# Patient Record
Sex: MALE | Race: WHITE | NOT HISPANIC OR LATINO | Employment: OTHER | URBAN - METROPOLITAN AREA
[De-identification: names, ages, dates, MRNs, and addresses within clinical notes are randomized per-mention and may not be internally consistent; named-entity substitution may affect disease eponyms.]

---

## 2017-11-22 ENCOUNTER — TRANSFERRED RECORDS (OUTPATIENT)
Dept: HEALTH INFORMATION MANAGEMENT | Facility: CLINIC | Age: 62
End: 2017-11-22

## 2017-12-04 ENCOUNTER — TRANSFERRED RECORDS (OUTPATIENT)
Dept: HEALTH INFORMATION MANAGEMENT | Facility: CLINIC | Age: 62
End: 2017-12-04

## 2017-12-06 ENCOUNTER — TRANSFERRED RECORDS (OUTPATIENT)
Dept: HEALTH INFORMATION MANAGEMENT | Facility: CLINIC | Age: 62
End: 2017-12-06

## 2017-12-07 ENCOUNTER — TRANSFERRED RECORDS (OUTPATIENT)
Dept: HEALTH INFORMATION MANAGEMENT | Facility: CLINIC | Age: 62
End: 2017-12-07

## 2018-02-12 ENCOUNTER — TRANSFERRED RECORDS (OUTPATIENT)
Dept: HEALTH INFORMATION MANAGEMENT | Facility: CLINIC | Age: 63
End: 2018-02-12

## 2018-06-14 ENCOUNTER — TRANSFERRED RECORDS (OUTPATIENT)
Dept: HEALTH INFORMATION MANAGEMENT | Facility: CLINIC | Age: 63
End: 2018-06-14

## 2018-08-22 ENCOUNTER — TRANSFERRED RECORDS (OUTPATIENT)
Dept: HEALTH INFORMATION MANAGEMENT | Facility: CLINIC | Age: 63
End: 2018-08-22

## 2018-10-10 ENCOUNTER — TRANSFERRED RECORDS (OUTPATIENT)
Dept: HEALTH INFORMATION MANAGEMENT | Facility: CLINIC | Age: 63
End: 2018-10-10

## 2018-10-10 LAB
ALT SERPL-CCNC: 87 U/L (ref 7–52)
AST SERPL-CCNC: 41 U/L (ref 12–39)
CREAT SERPL-MCNC: 1.1 MG/DL (ref 0.7–1.3)
GFR SERPL CREATININE-BSD FRML MDRD: >60 ML/MIN/1.73M2
GLUCOSE SERPL-MCNC: 194 MG/DL (ref 70–100)
POTASSIUM SERPL-SCNC: 4.6 MMOL/L (ref 3.1–5.1)

## 2019-01-29 ENCOUNTER — TELEPHONE (OUTPATIENT)
Dept: VASCULAR SURGERY | Facility: CLINIC | Age: 64
End: 2019-01-29

## 2019-01-29 NOTE — TELEPHONE ENCOUNTER
Dx 5/1/14 colon cancer  Scan saw that it had also mets into liver    Had Colectomy in ascending colon June 2014.     8/14: hepatectomy at [facility] 2/3rd of his right liver.  Treated after with irinotecan  No tolerate oxaliplatan- due to neuropathy     Was told that Margins good, MD was encouraging.    F/u with CT and was noted to be Cancer free    Last CT scan noted on 3/2016 that it was back.   Went to [facility] in [place], as [facility] hospital stated that it was one lesion    But Bernard said that it was 4 lesions noted in the liver. Discouraged and went back to [facility].     Recommended irinotecan with 12 txt, once a week.     Did 2 txt and then went searching again for another MD.    Found: Dr Gerber Pacheco,Oncologist  at [place] in [place],     He then started to Notice a lump in right side of his abd wall,     12/2016: surgeon removed the mass,   Baldomero Koroma MD , path came back as  Adenocarcinoma.  Dr. Koroma:  suspicious that it was seeding from the liver biopsy at the time of colectomy.    Since then. Dr. Pacheco rec Dr. Aguilar Hogan, IR at [facility], after several months of contemplating treatment, on 12/2017: Y90 was performed, tolerated it well.     Did also have TACE treatment x 2 afterwards,  Last treatment August 2018  Last followup last week.     Plan: pet/ct scan.     Seek 2nd opinion at  of MANI Galarza, RN, BSN  Interventional Radiology Nurse Coordinator   Phone: [phone]

## 2019-02-05 ENCOUNTER — TELEPHONE (OUTPATIENT)
Dept: ONCOLOGY | Facility: CLINIC | Age: 64
End: 2019-02-05

## 2019-02-05 ENCOUNTER — PRE VISIT (OUTPATIENT)
Dept: RADIOLOGY | Facility: CLINIC | Age: 64
End: 2019-02-05

## 2019-02-05 ENCOUNTER — OFFICE VISIT (OUTPATIENT)
Dept: RADIOLOGY | Facility: CLINIC | Age: 64
End: 2019-02-05
Attending: RADIOLOGY
Payer: MEDICARE

## 2019-02-05 VITALS
BODY MASS INDEX: 26.1 KG/M2 | OXYGEN SATURATION: 97 % | HEART RATE: 80 BPM | WEIGHT: 203.4 LBS | TEMPERATURE: 98.1 F | HEIGHT: 74 IN | SYSTOLIC BLOOD PRESSURE: 130 MMHG | DIASTOLIC BLOOD PRESSURE: 72 MMHG

## 2019-02-05 DIAGNOSIS — C18.9 METASTATIC COLON CANCER TO LIVER (H): Primary | ICD-10-CM

## 2019-02-05 DIAGNOSIS — C78.7 METASTATIC COLON CANCER TO LIVER (H): Primary | ICD-10-CM

## 2019-02-05 PROCEDURE — G0463 HOSPITAL OUTPT CLINIC VISIT: HCPCS

## 2019-02-05 RX ORDER — RISPERIDONE 4 MG/1
4 TABLET ORAL AT BEDTIME
Status: ON HOLD | COMMUNITY
End: 2019-04-22

## 2019-02-05 RX ORDER — DIVALPROEX SODIUM 500 MG/1
1000 TABLET, EXTENDED RELEASE ORAL DAILY
Status: ON HOLD | COMMUNITY
End: 2019-04-22

## 2019-02-05 RX ORDER — CLONAZEPAM 0.5 MG/1
0.5 TABLET ORAL
Status: ON HOLD | COMMUNITY
End: 2019-04-22

## 2019-02-05 RX ORDER — MULTIVIT WITH MINERALS/LUTEIN
1 TABLET ORAL DAILY
Status: ON HOLD | COMMUNITY
End: 2019-04-22

## 2019-02-05 RX ORDER — TRAZODONE HYDROCHLORIDE 50 MG/1
50 TABLET, FILM COATED ORAL AT BEDTIME
Status: ON HOLD | COMMUNITY
End: 2019-04-22

## 2019-02-05 RX ORDER — METHYLPHENIDATE HYDROCHLORIDE 10 MG/1
10 CAPSULE, EXTENDED RELEASE ORAL DAILY
Status: ON HOLD | COMMUNITY
End: 2019-04-22

## 2019-02-05 SDOH — HEALTH STABILITY: MENTAL HEALTH: HOW OFTEN DO YOU HAVE A DRINK CONTAINING ALCOHOL?: MONTHLY OR LESS

## 2019-02-05 ASSESSMENT — MIFFLIN-ST. JEOR: SCORE: 1777.37

## 2019-02-05 ASSESSMENT — PAIN SCALES - GENERAL: PAINLEVEL: NO PAIN (0)

## 2019-02-05 NOTE — TELEPHONE ENCOUNTER
ONCOLOGY INTAKE: Records Information      APPT INFORMATION: 02/14 W/Dr. Srivastava at [facility]   Referring provider:  Trinh Landis MD  Referring provider s clinic:  NA  Reason for visit/diagnosis:  Metastatic colon cancer to liver (H) [C18.9, C78.7]    Were the records received with the referral (via [employer])? No    Has patient been seen for any external appt for this diagnosis (enter clinic/location)? Yes    Dx: Metastatic colon cancer to liver (H) [C18.9, C78.7]: Caller PT: Ref by:  Trinh Landis MD:   Records in [facility]   not sure for Outside records

## 2019-02-05 NOTE — LETTER
2019       RE: Los Huang  [address]  [place] [address]     Dear Colleague,    Thank you for referring your patient, Los Huang, to the [facility]. Please see a copy of my visit note below.        Interventional Radiology Clinic Visit  Chief Complaint   Patient presents with     Oncology Clinic Visit     New; Met Colon Ca     HPI:    Pt is a 63M with a history of metastatic CRC, dx in  with oligometastatic disease to liver at the time of diagnosis.  Per the chart, He was treated with ascending colectomy  and R hepatectomy in .  Was then tx with chemotx, eventually had recurrance of hepatic mets in .  Since then has been treated with multiple LDT, initially with y90 but since then with TACE x4. It also sounds like he had an abdominal wall met that was resected at one point.  Most recent treatment was TACE in .      He presents today to establish care.  He recently moved to [place] to be closer to his adult children as well as plan to seek care at a new facility.  He states that he had originally planned to seek care at [facility] when he moved here, but has been unable to gather required documents for [place].  He states that he just wanted to be somewhere he could be placed on a trial.  He had been offered continued treatment with Y90 in [place].  He states that in the past he had preferred TACE over chemotherapy when possible due to convenience.  His visit with us today is his first with new providers since moving to [place].  He has not seen medical oncology.      PMH: No past medical history on file.    PSH: No past surgical history on file.    Family History: No family history on file.    Social History:   Social History     Tobacco Use     Smoking status: Former Smoker     Types: Cigarettes     Start date:      Last attempt to quit:      Years since quittin.1     Smokeless tobacco: Former User     Types: Snuff     Quit  "date: 2008   Substance Use Topics     Alcohol use: Yes     Frequency: Monthly or less     Drug use: No     Medications:   No current outpatient medications on file.     ROS:   Negative other than mentioned in HPI.     EXAM:  /72   Pulse 80   Temp 98.1  F (36.7  C) (Oral)   Ht 1.88 m (6' 2\")   Wt 92.3 kg (203 lb 6.4 oz)   SpO2 97%   BMI 26.12 kg/m     Los is a 63 year old male    General: Well appearing, no acute distress  Eyes: anicteric  CV: Regular rate and rhythm  Resp: Clear to auscultation bilaterally, non labored breathing on room air.    Labs:   No results found for: WBC  No results found for: INR   No results found for: PLT     Imaging Results:   PET ct from 10/10 reviewed.  He has two residual metabolically active liver lesions.  The distrubution of hypermetabolism is much small that the overall lesions on the CT component, likely indicated at least partial response to the prior treatments, although difficult to state with certainty.  He also has 2 FDG avid L axillary LN of unclear significance.       Assessment and Plan:     Pt is a 63M with a hx of R colon Ca with liver and abdominal wall mets with multiple prior treatments, included R hemicolectomy, R abd wall met resection, R hepatectomy, Y90 x1 and TACE x4 to residual liver mets.  He presents to our clinic to establish care in a new system. He may be a candidate for further LDT although his overall care should be managed by medical oncology. At this point we only have limited outside records.      -Referral to medical oncology to establish treatment    -Will follow up with the patient after examining the most recent imaging and discussing with medical oncology.     -May consider further LDT to the residual mets, but need to confer with medical onc first.       Raymon Rubalcava MD  Interventional Radiology Fellow      A total of 30 minutes was spent in care for the patient, of which >50% was spent in counseling and co-ordination of " care.         I have seen the patient with the fellow and agree with the plan.    Again, thank you for allowing me to participate in the care of your patient.      Sincerely,    Trinh Landis MD

## 2019-02-05 NOTE — PATIENT INSTRUCTIONS
1. We will refer you to see an Oncologist.     2. We will also review the rest of your images after they are uploaded and consult with an Oncologist to see what is the best plan of care.     Please call me should you have any other questions. It was a pleasure to see you today.    Elvira Galarza RN, BSN  Interventional Radiology Nurse Coordinator   Phone: [phone]

## 2019-02-05 NOTE — PROGRESS NOTES
Interventional Radiology Clinic Visit  Chief Complaint   Patient presents with     Oncology Clinic Visit     New; Met Colon Ca     HPI:    Pt is a 63M with a history of metastatic CRC, dx in  with oligometastatic disease to liver at the time of diagnosis.  Per the chart, He was treated with ascending colectomy  and R hepatectomy in .  Was then tx with chemotx, eventually had recurrance of hepatic mets in 2016.  Since then has been treated with multiple LDT, initially with y90 but since then with TACE x4. It also sounds like he had an abdominal wall met that was resected at one point.  Most recent treatment was TACE in .      He presents today to establish care.  He recently moved to [place] to be closer to his adult children as well as plan to seek care at a new facility.  He states that he had originally planned to seek care at [facility] when he moved here, but has been unable to gather required documents for [place].  He states that he just wanted to be somewhere he could be placed on a trial.  He had been offered continued treatment with Y90 in [place].  He states that in the past he had preferred TACE over chemotherapy when possible due to convenience.  His visit with us today is his first with new providers since moving to [place].  He has not seen medical oncology.      PMH: No past medical history on file.    PSH: No past surgical history on file.    Family History: No family history on file.    Social History:   Social History     Tobacco Use     Smoking status: Former Smoker     Types: Cigarettes     Start date:      Last attempt to quit: 2003     Years since quittin.1     Smokeless tobacco: Former User     Types: Snuff     Quit date:    Substance Use Topics     Alcohol use: Yes     Frequency: Monthly or less     Drug use: No     Medications:   No current outpatient medications on file.     ROS:   Negative other than mentioned in HPI.     EXAM:  /72   Pulse 80   " Temp 98.1  F (36.7  C) (Oral)   Ht 1.88 m (6' 2\")   Wt 92.3 kg (203 lb 6.4 oz)   SpO2 97%   BMI 26.12 kg/m    Los is a 63 year old male    General: Well appearing, no acute distress  Eyes: anicteric  CV: Regular rate and rhythm  Resp: Clear to auscultation bilaterally, non labored breathing on room air.    Labs:   No results found for: WBC  No results found for: INR   No results found for: PLT     Imaging Results:   PET ct from 10/10 reviewed.  He has two residual metabolically active liver lesions.  The distrubution of hypermetabolism is much small that the overall lesions on the CT component, likely indicated at least partial response to the prior treatments, although difficult to state with certainty.  He also has 2 FDG avid L axillary LN of unclear significance.       Assessment and Plan:     Pt is a 63M with a hx of R colon Ca with liver and abdominal wall mets with multiple prior treatments, included R hemicolectomy, R abd wall met resection, R hepatectomy, Y90 x1 and TACE x4 to residual liver mets.  He presents to our clinic to establish care in a new system. He may be a candidate for further LDT although his overall care should be managed by medical oncology. At this point we only have limited outside records.      -Referral to medical oncology to establish treatment    -Will follow up with the patient after examining the most recent imaging and discussing with medical oncology.     -May consider further LDT to the residual mets, but need to confer with medical onc first.       Raymon Rubalcava MD  Interventional Radiology Fellow      A total of 30 minutes was spent in care for the patient, of which >50% was spent in counseling and co-ordination of care.         I have seen the patient with the fellow and agree with the plan.  "

## 2019-02-05 NOTE — NURSING NOTE
"Oncology Rooming Note    February 5, 2019 1:56 PM   Los Huang is a 63 year old male who presents for:    Chief Complaint   Patient presents with     Oncology Clinic Visit     New; Met Colon Ca     Initial Vitals: /72   Pulse 80   Temp 98.1  F (36.7  C) (Oral)   Ht 1.88 m (6' 2\")   Wt 92.3 kg (203 lb 6.4 oz)   SpO2 97%   BMI 26.12 kg/m   Estimated body mass index is 26.12 kg/m  as calculated from the following:    Height as of this encounter: 1.88 m (6' 2\").    Weight as of this encounter: 92.3 kg (203 lb 6.4 oz). Body surface area is 2.2 meters squared.  No Pain (0) Comment: Data Unavailable   No LMP for male patient.  Allergies reviewed: Yes  Medications reviewed: Yes    Medications: Medication refills not needed today.  Pharmacy name entered into EPIC: Data Unavailable    Clinical concerns: Pt here to discuss Met Colon CA.  Dr Landis  was notified.        Malina Man CMA              "

## 2019-02-05 NOTE — LETTER
Date:February 14, 2019      Patient was self referred, no letter generated. Do not send.        [facility] Information

## 2019-02-06 ENCOUNTER — TELEPHONE (OUTPATIENT)
Dept: PSYCHIATRY | Facility: CLINIC | Age: 64
End: 2019-02-06
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
## 2019-02-06 NOTE — TELEPHONE ENCOUNTER
----- Message from Jeri Sharpe MA sent at 2/5/2019  3:27 PM CST -----  Regarding: Patient Records  [facility],    Patient Los Huang ([id number]) inquired about the CD's of his records he gave to you/Dr Landis today but, you had already left for the day. The patient would like to make sure he can got those records back, when he returns to the clinic next Thursday.     Please let me know if you have any questions.    Thanks very much!    Jeri Sharpe CMA

## 2019-02-12 ENCOUNTER — TELEPHONE (OUTPATIENT)
Dept: PSYCHIATRY | Facility: CLINIC | Age: 64
End: 2019-02-12

## 2019-02-12 NOTE — TELEPHONE ENCOUNTER
Social Work   Incoming/Outgoing Call  [facility]    Outgoing Call To: Los Huang    Reason for Call:  Requested by patient, see intake note below    Response/Plan:  Los currently has a 90 day supply of medications. He has calculated that he will run out around 4/8/19 and his appointment with psychiatric clinic is not until 4/17/19. LAURIE brainstormed options. He believes his past provider would write another refill for 1 weeks worth of medication. He is also on the cancellation list at our clinic. LAURIE provided information on APS (Acute Psychiatric Services) with [facility] that may be able to provide short term medications if needed. Los would prefer to move up his appointment time.    Los reports having a complicated case as he also has metastatic cancer in his liver. He reports reading a blackbox warning on one of his medications (Depakote XR) regarding effects on liver and is concerned. LAURIE offered to contact clinic's Pharmacy team about possibility of MTM visit. Los has an appointment with cancer clinic tomorrow (2/13/19). LAURIE also encouraged him to talk with his provider there as it is possible they have pharmacists on staff.    Los notes he does best when he has both medication and therapy support. He requested help finding a therapist. He would prefer to stay within provider system and has set up PCP care at [facility]. LAURIE provided information on Eugenio Ashby, who provides therapy at [place]. Los has My Chart and writer will provide a few additional referral ideas.    LAURIE encouraged Los to call if he needs additional resources or supports.      Will route to patient's current psychiatric provider(s) as an FYI.   Please call or EPIC message with any questions or concerns.    KIM Bernabe, [facility]  [phone]    ----- Message from Shanel Juarez sent at 1/30/2019 11:18 AM CST -----  Regarding: Pt Needs Help with Resources  Contact:  [phone]  Amado Alexander,     I was speaking with this pt. He's scared about what's going to happen to him because [place] and doesn't have a med provider or PCP. He needs help with find providers and is currently taking psych meds for bi-polar or schizoeffective, although he doesn't have another refill. I explained to him that we are scheduled out into April, but he doesn't want to wait too long.     His name is Solo and he can be reached at [phone].

## 2019-02-13 NOTE — TELEPHONE ENCOUNTER
RECORDS STATUS - ALL OTHER DIAGNOSIS      RECORDS RECEIVED FROM: Epic    DATE RECEIVED: February 13, 2019     NOTES STATUS DETAILS   OFFICE NOTE from referring provider      OFFICE NOTE from medical oncologist None per pt     DISCHARGE SUMMARY from hospital [facility] - and [facility]    DISCHARGE REPORT from the ER [facility] - and [facility]- dr. Aguilar leon.       OPERATIVE REPORT [facility] -  Dr. Shaun acevedo and [facility]- dr. Gerber castaneda        MEDICATION LIST [facility]     CLINICAL TRIAL TREATMENTS TO DATE None per pt     LABS     PATHOLOGY REPORTS Reports in epic     ANYTHING RELATED TO DIAGNOSIS [facility] and [facility]        GENONOMIC TESTING     TYPE: None per pt     IMAGING (NEED IMAGES & REPORT)     CT SCANS [facility]     MRI NA    MAMMO NA    ULTRASOUND NA    PET NA

## 2019-02-13 NOTE — TELEPHONE ENCOUNTER
I called Zuleyma at the clinic and she will let me know if  We need to reschedule due to a lack of records.  3:51 PM

## 2019-02-13 NOTE — TELEPHONE ENCOUNTER
Called [facility] to obtain MR Sowmya Lee Ann ALVES. LVM and explained urgency of the records.  3:17 PM

## 2019-02-14 ENCOUNTER — PRE VISIT (OUTPATIENT)
Dept: ONCOLOGY | Facility: CLINIC | Age: 64
End: 2019-02-14

## 2019-02-14 ENCOUNTER — ONCOLOGY VISIT (OUTPATIENT)
Dept: ONCOLOGY | Facility: CLINIC | Age: 64
End: 2019-02-14
Attending: RADIOLOGY
Payer: MEDICARE

## 2019-02-14 VITALS
WEIGHT: 210 LBS | BODY MASS INDEX: 26.95 KG/M2 | OXYGEN SATURATION: 95 % | DIASTOLIC BLOOD PRESSURE: 75 MMHG | SYSTOLIC BLOOD PRESSURE: 123 MMHG | HEIGHT: 74 IN | TEMPERATURE: 97 F | RESPIRATION RATE: 16 BRPM | HEART RATE: 76 BPM

## 2019-02-14 DIAGNOSIS — C78.7 METASTATIC COLON CANCER TO LIVER (H): ICD-10-CM

## 2019-02-14 DIAGNOSIS — C18.9 METASTATIC COLON CANCER TO LIVER (H): ICD-10-CM

## 2019-02-14 LAB
ALBUMIN SERPL-MCNC: 3.9 G/DL (ref 3.4–5)
ALP SERPL-CCNC: 96 U/L (ref 40–150)
ALT SERPL W P-5'-P-CCNC: 63 U/L (ref 0–70)
ANION GAP SERPL CALCULATED.3IONS-SCNC: 8 MMOL/L (ref 3–14)
AST SERPL W P-5'-P-CCNC: 36 U/L (ref 0–45)
BASOPHILS # BLD AUTO: 0.1 10E9/L (ref 0–0.2)
BASOPHILS NFR BLD AUTO: 0.9 %
BILIRUB SERPL-MCNC: 0.5 MG/DL (ref 0.2–1.3)
BUN SERPL-MCNC: 20 MG/DL (ref 7–30)
CALCIUM SERPL-MCNC: 8.5 MG/DL (ref 8.5–10.1)
CEA SERPL-MCNC: 0.7 UG/L (ref 0–2.5)
CHLORIDE SERPL-SCNC: 106 MMOL/L (ref 94–109)
CO2 SERPL-SCNC: 25 MMOL/L (ref 20–32)
CREAT SERPL-MCNC: 0.86 MG/DL (ref 0.66–1.25)
DIFFERENTIAL METHOD BLD: NORMAL
EOSINOPHIL # BLD AUTO: 0.3 10E9/L (ref 0–0.7)
EOSINOPHIL NFR BLD AUTO: 4.7 %
ERYTHROCYTE [DISTWIDTH] IN BLOOD BY AUTOMATED COUNT: 13.2 % (ref 10–15)
GFR SERPL CREATININE-BSD FRML MDRD: >90 ML/MIN/{1.73_M2}
GLUCOSE SERPL-MCNC: 91 MG/DL (ref 70–99)
HCT VFR BLD AUTO: 46.8 % (ref 40–53)
HGB BLD-MCNC: 15.3 G/DL (ref 13.3–17.7)
IMM GRANULOCYTES # BLD: 0 10E9/L (ref 0–0.4)
IMM GRANULOCYTES NFR BLD: 0.2 %
LYMPHOCYTES # BLD AUTO: 1.3 10E9/L (ref 0.8–5.3)
LYMPHOCYTES NFR BLD AUTO: 20.6 %
MCH RBC QN AUTO: 29.5 PG (ref 26.5–33)
MCHC RBC AUTO-ENTMCNC: 32.7 G/DL (ref 31.5–36.5)
MCV RBC AUTO: 90 FL (ref 78–100)
MONOCYTES # BLD AUTO: 0.7 10E9/L (ref 0–1.3)
MONOCYTES NFR BLD AUTO: 10.3 %
NEUTROPHILS # BLD AUTO: 4.1 10E9/L (ref 1.6–8.3)
NEUTROPHILS NFR BLD AUTO: 63.3 %
NRBC # BLD AUTO: 0 10*3/UL
NRBC BLD AUTO-RTO: 0 /100
PLATELET # BLD AUTO: 155 10E9/L (ref 150–450)
POTASSIUM SERPL-SCNC: 4 MMOL/L (ref 3.4–5.3)
PROT SERPL-MCNC: 7.1 G/DL (ref 6.8–8.8)
RBC # BLD AUTO: 5.18 10E12/L (ref 4.4–5.9)
SODIUM SERPL-SCNC: 138 MMOL/L (ref 133–144)
WBC # BLD AUTO: 6.4 10E9/L (ref 4–11)

## 2019-02-14 PROCEDURE — G0463 HOSPITAL OUTPT CLINIC VISIT: HCPCS | Mod: ZF

## 2019-02-14 PROCEDURE — 36415 COLL VENOUS BLD VENIPUNCTURE: CPT

## 2019-02-14 PROCEDURE — 80053 COMPREHEN METABOLIC PANEL: CPT | Performed by: INTERNAL MEDICINE

## 2019-02-14 PROCEDURE — 82378 CARCINOEMBRYONIC ANTIGEN: CPT | Performed by: INTERNAL MEDICINE

## 2019-02-14 PROCEDURE — 85025 COMPLETE CBC W/AUTO DIFF WBC: CPT | Performed by: INTERNAL MEDICINE

## 2019-02-14 PROCEDURE — 99205 OFFICE O/P NEW HI 60 MIN: CPT | Mod: ZP | Performed by: INTERNAL MEDICINE

## 2019-02-14 ASSESSMENT — MIFFLIN-ST. JEOR: SCORE: 1807.3

## 2019-02-14 ASSESSMENT — PAIN SCALES - GENERAL: PAINLEVEL: NO PAIN (0)

## 2019-02-14 NOTE — TELEPHONE ENCOUNTER
Spoke to [place] in path dept and it will take a couple days for them to send path. When ready she will overnight the slides. She has all the info needed to do so.   9:28 AM

## 2019-02-14 NOTE — LETTER
2/14/2019     RE: Los Huang  [address]  [facility] [address]     Dear Colleague,    Thank you for referring your patient, Los Huang, to the [facility]. Please see a copy of my visit note below.    Oncology initial visit:  Date on this visit: 2/14/2019    Los Huang  is referred by Dr.Jafar Landis for an oncology consultation. He requires evaluation for metastatic colon cancer    Primary Physician: No Ref-Primary, Physician     History Of Present Illness:    I took the history from the patient as well as reviewing outside records but all relevant outside records are not available at this time.  Mr. Huang is a 63 year old male who was diagnosed with metastatic colorectal cancer in 2014 with oligometastatic disease to liver at the time of diagnosis.   He was treated with ascending colectomy 6/14 and R hepatectomy in 8/14.  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  He completed 10 cycles of FOLFIRI around February of March 2015.), eventually had recurrance of hepatic mets in March 2016.  He was seeking several different opinions from different oncologist at that time so his treatment got delayed and he got cetuximab for 2 cycles during the summer 2016 and he had issues with skin the rash from it.  He also had an right upper quadrant abdominal wall met that was resected in Dec 2016.    Since then has been treated with multiple liver directed therapies, initially with y90 in Dec 2017 but since then with TACE x4, 1/9/18, 6/14/18, 7/10/18 and 8/22/18.        He also has possible Renal cell cancer of the Right lower pole of the kidney which has not been treated.    He comes in today and tells me that he recently relocated from [place] to [place].  Overall he has been feeling well but he has  noticed some mild right upper quadrant abdominal discomfort but it is not severe enough to take any pain medications.  Denies nausea or vomiting.  No significant weight loss.  He has been able to eat and drink well.  Off-and-on he has had diarrhea for 18 months and he takes 1-4 Imodium's every day.  Generally he takes 1 Imodium a day.  Denies infections.  No trouble breathing.  He has baseline mild numbness in his feet which predates the diagnosis of colon cancer but he does not have any other numbness anywhere else.  Overall his energy level is a stable although he is retired and he feels tired.  He is also on multiple psych medications and he believes that some of the lack of energy is due to that.    ECOG 1    ROS:  A comprehensive ROS was otherwise neg    Past Medical/Surgical History:  No past medical history on file.  No past surgical history on file.   Bipolar disorder  Schizoaffective dx  Colon cancer    Cancer History:   As above    Allergies:  Allergies as of 02/14/2019 - Reviewed 02/14/2019   Allergen Reaction Noted     Animal dander Other (See Comments) 02/14/2019     Current Medications:  Current Outpatient Medications   Medication Sig Dispense Refill     cholecalciferol (VITAMIN D3) 1000 units (25 mcg) capsule Take 1 capsule by mouth daily       clonazePAM (KLONOPIN) 0.5 MG tablet Take 0.5 mg by mouth nightly as needed for anxiety       divalproex sodium extended-release (DEPAKOTE ER) 500 MG 24 hr tablet Take 1,000 mg by mouth daily       methylphenidate (RITALIN LA) 10 MG 24 hr capsule Take 10 mg by mouth daily       multivitamin (CENTRUM SILVER) tablet Take 1 tablet by mouth daily       risperiDONE (RISPERDAL) 4 MG tablet Take 4 mg by mouth At Bedtime       traZODone (DESYREL) 50 MG tablet Take 50 mg by mouth At Bedtime        Family History:  No family history on file.   No known family history of cancers.  He has a son and a daughter who are healthy.  Social History:  Social History  "    Socioeconomic History     Marital status:      Spouse name: Not on file     Number of children: Not on file     Years of education: Not on file     Highest education level: Not on file   Social Needs     Financial resource strain: Not on file     Food insecurity - worry: Not on file     Food insecurity - inability: Not on file     Transportation needs - medical: Not on file     Transportation needs - non-medical: Not on file   Occupational History     Not on file   Tobacco Use     Smoking status: Former Smoker     Types: Cigarettes     Start date:      Last attempt to quit:      Years since quittin.1     Smokeless tobacco: Former User     Types: Snuff     Quit date:    Substance and Sexual Activity     Alcohol use: Yes     Frequency: Monthly or less     Drug use: No     Sexual activity: Not on file   Other Topics Concern     Not on file   Social History Narrative     Not on file   He used to smoke but quit in .  He drinks alcohol occasionally.  He was in [place] but recently relocated to [place] and he is going to move in his own apartment tomorrow.  His daughter lives close by.    Physical Exam:  /75   Pulse 76   Temp 97  F (36.1  C) (Tympanic)   Resp 16   Ht 1.88 m (6' 2\")   Wt 95.3 kg (210 lb)   SpO2 95%   BMI 26.96 kg/m     CONSTITUTIONAL: no acute distress  EYES: PERRLA, no palor or icterus.   ENT/MOUTH: no mouth lesions. Ears normal  CVS: s1s2 no m r g .   RESPIRATORY: clear to auscultation b/l  GI: soft.  Abdominal surgical scars are healed well.  He has minimal tenderness in the right upper quadrant but no organomegaly appreciated.   NEURO: AAOX3  Grossly non focal neuro exam apart from mild neuropathy in the feet.  INTEGUMENT: no obvious rashes  LYMPHATIC: no palpable cervical, supraclavicular, axillary or inguinal LAD  MUSCULOSKELETAL: Unremarkable. No bony tenderness.   EXTREMITIES: no edema  PSYCH: Mentation, mood and affect are normal. Decision making " capacity is intact    Laboratory/Imaging Studies  No results found for this or any previous visit.     Outside labs reviewed.  Outside his scans reviewed.    ASSESSMENT/PLAN:    We will try to get all outside medical records including his original slides and I would also like to confirm MSI status.  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  He has metastatic colorectal cancer to the liver.  He was treated with ascending colectomy in June 2014 followed by hepatectomy in August 2014.  He received FOLFOX x2 and FOLFIRI x10 after that.  He had recurrence of the liver metastases in 2016. He got 2 doses of Cetuximab in 2016. He has received multiple liver directed therapy since then the last one was in August 2018.  He also had excision of the right upper quadrant abdominal wall metastasis in December 2016.  On his most recent PET scan from October 2018 he had 2 residual metabolically active liver lesions and these were less conspicuous as compared to the previous CT scan.  He also had 2 FDG avid left axillary lymph nodes which likely were due to extravasation of the tracer which was injected in the left arm.    We discussed the situation in detail.  I would recommend repeating labs and getting a fresh set of his scans with a PET scan and then decide further management.  Potentially we can continue with additional liver directed therapy if possible.  But we will decide about it later on.    Right kidney lesion.  There is a lesion in the right lower pole of the kidney which is concerning for renal cell cancer.  We will repeat imaging and decide about its management.  Potentially it can be treated by local therapies by interventional radiology but we will make the final determination later on.    We will determine the follow-up accordingly.    All of his questions were answered to his satisfaction.  He is agreeable and comfortable with the plan.      Again, thank you for allowing me to participate in  the care of your patient.      Sincerely,    Patrice Srivastava MD

## 2019-02-14 NOTE — PROGRESS NOTES
Oncology initial visit:  Date on this visit: 2/14/2019    Los Huang  is referred by Dr.Jafar Landis for an oncology consultation. He requires evaluation for metastatic colon cancer    Primary Physician: No Ref-Primary, Physician     History Of Present Illness:    I took the history from the patient as well as reviewing outside records but all relevant outside records are not available at this time.  Mr. Huang is a 63 year old male who was diagnosed with metastatic colorectal cancer in 2014 with oligometastatic disease to liver at the time of diagnosis.  He was treated with ascending colectomy 6/14 and R hepatectomy in 8/14.  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  He completed 10 cycles of FOLFIRI around February of March 2015.), eventually had recurrance of hepatic mets in March 2016.  He was seeking several different opinions from different oncologist at that time so his treatment got delayed and he got cetuximab for 2 cycles during the summer 2016 and he had issues with skin the rash from it. He also had an right upper quadrant abdominal wall met that was resected in Dec 2016.    Since then has been treated with multiple liver directed therapies, initially with y90 in Dec 2017 but since then with TACE x4, 1/9/18, 6/14/18, 7/10/18 and 8/22/18.        He also has possible Renal cell cancer of the Right lower pole of the kidney which has not been treated.    He comes in today and tells me that he recently relocated from [place] to [place].  Overall he has been feeling well but he has noticed some mild right upper quadrant abdominal discomfort but it is not severe enough to take any pain medications.  Denies nausea or vomiting.  No significant weight loss.  He has been able to eat and drink well.  Off-and-on he has had diarrhea for  18 months and he takes 1-4 Imodium's every day.  Generally he takes 1 Imodium a day.  Denies infections.  No trouble breathing.  He has baseline mild numbness in his feet which predates the diagnosis of colon cancer but he does not have any other numbness anywhere else.  Overall his energy level is a stable although he is retired and he feels tired.  He is also on multiple psych medications and he believes that some of the lack of energy is due to that.    ECOG 1    ROS:  A comprehensive ROS was otherwise neg    Past Medical/Surgical History:  No past medical history on file.  No past surgical history on file.   Bipolar disorder  Schizoaffective dx  Colon cancer    Cancer History:   As above    Allergies:  Allergies as of 02/14/2019 - Reviewed 02/14/2019   Allergen Reaction Noted     Animal dander Other (See Comments) 02/14/2019     Current Medications:  Current Outpatient Medications   Medication Sig Dispense Refill     cholecalciferol (VITAMIN D3) 1000 units (25 mcg) capsule Take 1 capsule by mouth daily       clonazePAM (KLONOPIN) 0.5 MG tablet Take 0.5 mg by mouth nightly as needed for anxiety       divalproex sodium extended-release (DEPAKOTE ER) 500 MG 24 hr tablet Take 1,000 mg by mouth daily       methylphenidate (RITALIN LA) 10 MG 24 hr capsule Take 10 mg by mouth daily       multivitamin (CENTRUM SILVER) tablet Take 1 tablet by mouth daily       risperiDONE (RISPERDAL) 4 MG tablet Take 4 mg by mouth At Bedtime       traZODone (DESYREL) 50 MG tablet Take 50 mg by mouth At Bedtime        Family History:  No family history on file.   No known family history of cancers.  He has a son and a daughter who are healthy.  Social History:  Social History     Socioeconomic History     Marital status:      Spouse name: Not on file     Number of children: Not on file     Years of education: Not on file     Highest education level: Not on file   Social Needs     Financial resource strain: Not on file     Food  "insecurity - worry: Not on file     Food insecurity - inability: Not on file     Transportation needs - medical: Not on file     Transportation needs - non-medical: Not on file   Occupational History     Not on file   Tobacco Use     Smoking status: Former Smoker     Types: Cigarettes     Start date:      Last attempt to quit: 2003     Years since quittin.1     Smokeless tobacco: Former User     Types: Snuff     Quit date:    Substance and Sexual Activity     Alcohol use: Yes     Frequency: Monthly or less     Drug use: No     Sexual activity: Not on file   Other Topics Concern     Not on file   Social History Narrative     Not on file   He used to smoke but quit in .  He drinks alcohol occasionally.  He was in [place] but recently relocated to [place] and he is going to move in his own apartment tomorrow.  His daughter lives close by.    Physical Exam:  /75   Pulse 76   Temp 97  F (36.1  C) (Tympanic)   Resp 16   Ht 1.88 m (6' 2\")   Wt 95.3 kg (210 lb)   SpO2 95%   BMI 26.96 kg/m    CONSTITUTIONAL: no acute distress  EYES: PERRLA, no palor or icterus.   ENT/MOUTH: no mouth lesions. Ears normal  CVS: s1s2 no m r g .   RESPIRATORY: clear to auscultation b/l  GI: soft.  Abdominal surgical scars are healed well.  He has minimal tenderness in the right upper quadrant but no organomegaly appreciated.   NEURO: AAOX3  Grossly non focal neuro exam apart from mild neuropathy in the feet.  INTEGUMENT: no obvious rashes  LYMPHATIC: no palpable cervical, supraclavicular, axillary or inguinal LAD  MUSCULOSKELETAL: Unremarkable. No bony tenderness.   EXTREMITIES: no edema  PSYCH: Mentation, mood and affect are normal. Decision making capacity is intact    Laboratory/Imaging Studies  No results found for this or any previous visit.     Outside labs reviewed.  Outside his scans reviewed.    ASSESSMENT/PLAN:    We will try to get all outside medical records including his original slides and I would " also like to confirm MSI status.  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  He has metastatic colorectal cancer to the liver.  He was treated with ascending colectomy in June 2014 followed by hepatectomy in August 2014.  He received FOLFOX x2 and FOLFIRI x10 after that.  He had recurrence of the liver metastases in 2016. He got 2 doses of Cetuximab in 2016. He has received multiple liver directed therapy since then the last one was in August 2018.  He also had excision of the right upper quadrant abdominal wall metastasis in December 2016.  On his most recent PET scan from October 2018 he had 2 residual metabolically active liver lesions and these were less conspicuous as compared to the previous CT scan.  He also had 2 FDG avid left axillary lymph nodes which likely were due to extravasation of the tracer which was injected in the left arm.    We discussed the situation in detail.  I would recommend repeating labs and getting a fresh set of his scans with a PET scan and then decide further management.  Potentially we can continue with additional liver directed therapy if possible.  But we will decide about it later on.    Right kidney lesion.  There is a lesion in the right lower pole of the kidney which is concerning for renal cell cancer.  We will repeat imaging and decide about its management.  Potentially it can be treated by local therapies by interventional radiology but we will make the final determination later on.    We will determine the follow-up accordingly.    All of his questions were answered to his satisfaction.  He is agreeable and comfortable with the plan.

## 2019-02-14 NOTE — NURSING NOTE
"Oncology Rooming Note    February 14, 2019 5:28 PM   Los Huang is a 63 year old male who presents for:    Chief Complaint   Patient presents with     Oncology Clinic Visit     Colon Cancer     Initial Vitals: /75   Pulse 76   Temp 97  F (36.1  C) (Tympanic)   Resp 16   Ht 1.88 m (6' 2\")   Wt 95.3 kg (210 lb)   SpO2 95%   BMI 26.96 kg/m   Estimated body mass index is 26.96 kg/m  as calculated from the following:    Height as of this encounter: 1.88 m (6' 2\").    Weight as of this encounter: 95.3 kg (210 lb). Body surface area is 2.23 meters squared.  No Pain (0) Comment: Data Unavailable   No LMP for male patient.  Allergies reviewed: Yes  Medications reviewed: Yes    Medications: Medication refills not needed today.  Pharmacy name entered into [employer]: [id number] - [place], [place] - [address] [facility] [address]    Clinical concerns: No New Concerns    5 minutes for nursing intake (face to face time)     DAWNA Walsh      "

## 2019-02-14 NOTE — TELEPHONE ENCOUNTER
Attempted to call [facility]  again to obtain MR Sowmya Had to [facility]. Explained the urgency of these records.    Correction- Pt mentioned he was seen at [place] - Due to his accent I misunderstood and thought he said [facility]- pt was not seen at [facility].  9:18 AM

## 2019-02-14 NOTE — TELEPHONE ENCOUNTER
[facility] records and [place] records as well as path reports scanned in and viewable under Media tab.  9:05 AM

## 2019-02-15 NOTE — NURSING NOTE
Chief Complaint   Patient presents with     Blood Draw     lab only appointment.  labs drawn by venipuncture by rosalba.       Bronwyn Long RN

## 2019-02-19 NOTE — PROGRESS NOTES
"  SUBJECTIVE:   Los Huang is a 63 year old male who presents to clinic today for the following health issues:       Polypharmacy  Screen for colon cancer  Need for hepatitis C screening test  Screening for HIV (human immunodeficiency virus)  Need for prophylactic vaccination and inoculation against influenza     New patient to the Internal Medicine department. Has seen Patrice Srivastava, hematologist with [facility] and that office visit assessment is clipped and pasted here.    Mr. Huang is currently dealing with diagnosis and treatment of metastatic colon cancer. He notes that he has a long history of mental health problems, and his mental health is not good at this time. He feels that because of his liver and colon cancer he is not long for this world, and would just like to live his life to the fullest and spend as much time with his grandchildren as he can. His psychologist has given him a stimulant to help him have more energy to get things done before he passes away. In 2014 when he was diagnosed, he was told he had 5 years to live, which will be this May 1st.   He is not looking for diagnostic or future cares today, but rather supportive care measures to make the coming times more comfortable. He notes that some of his psychiatric meds cause his energy to be lower, calls them \"anti-motivation pills\". He cannot get in to see psychiatry very soon, will be seeing a new psychiatrist at the end of April. 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 He does have enough to get him through until he sees the psychiatrist. He is interested in the [facility] pharmacy program.   He is concerned that the Depakote he is taking could cause liver failure.     We can review all preventative healthcare maintenance goals. I have NOT listed all of this stuff as diagnoses in the diagnosis section. " Screening human immunodeficiency virus ( HIV ), screening  Hepatitis C, Immunizations for Shingrix vaccination against herpes zoster, tetanus booster, prophylactic vaccination and inoculation against influenza, fasting lipid panel, advanced directive discussion, Wellness physical exam, screening for abdominal aortic aneurysm     History Of Present Illness:     I took the history from the patient as well as reviewing outside records but all relevant outside records are not available at this time.  Mr. Huang is a 63 year old male who was diagnosed with metastatic colorectal cancer in 2014 with oligometastatic disease to liver at the time of diagnosis.  He was treated with ascending colectomy 6/14 and R hepatectomy in 8/14.  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  He completed 10 cycles of FOLFIRI around February of March 2015.), eventually had recurrance of hepatic mets in March 2016.  He was seeking several different opinions from different oncologist at that time so his treatment got delayed and he got cetuximab for 2 cycles during the summer 2016 and he had issues with skin the rash from it. He also had an right upper quadrant abdominal wall met that was resected in Dec 2016.    Since then has been treated with multiple liver directed therapies, initially with y90 in Dec 2017 but since then with TACE x4, 1/9/18, 6/14/18, 7/10/18 and 8/22/18.       He also has possible Renal cell cancer of the Right lower pole of the kidney which has not been treated.     He comes in today and tells me that he recently relocated from [place] to [place].  Overall he has been feeling well but he has noticed some mild right upper quadrant abdominal discomfort but it is not severe enough to take any pain medications.  Denies nausea or vomiting.  No significant  weight loss.  He has been able to eat and drink well.  Off-and-on he has had diarrhea for 18 months and he takes 1-4 Imodium's every day.  Generally he takes 1 Imodium a day.  Denies infections.  No trouble breathing.  He has baseline mild numbness in his feet which predates the diagnosis of colon cancer but he does not have any other numbness anywhere else.  Overall his energy level is a stable although he is retired and he feels tired.  He is also on multiple psych medications and he believes that some of the lack of energy is due to that.    ASSESSMENT/PLAN:     We will try to get all outside medical records including his original slides and I would also like to confirm MSI status.  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  He has metastatic colorectal cancer to the liver.  He was treated with ascending colectomy in June 2014 followed by hepatectomy in August 2014.  He received FOLFOX x2 and FOLFIRI x10 after that.  He had recurrence of the liver metastases in 2016. He got 2 doses of Cetuximab in 2016. He has received multiple liver directed therapy since then the last one was in August 2018.  He also had excision of the right upper quadrant abdominal wall metastasis in December 2016.  On his most recent PET scan from October 2018 he had 2 residual metabolically active liver lesions and these were less conspicuous as compared to the previous CT scan.  He also had 2 FDG avid left axillary lymph nodes which likely were due to extravasation of the tracer which was injected in the left arm.     We discussed the situation in detail.  I would recommend repeating labs and getting a fresh set of his scans with a PET scan and then decide further management.  Potentially we can continue with additional liver directed therapy if possible.  But we will decide about it later on.     Right kidney lesion.  There is a lesion in the right lower pole of the kidney which is concerning for renal cell cancer.   We will repeat imaging and decide about its management.  Potentially it can be treated by local therapies by interventional radiology but we will make the final determination later on.     We will determine the follow-up accordingly.     All of his questions were answered to his satisfaction.  He is agreeable and comfortable with the plan.       Problem list and histories reviewed & adjusted, as indicated.  Additional history: as documented    There is no problem list on file for this patient.    Past Surgical History:   Procedure Laterality Date     ABDOMEN SURGERY       BIOPSY       CHOLECYSTECTOMY       COLONOSCOPY       ORTHOPEDIC SURGERY         Social History     Tobacco Use     Smoking status: Former Smoker     Packs/day: 1.00     Years: 20.00     Pack years: 20.00     Types: Cigarettes     Start date:      Last attempt to quit:      Years since quittin.1     Smokeless tobacco: Former User     Types: Snuff     Quit date:    Substance Use Topics     Alcohol use: Yes     Frequency: Monthly or less     Comment: slight     Family History   Problem Relation Age of Onset     Osteoporosis Sister      Thyroid Disease Sister         Thyroid killed     Thyroid Disease Sister         Thyroid removed         Current Outpatient Medications   Medication Sig Dispense Refill     cholecalciferol (VITAMIN D3) 1000 units (25 mcg) capsule Take 1 capsule by mouth daily       clonazePAM (KLONOPIN) 0.5 MG tablet Take 0.5 mg by mouth nightly as needed for anxiety       divalproex sodium extended-release (DEPAKOTE ER) 500 MG 24 hr tablet Take 1,000 mg by mouth daily       methylphenidate (RITALIN LA) 10 MG 24 hr capsule Take 10 mg by mouth daily       multivitamin (CENTRUM SILVER) tablet Take 1 tablet by mouth daily       risperiDONE (RISPERDAL) 4 MG tablet Take 4 mg by mouth At Bedtime       traZODone (DESYREL) 50 MG tablet Take 50 mg by mouth At Bedtime       Labs reviewed in EPIC    Reviewed and updated as needed  "this visit by clinical staff       Reviewed and updated as needed this visit by Provider         ROS:  Constitutional, HEENT, cardiovascular, pulmonary, GI, , musculoskeletal, neuro, skin, endocrine and psych systems are negative, except as otherwise noted.    This document serves as a record of the services and decisions personally performed and made by Babar Mckinney MD. It was created on his behalf by Sonia Sanchez, a trained medical scribe. The creation of this document is based on the provider's statements to the medical scribe.  Sonia Sanchez February 25, 2019 3:13 PM    OBJECTIVE:     /64   Pulse 86   Temp 98.8  F (37.1  C) (Oral)   Resp 18   Ht 1.88 m (6' 2\")   Wt 95.7 kg (211 lb)   SpO2 96%   BMI 27.09 kg/m    Body mass index is 27.09 kg/m .  GENERAL: healthy, alert and no distress  MS: no gross musculoskeletal defects noted, no edema  SKIN: no suspicious lesions or rashes  PSYCH: mentation appears normal, affect normal/bright    Diagnostic Test Results:  No results found for this or any previous visit (from the past 24 hour(s)).    ASSESSMENT/PLAN:     1. Screen for colon cancer  A nonsensical diagnosis     2. Need for hepatitis C screening test  Postponed     3. Screening for HIV (human immunodeficiency virus)  Postponed     4. Need for prophylactic vaccination and inoculation against influenza  Declines     5. Polypharmacy  I agreed to help refill prescriptions and he doesn't need that, he has refills from his prior MD . He does have a good many questions about his medication and agreed that an medication therapy management consult  Was a good plan  - MED THERAPY MANAGE REFERRAL    There are no Patient Instructions on file for this visit.    The information in this document, created by the medical scribe for me, accurately reflects the services I personally performed and the decisions made by me. I have reviewed and approved this document for accuracy.    Babar Mckinney MD  [facility] " [place]

## 2019-02-21 PROCEDURE — 88341 IMHCHEM/IMCYTCHM EA ADD ANTB: CPT | Performed by: INTERNAL MEDICINE

## 2019-02-21 PROCEDURE — 88342 IMHCHEM/IMCYTCHM 1ST ANTB: CPT | Performed by: INTERNAL MEDICINE

## 2019-02-22 ENCOUNTER — HOSPITAL ENCOUNTER (OUTPATIENT)
Dept: PET IMAGING | Facility: CLINIC | Age: 64
Discharge: HOME | End: 2019-02-22
Attending: INTERNAL MEDICINE | Admitting: INTERNAL MEDICINE
Payer: MEDICARE

## 2019-02-22 DIAGNOSIS — C78.7 METASTATIC COLON CANCER TO LIVER (H): ICD-10-CM

## 2019-02-22 DIAGNOSIS — C18.9 METASTATIC COLON CANCER TO LIVER (H): ICD-10-CM

## 2019-02-22 LAB — GLUCOSE BLDC GLUCOMTR-MCNC: 94 MG/DL (ref 70–99)

## 2019-02-22 PROCEDURE — 34300033 ZZH RX 343: Performed by: INTERNAL MEDICINE

## 2019-02-22 PROCEDURE — 78816 PET IMAGE W/CT FULL BODY: CPT | Mod: PS

## 2019-02-22 PROCEDURE — 71260 CT THORAX DX C+: CPT

## 2019-02-22 PROCEDURE — 82962 GLUCOSE BLOOD TEST: CPT

## 2019-02-22 PROCEDURE — A9552 F18 FDG: HCPCS | Performed by: INTERNAL MEDICINE

## 2019-02-22 PROCEDURE — 25000128 H RX IP 250 OP 636: Performed by: INTERNAL MEDICINE

## 2019-02-22 RX ORDER — IOPAMIDOL 755 MG/ML
45-150 INJECTION, SOLUTION INTRAVASCULAR ONCE
Status: COMPLETED | OUTPATIENT
Start: 2019-02-22 | End: 2019-02-22

## 2019-02-22 RX ADMIN — IOPAMIDOL 128 ML: 755 INJECTION, SOLUTION INTRAVENOUS at 13:56

## 2019-02-22 RX ADMIN — FLUDEOXYGLUCOSE F-18 13.03 MCI.: 500 INJECTION, SOLUTION INTRAVENOUS at 13:00

## 2019-02-25 ENCOUNTER — OFFICE VISIT (OUTPATIENT)
Dept: FAMILY MEDICINE | Facility: CLINIC | Age: 64
End: 2019-02-25
Payer: MEDICARE

## 2019-02-25 VITALS
HEART RATE: 86 BPM | TEMPERATURE: 98.8 F | RESPIRATION RATE: 18 BRPM | DIASTOLIC BLOOD PRESSURE: 64 MMHG | BODY MASS INDEX: 27.08 KG/M2 | HEIGHT: 74 IN | OXYGEN SATURATION: 96 % | WEIGHT: 211 LBS | SYSTOLIC BLOOD PRESSURE: 108 MMHG

## 2019-02-25 DIAGNOSIS — Z11.4 SCREENING FOR HIV (HUMAN IMMUNODEFICIENCY VIRUS): ICD-10-CM

## 2019-02-25 DIAGNOSIS — Z79.899 POLYPHARMACY: Primary | ICD-10-CM

## 2019-02-25 DIAGNOSIS — Z23 NEED FOR PROPHYLACTIC VACCINATION AND INOCULATION AGAINST INFLUENZA: ICD-10-CM

## 2019-02-25 DIAGNOSIS — Z12.11 SCREEN FOR COLON CANCER: ICD-10-CM

## 2019-02-25 DIAGNOSIS — Z11.59 NEED FOR HEPATITIS C SCREENING TEST: ICD-10-CM

## 2019-02-25 PROCEDURE — 99214 OFFICE O/P EST MOD 30 MIN: CPT | Performed by: INTERNAL MEDICINE

## 2019-02-25 ASSESSMENT — ANXIETY QUESTIONNAIRES
IF YOU CHECKED OFF ANY PROBLEMS ON THIS QUESTIONNAIRE, HOW DIFFICULT HAVE THESE PROBLEMS MADE IT FOR YOU TO DO YOUR WORK, TAKE CARE OF THINGS AT HOME, OR GET ALONG WITH OTHER PEOPLE: NOT DIFFICULT AT ALL
7. FEELING AFRAID AS IF SOMETHING AWFUL MIGHT HAPPEN: SEVERAL DAYS
3. WORRYING TOO MUCH ABOUT DIFFERENT THINGS: SEVERAL DAYS
GAD7 TOTAL SCORE: 4
2. NOT BEING ABLE TO STOP OR CONTROL WORRYING: SEVERAL DAYS
6. BECOMING EASILY ANNOYED OR IRRITABLE: NOT AT ALL
5. BEING SO RESTLESS THAT IT IS HARD TO SIT STILL: NOT AT ALL
1. FEELING NERVOUS, ANXIOUS, OR ON EDGE: SEVERAL DAYS

## 2019-02-25 ASSESSMENT — PATIENT HEALTH QUESTIONNAIRE - PHQ9
5. POOR APPETITE OR OVEREATING: NOT AT ALL
SUM OF ALL RESPONSES TO PHQ QUESTIONS 1-9: 2

## 2019-02-25 ASSESSMENT — MIFFLIN-ST. JEOR: SCORE: 1811.84

## 2019-02-26 ASSESSMENT — ANXIETY QUESTIONNAIRES: GAD7 TOTAL SCORE: 4

## 2019-03-04 ENCOUNTER — HOSPITAL ENCOUNTER (OUTPATIENT)
Facility: CLINIC | Age: 64
Setting detail: SPECIMEN
Discharge: HOME | End: 2019-03-04
Admitting: INTERNAL MEDICINE
Payer: MEDICARE

## 2019-03-04 PROCEDURE — 00000346 ZZHCL STATISTIC REVIEW OUTSIDE SLIDES TC 88321: Performed by: INTERNAL MEDICINE

## 2019-03-04 PROCEDURE — 88323 CONSLTJ&REPRT MATRL PREP SLD: CPT | Performed by: INTERNAL MEDICINE

## 2019-03-06 ENCOUNTER — OFFICE VISIT (OUTPATIENT)
Dept: PSYCHOLOGY | Facility: CLINIC | Age: 64
End: 2019-03-06
Payer: MEDICARE

## 2019-03-06 DIAGNOSIS — F31.0: Primary | ICD-10-CM

## 2019-03-06 PROCEDURE — 90834 PSYTX W PT 45 MINUTES: CPT | Performed by: SOCIAL WORKER

## 2019-03-06 ASSESSMENT — ANXIETY QUESTIONNAIRES
7. FEELING AFRAID AS IF SOMETHING AWFUL MIGHT HAPPEN: NEARLY EVERY DAY
1. FEELING NERVOUS, ANXIOUS, OR ON EDGE: SEVERAL DAYS
IF YOU CHECKED OFF ANY PROBLEMS ON THIS QUESTIONNAIRE, HOW DIFFICULT HAVE THESE PROBLEMS MADE IT FOR YOU TO DO YOUR WORK, TAKE CARE OF THINGS AT HOME, OR GET ALONG WITH OTHER PEOPLE: NOT DIFFICULT AT ALL
2. NOT BEING ABLE TO STOP OR CONTROL WORRYING: NOT AT ALL
5. BEING SO RESTLESS THAT IT IS HARD TO SIT STILL: NOT AT ALL
GAD7 TOTAL SCORE: 6
6. BECOMING EASILY ANNOYED OR IRRITABLE: SEVERAL DAYS
3. WORRYING TOO MUCH ABOUT DIFFERENT THINGS: SEVERAL DAYS

## 2019-03-06 ASSESSMENT — PATIENT HEALTH QUESTIONNAIRE - PHQ9
SUM OF ALL RESPONSES TO PHQ QUESTIONS 1-9: 2
5. POOR APPETITE OR OVEREATING: NOT AT ALL

## 2019-03-06 NOTE — PROGRESS NOTES
Progress Note - Initial Session    Client Name:  Los Huang Date: 3-06-19         Service Type: Individual  Video Visit: No     Session Start Time: 11:00  Session End Time: 11:45     Session Length: 45    Session #: 1    Attendees: Client     DATA:  Diagnostic Assessment in progress.  Unable to complete documentation at the conclusion of the first session due to extensive social and mental health history.     Interactive Complexity: No  Crisis: No    Intervention:  Motivational Interviewing: PACE and MI spirit skills    ASSESSMENT:  Mental Status Assessment:  Appearance:   Appropriate   Eye Contact:   Good   Psychomotor Behavior: Normal   Attitude:   Cooperative   Orientation:   All  Speech   Rate / Production: Normal    Volume:  Normal   Mood:    Anxious  Elevated   Affect:    Bright  Expansive  Worrisome   Thought Content:  Referential Thinking   Thought Form:  Coherent  Logical   Insight:    Fair       Safety Issues and Plan for Safety and Risk Management:  Client denies current fears or concerns for personal safety.  Client denies current or recent suicidal ideation or behaviors.  Client denies current or recent homicidal ideation or behaviors.  Client denies current or recent self injurious behavior or ideation.  Client denies other safety concerns.  A safety and risk management plan has not been developed at this time, however client was given the after-hours number / 911 should there be a change in any of these risk factors.  Client reports there are no firearms in the house.      Diagnostic Criteria:  MANIC EPISODE - At least one lifetime manic episode is required for the dx of Bipolar I Disorder as evidenced by present symptoms or by history  A. A distinct period of abnormally and persistently elevated, expansive, or irritable mood, lasting at least 1 week (or any duration if hospitalization is necessary).   B. During the period of mood disturbance, three (or more) of the following  symptoms (four if the mood is only irritable) have persisted and have been present to a significant degree:   - inflated self-esteem or grandiosity    - decreased need for sleep (e.g., feels rested after only 3 hours of sleep)    - more talkative than usual or pressure to keep talking    - flight of ideas or subjectivie experience that thoughts are racing   - distractibility   - increase in goal-directed activity   - excessive involvement in pleasurable activities that have a high potential for painful consequences, such as spending money or sexual indiscretion.  C. The mood disturbance is sufficiently severe to cause marked impairment in social or occupational functioning or to necessitate hospitalization to prevent harm to self or others, or there are severe psychotic features  D. The symptoms are not attributable to the physiologicial effects of a substance or to another medical condition  E. The episode is sufficiently severe enough to cause marked impairment in social or occupational functioning or to necessitate hospitalization to prevent harm to self or others, or there are psychotic features  F. The symptoms are not due to the direct physiological effects of a substance (eg, a drug of abuse, a medication, or other treatment) or a general medical condition (eg, hyperthyroidism).      DSM5 Diagnoses: (Sustained by DSM5 Criteria Listed Above)  Diagnoses: 296.40 Bipolar I Disorder Current or Most Recent Episode Hypomanic with anxious distress  R/O Schizoaffective disorder  Psychosocial & Contextual Factors: has cancer dx, recent move to [place] from [place], difficulty making decisions, limited social support system  WHODAS 2.0 (12 item)            This questionnaire asks about difficulties due to health conditions. Health conditions  include  disease or illnesses, other health problems that may be short or long lasting,  injuries, mental health or emotional problems, and problems with alcohol or drugs.                      Think back over the past 30 days and answer these questions, thinking about how much  difficulty you had doing the following activities. For each question, please [place] only  one response.    S1 Standing for long periods such as 30 minutes? None =         1   S2 Taking care of household responsibilities? None =         1   S3 Learning a new task, for example, learning how to get to a new place? Mild =           2   S4 How much of a problem do you have joining community activities (for example, festivals, Rastafari or other activities) in the same way as anyone else can? Mild =           2   S5 How much have you been emotionally affected by your health problems? Moderate =   3     In the past 30 days, how much difficulty did you have in:   S6 Concentrating on doing something for ten minutes? Mild =           2   S7 Walking a long distance such as a kilometer (or equivalent)? None =         1   S8 Washing your whole body? None =         1   S9 Getting dressed? None =         1   S10 Dealing with people you do not know? None =         1   S11 Maintaining a friendship? None =         1   S12 Your day to day work? None =         1     H1 Overall, in the past 30 days, how many days were these difficulties present? Record number of days 30   H2 In the past 30 days, for how many days were you totally unable to carry out your usual activities or work because of any health condition? Record number of days  0   H3 In the past 30 days, not counting the days that you were totally unable, for how many days did you cut back or reduce your usual activities or work because of any health condition? Record number of days 5       Collateral Reports Completed:  Routed note to PCP      PLAN: (Homework, other):  Client scheduled ongoing services with [facility].        ARGELIA Walker

## 2019-03-06 NOTE — Clinical Note
Amado Eckert--I saw our patient today for his first session and he listed you as his PCP. I will complete his diagnostic assessment at our next session.  Very long history of mental health issues and he was very talkative and forth coming about his issues.  I have scheduled follow-up sessions with him in the future.  Let me know if you have any questions.  Best, Chris

## 2019-03-07 ASSESSMENT — ANXIETY QUESTIONNAIRES: GAD7 TOTAL SCORE: 6

## 2019-03-12 ENCOUNTER — TELEPHONE (OUTPATIENT)
Dept: VASCULAR SURGERY | Facility: CLINIC | Age: 64
End: 2019-03-12

## 2019-03-12 NOTE — TELEPHONE ENCOUNTER
Called pt on his home phone.     Informed him that we did receive a msg from Dr. Srivastava.   Understood that he had a PET/CT.   Informed him that we'd like to bring him in next week to talk about the scan as well as Y90 Sirs treatment.    Pt is agreeable    Will go ahead and schedule with Boby Conner 3/19 @ 130pm     He is aware of appt details/location    Will call me with any further questions     Elvira Galarza RN, BSN  Interventional Radiology Nurse Coordinator   Phone: [phone]

## 2019-03-15 LAB — COPATH REPORT: NORMAL

## 2019-03-18 ENCOUNTER — OFFICE VISIT (OUTPATIENT)
Dept: PSYCHIATRY | Facility: CLINIC | Age: 64
End: 2019-03-18
Attending: PSYCHIATRY & NEUROLOGY
Payer: MEDICARE

## 2019-03-18 VITALS
SYSTOLIC BLOOD PRESSURE: 97 MMHG | WEIGHT: 203.2 LBS | DIASTOLIC BLOOD PRESSURE: 66 MMHG | HEART RATE: 76 BPM | BODY MASS INDEX: 26.09 KG/M2

## 2019-03-18 DIAGNOSIS — Z79.899 ENCOUNTER FOR LONG-TERM (CURRENT) USE OF MEDICATIONS: Primary | ICD-10-CM

## 2019-03-18 PROCEDURE — G0463 HOSPITAL OUTPT CLINIC VISIT: HCPCS | Mod: ZF

## 2019-03-18 RX ORDER — BENZTROPINE MESYLATE 1 MG/1
1 TABLET ORAL DAILY
Status: ON HOLD | COMMUNITY
End: 2019-04-22

## 2019-03-18 ASSESSMENT — PAIN SCALES - GENERAL: PAINLEVEL: NO PAIN (0)

## 2019-03-18 NOTE — NURSING NOTE
Chief Complaint   Patient presents with     Eval/Assessment     Encounter for long-term (current) use of medications

## 2019-03-18 NOTE — PROGRESS NOTES
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 the adverse/toxic effect of Serotonin Modulators. Specifically, the risk of serotonin syndrome or serotonin toxicity may be increased.    ClonazePAM may diminish the therapeutic effect of Valproate Products. Valproate Products may decrease the serum concentration of ClonazePAM. ClonazePAM may increase the serum concentration of Valproate Products.    MANAGEMENT:  Monitoring for adverse effects, routine vitals, routine labs, using lowest therapeutic dose of [Ritalin] and patient is aware of risks     PLAN                                                                                                                        m2, h3     1) PSYCHOTROPIC MEDICATIONS:    For now will recommend continuing the patient's previous regimen:  - Risperidone 4 mg at bedtime  - Depakote ER 1000 mg at bedtime  - Trazodone 50 mg at bedtime  - Clonazepam 0.5 mg BID PRN  - Ritalin 10 mg daily (see above re: 3/13/19 rx by GABRIEL Pacheco MD)    2) THERAPY:  Continue with ARGELIA Walker    3) NEXT DUE:    Labs- Depakote and AP labs  EKG- PRN  Rating Scales- AIMS due    4) REFERRALS:  MTM- to address regimen in the context of metastatic liver cancer    5) RTC: 2-3 weeks    6)  CRISIS NUMBERS:   Provided routinely in AVS.  [facility] [phone] (clinic)    [phone] (after hours)  CRISIS TEXT LINE: Text [id number] from anywhere in [place], anytime, any crisis 24/7;  OR SEE www.crisistextline.org    TREATMENT RISK STATEMENT:  The risks, benefits, alternatives and potential adverse effects have been discussed and are understood by the pt. The pt understands the risks of using street drugs or alcohol. There are no medical contraindications, the pt agrees to treatment with the ability to do so. The pt knows to call the clinic for any problems or to access emergency care if needed.  Medical and substance use concerns are documented above.  Psychotropic drug interaction check was  done, including changes made today.    PROVIDER: Ziggy Guevara,     Patient staffed in clinic with Dr. Munguia who will sign the note.  Supervisor is Dr. Munguia.     Supervisor Attestation:  I met with Solo Huang along with the resident physician, Ziggy Guevara MD. I participated in key portions of the service, including the mental status examination and developing the plan of care. I reviewed key portions of the history with the resident. I agree with the findings and plan as documented in this note.  Alex Munguia MD

## 2019-03-19 ENCOUNTER — TELEPHONE (OUTPATIENT)
Dept: VASCULAR SURGERY | Facility: CLINIC | Age: 64
End: 2019-03-19

## 2019-03-19 NOTE — TELEPHONE ENCOUNTER
Pt calling to inform us that he would like to cancel his appt for today with Dr. Landis.   Did not state a reason as to why.    *Will go ahead and cancel appt.   Elvira Galarza RN, BSN  Interventional Radiology Nurse Coordinator   Phone: [phone]

## 2019-03-25 ENCOUNTER — TELEPHONE (OUTPATIENT)
Dept: PSYCHIATRY | Facility: CLINIC | Age: 64
End: 2019-03-25

## 2019-03-25 ASSESSMENT — PATIENT HEALTH QUESTIONNAIRE - PHQ9: SUM OF ALL RESPONSES TO PHQ QUESTIONS 1-9: 4

## 2019-03-25 NOTE — TELEPHONE ENCOUNTER
On 3/18/19 the patient signed an SERA authorizing the release of all pertinent records from Dr. Kain Pacheco to [facility] for the purpose of continuing care. This writer faxed this SERA to [phone] on 3/25/19, sent the original to scanning and held a copy in psychiatry until scanning completed.

## 2019-04-02 ENCOUNTER — OFFICE VISIT (OUTPATIENT)
Dept: RADIOLOGY | Facility: CLINIC | Age: 64
End: 2019-04-02
Attending: RADIOLOGY
Payer: MEDICARE

## 2019-04-02 VITALS
DIASTOLIC BLOOD PRESSURE: 70 MMHG | TEMPERATURE: 97.7 F | BODY MASS INDEX: 26.58 KG/M2 | HEART RATE: 74 BPM | WEIGHT: 207 LBS | SYSTOLIC BLOOD PRESSURE: 111 MMHG | OXYGEN SATURATION: 95 %

## 2019-04-02 DIAGNOSIS — C78.7 SECONDARY MALIGNANT NEOPLASM OF LIVER (H): Primary | ICD-10-CM

## 2019-04-02 PROCEDURE — G0463 HOSPITAL OUTPT CLINIC VISIT: HCPCS | Mod: ZF

## 2019-04-02 ASSESSMENT — ENCOUNTER SYMPTOMS
JAUNDICE: 0
COUGH DISTURBING SLEEP: 1
SNORES LOUDLY: 0
MUSCLE WEAKNESS: 1
JOINT SWELLING: 0
ABDOMINAL PAIN: 1
BOWEL INCONTINENCE: 0
MUSCLE CRAMPS: 1
MYALGIAS: 1
CONSTIPATION: 0
BLOATING: 0
RECTAL PAIN: 0
DYSPNEA ON EXERTION: 1
HEARTBURN: 0
HEMOPTYSIS: 0
NECK PAIN: 0
SHORTNESS OF BREATH: 0
ARTHRALGIAS: 0
BLOOD IN STOOL: 0
SPUTUM PRODUCTION: 0
COUGH: 1
STIFFNESS: 1
POSTURAL DYSPNEA: 0
WHEEZING: 0
BACK PAIN: 0
VOMITING: 0
NAUSEA: 0
DIARRHEA: 1

## 2019-04-02 ASSESSMENT — PAIN SCALES - GENERAL: PAINLEVEL: NO PAIN (0)

## 2019-04-02 NOTE — LETTER
2019       RE: Los Huang  [address]  [place] [address]     Dear Colleague,    Thank you for referring your patient, Los Huang, to the [facility]. Please see a copy of my visit note below.    Mr. Huang is a 63 year-old patient who has a long history of CRC with liver met that has been treated with Y 90 and Debiri in [place]. The patient has moved here to stay with his daughter and seek a second opinion. He is doing good overall but a little depressed. He has been seeing a psychologist in the last couple of weeks. I have seen his imaging. PET scan shows two lesions that are slightly bigger than before.        Past Medical History:   Diagnosis Date     Cancer (H)      Depressive disorder        Past Surgical History:   Procedure Laterality Date     ABDOMEN SURGERY       BIOPSY       CHOLECYSTECTOMY       COLONOSCOPY       ORTHOPEDIC SURGERY         Family History   Problem Relation Age of Onset     Osteoporosis Sister      Thyroid Disease Sister         Thyroid killed     Thyroid Disease Sister         Thyroid removed       Social History     Tobacco Use     Smoking status: Former Smoker     Packs/day: 1.00     Years: 20.00     Pack years: 20.00     Types: Cigarettes     Start date:      Last attempt to quit:      Years since quittin.2     Smokeless tobacco: Former User     Types: Snuff     Quit date:    Substance Use Topics     Alcohol use: Yes     Frequency: Monthly or less     Comment: slight     Impression and Plan:    I discussed the possibility of Y 90 and possibly TACE on these lesions. However, the patient is still uncertain and would like to think. I proposed that he can come back any time to see me if he would like to undergo any treatment. If not, I will see him in  with new imaging. He agreed with the plan.  Answers for HPI/ROS submitted by the patient on 2019   General Symptoms: No  Skin Symptoms: No  HENT  Symptoms: No  EYE SYMPTOMS: No  HEART SYMPTOMS: No  LUNG SYMPTOMS: Yes  INTESTINAL SYMPTOMS: Yes  URINARY SYMPTOMS: No  REPRODUCTIVE SYMPTOMS: No  SKELETAL SYMPTOMS: Yes  BLOOD SYMPTOMS: No  NERVOUS SYSTEM SYMPTOMS: No  MENTAL HEALTH SYMPTOMS: No  Cough: Yes  Sputum or phlegm: No  Coughing up blood: No  Difficulty breating or shortness of breath: No  Snoring: No  Wheezing: No  Difficulty breathing on exertion: Yes  Nighttime Cough: Yes  Difficulty breathing when lying flat: No  Heart burn or indigestion: No  Nausea: No  Vomiting: No  Abdominal pain: Yes  Bloating: No  Constipation: No  Diarrhea: Yes  Blood in stool: No  Black stools: No  Rectal or Anal pain: No  Fecal incontinence: No  Yellowing of skin or eyes: No  Vomit with blood: No  Change in stools: Yes  Back pain: No  Muscle aches: Yes  Neck pain: No  Swollen joints: No  Joint pain: No  Bone pain: No  Muscle cramps: Yes  Muscle weakness: Yes  Joint stiffness: Yes  Bone fracture: No      Again, thank you for allowing me to participate in the care of your patient.      Sincerely,    Trinh Landis MD

## 2019-04-02 NOTE — PATIENT INSTRUCTIONS
"We will call you with the appointment details of your Radioembolizaton procedure.     As discussed with you, I will need to seek insurance approval before scheduling you for your procedure. This may take up to two weeks, however I will keep you updated. Please feel free to call me for updates as well.     Please check into the Gold Waiting room, located on the main level, at [facility], located at [address] [place] [address].       Reminders:    -No solids or milk products 6 hours prior to appointment time.    -No clear liquids 2 hours prior to appointment time.     -Please take your morning medications as indicated with enough water to swallow.    -Please have a  as you will be going home afterwards.      Radioembolization consists of two procedures.     1.  Y90  Mapping- This procedure will help us see the vasculature of your liver and help us determine the shunting from the liver to the lung and gastric area.   Please allow for a total time of 3-5 hours for this day.  You will need to \"pass\" this procedure before the Delivery day. This means that if you're \"shunting\" calculation is higher than a certain percentage (20%) then we may not be able to move forward with treatment due to risk of the radioembolization particles migrating elsewhere besides the tumor itself. If you do pass then we will move forward with the Deliver day.     On the day of the appointment, you will check  in 1.5 hours early to your scheduled procedure.     2. Y90 Delivery procedure.  This procedure is the actual delivery of the radioactive particles into the liver.   This will take approximately 2 hours. Please also plan for at least 3-5 hours for this day as well.     On the day of the appointment you will check in 2 hours early to your scheduled procedure.        -Post follow up: We will call you 2-3 days after you leave to follow up after the procedure.     We will also have you return in 1 mos " with an MRI and follow upappt.       *Please keep in mind that you may have Post Embolization syndrome.  The reason for this is because the tumor is dying.     This includes:    -high fevers 101-102, which may last for a couple of days. We recommend using over the counter medications such as Tylenol or Ibuprofen.     -Nausea, in which we will give you medications after you are discharged home.     -Decreased appetite: We don't expect you to eat 3 full meals. Instead, we prefer that you  snack through out the day.     -Feeling fatigue: this is normal, however we recommend that you get up and walk around.     -Radiation Precaution (please do these things for 5 days)     - we advise that although you are not radiating a large amount of radiation, please refrain from being near small children, pregnant women and those who have a compromised immune system. We advise a 3 feet distance for no more than 30 minutes.     -PLEASE ALSO MAKE SURE THAT YOU FLUSH TWICE WHEN USING THE BATHROOM, and make sure to rinse the sink and tub after each time you use them.      -Wash your hands with soap and water after each visit to the bathroom.      -Use separate eating utensils. Wash them separately from your family's dishes.      -Avoid public transportation (buses, trains, taxis, light rail) and crowded places such as stores, restaurants, theaters and sporting events.      -Sleep alone, Avoid intimate contact; No kissing, or intercourse     **Please keep in mind to stay hydrated after the procedure. At Least 6 glasses of water a day.        *Abnormal symptoms in which to call or seek immediate help:  1. Confusion!!-GO TO THE EMERGENCY ROOM.  2. Swelling of the lower legs  3. Severe abdominal pain that doesn't go away with pain medications.   4. High fevers that do not come down with over the counter medications.      Should ANY of the above symptoms are exhibited, please seek immediate help or call our hospital at [phone] and ask  for the Interventional Radiologist On-call (after hours) or you can contact me (during business hours) 8-4pm.     Should you have any further questions, please call us at anytime. It has been a pleasure to see you today.           Elvira Galarza RN  Interventional Radiology Nurse Coordinator   Phone: [phone]

## 2019-04-02 NOTE — NURSING NOTE
"Oncology Rooming Note    April 2, 2019 2:40 PM   Los Huang is a 63 year old male who presents for:    Chief Complaint   Patient presents with     Oncology Clinic Visit     Return; Y90 Consult     Initial Vitals: /70   Pulse 74   Temp 97.7  F (36.5  C) (Oral)   Wt 93.9 kg (207 lb)   SpO2 95%   BMI 26.58 kg/m   Estimated body mass index is 26.58 kg/m  as calculated from the following:    Height as of 2/25/19: 1.88 m (6' 2\").    Weight as of this encounter: 93.9 kg (207 lb). Body surface area is 2.21 meters squared.  No Pain (0) Comment: Data Unavailable   No LMP for male patient.  Allergies reviewed: Yes  Medications reviewed: Yes    Medications: Medication refills not needed today.  Pharmacy name entered into [employer]: [id number] - [address]    Clinical concerns: No Concerns Boby was NOT notified.      Jeri Sharpe CMA              "

## 2019-04-02 NOTE — PROGRESS NOTES
Mr. Huang is a 63 year-old patient who has a long history of CRC with liver met that has been treated with Y 90 and Debiri in [place]. The patient has moved here to stay with his daughter and seek a second opinion. He is doing good overall but a little depressed. He has been seeing a psychologist in the last couple of weeks. I have seen his imaging. PET scan shows two lesions that are slightly bigger than before.        Past Medical History:   Diagnosis Date     Cancer (H)      Depressive disorder        Past Surgical History:   Procedure Laterality Date     ABDOMEN SURGERY       BIOPSY       CHOLECYSTECTOMY       COLONOSCOPY       ORTHOPEDIC SURGERY         Family History   Problem Relation Age of Onset     Osteoporosis Sister      Thyroid Disease Sister         Thyroid killed     Thyroid Disease Sister         Thyroid removed       Social History     Tobacco Use     Smoking status: Former Smoker     Packs/day: 1.00     Years: 20.00     Pack years: 20.00     Types: Cigarettes     Start date:      Last attempt to quit:      Years since quittin.2     Smokeless tobacco: Former User     Types: Snuff     Quit date:    Substance Use Topics     Alcohol use: Yes     Frequency: Monthly or less     Comment: slight     Impression and Plan:    I discussed the possibility of Y 90 and possibly TACE on these lesions. However, the patient is still uncertain and would like to think. I proposed that he can come back any time to see me if he would like to undergo any treatment. If not, I will see him in  with new imaging. He agreed with the plan.  Answers for HPI/ROS submitted by the patient on 2019   General Symptoms: No  Skin Symptoms: No  HENT Symptoms: No  EYE SYMPTOMS: No  HEART SYMPTOMS: No  LUNG SYMPTOMS: Yes  INTESTINAL SYMPTOMS: Yes  URINARY SYMPTOMS: No  REPRODUCTIVE SYMPTOMS: No  SKELETAL SYMPTOMS: Yes  BLOOD SYMPTOMS: No  NERVOUS SYSTEM SYMPTOMS: No  MENTAL HEALTH SYMPTOMS: No  Cough:  Yes  Sputum or phlegm: No  Coughing up blood: No  Difficulty breating or shortness of breath: No  Snoring: No  Wheezing: No  Difficulty breathing on exertion: Yes  Nighttime Cough: Yes  Difficulty breathing when lying flat: No  Heart burn or indigestion: No  Nausea: No  Vomiting: No  Abdominal pain: Yes  Bloating: No  Constipation: No  Diarrhea: Yes  Blood in stool: No  Black stools: No  Rectal or Anal pain: No  Fecal incontinence: No  Yellowing of skin or eyes: No  Vomit with blood: No  Change in stools: Yes  Back pain: No  Muscle aches: Yes  Neck pain: No  Swollen joints: No  Joint pain: No  Bone pain: No  Muscle cramps: Yes  Muscle weakness: Yes  Joint stiffness: Yes  Bone fracture: No

## 2019-04-03 ENCOUNTER — OFFICE VISIT (OUTPATIENT)
Dept: PSYCHOLOGY | Facility: CLINIC | Age: 64
End: 2019-04-03
Payer: MEDICARE

## 2019-04-03 DIAGNOSIS — C78.7 METASTATIC COLON CANCER TO LIVER (H): Primary | ICD-10-CM

## 2019-04-03 DIAGNOSIS — C18.9 METASTATIC COLON CANCER TO LIVER (H): Primary | ICD-10-CM

## 2019-04-03 DIAGNOSIS — F31.0: Primary | ICD-10-CM

## 2019-04-03 PROCEDURE — 90791 PSYCH DIAGNOSTIC EVALUATION: CPT | Performed by: SOCIAL WORKER
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
## 2019-04-03 NOTE — Clinical Note
Amado العلي-I completed client's diagnostic assessment to the best of my ability.  Client would be very tangential and had difficulty staying on topic and difficult to re-direct at times.  Client was friendly and cooperative.  Client has a lot of issues he is dealing with especially [place] and dealing with [place] diagnosis.  Let me know if you have any questions. Best, Chris

## 2019-04-04 ENCOUNTER — MYC MEDICAL ADVICE (OUTPATIENT)
Dept: PSYCHIATRY | Facility: CLINIC | Age: 64
End: 2019-04-04

## 2019-04-17 ENCOUNTER — HOSPITAL ENCOUNTER (INPATIENT)
Facility: CLINIC | Age: 64
LOS: 5 days | Discharge: HOME | DRG: 885 | End: 2019-04-22
Attending: EMERGENCY MEDICINE | Admitting: PSYCHIATRY & NEUROLOGY
Payer: MEDICARE

## 2019-04-17 DIAGNOSIS — F22 DELUSIONS (H): ICD-10-CM

## 2019-04-17 DIAGNOSIS — F90.1 ATTENTION DEFICIT HYPERACTIVITY DISORDER (ADHD), PREDOMINANTLY HYPERACTIVE TYPE: Primary | ICD-10-CM

## 2019-04-17 DIAGNOSIS — F31.9 BIPOLAR 1 DISORDER (H): ICD-10-CM

## 2019-04-17 DIAGNOSIS — R41.89 DISORGANIZED THOUGHT PROCESS: ICD-10-CM

## 2019-04-17 LAB
ALBUMIN SERPL-MCNC: 3.6 G/DL (ref 3.4–5)
ALP SERPL-CCNC: 79 U/L (ref 40–150)
ALT SERPL W P-5'-P-CCNC: 49 U/L (ref 0–70)
AMMONIA PLAS-SCNC: 36 UMOL/L (ref 10–50)
AMPHETAMINES UR QL SCN: NEGATIVE
ANION GAP SERPL CALCULATED.3IONS-SCNC: 8 MMOL/L (ref 3–14)
AST SERPL W P-5'-P-CCNC: 26 U/L (ref 0–45)
BARBITURATES UR QL: NEGATIVE
BASOPHILS # BLD AUTO: 0 10E9/L (ref 0–0.2)
BASOPHILS NFR BLD AUTO: 0.7 %
BENZODIAZ UR QL: NEGATIVE
BILIRUB SERPL-MCNC: 0.4 MG/DL (ref 0.2–1.3)
BUN SERPL-MCNC: 22 MG/DL (ref 7–30)
CALCIUM SERPL-MCNC: 8.1 MG/DL (ref 8.5–10.1)
CANNABINOIDS UR QL SCN: NEGATIVE
CHLORIDE SERPL-SCNC: 108 MMOL/L (ref 94–109)
CO2 SERPL-SCNC: 27 MMOL/L (ref 20–32)
COCAINE UR QL: NEGATIVE
CREAT SERPL-MCNC: 1.02 MG/DL (ref 0.66–1.25)
DIFFERENTIAL METHOD BLD: NORMAL
EOSINOPHIL # BLD AUTO: 0.3 10E9/L (ref 0–0.7)
EOSINOPHIL NFR BLD AUTO: 4.7 %
ERYTHROCYTE [DISTWIDTH] IN BLOOD BY AUTOMATED COUNT: 13.1 % (ref 10–15)
ETHANOL UR QL SCN: NEGATIVE
GFR SERPL CREATININE-BSD FRML MDRD: 76 ML/MIN/{1.73_M2}
GLUCOSE SERPL-MCNC: 77 MG/DL (ref 70–99)
HCT VFR BLD AUTO: 42.4 % (ref 40–53)
HGB BLD-MCNC: 14.2 G/DL (ref 13.3–17.7)
IMM GRANULOCYTES # BLD: 0 10E9/L (ref 0–0.4)
IMM GRANULOCYTES NFR BLD: 0 %
LYMPHOCYTES # BLD AUTO: 1.7 10E9/L (ref 0.8–5.3)
LYMPHOCYTES NFR BLD AUTO: 28.9 %
MCH RBC QN AUTO: 30.1 PG (ref 26.5–33)
MCHC RBC AUTO-ENTMCNC: 33.5 G/DL (ref 31.5–36.5)
MCV RBC AUTO: 90 FL (ref 78–100)
MONOCYTES # BLD AUTO: 0.9 10E9/L (ref 0–1.3)
MONOCYTES NFR BLD AUTO: 15.1 %
NEUTROPHILS # BLD AUTO: 2.9 10E9/L (ref 1.6–8.3)
NEUTROPHILS NFR BLD AUTO: 50.6 %
NRBC # BLD AUTO: 0 10*3/UL
NRBC BLD AUTO-RTO: 0 /100
OPIATES UR QL SCN: NEGATIVE
PLATELET # BLD AUTO: 164 10E9/L (ref 150–450)
POTASSIUM SERPL-SCNC: 3.7 MMOL/L (ref 3.4–5.3)
PROT SERPL-MCNC: 6.4 G/DL (ref 6.8–8.8)
RBC # BLD AUTO: 4.71 10E12/L (ref 4.4–5.9)
SODIUM SERPL-SCNC: 143 MMOL/L (ref 133–144)
VALPROATE SERPL-MCNC: 47 MG/L (ref 50–100)
WBC # BLD AUTO: 5.7 10E9/L (ref 4–11)

## 2019-04-17 PROCEDURE — 82140 ASSAY OF AMMONIA: CPT | Performed by: PSYCHIATRY & NEUROLOGY

## 2019-04-17 PROCEDURE — 93010 ELECTROCARDIOGRAM REPORT: CPT | Performed by: INTERNAL MEDICINE

## 2019-04-17 PROCEDURE — 99285 EMERGENCY DEPT VISIT HI MDM: CPT | Mod: Z6 | Performed by: EMERGENCY MEDICINE

## 2019-04-17 PROCEDURE — 80307 DRUG TEST PRSMV CHEM ANLYZR: CPT | Performed by: EMERGENCY MEDICINE

## 2019-04-17 PROCEDURE — 80320 DRUG SCREEN QUANTALCOHOLS: CPT | Performed by: EMERGENCY MEDICINE

## 2019-04-17 PROCEDURE — 99285 EMERGENCY DEPT VISIT HI MDM: CPT | Performed by: EMERGENCY MEDICINE

## 2019-04-17 PROCEDURE — A9270 NON-COVERED ITEM OR SERVICE: HCPCS | Performed by: PSYCHIATRY & NEUROLOGY

## 2019-04-17 PROCEDURE — 25000132 ZZH RX MED GY IP 250 OP 250 PS 637: Performed by: PSYCHIATRY & NEUROLOGY

## 2019-04-17 PROCEDURE — 93005 ELECTROCARDIOGRAM TRACING: CPT

## 2019-04-17 PROCEDURE — 12400002 ZZH R&B MH SENIOR/ADOLESCENT

## 2019-04-17 PROCEDURE — A9270 NON-COVERED ITEM OR SERVICE: HCPCS | Performed by: FAMILY MEDICINE

## 2019-04-17 PROCEDURE — 85025 COMPLETE CBC W/AUTO DIFF WBC: CPT | Performed by: EMERGENCY MEDICINE

## 2019-04-17 PROCEDURE — H2032 ACTIVITY THERAPY, PER 15 MIN: HCPCS

## 2019-04-17 PROCEDURE — 80053 COMPREHEN METABOLIC PANEL: CPT | Performed by: EMERGENCY MEDICINE

## 2019-04-17 PROCEDURE — 80164 ASSAY DIPROPYLACETIC ACD TOT: CPT | Performed by: PSYCHIATRY & NEUROLOGY

## 2019-04-17 PROCEDURE — 25000132 ZZH RX MED GY IP 250 OP 250 PS 637: Performed by: FAMILY MEDICINE

## 2019-04-17 PROCEDURE — G0177 OPPS/PHP; TRAIN & EDUC SERV: HCPCS

## 2019-04-17 PROCEDURE — 36415 COLL VENOUS BLD VENIPUNCTURE: CPT | Performed by: PSYCHIATRY & NEUROLOGY

## 2019-04-17 RX ORDER — HYDROXYZINE HYDROCHLORIDE 25 MG/1
25 TABLET, FILM COATED ORAL EVERY 4 HOURS PRN
Status: DISCONTINUED | OUTPATIENT
Start: 2019-04-17 | End: 2019-04-22 | Stop reason: HOSPADM

## 2019-04-17 RX ORDER — ATOMOXETINE 18 MG/1
18 CAPSULE ORAL DAILY
Status: DISCONTINUED | OUTPATIENT
Start: 2019-04-17 | End: 2019-04-22 | Stop reason: HOSPADM

## 2019-04-17 RX ORDER — MULTIPLE VITAMINS W/ MINERALS TAB 9MG-400MCG
1 TAB ORAL DAILY
Status: DISCONTINUED | OUTPATIENT
Start: 2019-04-17 | End: 2019-04-22 | Stop reason: HOSPADM

## 2019-04-17 RX ORDER — ACETAMINOPHEN 325 MG/1
650 TABLET ORAL EVERY 4 HOURS PRN
Status: DISCONTINUED | OUTPATIENT
Start: 2019-04-17 | End: 2019-04-22 | Stop reason: HOSPADM

## 2019-04-17 RX ORDER — ALUMINA, MAGNESIA, AND SIMETHICONE 2400; 2400; 240 MG/30ML; MG/30ML; MG/30ML
30 SUSPENSION ORAL EVERY 4 HOURS PRN
Status: DISCONTINUED | OUTPATIENT
Start: 2019-04-17 | End: 2019-04-22 | Stop reason: HOSPADM

## 2019-04-17 RX ORDER — ARIPIPRAZOLE 2 MG/1
2 TABLET ORAL DAILY
Status: DISCONTINUED | OUTPATIENT
Start: 2019-04-17 | End: 2019-04-22 | Stop reason: HOSPADM

## 2019-04-17 RX ORDER — BISACODYL 10 MG
10 SUPPOSITORY, RECTAL RECTAL DAILY PRN
Status: DISCONTINUED | OUTPATIENT
Start: 2019-04-17 | End: 2019-04-22 | Stop reason: HOSPADM

## 2019-04-17 RX ORDER — BENZTROPINE MESYLATE 1 MG/1
1 TABLET ORAL DAILY
Status: DISCONTINUED | OUTPATIENT
Start: 2019-04-17 | End: 2019-04-19

## 2019-04-17 RX ORDER — DIVALPROEX SODIUM 500 MG/1
1000 TABLET, EXTENDED RELEASE ORAL DAILY
Status: DISCONTINUED | OUTPATIENT
Start: 2019-04-17 | End: 2019-04-22 | Stop reason: HOSPADM

## 2019-04-17 RX ORDER — BENZTROPINE MESYLATE 1 MG/1
1 TABLET ORAL ONCE
Status: COMPLETED | OUTPATIENT
Start: 2019-04-17 | End: 2019-04-17

## 2019-04-17 RX ORDER — TRAZODONE HYDROCHLORIDE 100 MG/1
100 TABLET ORAL AT BEDTIME
Status: DISCONTINUED | OUTPATIENT
Start: 2019-04-17 | End: 2019-04-22 | Stop reason: HOSPADM

## 2019-04-17 RX ORDER — CLONAZEPAM 0.5 MG/1
0.5 TABLET ORAL ONCE
Status: COMPLETED | OUTPATIENT
Start: 2019-04-17 | End: 2019-04-17

## 2019-04-17 RX ADMIN — VITAMIN D, TAB 1000IU (100/BT) 1000 UNITS: 25 TAB at 12:44

## 2019-04-17 RX ADMIN — ATOMOXETINE 18 MG: 18 CAPSULE ORAL at 12:43

## 2019-04-17 RX ADMIN — BENZTROPINE MESYLATE 1 MG: 1 TABLET ORAL at 12:43

## 2019-04-17 RX ADMIN — MULTIPLE VITAMINS W/ MINERALS TAB 1 TABLET: TAB at 12:43

## 2019-04-17 RX ADMIN — RISPERIDONE 4 MG: 3 TABLET ORAL at 21:58

## 2019-04-17 RX ADMIN — ARIPIPRAZOLE 2 MG: 2 TABLET ORAL at 12:42

## 2019-04-17 RX ADMIN — BENZTROPINE MESYLATE 1 MG: 1 TABLET ORAL at 08:22

## 2019-04-17 RX ADMIN — DIVALPROEX SODIUM 1000 MG: 500 TABLET, EXTENDED RELEASE ORAL at 21:58

## 2019-04-17 RX ADMIN — TRAZODONE HYDROCHLORIDE 100 MG: 100 TABLET ORAL at 21:59

## 2019-04-17 RX ADMIN — CLONAZEPAM 0.5 MG: 0.5 TABLET ORAL at 08:22

## 2019-04-17 ASSESSMENT — ACTIVITIES OF DAILY LIVING (ADL)
TOILETING: 0-->INDEPENDENT
DRESS: 0-->INDEPENDENT
HYGIENE/GROOMING: INDEPENDENT
RETIRED_COMMUNICATION: 0-->UNDERSTANDS/COMMUNICATES WITHOUT DIFFICULTY
RETIRED_EATING: 0-->INDEPENDENT
HYGIENE/GROOMING: INDEPENDENT
ORAL_HYGIENE: INDEPENDENT
COGNITION: 0 - NO COGNITION ISSUES REPORTED
AMBULATION: 0-->INDEPENDENT
FALL_HISTORY_WITHIN_LAST_SIX_MONTHS: NO
SWALLOWING: 0-->SWALLOWS FOODS/LIQUIDS WITHOUT DIFFICULTY
ORAL_HYGIENE: INDEPENDENT
DRESS: INDEPENDENT
BATHING: 0-->INDEPENDENT
DRESS: INDEPENDENT
TRANSFERRING: 0-->INDEPENDENT

## 2019-04-17 NOTE — PROGRESS NOTES
04/17/19 0557   Patient Belongings   Did you bring any home meds/supplements to the hospital?  No   Patient Belongings sent to security per site process   Patient Belongings Put in Hospital Secure Location (Security or Locker, etc.) watch;glasses;clothing  (wallet keys sent home with daughter, watch,belt,shoelaces gl)     Disposition    W/ Pt: Glasses, shoes, shirt, pants, underwear, socks.    Locked in Pt cubby in room: Belt, shoelaces, watch.    Wallet and keys sent home with daughter.    A               Admission:  I am responsible for any personal items that are not sent to the safe or pharmacy.  [facility] is not responsible for loss, theft or damage of any property in my possession.    Signature:  _________________________________ Date: _______  Time: _____                                              Staff Signature:  ____________________________ Date: ________  Time: _____      2nd Staff person, if patient is unable/unwilling to sign:    Signature: ________________________________ Date: ________  Time: _____     Discharge:  [facility] has returned all of my personal belongings:    Signature: _________________________________ Date: ________  Time: _____                                          Staff Signature:  ____________________________ Date: ________  Time: _____

## 2019-04-17 NOTE — PLAN OF CARE
Reasons you are in the hospital:  1)  Delusional thinking  2)  Hypo-manic    Goals for Discharge:  1)  Just want to be stable.

## 2019-04-17 NOTE — PROGRESS NOTES
"Pt is calm and cooperative. Denies depression, anxiety and SI.  Affect is flat.  Eye contact is direct and somewhat intense.  Participated in group activity.  Went to bed at 2015 and slept the remainder of the shift.  No delusional statements observed.  Alert and oriented x 4.  States, \"I don't know what happened to me before I came in, but now I feel good.  I felt really off before I came here.\"  Compliant with scheduled medications.    "
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
## 2019-04-17 NOTE — PROGRESS NOTES
"Pt is admitted to w. Came to ER with Lori,diorganized and delusional thinking. Pt during admit process was pleasant and cooperative. Pt did not appear to state anything delusional. Pt reports he comes to the hospital because he recently moved to [facility] from [place], is in a new apt. and does not feel connected to the community and is anxious and fearful. Pt reports he has 2 sisters that live here in [place] and he wanted to be near them. He had lived with his mother, he says, in [place],but she recently went into a nursing home and he was displaced.He says he now lives in an apt in [place].     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 He reports he plans treatment for this via the \" [facility] cancer center\" here in [place].     Plan:Build trust and allow to settle onto the unit. ( Pt. Ate well at lunch and is compliant with his medications.)  "

## 2019-04-17 NOTE — PLAN OF CARE
BEHAVIORAL TEAM DISCUSSION    Participants:Dr. Carrington, Murphy Kim, RN, Jewels Fang [facility], Viktoria Cam, OT  Progress: New admit  Continued Stay Criteria/Rationale: Delusional thinking  Medical/Physical: See medical notes  Precautions:  N/A  Plan: The plan is to assess the patient for mental health and medication needs.  The patient will be prescribed medications to treat the identified symptoms.  Upon discharge the patient will be referred to services as appropriate based on the assessment.  Rationale for change in precautions or plan: N/A

## 2019-04-17 NOTE — ED NOTES
Sign out Provider: Demian      Sign out Plan: Patient seen on an earlier shift and deemed in need of psychiatric admission.  This morning is requesting his a.m. medications.      Reassessment: No events or issues this morning.  His a.m. medications were ordered.      Disposition: Continue with plan for psychiatric admission as outlined at shift change.       Kain Sood MD  04/17/19 0802
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
## 2019-04-18 PROCEDURE — 12400002 ZZH R&B MH SENIOR/ADOLESCENT

## 2019-04-18 PROCEDURE — G0177 OPPS/PHP; TRAIN & EDUC SERV: HCPCS

## 2019-04-18 PROCEDURE — 25000132 ZZH RX MED GY IP 250 OP 250 PS 637: Performed by: PSYCHIATRY & NEUROLOGY

## 2019-04-18 PROCEDURE — A9270 NON-COVERED ITEM OR SERVICE: HCPCS | Performed by: PSYCHIATRY & NEUROLOGY

## 2019-04-18 RX ADMIN — RISPERIDONE 4 MG: 3 TABLET ORAL at 21:02

## 2019-04-18 RX ADMIN — ATOMOXETINE 18 MG: 18 CAPSULE ORAL at 08:07

## 2019-04-18 RX ADMIN — DIVALPROEX SODIUM 1000 MG: 500 TABLET, EXTENDED RELEASE ORAL at 21:02

## 2019-04-18 RX ADMIN — ARIPIPRAZOLE 2 MG: 2 TABLET ORAL at 08:08

## 2019-04-18 RX ADMIN — VITAMIN D, TAB 1000IU (100/BT) 1000 UNITS: 25 TAB at 08:07

## 2019-04-18 RX ADMIN — BENZTROPINE MESYLATE 1 MG: 1 TABLET ORAL at 08:07

## 2019-04-18 RX ADMIN — TRAZODONE HYDROCHLORIDE 100 MG: 100 TABLET ORAL at 21:02

## 2019-04-18 RX ADMIN — MULTIPLE VITAMINS W/ MINERALS TAB 1 TABLET: TAB at 08:07

## 2019-04-18 ASSESSMENT — ACTIVITIES OF DAILY LIVING (ADL)
DRESS: INDEPENDENT
ORAL_HYGIENE: INDEPENDENT
LAUNDRY: WITH SUPERVISION
HYGIENE/GROOMING: INDEPENDENT

## 2019-04-18 NOTE — PROGRESS NOTES
04/17/19 2200   Therapeutic Recreation   Type of Intervention structured groups   Activity game   Response Participates, initiates socially appropriate   Hours 1     Pt actively participated in a structured Therapeutic Recreation group with a focus on leisure participation, stress reduction, and social engagement via a group game. Pt remained focused and engaged throughout full duration of group. Pt shared with group a recreational interest they enjoy during the spring season. Showed progress in session goals. Pt mood was calm and was appropriate with interactions.

## 2019-04-18 NOTE — PLAN OF CARE
"INITIAL OT NOTE  Problem: OT General Care Plan  Goal: OT Goal 1  Will attend OT groups and participate actively in all OT opportunities. Will assess and set goals.     Pt attended 1 out of 2 OT groups offered. Pt actively participated in a mental health management group with a focus on coping through movement to facilitate relaxation and stress management via chair yoga. Pt followed and engaged in about 50% of the yoga poses, and appeared calm at the end of group. He shared that yoga is new to him, but expressed that he is interested in trying it because he is \"losing muscle.\" Calm and cooperative throughout this group. Will continue to assess. Initial assessment to be completed upon additional group participation.     "

## 2019-04-18 NOTE — PROGRESS NOTES
Pt denies SI/SIB or hallucinations. Pt was active in the milieu and attended evening community group. Pt was calm, cooperative, and pleasant.

## 2019-04-19 LAB — INTERPRETATION ECG - MUSE: NORMAL

## 2019-04-19 PROCEDURE — G0177 OPPS/PHP; TRAIN & EDUC SERV: HCPCS

## 2019-04-19 PROCEDURE — A9270 NON-COVERED ITEM OR SERVICE: HCPCS | Performed by: PSYCHIATRY & NEUROLOGY

## 2019-04-19 PROCEDURE — 12400002 ZZH R&B MH SENIOR/ADOLESCENT

## 2019-04-19 PROCEDURE — 25000132 ZZH RX MED GY IP 250 OP 250 PS 637: Performed by: PSYCHIATRY & NEUROLOGY

## 2019-04-19 RX ORDER — BENZTROPINE MESYLATE 1 MG/1
1 TABLET ORAL 2 TIMES DAILY
Status: DISCONTINUED | OUTPATIENT
Start: 2019-04-19 | End: 2019-04-22 | Stop reason: HOSPADM

## 2019-04-19 RX ORDER — CLONAZEPAM 0.5 MG/1
0.5 TABLET ORAL AT BEDTIME
Status: DISCONTINUED | OUTPATIENT
Start: 2019-04-19 | End: 2019-04-22 | Stop reason: HOSPADM

## 2019-04-19 RX ADMIN — DIVALPROEX SODIUM 1000 MG: 500 TABLET, EXTENDED RELEASE ORAL at 21:17

## 2019-04-19 RX ADMIN — VITAMIN D, TAB 1000IU (100/BT) 1000 UNITS: 25 TAB at 08:16

## 2019-04-19 RX ADMIN — ATOMOXETINE 18 MG: 18 CAPSULE ORAL at 08:16

## 2019-04-19 RX ADMIN — RISPERIDONE 4 MG: 3 TABLET ORAL at 21:17

## 2019-04-19 RX ADMIN — BENZTROPINE MESYLATE 1 MG: 1 TABLET ORAL at 08:16

## 2019-04-19 RX ADMIN — TRAZODONE HYDROCHLORIDE 100 MG: 100 TABLET ORAL at 21:17

## 2019-04-19 RX ADMIN — ACETAMINOPHEN 650 MG: 325 TABLET, FILM COATED ORAL at 01:27

## 2019-04-19 RX ADMIN — CLONAZEPAM 0.5 MG: 0.5 TABLET ORAL at 21:17

## 2019-04-19 RX ADMIN — BENZTROPINE MESYLATE 1 MG: 1 TABLET ORAL at 21:17

## 2019-04-19 RX ADMIN — MULTIPLE VITAMINS W/ MINERALS TAB 1 TABLET: TAB at 08:16

## 2019-04-19 RX ADMIN — ARIPIPRAZOLE 2 MG: 2 TABLET ORAL at 08:16

## 2019-04-19 ASSESSMENT — ACTIVITIES OF DAILY LIVING (ADL)
ORAL_HYGIENE: INDEPENDENT
HYGIENE/GROOMING: INDEPENDENT
DRESS: INDEPENDENT;SCRUBS (BEHAVIORAL HEALTH)
LAUNDRY: UNABLE TO COMPLETE
DRESS: INDEPENDENT
ORAL_HYGIENE: INDEPENDENT
LAUNDRY: WITH SUPERVISION
HYGIENE/GROOMING: INDEPENDENT

## 2019-04-19 NOTE — PROGRESS NOTES
Patient woke up,  came to the nurses' station and reported that he was having right hip pain. Tylenol 650 mg. given @ 0127 PRN for pain. Patient went to sleep after a few  minutes.     Slept a total of 7.75 hours.

## 2019-04-19 NOTE — PROGRESS NOTES
"INITIAL OT ASSESSMENT       04/18/19 1400   General Information   Date Initially Attended OT 04/18/19   Special Considerations see note - based on participation in 2 groups   Clinical Impression   Affect Appropriate to situation;Other (see comments)  (appeared hypomanic at times, otherwise calm and appropriate)   Orientation Oriented to person, place and time   Appearance and ADLs Neatly groomed   Attention to Internal Stimuli No observed signs   Interaction Skills Interacts appropriately with staff;Interacts appropriately with peers   Ability to Communicate Needs Independent   Verbal Content Pressured;Loose;Clear   Ability to Maintain Boundaries Maintains appropriate physical boundaries;Maintains appropriate verbal boundaries   Participation Independently participates   Concentration Concentrates 50 minutes   Ability to Concentrate Without difficulty;With structure   Follows and Comprehends Directions Independently follows 2 step verbal directions   Memory Delayed and immediate recall intact   Organization Independently organizes all tasks   Decision Making Independent   Planning and Problem Solving Independently plans ahead   Ability to Apply and Learn Concepts Applies within group structure   Frustrations / Stress Tolerance Independently identifies skills    Level of Insight Some insight   Self Esteem Can identify positives;Poor self esteem  (reported low self-esteem on self-assessment)   Social Supports Has knowledge of support systems     Pt attended 1 out of 2 OT groups offered. Pt actively participated in occupational therapy clinic. Pt was able to ask for assistance as needed, and independently initiated a creative expression task. Pt demonstrated good focus, planning, and attention to detail. Social with peers throughout. He occasionally made odd comments, such as stating \"I'm going to use black paint [place] He verbalized: \"I'm worried that my " "daughter will disown me for things I've said when I was manic.\" He demonstrated some insight, and was receptive to validation. Pressured speech observed at times, though he was overall calm and cooperative throughout group. Pt was given and completed a written self-assessment form. OT staff reviewed with pt and explained the value of having them involved in their treatment plan, and provided options to meet current needs/self-identified goals. Pt stated reason for admission was \"med adjustment.\" Selected goals including deal with frustration more effectively, increase motivation, improve decision making and organization, and improve assertivness. PLAN: Will provide structure, support, and encouragement. Offer education on coping strategies and life management skills. Assist pt to increase self awareness regarding mental health issues and expand ideas. Will assess further in the areas of organization, problem solving, and concentration.           "

## 2019-04-19 NOTE — PROGRESS NOTES
Patient participated in :60 process group on the topic of a Life Tree wherein patients taz a tree and filled in various elements that bring meaning to their life. Patient participated actively in the group by drawing his tree, filling in elements and in processing afterward with other patients

## 2019-04-19 NOTE — PROGRESS NOTES
04/19/19 4715   Behavioral Health   Hallucinations denies / not responding to hallucinations   Thinking intact   Orientation person: oriented;place: oriented;date: oriented;time: oriented   Memory baseline memory   Insight admits / accepts   Judgement intact   Eye Contact at examiner   Affect full range affect   Mood mood is calm   Physical Appearance/Attire attire appropriate to age and situation   Hygiene well groomed   Suicidality other (see comments)  (Denies)   1. Wish to be Dead No   2. Non-Specific Active Suicidal Thoughts  No   Self Injury other (see comment)  (Denies)   Elopement   (None observed)   Activity other (see comment)  (Visible in the milieu and groups)   Speech coherent;clear   Medication Sensitivity no observed side effects;no stated side effects   Psychomotor / Gait steady;balanced   Activities of Daily Living   Hygiene/Grooming independent   Oral Hygiene independent   Dress independent   Laundry with supervision   Room Organization independent     Solo had a calm and engaged shift this evening. He spent time in the milieu and attending groups, participating as intended. He was friendly upon approach but appeared with a blunted affect. He denied anxiety and endorsed a little depression. He denied SI, SIB, HI, and hallucinations. He spoke about exercising more, a goal he has for himself moving forward. He said he was happy he was addressing his mental health and appreciated everything staff is doing. There were no behavioral problems today.

## 2019-04-19 NOTE — PROGRESS NOTES
Patient seen, chart reviewed, care discussed with staff.  Blood pressure 100/64, pulse 65, temperature 97.3  F (36.3  C), temperature source Tympanic, resp. rate 16, weight 92.4 kg (203 lb 9.6 oz), SpO2 98 %.    Alert.  Affect fair and anxious.  Speech production increased.  Eye contact good.  Psychomotor behavior and gait  normal.  No delusions or hallucinations voiced today.  Thoughts logical.  Associations intact. Cognitions intact.  Not suicidal.  He has had muscle cramping from neuroleptics in the past.  He is worried about anxiety, notes he was taking Cogentin and Klonopin twice daily.    He has a headache, possible from Strattera    Plan:  Re-start Klonopin at once/day  2.  Increase Cogentin      Current Facility-Administered Medications:      acetaminophen (TYLENOL) tablet 650 mg, 650 mg, Oral, Q4H PRN, Shaun Carrington MD, 650 mg at 04/19/19 0127     alum & mag hydroxide-simethicone (MYLANTA ES/MAALOX  ES) suspension 30 mL, 30 mL, Oral, Q4H PRN, Shaun Carrington MD     ARIPiprazole (ABILIFY) tablet 2 mg, 2 mg, Oral, Daily, Shaun Carrington MD, 2 mg at 04/19/19 0816     atomoxetine (STRATTERA) capsule 18 mg, 18 mg, Oral, Daily, Shaun Carrington MD, 18 mg at 04/19/19 0816     benztropine (COGENTIN) tablet 1 mg, 1 mg, Oral, BID, Shaun Carrington MD     bisacodyl (DULCOLAX) Suppository 10 mg, 10 mg, Rectal, Daily PRN, Shaun Carrington MD     clonazePAM (klonoPIN) tablet 0.5 mg, 0.5 mg, Oral, At Bedtime, Shaun Carrington MD     divalproex sodium extended-release (DEPAKOTE ER) 24 hr tablet 1,000 mg, 1,000 mg, Oral, Daily, Shaun Carrington MD, 1,000 mg at 04/18/19 2102     hydrOXYzine (ATARAX) tablet 25 mg, 25 mg, Oral, Q4H PRN, Shaun Carrington MD     magnesium hydroxide (MILK OF MAGNESIA) suspension 30 mL, 30 mL, Oral, At Bedtime PRN, Shaun Carrington MD     multivitamin w/minerals (THERA-VIT-M) tablet 1 tablet, 1 tablet, Oral, Daily, Shaun Carrington MD, 1 tablet at 04/19/19 0816     risperiDONE (risperDAL) tablet 4 mg, 4 mg, Oral,  At Bedtime, Shaun Carrington MD, 4 mg at 04/18/19 2102     traZODone (DESYREL) tablet 100 mg, 100 mg, Oral, At Bedtime, Shaun Carrington MD, 100 mg at 04/18/19 2102     vitamin D3 (CHOLECALCIFEROL) 1000 units (25 mcg) tablet 1,000 Units, 1,000 Units, Oral, Daily, Shaun Carrington MD, 1,000 Units at 04/19/19 0816  Recent Results (from the past 168 hour(s))   Drug abuse screen 6 urine (chem dep)    Collection Time: 04/17/19  5:04 AM   Result Value Ref Range    Amphetamine Qual Urine Negative NEG^Negative    Barbiturates Qual Urine Negative NEG^Negative    Benzodiazepine Qual Urine Negative NEG^Negative    Cannabinoids Qual Urine Negative NEG^Negative    Cocaine Qual Urine Negative NEG^Negative    Ethanol Qual Urine Negative NEG^Negative    Opiates Qualitative Urine Negative NEG^Negative   CBC with platelets differential    Collection Time: 04/17/19  5:28 AM   Result Value Ref Range    WBC 5.7 4.0 - 11.0 10e9/L    RBC Count 4.71 4.4 - 5.9 10e12/L    Hemoglobin 14.2 13.3 - 17.7 g/dL    Hematocrit 42.4 40.0 - 53.0 %    MCV 90 78 - 100 fl    MCH 30.1 26.5 - 33.0 pg    MCHC 33.5 31.5 - 36.5 g/dL    RDW 13.1 10.0 - 15.0 %    Platelet Count 164 150 - 450 10e9/L    Diff Method Automated Method     % Neutrophils 50.6 %    % Lymphocytes 28.9 %    % Monocytes 15.1 %    % Eosinophils 4.7 %    % Basophils 0.7 %    % Immature Granulocytes 0.0 %    Nucleated RBCs 0 0 /100    Absolute Neutrophil 2.9 1.6 - 8.3 10e9/L    Absolute Lymphocytes 1.7 0.8 - 5.3 10e9/L    Absolute Monocytes 0.9 0.0 - 1.3 10e9/L    Absolute Eosinophils 0.3 0.0 - 0.7 10e9/L    Absolute Basophils 0.0 0.0 - 0.2 10e9/L    Abs Immature Granulocytes 0.0 0 - 0.4 10e9/L    Absolute Nucleated RBC 0.0    Comprehensive metabolic panel    Collection Time: 04/17/19  5:28 AM   Result Value Ref Range    Sodium 143 133 - 144 mmol/L    Potassium 3.7 3.4 - 5.3 mmol/L    Chloride 108 94 - 109 mmol/L    Carbon Dioxide 27 20 - 32 mmol/L    Anion Gap 8 3 - 14 mmol/L    Glucose 77 70 - 99  mg/dL    Urea Nitrogen 22 7 - 30 mg/dL    Creatinine 1.02 0.66 - 1.25 mg/dL    GFR Estimate 76 >60 mL/min/[1.73_m2]    GFR Estimate If Black 89 >60 mL/min/[1.73_m2]    Calcium 8.1 (L) 8.5 - 10.1 mg/dL    Bilirubin Total 0.4 0.2 - 1.3 mg/dL    Albumin 3.6 3.4 - 5.0 g/dL    Protein Total 6.4 (L) 6.8 - 8.8 g/dL    Alkaline Phosphatase 79 40 - 150 U/L    ALT 49 0 - 70 U/L    AST 26 0 - 45 U/L   EKG 12-lead, complete    Collection Time: 04/17/19 12:20 PM   Result Value Ref Range    Interpretation ECG Click View Image link to view waveform and result    Valproic acid    Collection Time: 04/17/19 12:39 PM   Result Value Ref Range    Valproic Acid Level 47 (L) 50 - 100 mg/L   Ammonia    Collection Time: 04/17/19 12:39 PM   Result Value Ref Range    Ammonia 36 10 - 50 umol/L

## 2019-04-20 PROCEDURE — A9270 NON-COVERED ITEM OR SERVICE: HCPCS | Performed by: PSYCHIATRY & NEUROLOGY

## 2019-04-20 PROCEDURE — 90853 GROUP PSYCHOTHERAPY: CPT

## 2019-04-20 PROCEDURE — 25000132 ZZH RX MED GY IP 250 OP 250 PS 637: Performed by: PSYCHIATRY & NEUROLOGY

## 2019-04-20 PROCEDURE — 12400002 ZZH R&B MH SENIOR/ADOLESCENT

## 2019-04-20 RX ADMIN — RISPERIDONE 4 MG: 3 TABLET ORAL at 21:30

## 2019-04-20 RX ADMIN — ARIPIPRAZOLE 2 MG: 2 TABLET ORAL at 08:23

## 2019-04-20 RX ADMIN — BENZTROPINE MESYLATE 1 MG: 1 TABLET ORAL at 08:23

## 2019-04-20 RX ADMIN — BENZTROPINE MESYLATE 1 MG: 1 TABLET ORAL at 21:30

## 2019-04-20 RX ADMIN — TRAZODONE HYDROCHLORIDE 100 MG: 100 TABLET ORAL at 21:30

## 2019-04-20 RX ADMIN — CLONAZEPAM 0.5 MG: 0.5 TABLET ORAL at 21:30

## 2019-04-20 RX ADMIN — DIVALPROEX SODIUM 1000 MG: 500 TABLET, EXTENDED RELEASE ORAL at 21:30

## 2019-04-20 RX ADMIN — MULTIPLE VITAMINS W/ MINERALS TAB 1 TABLET: TAB at 08:23

## 2019-04-20 RX ADMIN — ATOMOXETINE 18 MG: 18 CAPSULE ORAL at 08:23

## 2019-04-20 RX ADMIN — VITAMIN D, TAB 1000IU (100/BT) 1000 UNITS: 25 TAB at 08:23

## 2019-04-20 ASSESSMENT — ACTIVITIES OF DAILY LIVING (ADL)
ORAL_HYGIENE: INDEPENDENT
DRESS: INDEPENDENT
DRESS: SCRUBS (BEHAVIORAL HEALTH);INDEPENDENT
HYGIENE/GROOMING: INDEPENDENT
LAUNDRY: UNABLE TO COMPLETE
LAUNDRY: UNABLE TO COMPLETE
HYGIENE/GROOMING: INDEPENDENT
ORAL_HYGIENE: INDEPENDENT

## 2019-04-20 NOTE — PROGRESS NOTES
"Pt has been present in the milieu and social with peers. Pt states his mood is, \"feeling good.\" Pt denies anxiety and depression, \"Not really. I just slept 2.5 hours this afternoon. I hope to sleep well tonight.\" Pt denies SI/SIB. Denies thoughts of wanting to die. Pt reports both sleep and appetite have been good. Denies hallucinations. Pt prior to admission states, \"I thought someone was coming after me because I have cancer.\" Pt denies these thoughts at this time. Denies confusion, \"I do have trouble with my thoughts because they are so deep.\" He reports, \"I am hopeful I won't be here too long.\" He reports when he has ruminating thoughts he feels, \"Like I am a fish under water, in the deep end. Not in the shallow, where it is safe.\" Pt has been pleasant and cooperative. No odd behaviors or statements made. Will continue to monitor and assess.   "

## 2019-04-20 NOTE — PROGRESS NOTES
"   04/20/19 1400   Behavioral Health   Hallucinations denies / not responding to hallucinations   Thinking distractable   Orientation person: oriented;place: oriented;date: oriented;time: oriented   Memory baseline memory   Insight insight appropriate to situation   Judgement intact   Eye Contact at examiner   Affect full range affect   Mood mood is calm   Physical Appearance/Attire attire appropriate to age and situation   Hygiene well groomed   Suicidality other (see comments)  (denies)   1. Wish to be Dead No   2. Non-Specific Active Suicidal Thoughts  No   Self Injury other (see comment)  (none)   Elopement   (none)   Activity other (see comment)  (visible in the millieu)   Speech clear;coherent   Medication Sensitivity no stated side effects;no observed side effects   Psychomotor / Gait balanced;steady   Psycho Education   Type of Intervention 1:1 intervention   Response participates, initiates socially appropriate   Hours 0.5   Treatment Detail check in   Activities of Daily Living   Hygiene/Grooming independent   Oral Hygiene independent   Dress independent   Laundry unable to complete   Room Organization independent     Pt had a good day today. Pt denied feelings of depression and anxiety today. Pt attended community meeting and participated. Pt stated that sleep was fair last night and appetite is good. Pt commented on behavior from yesterday stating that he was \"walking like an [place] and whistling a lot yesterday because I was bored.\" Pt is hoping that he can discharge back to his home so he can \"take care of responsibilities outside of the hospital.\"Pt has been social with other pts. Pt took a shower this morning. Pt spent most the shift out in the lounge for the first half of the day shift.   "

## 2019-04-20 NOTE — PLAN OF CARE
"At the start of the shift pt was slightly hyper verbal talking about, \"[place],\" and asking numerous patients, \"do you know anything about [place]?\" Pt was pacing the [place] whistling songs. \"I hope everyone likes my whistling because I can whistle many songs.\" Another pt became irritated with his whistling and he stopped.     Pt attended and participated in community meeting.     Pt states his mood, \"different, it's okay.\" Pt states, \"I just have these irrational fears of being here for a long period of time.\" Pt reports his sleep has been good. Appetite has been good. Pleasant and cooperative. Pt reports that he feels, \"less confused than when I came in.\" No other concerns at this time. Denies SI/SIB. Denies hallucinations. Will continue to monitor and assess.   "

## 2019-04-20 NOTE — PROGRESS NOTES
"At the beginning of this writers shift patient was pacing the [place] whistling intrusively and was redirected to stop. He said \"I alredy stopped you were was just hearing him whistle in the past.\" Patient would later attend community meeting and be hyper verbal when others were trying to contribute saying,\"I cant talk in this group because I'm not clear in thought right now\". When it was his turn patient did not address the topic of the day or produce a meaningful check-in and was completely off topic. Patient had to de redirected a few times but was allowed to stay in group. Later in the shift he would rejoin his peers in the lounge and conduct himself very well, he was appropriate and carried on substantive conversations with his peers and this writer. He was rather pleasant which was a completely different presentation then earlier. Denies SI and SIB.   "

## 2019-04-20 NOTE — PROGRESS NOTES
Patient seen, chart reviewed, care discussed with staff.  Blood pressure 100/57, pulse 73, temperature 97.2  F (36.2  C), temperature source Tympanic, resp. rate 16, weight 92.4 kg (203 lb 9.6 oz), SpO2 98 %.    Less speech production, but still above normal.  Less anxious, Motor ok.  No delusions voiced.  Plan:  Same meds      Current Facility-Administered Medications:      acetaminophen (TYLENOL) tablet 650 mg, 650 mg, Oral, Q4H PRN, Shaun Carrington MD, 650 mg at 04/19/19 0127     alum & mag hydroxide-simethicone (MYLANTA ES/MAALOX  ES) suspension 30 mL, 30 mL, Oral, Q4H PRN, Sahun Carrington MD     ARIPiprazole (ABILIFY) tablet 2 mg, 2 mg, Oral, Daily, Shaun Carrington MD, 2 mg at 04/20/19 0823     atomoxetine (STRATTERA) capsule 18 mg, 18 mg, Oral, Daily, Shaun Carrington MD, 18 mg at 04/20/19 0823     benztropine (COGENTIN) tablet 1 mg, 1 mg, Oral, BID, Shaun Carrington MD, 1 mg at 04/20/19 0823     bisacodyl (DULCOLAX) Suppository 10 mg, 10 mg, Rectal, Daily PRN, Shaun Carrington MD     clonazePAM (klonoPIN) tablet 0.5 mg, 0.5 mg, Oral, At Bedtime, Shaun Carrington MD, 0.5 mg at 04/19/19 2117     divalproex sodium extended-release (DEPAKOTE ER) 24 hr tablet 1,000 mg, 1,000 mg, Oral, Daily, Shaun Carrington MD, 1,000 mg at 04/19/19 2117     hydrOXYzine (ATARAX) tablet 25 mg, 25 mg, Oral, Q4H PRN, Shaun Carrington MD     magnesium hydroxide (MILK OF MAGNESIA) suspension 30 mL, 30 mL, Oral, At Bedtime PRN, Shaun Carrington MD     multivitamin w/minerals (THERA-VIT-M) tablet 1 tablet, 1 tablet, Oral, Daily, Shaun Carrington MD, 1 tablet at 04/20/19 0823     risperiDONE (risperDAL) tablet 4 mg, 4 mg, Oral, At Bedtime, Shaun Carrington MD, 4 mg at 04/19/19 2117     traZODone (DESYREL) tablet 100 mg, 100 mg, Oral, At Bedtime, Shaun Carrington MD, 100 mg at 04/19/19 2117     vitamin D3 (CHOLECALCIFEROL) 1000 units (25 mcg) tablet 1,000 Units, 1,000 Units, Oral, Daily, Shaun Carrington MD, 1,000 Units at 04/20/19 0823  Recent Results (from the  past 168 hour(s))   Drug abuse screen 6 urine (chem dep)    Collection Time: 04/17/19  5:04 AM   Result Value Ref Range    Amphetamine Qual Urine Negative NEG^Negative    Barbiturates Qual Urine Negative NEG^Negative    Benzodiazepine Qual Urine Negative NEG^Negative    Cannabinoids Qual Urine Negative NEG^Negative    Cocaine Qual Urine Negative NEG^Negative    Ethanol Qual Urine Negative NEG^Negative    Opiates Qualitative Urine Negative NEG^Negative   CBC with platelets differential    Collection Time: 04/17/19  5:28 AM   Result Value Ref Range    WBC 5.7 4.0 - 11.0 10e9/L    RBC Count 4.71 4.4 - 5.9 10e12/L    Hemoglobin 14.2 13.3 - 17.7 g/dL    Hematocrit 42.4 40.0 - 53.0 %    MCV 90 78 - 100 fl    MCH 30.1 26.5 - 33.0 pg    MCHC 33.5 31.5 - 36.5 g/dL    RDW 13.1 10.0 - 15.0 %    Platelet Count 164 150 - 450 10e9/L    Diff Method Automated Method     % Neutrophils 50.6 %    % Lymphocytes 28.9 %    % Monocytes 15.1 %    % Eosinophils 4.7 %    % Basophils 0.7 %    % Immature Granulocytes 0.0 %    Nucleated RBCs 0 0 /100    Absolute Neutrophil 2.9 1.6 - 8.3 10e9/L    Absolute Lymphocytes 1.7 0.8 - 5.3 10e9/L    Absolute Monocytes 0.9 0.0 - 1.3 10e9/L    Absolute Eosinophils 0.3 0.0 - 0.7 10e9/L    Absolute Basophils 0.0 0.0 - 0.2 10e9/L    Abs Immature Granulocytes 0.0 0 - 0.4 10e9/L    Absolute Nucleated RBC 0.0    Comprehensive metabolic panel    Collection Time: 04/17/19  5:28 AM   Result Value Ref Range    Sodium 143 133 - 144 mmol/L    Potassium 3.7 3.4 - 5.3 mmol/L    Chloride 108 94 - 109 mmol/L    Carbon Dioxide 27 20 - 32 mmol/L    Anion Gap 8 3 - 14 mmol/L    Glucose 77 70 - 99 mg/dL    Urea Nitrogen 22 7 - 30 mg/dL    Creatinine 1.02 0.66 - 1.25 mg/dL    GFR Estimate 76 >60 mL/min/[1.73_m2]    GFR Estimate If Black 89 >60 mL/min/[1.73_m2]    Calcium 8.1 (L) 8.5 - 10.1 mg/dL    Bilirubin Total 0.4 0.2 - 1.3 mg/dL    Albumin 3.6 3.4 - 5.0 g/dL    Protein Total 6.4 (L) 6.8 - 8.8 g/dL    Alkaline Phosphatase  79 40 - 150 U/L    ALT 49 0 - 70 U/L    AST 26 0 - 45 U/L   EKG 12-lead, complete    Collection Time: 04/17/19 12:20 PM   Result Value Ref Range    Interpretation ECG Click View Image link to view waveform and result    Valproic acid    Collection Time: 04/17/19 12:39 PM   Result Value Ref Range    Valproic Acid Level 47 (L) 50 - 100 mg/L   Ammonia    Collection Time: 04/17/19 12:39 PM   Result Value Ref Range    Ammonia 36 10 - 50 umol/L

## 2019-04-21 PROCEDURE — A9270 NON-COVERED ITEM OR SERVICE: HCPCS | Performed by: PSYCHIATRY & NEUROLOGY

## 2019-04-21 PROCEDURE — G0177 OPPS/PHP; TRAIN & EDUC SERV: HCPCS

## 2019-04-21 PROCEDURE — 12400002 ZZH R&B MH SENIOR/ADOLESCENT

## 2019-04-21 PROCEDURE — 25000132 ZZH RX MED GY IP 250 OP 250 PS 637: Performed by: PSYCHIATRY & NEUROLOGY

## 2019-04-21 RX ADMIN — MULTIPLE VITAMINS W/ MINERALS TAB 1 TABLET: TAB at 08:37

## 2019-04-21 RX ADMIN — BENZTROPINE MESYLATE 1 MG: 1 TABLET ORAL at 08:37

## 2019-04-21 RX ADMIN — BENZTROPINE MESYLATE 1 MG: 1 TABLET ORAL at 22:11

## 2019-04-21 RX ADMIN — ATOMOXETINE 18 MG: 18 CAPSULE ORAL at 08:37

## 2019-04-21 RX ADMIN — ARIPIPRAZOLE 2 MG: 2 TABLET ORAL at 08:37

## 2019-04-21 RX ADMIN — VITAMIN D, TAB 1000IU (100/BT) 1000 UNITS: 25 TAB at 08:37

## 2019-04-21 RX ADMIN — TRAZODONE HYDROCHLORIDE 100 MG: 100 TABLET ORAL at 22:11

## 2019-04-21 RX ADMIN — DIVALPROEX SODIUM 1000 MG: 500 TABLET, EXTENDED RELEASE ORAL at 22:11

## 2019-04-21 RX ADMIN — RISPERIDONE 4 MG: 3 TABLET ORAL at 22:11

## 2019-04-21 RX ADMIN — ACETAMINOPHEN 650 MG: 325 TABLET, FILM COATED ORAL at 02:55

## 2019-04-21 RX ADMIN — CLONAZEPAM 0.5 MG: 0.5 TABLET ORAL at 22:11

## 2019-04-21 ASSESSMENT — ACTIVITIES OF DAILY LIVING (ADL)
DRESS: INDEPENDENT
HYGIENE/GROOMING: INDEPENDENT
ORAL_HYGIENE: INDEPENDENT
DRESS: INDEPENDENT
HYGIENE/GROOMING: INDEPENDENT
ORAL_HYGIENE: INDEPENDENT

## 2019-04-21 NOTE — PROGRESS NOTES
04/21/19 1447   Behavioral Health   Hallucinations denies / not responding to hallucinations   Thinking distractable   Orientation person: oriented;place: oriented;date: oriented;time: oriented   Memory baseline memory   Insight insight appropriate to situation   Judgement intact   Eye Contact at examiner   Affect full range affect   Mood mood is calm   Physical Appearance/Attire attire appropriate to age and situation   Hygiene well groomed   Suicidality other (see comments)  (denies)   1. Wish to be Dead No   2. Non-Specific Active Suicidal Thoughts  No   Self Injury other (see comment)  (pt denies)   Elopement   (none)   [facility]see [facility]   Speech clear;coherent   Medication Sensitivity no stated side effects;no observed side effects   Psychomotor / Gait balanced;steady   Psycho Education   Type of Intervention 1:1 intervention   Response participates, initiates socially appropriate   Hours 0.5   Treatment Detail check in   Activities of Daily Living   Hygiene/Grooming independent   Oral Hygiene independent   Dress independent   Laundry   (NA)   Room Organization independent     Pt has had a good day. Pt denies feelings of anxiety and depression. Pt does feel somewhat anxious about cancer diagnosis though. Pt attended community meeting and OT. Pt has been social with other pts and staff. Pt told writer that he has been feeling more talkative lately which is not normal for him. Pt feels that he is getting better. Pt has been eating well and feels quality of sleep is improving. Pt shaved his face today and had a visit from his kids.

## 2019-04-21 NOTE — PLAN OF CARE
04/21/19 1200   Occupational Therapy   Type of Intervention structured groups   Response Initiates, socially acceptable   Hours 1     Pt actively participated in occupational therapy clinic. Pt was able to ask for assistance as needed, and independently initiate a novel, goal-directed task with minimal assistance. Pt demonstrated good focus, planning, and problem solving during task completion. Pt appeared comfortable interacting with peers and writer, and complimented peers regarding their projects. He was pleasant with a congruent affect.

## 2019-04-21 NOTE — PROGRESS NOTES
04/20/19 1815   Psycho Education   Type of Intervention structured groups   Response participates, initiates socially appropriate   Hours 1   Psychotherapy group goals: Identify and share responses to 4 questions (fears, loves/likes, things to let go, wants).     Solo actively participated in group and openly shared responses during group discussion. He was talkative but respectful of others' time to share.  He appeared anxious as he described multiple worries. This writer taught and the group practiced 2 mindful strategies to encourage focus on the here and now.

## 2019-04-22 VITALS
SYSTOLIC BLOOD PRESSURE: 106 MMHG | RESPIRATION RATE: 16 BRPM | TEMPERATURE: 97.6 F | HEART RATE: 81 BPM | DIASTOLIC BLOOD PRESSURE: 65 MMHG | BODY MASS INDEX: 26.36 KG/M2 | WEIGHT: 205.3 LBS | OXYGEN SATURATION: 99 %

## 2019-04-22 PROCEDURE — 25000132 ZZH RX MED GY IP 250 OP 250 PS 637: Performed by: PSYCHIATRY & NEUROLOGY

## 2019-04-22 PROCEDURE — G0177 OPPS/PHP; TRAIN & EDUC SERV: HCPCS

## 2019-04-22 PROCEDURE — A9270 NON-COVERED ITEM OR SERVICE: HCPCS | Performed by: PSYCHIATRY & NEUROLOGY

## 2019-04-22 RX ORDER — MULTIVIT WITH MINERALS/LUTEIN
1 TABLET ORAL DAILY
Qty: 90 TABLET | Refills: 3 | Status: SHIPPED | OUTPATIENT
Start: 2019-04-22 | End: 2022-07-14

## 2019-04-22 RX ORDER — ARIPIPRAZOLE 2 MG/1
2 TABLET ORAL DAILY
Qty: 30 TABLET | Refills: 3 | Status: SHIPPED | OUTPATIENT
Start: 2019-04-23 | End: 2019-07-31

## 2019-04-22 RX ORDER — DIVALPROEX SODIUM 500 MG/1
1000 TABLET, EXTENDED RELEASE ORAL DAILY
Qty: 60 TABLET | Refills: 3 | Status: SHIPPED | OUTPATIENT
Start: 2019-04-22 | End: 2019-07-09

## 2019-04-22 RX ORDER — ATOMOXETINE 18 MG/1
18 CAPSULE ORAL DAILY
Qty: 30 CAPSULE | Refills: 3 | Status: SHIPPED | OUTPATIENT
Start: 2019-04-23 | End: 2019-08-23

## 2019-04-22 RX ORDER — TRAZODONE HYDROCHLORIDE 100 MG/1
100 TABLET ORAL AT BEDTIME
Qty: 30 TABLET | Refills: 3 | Status: SHIPPED | OUTPATIENT
Start: 2019-04-22 | End: 2019-10-30

## 2019-04-22 RX ORDER — CLONAZEPAM 0.5 MG/1
0.5 TABLET ORAL AT BEDTIME
Qty: 30 TABLET | Refills: 3 | Status: SHIPPED | OUTPATIENT
Start: 2019-04-22 | End: 2019-08-07

## 2019-04-22 RX ORDER — BENZTROPINE MESYLATE 1 MG/1
1 TABLET ORAL DAILY
Qty: 60 TABLET | Refills: 3 | Status: SHIPPED | OUTPATIENT
Start: 2019-04-22 | End: 2019-08-28

## 2019-04-22 RX ORDER — RISPERIDONE 4 MG/1
4 TABLET ORAL AT BEDTIME
Qty: 30 TABLET | Refills: 3 | Status: SHIPPED | OUTPATIENT
Start: 2019-04-22 | End: 2019-08-28

## 2019-04-22 RX ADMIN — VITAMIN D, TAB 1000IU (100/BT) 1000 UNITS: 25 TAB at 08:33

## 2019-04-22 RX ADMIN — ATOMOXETINE 18 MG: 18 CAPSULE ORAL at 08:32

## 2019-04-22 RX ADMIN — BENZTROPINE MESYLATE 1 MG: 1 TABLET ORAL at 08:34

## 2019-04-22 RX ADMIN — MULTIPLE VITAMINS W/ MINERALS TAB 1 TABLET: TAB at 08:33

## 2019-04-22 RX ADMIN — ARIPIPRAZOLE 2 MG: 2 TABLET ORAL at 08:34

## 2019-04-22 ASSESSMENT — ACTIVITIES OF DAILY LIVING (ADL)
DRESS: INDEPENDENT
DRESS: INDEPENDENT
ORAL_HYGIENE: INDEPENDENT
HYGIENE/GROOMING: INDEPENDENT
ORAL_HYGIENE: INDEPENDENT
HYGIENE/GROOMING: INDEPENDENT

## 2019-04-22 NOTE — PROGRESS NOTES
Pt attended 2 out of 2 OT groups offered. Pt actively participated in occupational therapy clinic. Pt was able to ask for assistance as needed, and independently returned to a creative expression task. Pt demonstrated good focus, planning, and problem solving. Social with peers and writer throughout. Pt actively participated in a structured occupational therapy group with a focus on identifying and prioritizing personal values. Pt contributed to a group discussion that facilitated self-reflection and application of personal values. Using a list of values, pt identified creativity, honesty, and family as his most important values. Calm and cooperative.

## 2019-04-22 NOTE — PROGRESS NOTES
Pt discharged to home, transportation provided by daughter.  Pt denies hallucinations, SI/SIB/HI at this time.  Pt is alert and oriented x 4.  Affect is bright.  States he feels hopeful for the future.  Discharge summary, follow up appointments and medications reviewed with pt - pt states an understanding.  Pt left the unit with discharge materials, medications from discharge pharmacy and belongings.

## 2019-04-22 NOTE — PLAN OF CARE
"Pt has been present in the milieu and social on approach. Pt has been attending and participating in programming. Reports both his sleep and appetite have been good. States his children came into visit today and it went well. Denies anxiety. Denies hallucinations and/or SI/SIB. Pt states he does have some depression, \"Only time I feel depressed is when I think about my cancer.\" Pt reports he feels the, \"Klonopin is helping me at night.\" Pleasant and cooperative. Hygiene is adequate. No odd statements or behaviors. Medication compliant. Will continue to monitor and assess.   "
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
## 2019-04-22 NOTE — DISCHARGE INSTRUCTIONS
Behavioral Discharge Planning and Instructions      Summary:  You were admitted on 4/17/2019  due to Delusional Thoughts and Manic Symptomology.  You were treated by Dr. Erin Mata MD and discharged on 04/22/2019 from Unit [facility] to Home      Principal Diagnosis:   Manic symptoms  Delusions (H)  Disorganized thought process       Health Care Follow-up Appointments:   Psychiatrist:   Ziggy Guevara - Friday, May 3rd at 1pm.  [facility] -   [address] [place] [id number]   Phone: [phone]    Therapy:  Chris Perez - Friday, May 10th at 10 am  [facility]  [address] [place] [address]   Phone: [phone]    Medication Therapy Management  You will receive a call for this appointment which will be over the phone. You have identified the best phone number to reach you as [phone]. You will receive your call on Thursday, April 25th at 2pm.  Attend all scheduled appointments with your outpatient providers. Call at least 24 hours in advance if you need to reschedule an appointment to ensure continued access to your outpatient providers.   Major Treatments, Procedures and Findings:  You were provided with: a psychiatric assessment, assessed for medical stability and medication evaluation and/or management    Symptoms to Report: feeling more aggressive, increased confusion, losing more sleep, mood getting worse or thoughts of suicide    Early warning signs can include: increased depression or anxiety sleep disturbances increased thoughts or behaviors of suicide or self-harm  increased unusual thinking, such as paranoia or hearing voices    Safety and Wellness:  Take all medicines as directed.  Make no changes unless your doctor suggests them.      Follow treatment recommendations.  Refrain from alcohol and non-prescribed drugs.  If there is a concern for safety, call [phone].    Resources:   Crisis Intervention: 353.570.6260 or  941.613.4219 (TTY: 633.244.9168).  Call anytime for help.  National [place] on Mental Illness (www.[place].christian.org): 197.230.6306 or 900-269-3227.  Suicide Awareness Voices of Education (SAVE) (www.save.org): 018-698-CXOI (2211)  National Suicide Prevention Line (www.mentalhealthmn.org): 669-440-AXMU (2419)  Mental Health Consumer/Survivor Network of [place] (www.mhcsn.net): 614.373.1952 or 790-899-6435  Mental Health Association of [place] (www.mentalhealth.org): 413.672.7660 or 971-305-4884  Self- Management and Recovery Training., SMART-- Toll free: 665.349.6212  www.[employer]  [facility] Crisis Response [phone]      The treatment team has appreciated the opportunity to work with you.     If you have any questions or concerns our unit number is [phone]  You may be receiving a follow-up phone call within the next three days from a representative from behavioral health.    You have identified the best phone number to reach you as [phone]

## 2019-04-25 ENCOUNTER — ALLIED HEALTH/NURSE VISIT (OUTPATIENT)
Dept: PHARMACY | Facility: CLINIC | Age: 64
End: 2019-04-25
Payer: COMMERCIAL

## 2019-04-25 ENCOUNTER — PATIENT OUTREACH (OUTPATIENT)
Dept: CARE COORDINATION | Facility: CLINIC | Age: 64
End: 2019-04-25

## 2019-04-25 DIAGNOSIS — R25.2 MUSCLE CRAMPS: ICD-10-CM

## 2019-04-25 DIAGNOSIS — F90.8 ATTENTION DEFICIT HYPERACTIVITY DISORDER (ADHD), OTHER TYPE: ICD-10-CM

## 2019-04-25 DIAGNOSIS — G47.09 OTHER INSOMNIA: ICD-10-CM

## 2019-04-25 DIAGNOSIS — Z78.9 TAKES DIETARY SUPPLEMENTS: ICD-10-CM

## 2019-04-25 DIAGNOSIS — F31.9 BIPOLAR 1 DISORDER (H): Primary | ICD-10-CM

## 2019-04-25 PROCEDURE — 99607 MTMS BY PHARM ADDL 15 MIN: CPT | Performed by: PHARMACIST

## 2019-04-25 PROCEDURE — 99605 MTMS BY PHARM NP 15 MIN: CPT | Performed by: PHARMACIST

## 2019-04-25 NOTE — Clinical Note
Amado Trinh,I ended up doing a phone visit with Solo yesterday and we got through quite a bit.  He is going to look for a saved document of the dates he's taken certain meds and see if there are others he couldn't remember.  Of note, he is interested in seeing a non-resident for consistency and plans to discuss that with you next week.Please see note for details and let me know if you have questions.Shameka

## 2019-04-25 NOTE — PROGRESS NOTES
Clinic Care Coordination Contact 4/25/19  Care Team Conversations-SW    Phone call made to f/u with the pt from his recent hospitalization. Pt states he has some apt's lined up in the near future. One is MTM, the others are psychiatry and counseling, all scheduled within the [place] system.     Pt explained that he didn't want to drive to the [facility] for his cares. SW offered to send him a mychart note with some places that could offer psychiatry that are closer to home for him. We also discussed MH CM through the county.    Pt sounded a bit overwhelmed at the end of our conversation. SW to send him a list of MH clinics near his home and was encouraged to check with his insurance about coverage. I will plan to f/u with him in approx 2 weeks to offer support if needed.    Trixie Mijares, [facility] -Care Coordination  Venancio Medrano  [phone]  4/25/2019 12:06 PM
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
## 2019-04-25 NOTE — LETTER
4/25/2019        RE: Los Huang  [address]  [place] [address]        Amado [place],       It was nice connecting with you this afternoon. As we discussed, here are some mental health clinics that could offer psychiatry should you want to find something closer to home.     Don't hesitate to call me if you have questions or concerns. I will plan to follow up with you in approximately 2 weeks.      [facility] (ID#[id number])  Address: [address] [place] [address]  Phone: [phone]     [facility] (ID#[id number])   Address: [address] [place] [address]  Phone: [phone]     [facility]-Jerome Franz MD/Vinnie Malik (ID#[id number])  Address: [place] [id number]  Phone: [phone]       Sincerely,        Trixie Mijares [facility] -Care Coordination  Venancio Medrano  [phone]

## 2019-04-26 RX ORDER — ARNICA MONTANA 1 [HP_X]/G
1 GEL TOPICAL DAILY PRN
COMMUNITY
End: 2020-12-15

## 2019-05-10 ENCOUNTER — OFFICE VISIT (OUTPATIENT)
Dept: PSYCHOLOGY | Facility: CLINIC | Age: 64
End: 2019-05-10
Payer: MEDICARE

## 2019-05-10 ENCOUNTER — PATIENT OUTREACH (OUTPATIENT)
Dept: CARE COORDINATION | Facility: CLINIC | Age: 64
End: 2019-05-10

## 2019-05-10 DIAGNOSIS — F31.9 BIPOLAR 1 DISORDER (H): Primary | ICD-10-CM

## 2019-05-10 PROCEDURE — 90834 PSYTX W PT 45 MINUTES: CPT | Performed by: SOCIAL WORKER

## 2019-05-10 ASSESSMENT — PATIENT HEALTH QUESTIONNAIRE - PHQ9
5. POOR APPETITE OR OVEREATING: NOT AT ALL
SUM OF ALL RESPONSES TO PHQ QUESTIONS 1-9: 3

## 2019-05-10 ASSESSMENT — ANXIETY QUESTIONNAIRES
3. WORRYING TOO MUCH ABOUT DIFFERENT THINGS: SEVERAL DAYS
1. FEELING NERVOUS, ANXIOUS, OR ON EDGE: NOT AT ALL
5. BEING SO RESTLESS THAT IT IS HARD TO SIT STILL: NOT AT ALL
IF YOU CHECKED OFF ANY PROBLEMS ON THIS QUESTIONNAIRE, HOW DIFFICULT HAVE THESE PROBLEMS MADE IT FOR YOU TO DO YOUR WORK, TAKE CARE OF THINGS AT HOME, OR GET ALONG WITH OTHER PEOPLE: SOMEWHAT DIFFICULT
GAD7 TOTAL SCORE: 2
7. FEELING AFRAID AS IF SOMETHING AWFUL MIGHT HAPPEN: NOT AT ALL
6. BECOMING EASILY ANNOYED OR IRRITABLE: NOT AT ALL
2. NOT BEING ABLE TO STOP OR CONTROL WORRYING: SEVERAL DAYS

## 2019-05-10 NOTE — PROGRESS NOTES
Progress Note    Client Name: Los Huang  Date: 5-10-19         Service Type: Individual  Video Visit: No     Session Start Time: 10:00  Session End Time: 10:45     Session Length: 45    Session #: 3    Attendees: Client     Treatment Plan Last Reviewed: Started tx plan today 5-10-19  PHQ-9 / HUGO-7 : Completed this session: Decreased anxiety screening this session and stable depression screening    DATA  Interactive Complexity: No  Crisis: No       Progress Since Last Session (Related to Symptoms / Goals / Homework):   Symptoms: stable-managing symptoms overall.     Homework: Did not complete      Episode of Care Goals: No improvement - PREPARATION (Decided to change - considering how); Intervened by negotiating a change plan and determining options / strategies for behavior change, identifying triggers, exploring social supports, and working towards setting a date to begin behavior change     Current / Ongoing Stressors and Concerns:   Has cancer dx, recent move to [place] from [place], difficulty making decisions, limited social support system       Treatment Objective(s) Addressed in This Session:   Ego integrity vs despair  Family relationship issues-concentrate on thought stopping process and engage in positive activities to improve his mood.      Intervention:   Solution Focused: tx planning  Motivational interviewing: OARS      ASSESSMENT: Current Emotional / Mental Status (status of significant symptoms):   Risk status (Self / Other harm or suicidal ideation)   Client denies current fears or concerns for personal safety.   Client denies current or recent suicidal ideation or behaviors.   Client denies current or recent homicidal ideation or behaviors.   Client denies current or recent self injurious behavior or ideation.   Client denies other safety concerns.   Client Client reports there has been no change in risk factors since their last session.     Client  Client reports there has been no change in protective factors since their last session.     A safety and risk management plan has not been developed at this time, however client was given the after-hours number / 911 should there be a change in any of these risk factors.     Appearance:   Appropriate    Eye Contact:   Good    Psychomotor Behavior: Restless    Attitude:   Cooperative    Orientation:   All   Speech    Rate / Production: Normal     Volume:  Normal    Mood:    Anxious  Depressed    Affect:    Bright  Expansive  Worrisome    Thought Content:  Referential Thinking  Rumination    Thought Form:  Blocking  Obsessive    Insight:    Fair      Medication Review:   No changes to current psychiatric medication(s)     Medication Compliance:   Yes     Changes in Health Issues:   None reported     Chemical Use Review:   Substance Use: Chemical use reviewed, no active concerns identified      Tobacco Use: No current tobacco use.      Diagnosis:  No diagnosis found.    Collateral Reports Completed:   Not Applicable    PLAN: (Client Tasks / Therapist Tasks / Other)  Client will concentrate on positives in his life.  Use thought stopping process when he get's caught up in negative thinking.  Concentrate on engaging in positive distraction activities that improve his mood.          Chris Perez, [facility]                                                         ______________________________________________________________________    Treatment Plan    Client's Name: Los Huang  YOB: 1955    Date: 5-10-19    DSM-V Diagnoses: 296.43 Bipolar I Disorder Current or Most Recent Episode Manic, Severe with psychotic features R/O Schizoaffective Disorder  Psychosocial & Contextual Factors: Has cancer dx, recent move to [place] from [place], difficulty making decisions, limited social support system      WHODAS: Completed at the first session     Referral / Collaboration:  Referral to another professional/service is  not indicated at this time..    Anticipated number of session or this episode of care: 15      MeasurableTreatment Goal(s) related to diagnosis / functional impairment(s)  Goal 1: Client will manage his bipolar symptoms and take his medications on a regular basis and feel better about himself.     I will know I've met my goal when I am enjoying and engaging in life again.       Objective #A (Client Action)    Client will use cognitive strategies identified in therapy to challenge anxious thoughts.  Status: Continued - Date(s): 5-10-19    Intervention(s)  Therapist will teach effective thought stopping and CBT skillsto improve his overall mood.  Client will engage in CBT skills until they become automatic. .    Objective #B  Client will engage in positive activities that improve his mood and that make him feel good about himself. .  Status: Continued - Date(s): 5-10-19    Intervention(s)  Therapist will assign homework Client will create a list of activities that will distract and improve his mood and engage in these activities at least three times per week. .    Objective #C  Client will work on resolving inner conflict and concentrate on ego integrity instead of despair and process feelings in session and concentrate on positives in his life..  Status: Continued - Date(s): 5-10-19    Intervention(s)  Therapist will work with client on reframing thinking and concentrate on improving his positive ego integrity and resolve the inner conflict he often wrestles with. .      Client has reviewed and agreed to the above plan.      Chris Perez, [facility]  May 10, 2019

## 2019-05-10 NOTE — PROGRESS NOTES
Clinic Care Coordination Contact 5/10/19  Care Team Conversations-LAURIE SULLIVAN made phone call to the pt today to check in with him. Pt states he has established with a psychiatrist at [facility] and has an apt on 5/22/19.    Pt had no further questions at this time. I encouraged him to let his PCP know to place a CC order should he have more questions/concerns in the future.     Trixie Mijares [facility] -Care Coordination  Venancio Medrano  [phone]  5/10/2019 9:12 AM

## 2019-05-11 ASSESSMENT — ANXIETY QUESTIONNAIRES: GAD7 TOTAL SCORE: 2

## 2019-05-16 ENCOUNTER — ALLIED HEALTH/NURSE VISIT (OUTPATIENT)
Dept: PHARMACY | Facility: CLINIC | Age: 64
End: 2019-05-16
Payer: COMMERCIAL

## 2019-05-16 DIAGNOSIS — F31.9 BIPOLAR 1 DISORDER (H): Primary | ICD-10-CM

## 2019-05-16 DIAGNOSIS — F90.8 ATTENTION DEFICIT HYPERACTIVITY DISORDER (ADHD), OTHER TYPE: ICD-10-CM

## 2019-05-16 PROCEDURE — 99607 MTMS BY PHARM ADDL 15 MIN: CPT | Performed by: PHARMACIST

## 2019-05-16 PROCEDURE — 99606 MTMS BY PHARM EST 15 MIN: CPT | Performed by: PHARMACIST
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
## 2019-05-22 ENCOUNTER — MYC MEDICAL ADVICE (OUTPATIENT)
Dept: PHARMACY | Facility: CLINIC | Age: 64
End: 2019-05-22

## 2019-05-24 NOTE — TELEPHONE ENCOUNTER
Spoke with patient about MyChart message.  Pt reported not having a positive experience at [facility] and is scheduled with Dr. Guevara for follow up on 5/31/19.    Shameka Curtis, PharmD  Medication Therapy Management Pharmacist  [facility]  Phone: [phone]

## 2019-05-31 ENCOUNTER — OFFICE VISIT (OUTPATIENT)
Dept: PSYCHIATRY | Facility: CLINIC | Age: 64
End: 2019-05-31
Attending: PSYCHIATRY & NEUROLOGY
Payer: MEDICARE

## 2019-05-31 VITALS
SYSTOLIC BLOOD PRESSURE: 109 MMHG | BODY MASS INDEX: 26.19 KG/M2 | WEIGHT: 204 LBS | HEART RATE: 76 BPM | DIASTOLIC BLOOD PRESSURE: 73 MMHG

## 2019-05-31 DIAGNOSIS — F31.9 BIPOLAR I DISORDER (H): Primary | ICD-10-CM

## 2019-05-31 PROCEDURE — G0463 HOSPITAL OUTPT CLINIC VISIT: HCPCS | Mod: ZF

## 2019-05-31 ASSESSMENT — PAIN SCALES - GENERAL: PAINLEVEL: NO PAIN (0)
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
## 2019-06-03 ASSESSMENT — PATIENT HEALTH QUESTIONNAIRE - PHQ9: SUM OF ALL RESPONSES TO PHQ QUESTIONS 1-9: 3

## 2019-06-06 ASSESSMENT — ENCOUNTER SYMPTOMS
JAUNDICE: 0
BLOATING: 0
NAUSEA: 0
ABDOMINAL PAIN: 1
RECTAL PAIN: 0
NECK PAIN: 0
POOR WOUND HEALING: 0
STIFFNESS: 0
HEARTBURN: 0
MUSCLE CRAMPS: 1
ARTHRALGIAS: 0
CONSTIPATION: 0
VOMITING: 0
DIARRHEA: 1
MYALGIAS: 1
BLOOD IN STOOL: 0
MUSCLE WEAKNESS: 0
BACK PAIN: 0
BOWEL INCONTINENCE: 0
NAIL CHANGES: 0
SKIN CHANGES: 1
JOINT SWELLING: 0

## 2019-06-07 ENCOUNTER — ANCILLARY PROCEDURE (OUTPATIENT)
Dept: CT IMAGING | Facility: CLINIC | Age: 64
End: 2019-06-07
Attending: RADIOLOGY
Payer: MEDICARE

## 2019-06-07 DIAGNOSIS — C18.9 METASTATIC COLON CANCER TO LIVER (H): ICD-10-CM

## 2019-06-07 DIAGNOSIS — C78.7 METASTATIC COLON CANCER TO LIVER (H): ICD-10-CM

## 2019-06-07 LAB
ALBUMIN SERPL-MCNC: 3.4 G/DL (ref 3.4–5)
ALP SERPL-CCNC: 87 U/L (ref 40–150)
ALT SERPL W P-5'-P-CCNC: 54 U/L (ref 0–70)
ANION GAP SERPL CALCULATED.3IONS-SCNC: 4 MMOL/L (ref 3–14)
AST SERPL W P-5'-P-CCNC: 29 U/L (ref 0–45)
BILIRUB DIRECT SERPL-MCNC: 0.1 MG/DL (ref 0–0.2)
BILIRUB SERPL-MCNC: 0.4 MG/DL (ref 0.2–1.3)
BUN SERPL-MCNC: 14 MG/DL (ref 7–30)
CALCIUM SERPL-MCNC: 8 MG/DL (ref 8.5–10.1)
CEA SERPL-MCNC: <0.5 UG/L (ref 0–2.5)
CHLORIDE SERPL-SCNC: 103 MMOL/L (ref 94–109)
CO2 SERPL-SCNC: 30 MMOL/L (ref 20–32)
CREAT SERPL-MCNC: 1.11 MG/DL (ref 0.66–1.25)
ERYTHROCYTE [DISTWIDTH] IN BLOOD BY AUTOMATED COUNT: 12.9 % (ref 10–15)
GFR SERPL CREATININE-BSD FRML MDRD: 69 ML/MIN/{1.73_M2}
GLUCOSE SERPL-MCNC: 91 MG/DL (ref 70–99)
HCT VFR BLD AUTO: 41.2 % (ref 40–53)
HGB BLD-MCNC: 13.7 G/DL (ref 13.3–17.7)
MCH RBC QN AUTO: 30.4 PG (ref 26.5–33)
MCHC RBC AUTO-ENTMCNC: 33.3 G/DL (ref 31.5–36.5)
MCV RBC AUTO: 91 FL (ref 78–100)
PLATELET # BLD AUTO: 143 10E9/L (ref 150–450)
POTASSIUM SERPL-SCNC: 4.1 MMOL/L (ref 3.4–5.3)
PROT SERPL-MCNC: 6.2 G/DL (ref 6.8–8.8)
RBC # BLD AUTO: 4.51 10E12/L (ref 4.4–5.9)
SODIUM SERPL-SCNC: 136 MMOL/L (ref 133–144)
WBC # BLD AUTO: 5.5 10E9/L (ref 4–11)

## 2019-06-07 PROCEDURE — 82378 CARCINOEMBRYONIC ANTIGEN: CPT | Performed by: RADIOLOGY

## 2019-06-07 RX ORDER — IOPAMIDOL 755 MG/ML
126 INJECTION, SOLUTION INTRAVASCULAR ONCE
Status: COMPLETED | OUTPATIENT
Start: 2019-06-07 | End: 2019-06-07

## 2019-06-07 RX ADMIN — IOPAMIDOL 126 ML: 755 INJECTION, SOLUTION INTRAVASCULAR at 16:08

## 2019-06-07 NOTE — DISCHARGE INSTRUCTIONS

## 2019-06-09 LAB — RADIOLOGIST FLAGS: NORMAL

## 2019-06-11 ENCOUNTER — OFFICE VISIT (OUTPATIENT)
Dept: RADIOLOGY | Facility: CLINIC | Age: 64
End: 2019-06-11
Attending: RADIOLOGY
Payer: MEDICARE

## 2019-06-11 VITALS
WEIGHT: 207.5 LBS | DIASTOLIC BLOOD PRESSURE: 70 MMHG | BODY MASS INDEX: 26.63 KG/M2 | RESPIRATION RATE: 16 BRPM | TEMPERATURE: 98 F | SYSTOLIC BLOOD PRESSURE: 117 MMHG | OXYGEN SATURATION: 94 % | HEART RATE: 72 BPM | HEIGHT: 74 IN

## 2019-06-11 DIAGNOSIS — C78.7 SECONDARY MALIGNANT NEOPLASM OF LIVER (H): Primary | ICD-10-CM

## 2019-06-11 PROCEDURE — G0463 HOSPITAL OUTPT CLINIC VISIT: HCPCS | Mod: ZF

## 2019-06-11 ASSESSMENT — MIFFLIN-ST. JEOR: SCORE: 1796.21

## 2019-06-11 ASSESSMENT — PAIN SCALES - GENERAL: PAINLEVEL: NO PAIN (0)

## 2019-06-11 NOTE — PROGRESS NOTES
Mr. Huang is a 63 year-old patient who has a long history of CRC with liver met that has been treated with Y [facility] and [facility] in [place]. The patient has moved here to stay with his daughter and seek a second opinion. I have seen him in April and discussed several treatment options but he preferred to just follow up in June. Today he is doing well and has still some hesitation about how to proceed. I have seen his new CT imaging.   The CT shows that the segment 2 larger lesion is slightly smaller. The dome lesion is slightly bigger. There is at list another small lesion in the left lobe that remains stable.   There is an increase in size of the right renal lesion that could represent an RCC vs a Metastatic lesion.      Current Outpatient Medications   Medication     ARIPiprazole (ABILIFY) 2 MG tablet     atomoxetine (STRATTERA) 18 MG capsule     benztropine (COGENTIN) 1 MG tablet     cholecalciferol (VITAMIN D3) 1000 units (25 mcg) capsule     clonazePAM (KLONOPIN) 0.5 MG tablet     divalproex sodium extended-release (DEPAKOTE ER) 500 MG 24 hr tablet     Homeopathic Products (ARNICARE) GEL     multivitamin (CENTRUM SILVER) tablet     risperiDONE (RISPERDAL) 4 MG tablet     traZODone (DESYREL) 100 MG tablet     No current facility-administered medications for this visit.      Past Medical History:   Diagnosis Date     Cancer (H)      Depressive disorder      Schizoaffective disorder (H)        Past Surgical History:   Procedure Laterality Date     ABDOMEN SURGERY       BIOPSY       CHOLECYSTECTOMY       COLONOSCOPY       ORTHOPEDIC SURGERY         Family History   Problem Relation Age of Onset     Osteoporosis Sister      Thyroid Disease Sister         Thyroid killed     Thyroid Disease Sister         Thyroid removed       Social History     Tobacco Use     Smoking status: Former Smoker     Packs/day: 1.00     Years: 20.00     Pack years: 20.00     Types: Cigarettes     Start date: 1980     Last attempt to quit: 2003      Years since quittin.4     Smokeless tobacco: Former User     Types: Snuff     Quit date:    Substance Use Topics     Alcohol use: Yes     Frequency: Monthly or less     Comment: slight         Impression and plan:    Stable liver metastatic disease in the liver. He is worried about his cancer but hesitate to do anything. I recommended him to follow up with Dr. Srivastava. He also asked about Colonoscopy. We will try to schedule to see a GI.   Slight increase in size of the inferior right kidney mass. May need a biopsy and visit with Urology.       Answers for HPI/ROS submitted by the patient on 2019   General Symptoms: No  Skin Symptoms: Yes  HENT Symptoms: No  EYE SYMPTOMS: No  HEART SYMPTOMS: No  LUNG SYMPTOMS: No  INTESTINAL SYMPTOMS: Yes  URINARY SYMPTOMS: No  REPRODUCTIVE SYMPTOMS: No  SKELETAL SYMPTOMS: Yes  BLOOD SYMPTOMS: No  NERVOUS SYSTEM SYMPTOMS: No  MENTAL HEALTH SYMPTOMS: No  Changes in hair: Yes  Changes in moles/birth marks: Yes  Itching: No  Rashes: No  Changes in nails: No  Acne: No  Change in facial hair: No  Warts: Yes  Non-healing sores: No  Scarring: No  Flaking of skin: No  Color changes of hands/feet in cold : No  Sun sensitivity: No  Skin thickening: No  Heart burn or indigestion: No  Nausea: No  Vomiting: No  Abdominal pain: Yes  Bloating: No  Constipation: No  Diarrhea: Yes  Blood in stool: No  Black stools: No  Rectal or Anal pain: No  Fecal incontinence: No  Yellowing of skin or eyes: No  Vomit with blood: No  Change in stools: Yes  Back pain: No  Muscle aches: Yes  Neck pain: No  Swollen joints: No  Joint pain: No  Bone pain: No  Muscle cramps: Yes  Muscle weakness: No  Joint stiffness: No  Bone fracture: No

## 2019-06-11 NOTE — NURSING NOTE
"Oncology Rooming Note    June 11, 2019 2:22 PM   Los Huang is a 63 year old male who presents for:    Chief Complaint   Patient presents with     Oncology Clinic Visit     [facility] RETURN- METASTATIC COLON CA TO LIVER     Initial Vitals: /70 (BP Location: Left arm, Patient Position: Chair, Cuff Size: Adult Regular)   Pulse 72   Temp 98  F (36.7  C) (Oral)   Resp 16   Ht 1.88 m (6' 2.02\")   Wt 94.1 kg (207 lb 8 oz)   SpO2 94%   BMI 26.63 kg/m   Estimated body mass index is 26.63 kg/m  as calculated from the following:    Height as of this encounter: 1.88 m (6' 2.02\").    Weight as of this encounter: 94.1 kg (207 lb 8 oz). Body surface area is 2.22 meters squared.  No Pain (0) Comment: Data Unavailable   No LMP for male patient.  Allergies reviewed: Yes  Medications reviewed: Yes    Medications: Medication refills not needed today.  Pharmacy name entered into [facility]: [id number] [address]    Clinical concerns: No new concerns. Boby was notified.      Harvinder Marquez LPN            "

## 2019-06-11 NOTE — LETTER
6/11/2019       RE: Los Huang  [address]  [facility] [address]     Dear Colleague,    Thank you for referring your patient, Los Huang, to the [facility]. Please see a copy of my visit note below.    Mr. Huang is a 63 year-old patient who has a long history of CRC with liver met that has been treated with Y 90 and Debiri in [place]. The patient has moved here to stay with his daughter and seek a second opinion. I have seen him in April and discussed several treatment options but he preferred to just follow up in June. Today he is doing well and has still some hesitation about how to proceed. I have seen his new CT imaging.   The CT shows that the segment 2 larger lesion is slightly smaller. The dome lesion is slightly bigger. There is at list another small lesion in the left lobe that remains stable.   There is an increase in size of the right renal lesion that could represent an RCC vs a Metastatic lesion.      Current Outpatient Medications   Medication     ARIPiprazole (ABILIFY) 2 MG tablet     atomoxetine (STRATTERA) 18 MG capsule     benztropine (COGENTIN) 1 MG tablet     cholecalciferol (VITAMIN D3) 1000 units (25 mcg) capsule     clonazePAM (KLONOPIN) 0.5 MG tablet     divalproex sodium extended-release (DEPAKOTE ER) 500 MG 24 hr tablet     Homeopathic Products (ARNICARE) GEL     multivitamin (CENTRUM SILVER) tablet     risperiDONE (RISPERDAL) 4 MG tablet     traZODone (DESYREL) 100 MG tablet     No current facility-administered medications for this visit.      Past Medical History:   Diagnosis Date     Cancer (H)      Depressive disorder      Schizoaffective disorder (H)        Past Surgical History:   Procedure Laterality Date     ABDOMEN SURGERY       BIOPSY       CHOLECYSTECTOMY       COLONOSCOPY       ORTHOPEDIC SURGERY         Family History   Problem Relation Age of Onset     Osteoporosis Sister      Thyroid Disease Sister         Thyroid  killed     Thyroid Disease Sister         Thyroid removed       Social History     Tobacco Use     Smoking status: Former Smoker     Packs/day: 1.00     Years: 20.00     Pack years: 20.00     Types: Cigarettes     Start date:      Last attempt to quit:      Years since quittin.4     Smokeless tobacco: Former User     Types: Snuff     Quit date:    Substance Use Topics     Alcohol use: Yes     Frequency: Monthly or less     Comment: slight         Impression and plan:    Stable liver metastatic disease in the liver. He is worried about his cancer but hesitate to do anything. I recommended him to follow up with Dr. Srivastava. He also asked about Colonoscopy. We will try to schedule to see a GI.   Slight increase in size of the inferior right kidney mass. May need a biopsy and visit with Urology.     Again, thank you for allowing me to participate in the care of your patient.      Sincerely,  Trinh Landis MD

## 2019-06-27 ENCOUNTER — ONCOLOGY VISIT (OUTPATIENT)
Dept: ONCOLOGY | Facility: CLINIC | Age: 64
End: 2019-06-27
Attending: INTERNAL MEDICINE
Payer: MEDICARE

## 2019-06-27 VITALS
RESPIRATION RATE: 16 BRPM | WEIGHT: 207.7 LBS | HEART RATE: 71 BPM | DIASTOLIC BLOOD PRESSURE: 68 MMHG | BODY MASS INDEX: 26.66 KG/M2 | SYSTOLIC BLOOD PRESSURE: 102 MMHG | OXYGEN SATURATION: 95 % | TEMPERATURE: 96.6 F | HEIGHT: 74 IN

## 2019-06-27 DIAGNOSIS — N28.9 RENAL LESION: Primary | ICD-10-CM

## 2019-06-27 PROCEDURE — G0463 HOSPITAL OUTPT CLINIC VISIT: HCPCS | Mod: ZF

## 2019-06-27 PROCEDURE — 99215 OFFICE O/P EST HI 40 MIN: CPT | Mod: ZP | Performed by: INTERNAL MEDICINE

## 2019-06-27 ASSESSMENT — PAIN SCALES - GENERAL: PAINLEVEL: NO PAIN (0)

## 2019-06-27 ASSESSMENT — MIFFLIN-ST. JEOR: SCORE: 1797.12

## 2019-06-27 NOTE — PATIENT INSTRUCTIONS
Refer to Urology    I will discuss with Dr Landis about doing liver directed therapy.    We will determine the follow up accordingly

## 2019-06-27 NOTE — LETTER
6/27/2019       RE: Los Huang  [address]  [place]. \[facility]"" [id number]     Dear Colleague,    Thank you for referring your patient, Los Huang, to the [facility]. Please see a copy of my visit note below.    Oncology follow up visit:  Date on this visit: 6/27/2019     Los Huang  is referred by Dr.Jafar Landis for an oncology consultation. He requires evaluation for metastatic colon cancer- [facility].    Primary Physician: No Ref-Primary, Physician     History Of Present Illness:    Please see previous notes for detail.  I have copied and updated from prior note.    Mr. Huang is a 63 year old male who was diagnosed with metastatic colorectal cancer in 2014 with oligometastatic disease to liver at the time of diagnosis.  He was treated with ascending colectomy 6/14 and R hepatectomy in 8/14.  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  He completed 10 cycles of FOLFIRI around February of March 2015.), eventually had recurrance of hepatic mets in March 2016.  He was seeking several different opinions from different oncologist at that time so his treatment got delayed and he got cetuximab for 2 cycles during the summer 2016 and he had issues with skin the rash from it. He also had an right upper quadrant abdominal wall met that was resected in Dec 2016.    Since then has been treated with multiple liver directed therapies, initially with y90 in Dec 2017 but since then with TACE x4, 1/9/18, 6/14/18, 7/10/18 and 8/22/18.        He also has possible Renal cell cancer of the Right lower pole of the kidney which has not been treated.    He recently relocated from [place] to [place].      We did a PET scan on 2/22/2019 which showed 2 FDG avid liver lesions which had enlarged since October  2018.  At that point I referred him to Dr. Landis for possibility of liver directed therapy.  At that point patient was not sure whether he want to pursue more liver directed therapy or not.  He eventually decided on repeating his scans after a few months and he had a repeat CT abdomen and pelvis on 6/7/2019.  The CT scan shows overall stable disease.  The exophytic lesion in the inferior pole of the right kidney has continued to slowly increase in size and now measures 3 cm as opposed to 2.2 cm in June 2017.  This is concerning for renal cell cancer.    He comes in today accompanied by his son.  Overall he is feeling about the same as before.  Denies any abdominal pain.  No nausea or vomiting.  Denies diarrhea constipation or bleeding.  Overall energy is a stable.  He denies any infections.  Denies any shortness of breath.  No new swellings.  Denies neuropathy.  He had an admission in psychiatry for delusions and April 2019.  He continues to be on psych medications.      ECOG 1    ROS:  Rest of the comprehensive review of the system was essentially unremarkable.    I reviewed other history in epic as below.      Past Medical/Surgical History:  Past Medical History:   Diagnosis Date     Cancer (H)      Depressive disorder      Schizoaffective disorder (H)      Past Surgical History:   Procedure Laterality Date     ABDOMEN SURGERY       BIOPSY       CHOLECYSTECTOMY       COLONOSCOPY       ORTHOPEDIC SURGERY        Bipolar disorder  Schizoaffective dx  Colon cancer    Cancer History:   As above    Allergies:  Allergies as of 06/27/2019 - Reviewed 06/27/2019   Allergen Reaction Noted     Animal dander Other (See Comments) 02/14/2019     Current Medications:  Current Outpatient Medications   Medication Sig Dispense Refill     ARIPiprazole (ABILIFY) 2 MG tablet Take 1 tablet (2 mg) by mouth daily 30 tablet 3     atomoxetine (STRATTERA) 18 MG capsule Take 1 capsule (18 mg) by mouth daily 30 capsule 3     benztropine  (COGENTIN) 1 MG tablet Take 1 tablet (1 mg) by mouth daily 60 tablet 3     cholecalciferol (VITAMIN D3) 1000 units (25 mcg) capsule Take 1 capsule (1,000 Units) by mouth daily 90 capsule 3     clonazePAM (KLONOPIN) 0.5 MG tablet Take 1 tablet (0.5 mg) by mouth At Bedtime 30 tablet 3     divalproex sodium extended-release (DEPAKOTE ER) 500 MG 24 hr tablet Take 2 tablets (1,000 mg) by mouth daily 60 tablet 3     Homeopathic Products (ARNICARE) GEL Externally apply 1 Dose topically daily as needed (leg cramps)       multivitamin (CENTRUM SILVER) tablet Take 1 tablet by mouth daily 90 tablet 3     risperiDONE (RISPERDAL) 4 MG tablet Take 1 tablet (4 mg) by mouth At Bedtime 30 tablet 3     traZODone (DESYREL) 100 MG tablet Take 1 tablet (100 mg) by mouth At Bedtime 30 tablet 3      Family History:  Family History   Problem Relation Age of Onset     Osteoporosis Sister      Thyroid Disease Sister         Thyroid killed     Thyroid Disease Sister         Thyroid removed      No known family history of cancers.  He has a son and a daughter who are healthy.  Social History:  Social History     Socioeconomic History     Marital status:      Spouse name: Not on file     Number of children: Not on file     Years of education: Not on file     Highest education level: Not on file   Occupational History     Not on file   Social Needs     Financial resource strain: Not on file     Food insecurity:     Worry: Not on file     Inability: Not on file     Transportation needs:     Medical: Not on file     Non-medical: Not on file   Tobacco Use     Smoking status: Former Smoker     Packs/day: 1.00     Years: 20.00     Pack years: 20.00     Types: Cigarettes     Start date:      Last attempt to quit:      Years since quittin.4     Smokeless tobacco: Former User     Types: Snuff     Quit date:    Substance and Sexual Activity     Alcohol use: Yes     Frequency: Monthly or less     Comment: slight     Drug use: No  "    Sexual activity: Never   Lifestyle     Physical activity:     Days per week: Not on file     Minutes per session: Not on file     Stress: Not on file   Relationships     Social connections:     Talks on phone: Not on file     Gets together: Not on file     Attends Rastafari service: Not on file     Active member of club or organization: Not on file     Attends meetings of clubs or organizations: Not on file     Relationship status: Not on file     Intimate partner violence:     Fear of current or ex partner: Not on file     Emotionally abused: Not on file     Physically abused: Not on file     Forced sexual activity: Not on file   Other Topics Concern     Parent/sibling w/ CABG, MI or angioplasty before 65F 55M? No   Social History Narrative     Not on file   He used to smoke but quit in 2003.  He drinks alcohol occasionally.  He was in [place] but recently relocated to [place] and he is going to move in his own apartment tomorrow.  His daughter lives close by.    Physical Exam:  /68 (BP Location: Right arm, Patient Position: Sitting, Cuff Size: Adult Regular)   Pulse 71   Temp 96.6  F (35.9  C) (Oral)   Resp 16   Ht 1.88 m (6' 2.02\")   Wt 94.2 kg (207 lb 11.2 oz)   SpO2 95%   BMI 26.66 kg/m      Wt Readings from Last 4 Encounters:   06/27/19 94.2 kg (207 lb 11.2 oz)   06/11/19 94.1 kg (207 lb 8 oz)   04/21/19 93.1 kg (205 lb 4.8 oz)   04/02/19 93.9 kg (207 lb)       CONSTITUTIONAL: No apparent distress  EYES: PERRLA, without pallor or jaundice  ENT/MOUTH: Ears unremarkable. No oral lesions  CVS: s1s2 normal  RESPIRATORY: Chest is clear  GI: Abdomen is benign  NEURO: Alert and oriented ×3  INTEGUMENT: no concerning skin rashes   LYMPHATIC: no palpable lymphadenopathy  MUSCULOSKELETAL: Unremarkable. No bony tenderness.   EXTREMITIES: no pedal edema  PSYCH: Mentation, mood and affect are appropriate      Laboratory/Imaging Studies    Results for NEVIN DICKERSON (MRN [id number]) as of 6/27/2019 " 17:04   Ref. Range 6/7/2019 16:09   Sodium Latest Ref Range: 133 - 144 mmol/L 136   Potassium Latest Ref Range: 3.4 - 5.3 mmol/L 4.1   Chloride Latest Ref Range: 94 - 109 mmol/L 103   Carbon Dioxide Latest Ref Range: 20 - 32 mmol/L 30   Urea Nitrogen Latest Ref Range: 7 - 30 mg/dL 14   Creatinine Latest Ref Range: 0.66 - 1.25 mg/dL 1.11   GFR Estimate Latest Ref Range: >60 mL/min/1.73_m2 69   GFR Estimate If Black Latest Ref Range: >60 mL/min/1.73_m2 80   Calcium Latest Ref Range: 8.5 - 10.1 mg/dL 8.0 (L)   Anion Gap Latest Ref Range: 3 - 14 mmol/L 4   Albumin Latest Ref Range: 3.4 - 5.0 g/dL 3.4   Protein Total Latest Ref Range: 6.8 - 8.8 g/dL 6.2 (L)   Bilirubin Total Latest Ref Range: 0.2 - 1.3 mg/dL 0.4   Alkaline Phosphatase Latest Ref Range: 40 - 150 U/L 87   ALT Latest Ref Range: 0 - 70 U/L 54   AST Latest Ref Range: 0 - 45 U/L 29   Bilirubin Direct Latest Ref Range: 0.0 - 0.2 mg/dL 0.1   Glucose Latest Ref Range: 70 - 99 mg/dL 91   WBC Latest Ref Range: 4.0 - 11.0 10e9/L 5.5   Hemoglobin Latest Ref Range: 13.3 - 17.7 g/dL 13.7   Hematocrit Latest Ref Range: 40.0 - 53.0 % 41.2   Platelet Count Latest Ref Range: 150 - 450 10e9/L 143 (L)   RBC Count Latest Ref Range: 4.4 - 5.9 10e12/L 4.51   MCV Latest Ref Range: 78 - 100 fl 91   MCH Latest Ref Range: 26.5 - 33.0 pg 30.4   MCHC Latest Ref Range: 31.5 - 36.5 g/dL 33.3   RDW is Latest Ref Range: 10.0 - 15.0 % 12.9     Results for NEVIN DICKERSON (MRN [id number]) as of 6/27/2019 17:04   Ref. Range 2/14/2019 18:16 6/7/2019 16:09   CEA Latest Ref Range: 0 - 2.5 ug/L 0.7 <0.5       Combined Report of:    PET and CT on  2/22/2019 2:23 PM :     1. PET of the neck, chest, abdomen, and pelvis.  2. PET CT Fusion for Attenuation Correction and Anatomical  Localization:    3. Diagnostic CT scan of the chest, abdomen, and pelvis with  intravenous contrast for interpretation.  3. CT of the chest, abdomen and pelvis obtained for diagnostic  interpretation.  4. 3D MIP and  PET-CT fused images were processed on an independent  workstation and archived to PACS and reviewed by a radiologist.     Technique:     1. PET: The patient received 13.01 mCi of F-18-FDG; the serum glucose  was 94 mg/dL prior to administration, body weight was 95.3 kg. Images  were evaluated in the axial, sagittal, and coronal planes as well as  the rotational whole body MIP. Images were acquired from the Vertex to  the Feet.     UPTAKE WAS MEASURED AT 61 MINUTES.      BACKGROUND:  Liver SUV max= 4.1,   Aorta Blood SUV Max: 2.8.      2. CT: Volumetric acquisition for clinical interpretation of the  chest, abdomen, and pelvis acquired at 3 mm sections . The chest,  abdomen, and pelvis were evaluated at 5 mm sections in bone, soft  tissue, and lung windows.       The patient received 128 cc. Of Isovue 370 intravenously for the  examination.    --     3. 3D MIP and PET-CT fused images were processed on an independent  workstation and archived to PACS and reviewed by a radiologist.     INDICATION: follow up colorectal cancer- please compare with outside  studies; Metastatic colon cancer to liver (H); Metastatic colon cancer  to liver (H)     ADDITIONAL INFORMATION OBTAINED FROM EMR: Metastatic colon cancer  status post right hepatectomy and ascending colectomy in 2014. Status  post TACE x4 in 2018.     COMPARISON: 10/10/2018, 5/15/2018     FINDINGS:      HEAD/NECK:  There is no  suspicious FDG uptake in the neck.      The paranasal sinuses are clear. The mastoid air cells are clear.      The mucosal pharyngeal space, the , prevertebral and carotid  spaces are within normal limits.      No masses, mass effect or pathologically enlarged lymph nodes are  evident. Unchanged 18 mm hyperenhancing right thyroid nodule with with  normal SUV of 2.8.     CHEST:  There is no suspicious FDG uptake in the chest.      There are no pathologically enlarged mediastinal, hilar or axillary  lymph nodes.  There are no suspicious  lung nodules or evidence for infection.    Calcified granulomas and right hilar lymph nodes, likely secondary to  prior histoplasmosis infection. Left chest wall Port-A-Cath with tip  in the mid SVC. There is no significant pericardial or plural  effusions.     ABDOMEN AND PELVIS:  Postoperative changes from right hepatectomy and right hemicolectomy.  Hypodense lesions in the liver with associated abnormal FDG uptake  appear enlarged from 10/10/2018. One example is a 28 x 23 mm lesion  with maximum SUV of 9.0 (series 5 image 173) and a 27 x 24 mm lesion  with maximum SUV of 9.7 (series 5 image 162). Exact size on the  previous scans is suboptimal due to lack of intravenous contrast,  however these lesions and maximum SUV of 6.5 and 6.6 respectively.  Nonspecific nodular hypermetabolism of the left adrenal gland with  maximum SUV of 5.5. Unchanged mild FDG avidity along the right  abdominal wall resection site.     There are no suspicious hepatic lesions. Calcified splenic granulomas.  No suspicious pancreatic lesion.  There are no suspicious adrenal mass lesions or opaque gallbladder  calculi.  Mildly hyperdense 15 mm cyst in the superior pole of the  right kidney without abnormal FDG uptake, such is favored to represent  a benign cyst. There is symmetric nephrographic renal phase without  hydronephrosis. Prostatomegaly with evidence of bladder wall  thickening from chronic outlet obstruction.     There is no evidence for diverticulitis, bowel obstruction or free  fluid.     LOWER EXTREMITIES:   No abnormal masses or hypermetabolic lesions.     BONES:   There are no suspicious lytic or blastic osseous lesions.  There is no  abnormal FDG uptake in the skeleton.                                                                         IMPRESSION:  In this patient with a history of colon cancer with  metastatic disease to the liver status post right hepatectomy and  right hemicolectomy:  1. Increased size and FDG uptake  in 2 metastases in the liver.  2. Nonspecific nodular hypermetabolism of the left adrenal gland  without underlying CT correlate. Recommend attention on follow-up.        Results for orders placed or performed in visit on 06/07/19   CT Abdomen Pelvis w Contrast   Result Value Ref Range    Radiologist flags Bilateral renal lesions     Narrative    EXAMINATION: CT ABDOMEN PELVIS W CONTRAST, 6/7/2019 4:08 PM    TECHNIQUE:  Helical CT images from the lung bases through the  symphysis pubis were obtained with IV contrast. Contrast dose: Isovue  370 126cc    COMPARISON: PET/CT 2/22/2019. CT exam 6/26/2017.    HISTORY: met colon cancer f/u; possible liver directed therapy please  assess liver lesions; Metastatic colon cancer to liver (H); Metastatic  colon cancer to liver (H)    FINDINGS:    Lung bases: No pleural effusions. Bilateral lower lobes dependent  atelectasis. Calcified focus in the right lower lobe.    Abdomen and pelvis: Postoperative changes from right hepatectomy and  right hemicolectomy. At the hepatic dome there is a 2.7 cm hypodense  liver lesion subcapsular, stable, series 3 image 42; hepatic segment 2  measuring 2.9 cm subcapsular, stable, series 3 image 62; hepatic  segment 2 measuring 9 mm, series 3 image 74. Measuring 7 mm, too small  to characterize inferiorly in the left hepatic lobe, series 3 image  128. Patent hepatic vasculature. No biliary tree dilation. Surgically  absent gallbladder.    Homogeneous pancreatic parenchyma. Main pancreatic duct is not  dilated. The spleen is not enlarged. Calcified granulomas in the  spleen.    Unremarkable right adrenal gland. Mild thickening of the left adrenal  gland with no discrete nodule. No renal stones or hydronephrosis.  Bilateral renal cysts as well as subcentimeter cortical hypodensities  that are too small to characterize. 1.5 cm indeterminant lesion in the  superior pole of the left kidney, stable. 3.0 cm heterogeneously  enhancing exophytic lesion  in the inferior pole of the right kidney  previously measuring 2.2 cm on CT exam 6/26/2017. Partially distended  urinary bladder. Enlarged prostate with calcifications. Pelvic  phleboliths. Prominent fat in the right greater than left inguinal  canal. No inguinal lymphadenopathy. No free fluid or free air. No  pelvic lymphadenopathy.    No abdominal aortic aneurysm. Atherosclerotic calcifications of the  abdominal aorta and its major branches. Prominent stable du hepatis  lymph nodes with nodularities adjacent to the diaphragmatic crura.  Subcentimeter retroperitoneal and mesenteric lymph nodes, not enlarged  by size criteria. No dilated loops of bowel. No bowel wall thickening.    Bones: Degenerative changes of the spine, bilateral SI joints and  hips. Scattered Schmorl nodes. Enthesopathic changes off the pelvis  and hips. Scattered sclerotic foci, stable, likely representing benign  bone islands. No convincing aggressive bone lesions.      Impression    IMPRESSION: In this patient with history of metastatic colon cancer:  1. Postoperative changes from right hepatectomy end right  hemicolectomy. Liver lesions as described above concerning for  metastatic disease.  2. Stable indeterminate lesion in the superior pole of the left kidney  comment stable from CT exam 6/26/2017, favoring benign etiology.  Consider further evaluation with renal ultrasound to rule out  hemorrhagic/proteinaceous cyst versus solid lesion.  3. 3 cm heterogeneously enhancing solid lesion in the inferior pole of  the right kidney previously measuring 2.2 cm on CT exam 6/26/2017.  Differentials include renal cell carcinoma versus metastatic disease.  4. Stable prominent du hepatis lymph nodes as well as nonspecific  soft tissue nodules adjacent to the diaphragmatic crura bilaterally.  5. Additional incidental findings as described above.    [Consider Follow Up: Bilateral renal lesions]    This report will be copied to the [facility] to ensure a  provider acknowledges the finding.     BROCK WILLIS MD        Outside labs reviewed.  Outside his scans reviewed.    ASSESSMENT/PLAN:      He has metastatic colorectal cancer to the liver.  [facility].  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 He was treated with ascending colectomy in June 2014 followed by hepatectomy in August 2014.  He received FOLFOX x2 and FOLFIRI x10 after that.  He had recurrence of the liver metastases in 2016. He got 2 doses of Cetuximab in 2016. He has received multiple liver directed therapy since then the last one was in August 2018.  He also had excision of the right upper quadrant abdominal wall metastasis in December 2016.  On his most recent PET scan from October 2018 he had 2 residual metabolically active liver lesions and these were less conspicuous as compared to the previous CT scan.  He also had 2 FDG avid left axillary lymph nodes which likely were due to extravasation of the tracer which was injected in the left arm.    We did a PET scan on 2/22/2019 which showed 2 FDG avid liver lesions which had enlarged since October 2018.  At that point I referred him to Dr. Landis for possibility of liver directed therapy.  At that point patient was not sure whether he want to pursue more liver directed therapy or not.  He eventually decided on repeating his scans after a few months and he had a repeat CT abdomen and pelvis on 6/7/2019.  The CT scan shows overall stable disease.  The exophytic lesion in the inferior pole of the right kidney has continued to slowly increase in size and now measures 3 cm as opposed to 2.2 cm in June 2017.  This is concerning for renal cell cancer.    We discussed the situation in detail.  I would recommend that he should follow with Dr Landis for more liver directed therapy as his disease has been confined to the liver and at this time, the chances of controlling it would be high and overall this is  likely to improve his outcomes in terms of progression form the cancer while maintaining his quality of life at this time.  As the known disease is only confined to the liver and he is pretty much asymptomatic from it, I am not enthusiastic is giving him systemic chemotherapy and would like to reserve it for potential future use.    Renal lesion in the inferior pole of right kidney. This has continued to grow slowly and likely represents RCC. I would like him to see Urology to discuss options regarding its management. Potentially it can be treated by local therapies by interventional radiology but would like to get Urology input as well.     Discussion regarding health care directive  He tells me that he has this completed. I asked him to bring this on next visit so that we can also scan in our system.      We will determine the follow-up accordingly.     All of his and his son's questions were answered to their satisfaction.  They are agreeable and comfortable with the plan.    Patrice Srivastava

## 2019-06-27 NOTE — NURSING NOTE
"Oncology Rooming Note    June 27, 2019 4:40 PM   Los Huang is a 63 year old male who presents for:    Chief Complaint   Patient presents with     RECHECK     on met colorectal ca      Initial Vitals: /68 (BP Location: Right arm, Patient Position: Sitting, Cuff Size: Adult Regular)   Pulse 71   Temp 96.6  F (35.9  C) (Oral)   Resp 16   Ht 1.88 m (6' 2.02\")   Wt 94.2 kg (207 lb 11.2 oz)   SpO2 95%   BMI 26.66 kg/m   Estimated body mass index is 26.66 kg/m  as calculated from the following:    Height as of this encounter: 1.88 m (6' 2.02\").    Weight as of this encounter: 94.2 kg (207 lb 11.2 oz). Body surface area is 2.22 meters squared.  No Pain (0) Comment: Data Unavailable   No LMP for male patient.  Allergies reviewed: Yes  Medications reviewed: Yes    Medications: Medication refills not needed today.  Pharmacy name entered into [facility]: [id number]  [place] [address]    Clinical concerns: none        Sarah CLIF Frias              "

## 2019-06-27 NOTE — PROGRESS NOTES
Oncology follow up visit:  Date on this visit: 6/27/2019     Los Huang  is referred by Dr.Jafar Landis for an oncology consultation. He requires evaluation for metastatic colon cancer- [facility].    Primary Physician: No Ref-Primary, Physician     History Of Present Illness:    Please see previous notes for detail.  I have copied and updated from prior note.    Mr. Huang is a 63 year old male who was diagnosed with metastatic colorectal cancer in 2014 with oligometastatic disease to liver at the time of diagnosis.  He was treated with ascending colectomy 6/14 and R hepatectomy in 8/14.  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  He completed 10 cycles of FOLFIRI around February of March 2015.), eventually had recurrance of hepatic mets in March 2016.  He was seeking several different opinions from different oncologist at that time so his treatment got delayed and he got cetuximab for 2 cycles during the summer 2016 and he had issues with skin the rash from it. He also had an right upper quadrant abdominal wall met that was resected in Dec 2016.    Since then has been treated with multiple liver directed therapies, initially with y90 in Dec 2017 but since then with TACE x4, 1/9/18, 6/14/18, 7/10/18 and 8/22/18.        He also has possible Renal cell cancer of the Right lower pole of the kidney which has not been treated.    He recently relocated from [place] to [place].      We did a PET scan on 2/22/2019 which showed 2 FDG avid liver lesions which had enlarged since October 2018.  At that point I referred him to Dr. Landis for possibility of liver directed therapy.  At that point patient was not sure whether he want to pursue more liver directed therapy or not.  He eventually decided on repeating his scans after a few months and he  had a repeat CT abdomen and pelvis on 6/7/2019.  The CT scan shows overall stable disease.  The exophytic lesion in the inferior pole of the right kidney has continued to slowly increase in size and now measures 3 cm as opposed to 2.2 cm in June 2017.  This is concerning for renal cell cancer.    He comes in today accompanied by his son.  Overall he is feeling about the same as before.  Denies any abdominal pain.  No nausea or vomiting.  Denies diarrhea constipation or bleeding.  Overall energy is a stable.  He denies any infections.  Denies any shortness of breath.  No new swellings.  Denies neuropathy.  He had an admission in psychiatry for delusions and April 2019.  He continues to be on psych medications.      ECOG 1    ROS:  Rest of the comprehensive review of the system was essentially unremarkable.    I reviewed other history in epic as below.      Past Medical/Surgical History:  Past Medical History:   Diagnosis Date     Cancer (H)      Depressive disorder      Schizoaffective disorder (H)      Past Surgical History:   Procedure Laterality Date     ABDOMEN SURGERY       BIOPSY       CHOLECYSTECTOMY       COLONOSCOPY       ORTHOPEDIC SURGERY        Bipolar disorder  Schizoaffective dx  Colon cancer    Cancer History:   As above    Allergies:  Allergies as of 06/27/2019 - Reviewed 06/27/2019   Allergen Reaction Noted     Animal dander Other (See Comments) 02/14/2019     Current Medications:  Current Outpatient Medications   Medication Sig Dispense Refill     ARIPiprazole (ABILIFY) 2 MG tablet Take 1 tablet (2 mg) by mouth daily 30 tablet 3     atomoxetine (STRATTERA) 18 MG capsule Take 1 capsule (18 mg) by mouth daily 30 capsule 3     benztropine (COGENTIN) 1 MG tablet Take 1 tablet (1 mg) by mouth daily 60 tablet 3     cholecalciferol (VITAMIN D3) 1000 units (25 mcg) capsule Take 1 capsule (1,000 Units) by mouth daily 90 capsule 3     clonazePAM (KLONOPIN) 0.5 MG tablet Take 1 tablet (0.5 mg) by mouth At  Bedtime 30 tablet 3     divalproex sodium extended-release (DEPAKOTE ER) 500 MG 24 hr tablet Take 2 tablets (1,000 mg) by mouth daily 60 tablet 3     Homeopathic Products (ARNICARE) GEL Externally apply 1 Dose topically daily as needed (leg cramps)       multivitamin (CENTRUM SILVER) tablet Take 1 tablet by mouth daily 90 tablet 3     risperiDONE (RISPERDAL) 4 MG tablet Take 1 tablet (4 mg) by mouth At Bedtime 30 tablet 3     traZODone (DESYREL) 100 MG tablet Take 1 tablet (100 mg) by mouth At Bedtime 30 tablet 3      Family History:  Family History   Problem Relation Age of Onset     Osteoporosis Sister      Thyroid Disease Sister         Thyroid killed     Thyroid Disease Sister         Thyroid removed      No known family history of cancers.  He has a son and a daughter who are healthy.  Social History:  Social History     Socioeconomic History     Marital status:      Spouse name: Not on file     Number of children: Not on file     Years of education: Not on file     Highest education level: Not on file   Occupational History     Not on file   Social Needs     Financial resource strain: Not on file     Food insecurity:     Worry: Not on file     Inability: Not on file     Transportation needs:     Medical: Not on file     Non-medical: Not on file   Tobacco Use     Smoking status: Former Smoker     Packs/day: 1.00     Years: 20.00     Pack years: 20.00     Types: Cigarettes     Start date:      Last attempt to quit:      Years since quittin.4     Smokeless tobacco: Former User     Types: Snuff     Quit date:    Substance and Sexual Activity     Alcohol use: Yes     Frequency: Monthly or less     Comment: slight     Drug use: No     Sexual activity: Never   Lifestyle     Physical activity:     Days per week: Not on file     Minutes per session: Not on file     Stress: Not on file   Relationships     Social connections:     Talks on phone: Not on file     Gets together: Not on file      "Attends Orthodox service: Not on file     Active member of club or organization: Not on file     Attends meetings of clubs or organizations: Not on file     Relationship status: Not on file     Intimate partner violence:     Fear of current or ex partner: Not on file     Emotionally abused: Not on file     Physically abused: Not on file     Forced sexual activity: Not on file   Other Topics Concern     Parent/sibling w/ CABG, MI or angioplasty before 65F 55M? No   Social History Narrative     Not on file   He used to smoke but quit in 2003.  He drinks alcohol occasionally.  He was in [place] but recently relocated to [place] and he is going to move in his own apartment tomorrow.  His daughter lives close by.    Physical Exam:  /68 (BP Location: Right arm, Patient Position: Sitting, Cuff Size: Adult Regular)   Pulse 71   Temp 96.6  F (35.9  C) (Oral)   Resp 16   Ht 1.88 m (6' 2.02\")   Wt 94.2 kg (207 lb 11.2 oz)   SpO2 95%   BMI 26.66 kg/m     Wt Readings from Last 4 Encounters:   06/27/19 94.2 kg (207 lb 11.2 oz)   06/11/19 94.1 kg (207 lb 8 oz)   04/21/19 93.1 kg (205 lb 4.8 oz)   04/02/19 93.9 kg (207 lb)       CONSTITUTIONAL: No apparent distress  EYES: PERRLA, without pallor or jaundice  ENT/MOUTH: Ears unremarkable. No oral lesions  CVS: s1s2 normal  RESPIRATORY: Chest is clear  GI: Abdomen is benign  NEURO: Alert and oriented ×3  INTEGUMENT: no concerning skin rashes   LYMPHATIC: no palpable lymphadenopathy  MUSCULOSKELETAL: Unremarkable. No bony tenderness.   EXTREMITIES: no pedal edema  PSYCH: Mentation, mood and affect are appropriate      Laboratory/Imaging Studies    Results for NEVIN DICKERSON DAVIDA (MRN [id number]) as of 6/27/2019 17:04   Ref. Range 6/7/2019 16:09   Sodium Latest Ref Range: 133 - 144 mmol/L 136   Potassium Latest Ref Range: 3.4 - 5.3 mmol/L 4.1   Chloride Latest Ref Range: 94 - 109 mmol/L 103   Carbon Dioxide Latest Ref Range: 20 - 32 mmol/L 30   Urea Nitrogen Latest Ref " Range: 7 - 30 mg/dL 14   Creatinine Latest Ref Range: 0.66 - 1.25 mg/dL 1.11   GFR Estimate Latest Ref Range: >60 mL/min/1.73_m2 69   GFR Estimate If Black Latest Ref Range: >60 mL/min/1.73_m2 80   Calcium Latest Ref Range: 8.5 - 10.1 mg/dL 8.0 (L)   Anion Gap Latest Ref Range: 3 - 14 mmol/L 4   Albumin Latest Ref Range: 3.4 - 5.0 g/dL 3.4   Protein Total Latest Ref Range: 6.8 - 8.8 g/dL 6.2 (L)   Bilirubin Total Latest Ref Range: 0.2 - 1.3 mg/dL 0.4   Alkaline Phosphatase Latest Ref Range: 40 - 150 U/L 87   ALT Latest Ref Range: 0 - 70 U/L 54   AST Latest Ref Range: 0 - 45 U/L 29   Bilirubin Direct Latest Ref Range: 0.0 - 0.2 mg/dL 0.1   Glucose Latest Ref Range: 70 - 99 mg/dL 91   WBC Latest Ref Range: 4.0 - 11.0 10e9/L 5.5   Hemoglobin Latest Ref Range: 13.3 - 17.7 g/dL 13.7   Hematocrit Latest Ref Range: 40.0 - 53.0 % 41.2   Platelet Count Latest Ref Range: 150 - 450 10e9/L 143 (L)   RBC Count Latest Ref Range: 4.4 - 5.9 10e12/L 4.51   MCV Latest Ref Range: 78 - 100 fl 91   MCH Latest Ref Range: 26.5 - 33.0 pg 30.4   MCHC Latest Ref Range: 31.5 - 36.5 g/dL 33.3   RDW is Latest Ref Range: 10.0 - 15.0 % 12.9     Results for NEVIN DICKERSON (MRN [id number]) as of 6/27/2019 17:04   Ref. Range 2/14/2019 18:16 6/7/2019 16:09   CEA Latest Ref Range: 0 - 2.5 ug/L 0.7 <0.5       Combined Report of:    PET and CT on  2/22/2019 2:23 PM :     1. PET of the neck, chest, abdomen, and pelvis.  2. PET CT Fusion for Attenuation Correction and Anatomical  Localization:    3. Diagnostic CT scan of the chest, abdomen, and pelvis with  intravenous contrast for interpretation.  3. CT of the chest, abdomen and pelvis obtained for diagnostic  interpretation.  4. 3D MIP and PET-CT fused images were processed on an independent  workstation and archived to PACS and reviewed by a radiologist.     Technique:     1. PET: The patient received 13.01 mCi of F-18-FDG; the serum glucose  was 94 mg/dL prior to administration, body weight was 95.3  kg. Images  were evaluated in the axial, sagittal, and coronal planes as well as  the rotational whole body MIP. Images were acquired from the Vertex to  the Feet.     UPTAKE WAS MEASURED AT 61 MINUTES.      BACKGROUND:  Liver SUV max= 4.1,   Aorta Blood SUV Max: 2.8.      2. CT: Volumetric acquisition for clinical interpretation of the  chest, abdomen, and pelvis acquired at 3 mm sections . The chest,  abdomen, and pelvis were evaluated at 5 mm sections in bone, soft  tissue, and lung windows.       The patient received 128 cc. Of Isovue 370 intravenously for the  examination.    --     3. 3D MIP and PET-CT fused images were processed on an independent  workstation and archived to PACS and reviewed by a radiologist.     INDICATION: follow up colorectal cancer- please compare with outside  studies; Metastatic colon cancer to liver (H); Metastatic colon cancer  to liver (H)     ADDITIONAL INFORMATION OBTAINED FROM EMR: Metastatic colon cancer  status post right hepatectomy and ascending colectomy in 2014. Status  post TACE x4 in 2018.     COMPARISON: 10/10/2018, 5/15/2018     FINDINGS:      HEAD/NECK:  There is no  suspicious FDG uptake in the neck.      The paranasal sinuses are clear. The mastoid air cells are clear.      The mucosal pharyngeal space, the , prevertebral and carotid  spaces are within normal limits.      No masses, mass effect or pathologically enlarged lymph nodes are  evident. Unchanged 18 mm hyperenhancing right thyroid nodule with with  normal SUV of 2.8.     CHEST:  There is no suspicious FDG uptake in the chest.      There are no pathologically enlarged mediastinal, hilar or axillary  lymph nodes.  There are no suspicious lung nodules or evidence for infection.    Calcified granulomas and right hilar lymph nodes, likely secondary to  prior histoplasmosis infection. Left chest wall Port-A-Cath with tip  in the mid SVC. There is no significant pericardial or  plural  effusions.     ABDOMEN AND PELVIS:  Postoperative changes from right hepatectomy and right hemicolectomy.  Hypodense lesions in the liver with associated abnormal FDG uptake  appear enlarged from 10/10/2018. One example is a 28 x 23 mm lesion  with maximum SUV of 9.0 (series 5 image 173) and a 27 x 24 mm lesion  with maximum SUV of 9.7 (series 5 image 162). Exact size on the  previous scans is suboptimal due to lack of intravenous contrast,  however these lesions and maximum SUV of 6.5 and 6.6 respectively.  Nonspecific nodular hypermetabolism of the left adrenal gland with  maximum SUV of 5.5. Unchanged mild FDG avidity along the right  abdominal wall resection site.     There are no suspicious hepatic lesions. Calcified splenic granulomas.  No suspicious pancreatic lesion.  There are no suspicious adrenal mass lesions or opaque gallbladder  calculi.  Mildly hyperdense 15 mm cyst in the superior pole of the  right kidney without abnormal FDG uptake, such is favored to represent  a benign cyst. There is symmetric nephrographic renal phase without  hydronephrosis. Prostatomegaly with evidence of bladder wall  thickening from chronic outlet obstruction.     There is no evidence for diverticulitis, bowel obstruction or free  fluid.     LOWER EXTREMITIES:   No abnormal masses or hypermetabolic lesions.     BONES:   There are no suspicious lytic or blastic osseous lesions.  There is no  abnormal FDG uptake in the skeleton.                                                                         IMPRESSION:  In this patient with a history of colon cancer with  metastatic disease to the liver status post right hepatectomy and  right hemicolectomy:  1. Increased size and FDG uptake in 2 metastases in the liver.  2. Nonspecific nodular hypermetabolism of the left adrenal gland  without underlying CT correlate. Recommend attention on follow-up.        Results for orders placed or performed in visit on 06/07/19   CT  Abdomen Pelvis w Contrast   Result Value Ref Range    Radiologist flags Bilateral renal lesions     Narrative    EXAMINATION: CT ABDOMEN PELVIS W CONTRAST, 6/7/2019 4:08 PM    TECHNIQUE:  Helical CT images from the lung bases through the  symphysis pubis were obtained with IV contrast. Contrast dose: Isovue  370 126cc    COMPARISON: PET/CT 2/22/2019. CT exam 6/26/2017.    HISTORY: met colon cancer f/u; possible liver directed therapy please  assess liver lesions; Metastatic colon cancer to liver (H); Metastatic  colon cancer to liver (H)    FINDINGS:    Lung bases: No pleural effusions. Bilateral lower lobes dependent  atelectasis. Calcified focus in the right lower lobe.    Abdomen and pelvis: Postoperative changes from right hepatectomy and  right hemicolectomy. At the hepatic dome there is a 2.7 cm hypodense  liver lesion subcapsular, stable, series 3 image 42; hepatic segment 2  measuring 2.9 cm subcapsular, stable, series 3 image 62; hepatic  segment 2 measuring 9 mm, series 3 image 74. Measuring 7 mm, too small  to characterize inferiorly in the left hepatic lobe, series 3 image  128. Patent hepatic vasculature. No biliary tree dilation. Surgically  absent gallbladder.    Homogeneous pancreatic parenchyma. Main pancreatic duct is not  dilated. The spleen is not enlarged. Calcified granulomas in the  spleen.    Unremarkable right adrenal gland. Mild thickening of the left adrenal  gland with no discrete nodule. No renal stones or hydronephrosis.  Bilateral renal cysts as well as subcentimeter cortical hypodensities  that are too small to characterize. 1.5 cm indeterminant lesion in the  superior pole of the left kidney, stable. 3.0 cm heterogeneously  enhancing exophytic lesion in the inferior pole of the right kidney  previously measuring 2.2 cm on CT exam 6/26/2017. Partially distended  urinary bladder. Enlarged prostate with calcifications. Pelvic  phleboliths. Prominent fat in the right greater than left  inguinal  canal. No inguinal lymphadenopathy. No free fluid or free air. No  pelvic lymphadenopathy.    No abdominal aortic aneurysm. Atherosclerotic calcifications of the  abdominal aorta and its major branches. Prominent stable du hepatis  lymph nodes with nodularities adjacent to the diaphragmatic crura.  Subcentimeter retroperitoneal and mesenteric lymph nodes, not enlarged  by size criteria. No dilated loops of bowel. No bowel wall thickening.    Bones: Degenerative changes of the spine, bilateral SI joints and  hips. Scattered Schmorl nodes. Enthesopathic changes off the pelvis  and hips. Scattered sclerotic foci, stable, likely representing benign  bone islands. No convincing aggressive bone lesions.      Impression    IMPRESSION: In this patient with history of metastatic colon cancer:  1. Postoperative changes from right hepatectomy end right  hemicolectomy. Liver lesions as described above concerning for  metastatic disease.  2. Stable indeterminate lesion in the superior pole of the left kidney  comment stable from CT exam 6/26/2017, favoring benign etiology.  Consider further evaluation with renal ultrasound to rule out  hemorrhagic/proteinaceous cyst versus solid lesion.  3. 3 cm heterogeneously enhancing solid lesion in the inferior pole of  the right kidney previously measuring 2.2 cm on CT exam 6/26/2017.  Differentials include renal cell carcinoma versus metastatic disease.  4. Stable prominent du hepatis lymph nodes as well as nonspecific  soft tissue nodules adjacent to the diaphragmatic crura bilaterally.  5. Additional incidental findings as described above.    [Consider Follow Up: Bilateral renal lesions]    This report will be copied to the [facility] to ensure a  provider acknowledges the finding.     BROCK WILLIS MD        Outside labs reviewed.  Outside his scans reviewed.    ASSESSMENT/PLAN:      He has metastatic colorectal cancer to the liver.  [facility] Int[facility].  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 He was treated with ascending colectomy in June 2014 followed by hepatectomy in August 2014.  He received FOLFOX x2 and FOLFIRI x10 after that.  He had recurrence of the liver metastases in 2016. He got 2 doses of Cetuximab in 2016. He has received multiple liver directed therapy since then the last one was in August 2018.  He also had excision of the right upper quadrant abdominal wall metastasis in December 2016.  On his most recent PET scan from October 2018 he had 2 residual metabolically active liver lesions and these were less conspicuous as compared to the previous CT scan.  He also had 2 FDG avid left axillary lymph nodes which likely were due to extravasation of the tracer which was injected in the left arm.    We did a PET scan on 2/22/2019 which showed 2 FDG avid liver lesions which had enlarged since October 2018.  At that point I referred him to Dr. Landis for possibility of liver directed therapy.  At that point patient was not sure whether he want to pursue more liver directed therapy or not.  He eventually decided on repeating his scans after a few months and he had a repeat CT abdomen and pelvis on 6/7/2019.  The CT scan shows overall stable disease.  The exophytic lesion in the inferior pole of the right kidney has continued to slowly increase in size and now measures 3 cm as opposed to 2.2 cm in June 2017.  This is concerning for renal cell cancer.    We discussed the situation in detail.  I would recommend that he should follow with Dr Landis for more liver directed therapy as his disease has been confined to the liver and at this time, the chances of controlling it would be high and overall this is likely to improve his outcomes in terms of progression form the cancer while maintaining his quality of life at this time.  As the known disease is only confined to the liver and he is pretty much asymptomatic from it, I am not  enthusiastic is giving him systemic chemotherapy and would like to reserve it for potential future use.    Renal lesion in the inferior pole of right kidney. This has continued to grow slowly and likely represents RCC. I would like him to see Urology to discuss options regarding its management. Potentially it can be treated by local therapies by interventional radiology but would like to get Urology input as well.     Discussion regarding health care directive  He tells me that he has this completed. I asked him to bring this on next visit so that we can also scan in our system.      We will determine the follow-up accordingly.     All of his and his son's questions were answered to their satisfaction.  They are agreeable and comfortable with the plan.    Patrice Srivastava

## 2019-07-02 ENCOUNTER — PRE VISIT (OUTPATIENT)
Dept: UROLOGY | Facility: CLINIC | Age: 64
End: 2019-07-02

## 2019-07-02 NOTE — TELEPHONE ENCOUNTER
MEDICAL RECORDS REQUEST   [facility]  Urology Clinic  [address] [place] [address]  PHONE: [phone]  Fax: [phone]        FUTURE VISIT INFORMATION                                                   Los Huang, : 1955 scheduled for future visit at [facility]    APPOINTMENT INFORMATION:    Date: 8/15/19 4PM    Provider: Esau Chairez MD     Reason for Visit/Diagnosis: Renal Lesion     REFERRAL INFORMATION:    Referring provider:  Patrice Srivastava     Specialty: MD    Referring providers clinic:  [facility]     Clinic contact number:  [phone]    RECORDS REQUESTED FOR VISIT                                                     NOTES  STATUS/DETAILS   OFFICE NOTE from referring provider  yes   OFFICE NOTE from other specialist  yes   DISCHARGE SUMMARY from hospital  no   DISCHARGE REPORT from the ER  no   OPERATIVE REPORT  no   MEDICATION LIST  no     PRE-VISIT CHECKLIST      Record collection complete Yes- All recs in Epic & Images in [facility] PACS    Appointment appropriately scheduled           (right time/right provider) Yes   MyChart activation Yes   Questionnaire complete If no, please explain: In process      Completed by: Cynthia Galarza

## 2019-07-08 ENCOUNTER — ALLIED HEALTH/NURSE VISIT (OUTPATIENT)
Dept: PHARMACY | Facility: CLINIC | Age: 64
End: 2019-07-08
Payer: COMMERCIAL

## 2019-07-08 DIAGNOSIS — F31.9 BIPOLAR 1 DISORDER (H): Primary | ICD-10-CM

## 2019-07-08 DIAGNOSIS — F90.8 ATTENTION DEFICIT HYPERACTIVITY DISORDER (ADHD), OTHER TYPE: ICD-10-CM

## 2019-07-08 PROCEDURE — 99607 MTMS BY PHARM ADDL 15 MIN: CPT | Performed by: PHARMACIST

## 2019-07-08 PROCEDURE — 99606 MTMS BY PHARM EST 15 MIN: CPT | Performed by: PHARMACIST
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
## 2019-07-08 NOTE — Clinical Note
Amado Mcginnis,You were originally going to meet this patient this week, but he is now regretful that he rescheduled for late August.  He is struggling with medication copays and wonders if we can make any changes.He could switch Depakote ER to DR, which would be much more affordable from his research. Additionally, from what I can find, aripiprazole was solely started for sexual side effects from risperidone during hospitalization and he no longer prefers to pay the cost for that effect.Please see note for details and let me know if you are comfortable making either/both of these changes prior to meeting him in about 7 weeks.  I'm happy to follow up with him, unless you'd rather talk with him instead.Looking forward to your thoughts!Shameka

## 2019-07-09 RX ORDER — DIVALPROEX SODIUM 500 MG/1
500 TABLET, DELAYED RELEASE ORAL 2 TIMES DAILY
Qty: 60 TABLET | Refills: 1 | Status: SHIPPED | OUTPATIENT
Start: 2019-07-09 | End: 2019-08-28

## 2019-07-09 NOTE — PROGRESS NOTES
Discussed with Dr. Colon, who is in agreement with switching Depakote ER to DR formulation, taking DR 500mg BID.  Also ok to discontinue aripiprazole 2mg if patient feels he is unable to sustain current dosing until next visit.    Spoke with patient to review update and sent [employer] message with summary.  Pt will stop Depakote ER and start taking Depakote DR 500mg BID.  He will message writer when he makes the switch, depending on when he is able to get to the pharmacy in the next few days.  Pt will have valproic acid level drawn ~5 days after switching meds.  Discussed importance of getting a 12 hour trough level and patient prefers to go in the afternoon.  He usually takes doses at 6am and 6pm and will plan for lab draw around 5:30pm.  Provided phone number for [facility] to schedule lab appointment.  Pt would prefer to discontinue aripiprazole and will plan to do so as of 7/10/19.    Will follow up on these changes on 8/5, then with Dr. Colon on 8/28/19.    Shameka Curtis, PharmD  Medication Therapy Management Pharmacist  [facility]  Phone: [phone]

## 2019-07-18 ENCOUNTER — OFFICE VISIT (OUTPATIENT)
Dept: FAMILY MEDICINE | Facility: CLINIC | Age: 64
End: 2019-07-18
Payer: MEDICARE

## 2019-07-18 VITALS
RESPIRATION RATE: 18 BRPM | SYSTOLIC BLOOD PRESSURE: 104 MMHG | BODY MASS INDEX: 26.69 KG/M2 | TEMPERATURE: 97.2 F | WEIGHT: 208 LBS | OXYGEN SATURATION: 97 % | DIASTOLIC BLOOD PRESSURE: 62 MMHG | HEART RATE: 82 BPM

## 2019-07-18 DIAGNOSIS — K43.9 VENTRAL HERNIA WITHOUT OBSTRUCTION OR GANGRENE: ICD-10-CM

## 2019-07-18 DIAGNOSIS — N28.1 RENAL CYST, ACQUIRED: ICD-10-CM

## 2019-07-18 DIAGNOSIS — Z11.4 ENCOUNTER FOR SCREENING FOR HIV: ICD-10-CM

## 2019-07-18 DIAGNOSIS — Z11.59 ENCOUNTER FOR HEPATITIS C SCREENING TEST FOR LOW RISK PATIENT: ICD-10-CM

## 2019-07-18 DIAGNOSIS — Z13.6 CARDIOVASCULAR SCREENING; LDL GOAL LESS THAN 130: Primary | ICD-10-CM

## 2019-07-18 LAB
CHOLEST SERPL-MCNC: 199 MG/DL
HDLC SERPL-MCNC: 40 MG/DL
LDLC SERPL CALC-MCNC: 129 MG/DL
NONHDLC SERPL-MCNC: 159 MG/DL
TRIGL SERPL-MCNC: 152 MG/DL

## 2019-07-18 PROCEDURE — 36415 COLL VENOUS BLD VENIPUNCTURE: CPT | Performed by: INTERNAL MEDICINE

## 2019-07-18 PROCEDURE — 87389 HIV-1 AG W/HIV-1&-2 AB AG IA: CPT | Performed by: INTERNAL MEDICINE

## 2019-07-18 PROCEDURE — 80061 LIPID PANEL: CPT | Performed by: INTERNAL MEDICINE

## 2019-07-18 PROCEDURE — 99213 OFFICE O/P EST LOW 20 MIN: CPT | Performed by: INTERNAL MEDICINE

## 2019-07-18 PROCEDURE — G0472 HEP C SCREEN HIGH RISK/OTHER: HCPCS | Performed by: INTERNAL MEDICINE

## 2019-07-18 NOTE — PATIENT INSTRUCTIONS
1) Follow-up with oncology specialist in order to discuss whether you should have follow-up colonoscopy at this time. Otherwise we will revisit the issue in a year.     2) As long as you are not in distress I don't see a reason why you shouldn't be able to try an abdominal exercise machine.

## 2019-07-18 NOTE — PROGRESS NOTES
"Subjective     Los Huang is a 63 year old male who presents to clinic today for the following health issues:    HPI   He has a history of partial colectomy, partially hepatectomy, and tumor removal on right side of abdomen.     Kidney Lesions  He had a recent PET scan and CT scan and noted an abnormality with the kidneys. He plans to have follow-up with urology in regards to this concern. He had left sided nephrectomy tube and after this he had pains whenever lying on his right side he noted \"gutwrenching\" pains.     EXAMINATION: CT ABDOMEN PELVIS W CONTRAST, 6/7/2019 4:08 PM     TECHNIQUE:  Helical CT images from the lung bases through the  symphysis pubis were obtained with IV contrast. Contrast dose: Isovue  370 126cc     COMPARISON: PET/CT 2/22/2019. CT exam 6/26/2017.     HISTORY: met colon cancer f/u; possible liver directed therapy please  assess liver lesions; Metastatic colon cancer to liver (H); Metastatic  colon cancer to liver (H)     FINDINGS:     Lung bases: No pleural effusions. Bilateral lower lobes dependent  atelectasis. Calcified focus in the right lower lobe.     Abdomen and pelvis: Postoperative changes from right hepatectomy and  right hemicolectomy. At the hepatic dome there is a 2.7 cm hypodense  liver lesion subcapsular, stable, series 3 image 42; hepatic segment 2  measuring 2.9 cm subcapsular, stable, series 3 image 62; hepatic  segment 2 measuring 9 mm, series 3 image 74. Measuring 7 mm, too small  to characterize inferiorly in the left hepatic lobe, series 3 image  128. Patent hepatic vasculature. No biliary tree dilation. Surgically  absent gallbladder.     Homogeneous pancreatic parenchyma. Main pancreatic duct is not  dilated. The spleen is not enlarged. Calcified granulomas in the  spleen.     Unremarkable right adrenal gland. Mild thickening of the left adrenal  gland with no discrete nodule. No renal stones or hydronephrosis.  Bilateral renal cysts as well as subcentimeter " cortical hypodensities  that are too small to characterize. 1.5 cm indeterminant lesion in the  superior pole of the left kidney, stable. 3.0 cm heterogeneously  enhancing exophytic lesion in the inferior pole of the right kidney  previously measuring 2.2 cm on CT exam 6/26/2017. Partially distended  urinary bladder. Enlarged prostate with calcifications. Pelvic  phleboliths. Prominent fat in the right greater than left inguinal  canal. No inguinal lymphadenopathy. No free fluid or free air. No  pelvic lymphadenopathy.     No abdominal aortic aneurysm. Atherosclerotic calcifications of the  abdominal aorta and its major branches. Prominent stable du hepatis  lymph nodes with nodularities adjacent to the diaphragmatic crura.  Subcentimeter retroperitoneal and mesenteric lymph nodes, not enlarged  by size criteria. No dilated loops of bowel. No bowel wall thickening.     Bones: Degenerative changes of the spine, bilateral SI joints and  hips. Scattered Schmorl nodes. Enthesopathic changes off the pelvis  and hips. Scattered sclerotic foci, stable, likely representing benign  bone islands. No convincing aggressive bone lesions.                                                                      IMPRESSION: In this patient with history of metastatic colon cancer:  1. Postoperative changes from right hepatectomy end right  hemicolectomy. Liver lesions as described above concerning for  metastatic disease.  2. Stable indeterminate lesion in the superior pole of the left kidney  comment stable from CT exam 6/26/2017, favoring benign etiology.  Consider further evaluation with renal ultrasound to rule out  hemorrhagic/proteinaceous cyst versus solid lesion.  3. 3 cm heterogeneously enhancing solid lesion in the inferior pole of  the right kidney previously measuring 2.2 cm on CT exam 6/26/2017.  Differentials include renal cell carcinoma versus metastatic disease.  4. Stable prominent du hepatis lymph nodes as well  as nonspecific  soft tissue nodules adjacent to the diaphragmatic crura bilaterally.  5. Additional incidental findings as described above.     [Consider Follow Up: Bilateral renal lesions]     This report will be copied to the [facility] to ensure a  provider acknowledges the finding.      BROCK WILLIS MD    Ventral Hernia Right   He noted that his right side of the abdomen seems to bulge out. This is where he had tumor removed and a mesh placed. He questions his ability to use abdominal exercise machines.     Pecnaeem Carmarshallum   Reports that he noted his chest seems to be sunken in. 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 He noted his sternum when he lies flat.     Additional Information   He reports that he would prefer to control lipids with diet before taking medications. However he says that lately he has been eating sort of however he wants. He had Tdap updated about 6 years ago. New shingrix vaccine discussed with patient today and patient declines. Reports that he was due for colonoscopy in 2017, but has not since followed with this. He plans to ask his oncologist whether this is a good idea for him given his cancer history.     Patient Active Problem List   Diagnosis     Metastatic colon cancer to liver (H)     Bipolar 1 disorder (H)     Past Surgical History:   Procedure Laterality Date     ABDOMEN SURGERY       BIOPSY       CHOLECYSTECTOMY       COLONOSCOPY       GENITOURINARY SURGERY      Ureteroscopy gone awry     ORTHOPEDIC SURGERY         Social History     Tobacco Use     Smoking status: Former Smoker     Packs/day: 1.00     Years: 34.00     Pack years: 34.00     Types: Cigarettes     Start date:      Last attempt to quit:      Years since quittin.5     Smokeless tobacco: Former User     Types: Snuff     Quit date:      Tobacco comment: Smoked sporadically in high school and during college until    Substance Use  Topics     Alcohol use: Not Currently     Frequency: Monthly or less     Comment: slight     Family History   Problem Relation Age of Onset     Osteoporosis Sister      Thyroid Disease Sister         Thyroid killed     Thyroid Disease Sister         Thyroid removed         BP Readings from Last 3 Encounters:   07/18/19 104/62   06/27/19 102/68   06/11/19 117/70    Wt Readings from Last 3 Encounters:   07/18/19 94.3 kg (208 lb)   06/27/19 94.2 kg (207 lb 11.2 oz)   06/11/19 94.1 kg (207 lb 8 oz)        Reviewed and updated as needed this visit by Provider       Review of Systems   ROS COMP: Constitutional, HEENT, cardiovascular, pulmonary, GI, , musculoskeletal, neuro, skin, endocrine and psych systems are negative, except as otherwise noted.    This document serves as a record of the services and decisions personally performed and made by Babar Mckinney MD. It was created on his behalf by Giovanni Vernon, a trained medical scribe. The creation of this document is based on the provider's statements to the medical scribe.  Giovanni Vernon 11:57 AM July 18, 2019      Objective    /62   Pulse 82   Temp 97.2  F (36.2  C) (Oral)   Resp 18   Wt 94.3 kg (208 lb)   SpO2 97%   BMI 26.69 kg/m    Body mass index is 26.69 kg/m .  Physical Exam   GENERAL: healthy, alert and no distress  EYES: Eyes grossly normal to inspection, PERRL and conjunctivae and sclerae normal  ABDOMEN: he has a ventral hernia defect with the right side of abdomen   MS: he has some mild pectus carinatum, normal xiphoid process   SKIN: no suspicious lesions or rashes to visible skin   NEURO: Normal strength and tone, mentation intact and speech normal  PSYCH: mentation appears normal, affect normal/bright    Diagnostic Test Results:  Labs reviewed in Epic  No results found for this or any previous visit (from the past 24 hour(s)).        Assessment & Plan     (Z13.6) CARDIOVASCULAR SCREENING; LDL GOAL LESS THAN 130  (primary encounter  diagnosis)  Comment: routine screening   Plan: Lipid panel reflex to direct LDL Non-fasting            (N28.1) Renal cyst, acquired  Comment: reviewed different ideas but in the end he needs urological follow-up   Plan:     (Z11.4) Encounter for screening for HIV  Comment: routine screening   Plan: HIV Antigen Antibody Combo            (Z11.59) Encounter for hepatitis C screening test for low risk patient  Comment: routine screening   Plan: Hepatitis C antibody            (K43.9) Ventral hernia without obstruction or gangrene  Comment: discussed this diagnosis. It's not dangerous   Plan: he's not a good surgery candidate in my opinion     No follow-ups on file.    The information in this document, created by the medical scribe for me, accurately reflects the services I personally performed and the decisions made by me. I have reviewed and approved this document for accuracy prior to leaving the patient care area.  July 18, 2019 12:05 PM    Babar Mckinney MD  [facility]

## 2019-07-19 LAB
HCV AB SERPL QL IA: NONREACTIVE
HIV 1+2 AB+HIV1 P24 AG SERPL QL IA: NONREACTIVE

## 2019-07-25 DIAGNOSIS — Z79.899 ENCOUNTER FOR LONG-TERM (CURRENT) USE OF MEDICATIONS: ICD-10-CM

## 2019-07-25 LAB
AMMONIA PLAS-SCNC: 39 UMOL/L (ref 10–50)
BASOPHILS # BLD AUTO: 0 10E9/L (ref 0–0.2)
BASOPHILS NFR BLD AUTO: 0.3 %
DIFFERENTIAL METHOD BLD: NORMAL
EOSINOPHIL # BLD AUTO: 0.2 10E9/L (ref 0–0.7)
EOSINOPHIL NFR BLD AUTO: 4 %
ERYTHROCYTE [DISTWIDTH] IN BLOOD BY AUTOMATED COUNT: 13.3 % (ref 10–15)
HBA1C MFR BLD: 5.5 % (ref 0–5.6)
HCT VFR BLD AUTO: 42.5 % (ref 40–53)
HGB BLD-MCNC: 14.7 G/DL (ref 13.3–17.7)
LYMPHOCYTES # BLD AUTO: 1.9 10E9/L (ref 0.8–5.3)
LYMPHOCYTES NFR BLD AUTO: 31.3 %
MCH RBC QN AUTO: 30.6 PG (ref 26.5–33)
MCHC RBC AUTO-ENTMCNC: 34.6 G/DL (ref 31.5–36.5)
MCV RBC AUTO: 89 FL (ref 78–100)
MONOCYTES # BLD AUTO: 0.8 10E9/L (ref 0–1.3)
MONOCYTES NFR BLD AUTO: 12.7 %
NEUTROPHILS # BLD AUTO: 3.1 10E9/L (ref 1.6–8.3)
NEUTROPHILS NFR BLD AUTO: 51.7 %
PLATELET # BLD AUTO: 162 10E9/L (ref 150–450)
RBC # BLD AUTO: 4.8 10E12/L (ref 4.4–5.9)
WBC # BLD AUTO: 6 10E9/L (ref 4–11)

## 2019-07-25 PROCEDURE — 80061 LIPID PANEL: CPT | Performed by: STUDENT IN AN ORGANIZED HEALTH CARE EDUCATION/TRAINING PROGRAM

## 2019-07-25 PROCEDURE — 82140 ASSAY OF AMMONIA: CPT | Performed by: STUDENT IN AN ORGANIZED HEALTH CARE EDUCATION/TRAINING PROGRAM

## 2019-07-25 PROCEDURE — 85025 COMPLETE CBC W/AUTO DIFF WBC: CPT | Performed by: FAMILY MEDICINE

## 2019-07-25 PROCEDURE — 80164 ASSAY DIPROPYLACETIC ACD TOT: CPT | Performed by: STUDENT IN AN ORGANIZED HEALTH CARE EDUCATION/TRAINING PROGRAM

## 2019-07-25 PROCEDURE — 80053 COMPREHEN METABOLIC PANEL: CPT | Performed by: FAMILY MEDICINE

## 2019-07-25 PROCEDURE — 36415 COLL VENOUS BLD VENIPUNCTURE: CPT | Performed by: STUDENT IN AN ORGANIZED HEALTH CARE EDUCATION/TRAINING PROGRAM

## 2019-07-25 PROCEDURE — 83036 HEMOGLOBIN GLYCOSYLATED A1C: CPT | Performed by: FAMILY MEDICINE

## 2019-07-26 LAB
ALBUMIN SERPL-MCNC: 4.1 G/DL (ref 3.4–5)
ALP SERPL-CCNC: 75 U/L (ref 40–150)
ALT SERPL W P-5'-P-CCNC: 51 U/L (ref 0–70)
ANION GAP SERPL CALCULATED.3IONS-SCNC: 8 MMOL/L (ref 3–14)
AST SERPL W P-5'-P-CCNC: 21 U/L (ref 0–45)
BILIRUB SERPL-MCNC: 0.4 MG/DL (ref 0.2–1.3)
BUN SERPL-MCNC: 18 MG/DL (ref 7–30)
CALCIUM SERPL-MCNC: 8.6 MG/DL (ref 8.5–10.1)
CHLORIDE SERPL-SCNC: 107 MMOL/L (ref 94–109)
CHOLEST SERPL-MCNC: 186 MG/DL
CO2 SERPL-SCNC: 24 MMOL/L (ref 20–32)
CREAT SERPL-MCNC: 1.01 MG/DL (ref 0.66–1.25)
GFR SERPL CREATININE-BSD FRML MDRD: 77 ML/MIN/{1.73_M2}
GLUCOSE SERPL-MCNC: 92 MG/DL (ref 70–99)
HDLC SERPL-MCNC: 40 MG/DL
LDLC SERPL CALC-MCNC: 110 MG/DL
NONHDLC SERPL-MCNC: 146 MG/DL
POTASSIUM SERPL-SCNC: 4.1 MMOL/L (ref 3.4–5.3)
PROT SERPL-MCNC: 7 G/DL (ref 6.8–8.8)
SODIUM SERPL-SCNC: 139 MMOL/L (ref 133–144)
TRIGL SERPL-MCNC: 181 MG/DL
VALPROATE SERPL-MCNC: 59 MG/L (ref 50–100)

## 2019-07-30 ASSESSMENT — ENCOUNTER SYMPTOMS
JAUNDICE: 0
MUSCLE WEAKNESS: 1
BACK PAIN: 0
JOINT SWELLING: 0
HEARTBURN: 0
VOMITING: 0
NECK PAIN: 0
ARTHRALGIAS: 0
BLOOD IN STOOL: 0
RECTAL PAIN: 0
MUSCLE CRAMPS: 1
ABDOMINAL PAIN: 0
BOWEL INCONTINENCE: 0
BLOATING: 0
CONSTIPATION: 0
STIFFNESS: 0
DIARRHEA: 1
MYALGIAS: 1
NAUSEA: 0

## 2019-07-30 NOTE — TELEPHONE ENCOUNTER
ONCOLOGY INTAKE: Records Information      APPT INFORMATION: 7/31/19 - Eric Bridges CSC  Referring provider:  REBEL Srivastava  Referring provider s clinic:  [facility]  Reason for visit/diagnosis:  Colon cancer with liver mets  Has patient been notified of appointment date and time?: Yes    RECORDS INFORMATION:  Were the records received with the referral (via [employer])? Internal referral    Has patient been seen for any external appt for this diagnosis? No    If yes, where? NA    Has patient had any imaging or procedures outside of [facility] for this condition? No      If Yes, where? NA    ADDITIONAL INFORMATION:  Scheduled via inbasket from Maeve / WENDIE called pt & confirmed

## 2019-07-30 NOTE — TELEPHONE ENCOUNTER
RECORDS STATUS - ALL OTHER DIAGNOSIS      RECORDS RECEIVED FROM: [facility]   DATE RECEIVED: 7/30/19   NOTES STATUS DETAILS   OFFICE NOTE from referring provider Damaris Srivastava   OFFICE NOTE from medical oncologist Damaris Srivastava   DISCHARGE SUMMARY from hospital NA    DISCHARGE REPORT from the ER NA    OPERATIVE REPORT NA    MEDICATION LIST [facility] 7/9/19   CLINICAL TRIAL TREATMENTS TO DATE     LABS     PATHOLOGY REPORTS [facility] Path Consult ([facility]) 2/21/19   ANYTHING RELATED TO DIAGNOSIS Epic 7/25/19   GENONOMIC TESTING     TYPE:     IMAGING (NEED IMAGES & REPORT)     CT SCANS PACS 6/7/19, 2/12/18, 12/6/17, 6/26/17   MRI     MAMMO     ULTRASOUND PACS 8/22/18, 7/10/18, 6/14/18, 12/6/17   PET PACS 2/22/19, 10/10/18, 5/15/18, 12/7/17

## 2019-07-31 ENCOUNTER — OFFICE VISIT (OUTPATIENT)
Dept: SURGERY | Facility: CLINIC | Age: 64
End: 2019-07-31
Attending: SURGERY
Payer: MEDICARE

## 2019-07-31 ENCOUNTER — PRE VISIT (OUTPATIENT)
Dept: SURGERY | Facility: CLINIC | Age: 64
End: 2019-07-31

## 2019-07-31 VITALS
RESPIRATION RATE: 16 BRPM | SYSTOLIC BLOOD PRESSURE: 114 MMHG | WEIGHT: 206.9 LBS | HEART RATE: 77 BPM | BODY MASS INDEX: 26.55 KG/M2 | HEIGHT: 74 IN | TEMPERATURE: 97.4 F | DIASTOLIC BLOOD PRESSURE: 71 MMHG | OXYGEN SATURATION: 96 %

## 2019-07-31 DIAGNOSIS — C78.7 METASTATIC COLON CANCER TO LIVER (H): Primary | ICD-10-CM

## 2019-07-31 DIAGNOSIS — C18.9 METASTATIC COLON CANCER TO LIVER (H): Primary | ICD-10-CM

## 2019-07-31 PROCEDURE — G0463 HOSPITAL OUTPT CLINIC VISIT: HCPCS | Mod: ZF

## 2019-07-31 ASSESSMENT — PAIN SCALES - GENERAL: PAINLEVEL: NO PAIN (0)

## 2019-07-31 ASSESSMENT — MIFFLIN-ST. JEOR: SCORE: 1793.56

## 2019-07-31 NOTE — NURSING NOTE
"Oncology Rooming Note    July 31, 2019 9:48 AM   Los Huang is a 63 year old male who presents for:    Chief Complaint   Patient presents with     New Patient     NEW PT; COLON CA; VITALS COMPLETED BY CMA      Initial Vitals: /71   Pulse 77   Temp 97.4  F (36.3  C) (Oral)   Resp 16   Ht 1.88 m (6' 2.02\")   Wt 93.8 kg (206 lb 14.4 oz)   SpO2 96%   BMI 26.55 kg/m   Estimated body mass index is 26.55 kg/m  as calculated from the following:    Height as of this encounter: 1.88 m (6' 2.02\").    Weight as of this encounter: 93.8 kg (206 lb 14.4 oz). Body surface area is 2.21 meters squared.  No Pain (0) Comment: Data Unavailable   No LMP for male patient.  Allergies reviewed: Yes  Medications reviewed: Yes    Medications: Medication refills not needed today.  Pharmacy name entered into [employer]: [id number] - [facility], [place] - [address] [place] [address]    Clinical concerns: No new concerns today  Dr Reyes was notified.      Marcia Jarvis              "

## 2019-07-31 NOTE — PROGRESS NOTES
NEW PATIENT CONSULTATION       REASON FOR EVALUATION:  Recurrent metastatic colon cancer.      HISTORY OF PRESENT ILLNESS:  Mr. Huang is a 63-year-old gentleman cared for by Dr. Srivastava from Medical Oncology.  Approximately 5 years ago, he was diagnosed with metastatic colon cancer of the right lobe of his liver.  He underwent sequential colectomy followed by a right hepatectomy at that time.  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  Since that time, he has had essentially intermittent but ongoing recurrences.  He initially had most of his care in the [place], [place] region.  He notes to me today that he saw numerous doctors, both at [facility], smaller [facility] hospitals as well as seeking care at [facility] along the way.  Along that time, he also had an abdominal wall metastases that was resected from his right abdominal wall adjacent to his previous right subcostal incision.  More recently, he has up to 4 lesions in the remnant left lobe of his liver.  He has received catheter-based therapy including Y90 as well as multiple TACE procedures which have not fully controlled the lesions in his liver.  Of note, he has not received any systemic therapy despite the presence of metastatic disease.        I was asked by Dr. Srivastava to see Mr. Huang to discuss the possibility of surgical ablation.      I had a long discussion with Mr. Huang today which lasted over an hour, discussing the options for treatment.  I discussed that I do believe that ablation is a possibility in this case.  However, given his extensive abdominal surgical history, would likely have to be performed as an open procedure as opposed to a percutaneous procedure or laparoscopic procedure.  I also discussed that overall, for patients in his situation, systemic therapy can improve the overall survival and is typically recommended as well.  I discussed with him that our typical protocol at the [facility]  [place] would be to give him approximately 3 months of upfront chemotherapy followed by restaging for assessment response.  We would then perform his surgery several months from now and that surgery would be followed by an additional 3 months of chemotherapy to complete his course.      Mr. Huang mentioned multiple times during our visit today that he was strongly considering no further treatment and he also mentioned that he found it unusual that suicide is illegal; however, we can force people live with cancer.  Having said that, he did not have any plans to harm himself.      Ultimately, I discussed multiple options with Mr. Huang including no treatment at all but also palliative chemotherapy alone or a combination of ablation and chemotherapy.  Finally, I also did discuss the option of moving forward with ablation first and resuming chemotherapy only for recurrent disease in the future.  I think Mr. Huang got perhaps somewhat overwhelmed by the discussion today and left the visit indicating that he wanted to take time to think things over.  I strongly encouraged him to follow up with Dr. Srivastava as quickly as possible to discuss options moving forward.  Per Mr. Huang' comfort level, we did not set up a followup visit with me.  I offered that I would be very happy to see him again to discuss moving forward with surgery but more specifically, I discussed that I would recommend that he get hooked up with Dr. Srivastava to consider up-front chemotherapy followed by attempts at treatment of all of his liver lesions.  Mr. Huang left the visit without finalizing those plans.      We will not make any plans for liver-directed therapy for Mr. Huang at this point but I look forward to hearing from Dr. Srivastava after his followup with Mr. Huang so that we can discuss the best plan of care moving forward.      A total of about an hour and a half was spent on this case; more than half that time was in face-to-face  time with Mr. Huang.

## 2019-07-31 NOTE — LETTER
7/31/2019       RE: Los Huang  [address] [facility] [address] [place] [id number]     Dear Colleague,    Thank you for referring your patient, Los Huang, to the [facility]. Please see a copy of my visit note below.    NEW PATIENT CONSULTATION       REASON FOR EVALUATION:  Recurrent metastatic colon cancer.      HISTORY OF PRESENT ILLNESS:  Mr. Huang is a 63-year-old gentleman cared for by Dr. Srivastava from [facility].  Approximately 5 years ago, he was diagnosed with metastatic colon cancer of the right lobe of his liver.  He underwent sequential colectomy followed by a right hepatectomy at that time.  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  Since that time, he has had essentially intermittent but ongoing recurrences.  He initially had most of his care in the [place], [place] region.  He notes to me today that he saw numerous doctors, both at [facility], smaller [facility] hospitals as well as seeking care at [place] along the way.  Along that time, he also had an abdominal wall metastases that was resected from his right abdominal wall adjacent to his previous right subcostal incision.  More recently, he has up to 4 lesions in the remnant left lobe of his liver.  He has received catheter-based therapy including Y90 as well as multiple TACE procedures which have not fully controlled the lesions in his liver.  Of note, he has not received any systemic therapy despite the presence of metastatic disease.        I was asked by Dr. Srivastava to see Mr. Huang to discuss the possibility of surgical ablation.      I had a long discussion with Mr. Huang today which lasted over an hour, discussing the options for treatment.  I discussed that I do believe that ablation is a possibility in this case.  However, given his extensive abdominal surgical history, would likely have to be performed as an open procedure as opposed to a percutaneous  procedure or laparoscopic procedure.  I also discussed that overall, for patients in his situation, systemic therapy can improve the overall survival and is typically recommended as well.  I discussed with him that our typical protocol at the [facility] would be to give him approximately 3 months of upfront chemotherapy followed by restaging for assessment response.  We would then perform his surgery several months from now and that surgery would be followed by an additional 3 months of chemotherapy to complete his course.      Mr. Huang mentioned multiple times during our visit today that he was strongly considering no further treatment and he also mentioned that he found it unusual that suicide is illegal; however, we can force people live with cancer.  Having said that, he did not have any plans to harm himself.      Ultimately, I discussed multiple options with Mr. Huang including no treatment at all but also palliative chemotherapy alone or a combination of ablation and chemotherapy.  Finally, I also did discuss the option of moving forward with ablation first and resuming chemotherapy only for recurrent disease in the future.  I think Mr. Huang got perhaps somewhat overwhelmed by the discussion today and left the visit indicating that he wanted to take time to think things over.  I strongly encouraged him to follow up with Dr. Srivastava as quickly as possible to discuss options moving forward.  Per Mr. Huang' comfort level, we did not set up a followup visit with me.  I offered that I would be very happy to see him again to discuss moving forward with surgery but more specifically, I discussed that I would recommend that he get hooked up with Dr. Srivastava to consider up-front chemotherapy followed by attempts at treatment of all of his liver lesions.  Mr. Huang left the visit without finalizing those plans.      We will not make any plans for liver-directed therapy for Mr. Huang at this  point but I look forward to hearing from Dr. Srivastava after his followup with Mr. Huang so that we can discuss the best plan of care moving forward.      A total of about an hour and a half was spent on this case; more than half that time was in face-to-face time with Mr. Huang.         Again, thank you for allowing me to participate in the care of your patient.      Sincerely,    Indra Reyes MD

## 2019-08-02 ENCOUNTER — TELEPHONE (OUTPATIENT)
Dept: PSYCHIATRY | Facility: CLINIC | Age: 64
End: 2019-08-02

## 2019-08-02 NOTE — TELEPHONE ENCOUNTER
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 Guevara, reading the Urology and Oncology notes, and reviewing the website linked in his other [employer] messages, I contacted him to discuss his mental health. There was no answer but I left a message with our clinic number to call back, as well as a reminder to call 911 or go to the ED if he is feeling unsafe or experiencing an emergency.    Rahel Colon MD  PGY-3 Psychiatry Resident

## 2019-08-05 ENCOUNTER — MYC MEDICAL ADVICE (OUTPATIENT)
Dept: PSYCHIATRY | Facility: CLINIC | Age: 64
End: 2019-08-05

## 2019-08-05 ENCOUNTER — TELEPHONE (OUTPATIENT)
Dept: PHARMACY | Facility: CLINIC | Age: 64
End: 2019-08-05

## 2019-08-05 NOTE — TELEPHONE ENCOUNTER
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  Notably, he sent a MyChart message to Urology provider yesterday, 8/4/19, asking that provider's [employer] preference.  Such behavior and inquisition is unusual based on this writer's experience with the patient.  Patient has been easily reachable in the past and never cancelled our phone appointments.  LVM on both mobile and home numbers encouraging call back to this writer or clinic and provided phone numbers.    Routed to psych provider as jossue Curtis, PharmD  Medication Therapy Management Pharmacist  [facility]  Phone: [phone]

## 2019-08-05 NOTE — TELEPHONE ENCOUNTER
Outreach     Rahel Colon MD  You 5 minutes ago (4:37 PM)      Amado Rader,   Thank you so much for reaching out! I'm happy we finally got in touch with him and happy he is doing OK for now.   Thanks,   Rahel    Routing comment

## 2019-08-05 NOTE — TELEPHONE ENCOUNTER
Writer placed a call to patient at [phone]. No answer at number provided. LVM, requesting a call back. Clinic number provided.

## 2019-08-05 NOTE — TELEPHONE ENCOUNTER
"Writer received incoming call from patient [phone]. Patient reports \" I am not sure why you guys keep calling me.\" Patient reports doing well. Denies changes in sleep or appetite. Reports feeling safe at home. Shared \" I know when I need to come into the hospital.\" Shared he cancelled appt with the pharmacist due to receiving an email from them with lab results which were normal. He does not feel the need to be seen in clinic sooner. Patient denied sharing anything further and the phone was quickly disconnected.   "

## 2019-08-07 DIAGNOSIS — F31.9 BIPOLAR 1 DISORDER (H): ICD-10-CM

## 2019-08-07 RX ORDER — CLONAZEPAM 0.5 MG/1
0.5 TABLET ORAL AT BEDTIME
Qty: 30 TABLET | Refills: 3 | Status: SHIPPED | OUTPATIENT
Start: 2019-08-07 | End: 2019-08-28

## 2019-08-07 NOTE — TELEPHONE ENCOUNTER
Last Seen 5/31/19  RTC 6-8 weeks  Cancel 1 7/10/19  No-Show None    Next Appt 8/28/19    Incoming Refill From [facility] in [place] via fax    Medication Requested clonazePAM (KLONOPIN) 0.5 MG tablet    Directions from request Take 1 tablet (0.5 mg) by mouth twice daily.     Directions per med tab Take 1 tablet (0.5 mg) by mouth At Bedtime  Qty 180    Last Refill 4/9/19      Message routed to Dior Dick RN

## 2019-08-07 NOTE — TELEPHONE ENCOUNTER
Writer received signed script from provider for Klonopin 0.5 mg. Script faxed to [id number] - [place], [place] - [address] [place] [address] [place] at [phone]     Also placed a call to [facility] pharmacy and spoke with Kathie who agreed to discontinue his refills for Klonopin sent on 4/22/19 since patient is refilling med a [facility] pharmacy.     No further action needed by this writer

## 2019-08-07 NOTE — TELEPHONE ENCOUNTER
Per  last refilled: 4/9/19    Post reviewing the chart patient should have refills for Klonopin available from previous refill. Placed a call to pharmacy at [phone] and confirmed 3 refills on file. Placed a call to [facility] pharmacy at [phone] and notified that patient has refills available at [facility] pharmacy. Per pharmacist, they are unable to transfer control med. Writer agreed to reach out to provider for refills.     Placed a call to patient at [phone]. No answer at number provided. LVM, requesting a call back. Clinic number provided.

## 2019-08-23 ENCOUNTER — MYC MEDICAL ADVICE (OUTPATIENT)
Dept: PSYCHIATRY | Facility: CLINIC | Age: 64
End: 2019-08-23

## 2019-08-23 DIAGNOSIS — F90.1 ATTENTION DEFICIT HYPERACTIVITY DISORDER (ADHD), PREDOMINANTLY HYPERACTIVE TYPE: ICD-10-CM

## 2019-08-23 RX ORDER — ATOMOXETINE 18 MG/1
18 CAPSULE ORAL DAILY
Qty: 30 CAPSULE | Refills: 0 | Status: SHIPPED | OUTPATIENT
Start: 2019-08-23 | End: 2019-08-28

## 2019-08-23 NOTE — TELEPHONE ENCOUNTER
Last seen: 5/31/19  RTC: 6-8 weeks   Cancel: 7/10/19  No-show: none   Next appt: 8/28/19    Incoming refill from patient via MyChart    Medication requested: atomoxetine (STRATTERA) 18 MG capsul  Directions: Take 1 capsule (18 mg) by mouth daily - Oral  Qty: 30  Last refilled: 4/23/19 by Dr. Watkins     Will route to provider for approval due to outside of RTC timeframe

## 2019-08-26 NOTE — TELEPHONE ENCOUNTER
Meds refilled by provider   Med tab changed to reflect this   Patient notified     No further action needed by this writer

## 2019-08-28 ENCOUNTER — OFFICE VISIT (OUTPATIENT)
Dept: PSYCHIATRY | Facility: CLINIC | Age: 64
End: 2019-08-28
Attending: PSYCHIATRY & NEUROLOGY
Payer: MEDICARE

## 2019-08-28 VITALS
BODY MASS INDEX: 26.36 KG/M2 | WEIGHT: 205.4 LBS | HEART RATE: 74 BPM | SYSTOLIC BLOOD PRESSURE: 107 MMHG | DIASTOLIC BLOOD PRESSURE: 73 MMHG

## 2019-08-28 DIAGNOSIS — F90.1 ATTENTION DEFICIT HYPERACTIVITY DISORDER (ADHD), PREDOMINANTLY HYPERACTIVE TYPE: ICD-10-CM

## 2019-08-28 DIAGNOSIS — F31.9 BIPOLAR 1 DISORDER (H): ICD-10-CM

## 2019-08-28 PROCEDURE — G0463 HOSPITAL OUTPT CLINIC VISIT: HCPCS | Mod: ZF

## 2019-08-28 RX ORDER — DIVALPROEX SODIUM 500 MG/1
1000 TABLET, DELAYED RELEASE ORAL AT BEDTIME
Qty: 60 TABLET | Refills: 3 | Status: SHIPPED | OUTPATIENT
Start: 2019-08-28 | End: 2019-10-30

## 2019-08-28 RX ORDER — RISPERIDONE 4 MG/1
4 TABLET ORAL AT BEDTIME
Qty: 30 TABLET | Refills: 3 | Status: SHIPPED | OUTPATIENT
Start: 2019-08-28 | End: 2019-10-30

## 2019-08-28 RX ORDER — CLONAZEPAM 0.5 MG/1
0.5 TABLET ORAL DAILY PRN
Qty: 30 TABLET | Refills: 3 | Status: SHIPPED | OUTPATIENT
Start: 2019-08-28 | End: 2020-01-03

## 2019-08-28 RX ORDER — ATOMOXETINE 18 MG/1
18 CAPSULE ORAL DAILY
Qty: 30 CAPSULE | Refills: 0 | Status: SHIPPED | OUTPATIENT
Start: 2019-08-28 | End: 2019-09-26

## 2019-08-28 ASSESSMENT — PATIENT HEALTH QUESTIONNAIRE - PHQ9: SUM OF ALL RESPONSES TO PHQ QUESTIONS 1-9: 3

## 2019-08-28 ASSESSMENT — PAIN SCALES - GENERAL: PAINLEVEL: NO PAIN (0)
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
## 2019-08-28 NOTE — PATIENT INSTRUCTIONS
Decrease Cogentin dose to 0.5 mg at bedtime and see how you feel.  Try taking the Klonopin in the morning.  Continue all other medications without any changes.  Please come back in 2 months.  Please call if you have any questions.

## 2019-08-29 NOTE — TELEPHONE ENCOUNTER
Writer received signed script for Klonopin 0.5 mg #30 with 3 refills with start date of 8/28/19. Script faxed to [id number] - [place], [place] - [address] [place] [address] [place] at [phone].     No further action needed by this writer

## 2019-09-12 ENCOUNTER — PRE VISIT (OUTPATIENT)
Dept: UROLOGY | Facility: CLINIC | Age: 64
End: 2019-09-12

## 2019-09-12 NOTE — TELEPHONE ENCOUNTER
Reason for Visit: Consult    Diagnosis: Renal lesion    Orders/Procedures/Records: Records available    Contact Patient: N/A    Rooming Requirements: Normal      Radha Lilly  09/12/19  12:34 PM

## 2019-09-24 DIAGNOSIS — F90.1 ATTENTION DEFICIT HYPERACTIVITY DISORDER (ADHD), PREDOMINANTLY HYPERACTIVE TYPE: ICD-10-CM

## 2019-09-26 RX ORDER — ATOMOXETINE 18 MG/1
18 CAPSULE ORAL DAILY
Qty: 30 CAPSULE | Refills: 0 | Status: SHIPPED | OUTPATIENT
Start: 2019-09-26 | End: 2019-10-30

## 2019-09-26 NOTE — TELEPHONE ENCOUNTER
Medication requested:  atomoxetine (STRATTERA) 18 MG   Last refilled: 8/28/19   Qty: 30  *  Continue Strattera 18 mg daily.    Last seen:  8/28/19  RTC:  2 MOS  Cancel:0  No-show: 0  Next appt: 10/30/19    Refill decision: Refilled for 30 days per protocol.

## 2019-10-08 ENCOUNTER — PRE VISIT (OUTPATIENT)
Dept: UROLOGY | Facility: CLINIC | Age: 64
End: 2019-10-08

## 2019-10-08 NOTE — TELEPHONE ENCOUNTER
Chief Complaint : renal lesion consult     Records/Orders: in epic     Pt Contacted: no    At Rooming: normal

## 2019-10-11 ENCOUNTER — MYC MEDICAL ADVICE (OUTPATIENT)
Dept: PSYCHIATRY | Facility: CLINIC | Age: 64
End: 2019-10-11

## 2019-10-14 NOTE — TELEPHONE ENCOUNTER
Received VORB from provider  1. This can be addressed at the next office visit on 10/30/19    Patient notified via [email or web address]

## 2019-10-30 ENCOUNTER — OFFICE VISIT (OUTPATIENT)
Dept: PSYCHIATRY | Facility: CLINIC | Age: 64
End: 2019-10-30
Attending: PSYCHIATRY & NEUROLOGY
Payer: MEDICARE

## 2019-10-30 VITALS
DIASTOLIC BLOOD PRESSURE: 65 MMHG | BODY MASS INDEX: 26.43 KG/M2 | WEIGHT: 206 LBS | SYSTOLIC BLOOD PRESSURE: 102 MMHG | HEART RATE: 72 BPM

## 2019-10-30 DIAGNOSIS — F31.9 BIPOLAR 1 DISORDER (H): Primary | ICD-10-CM

## 2019-10-30 DIAGNOSIS — R06.83 SNORING: ICD-10-CM

## 2019-10-30 DIAGNOSIS — F90.1 ATTENTION DEFICIT HYPERACTIVITY DISORDER (ADHD), PREDOMINANTLY HYPERACTIVE TYPE: ICD-10-CM

## 2019-10-30 PROCEDURE — G0463 HOSPITAL OUTPT CLINIC VISIT: HCPCS | Mod: ZF

## 2019-10-30 RX ORDER — BENZTROPINE MESYLATE 1 MG/1
1 TABLET ORAL DAILY
COMMUNITY
End: 2020-01-03

## 2019-10-30 RX ORDER — ATOMOXETINE 18 MG/1
18 CAPSULE ORAL DAILY
Qty: 30 CAPSULE | Refills: 1 | Status: SHIPPED | OUTPATIENT
Start: 2019-10-30 | End: 2020-01-03

## 2019-10-30 RX ORDER — RISPERIDONE 4 MG/1
4 TABLET ORAL AT BEDTIME
Qty: 30 TABLET | Refills: 3 | Status: SHIPPED | OUTPATIENT
Start: 2019-10-30 | End: 2020-01-03

## 2019-10-30 RX ORDER — CLONAZEPAM 0.5 MG/1
0.5 TABLET ORAL DAILY PRN
Qty: 30 TABLET | Refills: 3 | Status: CANCELLED | OUTPATIENT
Start: 2019-10-30

## 2019-10-30 RX ORDER — DIVALPROEX SODIUM 500 MG/1
1000 TABLET, DELAYED RELEASE ORAL AT BEDTIME
Qty: 60 TABLET | Refills: 3 | Status: SHIPPED | OUTPATIENT
Start: 2019-10-30 | End: 2020-01-31

## 2019-10-30 ASSESSMENT — PAIN SCALES - GENERAL: PAINLEVEL: NO PAIN (0)

## 2019-10-30 ASSESSMENT — PATIENT HEALTH QUESTIONNAIRE - PHQ9: SUM OF ALL RESPONSES TO PHQ QUESTIONS 1-9: 5

## 2019-10-30 NOTE — PROGRESS NOTES
"     [facility]  Psychiatry Clinic  TRANSFER of CARE DIAGNOSTIC ASSESSMENT     Date of initial Diagnostic Assessment (DA) is 3/18/19 and most recent Transfer of Care DA is 08/28/19.    CARE TEAM:  PCP- Babar Mckinney    Oncology- [facility], Therapist- [facility].              Los Huang is a 64 year old male who prefers the name \"Solo\" & pronouns he, him, his, himself.      Pertinent Background:  This patient first experienced mental health issues around the age of 28 and has received treatment for Bipolar I Disorder with severe manic episodes accompanied by psychosis.  Notably, had a major first psychotic carlos in 1983, subsequent to which he took Navane until 1994.  That is also the year that he moved back to [place] (where he grew up and attended college) from [place] where he'd lived for four years and had  his wife in 1992 after 16 years of marriage.  Retained joint custody of their two kids.  Back in [place], lived with his mother until her transition into a nursing home in 2017.  Has struggled with what he describes as having been \"dozens\" of psychiatric hospitalizations, estimating that he's spent approximately two cumulative years of his life on an inpatient psychiatric unit.  Has been on disability since 2001.  Had a 7-year stent on Clozaril without hospitalizations from [id number] however this medication unfortunately had to be discontinued after a notable drop in WBC's occurred.  In 2014 was diagnosed with stage IV colon cancer and was told that his life expectancy would be five years, a milestone which will be reached in May 2019.  Continues to work closely with oncology.  In December 2018 states he sought out a three week psychiatric admission due to feeling trapped and depressed in his assisted living facility, back in [place], subsequent to which he has been brought to live in [place] by his two adult children who live in the " "[employer]. 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    Psych critical item history includes suicide attempt [multiple], psychosis [sxs include paranoia, IOR, AH, thought insertion], mutiple psychotropic trials, trauma hx, psych hosp (>5) and SUBSTANCE USE: cannabis and acid in the 1970s. Note: no specific traumatic anniversary dates, but is often hospitalized for carlos around Thanksgiving and [place] holidays.    INTERIM HISTORY      [4, 4]   The patient reports good treatment adherence.  History was provided by the patient who was a fair but often overinclusive historian.    The last visit ended with no change to the med regimen.   Since the last visit:   Solo contacted our clinic via [facility] about one month after his last visit, asking \"Just wondered why I had such a difficult time finding a psychiatrist when I came to [place], and am I at the right no answer center now? Do I need an appointment based on your assessment of this email?\" Please see EMR for Dior Dick RN's reply. Subsequently Solo contacted Shameka Curtis PharmD about concerns about dry mouth possibly related to Depakote and had a more organized written conversation on 10/11.     Solo states that overall he has been \"good, but concerned about some things lately.\" For instance, he has been driving less due to worry that he would \"not know where to go or what to do.\" He also has times when his \"thoughts start racing a little bit\" and he paces, precipitated by worries that people are reading his website but not listening to his music, or judging his writing online. He feels he is more able to focus recently and \"can write a whole paragraph now.\" He wonders \"how much of that is related to burning my brain out on Nuvigil\" in the past. He has also taken Ritalin and now atomoxetine for difficulty with attention and alertness. He does report offhandedly that he " "frequently wakes up gasping during the night. He is unsure if he snores. He worries that the atomoxetine is increasing his anxiety, though.      He also worries that he is \"addicted\" to the internet. He wakes up at 0600, gets coffee, and goes straight to the computer. He reads the news and works on his blog. 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  He has been using Biotene wash but it is not helping. He thinks he probably does not drink enough water. He also notes that he is losing hair, which is associated with Depakote, but states \"I'd rather be bald than crazy.\"    RECENT PSYCH ROS:   Depression:  poor concentration /memory and indecisiveness  Elevated:  none  Psychosis:  disorganized speech (difficulty communicating) at times  Anxiety:  excessive worry and feeling fearful  Panic Attack:  none  Trauma Related:  none  Dysregulation:  none  Eating Disorder: no     Pertinent Negative Symptoms:  NO suicidal and violent ideation, aggression, hallucinations and carlos    RECENT SUBSTANCE USE:     ALCOHOL- 1-2 occasions 1-2 times a month   TOBACCO- none  CAFFEINE- 3-4 cups of coffee per day  OPIOIDS- none         NARCAN KIT- N/A        CANNABIS- none          OTHER ILLICIT DRUGS- none    CURRENT SOCIAL HISTORY:  FINANCIAL SUPPORT- on disability since 2001, also family, savings  CHILDREN- a son Juventino (40 years old) who works for the [employer], and a daughter Yuliet (35 years old) who works for \"[employer]\" - also has two " "granddaughters  LIVING SITUATION- apartment in [place]  SOCIAL/ SPIRITUAL SUPPORT- daughter, son, grand kids, sisters, artistic/avocational pursuits, i.e. 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 Resident transition. Cogentin decreased.  10/30: Trazodone discontinued to target dry mouth. Patient had increased Cogentin back to previous dose. Sleep Medicine referral placed for waking up gasping at night.    PAST MED TRIALS        See last Diagnostic Assessment  MEDICAL / SURGICAL HISTORY          Patient Active Problem List   Diagnosis     Metastatic colon cancer to liver (H)     Bipolar 1 disorder (H)       Major Surgery-   Past Surgical History:   Procedure Laterality Date     ABDOMEN SURGERY       BIOPSY       CHOLECYSTECTOMY       COLONOSCOPY       GENITOURINARY SURGERY  1997    Ureteroscopy gone awry     ORTHOPEDIC SURGERY         MEDICAL REVIEW OF SYSTEMS    [2, 10]     A comprehensive review of systems was performed and is negative other than noted in the HPI.    ALLERGY       Animal dander  MEDICATIONS        Current Outpatient Medications   Medication Sig Dispense Refill     atomoxetine (STRATTERA) 18 MG capsule Take 1 capsule (18 mg) by mouth daily 30 capsule 0     benztropine (COGENTIN) 1 MG tablet Take 1 mg by mouth daily       cholecalciferol (VITAMIN D3) 1000 units (25 mcg) capsule Take 1 capsule (1,000 Units) by mouth daily 90 capsule 3     clonazePAM (KLONOPIN) 0.5 MG tablet Take 1 tablet (0.5 mg) by mouth daily as needed for anxiety As needed 30 tablet 3     divalproex sodium delayed-release (DEPAKOTE) 500 MG DR tablet Take 2 tablets (1,000 mg) by mouth At Bedtime 60 tablet 3     Homeopathic Products (ARNICARE) GEL Externally " "apply 1 Dose topically daily as needed (leg cramps)       multivitamin (CENTRUM SILVER) tablet Take 1 tablet by mouth daily 90 tablet 3     risperiDONE (RISPERDAL) 4 MG tablet Take 1 tablet (4 mg) by mouth At Bedtime 30 tablet 3     traZODone (DESYREL) 100 MG tablet Take 1 tablet (100 mg) by mouth At Bedtime 30 tablet 3     VITALS      [3, 3]   /65   Pulse 72   Wt 93.4 kg (206 lb)   BMI 26.43 kg/m     MENTAL STATUS EXAM      [9, 14 cog gs]     Alertness: alert  and oriented  Appearance: well groomed and neatly dressed  Behavior/Demeanor: cooperative and pleasant, with good  eye contact   Speech: Slow, frequent pauses, Southern accent  Language: no obvious problem  Psychomotor: normal or unremarkable  Mood: \"pretty good\"  Affect: restricted; was congruent to mood; was congruent to content, no grandiosity today  Thought Process/Associations: overinclusive  and circumstantial at times  Thought Content:  Reports none;  Denies suicidal and violent ideation and paranoid ideation  Perception:  Reports none;  Denies auditory hallucinations and visual hallucinations  Insight: limited  Judgment: fair and adequate for safety  Cognition: (6) does  appear grossly intact; formal cognitive testing was not done  Gait and Station: unremarkable    LABS and DATA     AIMS:  due at next appointment    PHQ9 TODAY = 3  PHQ-9 SCORE 5/10/2019 5/31/2019 8/28/2019   PHQ-9 Total Score 3 3 3     ANTIPSYCHOTIC LABS  [glu, A1C, lipids (eddie LDL), liver enzymes, WBC, ANEU, Hgb, plts]  q12 mo  Recent Labs   Lab Test 07/25/19 1649 06/07/19 1609 04/17/19 0528 02/14/19  1816   GLC 92 91 77 91   A1C 5.5  --   --   --      Recent Labs   Lab Test 07/25/19 1649 07/18/19  1216   CHOL 186 199   TRIG 181* 152*   * 129*   HDL 40 40     Recent Labs   Lab Test 07/25/19 1649 06/07/19 1609 04/17/19  0528 02/14/19  1816   AST 21 29 26 36   ALT 51 54 49 63   ALKPHOS 75 87 79 96     Recent Labs   Lab Test 07/25/19 1649 06/07/19  1609 " 04/17/19  0528 02/14/19  1816   WBC 6.0 5.5 5.7 6.4   ANEU 3.1  --  2.9 4.1   HGB 14.7 13.7 14.2 15.3    143* 164 155     Recent Labs   Lab Test 07/25/19  1649 06/07/19  1609 04/17/19  0528   CR 1.01 1.11 1.02   GFRESTIMATED 77 69 76       VALPROIC ACID LABS     [liver enzymes, WBC, ANEU, Hgb, plts, +ammonia]  q6-12 mo  Recent Labs   Lab Test 07/25/19  1649 04/17/19  1239   ACE 59 47*       PSYCHOTROPIC DRUG INTERACTIONS     Serotonin Modulators may enhance the adverse/toxic effect of Antipsychotic Agents. Specifically, serotonin modulators may enhance dopamine blockade, possibly increasing the risk for neuroleptic malignant syndrome. Antipsychotic Agents may enhance the serotonergic effect of Serotonin Modulators. This could result in serotonin syndrome.     CNS Depressants may enhance the adverse/toxic effect of other CNS Depressants.     Valproate Products may enhance the adverse/toxic effect of RisperiDONE. Generalized edema has developed.     ClonazePAM may diminish the therapeutic effect of Valproate Products. Valproate Products may decrease the serum concentration of ClonazePAM. ClonazePAM may increase the serum concentration of Valproate Products.     Anticholinergic Agents may enhance the adverse/toxic effect of other Anticholinergic Agents.     QT-prolonging Agents may enhance the QTc-prolonging effect of QT-prolonging Agents.     MANAGEMENT:  Monitoring for adverse effects, routine vitals, routine labs, using lowest therapeutic dose of [Klonopin] and patient is aware of risks    RISK STATEMENT for SAFETY     Los Huang did not appear to be an imminent safety risk to self or others.    PSYCHIATRIC DIAGNOSIS     Bipolar Disorder, type I, most recent episode depressed, currently in remission  R/o ADHD     ASSESSMENT      [m2, h3]     TODAY Solo Huang, 64M with PMHx including stage IV colon cancer with liver mets s/p surgery and chemotherapy, returns to the [facility] Out[facility]  "Clinic for ongoing management of Bipolar I. Had clinic intake here in March of this year, followed by a hospitalization for carlos during which his medications were adjusted; he has stabilized since then, though some of his writing in [employer] messages is odd at times. Currently, he is euthymic, less animated than during the last visit, and without the grandiosity evident at that time. Aurora denies SI/SIB thoughts, violent/aggressive ideation, markedly depressed mood, hypo-/manic features, psychotic features, or other hallmarks of psychiatric decompensation with dangerousness. Reports overall feeling \"good,\" but does have ongoing concerns with loneliness and frustration with his children, as well as difficulty focusing at times. He described waking up gasping at times, potentially related to ROSEMARY (no recent weight gain, unsure if he snores, takes clonazepam daily but often takes his dose several hours before bedtime); will refer him for a sleep study. Should he be diagnosed with ROSEMARY it would prompt discussion of a shorter timeline for taper off clonazepam, due to negative cognitive side effects and risk of falls, but in the interest of not further disturbing sleep prior to the study, will make no changes today. Another possible medication change to consider would be discontinue atomoxetine, which is already at a low dose.     PLAN      [m2, h3]     1) PSYCHOTROPIC MEDICATIONS:   Continue Depakote DR 1000 mg PO qHS.  Continue risperidone 4 mg at bedtime.  Continue Strattera 18 mg daily.  Continue clonazepam 0.5 mg daily PRN.  Continue trazodone 100 mg at bedtime.  Continue benztropine 0.5 mg PO qHS     2) [place]  was checked today: indicates expected use in accordance with chart review/patient report.    3) THERAPY:    continue    4) NEXT DUE:    Labs- CMP, CBC, lipids, HbA1c, VPA level ordered and pending  EKG- PRN  Rating Scales- AIMS due    5) REFERRALS:    No Referrals needed     6) RTC: 1 month    7) CRISIS NUMBERS:   " Provided routinely in AVS   CRISIS TEXT LINE: Text [id number] from anywhere in [place], anytime, any crisis 24/7;  OR SEE www.crisistextline.org  ONLY if a CAR[facility] PT: [facility] [phone] (clinic), [phone] (after hours)     TREATMENT RISK STATEMENT:  The risks, benefits, alternatives and potential adverse effects have been discussed and are understood by the pt. The pt understands the risks of using street drugs or alcohol. There are no medical contraindications, the pt agrees to treatment with the ability to do so. The pt knows to call the clinic for any problems or to access emergency care if needed.  Medical and substance use concerns are documented above.  Psychotropic drug interaction check was done, including changes made today.    PROVIDER: Rahel Colon MD    Patient staffed in clinic with Dr. Arias who will sign the note.  Supervisor is Dr. Munguia.    Supervisor Attestation:    I saw the patient with the resident, and participated in key portions of the service, including the mental status examination and developing the plan of care. I reviewed key portions of the history with the resident. I agree with the findings and plan as documented in this note.    Soledad Arias MD

## 2019-10-30 NOTE — PATIENT INSTRUCTIONS
-Try going without the trazodone and see if that helps with dry mouth.  -Please come back in one month.    -The sleep medicine people should call you to set an appointment. If they don't call you in the next two weeks, here is their information:    [facility] [address] [place] [address]   Floor 1, [address]   Appointments: [phone]   Provider Referrals: [phone]  OR  [phone]  Alternative numbers:  Call us at [phone] or [phone] (toll free).

## 2019-11-11 ASSESSMENT — ENCOUNTER SYMPTOMS
SINUS CONGESTION: 0
SINUS PAIN: 0
EYE PAIN: 0
EYE REDNESS: 0
DOUBLE VISION: 0
SMELL DISTURBANCE: 0
TROUBLE SWALLOWING: 0
EYE IRRITATION: 0
SORE THROAT: 0
EYE WATERING: 0
TASTE DISTURBANCE: 0
NECK MASS: 0
HOARSE VOICE: 0

## 2019-11-13 NOTE — PROGRESS NOTES
Urology Clinic    Esau Chairez MD  Date of Service: 2019     Name: Los Huang  MRN: [id number]  Age: 64 year old  : 1955  Referring provider: Patrice Srivastava     Assessment:  Los Huang  is a 64 year old male with a history of bilateral subcentimeter cortical hydrodensities, 1.5 cm indeterminate left superior pole kidney lesion and stable 3.0 cm heterogeneously enhancing exophytic lesion in the inferior right kidney (previously measured 2.2 cm in 2017). He presents today to follow up on imagining completed in 2019. Based on imaging, we think it is probable these lesions are renal cell carcinoma. Though, there is a chance this is secondary to his colon cancer. In the end, we decided to proceed with repeat CT and follow up with Dr. Villanueva. He will discuss this further with Dr. Villanueva and if they decide, we could schedule a biopsy.     We had an extensive discussion about the significance of a localized, enhancing kidney masses/lesions.  Evaluation of the literature demonstrates that approximately 80% of enhancing renal masses turn out to be kidney cancer upon removal, while 20% are found to be benign.  Although certain features such as size, gender and tumor characteristics can change these risks.  Predominantly cystic masses tend to have a much lower risk of cancer than solid masses.    We discussed the role of renal mass biopsy including the fact that renal mass biopsy is very safe with current techniques (risk of tumor seeding far below 1%).  Though renal mass biopsy is usually quite accurate 90-95% of the time, it can be inaccurate and it can be non diagnostic.  Furthermore, the majority of patients find they just need to proceed to surgery anyway. Renal mass biopsy can be very useful to determine a possible cancer recurrence (if there was a history of a previous) and if the surgical risks are high due to comorbidities or previous extensive surgery.    Plan:  -Complete CT  scan and follow up with Dr. Villanueva.   -Will watch closely for growth, but active surveillance good option for now.    Attestation:  This patient was seen and evaluated by me, with a scribe taking notes.  I have reviewed the note above and agree.  The physical exam and or any procedures were performed by me and the pertinant details are outlined below.       Esau Chairez MD  Department of Urology  [facility]    ---------------------------------------------------------------------------------------------------------------------  ##RENAL MASS INITIAL ENCOUNTER##  Chief Complaint:   Solid Renal Mass     HPI:   Los Huang  is a 64 year old male with a history of bilateral subcentimeter cortical hydrodensities, 1.5 cm indeterminate left superior pole kidney lesion and stable 3.0 cm heterogeneously enhancing exophytic lesion in the inferior right kidney (previously measured 2.2 cm in 06/2017). He presents today to follow up on imagining completed in 06/2019.     Concerning  his renal function, his last SCr is 1.01 in July 2019.     He does have a history of previous abdominal or retroperitoneal surgery.  There is not a family history of renal cell cancer.  The patient does have a history of smoking and currently is not smoking. He denies gross hematuria, bone pain or fatigue.     Notably, he has a history of ureteroscopy at an outside clinic as there was concerned for a spot on his kidney. After the surgery, he required a nephrostomy as there was a hole in his ureter.     Abdominal CT in 06/2019  Unremarkable right adrenal gland. Mild thickening of the left adrenal gland with no discrete nodule. No renal stones or hydronephrosis. Bilateral renal cysts as well as subcentimeter cortical hypodensities that are too small to characterize. 1.5 cm indeterminant lesion in the superior pole of the left kidney, stable. 3.0 cm heterogeneously enhancing exophytic lesion in the inferior pole of the right kidney  previously measuring 2.2 cm on CT exam 6/26/2017. Partially distended urinary bladder. Enlarged prostate with calcifications. Pelvic phleboliths. Prominent fat in the right greater than left inguinal canal. No inguinal lymphadenopathy. No free fluid or free air. No pelvic lymphadenopathy.     Based on notes from Dr. Reyes who he saw in July 2019. He also has a history of metastatic colon cancer initially diagnosed in 2014. He underwent sequential colectomy followed by right hepatectomy at time of diagnosis. 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 Since that time, he has had intermittent but ongoing recurrences. He initially received his care in the [place], [place] region. Notably he has received catheter-based therapy including Y90 as well as multiple TACE procedures which have not fully controlled the liver lesions.       Review of Systems:   Pertinent items are noted in HPI or as below, remainder of complete ROS is negative.      Active Medications:      atomoxetine (STRATTERA) 18 MG capsule, Take 1 capsule (18 mg) by mouth daily, Disp: 30 capsule, Rfl: 1     benztropine (COGENTIN) 1 MG tablet, Take 1 mg by mouth daily, Disp: , Rfl:      cholecalciferol (VITAMIN D3) 1000 units (25 mcg) capsule, Take 1 capsule (1,000 Units) by mouth daily, Disp: 90 capsule, Rfl: 3     clonazePAM (KLONOPIN) 0.5 MG tablet, Take 1 tablet (0.5 mg) by mouth daily as needed for anxiety As needed, Disp: 30 tablet, Rfl: 3     divalproex sodium delayed-release (DEPAKOTE) 500 MG DR tablet, Take 2 tablets (1,000 mg) by mouth At Bedtime, Disp: 60 tablet, Rfl: 3     Homeopathic Products (ARNICARE) GEL, Externally apply 1 Dose topically daily as needed (leg cramps), Disp: , Rfl:      multivitamin (CENTRUM SILVER) tablet, Take 1 tablet by mouth daily, Disp: 90 tablet, Rfl: 3     risperiDONE (RISPERDAL) 4 MG tablet, Take 1 tablet (4 mg) by mouth At Bedtime, Disp: 30 tablet, Rfl: 3      Allergies:   Animal  "dontrell      Past Medical History:  Colon caner   Depression   Schizoaffective disorder   Bipolar 1 disorder   Incisional hernia      Past Surgical History:  Cholecystectomy   Biopsy   Vasectomy   Ureteroscopy with nephrostomy   Orthopedic surgery      Family History:   Sister: osteoporosis, thyroid disease, thyroidectomy       Social History:   The patient was accompanied to the appointment by: Son  Smoking Status: former; 1 pack per day for 34 years; quit in 2003   Smokeless Tobacco: former (snuff); quit in 2011    Alcohol Use: former    Drug use: 8062-2111; Marijuana, Amphetamines, Benzodiazepines, Hashish, LSD, Mescaline, Methaqualone, Methylphenidate, Nitrous oxide, PCP      Physical Exam:   Patient is a 64 year old  male   Vitals: Blood pressure 107/72, pulse 74, temperature 97.8  F (36.6  C), height 1.88 m (6' 2.02\"), weight 93 kg (205 lb).  General Appearance Adult: Alert, no acute distress, oriented  HENT: throat/mouth:normal, good dentition  Lungs: no respiratory distress, or pursed lip breathing  Heart: No obvious jugular venous distension present  Abdomen: Body mass index is 26.31 kg/m .  Musculoskeltal: extremities normal  Skin: no visible suspicious lesions or rashes  Neuro: Alert, oriented, speech and mentation normal  Psych: affect and mood normal  Gait: Normal  : deferred    Imaging:   I have personally reviewed the results of the below imaging studies. The results were discussed with the patient.     Results for orders placed or performed in visit on 06/07/19   CT Abdomen Pelvis w Contrast     Value    Radiologist flags Bilateral renal lesions    Narrative    EXAMINATION: CT ABDOMEN PELVIS W CONTRAST, 6/7/2019 4:08 PM    TECHNIQUE:  Helical CT images from the lung bases through the  symphysis pubis were obtained with IV contrast. Contrast dose: Isovue  370 126cc    COMPARISON: PET/CT 2/22/2019. CT exam 6/26/2017.    HISTORY: met colon cancer f/u; possible liver directed therapy please  assess " liver lesions; Metastatic colon cancer to liver (H); Metastatic  colon cancer to liver (H)    FINDINGS:    Lung bases: No pleural effusions. Bilateral lower lobes dependent  atelectasis. Calcified focus in the right lower lobe.    Abdomen and pelvis: Postoperative changes from right hepatectomy and  right hemicolectomy. At the hepatic dome there is a 2.7 cm hypodense  liver lesion subcapsular, stable, series 3 image 42; hepatic segment 2  measuring 2.9 cm subcapsular, stable, series 3 image 62; hepatic  segment 2 measuring 9 mm, series 3 image 74. Measuring 7 mm, too small  to characterize inferiorly in the left hepatic lobe, series 3 image  128. Patent hepatic vasculature. No biliary tree dilation. Surgically  absent gallbladder.    Homogeneous pancreatic parenchyma. Main pancreatic duct is not  dilated. The spleen is not enlarged. Calcified granulomas in the  spleen.    Unremarkable right adrenal gland. Mild thickening of the left adrenal  gland with no discrete nodule. No renal stones or hydronephrosis.  Bilateral renal cysts as well as subcentimeter cortical hypodensities  that are too small to characterize. 1.5 cm indeterminant lesion in the  superior pole of the left kidney, stable. 3.0 cm heterogeneously  enhancing exophytic lesion in the inferior pole of the right kidney  previously measuring 2.2 cm on CT exam 6/26/2017. Partially distended  urinary bladder. Enlarged prostate with calcifications. Pelvic  phleboliths. Prominent fat in the right greater than left inguinal  canal. No inguinal lymphadenopathy. No free fluid or free air. No  pelvic lymphadenopathy.    No abdominal aortic aneurysm. Atherosclerotic calcifications of the  abdominal aorta and its major branches. Prominent stable du hepatis  lymph nodes with nodularities adjacent to the diaphragmatic crura.  Subcentimeter retroperitoneal and mesenteric lymph nodes, not enlarged  by size criteria. No dilated loops of bowel. No bowel wall  thickening.    Bones: Degenerative changes of the spine, bilateral SI joints and  hips. Scattered Schmorl nodes. Enthesopathic changes off the pelvis  and hips. Scattered sclerotic foci, stable, likely representing benign  bone islands. No convincing aggressive bone lesions.      Impression    IMPRESSION: In this patient with history of metastatic colon cancer:  1. Postoperative changes from right hepatectomy end right  hemicolectomy. Liver lesions as described above concerning for  metastatic disease.  2. Stable indeterminate lesion in the superior pole of the left kidney  comment stable from CT exam 6/26/2017, favoring benign etiology.  Consider further evaluation with renal ultrasound to rule out  hemorrhagic/proteinaceous cyst versus solid lesion.  3. 3 cm heterogeneously enhancing solid lesion in the inferior pole of  the right kidney previously measuring 2.2 cm on CT exam 6/26/2017.  Differentials include renal cell carcinoma versus metastatic disease.  4. Stable prominent du hepatis lymph nodes as well as nonspecific  soft tissue nodules adjacent to the diaphragmatic crura bilaterally.  5. Additional incidental findings as described above.    [Consider Follow Up: Bilateral renal lesions]    This report will be copied to the [facility] to ensure a  provider acknowledges the finding.     BROCK WILLIS MD     Laboratory:   I personally reviewed all applicable laboratory data and went over findings with patient  Significant for:    CBC RESULTS:  Recent Labs   Lab Test 07/25/19  1649 06/07/19  1609 04/17/19  0528 02/14/19  1816   WBC 6.0 5.5 5.7 6.4   HGB 14.7 13.7 14.2 15.3    143* 164 155     BMP RESULTS:  Recent Labs   Lab Test 07/25/19  1649 06/07/19  1609 04/17/19  0528 02/14/19  1816    136 143 138   POTASSIUM 4.1 4.1 3.7 4.0   CHLORIDE 107 103 108 106   CO2 24 30 27 25   ANIONGAP 8 4 8 8   GLC 92 91 77 91   BUN 18 14 22 20   CR 1.01 1.11 1.02 0.86   GFRESTIMATED 77 69 76  >90   GFRESTBLACK 90 80 89 >90   GEOVANNA 8.6 8.0* 8.1* 8.5     Scribe Disclosure:  Martha PRESSLEY, am serving as a scribe to document services personally performed by Esau Chairez MD at this visit, based upon the provider's statements to me. All documentation has been reviewed by the aforementioned provider prior to being entered into the official medical record.     Martha PRESSLEY, a scribe, prepared the chart for today's encounter.     Answers for HPI/ROS submitted by the patient on 11/11/2019   General Symptoms: No  Skin Symptoms: No  HENT Symptoms: Yes  EYE SYMPTOMS: Yes  HEART SYMPTOMS: No  LUNG SYMPTOMS: No  INTESTINAL SYMPTOMS: No  URINARY SYMPTOMS: No  REPRODUCTIVE SYMPTOMS: No  SKELETAL SYMPTOMS: No  BLOOD SYMPTOMS: No  NERVOUS SYSTEM SYMPTOMS: No  MENTAL HEALTH SYMPTOMS: No  Ear pain: No  Ear discharge: No  Hearing loss: No  Tinnitus: No  Nosebleeds: No  Congestion: No  Sinus pain: No  Trouble swallowing: No   Voice hoarseness: No  Mouth sores: No  Sore throat: No  Tooth pain: No  Gum tenderness: Yes  Bleeding gums: No  Change in taste: No  Change in sense of smell: No  Dry mouth: Yes  Hearing aid used: No  Neck lump: No  Eye pain: No  Vision loss: No  Dry eyes: Yes  Watery eyes: No  Eye bulging: No  Double vision: No  Flashing of lights: No  Spots: No  Floaters: No  Redness: No  Crossed eyes: No  Tunnel Vision: No  Yellowing of eyes: No  Eye irritation: No

## 2019-11-14 ENCOUNTER — OFFICE VISIT (OUTPATIENT)
Dept: UROLOGY | Facility: CLINIC | Age: 64
End: 2019-11-14
Payer: MEDICARE

## 2019-11-14 VITALS
WEIGHT: 205 LBS | HEART RATE: 74 BPM | BODY MASS INDEX: 26.31 KG/M2 | TEMPERATURE: 97.8 F | HEIGHT: 74 IN | DIASTOLIC BLOOD PRESSURE: 72 MMHG | SYSTOLIC BLOOD PRESSURE: 107 MMHG

## 2019-11-14 DIAGNOSIS — C78.7 METASTATIC COLON CANCER TO LIVER (H): Primary | ICD-10-CM

## 2019-11-14 DIAGNOSIS — N28.89 RENAL MASS: ICD-10-CM

## 2019-11-14 DIAGNOSIS — C18.9 METASTATIC COLON CANCER TO LIVER (H): Primary | ICD-10-CM

## 2019-11-14 ASSESSMENT — MIFFLIN-ST. JEOR: SCORE: 1779.94

## 2019-11-14 NOTE — PATIENT INSTRUCTIONS
Schedule appointment for CT scan.    Follow up with Medical Oncology.    It was a pleasure meeting with you today.  Thank you for allowing me and my team the privilege of caring for you today.  YOU are the reason we are here, and I truly hope we provided you with the excellent service you deserve.  Please let us know if there is anything else we can do for you so that we can be sure you are leaving completely satisfied with your care experience.        Domonique Benavides, CMA

## 2019-11-14 NOTE — LETTER
2019       RE: Los Huang  [address]  [place] [address]     Dear Colleague,    Thank you for referring your patient, Los Huang, to the [facility] AND [facility] PROST[facility] UROLOGIC [facility] at [facility]. Please see a copy of my visit note below.      Urology Clinic    Esau Chairez MD  Date of Service: 2019     Name: Los Huang  MRN: [id number]  Age: 64 year old  : 1955  Referring provider: Patrice Srivastava     Assessment:  Los Huang  is a 64 year old male with a history of bilateral subcentimeter cortical hydrodensities, 1.5 cm indeterminate left superior pole kidney lesion and stable 3.0 cm heterogeneously enhancing exophytic lesion in the inferior right kidney (previously measured 2.2 cm in 2017). He presents today to follow up on imagining completed in 2019. Based on imaging, we think it is probable these lesions are renal cell carcinoma. Though, there is a chance this is secondary to his colon cancer. In the end, we decided to proceed with repeat CT and follow up with Dr. Villanueva. He will discuss this further with Dr. Villanueva and if they decide, we could schedule a biopsy.     We had an extensive discussion about the significance of a localized, enhancing kidney masses/lesions.  Evaluation of the literature demonstrates that approximately 80% of enhancing renal masses turn out to be kidney cancer upon removal, while 20% are found to be benign.  Although certain features such as size, gender and tumor characteristics can change these risks.  Predominantly cystic masses tend to have a much lower risk of cancer than solid masses.    We discussed the role of renal mass biopsy including the fact that renal mass biopsy is very safe with current techniques (risk of tumor seeding far below 1%).  Though renal mass biopsy is usually quite accurate 90-95% of the time, it can be inaccurate and it  can be non diagnostic.  Furthermore, the majority of patients find they just need to proceed to surgery anyway. Renal mass biopsy can be very useful to determine a possible cancer recurrence (if there was a history of a previous) and if the surgical risks are high due to comorbidities or previous extensive surgery.    Plan:  -Complete CT scan and follow up with Dr. Villanueva.   -Will watch closely for growth, but active surveillance good option for now.    Attestation:  This patient was seen and evaluated by me, with a scribe taking notes.  I have reviewed the note above and agree.  The physical exam and or any procedures were performed by me and the pertinant details are outlined below.       Esau Chairez MD  Department of Urology  [facility]    ---------------------------------------------------------------------------------------------------------------------  ##RENAL MASS INITIAL ENCOUNTER##  Chief Complaint:   Solid Renal Mass     HPI:   Los Huang  is a 64 year old male with a history of bilateral subcentimeter cortical hydrodensities, 1.5 cm indeterminate left superior pole kidney lesion and stable 3.0 cm heterogeneously enhancing exophytic lesion in the inferior right kidney (previously measured 2.2 cm in 06/2017). He presents today to follow up on imagining completed in 06/2019.     Concerning  his renal function, his last SCr is 1.01 in July 2019.     He does have a history of previous abdominal or retroperitoneal surgery.  There is not a family history of renal cell cancer.  The patient does have a history of smoking and currently is not smoking. He denies gross hematuria, bone pain or fatigue.     Notably, he has a history of ureteroscopy at an outside clinic as there was concerned for a spot on his kidney. After the surgery, he required a nephrostomy as there was a hole in his ureter.     Abdominal CT in 06/2019  Unremarkable right adrenal gland. Mild thickening of the left adrenal  gland with no discrete nodule. No renal stones or hydronephrosis. Bilateral renal cysts as well as subcentimeter cortical hypodensities that are too small to characterize. 1.5 cm indeterminant lesion in the superior pole of the left kidney, stable. 3.0 cm heterogeneously enhancing exophytic lesion in the inferior pole of the right kidney previously measuring 2.2 cm on CT exam 6/26/2017. Partially distended urinary bladder. Enlarged prostate with calcifications. Pelvic phleboliths. Prominent fat in the right greater than left inguinal canal. No inguinal lymphadenopathy. No free fluid or free air. No pelvic lymphadenopathy.     Based on notes from Dr. Reyes who he saw in July 2019. He also has a history of metastatic colon cancer initially diagnosed in 2014. He underwent sequential colectomy followed by right hepatectomy at time of diagnosis. 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 Since that time, he has had intermittent but ongoing recurrences. He initially received his care in the [place], [place] region. Notably he has received catheter-based therapy including Y90 as well as multiple TACE procedures which have not fully controlled the liver lesions.       Review of Systems:   Pertinent items are noted in HPI or as below, remainder of complete ROS is negative.      Active Medications:      atomoxetine (STRATTERA) 18 MG capsule, Take 1 capsule (18 mg) by mouth daily, Disp: 30 capsule, Rfl: 1     benztropine (COGENTIN) 1 MG tablet, Take 1 mg by mouth daily, Disp: , Rfl:      cholecalciferol (VITAMIN D3) 1000 units (25 mcg) capsule, Take 1 capsule (1,000 Units) by mouth daily, Disp: 90 capsule, Rfl: 3     clonazePAM (KLONOPIN) 0.5 MG tablet, Take 1 tablet (0.5 mg) by mouth daily as needed for anxiety As needed, Disp: 30 tablet, Rfl: 3     divalproex sodium delayed-release (DEPAKOTE) 500 MG DR tablet, Take 2 tablets (1,000 mg) by mouth At Bedtime, Disp: 60 tablet, Rfl: 3      "Homeopathic Products (ARNICARE) GEL, Externally apply 1 Dose topically daily as needed (leg cramps), Disp: , Rfl:      multivitamin (CENTRUM SILVER) tablet, Take 1 tablet by mouth daily, Disp: 90 tablet, Rfl: 3     risperiDONE (RISPERDAL) 4 MG tablet, Take 1 tablet (4 mg) by mouth At Bedtime, Disp: 30 tablet, Rfl: 3      Allergies:   Animal dander      Past Medical History:  Colon caner   Depression   Schizoaffective disorder   Bipolar 1 disorder   Incisional hernia      Past Surgical History:  Cholecystectomy   Biopsy   Vasectomy   Ureteroscopy with nephrostomy   Orthopedic surgery      Family History:   Sister: osteoporosis, thyroid disease, thyroidectomy       Social History:   The patient was accompanied to the appointment by: Son  Smoking Status: former; 1 pack per day for 34 years; quit in 2003   Smokeless Tobacco: former (snuff); quit in 2011    Alcohol Use: former    Drug use: 1036-3912; Marijuana, Amphetamines, Benzodiazepines, Hashish, LSD, Mescaline, Methaqualone, Methylphenidate, Nitrous oxide, PCP      Physical Exam:   Patient is a 64 year old  male   Vitals: Blood pressure 107/72, pulse 74, temperature 97.8  F (36.6  C), height 1.88 m (6' 2.02\"), weight 93 kg (205 lb).  General Appearance Adult: Alert, no acute distress, oriented  HENT: throat/mouth:normal, good dentition  Lungs: no respiratory distress, or pursed lip breathing  Heart: No obvious jugular venous distension present  Abdomen: Body mass index is 26.31 kg/m .  Musculoskeltal: extremities normal  Skin: no visible suspicious lesions or rashes  Neuro: Alert, oriented, speech and mentation normal  Psych: affect and mood normal  Gait: Normal  : deferred    Imaging:   I have personally reviewed the results of the below imaging studies. The results were discussed with the patient.     Results for orders placed or performed in visit on 06/07/19   CT Abdomen Pelvis w Contrast     Value    Radiologist flags Bilateral renal lesions    Narrative    " EXAMINATION: CT ABDOMEN PELVIS W CONTRAST, 6/7/2019 4:08 PM    TECHNIQUE:  Helical CT images from the lung bases through the  symphysis pubis were obtained with IV contrast. Contrast dose: Isovue  370 126cc    COMPARISON: PET/CT 2/22/2019. CT exam 6/26/2017.    HISTORY: met colon cancer f/u; possible liver directed therapy please  assess liver lesions; Metastatic colon cancer to liver (H); Metastatic  colon cancer to liver (H)    FINDINGS:    Lung bases: No pleural effusions. Bilateral lower lobes dependent  atelectasis. Calcified focus in the right lower lobe.    Abdomen and pelvis: Postoperative changes from right hepatectomy and  right hemicolectomy. At the hepatic dome there is a 2.7 cm hypodense  liver lesion subcapsular, stable, series 3 image 42; hepatic segment 2  measuring 2.9 cm subcapsular, stable, series 3 image 62; hepatic  segment 2 measuring 9 mm, series 3 image 74. Measuring 7 mm, too small  to characterize inferiorly in the left hepatic lobe, series 3 image  128. Patent hepatic vasculature. No biliary tree dilation. Surgically  absent gallbladder.    Homogeneous pancreatic parenchyma. Main pancreatic duct is not  dilated. The spleen is not enlarged. Calcified granulomas in the  spleen.    Unremarkable right adrenal gland. Mild thickening of the left adrenal  gland with no discrete nodule. No renal stones or hydronephrosis.  Bilateral renal cysts as well as subcentimeter cortical hypodensities  that are too small to characterize. 1.5 cm indeterminant lesion in the  superior pole of the left kidney, stable. 3.0 cm heterogeneously  enhancing exophytic lesion in the inferior pole of the right kidney  previously measuring 2.2 cm on CT exam 6/26/2017. Partially distended  urinary bladder. Enlarged prostate with calcifications. Pelvic  phleboliths. Prominent fat in the right greater than left inguinal  canal. No inguinal lymphadenopathy. No free fluid or free air. No  pelvic lymphadenopathy.    No  abdominal aortic aneurysm. Atherosclerotic calcifications of the  abdominal aorta and its major branches. Prominent stable du hepatis  lymph nodes with nodularities adjacent to the diaphragmatic crura.  Subcentimeter retroperitoneal and mesenteric lymph nodes, not enlarged  by size criteria. No dilated loops of bowel. No bowel wall thickening.    Bones: Degenerative changes of the spine, bilateral SI joints and  hips. Scattered Schmorl nodes. Enthesopathic changes off the pelvis  and hips. Scattered sclerotic foci, stable, likely representing benign  bone islands. No convincing aggressive bone lesions.      Impression    IMPRESSION: In this patient with history of metastatic colon cancer:  1. Postoperative changes from right hepatectomy end right  hemicolectomy. Liver lesions as described above concerning for  metastatic disease.  2. Stable indeterminate lesion in the superior pole of the left kidney  comment stable from CT exam 6/26/2017, favoring benign etiology.  Consider further evaluation with renal ultrasound to rule out  hemorrhagic/proteinaceous cyst versus solid lesion.  3. 3 cm heterogeneously enhancing solid lesion in the inferior pole of  the right kidney previously measuring 2.2 cm on CT exam 6/26/2017.  Differentials include renal cell carcinoma versus metastatic disease.  4. Stable prominent du hepatis lymph nodes as well as nonspecific  soft tissue nodules adjacent to the diaphragmatic crura bilaterally.  5. Additional incidental findings as described above.    [Consider Follow Up: Bilateral renal lesions]    This report will be copied to the [facility] to ensure a  provider acknowledges the finding.     BROCK WILLIS MD     Laboratory:   I personally reviewed all applicable laboratory data and went over findings with patient  Significant for:    CBC RESULTS:  Recent Labs   Lab Test 07/25/19  1649 06/07/19  1609 04/17/19  0528 02/14/19  1816   WBC 6.0 5.5 5.7 6.4   HGB 14.7  13.7 14.2 15.3    143* 164 155     BMP RESULTS:  Recent Labs   Lab Test 07/25/19  1649 06/07/19  1609 04/17/19  0528 02/14/19  1816    136 143 138   POTASSIUM 4.1 4.1 3.7 4.0   CHLORIDE 107 103 108 106   CO2 24 30 27 25   ANIONGAP 8 4 8 8   GLC 92 91 77 91   BUN 18 14 22 20   CR 1.01 1.11 1.02 0.86   GFRESTIMATED 77 69 76 >90   GFRESTBLACK 90 80 89 >90   GEOVANNA 8.6 8.0* 8.1* 8.5     Scribe Disclosure:  Martha PRESSLEY, am serving as a scribe to document services personally performed by Esau Chairez MD at this visit, based upon the provider's statements to me. All documentation has been reviewed by the aforementioned provider prior to being entered into the official medical record.     Martha PRESSLEY, a scribe, prepared the chart for today's encounter.     Answers for HPI/ROS submitted by the patient on 11/11/2019   General Symptoms: No  Skin Symptoms: No  HENT Symptoms: Yes  EYE SYMPTOMS: Yes  HEART SYMPTOMS: No  LUNG SYMPTOMS: No  INTESTINAL SYMPTOMS: No  URINARY SYMPTOMS: No  REPRODUCTIVE SYMPTOMS: No  SKELETAL SYMPTOMS: No  BLOOD SYMPTOMS: No  NERVOUS SYSTEM SYMPTOMS: No  MENTAL HEALTH SYMPTOMS: No  Ear pain: No  Ear discharge: No  Hearing loss: No  Tinnitus: No  Nosebleeds: No  Congestion: No  Sinus pain: No  Trouble swallowing: No   Voice hoarseness: No  Mouth sores: No  Sore throat: No  Tooth pain: No  Gum tenderness: Yes  Bleeding gums: No  Change in taste: No  Change in sense of smell: No  Dry mouth: Yes  Hearing aid used: No  Neck lump: No  Eye pain: No  Vision loss: No  Dry eyes: Yes  Watery eyes: No  Eye bulging: No  Double vision: No  Flashing of lights: No  Spots: No  Floaters: No  Redness: No  Crossed eyes: No  Tunnel Vision: No  Yellowing of eyes: No  Eye irritation: No      Again, thank you for allowing me to participate in the care of your patient.      Sincerely,    Esau Chairez MD

## 2019-12-09 DIAGNOSIS — C18.9 METASTATIC COLON CANCER TO LIVER (H): Primary | ICD-10-CM

## 2019-12-09 DIAGNOSIS — C78.7 METASTATIC COLON CANCER TO LIVER (H): Primary | ICD-10-CM

## 2019-12-13 ENCOUNTER — ANCILLARY PROCEDURE (OUTPATIENT)
Dept: CT IMAGING | Facility: CLINIC | Age: 64
End: 2019-12-13
Attending: INTERNAL MEDICINE
Payer: MEDICARE

## 2019-12-13 DIAGNOSIS — C18.9 METASTATIC COLON CANCER TO LIVER (H): ICD-10-CM

## 2019-12-13 DIAGNOSIS — C78.7 METASTATIC COLON CANCER TO LIVER (H): ICD-10-CM

## 2019-12-13 LAB
ALBUMIN SERPL-MCNC: 4.1 G/DL (ref 3.4–5)
ALP SERPL-CCNC: 85 U/L (ref 40–150)
ALT SERPL W P-5'-P-CCNC: 56 U/L (ref 0–70)
ANION GAP SERPL CALCULATED.3IONS-SCNC: 4 MMOL/L (ref 3–14)
AST SERPL W P-5'-P-CCNC: 18 U/L (ref 0–45)
BASOPHILS # BLD AUTO: 0.1 10E9/L (ref 0–0.2)
BASOPHILS NFR BLD AUTO: 0.8 %
BILIRUB SERPL-MCNC: 0.4 MG/DL (ref 0.2–1.3)
BUN SERPL-MCNC: 19 MG/DL (ref 7–30)
CALCIUM SERPL-MCNC: 9.1 MG/DL (ref 8.5–10.1)
CEA SERPL-MCNC: <0.5 UG/L (ref 0–2.5)
CHLORIDE SERPL-SCNC: 109 MMOL/L (ref 94–109)
CO2 SERPL-SCNC: 28 MMOL/L (ref 20–32)
CREAT BLD-MCNC: 1.1 MG/DL (ref 0.66–1.25)
CREAT SERPL-MCNC: 0.9 MG/DL (ref 0.66–1.25)
DIFFERENTIAL METHOD BLD: NORMAL
EOSINOPHIL # BLD AUTO: 0.3 10E9/L (ref 0–0.7)
EOSINOPHIL NFR BLD AUTO: 4.8 %
ERYTHROCYTE [DISTWIDTH] IN BLOOD BY AUTOMATED COUNT: 13 % (ref 10–15)
GFR SERPL CREATININE-BSD FRML MDRD: 67 ML/MIN/{1.73_M2}
GFR SERPL CREATININE-BSD FRML MDRD: 89 ML/MIN/{1.73_M2}
GLUCOSE SERPL-MCNC: 110 MG/DL (ref 70–99)
HCT VFR BLD AUTO: 45.1 % (ref 40–53)
HGB BLD-MCNC: 15.2 G/DL (ref 13.3–17.7)
IMM GRANULOCYTES # BLD: 0 10E9/L (ref 0–0.4)
IMM GRANULOCYTES NFR BLD: 0.2 %
LYMPHOCYTES # BLD AUTO: 1.7 10E9/L (ref 0.8–5.3)
LYMPHOCYTES NFR BLD AUTO: 26.8 %
MCH RBC QN AUTO: 30 PG (ref 26.5–33)
MCHC RBC AUTO-ENTMCNC: 33.7 G/DL (ref 31.5–36.5)
MCV RBC AUTO: 89 FL (ref 78–100)
MONOCYTES # BLD AUTO: 0.7 10E9/L (ref 0–1.3)
MONOCYTES NFR BLD AUTO: 11.5 %
NEUTROPHILS # BLD AUTO: 3.5 10E9/L (ref 1.6–8.3)
NEUTROPHILS NFR BLD AUTO: 55.9 %
NRBC # BLD AUTO: 0 10*3/UL
NRBC BLD AUTO-RTO: 0 /100
PLATELET # BLD AUTO: 167 10E9/L (ref 150–450)
POTASSIUM SERPL-SCNC: 4 MMOL/L (ref 3.4–5.3)
PROT SERPL-MCNC: 7.4 G/DL (ref 6.8–8.8)
RBC # BLD AUTO: 5.07 10E12/L (ref 4.4–5.9)
SODIUM SERPL-SCNC: 140 MMOL/L (ref 133–144)
WBC # BLD AUTO: 6.3 10E9/L (ref 4–11)

## 2019-12-13 PROCEDURE — 82378 CARCINOEMBRYONIC ANTIGEN: CPT | Performed by: INTERNAL MEDICINE

## 2019-12-13 RX ORDER — IOPAMIDOL 755 MG/ML
126 INJECTION, SOLUTION INTRAVASCULAR ONCE
Status: COMPLETED | OUTPATIENT
Start: 2019-12-13 | End: 2019-12-13

## 2019-12-13 RX ADMIN — IOPAMIDOL 126 ML: 755 INJECTION, SOLUTION INTRAVASCULAR at 16:29

## 2020-01-03 ENCOUNTER — OFFICE VISIT (OUTPATIENT)
Dept: PSYCHIATRY | Facility: CLINIC | Age: 65
End: 2020-01-03
Attending: PSYCHIATRY & NEUROLOGY
Payer: MEDICARE

## 2020-01-03 VITALS
BODY MASS INDEX: 26.95 KG/M2 | SYSTOLIC BLOOD PRESSURE: 115 MMHG | WEIGHT: 210 LBS | DIASTOLIC BLOOD PRESSURE: 80 MMHG | HEART RATE: 76 BPM

## 2020-01-03 DIAGNOSIS — F90.1 ATTENTION DEFICIT HYPERACTIVITY DISORDER (ADHD), PREDOMINANTLY HYPERACTIVE TYPE: ICD-10-CM

## 2020-01-03 DIAGNOSIS — F31.9 BIPOLAR 1 DISORDER (H): ICD-10-CM

## 2020-01-03 PROCEDURE — G0463 HOSPITAL OUTPT CLINIC VISIT: HCPCS | Mod: ZF

## 2020-01-03 RX ORDER — ATOMOXETINE 18 MG/1
18 CAPSULE ORAL DAILY
Qty: 30 CAPSULE | Refills: 1 | Status: SHIPPED | OUTPATIENT
Start: 2020-01-03 | End: 2020-03-13

## 2020-01-03 RX ORDER — BENZTROPINE MESYLATE 0.5 MG/1
0.5 TABLET ORAL DAILY
Qty: 30 TABLET | Refills: 1 | Status: SHIPPED | OUTPATIENT
Start: 2020-01-03 | End: 2020-01-31

## 2020-01-03 RX ORDER — CLONAZEPAM 0.5 MG/1
0.5 TABLET ORAL DAILY PRN
Qty: 30 TABLET | Refills: 1 | Status: SHIPPED | OUTPATIENT
Start: 2020-01-03 | End: 2020-01-31

## 2020-01-03 RX ORDER — RISPERIDONE 4 MG/1
4 TABLET ORAL AT BEDTIME
Qty: 30 TABLET | Refills: 1 | Status: SHIPPED | OUTPATIENT
Start: 2020-01-03 | End: 2020-01-31

## 2020-01-03 ASSESSMENT — PAIN SCALES - GENERAL: PAINLEVEL: NO PAIN (0)

## 2020-01-03 NOTE — PROGRESS NOTES
"     [facility]  Psychiatry Clinic  PROGRESS NOTE     Date of initial Diagnostic Assessment (DA) is 3/18/19 and most recent Transfer of Care DA is 08/28/19.    CARE TEAM:  PCP- Babar Mckinney    Oncology- [facility], Therapist- ARGELIA Virgen and Urology-[facility].              Los Huang is a 64 year old male who prefers the name \"Solo\" & pronouns he, him, his, himself.      Pertinent Background:  This patient first experienced mental health issues around the age of 28 and has received treatment for Bipolar I Disorder with severe manic episodes accompanied by psychosis.  Notably, had a major first psychotic carlos in 1983, subsequent to which he took Navane until 1994.  That is also the year that he moved back to [place] (where he grew up and attended college) from [place] where he'd lived for four years and had  his wife in 1992 after 16 years of marriage.  Retained joint custody of their two kids.  Back in [place], lived with his mother until her transition into a nursing home in 2017.  Has struggled with what he describes as having been \"dozens\" of psychiatric hospitalizations, estimating that he's spent approximately two cumulative years of his life on an inpatient psychiatric unit.  Has been on disability since 2001.  Had a 7-year stent on Clozaril without hospitalizations from [id number] however this medication unfortunately had to be discontinued after a notable drop in WBC's occurred.  In 2014 was diagnosed with stage IV colon cancer and was told that his life expectancy would be five years, a milestone which will be reached in May 2019.  Continues to work closely with oncology.  In December 2018 states he sought out a three week psychiatric admission due to feeling trapped and depressed in his assisted living facility, back in [place], subsequent to which he has been brought to live in [place] by his two adult children who live in the [facility]. 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    Psych critical item history includes suicide attempt [multiple], psychosis [sxs include paranoia, IOR, AH, thought insertion], mutiple psychotropic trials, trauma hx, psych hosp (>5) and SUBSTANCE USE: cannabis and acid in the 1970s. Note: no specific traumatic anniversary dates, but is often hospitalized for carlos around Thanksgiving and Chloe holidays.    INTERIM HISTORY      [4, 4]   The patient reports good treatment adherence.  History was provided by the patient who was a fair but often overinclusive historian.    The last visit ended with no change to the med regimen.   Since the last visit:   Solo reports things ar good though he spent [place] and [place] alone. He is lonely at home, where he feels there is a cultural barrier between him and other people in the building, many of whom are Omani. He is not aware of any senior centers around but may be interested in checking them out. He states that for fun he \"cooks and does dishes.\" He is a creature of habit, eating almon, chicken, vegetables, frozen pizzas every now and again.     Mood is \"unpredictable sometimes. I get angry at my kids because they ignore me. They're entitled to ignore me but my anger doesn't manifest itself in too bad a way.\" He recently texted his son something inflammatory to bother him. He feels sometimes that they don't have empathy toward him. He moved up here because he was mad at his sisters for putting him in the [place]    He reports his dentist told him his dry mouth is likely contributing to cavities and gum disease. He thinks the Cogentin is to blame, but he has to take that for muscle stiffness. He is interested in switching antipsychotics.    RECENT PSYCH ROS:   Depression:  poor concentration /memory and indecisiveness  Elevated:  none  Psychosis:  disorganized speech " "(difficulty communicating) at times  Anxiety:  excessive worry and feeling fearful  Panic Attack:  none  Trauma Related:  none  Dysregulation:  none  Eating Disorder: no     Pertinent Negative Symptoms:  NO suicidal and violent ideation, aggression, hallucinations and carlos    RECENT SUBSTANCE USE:     ALCOHOL- 1-2 occasions 1-2 times a month   TOBACCO- none  CAFFEINE- 3-4 cups of coffee per day  OPIOIDS- none         NARCAN KIT- N/A        CANNABIS- none          OTHER ILLICIT DRUGS- none    CURRENT SOCIAL HISTORY:  FINANCIAL SUPPORT- on disability since 2001, also family, savings  CHILDREN- a son Juventino (40 years old) who works for the [employer], and a daughter Yuliet (35 years old) who works for \"[employer]\" - also has two granddaughters  LIVING SITUATION- apartment in [place]  SOCIAL/ SPIRITUAL SUPPORT- daughter, son, grand kids, sisters, artistic/avocational pursuits, i.e. 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 Resident transition. Cogentin decreased.  10/30: Trazodone discontinued to target dry mouth. Patient had increased Cogentin back to previous dose. Sleep Medicine referral placed for waking up gasping at night.  1/3/2020:     PAST MED TRIALS        See last Diagnostic Assessment  MEDICAL / SURGICAL HISTORY          Patient Active Problem List   Diagnosis     Metastatic colon cancer to liver (H)     Bipolar 1 disorder (H)       Major Surgery-   Past Surgical History:   Procedure Laterality Date     ABDOMEN SURGERY       BIOPSY       CHOLECYSTECTOMY       COLONOSCOPY       GENITOURINARY SURGERY  1997    Ureteroscopy gone awry     ORTHOPEDIC SURGERY         MEDICAL REVIEW OF SYSTEMS    [2, 10]     A comprehensive review of " "systems was performed and is negative other than noted in the HPI.    ALLERGY       Animal dander  MEDICATIONS        Current Outpatient Medications   Medication Sig Dispense Refill     atomoxetine (STRATTERA) 18 MG capsule Take 1 capsule (18 mg) by mouth daily 30 capsule 1     benztropine (COGENTIN) 1 MG tablet Take 1 mg by mouth daily       cholecalciferol (VITAMIN D3) 1000 units (25 mcg) capsule Take 1 capsule (1,000 Units) by mouth daily 90 capsule 3     clonazePAM (KLONOPIN) 0.5 MG tablet Take 1 tablet (0.5 mg) by mouth daily as needed for anxiety As needed 30 tablet 3     divalproex sodium delayed-release (DEPAKOTE) 500 MG DR tablet Take 2 tablets (1,000 mg) by mouth At Bedtime 60 tablet 3     Homeopathic Products (ARNICARE) GEL Externally apply 1 Dose topically daily as needed (leg cramps)       multivitamin (CENTRUM SILVER) tablet Take 1 tablet by mouth daily 90 tablet 3     risperiDONE (RISPERDAL) 4 MG tablet Take 1 tablet (4 mg) by mouth At Bedtime 30 tablet 3     VITALS      [3, 3]   /80   Pulse 76   Wt 95.3 kg (210 lb)   BMI 26.95 kg/m     MENTAL STATUS EXAM      [9, 14 cog gs]     Alertness: alert  and oriented  Appearance: well groomed and neatly dressed  Behavior/Demeanor: cooperative and pleasant, with good  eye contact   Speech: Slow, frequent pauses, Southern accent  Language: no obvious problem  Psychomotor: normal or unremarkable  Mood: \"pretty good\"  Affect: restricted; was congruent to mood; was congruent to content, no grandiosity today  Thought Process/Associations: overinclusive  and circumstantial at times  Thought Content:  Reports none;  Denies suicidal and violent ideation and paranoid ideation  Perception:  Reports none;  Denies auditory hallucinations and visual hallucinations  Insight: limited  Judgment: fair and adequate for safety  Cognition: (6) does  appear grossly intact; formal cognitive testing was not done  Gait and Station: unremarkable    LABS and DATA     AIMS:  " due at next appointment    PHQ9 TODAY = 3  PHQ-9 SCORE 5/31/2019 8/28/2019 10/30/2019   PHQ-9 Total Score 3 3 5     ANTIPSYCHOTIC LABS  [glu, A1C, lipids (eddie LDL), liver enzymes, WBC, ANEU, Hgb, plts]  q12 mo  Recent Labs   Lab Test 12/13/19  1558 07/25/19  1649 06/07/19  1609 04/17/19  0528   * 92 91 77   A1C  --  5.5  --   --      Recent Labs   Lab Test 07/25/19 1649 07/18/19  1216   CHOL 186 199   TRIG 181* 152*   * 129*   HDL 40 40     Recent Labs   Lab Test 12/13/19  1558 07/25/19  1649 06/07/19  1609 04/17/19  0528   AST 18 21 29 26   ALT 56 51 54 49   ALKPHOS 85 75 87 79     Recent Labs   Lab Test 12/13/19  1558 07/25/19  1649 06/07/19  1609 04/17/19  0528 02/14/19  1816   WBC 6.3 6.0 5.5 5.7 6.4   ANEU 3.5 3.1  --  2.9 4.1   HGB 15.2 14.7 13.7 14.2 15.3    162 143* 164 155     Recent Labs   Lab Test 12/13/19  1627 12/13/19  1558 07/25/19  1649 06/07/19  1609   CR  --  0.90 1.01 1.11   GFRESTIMATED 67 89 77 69       VALPROIC ACID LABS     [liver enzymes, WBC, ANEU, Hgb, plts, +ammonia]  q6-12 mo  Recent Labs   Lab Test 07/25/19  1649 04/17/19  1239   ACE 59 47*       PSYCHOTROPIC DRUG INTERACTIONS     Serotonin Modulators may enhance the adverse/toxic effect of Antipsychotic Agents. Specifically, serotonin modulators may enhance dopamine blockade, possibly increasing the risk for neuroleptic malignant syndrome. Antipsychotic Agents may enhance the serotonergic effect of Serotonin Modulators. This could result in serotonin syndrome.     CNS Depressants may enhance the adverse/toxic effect of other CNS Depressants.     Valproate Products may enhance the adverse/toxic effect of RisperiDONE. Generalized edema has developed.     ClonazePAM may diminish the therapeutic effect of Valproate Products. Valproate Products may decrease the serum concentration of ClonazePAM. ClonazePAM may increase the serum concentration of Valproate Products.     Anticholinergic Agents may enhance the  adverse/toxic effect of other Anticholinergic Agents.     QT-prolonging Agents may enhance the QTc-prolonging effect of QT-prolonging Agents.     MANAGEMENT:  Monitoring for adverse effects, routine vitals, routine labs, using lowest therapeutic dose of [Klonopin] and patient is aware of risks    RISK STATEMENT for SAFETY     Los Huang did not appear to be an imminent safety risk to self or others.    PSYCHIATRIC DIAGNOSIS     Bipolar Disorder, type I, most recent episode depressed, currently in remission  R/o ADHD     ASSESSMENT      [m2, h3]     TODAY Solo Huang, 64M with PMHx including stage IV colon cancer with liver mets s/p surgery and chemotherapy, returns to the [facility] for ongoing management of Bipolar I. Had clinic intake here in March of this year, followed by a hospitalization for carlos during which his medications were adjusted; he has stabilized since then, though some of his writing in [employer] messages is odd at times. Currently, he is euthymic and organized, without safety concerns. He did not go for the sleep study that was recommended. He does voice concern about his Cogentin worsening dry mouth/dental problems. It is possible that he could require a lower dose of Cogentin at a lower dose of Risperdal, but the latter would have to be decreased slowly while watching for emerging psychotic symptoms. Would also consider Klonopin taper given patient's age and fall risk. If possible, could increase Depakote dose to compensate. He is also interested in using melatonin OTC and was instructed in its use.     PLAN      [m2, h3]     1) PSYCHOTROPIC MEDICATIONS:   Continue Depakote DR 1000 mg PO qHS.  Decrease Risperidone to 3.5 mg at bedtime.  Continue Strattera 18 mg daily.  Continue clonazepam 0.5 mg daily PRN.  Continue benztropine 0.5 mg PO qHS   Take melatonin 1 mg PO daily with dinner.    2) [place]  was checked today: indicates expected use in accordance with  chart review/patient report.    3) THERAPY:    continue    4) NEXT DUE:    Labs- CMP, CBC, lipids, HbA1c, VPA level ordered and pending  EKG- PRN  Rating Scales- AIMS due    5) REFERRALS:    No Referrals needed     6) RTC: 1 month    7) CRISIS NUMBERS:   Provided routinely in AVS   CRISIS TEXT LINE: Text [id number] from anywhere in [place], anytime, any crisis 24/7;  OR SEE www.crisistextline.org  ONLY if a CARL PT: [facility] [phone] (clinic), [phone] (after hours)     TREATMENT RISK STATEMENT:  The risks, benefits, alternatives and potential adverse effects have been discussed and are understood by the pt. The pt understands the risks of using street drugs or alcohol. There are no medical contraindications, the pt agrees to treatment with the ability to do so. The pt knows to call the clinic for any problems or to access emergency care if needed.  Medical and substance use concerns are documented above.  Psychotropic drug interaction check was done, including changes made today.    PROVIDER: Rahel Colon MD    Patient staffed in clinic with Dr. Pardo who will sign the note.  Supervisor is Dr. Munguia.    Supervisor Attestation:  I saw the patient with the resident, and participated in key portions of the service, including the mental status examination and developing the plan of care. I reviewed key portions of the history with the resident. I agree with the findings and plan as documented in this note.    Mignon Pardo MD       Additional Progress Note...

## 2020-01-03 NOTE — PATIENT INSTRUCTIONS
-[email or web address]  -Melatonin: you can take 1 mg every day with dinner. Do not take it as needed.   -Decrease Cogentin to 0.5 mg at bedtime.  -Please come back in one month.    Thank you for coming to the PSYCHIATRY CLINIC.    Lab Testing:  If you had lab testing today and your results are reassuring or normal they will be mailed to you or sent through [facility] within 7 days.   If the lab tests need quick action we will call you with the results.  The phone number we will call with results is # [phone] (home) . If this is not the best number please call our clinic and change the number.    Medication Refills:  If you need any refills please call your pharmacy and they will contact us. Our fax number for refills is [phone]. Please allow three business for refill processing.   If you need to  your refill at a new pharmacy, please contact the new pharmacy directly. The new pharmacy will help you get your medications transferred.     Scheduling:  If you have any concerns about today's visit or wish to schedule another appointment please call our office during normal business hours [phone] (8-5:00 M-F)    Contact Us:  Please call [phone] during business hours (8-5:00 M-F).  If after clinic hours, or on the weekend, please call  [phone].    Financial Assistance [phone]  [employer] Billing [phone]  [place] Billing Office, [facility]: [phone]  [facility] Billing [phone]  Medical Records [phone]      MENTAL HEALTH CRISIS NUMBERS:  [facility]:   [facility] - [phone]   Crisis Residence [facility] - [phone]   Walk-In Counseling [facility] - [phone]   COPE 24/7 [place] Mobile Team for Adults - [[phone]]; Child - [[phone]]        [facility]:   [facility] - [phone]   Walk-in counseling [facility] [phone]    Walk-in counseling [facility] - [phone]   Crisis Residence [facility] - [phone]   Urgent Care Adult Mental Health:   --Drop-in, 24/7 crisis line, [facility] Mobile Team [[phone]]    CRISIS TEXT LINE: Text [id number] from anywhere, anytime, any crisis 24/7;    OR SEE www.crisistextline.org     Poison Control Center - [phone]    CHILD: [facility] needs assessment team - [phone]     [facility] Lifeline - [phone]; or [employer] Lifeline - [phone]    If you have a medical emergency please call 911or go to the nearest ER.                    _____________________________________________    Again thank you for choosing PSYCHIATRY CLINIC and please let us know how we can best partner with you to improve you and your family's health.  You may be receiving a survey in the mail regarding this appointment. We would love to have your feedback, both positive and negative, so please fill out the survey and return it using the provided envelope. The survey is done by an external company, so your answers are anonymous.

## 2020-01-16 ENCOUNTER — ONCOLOGY VISIT (OUTPATIENT)
Dept: ONCOLOGY | Facility: CLINIC | Age: 65
End: 2020-01-16
Attending: INTERNAL MEDICINE
Payer: MEDICARE

## 2020-01-16 VITALS
HEIGHT: 74 IN | SYSTOLIC BLOOD PRESSURE: 102 MMHG | RESPIRATION RATE: 15 BRPM | OXYGEN SATURATION: 98 % | DIASTOLIC BLOOD PRESSURE: 69 MMHG | WEIGHT: 209.2 LBS | HEART RATE: 91 BPM | TEMPERATURE: 98.1 F | BODY MASS INDEX: 26.85 KG/M2

## 2020-01-16 DIAGNOSIS — C78.7 METASTATIC COLON CANCER TO LIVER (H): Primary | ICD-10-CM

## 2020-01-16 DIAGNOSIS — C18.9 METASTATIC COLON CANCER TO LIVER (H): Primary | ICD-10-CM

## 2020-01-16 DIAGNOSIS — N28.9 RENAL LESION: ICD-10-CM

## 2020-01-16 PROCEDURE — G0463 HOSPITAL OUTPT CLINIC VISIT: HCPCS | Mod: ZF

## 2020-01-16 PROCEDURE — 99215 OFFICE O/P EST HI 40 MIN: CPT | Mod: ZP | Performed by: INTERNAL MEDICINE

## 2020-01-16 ASSESSMENT — MIFFLIN-ST. JEOR: SCORE: 1798.92

## 2020-01-16 ASSESSMENT — PAIN SCALES - GENERAL: PAINLEVEL: NO PAIN (0)

## 2020-01-16 NOTE — NURSING NOTE
"Oncology Rooming Note    January 16, 2020 3:24 PM   Los Huang is a 64 year old male who presents for:    Chief Complaint   Patient presents with     Oncology Clinic Visit     Return; Metastatic Colorectal Ca     Initial Vitals: /69   Pulse 91   Temp 98.1  F (36.7  C) (Oral)   Resp 15   Ht 1.88 m (6' 2.02\")   Wt 94.9 kg (209 lb 3.2 oz)   SpO2 98%   BMI 26.85 kg/m   Estimated body mass index is 26.85 kg/m  as calculated from the following:    Height as of this encounter: 1.88 m (6' 2.02\").    Weight as of this encounter: 94.9 kg (209 lb 3.2 oz). Body surface area is 2.23 meters squared.  No Pain (0) Comment: Data Unavailable   No LMP for male patient.  Allergies reviewed: Yes  Medications reviewed: Yes    Medications: Medication refills not needed today.  Pharmacy name entered into [facility]: [id number] - [place], [place] - [address]    Clinical concerns: No new concerns        Bhakti Ross CMA              "

## 2020-01-16 NOTE — PROGRESS NOTES
Oncology follow up visit:  Date on this visit: 1/16/2020     Los Huang  is referred by Dr.Jafar Landis for an oncology consultation. He requires evaluation for metastatic colon adenocarcinoma- [facility].    Primary Physician: No Ref-Primary, Physician     History Of Present Illness:    Please see previous notes for detail.  I have copied and updated from prior note.    Mr. Huang is a 64 year old male who was diagnosed with metastatic colorectal cancer in 2014 with oligometastatic disease to liver at the time of diagnosis.  He was treated with ascending colectomy 6/14 and R hepatectomy in 8/14.  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  He completed 10 cycles of FOLFIRI around February of March 2015.), eventually had recurrance of hepatic mets in March 2016.  He was seeking several different opinions from different oncologist at that time so his treatment got delayed and he got cetuximab for 2 cycles during the summer 2016 and he had issues with skin the rash from it. He also had an right upper quadrant abdominal wall met that was resected in Dec 2016.    Since then has been treated with multiple liver directed therapies, initially with y90 in Dec 2017 but since then with TACE x4, 1/9/18, 6/14/18, 7/10/18 and 8/22/18.        He also has possible Renal cell cancer of the Right lower pole of the kidney which has not been treated.    He recently relocated from [place] to [place].      We did a PET scan on 2/22/2019 which showed 2 FDG avid liver lesions which had enlarged since October 2018.  At that point I referred him to Dr. Landis for possibility of liver directed therapy.  At that point patient was not sure whether he want to pursue more liver directed therapy or not.  He eventually decided on repeating his scans after a few  months and he had a repeat CT abdomen and pelvis on 6/7/2019.  The CT scan shows overall stable disease.  The exophytic lesion in the inferior pole of the right kidney has continued to slowly increase in size and now measures 3 cm as opposed to 2.2 cm in June 2017.  This is concerning for renal cell cancer.    He comes in today accompanied by his son.  He was seen by Dr. Reyes in July 2019 who offered him microwave ablation via an open procedure but patient was not interested in that.     He was seen by Dr. chung from urology in November 2019 for the right kidney inferior pole lesion, likely a renal cell cancer but he opted for watchful waiting.   Overall he feels well.  He has some discomfort in the right side of the abdomen from what he thinks is a small hernia which pops up when he stands up but it is reducible.  Denies any other pain.  No nausea or vomiting.  No diarrhea constipation.  Energy is decent.  Denies any fevers or infections or shortness of breath or new swellings or neuropathy.       ECOG 1    ROS:  A comprehensive ROS was otherwise neg      I reviewed other history in epic as below.      Past Medical/Surgical History:  Past Medical History:   Diagnosis Date     Cancer (H)      Depressive disorder      Schizoaffective disorder (H)      Past Surgical History:   Procedure Laterality Date     ABDOMEN SURGERY       BIOPSY       CHOLECYSTECTOMY       COLONOSCOPY       GENITOURINARY SURGERY  1997    Ureteroscopy gone awry     ORTHOPEDIC SURGERY        Bipolar disorder  Schizoaffective dx  Colon cancer    Cancer History:   As above    Allergies:  Allergies as of 01/16/2020 - Reviewed 01/16/2020   Allergen Reaction Noted     Animal dander Other (See Comments) 02/14/2019     Current Medications:  Current Outpatient Medications   Medication Sig Dispense Refill     atomoxetine (STRATTERA) 18 MG capsule Take 1 capsule (18 mg) by mouth daily 30 capsule 1     benztropine (COGENTIN) 0.5 MG tablet Take 1 tablet  (0.5 mg) by mouth daily 30 tablet 1     cholecalciferol (VITAMIN D3) 1000 units (25 mcg) capsule Take 1 capsule (1,000 Units) by mouth daily 90 capsule 3     clonazePAM (KLONOPIN) 0.5 MG tablet Take 1 tablet (0.5 mg) by mouth daily as needed for anxiety As needed 30 tablet 1     divalproex sodium delayed-release (DEPAKOTE) 500 MG DR tablet Take 2 tablets (1,000 mg) by mouth At Bedtime 60 tablet 3     Homeopathic Products (ARNICARE) GEL Externally apply 1 Dose topically daily as needed (leg cramps)       multivitamin (CENTRUM SILVER) tablet Take 1 tablet by mouth daily 90 tablet 3     risperiDONE (RISPERDAL) 4 MG tablet Take 1 tablet (4 mg) by mouth At Bedtime 30 tablet 1      Family History:  Family History   Problem Relation Age of Onset     Osteoporosis Sister      Thyroid Disease Sister         Thyroid killed     Thyroid Disease Sister         Thyroid removed      No known family history of cancers.  He has a son and a daughter who are healthy.  Social History:  Social History     Socioeconomic History     Marital status:      Spouse name: Not on file     Number of children: Not on file     Years of education: Not on file     Highest education level: Not on file   Occupational History     Not on file   Social Needs     Financial resource strain: Not on file     Food insecurity:     Worry: Not on file     Inability: Not on file     Transportation needs:     Medical: Not on file     Non-medical: Not on file   Tobacco Use     Smoking status: Former Smoker     Packs/day: 1.00     Years: 34.00     Pack years: 34.00     Types: Cigarettes     Start date:      Last attempt to quit:      Years since quittin.0     Smokeless tobacco: Former User     Types: Snuff     Quit date:      Tobacco comment: Smoked sporadically in high school and during college until    Substance and Sexual Activity     Alcohol use: Not Currently     Frequency: Monthly or less     Comment: slight     Drug use: Not  "Currently     Types: Marijuana, Amphetamines, Benzodiazepines, Hashish, LSD, Mescaline, Methaqualone, Methylphenidate, Nitrous oxide, PCP, Psilocybin     Comment: [id number]     Sexual activity: Never   Lifestyle     Physical activity:     Days per week: Not on file     Minutes per session: Not on file     Stress: Not on file   Relationships     Social connections:     Talks on phone: Not on file     Gets together: Not on file     Attends Jewish service: Not on file     Active member of club or organization: Not on file     Attends meetings of clubs or organizations: Not on file     Relationship status: Not on file     Intimate partner violence:     Fear of current or ex partner: Not on file     Emotionally abused: Not on file     Physically abused: Not on file     Forced sexual activity: Not on file   Other Topics Concern     Parent/sibling w/ CABG, MI or angioplasty before 65F 55M? No   Social History Narrative     Not on file   He used to smoke but quit in 2003.  He drinks alcohol occasionally.  He was in [place] but recently relocated to [place] and he is going to move in his own apartment tomorrow.  His daughter lives close by.    Physical Exam:  /69   Pulse 91   Temp 98.1  F (36.7  C) (Oral)   Resp 15   Ht 1.88 m (6' 2.02\")   Wt 94.9 kg (209 lb 3.2 oz)   SpO2 98%   BMI 26.85 kg/m     Wt Readings from Last 4 Encounters:   01/16/20 94.9 kg (209 lb 3.2 oz)   11/14/19 93 kg (205 lb)   07/31/19 93.8 kg (206 lb 14.4 oz)   07/18/19 94.3 kg (208 lb)       CONSTITUTIONAL: no acute distress  EYES: PERRLA, no palor or icterus.   ENT/MOUTH: no mouth lesions. Ears normal  CVS: s1s2 no m r g .   RESPIRATORY: clear to auscultation b/l  GI: soft non tender no hepatosplenomegaly.  Surgical scars are well-healed.  On the right side of the abdomen there is about a 2 inch area very likely has incisional hernia which is very reducible and no signs of strangulation.  NEURO: AAOX3  Grossly non focal neuro " exam  INTEGUMENT: no obvious rashes  LYMPHATIC: no palpable cervical, supraclavicular, axillary or inguinal LAD  MUSCULOSKELETAL: Unremarkable. No bony tenderness.   EXTREMITIES: no edema  PSYCH: Mentation, mood and affect are normal. Decision making capacity is intact        Laboratory/Imaging Studies    Results for NEVIN DICKERSON (MRN [id number]) as of 1/16/2020 15:47   Ref. Range 12/13/2019 15:58   Sodium Latest Ref Range: 133 - 144 mmol/L 140   Potassium Latest Ref Range: 3.4 - 5.3 mmol/L 4.0   Chloride Latest Ref Range: 94 - 109 mmol/L 109   Carbon Dioxide Latest Ref Range: 20 - 32 mmol/L 28   Urea Nitrogen Latest Ref Range: 7 - 30 mg/dL 19   Creatinine Latest Ref Range: 0.66 - 1.25 mg/dL 0.90   GFR Estimate Latest Ref Range: >60 mL/min/1.73_m2 89   GFR Estimate If Black Latest Ref Range: >60 mL/min/1.73_m2 >90   Calcium Latest Ref Range: 8.5 - 10.1 mg/dL 9.1   Anion Gap Latest Ref Range: 3 - 14 mmol/L 4   Albumin Latest Ref Range: 3.4 - 5.0 g/dL 4.1   Protein Total Latest Ref Range: 6.8 - 8.8 g/dL 7.4   Bilirubin Total Latest Ref Range: 0.2 - 1.3 mg/dL 0.4   Alkaline Phosphatase Latest Ref Range: 40 - 150 U/L 85   ALT Latest Ref Range: 0 - 70 U/L 56   AST Latest Ref Range: 0 - 45 U/L 18   Glucose Latest Ref Range: 70 - 99 mg/dL 110 (H)   WBC Latest Ref Range: 4.0 - 11.0 10e9/L 6.3   Hemoglobin Latest Ref Range: 13.3 - 17.7 g/dL 15.2   Hematocrit Latest Ref Range: 40.0 - 53.0 % 45.1   Platelet Count Latest Ref Range: 150 - 450 10e9/L 167   RBC Count Latest Ref Range: 4.4 - 5.9 10e12/L 5.07   MCV Latest Ref Range: 78 - 100 fl 89   MCH Latest Ref Range: 26.5 - 33.0 pg 30.0   MCHC Latest Ref Range: 31.5 - 36.5 g/dL 33.7   RDW Latest Ref Range: 10.0 - 15.0 % 13.0   Diff Method Unknown Automated Method   % Neutrophils Latest Units: % 55.9   % Lymphocytes Latest Units: % 26.8   % Monocytes Latest Units: % 11.5   % Eosinophils Latest Units: % 4.8   % Basophils Latest Units: % 0.8   % Immature Granulocytes Latest  Units: % 0.2   Nucleated RBCs Latest Ref Range: 0 /100 0   Absolute Neutrophil Latest Ref Range: 1.6 - 8.3 10e9/L 3.5   Absolute Lymphocytes Latest Ref Range: 0.8 - 5.3 10e9/L 1.7   Absolute Monocytes Latest Ref Range: 0.0 - 1.3 10e9/L 0.7   Absolute Eosinophils Latest Ref Range: 0.0 - 0.7 10e9/L 0.3   Absolute Basophils Latest Ref Range: 0.0 - 0.2 10e9/L 0.1   Abs Immature Granulocytes Latest Ref Range: 0 - 0.4 10e9/L 0.0   Absolute Nucleated RBC Unknown 0.0   CEA Latest Ref Range: 0 - 2.5 ug/L <0.5       EXAMINATION: CT CHEST/ABDOMEN/PELVIS W CONTRAST, 12/13/2019 4:39 PM     TECHNIQUE:  Helical CT images from the thoracic inlet through the  symphysis pubis were obtained  with contrast. Contrast dose: Isovue  370 126cc     COMPARISON: 6/7/2019 CT exam; PET/CT 2/22/2019.     HISTORY: Surveillance scan for metastatic colon cancer; Metastatic  colon cancer to liver (H); Metastatic colon cancer to liver (H)     FINDINGS:     Chest: Left-sided chest wall Port-A-Cath with its tip in the mid SVC.  18 mm right thyroid gland lobe nodule previously mildly hypermetabolic  on PET/CT 2/22/2019. Central tracheobronchial tree is patent. Mild  patulous esophagus. Minimal fluid in the superior aortic recess.  Thoracic aorta and main pulmonary artery with normal diameter. No  central pulmonary embolism. Atherosclerotic calcifications of the  thoracic aorta, great vessels and coronary arteries. Cardiac size  within normal limits. No pericardial effusions. No pleural effusions.  No pneumothorax. Subcentimeter mediastinal lymph nodes are not  enlarged by size criteria. No new or enlarging thoracic lymph nodes.  Calcified mediastinal and hilar lymph nodes. Findings are most likely  sequela of prior histoplasmosis infection.     LUNGS: Mild biapical pleural thickening/scarring. Mild bilateral lower  lobes dependent atelectasis. Scattered pulmonary nodules measuring up  to 9 mm in the right lower lobe, stable. Calcified granulomas.  No  acute airspace opacities.     Abdomen and pelvis: Postoperative changes of right hepatectomy and  right hemicolectomy. Stable hepatic liver lesions measuring 2.8 cm at  the dome, subcapsular; measuring 2.8 cm in hepatic segment 2;  measuring 7 mm in the left hepatic lobe inferiorly. No new or  enlarging liver lesions. Patent hepatic vasculature. No biliary tree  dilation. Surgically absent gallbladder.     Homogeneous pancreatic parenchyma. Main pancreatic duct is not  dilated. The spleen is not enlarged. No suspicious splenic lesions.  Calcified splenic granulomas.     Unremarkable adrenal glands. No renal stones or hydronephrosis.  Bilateral renal cysts as well as subcentimeter cortical hypodensities,  too small to characterize, stable. Persistent 1.5 cm indeterminant  lesion in the superior pole of the kidney on the left. Right kidney  3.0 cm heterogeneously enhancing exophytic lesion, stable.  Unremarkable urinary bladder. Enlarged prostate with calcifications.  Pelvic phleboliths. Prominent fat in the right greater than left  inguinal canal. No inguinal lymphadenopathy. Prominent pelvic lymph  nodes measuring up to 7 mm short axis in the right external iliac  chain, stable. Stable nodularity is adjacent to the diaphragmatic  crura. Subcentimeter retroperitoneal and mesenteric lymph nodes are  not enlarged by size criteria. No dilated loops of bowel. No bowel  wall thickening. Colonic diverticulosis. No associated CT findings of  acute diverticulitis. Minimal free fluid in the pelvis. No free air.  No pelvic masses. No abdominal aortic aneurysm. Atherosclerotic  calcifications of the abdominal aorta and its major branches. Stable  aneurysmal dilation off the right common iliac artery measuring 2.0  cm. Stable prominent du hepatis lymph nodes. Fat and small bowel  loop containing abdominal hernia along the right lower quadrant  abdominal wall, series 3 image 441. No evidence for obstruction.     Bones:  Degenerative changes of the spine, bilateral SI joints and  hips. Scattered Schmorl nodes. Scattered stable sclerotic foci likely  representing benign bone islands. Enthesopathic changes of the pelvis  and hips. No convincing aggressive bone lesions.                                                                      IMPRESSION: In this patient with history of metastatic colon cancer:  1. Postoperative changes of right hepatectomy and right hemicolectomy.  Liver lesions as described above concerning for metastasis, stable.  2. Stable indeterminate lesion in the superior pole of the left kidney  from 2017 favoring benign etiology. Attention on follow-up studies.  3. Stable enhancing lesion in the inferior pole of the right kidney  concerning for renal cell carcinoma versus metastatic disease.  Attention on follow-up studies.  4. Stable prominent du hepatis lymph nodes as well as nonspecific  soft tissue nodules adjacent to the diaphragmatic crura bilaterally.  Attention on follow-up studies.  5. Stable pulmonary nodules. Attention on follow-up studies.  6. Additional incidental findings as described above.        ASSESSMENT/PLAN:      He has metastatic colorectal cancer to the liver.  [facility] Int[facility].  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 He was treated with ascending colectomy in June 2014 followed by hepatectomy in August 2014.  He received FOLFOX x2 and FOLFIRI x10 after that.  He had recurrence of the liver metastases in 2016. He got 2 doses of Cetuximab in 2016. He has received multiple liver directed therapy since then the last one was in August 2018.  He also had excision of the right upper quadrant abdominal wall metastasis in December 2016.  On his most recent PET scan from October 2018 he had 2 residual metabolically active liver lesions and these were less conspicuous as compared to the previous CT scan.  He also had 2 FDG avid left axillary lymph nodes which likely were due to  extravasation of the tracer which was injected in the left arm.    We did a PET scan on 2/22/2019 which showed 2 FDG avid liver lesions which had enlarged since October 2018.  At that point I referred him to Dr. Landis for possibility of liver directed therapy.  At that point patient was not sure whether he want to pursue more liver directed therapy or not.  He eventually decided on repeating his scans after a few months and he had a repeat CT abdomen and pelvis on 6/7/2019.  The CT scan shows overall stable disease.  The exophytic lesion in the inferior pole of the right kidney has continued to slowly increase in size and now measures 3 cm as opposed to 2.2 cm in June 2017.  This is concerning for renal cell cancer.      Repeat CT CAP from 12/13/19 shows stable disease and liver lesions.  We again discussed the situation in detail.  I reviewed the scans with him.  This is good news.  We discussed several options going forward.  One option would be to wait and watch and repeat scans in a few months.  The other option would be to discuss microwave ablation procedure with Dr. Reyes again.  I also offered him to have another opinion with another interventional radiologist to see if any percutaneous liver directed procedures would be possible.    Since the disease is so stable and he is doing well, I do not see a necessity to start him on systemic chemotherapy and he also does not want to start systemic chemotherapy.  After thorough discussion he wants to wait and watch and repeat his scans in a few months.  We decided on repeating his scans in 4 months.    Renal lesion in the inferior pole of right kidney. This has continued to grow slowly and likely represents RCC.  He met with Dr. chung and at that time watchful waiting was decided.  He wants to readdress this and I believe since his colon cancer is so stable it is reasonable to treat his likely renal cell cancer at this time.  I will send a message to   weight about this and he will follow with him.      We did not address the following today.    Discussion regarding health care directive  He tells me that he has this completed.   I will see him back in 4 months with repeat labs/CT scan prior.         All of his and his son's questions were answered to their satisfaction.  They are agreeable and comfortable with the plan.    Patrice Srivastava

## 2020-01-16 NOTE — LETTER
1/16/2020       RE: Los Huang  [address]  [place] [facility] [address]     Dear Colleague,    Thank you for referring your patient, Los Huang, to the [facility]. Please see a copy of my visit note below.    Oncology follow up visit:  Date on this visit: 1/16/2020     Los Huang  is referred by Dr.Jafar Landis for an oncology consultation. He requires evaluation for metastatic colon adenocarcinoma- MSI-Intact.    Primary Physician: No Ref-Primary, Physician     History Of Present Illness:    Please see previous notes for detail.  I have copied and updated from prior note.    Mr. Huang is a 64 year old male who was diagnosed with metastatic colorectal cancer in 2014 with oligometastatic disease to liver at the time of diagnosis.  He was treated with ascending colectomy 6/14 and R hepatectomy in 8/14.  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  He completed 10 cycles of FOLFIRI around February of March 2015.), eventually had recurrance of hepatic mets in March 2016.  He was seeking several different opinions from different oncologist at that time so his treatment got delayed and he got cetuximab for 2 cycles during the summer 2016 and he had issues with skin the rash from it. He also had an right upper quadrant abdominal wall met that was resected in Dec 2016.    Since then has been treated with multiple liver directed therapies, initially with y90 in Dec 2017 but since then with TACE x4, 1/9/18, 6/14/18, 7/10/18 and 8/22/18.        He also has possible Renal cell cancer of the Right lower pole of the kidney which has not been treated.    He recently relocated from [place] to [place].      We did a PET scan on 2/22/2019 which showed 2 FDG avid liver lesions which had enlarged since October  2018.  At that point I referred him to Dr. Landis for possibility of liver directed therapy.  At that point patient was not sure whether he want to pursue more liver directed therapy or not.  He eventually decided on repeating his scans after a few months and he had a repeat CT abdomen and pelvis on 6/7/2019.  The CT scan shows overall stable disease.  The exophytic lesion in the inferior pole of the right kidney has continued to slowly increase in size and now measures 3 cm as opposed to 2.2 cm in June 2017.  This is concerning for renal cell cancer.    He comes in today accompanied by his son.  He was seen by Dr. Reyes in July 2019 who offered him microwave ablation via an open procedure but patient was not interested in that.     He was seen by Dr. chung from urology in November 2019 for the right kidney inferior pole lesion, likely a renal cell cancer but he opted for watchful waiting.   Overall he feels well.  He has some discomfort in the right side of the abdomen from what he thinks is a small hernia which pops up when he stands up but it is reducible.  Denies any other pain.  No nausea or vomiting.  No diarrhea constipation.  Energy is decent.  Denies any fevers or infections or shortness of breath or new swellings or neuropathy.       ECOG 1    ROS:  A comprehensive ROS was otherwise neg      I reviewed other history in epic as below.      Past Medical/Surgical History:  Past Medical History:   Diagnosis Date     Cancer (H)      Depressive disorder      Schizoaffective disorder (H)      Past Surgical History:   Procedure Laterality Date     ABDOMEN SURGERY       BIOPSY       CHOLECYSTECTOMY       COLONOSCOPY       GENITOURINARY SURGERY  1997    Ureteroscopy gone awry     ORTHOPEDIC SURGERY        Bipolar disorder  Schizoaffective dx  Colon cancer    Cancer History:   As above    Allergies:  Allergies as of 01/16/2020 - Reviewed 01/16/2020   Allergen Reaction Noted     Animal dander Other (See Comments)  2019     Current Medications:  Current Outpatient Medications   Medication Sig Dispense Refill     atomoxetine (STRATTERA) 18 MG capsule Take 1 capsule (18 mg) by mouth daily 30 capsule 1     benztropine (COGENTIN) 0.5 MG tablet Take 1 tablet (0.5 mg) by mouth daily 30 tablet 1     cholecalciferol (VITAMIN D3) 1000 units (25 mcg) capsule Take 1 capsule (1,000 Units) by mouth daily 90 capsule 3     clonazePAM (KLONOPIN) 0.5 MG tablet Take 1 tablet (0.5 mg) by mouth daily as needed for anxiety As needed 30 tablet 1     divalproex sodium delayed-release (DEPAKOTE) 500 MG DR tablet Take 2 tablets (1,000 mg) by mouth At Bedtime 60 tablet 3     Homeopathic Products (ARNICARE) GEL Externally apply 1 Dose topically daily as needed (leg cramps)       multivitamin (CENTRUM SILVER) tablet Take 1 tablet by mouth daily 90 tablet 3     risperiDONE (RISPERDAL) 4 MG tablet Take 1 tablet (4 mg) by mouth At Bedtime 30 tablet 1      Family History:  Family History   Problem Relation Age of Onset     Osteoporosis Sister      Thyroid Disease Sister         Thyroid killed     Thyroid Disease Sister         Thyroid removed      No known family history of cancers.  He has a son and a daughter who are healthy.  Social History:  Social History     Socioeconomic History     Marital status:      Spouse name: Not on file     Number of children: Not on file     Years of education: Not on file     Highest education level: Not on file   Occupational History     Not on file   Social Needs     Financial resource strain: Not on file     Food insecurity:     Worry: Not on file     Inability: Not on file     Transportation needs:     Medical: Not on file     Non-medical: Not on file   Tobacco Use     Smoking status: Former Smoker     Packs/day: 1.00     Years: 34.00     Pack years: 34.00     Types: Cigarettes     Start date:      Last attempt to quit:      Years since quittin.0     Smokeless tobacco: Former User     Types:  "Snuff     Quit date: 2011     Tobacco comment: Smoked sporadically in high school and during college until 1979   Substance and Sexual Activity     Alcohol use: Not Currently     Frequency: Monthly or less     Comment: slight     Drug use: Not Currently     Types: Marijuana, Amphetamines, Benzodiazepines, Hashish, LSD, Mescaline, Methaqualone, Methylphenidate, Nitrous oxide, PCP, Psilocybin     Comment: [id number]     Sexual activity: Never   Lifestyle     Physical activity:     Days per week: Not on file     Minutes per session: Not on file     Stress: Not on file   Relationships     Social connections:     Talks on phone: Not on file     Gets together: Not on file     Attends Buddhism service: Not on file     Active member of club or organization: Not on file     Attends meetings of clubs or organizations: Not on file     Relationship status: Not on file     Intimate partner violence:     Fear of current or ex partner: Not on file     Emotionally abused: Not on file     Physically abused: Not on file     Forced sexual activity: Not on file   Other Topics Concern     Parent/sibling w/ CABG, MI or angioplasty before 65F 55M? No   Social History Narrative     Not on file   He used to smoke but quit in 2003.  He drinks alcohol occasionally.  He was in [place] but recently relocated to [place] and he is going to move in his own apartment tomorrow.  His daughter lives close by.    Physical Exam:  /69   Pulse 91   Temp 98.1  F (36.7  C) (Oral)   Resp 15   Ht 1.88 m (6' 2.02\")   Wt 94.9 kg (209 lb 3.2 oz)   SpO2 98%   BMI 26.85 kg/m      Wt Readings from Last 4 Encounters:   01/16/20 94.9 kg (209 lb 3.2 oz)   11/14/19 93 kg (205 lb)   07/31/19 93.8 kg (206 lb 14.4 oz)   07/18/19 94.3 kg (208 lb)       CONSTITUTIONAL: no acute distress  EYES: PERRLA, no palor or icterus.   ENT/MOUTH: no mouth lesions. Ears normal  CVS: s1s2 no m r g .   RESPIRATORY: clear to auscultation b/l  GI: soft non tender no " hepatosplenomegaly.  Surgical scars are well-healed.  On the right side of the abdomen there is about a 2 inch area very likely has incisional hernia which is very reducible and no signs of strangulation.  NEURO: AAOX3  Grossly non focal neuro exam  INTEGUMENT: no obvious rashes  LYMPHATIC: no palpable cervical, supraclavicular, axillary or inguinal LAD  MUSCULOSKELETAL: Unremarkable. No bony tenderness.   EXTREMITIES: no edema  PSYCH: Mentation, mood and affect are normal. Decision making capacity is intact        Laboratory/Imaging Studies    Results for NEVIN DICKERSON (MRN [id number]) as of 1/16/2020 15:47   Ref. Range 12/13/2019 15:58   Sodium Latest Ref Range: 133 - 144 mmol/L 140   Potassium Latest Ref Range: 3.4 - 5.3 mmol/L 4.0   Chloride Latest Ref Range: 94 - 109 mmol/L 109   Carbon Dioxide Latest Ref Range: 20 - 32 mmol/L 28   Urea Nitrogen Latest Ref Range: 7 - 30 mg/dL 19   Creatinine Latest Ref Range: 0.66 - 1.25 mg/dL 0.90   GFR Estimate Latest Ref Range: >60 mL/min/1.73_m2 89   GFR Estimate If Black Latest Ref Range: >60 mL/min/1.73_m2 >90   Calcium Latest Ref Range: 8.5 - 10.1 mg/dL 9.1   Anion Gap Latest Ref Range: 3 - 14 mmol/L 4   Albumin Latest Ref Range: 3.4 - 5.0 g/dL 4.1   Protein Total Latest Ref Range: 6.8 - 8.8 g/dL 7.4   Bilirubin Total Latest Ref Range: 0.2 - 1.3 mg/dL 0.4   Alkaline Phosphatase Latest Ref Range: 40 - 150 U/L 85   ALT Latest Ref Range: 0 - 70 U/L 56   AST Latest Ref Range: 0 - 45 U/L 18   Glucose Latest Ref Range: 70 - 99 mg/dL 110 (H)   WBC Latest Ref Range: 4.0 - 11.0 10e9/L 6.3   Hemoglobin Latest Ref Range: 13.3 - 17.7 g/dL 15.2   Hematocrit Latest Ref Range: 40.0 - 53.0 % 45.1   Platelet Count Latest Ref Range: 150 - 450 10e9/L 167   RBC Count Latest Ref Range: 4.4 - 5.9 10e12/L 5.07   MCV Latest Ref Range: 78 - 100 fl 89   MCH Latest Ref Range: 26.5 - 33.0 pg 30.0   MCHC Latest Ref Range: 31.5 - 36.5 g/dL 33.7   RDW Latest Ref Range: 10.0 - 15.0 % 13.0   Diff  Method Unknown Automated Method   % Neutrophils Latest Units: % 55.9   % Lymphocytes Latest Units: % 26.8   % Monocytes Latest Units: % 11.5   % Eosinophils Latest Units: % 4.8   % Basophils Latest Units: % 0.8   % Immature Granulocytes Latest Units: % 0.2   Nucleated RBCs Latest Ref Range: 0 /100 0   Absolute Neutrophil Latest Ref Range: 1.6 - 8.3 10e9/L 3.5   Absolute Lymphocytes Latest Ref Range: 0.8 - 5.3 10e9/L 1.7   Absolute Monocytes Latest Ref Range: 0.0 - 1.3 10e9/L 0.7   Absolute Eosinophils Latest Ref Range: 0.0 - 0.7 10e9/L 0.3   Absolute Basophils Latest Ref Range: 0.0 - 0.2 10e9/L 0.1   Abs Immature Granulocytes Latest Ref Range: 0 - 0.4 10e9/L 0.0   Absolute Nucleated RBC Unknown 0.0   CEA Latest Ref Range: 0 - 2.5 ug/L <0.5       EXAMINATION: CT CHEST/ABDOMEN/PELVIS W CONTRAST, 12/13/2019 4:39 PM     TECHNIQUE:  Helical CT images from the thoracic inlet through the  symphysis pubis were obtained  with contrast. Contrast dose: Isovue  370 126cc     COMPARISON: 6/7/2019 CT exam; PET/CT 2/22/2019.     HISTORY: Surveillance scan for metastatic colon cancer; Metastatic  colon cancer to liver (H); Metastatic colon cancer to liver (H)     FINDINGS:     Chest: Left-sided chest wall Port-A-Cath with its tip in the mid SVC.  18 mm right thyroid gland lobe nodule previously mildly hypermetabolic  on PET/CT 2/22/2019. Central tracheobronchial tree is patent. Mild  patulous esophagus. Minimal fluid in the superior aortic recess.  Thoracic aorta and main pulmonary artery with normal diameter. No  central pulmonary embolism. Atherosclerotic calcifications of the  thoracic aorta, great vessels and coronary arteries. Cardiac size  within normal limits. No pericardial effusions. No pleural effusions.  No pneumothorax. Subcentimeter mediastinal lymph nodes are not  enlarged by size criteria. No new or enlarging thoracic lymph nodes.  Calcified mediastinal and hilar lymph nodes. Findings are most likely  sequela of  prior histoplasmosis infection.     LUNGS: Mild biapical pleural thickening/scarring. Mild bilateral lower  lobes dependent atelectasis. Scattered pulmonary nodules measuring up  to 9 mm in the right lower lobe, stable. Calcified granulomas. No  acute airspace opacities.     Abdomen and pelvis: Postoperative changes of right hepatectomy and  right hemicolectomy. Stable hepatic liver lesions measuring 2.8 cm at  the dome, subcapsular; measuring 2.8 cm in hepatic segment 2;  measuring 7 mm in the left hepatic lobe inferiorly. No new or  enlarging liver lesions. Patent hepatic vasculature. No biliary tree  dilation. Surgically absent gallbladder.     Homogeneous pancreatic parenchyma. Main pancreatic duct is not  dilated. The spleen is not enlarged. No suspicious splenic lesions.  Calcified splenic granulomas.     Unremarkable adrenal glands. No renal stones or hydronephrosis.  Bilateral renal cysts as well as subcentimeter cortical hypodensities,  too small to characterize, stable. Persistent 1.5 cm indeterminant  lesion in the superior pole of the kidney on the left. Right kidney  3.0 cm heterogeneously enhancing exophytic lesion, stable.  Unremarkable urinary bladder. Enlarged prostate with calcifications.  Pelvic phleboliths. Prominent fat in the right greater than left  inguinal canal. No inguinal lymphadenopathy. Prominent pelvic lymph  nodes measuring up to 7 mm short axis in the right external iliac  chain, stable. Stable nodularity is adjacent to the diaphragmatic  crura. Subcentimeter retroperitoneal and mesenteric lymph nodes are  not enlarged by size criteria. No dilated loops of bowel. No bowel  wall thickening. Colonic diverticulosis. No associated CT findings of  acute diverticulitis. Minimal free fluid in the pelvis. No free air.  No pelvic masses. No abdominal aortic aneurysm. Atherosclerotic  calcifications of the abdominal aorta and its major branches. Stable  aneurysmal dilation off the right common  iliac artery measuring 2.0  cm. Stable prominent du hepatis lymph nodes. Fat and small bowel  loop containing abdominal hernia along the right lower quadrant  abdominal wall, series 3 image 441. No evidence for obstruction.     Bones: Degenerative changes of the spine, bilateral SI joints and  hips. Scattered Schmorl nodes. Scattered stable sclerotic foci likely  representing benign bone islands. Enthesopathic changes of the pelvis  and hips. No convincing aggressive bone lesions.                                                                      IMPRESSION: In this patient with history of metastatic colon cancer:  1. Postoperative changes of right hepatectomy and right hemicolectomy.  Liver lesions as described above concerning for metastasis, stable.  2. Stable indeterminate lesion in the superior pole of the left kidney  from 2017 favoring benign etiology. Attention on follow-up studies.  3. Stable enhancing lesion in the inferior pole of the right kidney  concerning for renal cell carcinoma versus metastatic disease.  Attention on follow-up studies.  4. Stable prominent du hepatis lymph nodes as well as nonspecific  soft tissue nodules adjacent to the diaphragmatic crura bilaterally.  Attention on follow-up studies.  5. Stable pulmonary nodules. Attention on follow-up studies.  6. Additional incidental findings as described above.        ASSESSMENT/PLAN:      He has metastatic colorectal cancer to the liver.  MSI Intact.  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 He was treated with ascending colectomy in June 2014 followed by hepatectomy in August 2014.  He received FOLFOX x2 and FOLFIRI x10 after that.  He had recurrence of the liver metastases in 2016. He got 2 doses of Cetuximab in 2016. He has received multiple liver directed therapy since then the last one was in August 2018.  He also had excision of the right upper quadrant abdominal wall metastasis in December 2016.  On his  most recent PET scan from October 2018 he had 2 residual metabolically active liver lesions and these were less conspicuous as compared to the previous CT scan.  He also had 2 FDG avid left axillary lymph nodes which likely were due to extravasation of the tracer which was injected in the left arm.    We did a PET scan on 2/22/2019 which showed 2 FDG avid liver lesions which had enlarged since October 2018.  At that point I referred him to Dr. Landis for possibility of liver directed therapy.  At that point patient was not sure whether he want to pursue more liver directed therapy or not.  He eventually decided on repeating his scans after a few months and he had a repeat CT abdomen and pelvis on 6/7/2019.  The CT scan shows overall stable disease.  The exophytic lesion in the inferior pole of the right kidney has continued to slowly increase in size and now measures 3 cm as opposed to 2.2 cm in June 2017.  This is concerning for renal cell cancer.      Repeat CT CAP from 12/13/19 shows stable disease and liver lesions.  We again discussed the situation in detail.  I reviewed the scans with him.  This is good news.  We discussed several options going forward.  One option would be to wait and watch and repeat scans in a few months.  The other option would be to discuss microwave ablation procedure with Dr. Reyes again.  I also offered him to have another opinion with another interventional radiologist to see if any percutaneous liver directed procedures would be possible.    Since the disease is so stable and he is doing well, I do not see a necessity to start him on systemic chemotherapy and he also does not want to start systemic chemotherapy.  After thorough discussion he wants to wait and watch and repeat his scans in a few months.  We decided on repeating his scans in 4 months.    Renal lesion in the inferior pole of right kidney. This has continued to grow slowly and likely represents RCC.  He met with   weight and at that time watchful waiting was decided.  He wants to readdress this and I believe since his colon cancer is so stable it is reasonable to treat his likely renal cell cancer at this time.  I will send a message to Dr. chung about this and he will follow with him.      We did not address the following today.    Discussion regarding health care directive  He tells me that he has this completed.   I will see him back in 4 months with repeat labs/CT scan prior.         All of his and his son's questions were answered to their satisfaction.  They are agreeable and comfortable with the plan.    Patrice Srivastava

## 2020-01-29 DIAGNOSIS — F31.9 BIPOLAR 1 DISORDER (H): ICD-10-CM

## 2020-01-30 RX ORDER — DIVALPROEX SODIUM 500 MG/1
1000 TABLET, DELAYED RELEASE ORAL AT BEDTIME
Qty: 60 TABLET | Status: CANCELLED | OUTPATIENT
Start: 2020-01-30

## 2020-01-30 NOTE — TELEPHONE ENCOUNTER
"Medication requested:divalproex sodium delayed-release (DEPAKOTE) 500 MG DR tablet    Take 2 tablets (1,000 mg) by mouth At Bedtime - Oral  Last refilled: 10/30/2019  Qty: 60/3  Last seen: 1/3/20     \"Continue Depakote DR 1000 mg PO qHS.\"  RTC: 1 month  Cancel: 0  No-show: 0  Next appt: 1/31/20    Refill decision: Refill pended and routed to the provider for review/determination due to  per Epic recent mental health hospitalization [facility].No reported changes in Divalproex.   1/27/20:  VALPROIC ACID,TOTAL 63.4Comment: This is a corrected result. Previously reported as 51.7 ug/mL with reference range 50.0-100.0 ug/mL on 1/27/2020 at 0959 CST            "

## 2020-01-31 ENCOUNTER — OFFICE VISIT (OUTPATIENT)
Dept: PSYCHIATRY | Facility: CLINIC | Age: 65
End: 2020-01-31
Attending: PSYCHIATRY & NEUROLOGY
Payer: MEDICARE

## 2020-01-31 VITALS
DIASTOLIC BLOOD PRESSURE: 80 MMHG | WEIGHT: 207 LBS | SYSTOLIC BLOOD PRESSURE: 122 MMHG | HEART RATE: 88 BPM | BODY MASS INDEX: 26.57 KG/M2

## 2020-01-31 DIAGNOSIS — F31.9 BIPOLAR 1 DISORDER (H): ICD-10-CM

## 2020-01-31 PROCEDURE — G0463 HOSPITAL OUTPT CLINIC VISIT: HCPCS | Mod: ZF

## 2020-01-31 RX ORDER — CLONAZEPAM 0.5 MG/1
0.25 TABLET ORAL DAILY
Qty: 15 TABLET | Refills: 1 | Status: SHIPPED | OUTPATIENT
Start: 2020-01-31 | End: 2020-03-16

## 2020-01-31 RX ORDER — BENZTROPINE MESYLATE 0.5 MG/1
0.5 TABLET ORAL DAILY
Qty: 30 TABLET | Refills: 1 | OUTPATIENT
Start: 2020-01-31

## 2020-01-31 RX ORDER — RISPERIDONE 0.5 MG/1
0.5 TABLET ORAL AT BEDTIME
Qty: 30 TABLET | Refills: 1 | Status: SHIPPED | OUTPATIENT
Start: 2020-01-31 | End: 2020-01-31

## 2020-01-31 RX ORDER — RISPERIDONE 3 MG/1
3 TABLET ORAL AT BEDTIME
Qty: 30 TABLET | Refills: 1 | Status: SHIPPED | OUTPATIENT
Start: 2020-01-31 | End: 2020-01-31

## 2020-01-31 RX ORDER — DIVALPROEX SODIUM 500 MG/1
1000 TABLET, DELAYED RELEASE ORAL AT BEDTIME
Qty: 60 TABLET | Refills: 3 | Status: SHIPPED | OUTPATIENT
Start: 2020-01-31 | End: 2020-03-10

## 2020-01-31 ASSESSMENT — PAIN SCALES - GENERAL: PAINLEVEL: NO PAIN (0)

## 2020-01-31 NOTE — PATIENT INSTRUCTIONS
-Decrease Klonopin to 0.25 mg daily. You can take this either in the morning or at night.  -Try to go without the benztropine. You can go back to 0.5 mg daily if you have muscle soreness.  -Keep the risperidone dose the same, 4 mg at bedtime.  -Please come back in one month.      Therapy referrals:  herapy Clinics in [facility] [place] ([place]) [phone]  [facility] ([place]) [phone]  [facility] ([facility]) [phone]  [facility] ([facility] Services)* ([place]) [phone]  [facility] (Multiple Locations) [phone]  [facility] ([place]) [phone]  [facility] ([place]) [phone]   [facility], [facility] ([place]) [phone]  [facility] ([place]) [phone]  [facility] ([facility]) [phone]  [facility] ([place]) [phone]  [facility]* ([place]) [phone]  [facility] ([place], [place], [place], [place]) [phone]  [facility] ([place]) [phone]    *Offers sliding fee schedule    You can also contact your insurance company, by calling the [phone] number on your insurance card, and they would be able to give you a list of providers they would cover.       Thank you for coming to the PSYCHIATRY CLINIC.    Lab Testing:  If you had lab testing today and your results are reassuring or normal they will be mailed to you or sent through [email or web address] within 7 days.   If the lab tests need quick action we will call you with the results.  The phone number we will call with results is # [phone] (home) . If this is not the best number please call our clinic and change the number.    Medication Refills:  If you need any refills please call your pharmacy and they will contact us. Our fax number for refills is [phone]. Please allow  three business for refill processing.   If you need to  your refill at a new pharmacy, please contact the new pharmacy directly. The new pharmacy will help you get your medications transferred.     Scheduling:  If you have any concerns about today's visit or wish to schedule another appointment please call our office during normal business hours [phone] (8-5:00 M-F)    Contact Us:  Please call [phone] during business hours (8-5:00 M-F).  If after clinic hours, or on the weekend, please call  [phone].    Financial Assistance [phone]  [facility] Billing [phone]  [place] Billing Office, [facility]: [phone]  [place] Billing [phone]  Medical Records [phone]      MENTAL HEALTH CRISIS NUMBERS:  [facility]:   [facility] - [phone]   [facility] ([place], [place]) Residence [facility] - [phone]   Walk-In Counseling [facility] [phone]   COPE 24/7 [place] Mobile Team for Adults - [[phone]]; Child - [[phone]]        [facility]:   [facility] - [phone]   Walk-in counseling [facility] - [phone]   Walk-in counseling [facility] - [phone]   [facility] [place] Residence [facility] - [phone]   Urgent Care Adult Mental Health:   --Drop-in, 24/7 crisis line, and [place] Mobile Team [[phone]]    CRISIS TEXT LINE: Text 741-862 from anywhere, anytime, any crisis 24/7;    OR SEE www.crisistextline.org     National Suicide Prevention Lifeline: 067-711-AVEA (487-516-7973)    Poison Control Center - [phone]    CHILD: [facility] needs assessment team - [phone]     [facility] Lifeline - 1-658.574.7398; or [facility] Lifeline - 1-246.700.4520    If you have a medical emergency please call 911or go to the nearest ER.                    _____________________________________________    Again thank you for choosing PSYCHIATRY CLINIC and please let us know how we can  best partner with you to improve you and your family's health.  You may be receiving a survey regarding this appointment. We would love to have your feedback, both positive and negative. The survey is done by an external company, so your answers are anonymous.

## 2020-01-31 NOTE — PROGRESS NOTES
"     [facility]  Psychiatry Clinic  PROGRESS NOTE     Date of initial Diagnostic Assessment (DA) is 3/18/19 and most recent Transfer of Care DA is 08/28/19.    CARE TEAM:  PCP- Babar Mckinney    Oncology- [facility], Therapist- [facility]rology-[facility].              Los Huang is a 64 year old male who prefers the name \"Solo\" & pronouns he, him, his, himself.      Pertinent Background:  This patient first experienced mental health issues around the age of 28 and has received treatment for Bipolar I Disorder with severe manic episodes accompanied by psychosis.  Notably, had a major first psychotic carlos in 1983, subsequent to which he took Navane until 1994.  That is also the year that he moved back to [place] (where he grew up and attended college) from [place] where he'd lived for four years and had  his wife in 1992 after 16 years of marriage.  Retained joint custody of their two kids.  Back in [place], lived with his mother until her transition into a nursing home in 2017.  Has struggled with what he describes as having been \"dozens\" of psychiatric hospitalizations, estimating that he's spent approximately two cumulative years of his life on an inpatient psychiatric unit.  Has been on disability since 2001.  Had a 7-year stint on Clozaril without hospitalizations from [id number] however this medication unfortunately had to be discontinued after a notable drop in WBC's occurred.  In 2014 was diagnosed with stage IV colon cancer and was told that his life expectancy would be five years, which he reached in May 2019.  Continues to work closely with oncology.  In December 2018 states he sought out a three week psychiatric admission due to feeling trapped and depressed in his assisted living facility, back in [place], subsequent to which he has been brought to live in [place] by his two adult children who live in the [facility]. 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    Psych critical item history includes suicide attempt [multiple], psychosis [sxs include paranoia, IOR, AH, thought insertion], mutiple psychotropic trials, trauma hx, psych hosp (>5) and SUBSTANCE USE: cannabis and acid in the 1970s. Note: no specific traumatic anniversary dates, but is often hospitalized for carlos around Thanksgiving and Chloe holidays.    INTERIM HISTORY      [4, 4]   The patient reports good treatment adherence.  History was provided by the patient who was a fair but often overinclusive historian.    The last visit ended with no change to the med regimen.   Since the last visit:   Solo reports he was briefly in the hospital over the past weekend for what the EMR describes as delusions but he describes as stress. He got bad news about his mother's health, his daughter brought him to the ED, and he \"put on a show\" for the staff in the ED, talking about \"weird things.\" He was also upset that staff asked him so many in-depth questions about drug use from decades ago. He was admitted for 3 days. No medication changes were made apart from stopping Klonopin (switched to Ativan while he was in the hospital, then stopped altogether on discharge) and Cogentin completely held while he was in the hospital. He also had a prolactin level drawn, it was 26 (top of the normal range is 13) though there was no comment made on this value in the documents from [place]. Patient denies any gynecomastia or decreased sex drive. He has been taking 0.5 mg at bedtime of Cogentin since discharge and has taken 1 dose of Klonopin.    He does report that his mother passed away this morning. He feels he is coping well with it, feeling it was harder to leave her in [place] over a year ago.     RECENT PSYCH ROS:   Depression:  poor concentration /memory and indecisiveness  Elevated:  none  Psychosis:  disorganized speech (difficulty " "communicating) at times, denies delusions or paranoia  Anxiety:  excessive worry and feeling fearful  Panic Attack:  none  Trauma Related:  none  Dysregulation:  none  Eating Disorder: no     Pertinent Negative Symptoms:  NO suicidal and violent ideation, aggression, hallucinations and carlos    RECENT SUBSTANCE USE:     ALCOHOL- 1-2 occasions 1-2 times a month   TOBACCO- none  CAFFEINE- 3-4 cups of coffee per day  OPIOIDS- none         NARCAN KIT- N/A        CANNABIS- none          OTHER ILLICIT DRUGS- none    CURRENT SOCIAL HISTORY:  FINANCIAL SUPPORT- on disability since 2001, also family, savings  CHILDREN- a son Juventino (40 years old) who works for the [employer], and a daughter Yuliet (35 years old) who works for \"[employer]\" - also has two granddaughters  LIVING SITUATION- apartment in [place]  SOCIAL/ SPIRITUAL SUPPORT- daughter, son, grand kids, sisters, artistic/avocational pursuits, i.e. 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 Resident transition. Cogentin decreased.  10/30: Trazodone discontinued to target dry mouth. Patient had increased Cogentin back to previous dose. Sleep Medicine referral placed for waking up gasping at night.  1/31/2020: Klonopin decreased to 0.25 mg per day, Cogentin stopped.    PAST MED TRIALS        See last Diagnostic Assessment  MEDICAL / SURGICAL HISTORY          Patient Active Problem List   Diagnosis     Metastatic colon cancer to liver (H)     Bipolar 1 disorder (H)       Major Surgery-   Past Surgical History:   Procedure Laterality Date     ABDOMEN SURGERY       BIOPSY       CHOLECYSTECTOMY       COLONOSCOPY       GENITOURINARY SURGERY  1997    Ureteroscopy gone awry     ORTHOPEDIC SURGERY " "        MEDICAL REVIEW OF SYSTEMS    [2, 10]     A comprehensive review of systems was performed and is negative other than noted in the HPI.    ALLERGY       Animal dander  MEDICATIONS        Current Outpatient Medications   Medication Sig Dispense Refill     atomoxetine (STRATTERA) 18 MG capsule Take 1 capsule (18 mg) by mouth daily 30 capsule 1     cholecalciferol (VITAMIN D3) 1000 units (25 mcg) capsule Take 1 capsule (1,000 Units) by mouth daily 90 capsule 3     clonazePAM (KLONOPIN) 0.5 MG tablet Take 0.5 tablets (0.25 mg) by mouth daily 15 tablet 1     divalproex sodium delayed-release (DEPAKOTE) 500 MG DR tablet Take 2 tablets (1,000 mg) by mouth At Bedtime 60 tablet 3     Homeopathic Products (ARNICARE) GEL Externally apply 1 Dose topically daily as needed (leg cramps)       multivitamin (CENTRUM SILVER) tablet Take 1 tablet by mouth daily 90 tablet 3     VITALS      [3, 3]   /80   Pulse 88   Wt 93.9 kg (207 lb)   BMI 26.57 kg/m     MENTAL STATUS EXAM      [9, 14 cog gs]     Alertness: alert  and oriented  Appearance: well groomed and neatly dressed  Behavior/Demeanor: cooperative and pleasant, with good  eye contact   Speech: Slow, frequent pauses, Southern accent  Language: no obvious problem  Psychomotor: normal or unremarkable  Mood: \"OK\"  Affect: restricted; was congruent to mood; was congruent to content, no grandiosity today  Thought Process/Associations: overinclusive  and circumstantial at times  Thought Content:  Reports none;  Denies suicidal and violent ideation and paranoid ideation  Perception:  Reports none;  Denies auditory hallucinations and visual hallucinations  Insight: limited  Judgment: fair and adequate for safety  Cognition: (6) does  appear grossly intact; formal cognitive testing was not done  Gait and Station: unremarkable    LABS and DATA     AIMS:  due at next appointment    PHQ9 TODAY = 3  PHQ-9 SCORE 5/31/2019 8/28/2019 10/30/2019   PHQ-9 Total Score 3 3 5 "     ANTIPSYCHOTIC LABS  [glu, A1C, lipids (eddie LDL), liver enzymes, WBC, ANEU, Hgb, plts]  q12 mo  Recent Labs   Lab Test 12/13/19  1558 07/25/19  1649 06/07/19  1609 04/17/19  0528   * 92 91 77   A1C  --  5.5  --   --      Recent Labs   Lab Test 07/25/19  1649 07/18/19  1216   CHOL 186 199   TRIG 181* 152*   * 129*   HDL 40 40     Recent Labs   Lab Test 12/13/19  1558 07/25/19  1649 06/07/19  1609 04/17/19  0528   AST 18 21 29 26   ALT 56 51 54 49   ALKPHOS 85 75 87 79     Recent Labs   Lab Test 12/13/19  1558 07/25/19  1649 06/07/19  1609 04/17/19  0528 02/14/19  1816   WBC 6.3 6.0 5.5 5.7 6.4   ANEU 3.5 3.1  --  2.9 4.1   HGB 15.2 14.7 13.7 14.2 15.3    162 143* 164 155     Recent Labs   Lab Test 12/13/19  1627 12/13/19  1558 07/25/19  1649 06/07/19  1609   CR  --  0.90 1.01 1.11   GFRESTIMATED 67 89 77 69       VALPROIC ACID LABS     [liver enzymes, WBC, ANEU, Hgb, plts, +ammonia]  q6-12 mo  Recent Labs   Lab Test 07/25/19  1649 04/17/19  1239   ACE 59 47*       PSYCHOTROPIC DRUG INTERACTIONS     Serotonin Modulators may enhance the adverse/toxic effect of Antipsychotic Agents. Specifically, serotonin modulators may enhance dopamine blockade, possibly increasing the risk for neuroleptic malignant syndrome. Antipsychotic Agents may enhance the serotonergic effect of Serotonin Modulators. This could result in serotonin syndrome.     CNS Depressants may enhance the adverse/toxic effect of other CNS Depressants.     Valproate Products may enhance the adverse/toxic effect of RisperiDONE. Generalized edema has developed.     ClonazePAM may diminish the therapeutic effect of Valproate Products. Valproate Products may decrease the serum concentration of ClonazePAM. ClonazePAM may increase the serum concentration of Valproate Products.     Anticholinergic Agents may enhance the adverse/toxic effect of other Anticholinergic Agents.     QT-prolonging Agents may enhance the QTc-prolonging effect of  "QT-prolonging Agents.     MANAGEMENT:  Monitoring for adverse effects, routine vitals, routine labs, using lowest therapeutic dose of [Klonopin] and patient is aware of risks    RISK STATEMENT for SAFETY     Los Huang did not appear to be an imminent safety risk to self or others.    PSYCHIATRIC DIAGNOSIS     Bipolar Disorder, type I, most recent episode depressed, currently in remission  R/o ADHD     ASSESSMENT      [m2, h3]     TODAY Solo Huang, 64M with PMHx including stage IV colon cancer with liver mets s/p surgery and chemotherapy, returns to the [facility] for ongoing management of Bipolar I. He had a brief hospitalization at [facility] last weekend for delusions after hearing that his mother was not doing well; he got news that she passed away earlier today. Denies any mood symptoms or psychosis recently, states he \"put on a show\" at [facility], where they stopped his Klonopin and found him to have a slightly elevated prolactin level. He is interested in switching or decreasing Risperdal, and also wants to decrease Cogentin due to dry mouth. It is possible that he could require a lower dose of Cogentin at a lower dose of Risperdal, but the latter would have to be decreased slowly while watching for emerging mood or psychotic symptoms. If possible, could increase Depakote dose to compensate. Given patient's age and fall risk, will begin slow Klonopin taper as recommended at [facility], given that pt had been taking this daily prior to hospitalization.     PLAN      [m2, h3]     1) PSYCHOTROPIC MEDICATIONS:   Continue Depakote DR 1000 mg PO qHS.  Continue risperidone 4 mg at bedtime.  Continue Strattera 18 mg daily.  Decrease clonazepam to 0.25 mg daily PRN.  Stop benztropine, may go back to 0.5 mg PO at bedtime if stiffness recurs  Take melatonin 1 mg PO daily with dinner.    2) [place]  was checked today: indicates expected use in accordance with chart review/patient report.    3) " THERAPY:    continue    4) NEXT DUE:    Labs- CMP, CBC, lipids, HbA1c, VPA level ordered and pending  EKG- PRN  Rating Scales- AIMS due    5) REFERRALS:    No Referrals needed     6) RTC: 1 month    7) CRISIS NUMBERS:   Provided routinely in AVS   CRISIS TEXT LINE: Text [id number] from anywhere in [place], anytime, any crisis 24/7;  OR SEE www.crisistextline.org  ONLY if a [facility]: [facility], located within [facility] [phone] (clinic), [phone] (after hours)     TREATMENT RISK STATEMENT:  The risks, benefits, alternatives and potential adverse effects have been discussed and are understood by the pt. The pt understands the risks of using street drugs or alcohol. There are no medical contraindications, the pt agrees to treatment with the ability to do so. The pt knows to call the clinic for any problems or to access emergency care if needed.  Medical and substance use concerns are documented above.  Psychotropic drug interaction check was done, including changes made today.    PROVIDER: Rahel Colon MD    Patient staffed in clinic with Dr. Pardo who will sign the note.  Supervisor is Dr. Munguia.    Supervisor Attestation:  I saw the patient with the resident, and participated in key portions of the service, including the mental status examination and developing the plan of care. I reviewed key portions of the history with the resident. I agree with the findings and plan as documented in this note.    Mignon Pardo MD

## 2020-02-04 RX ORDER — RISPERIDONE 4 MG/1
4 TABLET ORAL AT BEDTIME
Qty: 30 TABLET | Refills: 1 | Status: SHIPPED | OUTPATIENT
Start: 2020-02-04 | End: 2020-04-17

## 2020-03-05 ENCOUNTER — PRE VISIT (OUTPATIENT)
Dept: UROLOGY | Facility: CLINIC | Age: 65
End: 2020-03-05

## 2020-03-05 DIAGNOSIS — F31.9 BIPOLAR 1 DISORDER (H): ICD-10-CM

## 2020-03-05 NOTE — TELEPHONE ENCOUNTER
Chief Complaint : kidney lesion     Records/Orders: record in epic     Pt Contacted: no    At Rooming: normal

## 2020-03-06 RX ORDER — DIVALPROEX SODIUM 500 MG/1
TABLET, DELAYED RELEASE ORAL
Qty: 60 TABLET | Refills: 0 | OUTPATIENT
Start: 2020-03-06

## 2020-03-08 DIAGNOSIS — F31.9 BIPOLAR 1 DISORDER (H): ICD-10-CM

## 2020-03-09 ENCOUNTER — OFFICE VISIT (OUTPATIENT)
Dept: INTERNAL MEDICINE | Facility: CLINIC | Age: 65
End: 2020-03-09
Payer: MEDICARE

## 2020-03-09 ENCOUNTER — ANCILLARY PROCEDURE (OUTPATIENT)
Dept: GENERAL RADIOLOGY | Facility: CLINIC | Age: 65
End: 2020-03-09
Attending: INTERNAL MEDICINE
Payer: MEDICARE

## 2020-03-09 VITALS
SYSTOLIC BLOOD PRESSURE: 106 MMHG | DIASTOLIC BLOOD PRESSURE: 80 MMHG | TEMPERATURE: 97.3 F | RESPIRATION RATE: 18 BRPM | HEART RATE: 89 BPM | BODY MASS INDEX: 26.31 KG/M2 | WEIGHT: 205 LBS | OXYGEN SATURATION: 97 %

## 2020-03-09 DIAGNOSIS — C78.7 METASTATIC COLON CANCER TO LIVER (H): ICD-10-CM

## 2020-03-09 DIAGNOSIS — M19.112 POST-TRAUMATIC OSTEOARTHRITIS OF LEFT SHOULDER: Primary | ICD-10-CM

## 2020-03-09 DIAGNOSIS — F90.9 ATTENTION DEFICIT HYPERACTIVITY DISORDER (ADHD), UNSPECIFIED ADHD TYPE: ICD-10-CM

## 2020-03-09 DIAGNOSIS — R19.7 DIARRHEA, UNSPECIFIED TYPE: ICD-10-CM

## 2020-03-09 DIAGNOSIS — Z23 NEED FOR 23-POLYVALENT PNEUMOCOCCAL POLYSACCHARIDE VACCINE: ICD-10-CM

## 2020-03-09 DIAGNOSIS — M19.112 POST-TRAUMATIC OSTEOARTHRITIS OF LEFT SHOULDER: ICD-10-CM

## 2020-03-09 DIAGNOSIS — Z90.49 H/O PARTIAL RESECTION OF COLON: ICD-10-CM

## 2020-03-09 DIAGNOSIS — C18.9 METASTATIC COLON CANCER TO LIVER (H): ICD-10-CM

## 2020-03-09 PROCEDURE — 73030 X-RAY EXAM OF SHOULDER: CPT | Mod: LT

## 2020-03-09 PROCEDURE — 90732 PPSV23 VACC 2 YRS+ SUBQ/IM: CPT | Performed by: INTERNAL MEDICINE

## 2020-03-09 PROCEDURE — G0009 ADMIN PNEUMOCOCCAL VACCINE: HCPCS | Performed by: INTERNAL MEDICINE

## 2020-03-09 PROCEDURE — 99214 OFFICE O/P EST MOD 30 MIN: CPT | Mod: 25 | Performed by: INTERNAL MEDICINE

## 2020-03-09 RX ORDER — DIVALPROEX SODIUM 500 MG/1
TABLET, DELAYED RELEASE ORAL
Qty: 60 TABLET | Refills: 0 | OUTPATIENT
Start: 2020-03-09

## 2020-03-09 RX ORDER — BENZTROPINE MESYLATE 0.5 MG/1
0.5 TABLET ORAL
COMMUNITY
Start: 2020-01-03 | End: 2020-03-19

## 2020-03-09 RX ORDER — DIPHENOXYLATE HCL/ATROPINE 2.5-.025MG
1 TABLET ORAL 4 TIMES DAILY PRN
Qty: 30 TABLET | Refills: 3 | Status: ON HOLD | OUTPATIENT
Start: 2020-03-09 | End: 2020-08-05

## 2020-03-09 NOTE — PROGRESS NOTES
Subjective     Los Huang is a 64 year old male who presents to clinic today for the following health issues:       Post-traumatic osteoarthritis of left shoulder  Need for 23-polyvalent pneumococcal polysaccharide vaccine  Attention deficit hyperactivity disorder (ADHD), unspecified ADHD type  Diarrhea, unspecified type  H/O partial resection of colon  Metastatic colon cancer to liver (H)     This is an interesting gentleman and this was an interesting appointment. When I first met this patient I was under the impression that he was dealing with a stage 4 metastatic cancer and wasn't long for this world. Seeing him today puts that picture in some dispute and need for reconsideration. He had / has benefit getting his oncology care with Patrice Srivastava , hematologist with [facility] and from the sound of things we are starting to consider his tumor [ with metastatic disease to the liver ] as more of a neuroendocrine tumor which can have a far slower rate of growth. His overall estimated life expectancy is entirely uncertain to me now.    Goals of this office visit are    1. Annual wellness visit ? He's concerned about coverage so he opted out of this title to the appointment today  2. A lot of shoulder - history of shoulder surgery 2012 with left shoulder - broke it as a kid falling out of bed, had a decompression.  Hard to play the acoustic guitar.  Is taking ibuprofen or acetaminophen , he's trying to avoid steroid injection =s because it affects his mood swings / mental health issues. Partial colon resection and have had mushy stools / diarrhea - since June of 2014. Armodafinil (Nuvigil) was started 5 years ago, for attention deficit and hyperactivity disorder , also on Strattera  (atomoxetine HCl) and no longer taking Armodafinil (Nuvigil). He's got questions. He wants a referral to the [facility] but eschews the ideas of seeing an orthopedist surgeon     3. He  does wonder about diarrhea secondary to psychiatric / psychotropic drugs ? he already did have medication therapy management consult and declines more of this.   Recommended to see a nutritionist / dietician to review his partial colon resection diet although was willing to try some diphenoxylate-atropine (LOMOTIL) to start, see orders section of this encounter     Finally has a wart on his left anterior aspect of his thigh and we recommended trying compound W  For this    Patient Active Problem List   Diagnosis     Metastatic colon cancer to liver (H)     Bipolar 1 disorder (H)     Attention deficit hyperactivity disorder (ADHD), unspecified ADHD type     Diarrhea, unspecified type     H/O partial resection of colon     Post-traumatic osteoarthritis of left shoulder     Past Surgical History:   Procedure Laterality Date     ABDOMEN SURGERY       BIOPSY       CHOLECYSTECTOMY       COLONOSCOPY       GENITOURINARY SURGERY      Ureteroscopy gone awry     ORTHOPEDIC SURGERY         Social History     Tobacco Use     Smoking status: Former Smoker     Packs/day: 1.00     Years: 34.00     Pack years: 34.00     Types: Cigarettes     Start date:      Last attempt to quit:      Years since quittin.1     Smokeless tobacco: Former User     Types: Snuff     Quit date:      Tobacco comment: Smoked sporadically in high school and during college until    Substance Use Topics     Alcohol use: Not Currently     Frequency: Monthly or less     Comment: slight     Family History   Problem Relation Age of Onset     Osteoporosis Sister      Thyroid Disease Sister         Thyroid killed     Thyroid Disease Sister         Thyroid removed         Current Outpatient Medications   Medication Sig Dispense Refill     atomoxetine (STRATTERA) 18 MG capsule Take 1 capsule (18 mg) by mouth daily 30 capsule 1     benztropine (COGENTIN) 0.5 MG tablet Take 0.5 mg by mouth       cholecalciferol (VITAMIN D3) 1000 units (25 mcg)  capsule Take 1 capsule (1,000 Units) by mouth daily 90 capsule 3     clonazePAM (KLONOPIN) 0.5 MG tablet Take 0.5 tablets (0.25 mg) by mouth daily 15 tablet 1     diphenoxylate-atropine (LOMOTIL) 2.5-0.025 MG tablet Take 1 tablet by mouth 4 times daily as needed for diarrhea 30 tablet 3     divalproex sodium delayed-release (DEPAKOTE) 500 MG DR tablet Take 2 tablets (1,000 mg) by mouth At Bedtime 60 tablet 3     Homeopathic Products (ARNICARE) GEL Externally apply 1 Dose topically daily as needed (leg cramps)       multivitamin (CENTRUM SILVER) tablet Take 1 tablet by mouth daily 90 tablet 3     risperiDONE (RISPERDAL) 4 MG tablet Take 1 tablet (4 mg) by mouth At Bedtime 30 tablet 1     Allergies   Allergen Reactions     Animal Dander Other (See Comments)     Sneezing  Sneezing     Recent Labs   Lab Test 12/13/19  1627 12/13/19  1558 07/25/19  1649 07/18/19  1216 06/07/19  1609   A1C  --   --  5.5  --   --    LDL  --   --  110* 129*  --    HDL  --   --  40 40  --    TRIG  --   --  181* 152*  --    ALT  --  56 51  --  54   CR  --  0.90 1.01  --  1.11   GFRESTIMATED 67 89 77  --  69   GFRESTBLACK 81 >90 90  --  80   POTASSIUM  --  4.0 4.1  --  4.1      BP Readings from Last 3 Encounters:   03/09/20 106/80   01/31/20 122/80   01/16/20 102/69    Wt Readings from Last 3 Encounters:   03/09/20 93 kg (205 lb)   01/31/20 93.9 kg (207 lb)   01/16/20 94.9 kg (209 lb 3.2 oz)               Reviewed and updated as needed this visit by Provider         Review of Systems   ROS COMP: Constitutional, HEENT, cardiovascular, pulmonary, gi and gu systems are negative, except as otherwise noted.      Objective    /80   Pulse 89   Temp 97.3  F (36.3  C) (Oral)   Resp 18   Wt 93 kg (205 lb)   SpO2 97%   BMI 26.31 kg/m    Body mass index is 26.31 kg/m .  Physical Exam   GENERAL: healthy, alert and no distress  EYES: Eyes grossly normal to inspection, PERRL and conjunctivae and sclerae normal  MS: no gross musculoskeletal  defects noted, no edema  NEURO: Normal strength and tone, mentation intact and speech normal  PSYCH: mentation appears normal, affect normal/bright    Diagnostic Test Results:  Orders Placed This Encounter   Procedures     XR Shoulder Left G/E 3 Views     Pneumococcal vaccine 23 valent PPSV23  (Pneumovax) [14615]     [facility], AND [facility] REFERRAL             Assessment & Plan     (M19.112) Post-traumatic osteoarthritis of left shoulder  (primary encounter diagnosis)  Comment: this patient has a very deep seated and chronic left shoulder condition . Seeing [facility] and having a baseline xray are good logical starting points.  Plan: [facility], AND [facility]CTIC REFERRAL, XR         Shoulder Left G/E 3 Views        Further follow up depending on how things go     (Z23) Need for 23-polyvalent pneumococcal polysaccharide vaccine  Comment: due and administered today   Plan: Pneumococcal vaccine 23 valent PPSV23          (Pneumovax) [30850]            (F90.9) Attention deficit hyperactivity disorder (ADHD), unspecified ADHD type  Comment: he's continuing with Strattera  (atomoxetine HCl) and seeing mental health   Plan: continue current plan of care      (R19.7) Diarrhea, unspecified type  Comment: he's status post partial colon resection [ ascending colon] and one wonders if this is playing a major role here, recommended for high fiber diet and intermittent / prn use of diphenoxylate-atropine (LOMOTIL) ; consider nutritionist consultation   Plan: diphenoxylate-atropine (LOMOTIL) 2.5-0.025 MG         tablet            (Z90.49) H/O partial resection of colon  Comment: noted as a point of historical importance   Plan: as above     (C18.9,  C78.7) Metastatic colon cancer to liver (H)  Comment: diagnosis seems not entirely clear to me   Plan: will brush past his oncologist        Return in about 6 months (around 9/9/2020).    Babar Mckinney MD  [facility]

## 2020-03-09 NOTE — TELEPHONE ENCOUNTER
Refill denied   Too soon  script faxed to [facility] #[id number]/31/2020 #60 3 refills!!!

## 2020-03-10 ENCOUNTER — MYC MEDICAL ADVICE (OUTPATIENT)
Dept: PSYCHIATRY | Facility: CLINIC | Age: 65
End: 2020-03-10

## 2020-03-10 DIAGNOSIS — F31.9 BIPOLAR 1 DISORDER (H): ICD-10-CM

## 2020-03-10 RX ORDER — DIVALPROEX SODIUM 500 MG/1
1000 TABLET, DELAYED RELEASE ORAL AT BEDTIME
Qty: 60 TABLET | Refills: 3 | Status: SHIPPED | OUTPATIENT
Start: 2020-03-10 | End: 2020-06-14

## 2020-03-10 NOTE — TELEPHONE ENCOUNTER
Last seen: 1/31/20  RTC: 1 month   Cancel: 3/6/20  No-show: 3/6/20  Next appt: 3/23/20    Incoming refill from patient via [employer]     Medication requested: divalproex sodium delayed-release (DEPAKOTE) 500 MG DR tablet   Directions: Take 2 tablets (1,000 mg) by mouth At Bedtime - Oral  Qty: 60  Last refilled: 1/31/20    Per chart review, patient should have refill on file at the pharmacy. Placed a call to pharmacy and spoke with pharmacistKamala who shared that patient's medication was deactivated. Will route to provider for approval.

## 2020-03-10 NOTE — TELEPHONE ENCOUNTER
Meds refilled by provider   Med tab changed to reflect this   Patient notified via [employer]     No further action needed by this writer

## 2020-03-11 ENCOUNTER — HEALTH MAINTENANCE LETTER (OUTPATIENT)
Age: 65
End: 2020-03-11

## 2020-03-13 DIAGNOSIS — F90.1 ATTENTION DEFICIT HYPERACTIVITY DISORDER (ADHD), PREDOMINANTLY HYPERACTIVE TYPE: ICD-10-CM

## 2020-03-13 NOTE — TELEPHONE ENCOUNTER
Last seen: 1/31/20    RTC: 1 month     Cancel: 1     No-show:  1     Next appt: 3/23/20     Incoming refill from [facility] via fax     Medication requested: atomoxetine (STRATTERA) 18 MG capsule    Directions: Take 1 capsule (18 mg) by mouth daily    Qty: 30    Last refilled: 2/13/20 for a quantity of 30      Medication refill pended to provider per refill protocol

## 2020-03-16 ENCOUNTER — MYC MEDICAL ADVICE (OUTPATIENT)
Dept: PSYCHIATRY | Facility: CLINIC | Age: 65
End: 2020-03-16

## 2020-03-16 DIAGNOSIS — F31.9 BIPOLAR 1 DISORDER (H): ICD-10-CM

## 2020-03-16 RX ORDER — CLONAZEPAM 0.5 MG/1
0.25 TABLET ORAL DAILY
Qty: 15 TABLET | Refills: 1 | Status: SHIPPED | OUTPATIENT
Start: 2020-03-16 | End: 2020-05-11

## 2020-03-16 RX ORDER — ATOMOXETINE 18 MG/1
18 CAPSULE ORAL DAILY
Qty: 30 CAPSULE | Refills: 0 | Status: SHIPPED | OUTPATIENT
Start: 2020-03-16 | End: 2020-04-12

## 2020-03-16 NOTE — TELEPHONE ENCOUNTER
Last seen: 1/31/20  RTC: 1 month   Cancel: 3/23/20, 3/6/20  No-show: none   Next appt: none     Incoming refill from patient via MyChart     Medication requested: clonazePAM (KLONOPIN) 0.5 MG tablet   Directions: Take 0.5 tablets (0.25 mg) by mouth daily - Oral   Qty: 15  Last refilled: 1/3 #30, 10/25 #30, 9/13 #30      Medication requested: atomoxetine (STRATTERA) 18 MG capsule  Directions: Take 1 capsule (18 mg) by mouth daily - Oral   Qty: 30  Last refilled: 3/16/20  - Meds refilled by provider today     Will route to provider for approval

## 2020-03-16 NOTE — TELEPHONE ENCOUNTER
Meds refilled by provider   Med tab changed to reflect this     Placed a call to patient to update regarding refill and also to assess for safety. No answer at number provided. Unable to LVM.

## 2020-03-16 NOTE — TELEPHONE ENCOUNTER
Writer placed a call to patient to assess for safety after the previous MyChart message. No answer at number provided. Unable to LVM.

## 2020-03-18 NOTE — TELEPHONE ENCOUNTER
"Writer placed a call to patient at [phone]. No answer at number provided. LVM, updating regarding a refill and also asked he call back to discuss this concerns of \"Somebody is trying to kill me.\" Requested a call back. Clinic number provided.   "

## 2020-03-19 ENCOUNTER — MYC MEDICAL ADVICE (OUTPATIENT)
Dept: PSYCHIATRY | Facility: CLINIC | Age: 65
End: 2020-03-19

## 2020-03-19 DIAGNOSIS — F31.9 BIPOLAR 1 DISORDER (H): Primary | ICD-10-CM

## 2020-03-19 NOTE — TELEPHONE ENCOUNTER
Last seen: 1/31/20  RTC: 1 month   Cancel: 3/23/20, 3/6/20  No-show: 3/6/20  Next appt: none     Incoming refill from patient via [employer]     Medication requested: benztropine (COGENTIN) 0.5 MG tablet   Directions: Take 1 tablet (0.5 mg) by mouth daily - Oral  Qty: 30  Last refilled: 1/30/20    Per last office visit note:    PLAN      [m2, h3]      1) PSYCHOTROPIC MEDICATIONS:   Continue Depakote DR 1000 mg PO qHS.  Continue risperidone 4 mg at bedtime.  Continue Strattera 18 mg daily.  Decrease clonazepam to 0.25 mg daily PRN.  Stop benztropine, may go back to 0.5 mg PO at bedtime if stiffness recurs  Take melatonin 1 mg PO daily with dinner.    Will reach to patient to confirm when patient restarted the medication. Routed to provider for further recommendations.

## 2020-03-20 RX ORDER — BENZTROPINE MESYLATE 0.5 MG/1
0.5 TABLET ORAL DAILY
Qty: 30 TABLET | Refills: 2 | Status: SHIPPED | OUTPATIENT
Start: 2020-03-20 | End: 2020-06-05

## 2020-04-12 ENCOUNTER — MYC REFILL (OUTPATIENT)
Dept: PSYCHIATRY | Facility: CLINIC | Age: 65
End: 2020-04-12

## 2020-04-12 DIAGNOSIS — F90.1 ATTENTION DEFICIT HYPERACTIVITY DISORDER (ADHD), PREDOMINANTLY HYPERACTIVE TYPE: ICD-10-CM

## 2020-04-13 RX ORDER — ATOMOXETINE 18 MG/1
18 CAPSULE ORAL DAILY
Qty: 30 CAPSULE | Refills: 2 | Status: SHIPPED | OUTPATIENT
Start: 2020-04-13 | End: 2020-07-16

## 2020-04-13 NOTE — TELEPHONE ENCOUNTER
Last seen: 1/31  RTC: 1 month  Cancel: 3/23, 3/6  No-show: none   Next appt: none     Incoming refill from patient via [employer]     Medication requested: atomoxetine (STRATTERA) 18 MG capsule   Directions: Take 1 capsule (18 mg) by mouth daily - Oral   Qty: 30  Last refilled: 3/16    Will route to provider for approval due to multiple cancellation in appt.

## 2020-04-13 NOTE — TELEPHONE ENCOUNTER
Meds refilled by provider   Med tab changed to reflect this   Patient notified via [employer]     No further action needed by this writer

## 2020-04-17 ENCOUNTER — MYC REFILL (OUTPATIENT)
Dept: PSYCHIATRY | Facility: CLINIC | Age: 65
End: 2020-04-17

## 2020-04-17 DIAGNOSIS — F31.9 BIPOLAR 1 DISORDER (H): ICD-10-CM

## 2020-04-17 RX ORDER — RISPERIDONE 4 MG/1
4 TABLET ORAL AT BEDTIME
Qty: 30 TABLET | Refills: 0 | Status: SHIPPED | OUTPATIENT
Start: 2020-04-17 | End: 2020-05-11

## 2020-04-17 NOTE — TELEPHONE ENCOUNTER
Last seen: 1/31  RTC: 1 month  Non-provider cancel: 3/23 Cancelled via MyChart, 3/6 Cancelled via MyChart  No-show: 3/6  Next appt: None     Incoming refill from patient via [employer]     Medication requested:   Disp  Refills  Start  End  ILIANA    risperiDONE (RISPERDAL) 4 MG tablet  30 tablet  1  2/4/2020   --    Sig - Route: Take 1 tablet (4 mg) by mouth At Bedtime     Refill routed to provider due to refill protocol (cancellation, no-show, outside RTC window).  Writer routed message to scheduling to contact patient for appointment.        04/17/20 1125  Pend  Kain Liriano RN    E-Prescribing Status: Receipt confirmed by pharmacy (4/17/2020  1:14 PM CDT)   Routed message to pt via [employer].        =========================================        ----- Message -----   From: Muhumed, Yasmin   Sent: 4/17/2020   1:49 PM CDT   To: [facility] Call Center     Phone Message     May a detailed message be left on voicemail: yes     Reason for Call: Medication Refill Request     Has the patient contacted the pharmacy for the refill? Yes   Name of medication being requested: Risperidone 4mg   Provider who prescribed the medication: Rahel Colon   Pharmacy: [facility] in [place]   Date medication is needed: in a week         Action Taken: Other: nurse pool     Travel Screening: Not Applicable     Writer left voice mail message for pt, identifying that the refill had been sent.

## 2020-05-09 ENCOUNTER — MYC MEDICAL ADVICE (OUTPATIENT)
Dept: INTERNAL MEDICINE | Facility: CLINIC | Age: 65
End: 2020-05-09

## 2020-05-11 ENCOUNTER — MYC REFILL (OUTPATIENT)
Dept: PSYCHIATRY | Facility: CLINIC | Age: 65
End: 2020-05-11

## 2020-05-11 DIAGNOSIS — F31.9 BIPOLAR 1 DISORDER (H): ICD-10-CM

## 2020-05-11 NOTE — TELEPHONE ENCOUNTER
Dr. Mckinney,  Please see [employer] message below and advise. Ok to schedule a face to face wellness exam?    Ludmila Fatima RN  [facility]

## 2020-05-11 NOTE — TELEPHONE ENCOUNTER
The current state of Wellness physical exams is that we can do these via video office visits currently. A face to face encounter for an annual wellness visit is supposed to be after July 6th I thought.    Seems to me video office visit or telephone for now. Face to face encounter after July 6th. See what patient prefers.    Does patient have a compelling reason for face to face encounter ? If he simply prefers this I guess it's ok     Babar Mckinney MD

## 2020-05-11 NOTE — TELEPHONE ENCOUNTER
Last seen: 1/31  RTC: 1 month    Cancel: 4/29, 3/23, 3/6  No-show: 3/6  Next appt: none     Incoming refill from patient via MyChart     Medication requested: risperiDONE (RISPERDAL) 4 MG tablet   Directions: Take 1 tablet (4 mg) by mouth At Bedtime - Oral   Qty: 30  Last refilled: 4/17    Medication requested: clonazePAM (KLONOPIN) 0.5 MG tablet   Directions: Take 0.5 tablets (0.25 mg) by mouth daily - Oral   Qty: 15  Last refilled: 4/13 #1, 3/16 #15, 1/3 #30    Per last office visit note:  PLAN      [m2, h3]      1) PSYCHOTROPIC MEDICATIONS:   Continue Depakote DR 1000 mg PO qHS.  Continue risperidone 4 mg at bedtime.  Continue Strattera 18 mg daily.  Decrease clonazepam to 0.25 mg daily PRN.  Stop benztropine, may go back to 0.5 mg PO at bedtime if stiffness recurs  Take melatonin 1 mg PO daily with dinner.    Will route to provider for approval

## 2020-05-12 RX ORDER — RISPERIDONE 4 MG/1
4 TABLET ORAL AT BEDTIME
Qty: 30 TABLET | Refills: 1 | Status: SHIPPED | OUTPATIENT
Start: 2020-05-12 | End: 2020-07-16

## 2020-05-12 RX ORDER — CLONAZEPAM 0.5 MG/1
0.25 TABLET ORAL DAILY
Qty: 15 TABLET | Refills: 1 | Status: SHIPPED | OUTPATIENT
Start: 2020-05-12 | End: 2020-07-16

## 2020-05-12 NOTE — TELEPHONE ENCOUNTER
- Meds refilled by provider   - Med tab changed to reflect this   - Patient notified via [employer]     No further action needed by this writer

## 2020-05-15 ENCOUNTER — MYC MEDICAL ADVICE (OUTPATIENT)
Dept: PSYCHIATRY | Facility: CLINIC | Age: 65
End: 2020-05-15

## 2020-05-26 ENCOUNTER — ANCILLARY PROCEDURE (OUTPATIENT)
Dept: CT IMAGING | Facility: CLINIC | Age: 65
End: 2020-05-26
Attending: INTERNAL MEDICINE
Payer: MEDICARE

## 2020-05-26 DIAGNOSIS — C78.7 METASTATIC COLON CANCER TO LIVER (H): ICD-10-CM

## 2020-05-26 DIAGNOSIS — C18.9 METASTATIC COLON CANCER TO LIVER (H): ICD-10-CM

## 2020-05-26 DIAGNOSIS — N28.9 RENAL LESION: ICD-10-CM

## 2020-05-26 LAB
ALBUMIN SERPL-MCNC: 3.6 G/DL (ref 3.4–5)
ALP SERPL-CCNC: 72 U/L (ref 40–150)
ALT SERPL W P-5'-P-CCNC: 41 U/L (ref 0–70)
ANION GAP SERPL CALCULATED.3IONS-SCNC: 5 MMOL/L (ref 3–14)
AST SERPL W P-5'-P-CCNC: 17 U/L (ref 0–45)
BASOPHILS # BLD AUTO: 0.1 10E9/L (ref 0–0.2)
BASOPHILS NFR BLD AUTO: 1.2 %
BILIRUB SERPL-MCNC: 0.3 MG/DL (ref 0.2–1.3)
BUN SERPL-MCNC: 19 MG/DL (ref 7–30)
CALCIUM SERPL-MCNC: 8.2 MG/DL (ref 8.5–10.1)
CEA SERPL-MCNC: <0.5 UG/L (ref 0–2.5)
CHLORIDE SERPL-SCNC: 106 MMOL/L (ref 94–109)
CO2 SERPL-SCNC: 26 MMOL/L (ref 20–32)
CREAT BLD-MCNC: 1 MG/DL (ref 0.66–1.25)
CREAT SERPL-MCNC: 0.93 MG/DL (ref 0.66–1.25)
DIFFERENTIAL METHOD BLD: NORMAL
EOSINOPHIL # BLD AUTO: 0.2 10E9/L (ref 0–0.7)
EOSINOPHIL NFR BLD AUTO: 4.1 %
ERYTHROCYTE [DISTWIDTH] IN BLOOD BY AUTOMATED COUNT: 13 % (ref 10–15)
GFR SERPL CREATININE-BSD FRML MDRD: 75 ML/MIN/{1.73_M2}
GFR SERPL CREATININE-BSD FRML MDRD: 84 ML/MIN/{1.73_M2}
GLUCOSE SERPL-MCNC: 99 MG/DL (ref 70–99)
HCT VFR BLD AUTO: 42.6 % (ref 40–53)
HGB BLD-MCNC: 14.5 G/DL (ref 13.3–17.7)
IMM GRANULOCYTES # BLD: 0 10E9/L (ref 0–0.4)
IMM GRANULOCYTES NFR BLD: 0.2 %
LYMPHOCYTES # BLD AUTO: 1.5 10E9/L (ref 0.8–5.3)
LYMPHOCYTES NFR BLD AUTO: 31.2 %
MCH RBC QN AUTO: 30.1 PG (ref 26.5–33)
MCHC RBC AUTO-ENTMCNC: 34 G/DL (ref 31.5–36.5)
MCV RBC AUTO: 88 FL (ref 78–100)
MONOCYTES # BLD AUTO: 0.5 10E9/L (ref 0–1.3)
MONOCYTES NFR BLD AUTO: 10.2 %
NEUTROPHILS # BLD AUTO: 2.6 10E9/L (ref 1.6–8.3)
NEUTROPHILS NFR BLD AUTO: 53.1 %
NRBC # BLD AUTO: 0 10*3/UL
NRBC BLD AUTO-RTO: 0 /100
PLATELET # BLD AUTO: 174 10E9/L (ref 150–450)
POTASSIUM SERPL-SCNC: 3.7 MMOL/L (ref 3.4–5.3)
PROT SERPL-MCNC: 6.6 G/DL (ref 6.8–8.8)
RBC # BLD AUTO: 4.82 10E12/L (ref 4.4–5.9)
SODIUM SERPL-SCNC: 138 MMOL/L (ref 133–144)
WBC # BLD AUTO: 4.9 10E9/L (ref 4–11)

## 2020-05-26 PROCEDURE — 82378 CARCINOEMBRYONIC ANTIGEN: CPT | Performed by: INTERNAL MEDICINE

## 2020-05-26 RX ORDER — HEPARIN SODIUM (PORCINE) LOCK FLUSH IV SOLN 100 UNIT/ML 100 UNIT/ML
5 SOLUTION INTRAVENOUS ONCE
Status: COMPLETED | OUTPATIENT
Start: 2020-05-26 | End: 2020-05-26

## 2020-05-26 RX ORDER — IOPAMIDOL 755 MG/ML
126 INJECTION, SOLUTION INTRAVASCULAR ONCE
Status: COMPLETED | OUTPATIENT
Start: 2020-05-26 | End: 2020-05-26

## 2020-05-26 RX ADMIN — HEPARIN SODIUM (PORCINE) LOCK FLUSH IV SOLN 100 UNIT/ML 5 ML: 100 SOLUTION at 14:08

## 2020-05-26 RX ADMIN — IOPAMIDOL 126 ML: 755 INJECTION, SOLUTION INTRAVASCULAR at 13:50

## 2020-05-28 ENCOUNTER — VIRTUAL VISIT (OUTPATIENT)
Dept: ONCOLOGY | Facility: CLINIC | Age: 65
End: 2020-05-28
Attending: INTERNAL MEDICINE
Payer: MEDICARE

## 2020-05-28 DIAGNOSIS — C18.9 METASTATIC COLON CANCER TO LIVER (H): Primary | ICD-10-CM

## 2020-05-28 DIAGNOSIS — C78.7 METASTATIC COLON CANCER TO LIVER (H): Primary | ICD-10-CM

## 2020-05-28 PROBLEM — F19.20 POLYSUBSTANCE DEPENDENCE (H): Status: ACTIVE | Noted: 2020-01-27

## 2020-05-28 PROBLEM — F39 MOOD DISORDER WITH PSYCHOSIS (H): Status: ACTIVE | Noted: 2020-01-27

## 2020-05-28 PROBLEM — F41.1 GENERALIZED ANXIETY DISORDER: Status: ACTIVE | Noted: 2020-01-27

## 2020-05-28 PROBLEM — R19.7 DIARRHEA, UNSPECIFIED TYPE: Status: RESOLVED | Noted: 2020-03-09 | Resolved: 2020-05-28

## 2020-05-28 PROCEDURE — 99214 OFFICE O/P EST MOD 30 MIN: CPT | Mod: 95 | Performed by: INTERNAL MEDICINE

## 2020-05-28 PROCEDURE — 40000114 ZZH STATISTIC NO CHARGE CLINIC VISIT

## 2020-05-28 NOTE — PROGRESS NOTES
"Los Huang is a 64 year old male who is being evaluated via a billable video visit.      The patient has been notified of following:     \"This video visit will be conducted via a call between you and your physician/provider. We have found that certain health care needs can be provided without the need for an in-person physical exam.  This service lets us provide the care you need with a video conversation.  If a prescription is necessary we can send it directly to your pharmacy.  If lab work is needed we can place an order for that and you can then stop by our lab to have the test done at a later time.    Video visits are billed at different rates depending on your insurance coverage.  Please reach out to your insurance provider with any questions.    If during the course of the call the physician/provider feels a video visit is not appropriate, you will not be charged for this service.\"    Patient has given verbal consent for Video visit? Yes    How would you like to obtain your AVS? MyChart    Patient would like the video invitation sent by: Send to e-mail at: [email or web address]    Will anyone else be joining your video visit? No       Patient would like information on support groups for people with cancer.    No vitals taken at home.    Domonique Lu CMA            Video-Visit Details    Type of service:  Video Visit    Video Start Time: 3:51 PM  Video End Time: 4:09 PM    Originating Location (pt. Location): Home    Distant Location (provider location):  [facility] CANCER [facility]     Platform used for Video Visit: Yeimy Srivastava MD        "

## 2020-05-28 NOTE — LETTER
"5/28/2020       RE: Los Huang  [address]  [facility] [address]      Dear Colleague,    Thank you for referring your patient, Los Huang, to the [facility]. Please see a copy of my visit note below.    Los Huang is a 64 year old male who is being evaluated via a billable video visit.      The patient has been notified of following:     \"This video visit will be conducted via a call between you and your physician/provider. We have found that certain health care needs can be provided without the need for an in-person physical exam.  This service lets us provide the care you need with a video conversation.  If a prescription is necessary we can send it directly to your pharmacy.  If lab work is needed we can place an order for that and you can then stop by our lab to have the test done at a later time.    Video visits are billed at different rates depending on your insurance coverage.  Please reach out to your insurance provider with any questions.    If during the course of the call the physician/provider feels a video visit is not appropriate, you will not be charged for this service.\"    Patient has given verbal consent for Video visit? Yes    How would you like to obtain your AVS? MyChart    Patient would like the video invitation sent by: Send to e-mail at: [email or web address]    Will anyone else be joining your video visit? No       Patient would like information on support groups for people with cancer.    No vitals taken at home.    Domonique Lu CMA            Video-Visit Details    Type of service:  Video Visit    Video Start Time: 3:51 PM  Video End Time: 4:09 PM    Originating Location (pt. Location): Home    Distant Location (provider location):  [facility]     Platform used for Video Visit: Yeimy Srivastava MD          Oncology follow up visit:  Date on this visit: 5/28/2020     Los Huang  " is referred by Dr.Jafar Landis for an oncology consultation. He requires evaluation for metastatic colon adenocarcinoma- [facility].    Primary Physician: No Ref-Primary, Physician     History Of Present Illness:    Please see previous notes for detail.  I have copied and updated from prior note.    Mr. Huang is a 64 year old male who was diagnosed with metastatic colorectal cancer in 2014 with oligometastatic disease to liver at the time of diagnosis.  He was treated with ascending colectomy 6/14 and R hepatectomy in 8/14.  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  He completed 10 cycles of FOLFIRI around February of March 2015.), eventually had recurrance of hepatic mets in March 2016.  He was seeking several different opinions from different oncologist at that time so his treatment got delayed and he got cetuximab for 2 cycles during the summer 2016 and he had issues with skin the rash from it. He also had an right upper quadrant abdominal wall met that was resected in Dec 2016.    Since then has been treated with multiple liver directed therapies, initially with Y90 in Dec 2017 but since then with TACE x4, 1/9/18, 6/14/18, 7/10/18 and 8/22/18.        He also has possible Renal cell cancer of the Right lower pole of the kidney which has not been treated.    He recently relocated from [place] to [place].      We did a PET scan on 2/22/2019 which showed 2 FDG avid liver lesions which had enlarged since October 2018.  At that point I referred him to Dr. Landis for possibility of liver directed therapy.  At that point patient was not sure whether he want to pursue more liver directed therapy or not.  He eventually decided on repeating his scans after a few months and he had a repeat CT abdomen and pelvis on 6/7/2019.  The CT scan shows  overall stable disease.  The exophytic lesion in the inferior pole of the right kidney has continued to slowly increase in size and now measures 3 cm as opposed to 2.2 cm in June 2017.  This is concerning for renal cell cancer.     He was seen by Dr. Reyes in July 2019 who offered him microwave ablation via an open procedure but patient was not interested in that.     He was seen by Dr. Chairez from urology in November 2019 for the right kidney inferior pole lesion, likely a renal cell cancer but he opted for watchful waiting.     CT scan in Dec 2019     This is a video visit.  He is doing well. Denies any pain, nausea, vomiting, diarrhea or constipation. No infections. Energy is stable. No neuropathy. No weight loss. No dyspnea. He has the small hernia in abdomen and is reducible. No new swellings.       ECOG 1    ROS:  Rest of the comprehensive review of the system was essentially unremarkable.      I reviewed other history in epic as below.      Past Medical/Surgical History:  Past Medical History:   Diagnosis Date     Cancer (H)      Depressive disorder      Post-traumatic osteoarthritis of left shoulder 3/9/2020     Schizoaffective disorder (H)      Past Surgical History:   Procedure Laterality Date     ABDOMEN SURGERY       BIOPSY       CHOLECYSTECTOMY       COLONOSCOPY       GENITOURINARY SURGERY  1997    Ureteroscopy gone awry     ORTHOPEDIC SURGERY        Bipolar disorder  Schizoaffective dx  Colon cancer    Cancer History:   As above    Allergies:  Allergies as of 05/28/2020 - Reviewed 05/28/2020   Allergen Reaction Noted     Animal dander  02/14/2019     Current Medications:  Current Outpatient Medications   Medication Sig Dispense Refill     atomoxetine (STRATTERA) 18 MG capsule Take 1 capsule (18 mg) by mouth daily 30 capsule 2     benztropine (COGENTIN) 0.5 MG tablet Take 1 tablet (0.5 mg) by mouth daily 30 tablet 2     cholecalciferol (VITAMIN D3) 1000 units (25 mcg) capsule Take 1 capsule (1,000  Units) by mouth daily 90 capsule 3     clonazePAM (KLONOPIN) 0.5 MG tablet Take 0.5 tablets (0.25 mg) by mouth daily 15 tablet 1     diphenoxylate-atropine (LOMOTIL) 2.5-0.025 MG tablet Take 1 tablet by mouth 4 times daily as needed for diarrhea 30 tablet 3     divalproex sodium delayed-release (DEPAKOTE) 500 MG DR tablet Take 2 tablets (1,000 mg) by mouth At Bedtime 60 tablet 3     Homeopathic Products (ARNICARE) GEL Externally apply 1 Dose topically daily as needed (leg cramps)       multivitamin (CENTRUM SILVER) tablet Take 1 tablet by mouth daily 90 tablet 3     risperiDONE (RISPERDAL) 4 MG tablet Take 1 tablet (4 mg) by mouth At Bedtime 30 tablet 1      Family History:  Family History   Problem Relation Age of Onset     Osteoporosis Sister      Thyroid Disease Sister         Thyroid killed     Thyroid Disease Sister         Thyroid removed      No known family history of cancers.  He has a son and a daughter who are healthy.  Social History:  Social History     Socioeconomic History     Marital status:      Spouse name: Not on file     Number of children: Not on file     Years of education: Not on file     Highest education level: Not on file   Occupational History     Not on file   Social Needs     Financial resource strain: Not on file     Food insecurity     Worry: Not on file     Inability: Not on file     Transportation needs     Medical: Not on file     Non-medical: Not on file   Tobacco Use     Smoking status: Former Smoker     Packs/day: 1.00     Years: 34.00     Pack years: 34.00     Types: Cigarettes     Start date:      Last attempt to quit:      Years since quittin.4     Smokeless tobacco: Former User     Types: Snuff     Quit date:      Tobacco comment: Smoked sporadically in high school and during college until    Substance and Sexual Activity     Alcohol use: Not Currently     Frequency: Monthly or less     Comment: slight     Drug use: Not Currently     Types:  Marijuana, Amphetamines, Benzodiazepines, Hashish, LSD, Mescaline, Methaqualone, Methylphenidate, Nitrous oxide, PCP, Psilocybin     Comment: [id number]     Sexual activity: Never   Lifestyle     Physical activity     Days per week: Not on file     Minutes per session: Not on file     Stress: Not on file   Relationships     Social connections     Talks on phone: Not on file     Gets together: Not on file     Attends Rastafarian service: Not on file     Active member of club or organization: Not on file     Attends meetings of clubs or organizations: Not on file     Relationship status: Not on file     Intimate partner violence     Fear of current or ex partner: Not on file     Emotionally abused: Not on file     Physically abused: Not on file     Forced sexual activity: Not on file   Other Topics Concern     Parent/sibling w/ CABG, MI or angioplasty before 65F 55M? No   Social History Narrative     Not on file   He used to smoke but quit in 2003.  He drinks alcohol occasionally.  He was in [place] but recently relocated to [place] and he is going to move in his own apartment tomorrow.  His daughter lives close by.    Physical Exam:  There were no vitals taken for this visit.   Wt Readings from Last 4 Encounters:   03/09/20 93 kg (205 lb)   01/16/20 94.9 kg (209 lb 3.2 oz)   11/14/19 93 kg (205 lb)   07/31/19 93.8 kg (206 lb 14.4 oz)           Constitutional.  Does not seem to be in any acute distress.  Eyes.  No redness or discharge noted.  Respiratory.  Speaking in full sentences.  Breathing seems comfortable without any accessory use of muscles.    Skin.  Visualized his skin does not show any obvious rashes.  Musculoskeletal.  Range of motion for visualized areas is intact.  Neurological.  Alert and oriented x3.  Psychiatric.  Mood, mentation and affect are normal.  Decision making capacity is intact.      The rest of a comprehensive physical examination is deferred due to Public Health Emergency video visit  restrictions.      Laboratory/Imaging Studies    Reviewed  Results for NEVIN DICKERSON (MRN [id number]) as of 5/28/2020 15:57   Ref. Range 5/26/2020 14:04   Sodium Latest Ref Range: 133 - 144 mmol/L 138   Potassium Latest Ref Range: 3.4 - 5.3 mmol/L 3.7   Chloride Latest Ref Range: 94 - 109 mmol/L 106   Carbon Dioxide Latest Ref Range: 20 - 32 mmol/L 26   Urea Nitrogen Latest Ref Range: 7 - 30 mg/dL 19   Creatinine Latest Ref Range: 0.66 - 1.25 mg/dL 0.93   GFR Estimate Latest Ref Range: >60 mL/min/1.73_m2 84   GFR Estimate If Black Latest Ref Range: >60 mL/min/1.73_m2 >90   Calcium Latest Ref Range: 8.5 - 10.1 mg/dL 8.2 (L)   Anion Gap Latest Ref Range: 3 - 14 mmol/L 5   Albumin Latest Ref Range: 3.4 - 5.0 g/dL 3.6   Protein Total Latest Ref Range: 6.8 - 8.8 g/dL 6.6 (L)   Bilirubin Total Latest Ref Range: 0.2 - 1.3 mg/dL 0.3   Alkaline Phosphatase Latest Ref Range: 40 - 150 U/L 72   ALT Latest Ref Range: 0 - 70 U/L 41   AST Latest Ref Range: 0 - 45 U/L 17   Glucose Latest Ref Range: 70 - 99 mg/dL 99   WBC Latest Ref Range: 4.0 - 11.0 10e9/L 4.9   Hemoglobin Latest Ref Range: 13.3 - 17.7 g/dL 14.5   Hematocrit Latest Ref Range: 40.0 - 53.0 % 42.6   Platelet Count Latest Ref Range: 150 - 450 10e9/L 174   RBC Count Latest Ref Range: 4.4 - 5.9 10e12/L 4.82   MCV Latest Ref Range: 78 - 100 fl 88   MCH Latest Ref Range: 26.5 - 33.0 pg 30.1   MCHC Latest Ref Range: 31.5 - 36.5 g/dL 34.0   RDW Latest Ref Range: 10.0 - 15.0 % 13.0   Diff Method Unknown Automated Method   % Neutrophils Latest Units: % 53.1   % Lymphocytes Latest Units: % 31.2   % Monocytes Latest Units: % 10.2   % Eosinophils Latest Units: % 4.1   % Basophils Latest Units: % 1.2   % Immature Granulocytes Latest Units: % 0.2   Nucleated RBCs Latest Ref Range: 0 /100 0   Absolute Neutrophil Latest Ref Range: 1.6 - 8.3 10e9/L 2.6   Absolute Lymphocytes Latest Ref Range: 0.8 - 5.3 10e9/L 1.5   Absolute Monocytes Latest Ref Range: 0.0 - 1.3 10e9/L 0.5   Absolute  Eosinophils Latest Ref Range: 0.0 - 0.7 10e9/L 0.2   Absolute Basophils Latest Ref Range: 0.0 - 0.2 10e9/L 0.1   Abs Immature Granulocytes Latest Ref Range: 0 - 0.4 10e9/L 0.0   Absolute Nucleated RBC Unknown 0.0   CEA Latest Ref Range: 0 - 2.5 ug/L <0.5       EXAMINATION: CT CHEST/ABDOMEN/PELVIS W CONTRAST  5/26/2020 2:02 PM       CLINICAL HISTORY: follow up colon cancer; Metastatic colon cancer to  liver (H); Metastatic colon cancer to liver (H); Renal lesion     COMPARISON: 12/13/2019, 6/26/2017         PROCEDURE COMMENTS: CT of the chest, abdomen, and pelvis was performed  with Isovue 370 126cc intravenous and oral contrast. Axial MIP  images  of the chest, and coronal and sagittal reformatted images of the  chest, abdomen, and pelvis obtained.     FINDINGS:    Support devices: Left chest wall Port-A-Cath, with tip in the mid SVC.     Chest:     Heterogeneous thyroid gland, with unchanged 1.6 cm enhancing nodule  within the right lobe of the thyroid gland. The heart is normal in  size, without pericardial effusion. Mild coronary artery  calcifications. The thoracic vessels are normal in caliber and  configuration. No central pulmonary embolus. Small volume fluid in the  superior aortic recess. Calcified mediastinal lymph nodes. No  pathologically enlarged thoracic lymph nodes.     The central tracheobronchial tree is patent. No pneumothorax or  pleural effusion. No focal airspace consolidation. Mild bibasilar  atelectasis. Slowly growing pulmonary nodule in the right lower lobe  measures 12 x 7 mm, previously 9 x 6 mm (series 6, image 172). No  additional enlarging nodules are identified. Calcified granuloma in  the right lower lobe.     Abdomen/pelvis:     Postsurgical changes of right hepatectomy and right hemicolectomy.  Ill-defined hypodense lesion within hepatic segment Kelli appears  increased in size, measuring approximately 4.2 x 3.9 cm, previously  3.5 x 3.6 cm (series 3, image 269). Stable subcapsular  lesion within  hepatic segment II measuring 2.7 x 1.8 cm (series 3, image 271). No  new lesion within the liver. The gallbladder is surgically absent. No  intra or extrahepatic biliary dilatation.      Unremarkable pancreas. The spleen is not enlarged. Scattered splenic  calcifications, similar to prior. Unremarkable adrenal glands.     Symmetric nephrographic enhancement of the kidneys, without  hydronephrosis. Unchanged indeterminate lesion within the upper pole  of the left kidney measuring up to 1.8 cm (series 3, image 336).  Additional subcentimeter hypodensities are also unchanged. No  enlarging renal lesion. No hydronephrosis. The urinary bladder is  partially distended and otherwise unremarkable in appearance. Stable  prominence of the prostate. Symmetric seminal vesicles.     No abnormally dilated loops of small or large bowel. No  intraperitoneal free fluid or free air. Colonic diverticulosis,  without secondary findings to suggest diverticulitis. Stable fat and  bowel containing abdominal hernia along the right lower quadrant  abdominal wall (series 3, image 436). Stable nodularity adjacent to  the diaphragmatic crura. Stable prominent du hepatic lymph nodes.  No enlarging lymphadenopathy within the abdomen or pelvis.     Bones:   No acute or aggressive osseous lesion. Mild degenerative changes of  the spine.                                                                      IMPRESSION: In this patient with history of metastatic colon cancer:  1. Postoperative changes of right hepatectomy and right hemicolectomy.  A single ill-defined liver lesion is increased in size, measuring up  to 4.2 cm, previously 3.5 cm. Additional hepatic lesions are  unchanged.  2. Mild enlargement of a solid pulmonary nodule in the right lower  lung, measuring up to 1.2 cm, previously 0.9 cm. Additional nodules  are unchanged.  3. Stable indeterminate lesion in the superior pole of the left  kidney, not significantly  changed from 2017. Attention on follow-up.  4. Stable appearance of prominent du hepatic lymph nodes and  nonspecific soft tissue nodularity adjacent to the diaphragm.  5. Additional incidental findings as detailed within the body of the  report.    ASSESSMENT/PLAN:      He has metastatic colorectal cancer to the liver.  [facility] Intact.  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 He was treated with ascending colectomy in June 2014 followed by hepatectomy in August 2014.  He received FOLFOX x2 and FOLFIRI x10 after that.  He had recurrence of the liver metastases in 2016. He got 2 doses of Cetuximab in 2016. He has received multiple liver directed therapy since then the last one was in August 2018.  He also had excision of the right upper quadrant abdominal wall metastasis in December 2016.  On his most recent PET scan from October 2018 he had 2 residual metabolically active liver lesions and these were less conspicuous as compared to the previous CT scan.  He also had 2 FDG avid left axillary lymph nodes which likely were due to extravasation of the tracer which was injected in the left arm.    We did a PET scan on 2/22/2019 which showed 2 FDG avid liver lesions which had enlarged since October 2018.  At that point I referred him to Dr. Landis for possibility of liver directed therapy.  At that point patient was not sure whether he want to pursue more liver directed therapy or not.  He eventually decided on repeating his scans after a few months and he had a repeat CT abdomen and pelvis on 6/7/2019.  The CT scan shows overall stable disease.  The exophytic lesion in the inferior pole of the right kidney has continued to slowly increase in size and now measures 3 cm as opposed to 2.2 cm in June 2017.  This is concerning for renal cell cancer.      Repeat CT CAP from 12/13/19 shows stable disease and liver lesions.  We opted for watchful waiting.    Repeat CT scan shows segment Kelli appears  increased in size, measuring approximately 4.2 x 3.9 cm, previously 3.5 x 3.6 cm.   RLL lung nodule is slightly bigger. Otherwise scan is stable.  He is doing well.      We again discussed the situation in detail.  I believe it would be reasonable to talk to interventional radiology again to see if liver directed procedures would be done since he is not interested in systemic chemotherapy.  Also his cancer is behaving not very aggressively so it is reasonable to try to avoid systemic chemotherapy if at all possible.     I have sent a message to Dr. Landis so that a follow-up with him could be scheduled.  I also discussed that if Dr. Landis cannot do the procedure then potentially he can talk to Dr. Reyes about the surgical procedure with microwave ablation as he had previously mentioned.    Right lung nodule.  This is slightly bigger as compared to previously. This is indeterminate although looks suspicious for malignancy. We again discussed that systemic chemotherapy would be resumed with FOLFIRI but he is not interested in that.  We decided on repeating a CT scan in 3 months and if it continues to grow then re-address this.  Potentially if that is the only site of growing disease then SBRT could be done.  We will decide about it later.      Kidney lesions.  He has indeterminate left superior pole kidney lesion which looks fairly stable and stable 3.0 cm heterogeneously enhancing exophytic lesion in the inferior right kidney  This likely represents RCC.  He met with Dr. Chairez and at that time watchful waiting was decided. The kidney lesions seem stable.  Continue to follow with Dr. Chairez.    We did not address the following today.    Discussion regarding health care directive  He tells me that he has this completed.     We will determine the follow-up after plan regarding management of the growing lesions as mentioned above is finalized.    I answered all of his questions to his satisfaction.  He is  agreeable and comfortable with the plan.    Patrice Srivastava

## 2020-05-28 NOTE — PROGRESS NOTES
Oncology follow up visit:  Date on this visit: 5/28/2020     Los Huang  is referred by Dr.Jafar Landis for an oncology consultation. He requires evaluation for metastatic colon adenocarcinoma- [facility].    Primary Physician: No Ref-Primary, Physician     History Of Present Illness:    Please see previous notes for detail.  I have copied and updated from prior note.    Mr. Huang is a 64 year old male who was diagnosed with metastatic colorectal cancer in 2014 with oligometastatic disease to liver at the time of diagnosis.  He was treated with ascending colectomy 6/14 and R hepatectomy in 8/14.  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  He completed 10 cycles of FOLFIRI around February of March 2015.), eventually had recurrance of hepatic mets in March 2016.  He was seeking several different opinions from different oncologist at that time so his treatment got delayed and he got cetuximab for 2 cycles during the summer 2016 and he had issues with skin the rash from it. He also had an right upper quadrant abdominal wall met that was resected in Dec 2016.    Since then has been treated with multiple liver directed therapies, initially with Y90 in Dec 2017 but since then with TACE x4, 1/9/18, 6/14/18, 7/10/18 and 8/22/18.        He also has possible Renal cell cancer of the Right lower pole of the kidney which has not been treated.    He recently relocated from [place] to [place].      We did a PET scan on 2/22/2019 which showed 2 FDG avid liver lesions which had enlarged since October 2018.  At that point I referred him to Dr. Landis for possibility of liver directed therapy.  At that point patient was not sure whether he want to pursue more liver directed therapy or not.  He eventually decided on repeating his scans after a few  months and he had a repeat CT abdomen and pelvis on 6/7/2019.  The CT scan shows overall stable disease.  The exophytic lesion in the inferior pole of the right kidney has continued to slowly increase in size and now measures 3 cm as opposed to 2.2 cm in June 2017.  This is concerning for renal cell cancer.     He was seen by Dr. Reyes in July 2019 who offered him microwave ablation via an open procedure but patient was not interested in that.     He was seen by Dr. Chairez from urology in November 2019 for the right kidney inferior pole lesion, likely a renal cell cancer but he opted for watchful waiting.     CT scan in Dec 2019     This is a video visit.  He is doing well. Denies any pain, nausea, vomiting, diarrhea or constipation. No infections. Energy is stable. No neuropathy. No weight loss. No dyspnea. He has the small hernia in abdomen and is reducible. No new swellings.       ECOG 1    ROS:  Rest of the comprehensive review of the system was essentially unremarkable.      I reviewed other history in epic as below.      Past Medical/Surgical History:  Past Medical History:   Diagnosis Date     Cancer (H)      Depressive disorder      Post-traumatic osteoarthritis of left shoulder 3/9/2020     Schizoaffective disorder (H)      Past Surgical History:   Procedure Laterality Date     ABDOMEN SURGERY       BIOPSY       CHOLECYSTECTOMY       COLONOSCOPY       GENITOURINARY SURGERY  1997    Ureteroscopy gone awry     ORTHOPEDIC SURGERY        Bipolar disorder  Schizoaffective dx  Colon cancer    Cancer History:   As above    Allergies:  Allergies as of 05/28/2020 - Reviewed 05/28/2020   Allergen Reaction Noted     Animal dander  02/14/2019     Current Medications:  Current Outpatient Medications   Medication Sig Dispense Refill     atomoxetine (STRATTERA) 18 MG capsule Take 1 capsule (18 mg) by mouth daily 30 capsule 2     benztropine (COGENTIN) 0.5 MG tablet Take 1 tablet (0.5 mg) by mouth daily 30 tablet 2      cholecalciferol (VITAMIN D3) 1000 units (25 mcg) capsule Take 1 capsule (1,000 Units) by mouth daily 90 capsule 3     clonazePAM (KLONOPIN) 0.5 MG tablet Take 0.5 tablets (0.25 mg) by mouth daily 15 tablet 1     diphenoxylate-atropine (LOMOTIL) 2.5-0.025 MG tablet Take 1 tablet by mouth 4 times daily as needed for diarrhea 30 tablet 3     divalproex sodium delayed-release (DEPAKOTE) 500 MG DR tablet Take 2 tablets (1,000 mg) by mouth At Bedtime 60 tablet 3     Homeopathic Products (ARNICARE) GEL Externally apply 1 Dose topically daily as needed (leg cramps)       multivitamin (CENTRUM SILVER) tablet Take 1 tablet by mouth daily 90 tablet 3     risperiDONE (RISPERDAL) 4 MG tablet Take 1 tablet (4 mg) by mouth At Bedtime 30 tablet 1      Family History:  Family History   Problem Relation Age of Onset     Osteoporosis Sister      Thyroid Disease Sister         Thyroid killed     Thyroid Disease Sister         Thyroid removed      No known family history of cancers.  He has a son and a daughter who are healthy.  Social History:  Social History     Socioeconomic History     Marital status:      Spouse name: Not on file     Number of children: Not on file     Years of education: Not on file     Highest education level: Not on file   Occupational History     Not on file   Social Needs     Financial resource strain: Not on file     Food insecurity     Worry: Not on file     Inability: Not on file     Transportation needs     Medical: Not on file     Non-medical: Not on file   Tobacco Use     Smoking status: Former Smoker     Packs/day: 1.00     Years: 34.00     Pack years: 34.00     Types: Cigarettes     Start date:      Last attempt to quit: 2003     Years since quittin.4     Smokeless tobacco: Former User     Types: Snuff     Quit date:      Tobacco comment: Smoked sporadically in high school and during college until    Substance and Sexual Activity     Alcohol use: Not Currently     Frequency:  Monthly or less     Comment: slight     Drug use: Not Currently     Types: Marijuana, Amphetamines, Benzodiazepines, Hashish, LSD, Mescaline, Methaqualone, Methylphenidate, Nitrous oxide, PCP, Psilocybin     Comment: [id number]     Sexual activity: Never   Lifestyle     Physical activity     Days per week: Not on file     Minutes per session: Not on file     Stress: Not on file   Relationships     Social connections     Talks on phone: Not on file     Gets together: Not on file     Attends [employer] service: Not on file     Active member of club or organization: Not on file     Attends meetings of clubs or organizations: Not on file     Relationship status: Not on file     Intimate partner violence     Fear of current or ex partner: Not on file     Emotionally abused: Not on file     Physically abused: Not on file     Forced sexual activity: Not on file   Other Topics Concern     Parent/sibling w/ CABG, MI or angioplasty before 65F 55M? No   Social History Narrative     Not on file   He used to smoke but quit in 2003.  He drinks alcohol occasionally.  He was in [place] but recently relocated to [place] and he is going to move in his own apartment tomorrow.  His daughter lives close by.    Physical Exam:  There were no vitals taken for this visit.   Wt Readings from Last 4 Encounters:   03/09/20 93 kg (205 lb)   01/16/20 94.9 kg (209 lb 3.2 oz)   11/14/19 93 kg (205 lb)   07/31/19 93.8 kg (206 lb 14.4 oz)           Constitutional.  Does not seem to be in any acute distress.  Eyes.  No redness or discharge noted.  Respiratory.  Speaking in full sentences.  Breathing seems comfortable without any accessory use of muscles.    Skin.  Visualized his skin does not show any obvious rashes.  Musculoskeletal.  Range of motion for visualized areas is intact.  Neurological.  Alert and oriented x3.  Psychiatric.  Mood, mentation and affect are normal.  Decision making capacity is intact.      The rest of a comprehensive  physical examination is deferred due to Public Health Emergency video visit restrictions.      Laboratory/Imaging Studies    Reviewed  Results for NEVIN DICKERSON (MRN [id number]) as of 5/28/2020 15:57   Ref. Range 5/26/2020 14:04   Sodium Latest Ref Range: 133 - 144 mmol/L 138   Potassium Latest Ref Range: 3.4 - 5.3 mmol/L 3.7   Chloride Latest Ref Range: 94 - 109 mmol/L 106   Carbon Dioxide Latest Ref Range: 20 - 32 mmol/L 26   Urea Nitrogen Latest Ref Range: 7 - 30 mg/dL 19   Creatinine Latest Ref Range: 0.66 - 1.25 mg/dL 0.93   GFR Estimate Latest Ref Range: >60 mL/min/1.73_m2 84   GFR Estimate If Black Latest Ref Range: >60 mL/min/1.73_m2 >90   Calcium Latest Ref Range: 8.5 - 10.1 mg/dL 8.2 (L)   Anion Gap Latest Ref Range: 3 - 14 mmol/L 5   Albumin Latest Ref Range: 3.4 - 5.0 g/dL 3.6   Protein Total Latest Ref Range: 6.8 - 8.8 g/dL 6.6 (L)   Bilirubin Total Latest Ref Range: 0.2 - 1.3 mg/dL 0.3   Alkaline Phosphatase Latest Ref Range: 40 - 150 U/L 72   ALT Latest Ref Range: 0 - 70 U/L 41   AST Latest Ref Range: 0 - 45 U/L 17   Glucose Latest Ref Range: 70 - 99 mg/dL 99   WBC Latest Ref Range: 4.0 - 11.0 10e9/L 4.9   Hemoglobin Latest Ref Range: 13.3 - 17.7 g/dL 14.5   Hematocrit Latest Ref Range: 40.0 - 53.0 % 42.6   Platelet Count Latest Ref Range: 150 - 450 10e9/L 174   RBC Count Latest Ref Range: 4.4 - 5.9 10e12/L 4.82   MCV Latest Ref Range: 78 - 100 fl 88   MCH Latest Ref Range: 26.5 - 33.0 pg 30.1   MCHC Latest Ref Range: 31.5 - 36.5 g/dL 34.0   RDW Latest Ref Range: 10.0 - 15.0 % 13.0   Diff Method Unknown Automated Method   % Neutrophils Latest Units: % 53.1   % Lymphocytes Latest Units: % 31.2   % Monocytes Latest Units: % 10.2   % Eosinophils Latest Units: % 4.1   % Basophils Latest Units: % 1.2   % Immature Granulocytes Latest Units: % 0.2   Nucleated RBCs Latest Ref Range: 0 /100 0   Absolute Neutrophil Latest Ref Range: 1.6 - 8.3 10e9/L 2.6   Absolute Lymphocytes Latest Ref Range: 0.8 - 5.3  10e9/L 1.5   Absolute Monocytes Latest Ref Range: 0.0 - 1.3 10e9/L 0.5   Absolute Eosinophils Latest Ref Range: 0.0 - 0.7 10e9/L 0.2   Absolute Basophils Latest Ref Range: 0.0 - 0.2 10e9/L 0.1   Abs Immature Granulocytes Latest Ref Range: 0 - 0.4 10e9/L 0.0   Absolute Nucleated RBC Unknown 0.0   CEA Latest Ref Range: 0 - 2.5 ug/L <0.5       EXAMINATION: CT CHEST/ABDOMEN/PELVIS W CONTRAST  5/26/2020 2:02 PM       CLINICAL HISTORY: follow up colon cancer; Metastatic colon cancer to  liver (H); Metastatic colon cancer to liver (H); Renal lesion     COMPARISON: 12/13/2019, 6/26/2017         PROCEDURE COMMENTS: CT of the chest, abdomen, and pelvis was performed  with Isovue 370 126cc intravenous and oral contrast. Axial MIP  images  of the chest, and coronal and sagittal reformatted images of the  chest, abdomen, and pelvis obtained.     FINDINGS:    Support devices: Left chest wall Port-A-Cath, with tip in the mid SVC.     Chest:     Heterogeneous thyroid gland, with unchanged 1.6 cm enhancing nodule  within the right lobe of the thyroid gland. The heart is normal in  size, without pericardial effusion. Mild coronary artery  calcifications. The thoracic vessels are normal in caliber and  configuration. No central pulmonary embolus. Small volume fluid in the  superior aortic recess. Calcified mediastinal lymph nodes. No  pathologically enlarged thoracic lymph nodes.     The central tracheobronchial tree is patent. No pneumothorax or  pleural effusion. No focal airspace consolidation. Mild bibasilar  atelectasis. Slowly growing pulmonary nodule in the right lower lobe  measures 12 x 7 mm, previously 9 x 6 mm (series 6, image 172). No  additional enlarging nodules are identified. Calcified granuloma in  the right lower lobe.     Abdomen/pelvis:     Postsurgical changes of right hepatectomy and right hemicolectomy.  Ill-defined hypodense lesion within hepatic segment Kelli appears  increased in size, measuring approximately  4.2 x 3.9 cm, previously  3.5 x 3.6 cm (series 3, image 269). Stable subcapsular lesion within  hepatic segment II measuring 2.7 x 1.8 cm (series 3, image 271). No  new lesion within the liver. The gallbladder is surgically absent. No  intra or extrahepatic biliary dilatation.      Unremarkable pancreas. The spleen is not enlarged. Scattered splenic  calcifications, similar to prior. Unremarkable adrenal glands.     Symmetric nephrographic enhancement of the kidneys, without  hydronephrosis. Unchanged indeterminate lesion within the upper pole  of the left kidney measuring up to 1.8 cm (series 3, image 336).  Additional subcentimeter hypodensities are also unchanged. No  enlarging renal lesion. No hydronephrosis. The urinary bladder is  partially distended and otherwise unremarkable in appearance. Stable  prominence of the prostate. Symmetric seminal vesicles.     No abnormally dilated loops of small or large bowel. No  intraperitoneal free fluid or free air. Colonic diverticulosis,  without secondary findings to suggest diverticulitis. Stable fat and  bowel containing abdominal hernia along the right lower quadrant  abdominal wall (series 3, image 436). Stable nodularity adjacent to  the diaphragmatic crura. Stable prominent du hepatic lymph nodes.  No enlarging lymphadenopathy within the abdomen or pelvis.     Bones:   No acute or aggressive osseous lesion. Mild degenerative changes of  the spine.                                                                      IMPRESSION: In this patient with history of metastatic colon cancer:  1. Postoperative changes of right hepatectomy and right hemicolectomy.  A single ill-defined liver lesion is increased in size, measuring up  to 4.2 cm, previously 3.5 cm. Additional hepatic lesions are  unchanged.  2. Mild enlargement of a solid pulmonary nodule in the right lower  lung, measuring up to 1.2 cm, previously 0.9 cm. Additional nodules  are unchanged.  3. Stable  indeterminate lesion in the superior pole of the left  kidney, not significantly changed from 2017. Attention on follow-up.  4. Stable appearance of prominent du hepatic lymph nodes and  nonspecific soft tissue nodularity adjacent to the diaphragm.  5. Additional incidental findings as detailed within the body of the  report.    ASSESSMENT/PLAN:      He has metastatic colorectal cancer to the liver.  [facility] Intact.  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 He was treated with ascending colectomy in June 2014 followed by hepatectomy in August 2014.  He received FOLFOX x2 and FOLFIRI x10 after that.  He had recurrence of the liver metastases in 2016. He got 2 doses of Cetuximab in 2016. He has received multiple liver directed therapy since then the last one was in August 2018.  He also had excision of the right upper quadrant abdominal wall metastasis in December 2016.  On his most recent PET scan from October 2018 he had 2 residual metabolically active liver lesions and these were less conspicuous as compared to the previous CT scan.  He also had 2 FDG avid left axillary lymph nodes which likely were due to extravasation of the tracer which was injected in the left arm.    We did a PET scan on 2/22/2019 which showed 2 FDG avid liver lesions which had enlarged since October 2018.  At that point I referred him to Dr. Landis for possibility of liver directed therapy.  At that point patient was not sure whether he want to pursue more liver directed therapy or not.  He eventually decided on repeating his scans after a few months and he had a repeat CT abdomen and pelvis on 6/7/2019.  The CT scan shows overall stable disease.  The exophytic lesion in the inferior pole of the right kidney has continued to slowly increase in size and now measures 3 cm as opposed to 2.2 cm in June 2017.  This is concerning for renal cell cancer.      Repeat CT CAP from 12/13/19 shows stable disease and liver lesions.   We opted for watchful waiting.    Repeat CT scan shows segment Kelli appears increased in size, measuring approximately 4.2 x 3.9 cm, previously 3.5 x 3.6 cm.   RLL lung nodule is slightly bigger. Otherwise scan is stable.  He is doing well.      We again discussed the situation in detail.  I believe it would be reasonable to talk to interventional radiology again to see if liver directed procedures would be done since he is not interested in systemic chemotherapy.  Also his cancer is behaving not very aggressively so it is reasonable to try to avoid systemic chemotherapy if at all possible.     I have sent a message to Dr. Landis so that a follow-up with him could be scheduled.  I also discussed that if Dr. Landis cannot do the procedure then potentially he can talk to Dr. Reyes about the surgical procedure with microwave ablation as he had previously mentioned.    Right lung nodule.  This is slightly bigger as compared to previously. This is indeterminate although looks suspicious for malignancy. We again discussed that systemic chemotherapy would be resumed with FOLFIRI but he is not interested in that.  We decided on repeating a CT scan in 3 months and if it continues to grow then re-address this.  Potentially if that is the only site of growing disease then SBRT could be done.  We will decide about it later.      Kidney lesions.  He has indeterminate left superior pole kidney lesion which looks fairly stable and stable 3.0 cm heterogeneously enhancing exophytic lesion in the inferior right kidney  This likely represents RCC.  He met with Dr. Chairez and at that time watchful waiting was decided. The kidney lesions seem stable.  Continue to follow with Dr. Chairez.    We did not address the following today.    Discussion regarding health care directive  He tells me that he has this completed.       We will determine the follow-up after plan regarding management of the growing lesions as mentioned above is  finalized.    I answered all of his questions to his satisfaction.  He is agreeable and comfortable with the plan.    Patrice Srivastava

## 2020-05-29 ENCOUNTER — PATIENT OUTREACH (OUTPATIENT)
Dept: CARE COORDINATION | Facility: CLINIC | Age: 65
End: 2020-05-29

## 2020-05-29 NOTE — PROGRESS NOTES
Oncology Distress Screening Follow-up  Clinical Social Work  [facility]    Identified Concern and Score From Distress Screening:   . How concerned are you about knowing what resources are available to help you?   5  cancer support groups            You can also ask to be contacted by one of our Oncology Supportive Care professionals.    9. If you want to be contacted by one of our professionals, I can send a message to them right now.   Oncology Social  Worker                  Date of Distress Screenin2020      Data: Mr. Huang is a 64 year old male who was diagnosed with metastatic colorectal.       Intervention/Education Provided::  spoke with patient checking in about patient's request for resources and to speak with a . Patient reported they were newly diagnosed and just wanted to know in general what resources were available. Patient stated they did not need any thing at this time but were interested to know what supports were available. Patient spoke about recently moving to [place] from [place] and wanting to find ways to connect for support as patient did not have a community in [place] yet.  explained their role and the different supports they can provide.  per patient's request will e-mail patient resources on support groups and online communities they can connect with as well as the phone number to senior linkage line to e-mail address [email or web address].  provided their contact information for patient to follow up as they need.       Follow-up Required:  will follow up as needed.           Rosa Isela ALVAREZ, ZAN  - Oncology  Phone : [phone]  Pager: [phone]

## 2020-06-02 ENCOUNTER — VIRTUAL VISIT (OUTPATIENT)
Dept: RADIOLOGY | Facility: CLINIC | Age: 65
End: 2020-06-02
Attending: RADIOLOGY
Payer: MEDICARE

## 2020-06-02 DIAGNOSIS — C19 COLORECTAL CANCER (H): ICD-10-CM

## 2020-06-02 DIAGNOSIS — C78.7 SECONDARY MALIGNANT NEOPLASM OF LIVER (H): Primary | ICD-10-CM

## 2020-06-02 DIAGNOSIS — C19 METASTATIC COLORECTAL CANCER: ICD-10-CM

## 2020-06-02 PROCEDURE — 40000114 ZZH STATISTIC NO CHARGE CLINIC VISIT

## 2020-06-02 NOTE — LETTER
"    6/2/2020         RE: Los Huang  [address]  [facility] [address]        Dear Colleague,    Thank you for referring your patient, Los Huang, to the [facility]. Please see a copy of my visit note below.    Los Huang is a 64 year old male who is being evaluated via a billable video visit.      The patient has been notified of following:     \"This video visit will be conducted via a call between you and your physician/provider. We have found that certain health care needs can be provided without the need for an in-person physical exam.  This service lets us provide the care you need with a video conversation.  If a prescription is necessary we can send it directly to your pharmacy.  If lab work is needed we can place an order for that and you can then stop by our lab to have the test done at a later time.    Video visits are billed at different rates depending on your insurance coverage.  Please reach out to your insurance provider with any questions.    If during the course of the call the physician/provider feels a video visit is not appropriate, you will not be charged for this service.\"    Patient has given verbal consent for Video visit? Yes    How would you like to obtain your AVS? MyChart    Patient would like the video invitation sent by: Text to cell phone: [phone]    Will anyone else be joining your video visit? No       I have reviewed and updated the patient's allergies and medication list.    Concerns: Been having abdominal pain, maybe from hernia  Refills: None needed.      Secondary Video Option (Doximity), send text message to: [phone]    Bhakti Ross CMA      Video-Visit Details    Type of service:  Video Visit    Video Start Time: 1pm   Video End Time: 145    Originating Location (pt. Location): Other home    Distant Location (provider location):  [facility]     [facility] for [facility] Visit[facility] " [facility]              Video clinic visit     Mr. Huang is a 63 year-old patient who has a long history of CRC with liver met that has been treated with Y 90 and Debiri in [place]. The patient lives in [place] for at least a year. I have seen him in June 2019 and discussed several treatment options but he preferred to wait. Today he is doing well but is frustrated with the Covid situation. I have seen his new CT imaging. CT shows increased in size of the lesion in segment 8, going from 3.8 to 4.2 cm.  There is an increase in size of the right renal lesion that could represent an RCC.    Lab Results   Component Value Date    AST 17 05/26/2020     Lab Results   Component Value Date    ALT 41 05/26/2020     No results found for: BILICONJ   Lab Results   Component Value Date    BILITOTAL 0.3 05/26/2020     Lab Results   Component Value Date    ALBUMIN 3.6 05/26/2020     Lab Results   Component Value Date    PROTTOTAL 6.6 05/26/2020      Lab Results   Component Value Date    ALKPHOS 72 05/26/2020     glomerular filtration rate: 84  Lab Results   Component Value Date    WBC 4.9 05/26/2020     Lab Results   Component Value Date    RBC 4.82 05/26/2020     Lab Results   Component Value Date    HGB 14.5 05/26/2020     Lab Results   Component Value Date    HCT 42.6 05/26/2020     No components found for: MCT  Lab Results   Component Value Date    MCV 88 05/26/2020     Lab Results   Component Value Date    MCH 30.1 05/26/2020     Lab Results   Component Value Date    MCHC 34.0 05/26/2020     Lab Results   Component Value Date    RDW 13.0 05/26/2020     Lab Results   Component Value Date     05/26/2020     Past Medical History:   Diagnosis Date     Cancer (H)      Depressive disorder      Post-traumatic osteoarthritis of left shoulder 3/9/2020     Schizoaffective disorder (H)        Past Surgical History:   Procedure Laterality Date     ABDOMEN SURGERY       BIOPSY       CHOLECYSTECTOMY       COLONOSCOPY       GENITOURINARY  SURGERY      Ureteroscopy gone awry     ORTHOPEDIC SURGERY         Family History   Problem Relation Age of Onset     Osteoporosis Sister      Thyroid Disease Sister         Thyroid killed     Thyroid Disease Sister         Thyroid removed       Social History     Tobacco Use     Smoking status: Former Smoker     Packs/day: 1.00     Years: 34.00     Pack years: 34.00     Types: Cigarettes     Start date:      Last attempt to quit:      Years since quittin.4     Smokeless tobacco: Former User     Types: Snuff     Quit date:      Tobacco comment: Smoked sporadically in high school and during college until    Substance Use Topics     Alcohol use: Not Currently     Frequency: Monthly or less     Comment: slight     Current Outpatient Medications   Medication     atomoxetine (STRATTERA) 18 MG capsule     benztropine (COGENTIN) 0.5 MG tablet     cholecalciferol (VITAMIN D3) 1000 units (25 mcg) capsule     clonazePAM (KLONOPIN) 0.5 MG tablet     diphenoxylate-atropine (LOMOTIL) 2.5-0.025 MG tablet     divalproex sodium delayed-release (DEPAKOTE) 500 MG DR tablet     Homeopathic Products (ARNICARE) GEL     multivitamin (CENTRUM SILVER) tablet     risperiDONE (RISPERDAL) 4 MG tablet     No current facility-administered medications for this visit.        Impression and Plan:    We reviewed different options and I think Y 90 is still his best choice. I spent 20 min counseling the patient and discussing the procedure, its risks and benefits. The patient is on board to proceed with the radioembolization.        Again, thank you for allowing me to participate in the care of your patient.        Sincerely,        Trinh Landis MD

## 2020-06-02 NOTE — PROGRESS NOTES
Video clinic visit     Mr. Huang is a 63 year-old patient who has a long history of CRC with liver met that has been treated with Y 90 and Debiri in [place]. The patient lives in [place] for at least a year. I have seen him in June 2019 and discussed several treatment options but he preferred to wait. Today he is doing well but is frustrated with the Covid situation. I have seen his new CT imaging. CT shows increased in size of the lesion in segment 8, going from 3.8 to 4.2 cm.  There is an increase in size of the right renal lesion that could represent an RCC.    Lab Results   Component Value Date    AST 17 05/26/2020     Lab Results   Component Value Date    ALT 41 05/26/2020     No results found for: BILICONJ   Lab Results   Component Value Date    BILITOTAL 0.3 05/26/2020     Lab Results   Component Value Date    ALBUMIN 3.6 05/26/2020     Lab Results   Component Value Date    PROTTOTAL 6.6 05/26/2020      Lab Results   Component Value Date    ALKPHOS 72 05/26/2020     glomerular filtration rate: 84  Lab Results   Component Value Date    WBC 4.9 05/26/2020     Lab Results   Component Value Date    RBC 4.82 05/26/2020     Lab Results   Component Value Date    HGB 14.5 05/26/2020     Lab Results   Component Value Date    HCT 42.6 05/26/2020     No components found for: MCT  Lab Results   Component Value Date    MCV 88 05/26/2020     Lab Results   Component Value Date    MCH 30.1 05/26/2020     Lab Results   Component Value Date    MCHC 34.0 05/26/2020     Lab Results   Component Value Date    RDW 13.0 05/26/2020     Lab Results   Component Value Date     05/26/2020     Past Medical History:   Diagnosis Date     Cancer (H)      Depressive disorder      Post-traumatic osteoarthritis of left shoulder 3/9/2020     Schizoaffective disorder (H)        Past Surgical History:   Procedure Laterality Date     ABDOMEN SURGERY       BIOPSY       CHOLECYSTECTOMY       COLONOSCOPY       GENITOURINARY SURGERY  1997     Ureteroscopy gone awry     ORTHOPEDIC SURGERY         Family History   Problem Relation Age of Onset     Osteoporosis Sister      Thyroid Disease Sister         Thyroid killed     Thyroid Disease Sister         Thyroid removed       Social History     Tobacco Use     Smoking status: Former Smoker     Packs/day: 1.00     Years: 34.00     Pack years: 34.00     Types: Cigarettes     Start date:      Last attempt to quit:      Years since quittin.4     Smokeless tobacco: Former User     Types: Snuff     Quit date:      Tobacco comment: Smoked sporadically in high school and during college until    Substance Use Topics     Alcohol use: Not Currently     Frequency: Monthly or less     Comment: slight     Current Outpatient Medications   Medication     atomoxetine (STRATTERA) 18 MG capsule     benztropine (COGENTIN) 0.5 MG tablet     cholecalciferol (VITAMIN D3) 1000 units (25 mcg) capsule     clonazePAM (KLONOPIN) 0.5 MG tablet     diphenoxylate-atropine (LOMOTIL) 2.5-0.025 MG tablet     divalproex sodium delayed-release (DEPAKOTE) 500 MG DR tablet     Homeopathic Products (ARNICARE) GEL     multivitamin (CENTRUM SILVER) tablet     risperiDONE (RISPERDAL) 4 MG tablet     No current facility-administered medications for this visit.        Impression and Plan:    We reviewed different options and I think Y 90 is still his best choice. I spent 20 min counseling the patient and discussing the procedure, its risks and benefits. The patient is on board to proceed with the radioembolization.

## 2020-06-02 NOTE — PROGRESS NOTES
"Los Huang is a 64 year old male who is being evaluated via a billable video visit.      The patient has been notified of following:     \"This video visit will be conducted via a call between you and your physician/provider. We have found that certain health care needs can be provided without the need for an in-person physical exam.  This service lets us provide the care you need with a video conversation.  If a prescription is necessary we can send it directly to your pharmacy.  If lab work is needed we can place an order for that and you can then stop by our lab to have the test done at a later time.    Video visits are billed at different rates depending on your insurance coverage.  Please reach out to your insurance provider with any questions.    If during the course of the call the physician/provider feels a video visit is not appropriate, you will not be charged for this service.\"    Patient has given verbal consent for Video visit? Yes    How would you like to obtain your AVS? MyChart    Patient would like the video invitation sent by: Text to cell phone: [phone]    Will anyone else be joining your video visit? No       I have reviewed and updated the patient's allergies and medication list.    Concerns: Been having abdominal pain, maybe from hernia  Refills: None needed.      Secondary Video Option (Doximity), send text message to: [phone]    Bhakti Ross CMA      Video-Visit Details    Type of service:  Video Visit    Video Start Time: 1pm   Video End Time: 145    Originating Location (pt. Location): Other home    Distant Location (provider location):  [facility]     Platform used for Video Visit: [employer]            "

## 2020-06-04 ENCOUNTER — TELEPHONE (OUTPATIENT)
Dept: VASCULAR SURGERY | Facility: CLINIC | Age: 65
End: 2020-06-04

## 2020-06-04 DIAGNOSIS — F31.9 BIPOLAR 1 DISORDER (H): ICD-10-CM

## 2020-06-04 RX ORDER — BENZTROPINE MESYLATE 0.5 MG/1
0.5 TABLET ORAL DAILY
Qty: 30 TABLET | Refills: 2 | OUTPATIENT
Start: 2020-06-04

## 2020-06-04 NOTE — TELEPHONE ENCOUNTER
Called pt to f/up on his clinic consult call with Dr Landis    Inquired if he had questions about the Y90 treatment.     Informed him of the prior authorization process as well as scheduling information with Dr. Landis.     He verbalized understanding.     Elvira HESS RN, BSN  Interventional Radiology Nurse Coordinator   Phone: [phone]

## 2020-06-05 ENCOUNTER — TELEPHONE (OUTPATIENT)
Dept: PSYCHIATRY | Facility: CLINIC | Age: 65
End: 2020-06-05

## 2020-06-05 ENCOUNTER — VIRTUAL VISIT (OUTPATIENT)
Dept: PSYCHIATRY | Facility: CLINIC | Age: 65
End: 2020-06-05
Attending: PSYCHIATRY & NEUROLOGY
Payer: MEDICARE

## 2020-06-05 DIAGNOSIS — F31.9 BIPOLAR 1 DISORDER (H): ICD-10-CM

## 2020-06-05 PROBLEM — C18.9 COLON CANCER (H): Status: ACTIVE | Noted: 2020-06-05

## 2020-06-05 RX ORDER — BENZTROPINE MESYLATE 0.5 MG/1
0.5 TABLET ORAL DAILY
Qty: 30 TABLET | Refills: 2 | Status: SHIPPED | OUTPATIENT
Start: 2020-06-05 | End: 2020-08-24

## 2020-06-05 NOTE — PROGRESS NOTES
"VIDEO VISIT  Los Huang is a 64 year old patient who is being evaluated via a billable video visit.      The patient has been notified of following:   \"We have found that certain health care needs can be provided without the need for an in-person physical exam. This service lets us provide the care you need with a video conversation. If a prescription is necessary we can send it directly to your pharmacy. If lab work is needed we can place an order for that and you can then stop by our lab to have the test done at a later time. Insurers are generally covering virtual visits as they would in-office visits so billing should not be different than normal.  If for some reason you do get billed incorrectly, you should contact the billing office to correct it and that number is in the AVS .    Patient has given verbal consent for video visit?: Yes   How would you like to obtain your AVS?: [employer]  AVS SmartPhrase [PsychAVS] has been placed in 'Patient Instructions': Yes      Video- Visit Details  Type of service:  video visit for medication management  Time of service:    Date: 6/5/2020    Video Start Time: 1:45 PM      Video End Time:  2:15 PM    Reason for video visit:  Patient unable to travel due to Covid-19  Originating Site (patient location):  [facility]   Location- Patient's home  Distant Site (provider location):  Remote location  Mode of Communication:  Video Conference via Doxy[employer]me  Consent:  Patient has given verbal consent for video visit?: Yes        [facility]  Psychiatry Clinic  PROGRESS NOTE     Date of initial Diagnostic Assessment (DA) is 3/18/19 and most recent Transfer of Care DA is 08/28/19.    CARE TEAM:  PCP- Babar Mckinney    Oncology- [facility], [facility].              Los Huang is a 64 year old male who prefers the name \"Solo\" & pronouns he, him, his, himself.      Pertinent Background:  This patient first experienced " "mental health issues around the age of 28 and has received treatment for Bipolar I Disorder with severe manic episodes accompanied by psychosis.  Notably, had a major first psychotic carlos in 1983, subsequent to which he took Navane until 1994.  That is also the year that he moved back to [place] (where he grew up and attended college) from [place] where he'd lived for four years and had  his wife in 1992 after 16 years of marriage.  Retained joint custody of their two kids.  Back in [place], lived with his mother until her transition into a nursing home in 2017.  Has struggled with what he describes as having been \"dozens\" of psychiatric hospitalizations, estimating that he's spent approximately two cumulative years of his life on an inpatient psychiatric unit.  Has been on disability since 2001.  Had a 7-year stint on Clozaril without hospitalizations from [id number] however this medication unfortunately had to be discontinued after a notable drop in WBC's occurred.  In 2014 was diagnosed with stage IV colon cancer and was told that his life expectancy would be five years, which he reached in May 2019.  Continues to work closely with oncology.  In December 2018 states he sought out a three week psychiatric admission due to feeling trapped and depressed in his assisted living facility, back in [place], subsequent to which he has been brought to live in [place] by his two adult children who live in the [facility]. 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    Psych critical item history includes suicide attempt [multiple], psychosis [sxs include paranoia, IOR, AH, thought insertion], mutiple psychotropic trials, trauma hx, psych hosp (>5) and SUBSTANCE USE: cannabis and acid in the 1970s. Note: no specific traumatic anniversary dates, but is often hospitalized for carlos around Thanksgiving and [place] holidays.    INTERIM " "HISTORY      [4, 4]   The patient reports good treatment adherence.  History was provided by the patient who was a fair but often overinclusive historian.    The last visit ended with no change to the med regimen.   Since the last visit:   Solo reports he has been \"alone isolated, lonely, wondering what in the world is going on in [place], feeling like I'm in a foreign country.\" He's got \"a concern with agitated feelings of not wanting to get out there and drive,\" unsure about why he is having so much fear of driving. He is worried about a procedure (a radioembolization with his radiologist) and is dealing with anxiety around that. He has an option to do chemo which he \"really doesn't want to do himself\" again. He continues to have difficulty with muscle stiffness at times.  Wonders about switching medications, as we had discussed previously, to something with less EPSE. States he is \"not sleeping like I used to, but I am awake when I get up.\" Continues to feel lonely, but denies sustained low mood, anhedonia, or SI. Denies irritability, decreased need for sleep    RECENT PSYCH ROS:   Depression:  poor concentration /memory and indecisiveness  Elevated:  none  Psychosis:  none, denies delusions or paranoia  Anxiety:  excessive worry and feeling fearful  Panic Attack:  none  Trauma Related:  none  Dysregulation:  none  Eating Disorder: no     Pertinent Negative Symptoms:  NO suicidal and violent ideation, aggression, hallucinations and carlos    RECENT SUBSTANCE USE:     ALCOHOL- 1-2 occasions 1-2 times a month   TOBACCO- none  CAFFEINE- 3-4 cups of coffee per day  OPIOIDS- none         NARCAN KIT- N/A        CANNABIS- none          OTHER ILLICIT DRUGS- none    CURRENT SOCIAL HISTORY:  FINANCIAL SUPPORT- on disability since 2001, also family, savings  CHILDREN- a son Juventino (40 years old) who works for the [employer], and a daughter Yuliet (35 years old) who works for \"[employer]\" - also has two " "granddaughters  LIVING SITUATION- apartment in [place]  SOCIAL/ SPIRITUAL SUPPORT- daughter, son, grand kids, sisters, artistic/avocational pursuits, i.e. 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 Resident transition. Cogentin decreased.  10/30: Trazodone discontinued to target dry mouth. Patient had increased Cogentin back to previous dose. Sleep Medicine referral placed for waking up gasping at night.  1/31/2020: Klonopin decreased to 0.25 mg per day, Cogentin stopped.    PAST MED TRIALS        See last Diagnostic Assessment  MEDICAL / SURGICAL HISTORY          Patient Active Problem List   Diagnosis     Metastatic colon cancer to liver (H)     Bipolar 1 disorder (H)     Attention deficit hyperactivity disorder (ADHD), unspecified ADHD type     H/O partial resection of colon     Post-traumatic osteoarthritis of left shoulder     Polysubstance dependence (H)     Mood disorder with psychosis (H)     Generalized anxiety disorder     Colon cancer (H)       Major Surgery-   Past Surgical History:   Procedure Laterality Date     ABDOMEN SURGERY       BIOPSY       CHOLECYSTECTOMY       COLONOSCOPY       GENITOURINARY SURGERY  1997    Ureteroscopy gone awry     ORTHOPEDIC SURGERY         MEDICAL REVIEW OF SYSTEMS    [2, 10]     A comprehensive review of systems was performed and is negative other than noted in the HPI.    ALLERGY       Animal dander  MEDICATIONS        Current Outpatient Medications   Medication Sig Dispense Refill     atomoxetine (STRATTERA) 18 MG capsule Take 1 capsule (18 mg) by mouth daily 30 capsule 2     benztropine (COGENTIN) 0.5 MG tablet Take 1 tablet (0.5 mg) by mouth daily 30 tablet 2     cholecalciferol (VITAMIN D3) " "1000 units (25 mcg) capsule Take 1 capsule (1,000 Units) by mouth daily 90 capsule 3     clonazePAM (KLONOPIN) 0.5 MG tablet Take 0.5 tablets (0.25 mg) by mouth daily 15 tablet 1     diphenoxylate-atropine (LOMOTIL) 2.5-0.025 MG tablet Take 1 tablet by mouth 4 times daily as needed for diarrhea 30 tablet 3     divalproex sodium delayed-release (DEPAKOTE) 500 MG DR tablet Take 2 tablets (1,000 mg) by mouth At Bedtime 60 tablet 3     Homeopathic Products (ARNICARE) GEL Externally apply 1 Dose topically daily as needed (leg cramps)       multivitamin (CENTRUM SILVER) tablet Take 1 tablet by mouth daily 90 tablet 3     risperiDONE (RISPERDAL) 4 MG tablet Take 1 tablet (4 mg) by mouth At Bedtime 30 tablet 1     VITALS      [3, 3]   There were no vitals taken for this visit.   MENTAL STATUS EXAM      [9, 14 cog gs]     Alertness: alert  and oriented  Appearance: well groomed and neatly dressed  Behavior/Demeanor: cooperative and pleasant, with good  eye contact   Speech: Slow, frequent pauses, Southern accent  Language: no obvious problem  Psychomotor: normal or unremarkable  Mood: \"OK\"  Affect: restricted; was congruent to mood; was congruent to content, no grandiosity today  Thought Process/Associations: overinclusive  and circumstantial at times  Thought Content:  Reports none;  Denies suicidal and violent ideation and paranoid ideation  Perception:  Reports none;  Denies auditory hallucinations and visual hallucinations  Insight: limited  Judgment: fair and adequate for safety  Cognition: (6) does  appear grossly intact; formal cognitive testing was not done  Gait and Station: unremarkable    LABS and DATA     AIMS:  due at next appointment    PHQ9 TODAY = not done  PHQ-9 SCORE 5/31/2019 8/28/2019 10/30/2019   PHQ-9 Total Score 3 3 5     ANTIPSYCHOTIC LABS  [glu, A1C, lipids (eddie LDL), liver enzymes, WBC, ANEU, Hgb, plts]  q12 mo  Recent Labs   Lab Test 05/26/20  1404 12/13/19  1558 07/25/19  1649 06/07/19  1609  " 11/02/15   GLC 99 110* 92 91   < > 90   A1C  --   --  5.5  --   --  5.6    < > = values in this interval not displayed.     Recent Labs   Lab Test 07/25/19  1649 07/18/19  1216 05/12/16 11/02/15   CHOL 186 199 210* 216*   TRIG 181* 152* 250* 214*   * 129*  --   --    HDL 40 40 45 37*     Recent Labs   Lab Test 05/26/20  1404 12/13/19  1558 07/25/19  1649 06/07/19  1609   AST 17 18 21 29   ALT 41 56 51 54   ALKPHOS 72 85 75 87     Recent Labs   Lab Test 05/26/20  1404 12/13/19  1558 07/25/19  1649 06/07/19  1609 04/17/19  0528   WBC 4.9 6.3 6.0 5.5 5.7   ANEU 2.6 3.5 3.1  --  2.9   HGB 14.5 15.2 14.7 13.7 14.2    167 162 143* 164     Recent Labs   Lab Test 05/26/20  1404 05/26/20  1350 12/13/19  1627 12/13/19  1558 07/25/19  1649   CR 0.93  --   --  0.90 1.01   GFRESTIMATED 84 75 67 89 77       VALPROIC ACID LABS     [liver enzymes, WBC, ANEU, Hgb, plts, +ammonia]  q6-12 mo  Recent Labs   Lab Test 07/25/19  1649 04/17/19  1239   ACE 59 47*       PSYCHOTROPIC DRUG INTERACTIONS     Serotonin Modulators may enhance the adverse/toxic effect of Antipsychotic Agents. Specifically, serotonin modulators may enhance dopamine blockade, possibly increasing the risk for neuroleptic malignant syndrome. Antipsychotic Agents may enhance the serotonergic effect of Serotonin Modulators. This could result in serotonin syndrome.     CNS Depressants may enhance the adverse/toxic effect of other CNS Depressants.     Valproate Products may enhance the adverse/toxic effect of RisperiDONE. Generalized edema has developed.     ClonazePAM may diminish the therapeutic effect of Valproate Products. Valproate Products may decrease the serum concentration of ClonazePAM. ClonazePAM may increase the serum concentration of Valproate Products.     Anticholinergic Agents may enhance the adverse/toxic effect of other Anticholinergic Agents.     QT-prolonging Agents may enhance the QTc-prolonging effect of QT-prolonging  Agents.     MANAGEMENT:  Monitoring for adverse effects, routine vitals, routine labs, using lowest therapeutic dose of [Klonopin] and patient is aware of risks    RISK STATEMENT for SAFETY     Los Huang did not appear to be an imminent safety risk to self or others.    PSYCHIATRIC DIAGNOSIS     Bipolar Disorder, type I, most recent episode depressed, currently in remission  R/o ADHD     ASSESSMENT      [m2, h3]     TODAY Solo Huang, 64M with PMHx including stage IV colon cancer with liver mets s/p surgery and chemotherapy, returns to the [facility] (seen over video) for ongoing management of Bipolar I with psychosis, currently euthymic. He continues to grieve his mother's death and attempt to rebuild an emotional relationship with his children. Today we discussed side effects at length, particularly EPSE and dry mouth. It is possible that he could require a lower dose of Cogentin at a lower dose of Risperdal, but the latter would have to be decreased slowly while watching for emerging mood or psychotic symptoms. If possible, could increase Depakote dose to compensate.      PLAN      [m2, h3]     1) PSYCHOTROPIC MEDICATIONS:   Continue Depakote DR 1000 mg PO qHS.  Continue risperidone 4 mg at bedtime.  Continue Strattera 18 mg daily.  Continue clonazepam 0.25 mg daily PRN.  Continue benztropine 0.5 mg PO daily  Take melatonin 1 mg PO daily with dinner.    2) [place]  was checked today: indicates expected use in accordance with chart review/patient report.    3) THERAPY:    continue    4) NEXT DUE:    Labs- CMP, CBC, lipids, HbA1c, VPA level ordered and pending  EKG- PRN  Rating Scales- AIMS due    5) REFERRALS:    No Referrals needed     6) RTC: 1 month    7) CRISIS NUMBERS:   Provided routinely in AVS   CRISIS TEXT LINE: Text [id number] from anywhere in [place], anytime, any crisis 24/7;  OR SEE www.crisistextline.org  ONLY if a CARL PT: [facility] [phone] (clinic),  [phone] (after hours)     TREATMENT RISK STATEMENT:  The risks, benefits, alternatives and potential adverse effects have been discussed and are understood by the pt. The pt understands the risks of using street drugs or alcohol. There are no medical contraindications, the pt agrees to treatment with the ability to do so. The pt knows to call the clinic for any problems or to access emergency care if needed.  Medical and substance use concerns are documented above.  Psychotropic drug interaction check was done, including changes made today.    PROVIDER: Rahel Colon MD    Patient staffed over video with Dr. Pardo who will sign the note.  Supervisor is Dr. Munguia.    TELEHEALTH ATTENDING ATTESTATION  Following the ACGME guidelines on telemedicine and direct supervision due to COVID-19, I was concurrently participating in and/or monitoring the patient care through appropriate telecommunication technology.  I discussed the key portions of the service with the resident, including the mental status examination and developing the plan of care. I reviewed key portions of the history with the resident. I agree with the findings and plan as documented in this note.   Mignon Pardo MD

## 2020-06-05 NOTE — TELEPHONE ENCOUNTER
On 6/5/2020, at 1302, writer called patient at mobile to confirm Virtual Visit. Writer unable to make contact with patient. Writer unable to leave voice message - number disconnected/busy. HEVER Counsell, EMT

## 2020-06-12 ENCOUNTER — TEAM CONFERENCE (OUTPATIENT)
Dept: VASCULAR SURGERY | Facility: CLINIC | Age: 65
End: 2020-06-12

## 2020-06-12 DIAGNOSIS — C78.7 METASTATIC COLON CANCER TO LIVER (H): Primary | ICD-10-CM

## 2020-06-12 DIAGNOSIS — C18.9 METASTATIC COLON CANCER TO LIVER (H): Primary | ICD-10-CM

## 2020-06-12 NOTE — TELEPHONE ENCOUNTER
Thank you for submitting a referral for patient [id number]. This patient has Medicare as their  primary insurance.  Medicare does not allow for pre-determination or authorization prior to treatment.  This patients supplemental plan benefits are summarized below:    [facility]: [phone]  Per Availity  Transaction Id#:  Effective Date:  Plan B Medicare Supplement  Covers the 20% co-insurance  Follows Medicare Guidelines  Electronic Cross Over In Place    Your patients diagnosis is metastatic colorectal cancer and you can proceed with treatment.  Medicare  has reimbursed providers for Yttrium-90 under . Per the FDA approved package insert, SIR-  Spheres  is indicated for the treatment of non-resectable metastatic liver tumors from colorectal cancer  with adjuvant intra-hepatic artery (HAC) FUDR (Floxuridine) chemotherapy. Medicare should cover 80%  of the Medicare eligible charges for outpatient hospital, based on claims review. The patient's  supplemental plan will coordinate benefits with Medicare and cover the remaining 20% coinsurance, as  they follow Medicare guidelines.    Your Medicare contractor has not published a local coverage determination (LCD) or medical coverage  policy for Yttrium-90. In the unlikely event that there is a denial of services, I will assist you with the  appeal process. If you have any additional questions or concerns please do not hesitate to contact me.

## 2020-06-14 ENCOUNTER — MYC REFILL (OUTPATIENT)
Dept: PSYCHIATRY | Facility: CLINIC | Age: 65
End: 2020-06-14

## 2020-06-14 DIAGNOSIS — F31.9 BIPOLAR 1 DISORDER (H): ICD-10-CM

## 2020-06-15 NOTE — TELEPHONE ENCOUNTER
Last seen: 6/5  RTC: 1 month   Cancel: none   No-show: none   Next appt: none     Incoming refill from [facility] via [employer]     Medication requested: divalproex sodium delayed-release (DEPAKOTE) 500 MG DR tablet   Directions:Take 2 tablets (1,000 mg) by mouth At Bedtime - Oral    Qty: 60  Last refilled: 3/10    Writer placed a call to pharmacy and confirmed, patient last refilled a 30 day supply of Depakote on 6/5 and should have enough medication for another few weeks. Will reach out to patient to confirm.

## 2020-06-16 ENCOUNTER — TELEPHONE (OUTPATIENT)
Dept: VASCULAR SURGERY | Facility: CLINIC | Age: 65
End: 2020-06-16

## 2020-06-16 RX ORDER — DIVALPROEX SODIUM 500 MG/1
1000 TABLET, DELAYED RELEASE ORAL AT BEDTIME
Qty: 60 TABLET | Refills: 3 | Status: SHIPPED | OUTPATIENT
Start: 2020-06-16 | End: 2020-08-24

## 2020-06-16 NOTE — TELEPHONE ENCOUNTER
Called pt and informed him that we did hear back regarding the Y90 prior authorization.     *informed him that we will start looking for dates and then call him back    *pt did return my call. We talked about dates as he's having issues with rides and his car is not working at this time    We did talk about some dates in which I will have to call him back on the dates once I consult with Dr. Landis.     He agrees to plan.     Elvira HESS RN, BSN  Interventional Radiology Nurse Coordinator   Phone: [phone]

## 2020-06-16 NOTE — TELEPHONE ENCOUNTER
- Meds refilled by provider   - Med tab changed to reflect this   - Patient notified via PocketSuite     No further action needed by this writer

## 2020-06-17 ENCOUNTER — TELEPHONE (OUTPATIENT)
Dept: VASCULAR SURGERY | Facility: CLINIC | Age: 65
End: 2020-06-17

## 2020-06-18 NOTE — TELEPHONE ENCOUNTER
Called pt as he had paged me,  He state that he is having issues with his family and had a falling out with his son last week.    He states that he has noone to drive him to the appt as well as he's having car issues.      We talked about dates of option and he also asked about the covid testing. I explained that once he is scheduled we have a covid team that will call him to get him scheduled for this. Pt did [facility] but there is a 45 min wait.     I informed him that I will check with my  and then call him with appt details.     Elvira HESS RN, BSN  Interventional Radiology Nurse Coordinator   Phone: [phone]

## 2020-06-19 ENCOUNTER — TELEPHONE (OUTPATIENT)
Dept: VASCULAR SURGERY | Facility: CLINIC | Age: 65
End: 2020-06-19

## 2020-06-19 DIAGNOSIS — Z11.59 ENCOUNTER FOR SCREENING FOR OTHER VIRAL DISEASES: Primary | ICD-10-CM

## 2020-06-19 NOTE — TELEPHONE ENCOUNTER
Called pt and informed him that we have him scheduled for Y90 treatment with Dr Landis    Left a VM    Mapping: WEds 7/8, check into [facility] at 7am for an 830am start time    Thurs 7/9, check into [facility] at 9am for an 1130a       Will send a letter and informed him that if these dates do not work then he is to call me.     Let direct line.     Elvira HESS RN, BSN  Interventional Radiology Nurse Coordinator   Phone: [phone]

## 2020-06-29 ENCOUNTER — OFFICE VISIT (OUTPATIENT)
Dept: INTERNAL MEDICINE | Facility: CLINIC | Age: 65
End: 2020-06-29
Payer: MEDICARE

## 2020-06-29 VITALS
RESPIRATION RATE: 18 BRPM | DIASTOLIC BLOOD PRESSURE: 64 MMHG | SYSTOLIC BLOOD PRESSURE: 110 MMHG | BODY MASS INDEX: 26.1 KG/M2 | OXYGEN SATURATION: 96 % | WEIGHT: 203.4 LBS | HEART RATE: 95 BPM | TEMPERATURE: 97 F

## 2020-06-29 DIAGNOSIS — Z01.818 PREOP GENERAL PHYSICAL EXAM: Primary | ICD-10-CM

## 2020-06-29 PROCEDURE — 99214 OFFICE O/P EST MOD 30 MIN: CPT | Performed by: INTERNAL MEDICINE

## 2020-06-29 PROCEDURE — 93000 ELECTROCARDIOGRAM COMPLETE: CPT | Performed by: INTERNAL MEDICINE

## 2020-06-29 NOTE — PROGRESS NOTES
[facility]  [address] [place] [id number]  [phone]  Dept: [phone]    PRE-OP EVALUATION:  Today's date: 2020    Los Huang (: 1955) presents for pre-operative evaluation assessment as requested by Dr. Lugo.  He requires evaluation and anesthesia risk assessment prior to undergoing surgery/procedure for treatment of Liver .    Proposed Surgery/ Procedure: radioembolization liver  Date of Surgery/ Procedure: 2020, 2020  Time of Surgery/ Procedure: AM  Hospital/Surgical Facility: [facility]   Fax number for surgical facility:   Primary Physician: Babar Mckinney  Type of Anesthesia Anticipated: General    Patient has a Health Care Directive or Living Will:  YES     1. NO - Do you have a history of heart attack, stroke, stent, bypass or surgery on an artery in the head, neck, heart or legs?  2. NO - Do you ever have any pain or discomfort in your chest?  3. NO - Do you have a history of  Heart Failure?  4. NO - Are you troubled by shortness of breath when: walking on the level, up a slight hill or at night?  5. NO - Do you currently have a cold, bronchitis or other respiratory infection?  6. NO - Do you have a cough, shortness of breath or wheezing?  7. YES - DO YOU SOMETIMES GET PAINS IN THE CALVES OF YOUR LEGS WHEN YOU WALK? These are muscle cramps and not consistent with peripheral arterial disease . No ischemic vascular disease  Or metabolic syndrome diagnoses   8. NO - Do you or anyone in your family have previous history of blood clots?  9. NO - Do you or does anyone in your family have a serious bleeding problem such as prolonged bleeding following surgeries or cuts?  10. NO - Have you ever had problems with anemia or been told to take iron pills?  11. NO - Have you had any abnormal blood loss such as black, tarry or bloody stools, or abnormal vaginal bleeding?  12. NO - Have you ever had a blood transfusion?  13. NO - Have you or any of your  relatives ever had problems with anesthesia?  14. NO - Do you have sleep apnea, excessive snoring or daytime drowsiness?  15. NO - Do you have any prosthetic heart valves?  16. NO - Do you have prosthetic joints?  17. NO - Is there any chance that you may be pregnant?      HPI:     HPI related to upcoming procedure: this is an treatment for a cancer       See problem list for active medical problems.  Problems all longstanding and stable, except as noted/documented.  See ROS for pertinent symptoms related to these conditions.    Patient did share some additional concerns.    1. He suspects he may have a small hernia with the right sided inguinal crease due to noting a small bulge there that does vary in size  2. He's had this procedure done once before while in [place] and it was hard for him to urinate in a laying flat position, maybe they should give him a wolf catheter for this procedure ?  3. Is this injection going to hurt him ?   4. He wanted my blessing as to, was it ok for him to try [place] prior to the planned procedure ?      MEDICAL HISTORY:     Patient Active Problem List    Diagnosis Date Noted     Colon cancer (H) 06/05/2020     Priority: Medium     Attention deficit hyperactivity disorder (ADHD), unspecified ADHD type 03/09/2020     Priority: Medium     H/O partial resection of colon 03/09/2020     Priority: Medium     Post-traumatic osteoarthritis of left shoulder 03/09/2020     Priority: Medium     Polysubstance dependence (H) 01/27/2020     Priority: Medium     Mood disorder with psychosis (H) 01/27/2020     Priority: Medium     Generalized anxiety disorder 01/27/2020     Priority: Medium     Bipolar 1 disorder (H) 04/17/2019     Priority: Medium     Metastatic colon cancer to liver (H) 04/03/2019     Priority: Medium      Past Medical History:   Diagnosis Date     Cancer (H)      Depressive disorder      Post-traumatic osteoarthritis of left shoulder 3/9/2020     Schizoaffective disorder  (H)      Past Surgical History:   Procedure Laterality Date     ABDOMEN SURGERY       BIOPSY       CHOLECYSTECTOMY       COLONOSCOPY       GENITOURINARY SURGERY      Ureteroscopy gone awry     ORTHOPEDIC SURGERY       Current Outpatient Medications   Medication Sig Dispense Refill     atomoxetine (STRATTERA) 18 MG capsule Take 1 capsule (18 mg) by mouth daily 30 capsule 2     benztropine (COGENTIN) 0.5 MG tablet Take 1 tablet (0.5 mg) by mouth daily 30 tablet 2     cholecalciferol (VITAMIN D3) 1000 units (25 mcg) capsule Take 1 capsule (1,000 Units) by mouth daily 90 capsule 3     clonazePAM (KLONOPIN) 0.5 MG tablet Take 0.5 tablets (0.25 mg) by mouth daily 15 tablet 1     diphenoxylate-atropine (LOMOTIL) 2.5-0.025 MG tablet Take 1 tablet by mouth 4 times daily as needed for diarrhea 30 tablet 3     divalproex sodium delayed-release (DEPAKOTE) 500 MG DR tablet Take 2 tablets (1,000 mg) by mouth At Bedtime 60 tablet 3     Homeopathic Products (ARNICARE) GEL Externally apply 1 Dose topically daily as needed (leg cramps)       multivitamin (CENTRUM SILVER) tablet Take 1 tablet by mouth daily 90 tablet 3     risperiDONE (RISPERDAL) 4 MG tablet Take 1 tablet (4 mg) by mouth At Bedtime 30 tablet 1     OTC products: None, except as noted above    Allergies   Allergen Reactions     Animal Dander       Latex Allergy: NO    Social History     Tobacco Use     Smoking status: Former Smoker     Packs/day: 1.00     Years: 34.00     Pack years: 34.00     Types: Cigarettes     Start date:      Last attempt to quit:      Years since quittin.5     Smokeless tobacco: Former User     Types: Snuff     Quit date:      Tobacco comment: Smoked sporadically in high school and during college until    Substance Use Topics     Alcohol use: Not Currently     Frequency: Monthly or less     Comment: slight     History   Drug Use Unknown     Comment: [id number]       REVIEW OF SYSTEMS:   CONSTITUTIONAL: NEGATIVE for fever,  chills, change in weight  ENT/MOUTH: NEGATIVE for ear, mouth and throat problems  RESP: NEGATIVE for significant cough or SOB  CV: NEGATIVE for chest pain, palpitations or peripheral edema  MUSCULOSKELETAL: POSITIVE  for  Some lingering symptoms of discomfort into the right inguinal crease for roughly the last month.seems to be a stable problem. He has noted that this is worsened with masturbation. Wonders if he has a hernia defect [ groin / umbilical ] but has not actually noted this.  HEME/ALLERGY/IMMUNE: NEGATIVE for bleeding problems  PSYCHIATRIC: NEGATIVE for changes in mood or affect  ROS otherwise negative    EXAM:   Pulse 95   Temp 97  F (36.1  C) (Oral)   Resp 18   Wt 92.3 kg (203 lb 6.4 oz)   SpO2 96%   BMI 26.10 kg/m    GENERAL APPEARANCE: healthy, alert and no distress  HENT: ear canals and TM's normal and nose and mouth without ulcers or lesions  NECK: no adenopathy, no asymmetry, masses, or scars and thyroid normal to palpation  RESP: lungs clear to auscultation - no rales, rhonchi or wheezes  CV: regular rate and rhythm, normal S1 S2, no S3 or S4 and no murmur, click or rub   ABDOMEN: soft, nontender, no HSM or masses and bowel sounds normal, he does have with valsalva maneuver , a small inguinal hernia right sided as well as surgical scars appropriate for past surgical history with some small ventral hernias, and a port is noted in the upper left chest wall   NEURO: Normal strength and tone, sensory exam grossly normal, mentation intact and speech normal  PSYCH: mentation appears normal and affect normal/bright    DIAGNOSTICS:     Orders Placed This Encounter   Procedures     EKG 12-lead complete w/read - Clinics         Recent Labs   Lab Test 05/26/20  1404 12/13/19  1558 07/25/19  1649  11/02/15   HGB 14.5 15.2 14.7   < >  --     167 162   < >  --     140 139   < >  --    POTASSIUM 3.7 4.0 4.1   < > 3.9   CR 0.93 0.90 1.01   < > 1.1   A1C  --   --  5.5  --  5.6    < > = values in  this interval not displayed.        IMPRESSION:   Reason for surgery/procedure: metastatic cancer   Diagnosis/reason for consult: assess and minimize preoperative risk per consulting surgeon     The proposed surgical procedure is considered INTERMEDIATE risk.    REVISED CARDIAC RISK INDEX  The patient has the following serious cardiovascular risks for perioperative complications such as (MI, PE, VFib and 3  AV Block):  No serious cardiac risks  INTERPRETATION: 0 risks: Class I (very low risk - 0.4% complication rate)    The patient has the following additional risks for perioperative complications:  No identified additional risks      ICD-10-CM    1. Preop general physical exam  Z01.818        RECOMMENDATIONS:       --Patient is to take all scheduled medications on the day of surgery EXCEPT for modifications listed below.    APPROVAL GIVEN to proceed with proposed procedure, without further diagnostic evaluation     I recommended he review his concerns about this procedure with the anaesthesiologist and with his proceduralist     Signed Electronically by: Babar Mckinney MD    Copy of this evaluation report is provided to requesting physician.    [facility] Preop Guidelines    Revised Cardiac Risk Index

## 2020-07-06 ENCOUNTER — TELEPHONE (OUTPATIENT)
Dept: VASCULAR SURGERY | Facility: CLINIC | Age: 65
End: 2020-07-06

## 2020-07-06 NOTE — TELEPHONE ENCOUNTER
Pt calling to inform me that he is not able to attend his Y[facility] treatment for this week.    States that he was not able to get covid 19 testing and did not want to wait in an uber care that would cost him over 200 dollars.    He states that he would like to reschedule his appts so that he can also r/s his covid 19 test.     I will see what I can do and then call him back  *called pt back    Informed him that we did r/s his appts.    Mapping: Thuresday 7/30  Delivery Weds 8/5    Will go ahead and send a letter today with all appt details.   Elvira HESS RN, BSN  Interventional Radiology Nurse Coordinator   Phone: [phone]

## 2020-07-16 ENCOUNTER — MYC REFILL (OUTPATIENT)
Dept: PSYCHIATRY | Facility: CLINIC | Age: 65
End: 2020-07-16

## 2020-07-16 ENCOUNTER — VIRTUAL VISIT (OUTPATIENT)
Dept: PSYCHIATRY | Facility: CLINIC | Age: 65
End: 2020-07-16
Attending: PSYCHIATRY & NEUROLOGY
Payer: MEDICARE

## 2020-07-16 ENCOUNTER — MYC MEDICAL ADVICE (OUTPATIENT)
Dept: PSYCHIATRY | Facility: CLINIC | Age: 65
End: 2020-07-16

## 2020-07-16 DIAGNOSIS — F31.9 BIPOLAR 1 DISORDER (H): ICD-10-CM

## 2020-07-16 DIAGNOSIS — F90.1 ATTENTION DEFICIT HYPERACTIVITY DISORDER (ADHD), PREDOMINANTLY HYPERACTIVE TYPE: ICD-10-CM

## 2020-07-16 DIAGNOSIS — F31.9 BIPOLAR 1 DISORDER (H): Primary | ICD-10-CM

## 2020-07-16 RX ORDER — ATOMOXETINE 18 MG/1
18 CAPSULE ORAL DAILY
Qty: 30 CAPSULE | Refills: 1 | Status: SHIPPED | OUTPATIENT
Start: 2020-07-16 | End: 2020-08-24

## 2020-07-16 RX ORDER — CLONAZEPAM 0.5 MG/1
0.25 TABLET ORAL DAILY
Qty: 15 TABLET | Refills: 1 | Status: SHIPPED | OUTPATIENT
Start: 2020-07-16 | End: 2020-08-24

## 2020-07-16 RX ORDER — RISPERIDONE 4 MG/1
4 TABLET ORAL AT BEDTIME
Qty: 30 TABLET | Refills: 1 | Status: SHIPPED | OUTPATIENT
Start: 2020-07-16 | End: 2020-08-24

## 2020-07-16 ASSESSMENT — PAIN SCALES - GENERAL: PAINLEVEL: MODERATE PAIN (4)
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
## 2020-07-16 NOTE — PROGRESS NOTES
"VIDEO VISIT  Los Huang is a 64 year old patient who is being evaluated via a billable video visit.      The patient has been notified of following:   \"This video visit will be conducted via a call between you and your physician/provider. We have found that certain health care needs can be provided without the need for an in-person physical exam. This service lets us provide the care you need with a video conversation. If a prescription is necessary we can send it directly to your pharmacy. If lab work is needed we can place an order for that and you can then stop by our lab to have the test done at a later time. Insurers are generally covering virtual visits as they would in-office visits so billing should not be different than normal.  If for some reason you do get billed incorrectly, you should contact the billing office to correct it and that number is in the AVS .    Patient has given verbal consent for video visit?:  Yes    How would you like to obtain your AVS?:  Anant    Patient would prefer that any video invitations be sent by: Send to e-mail at: [email or web address]      AVS SmartPhrase [PsychAVS] has been placed in 'Patient Instructions':  Yes     "

## 2020-07-16 NOTE — TELEPHONE ENCOUNTER
Last seen: 7/16  RTC: none   Cancel: none   No-show: none   Next appt: 8/24    Incoming refill from [facility] via [employer]      Disp  Refills  Start  End  ILIANA    atomoxetine (STRATTERA) 18 MG capsule  30 capsule  2  4/13/2020   No    Sig - Route: Take 1 capsule (18 mg) by mouth daily - Oral    Sent to pharmacy as: atomoxetine (STRATTERA) 18 MG capsule    Class: E-Prescribe    Order: [id number]    E-Prescribing Status: Receipt confirmed by pharmacy (4/13/2020 11:52 AM CDT)       Disp  Refills  Start  End  ILIANA    risperiDONE (RISPERDAL) 4 MG tablet  30 tablet  1  5/12/2020   No    Sig - Route: Take 1 tablet (4 mg) by mouth At Bedtime - Oral    Sent to pharmacy as: risperiDONE 4 MG Oral Tablet (risperDAL)    Class: E-Prescribe    Order: [id number]    E-Prescribing Status: Receipt confirmed by pharmacy (5/12/2020 12:37 PM CDT)      Disp  Refills  Start  End  ILIANA    clonazePAM (KLONOPIN) 0.5 MG tablet  15 tablet  1  5/12/2020   No    Sig - Route: Take 0.5 tablets (0.25 mg) by mouth daily - Oral    Sent to pharmacy as: clonazePAM 0.5 MG Oral Tablet (klonoPIN)    Class: E-Prescribe    Order: [id number]    Cosign for Ordering: Accepted by Alex Munguia MD on 5/14/2020  3:32 PM    E-Prescribing Status: Receipt confirmed by pharmacy (5/12/2020 12:37 PM CDT)    Per  last refilled- 6/12 #15, 5/12 #15, 4/13 #15    Will route to provider for approval

## 2020-07-16 NOTE — TELEPHONE ENCOUNTER
- Meds refilled by provider   - Med tab changed to reflect this   - Patient notified via [employer]     No further action needed by this writer

## 2020-07-16 NOTE — PATIENT INSTRUCTIONS
Thank you for coming to the PSYCHIATRY CLINIC.    Amado Banda, thank you for coming today.    - It is okay to take one extra dose of Clonazepam the morning of your procedures, please let the procedure staff know how much Clonazepam you took.   - I have referred you for supportive therapy here at our clinic and someone will be in touch with you to schedule a virtual therapy appointment.   - If you have any further questions please call the clinic at [phone].     Thank you,   Dr. Gracia      Lab Testing:  If you had lab testing today and your results are reassuring or normal they will be mailed to you or sent through [email or web address] within 7 days. If the lab tests need quick action we will call you with the results. The phone number we will call with results is # none (home) . If this is not the best number please call our clinic and change the number.    Medication Refills:  If you need any refills please call your pharmacy and they will contact us. Our fax number for refills is [phone]. Please allow three business for refill processing. If you need to  your refill at a new pharmacy, please contact the new pharmacy directly. The new pharmacy will help you get your medications transferred.     Scheduling:  If you have any concerns about today's visit or wish to schedule another appointment please call our office during normal business hours [phone] (8-5:00 M-F)    Contact Us:  Please call [phone] during business hours (8-5:00 M-F).  If after clinic hours, or on the weekend, please call  [phone].    Financial Assistance [phone]  [facility] Billing [phone]  Central Billing Office, [facility]: [phone]  [facility] Billing [phone]  Medical Records [phone]      MENTAL HEALTH CRISIS NUMBERS:  For a medical emergency please call  911 or go to the nearest ER.     [facility]:   [facility] -[phone]   Crisis Residence [facility] -[phone]   Walk-In  Counseling Center [facility] -[phone]   COPE 24/7 [facility] Mobile Team -[phone] (adults)/[phone] (child)  CHILD: [facility] needs assessment team - [phone]      [facility]:   [facility] - [phone]   Walk-in counseling [facility] - [phone]   Walk-in counseling [facility] - [phone]   Crisis Residence [facility] - [phone]  Urgent Care Adult Mental [facility]-[phone] mobile unit/ 24/7 crisis line    National Crisis Numbers:   National Suicide Prevention Lifeline: 9-518-595-TALK (380-495-9652)  Poison Control Center - 1-194.849.7819  [employer]/resources for a list of additional resources (SOS)  Trans Lifeline a hotline for transgender people 1-902.989.3693  The [employer] a hotline for LGBT youth 1-946.223.3186  Crisis Text Line: For any crisis 24/7   To: 103949  see www.crisistextline.org  - IF MAKING A CALL FEELS TOO HARD, send a text!         Again thank you for choosing PSYCHIATRY CLINIC and please let us know how we can best partner with you to improve you and your family's health.    You may be receiving a survey regarding this appointment. We would love to have your feedback, both positive and negative. The survey is done by an external company, so your answers are anonymous.

## 2020-07-26 DIAGNOSIS — F31.9 BIPOLAR 1 DISORDER (H): Primary | ICD-10-CM

## 2020-07-27 ENCOUNTER — APPOINTMENT (OUTPATIENT)
Dept: LAB | Facility: CLINIC | Age: 65
End: 2020-07-27
Payer: MEDICARE

## 2020-07-27 DIAGNOSIS — Z11.59 ENCOUNTER FOR SCREENING FOR OTHER VIRAL DISEASES: ICD-10-CM

## 2020-07-28 ENCOUNTER — TELEPHONE (OUTPATIENT)
Dept: INTERVENTIONAL RADIOLOGY/VASCULAR | Facility: CLINIC | Age: 65
End: 2020-07-28

## 2020-07-28 LAB
SARS-COV-2 RNA SPEC QL NAA+PROBE: NOT DETECTED
SPECIMEN SOURCE: NORMAL

## 2020-07-30 ENCOUNTER — APPOINTMENT (OUTPATIENT)
Dept: INTERVENTIONAL RADIOLOGY/VASCULAR | Facility: CLINIC | Age: 65
End: 2020-07-30
Attending: RADIOLOGY
Payer: MEDICARE

## 2020-07-30 ENCOUNTER — HOSPITAL ENCOUNTER (OUTPATIENT)
Dept: NUCLEAR MEDICINE | Facility: CLINIC | Age: 65
Setting detail: NUCLEAR MEDICINE
Discharge: HOME | End: 2020-07-30
Attending: RADIOLOGY | Admitting: RADIOLOGY
Payer: MEDICARE

## 2020-07-30 ENCOUNTER — APPOINTMENT (OUTPATIENT)
Dept: MEDSURG UNIT | Facility: CLINIC | Age: 65
End: 2020-07-30
Payer: MEDICARE

## 2020-07-30 ENCOUNTER — HOSPITAL ENCOUNTER (OUTPATIENT)
Facility: CLINIC | Age: 65
Discharge: HOME | End: 2020-07-30
Attending: RADIOLOGY | Admitting: RADIOLOGY
Payer: MEDICARE

## 2020-07-30 VITALS
TEMPERATURE: 97.7 F | HEART RATE: 71 BPM | HEIGHT: 74 IN | OXYGEN SATURATION: 98 % | DIASTOLIC BLOOD PRESSURE: 78 MMHG | RESPIRATION RATE: 18 BRPM | WEIGHT: 197 LBS | BODY MASS INDEX: 25.28 KG/M2 | SYSTOLIC BLOOD PRESSURE: 112 MMHG

## 2020-07-30 DIAGNOSIS — C18.9 METASTATIC COLON CANCER TO LIVER (H): ICD-10-CM

## 2020-07-30 DIAGNOSIS — C78.7 METASTATIC COLON CANCER TO LIVER (H): ICD-10-CM

## 2020-07-30 LAB
ALBUMIN SERPL-MCNC: 4.1 G/DL (ref 3.4–5)
ALP SERPL-CCNC: 83 U/L (ref 40–150)
ALT SERPL W P-5'-P-CCNC: 33 U/L (ref 0–70)
ANION GAP SERPL CALCULATED.3IONS-SCNC: 5 MMOL/L (ref 3–14)
APTT PPP: 28 SEC (ref 22–37)
AST SERPL W P-5'-P-CCNC: 17 U/L (ref 0–45)
BILIRUB DIRECT SERPL-MCNC: 0.1 MG/DL (ref 0–0.2)
BILIRUB SERPL-MCNC: 0.5 MG/DL (ref 0.2–1.3)
BUN SERPL-MCNC: 18 MG/DL (ref 7–30)
CALCIUM SERPL-MCNC: 9 MG/DL (ref 8.5–10.1)
CHLORIDE SERPL-SCNC: 109 MMOL/L (ref 94–109)
CO2 SERPL-SCNC: 27 MMOL/L (ref 20–32)
CREAT SERPL-MCNC: 1.03 MG/DL (ref 0.66–1.25)
ERYTHROCYTE [DISTWIDTH] IN BLOOD BY AUTOMATED COUNT: 13.3 % (ref 10–15)
GFR SERPL CREATININE-BSD FRML MDRD: 75 ML/MIN/{1.73_M2}
GLUCOSE SERPL-MCNC: 104 MG/DL (ref 70–99)
HCT VFR BLD AUTO: 48.1 % (ref 40–53)
HGB BLD-MCNC: 15.7 G/DL (ref 13.3–17.7)
INR PPP: 1.02 (ref 0.86–1.14)
MCH RBC QN AUTO: 29.7 PG (ref 26.5–33)
MCHC RBC AUTO-ENTMCNC: 32.6 G/DL (ref 31.5–36.5)
MCV RBC AUTO: 91 FL (ref 78–100)
PLATELET # BLD AUTO: 169 10E9/L (ref 150–450)
POTASSIUM SERPL-SCNC: 4 MMOL/L (ref 3.4–5.3)
PROT SERPL-MCNC: 7.8 G/DL (ref 6.8–8.8)
RBC # BLD AUTO: 5.29 10E12/L (ref 4.4–5.9)
SODIUM SERPL-SCNC: 140 MMOL/L (ref 133–144)
WBC # BLD AUTO: 6 10E9/L (ref 4–11)

## 2020-07-30 PROCEDURE — 27210910 ZZH NEEDLE CR6

## 2020-07-30 PROCEDURE — C1760 CLOSURE DEV, VASC: HCPCS

## 2020-07-30 PROCEDURE — A9540 TC99M MAA: HCPCS | Performed by: RADIOLOGY

## 2020-07-30 PROCEDURE — 75726 ARTERY X-RAYS ABDOMEN: CPT | Mod: XS

## 2020-07-30 PROCEDURE — 51702 INSERT TEMP BLADDER CATH: CPT

## 2020-07-30 PROCEDURE — 34300033 ZZH RX 343: Performed by: RADIOLOGY

## 2020-07-30 PROCEDURE — C1769 GUIDE WIRE: HCPCS

## 2020-07-30 PROCEDURE — 27210908 ZZH NEEDLE CR4

## 2020-07-30 PROCEDURE — 40000168 ZZH STATISTIC PP CARE STAGE 3

## 2020-07-30 PROCEDURE — 25500064 ZZH RX 255 OP 636: Performed by: RADIOLOGY

## 2020-07-30 PROCEDURE — 27210888 ZZH ACCESSORY CR7

## 2020-07-30 PROCEDURE — 85610 PROTHROMBIN TIME: CPT | Performed by: RADIOLOGY

## 2020-07-30 PROCEDURE — 25000128 H RX IP 250 OP 636: Performed by: RADIOLOGY

## 2020-07-30 PROCEDURE — 78597 LUNG PERFUSION DIFFERENTIAL: CPT

## 2020-07-30 PROCEDURE — C1887 CATHETER, GUIDING: HCPCS

## 2020-07-30 PROCEDURE — 85730 THROMBOPLASTIN TIME PARTIAL: CPT | Performed by: RADIOLOGY

## 2020-07-30 PROCEDURE — 25800030 ZZH RX IP 258 OP 636: Performed by: RADIOLOGY

## 2020-07-30 PROCEDURE — 27210732 ZZH ACCESSORY CR1

## 2020-07-30 PROCEDURE — 80076 HEPATIC FUNCTION PANEL: CPT | Performed by: RADIOLOGY

## 2020-07-30 PROCEDURE — 25000128 H RX IP 250 OP 636: Performed by: STUDENT IN AN ORGANIZED HEALTH CARE EDUCATION/TRAINING PROGRAM

## 2020-07-30 PROCEDURE — 27210804 ZZH SHEATH CR3

## 2020-07-30 PROCEDURE — 36245 INS CATH ABD/L-EXT ART 1ST: CPT | Mod: XS

## 2020-07-30 PROCEDURE — 85027 COMPLETE CBC AUTOMATED: CPT | Performed by: RADIOLOGY

## 2020-07-30 PROCEDURE — 80048 BASIC METABOLIC PNL TOTAL CA: CPT | Performed by: RADIOLOGY

## 2020-07-30 PROCEDURE — 75774 ARTERY X-RAY EACH VESSEL: CPT | Mod: XU

## 2020-07-30 PROCEDURE — 75726 ARTERY X-RAYS ABDOMEN: CPT

## 2020-07-30 PROCEDURE — 27210780 ZZH KIT CR3

## 2020-07-30 PROCEDURE — 74175 CTA ABDOMEN W/CONTRAST: CPT

## 2020-07-30 PROCEDURE — 27210889 ZZH ACCESSORY CR8

## 2020-07-30 PROCEDURE — 36247 INS CATH ABD/L-EXT ART 3RD: CPT

## 2020-07-30 PROCEDURE — 25000125 ZZHC RX 250: Performed by: STUDENT IN AN ORGANIZED HEALTH CARE EDUCATION/TRAINING PROGRAM

## 2020-07-30 PROCEDURE — 99152 MOD SED SAME PHYS/QHP 5/>YRS: CPT

## 2020-07-30 PROCEDURE — 99153 MOD SED SAME PHYS/QHP EA: CPT

## 2020-07-30 RX ORDER — LIDOCAINE 40 MG/G
CREAM TOPICAL
Status: DISCONTINUED | OUTPATIENT
Start: 2020-07-30 | End: 2020-07-30 | Stop reason: HOSPADM

## 2020-07-30 RX ORDER — HEPARIN SODIUM 200 [USP'U]/100ML
1 INJECTION, SOLUTION INTRAVENOUS CONTINUOUS PRN
Status: DISCONTINUED | OUTPATIENT
Start: 2020-07-30 | End: 2020-07-30 | Stop reason: HOSPADM

## 2020-07-30 RX ORDER — NICOTINE POLACRILEX 4 MG
15-30 LOZENGE BUCCAL
Status: DISCONTINUED | OUTPATIENT
Start: 2020-07-30 | End: 2020-07-30 | Stop reason: HOSPADM

## 2020-07-30 RX ORDER — DEXTROSE MONOHYDRATE 25 G/50ML
25-50 INJECTION, SOLUTION INTRAVENOUS
Status: DISCONTINUED | OUTPATIENT
Start: 2020-07-30 | End: 2020-07-30 | Stop reason: HOSPADM

## 2020-07-30 RX ORDER — SODIUM CHLORIDE 9 MG/ML
INJECTION, SOLUTION INTRAVENOUS CONTINUOUS
Status: DISCONTINUED | OUTPATIENT
Start: 2020-07-30 | End: 2020-07-30 | Stop reason: HOSPADM

## 2020-07-30 RX ORDER — FLUMAZENIL 0.1 MG/ML
0.2 INJECTION, SOLUTION INTRAVENOUS
Status: DISCONTINUED | OUTPATIENT
Start: 2020-07-30 | End: 2020-07-30 | Stop reason: HOSPADM

## 2020-07-30 RX ORDER — NALOXONE HYDROCHLORIDE 0.4 MG/ML
.1-.4 INJECTION, SOLUTION INTRAMUSCULAR; INTRAVENOUS; SUBCUTANEOUS
Status: DISCONTINUED | OUTPATIENT
Start: 2020-07-30 | End: 2020-07-30 | Stop reason: HOSPADM

## 2020-07-30 RX ORDER — IODIXANOL 320 MG/ML
150 INJECTION, SOLUTION INTRAVASCULAR ONCE
Status: COMPLETED | OUTPATIENT
Start: 2020-07-30 | End: 2020-07-30

## 2020-07-30 RX ORDER — ACETAMINOPHEN 500 MG
500 TABLET ORAL EVERY 6 HOURS PRN
Status: DISCONTINUED | OUTPATIENT
Start: 2020-07-30 | End: 2020-07-30 | Stop reason: HOSPADM

## 2020-07-30 RX ORDER — FENTANYL CITRATE 50 UG/ML
25-50 INJECTION, SOLUTION INTRAMUSCULAR; INTRAVENOUS EVERY 5 MIN PRN
Status: DISCONTINUED | OUTPATIENT
Start: 2020-07-30 | End: 2020-07-30 | Stop reason: HOSPADM

## 2020-07-30 RX ADMIN — FENTANYL CITRATE 25 MCG: 50 INJECTION, SOLUTION INTRAMUSCULAR; INTRAVENOUS at 10:35

## 2020-07-30 RX ADMIN — FENTANYL CITRATE 25 MCG: 50 INJECTION, SOLUTION INTRAMUSCULAR; INTRAVENOUS at 09:06

## 2020-07-30 RX ADMIN — FENTANYL CITRATE 25 MCG: 50 INJECTION, SOLUTION INTRAMUSCULAR; INTRAVENOUS at 10:00

## 2020-07-30 RX ADMIN — SODIUM CHLORIDE: 9 INJECTION, SOLUTION INTRAVENOUS at 08:32

## 2020-07-30 RX ADMIN — MIDAZOLAM 0.5 MG: 1 INJECTION INTRAMUSCULAR; INTRAVENOUS at 09:44

## 2020-07-30 RX ADMIN — FENTANYL CITRATE 25 MCG: 50 INJECTION, SOLUTION INTRAMUSCULAR; INTRAVENOUS at 09:03

## 2020-07-30 RX ADMIN — HEPARIN SODIUM 1 BAG: 200 INJECTION, SOLUTION INTRAVENOUS at 09:06

## 2020-07-30 RX ADMIN — FENTANYL CITRATE 25 MCG: 50 INJECTION, SOLUTION INTRAMUSCULAR; INTRAVENOUS at 10:38

## 2020-07-30 RX ADMIN — LIDOCAINE HYDROCHLORIDE 5 ML: 10 INJECTION, SOLUTION EPIDURAL; INFILTRATION; INTRACAUDAL; PERINEURAL at 09:06

## 2020-07-30 RX ADMIN — MIDAZOLAM 0.5 MG: 1 INJECTION INTRAMUSCULAR; INTRAVENOUS at 09:05

## 2020-07-30 RX ADMIN — FENTANYL CITRATE 25 MCG: 50 INJECTION, SOLUTION INTRAMUSCULAR; INTRAVENOUS at 09:44

## 2020-07-30 RX ADMIN — MIDAZOLAM 0.5 MG: 1 INJECTION INTRAMUSCULAR; INTRAVENOUS at 10:39

## 2020-07-30 RX ADMIN — MIDAZOLAM 0.5 MG: 1 INJECTION INTRAMUSCULAR; INTRAVENOUS at 10:00

## 2020-07-30 RX ADMIN — KIT FOR THE PREPARATION OF TECHNETIUM TC 99M ALBUMIN AGGREGATED 4 MILLICURIE: 2.5 INJECTION, POWDER, FOR SOLUTION INTRAVENOUS at 10:30

## 2020-07-30 RX ADMIN — IODIXANOL 150 ML: 320 INJECTION, SOLUTION INTRAVASCULAR at 10:48

## 2020-07-30 RX ADMIN — MIDAZOLAM 0.5 MG: 1 INJECTION INTRAMUSCULAR; INTRAVENOUS at 10:34

## 2020-07-30 RX ADMIN — MIDAZOLAM 0.5 MG: 1 INJECTION INTRAMUSCULAR; INTRAVENOUS at 09:03

## 2020-07-30 ASSESSMENT — MIFFLIN-ST. JEOR: SCORE: 1743.34

## 2020-07-30 NOTE — PROGRESS NOTES
Prep and teaching complete for Y90 mapping; pt reports he has had embolizations previously at other facility; pt reports he had pain with inability to void while laying flat during and after procedure. Pt spoke with Dr ZAC Gill; gave verbal order for placing wolf catheter; wolf placed at 0830 without difficulty, clear pale yellow urine returned. Daughter, Yuliet at bedside, will drive pt home. IV placed peripherally after pt reports he did have a port in place, port not seen in chart previously. Pt reports some anxiety with procedure; pt cooperative. Pt to IR with staff for procedure.

## 2020-07-30 NOTE — IP AVS SNAPSHOT
MRN:[id number]                      After Visit Summary   7/30/2020    Lso Huang    MRN: [id number]           Visit Information        Department      7/30/2020  7:01 AM [address] [facility]          Review of your medicines      UNREVIEWED medicines. Ask your doctor about these medicines       Dose / Directions   Arnicare Gel      Dose:  1 Dose  Externally apply 1 Dose topically daily as needed (leg cramps)  Refills:  0     atomoxetine 18 MG capsule  Commonly known as:  STRATTERA  Used for:  Attention deficit hyperactivity disorder (ADHD), predominantly hyperactive type      Dose:  18 mg  Take 1 capsule (18 mg) by mouth daily  Quantity:  30 capsule  Refills:  1     benztropine 0.5 MG tablet  Commonly known as:  COGENTIN  Used for:  Bipolar 1 disorder (H)      Dose:  0.5 mg  Take 1 tablet (0.5 mg) by mouth daily  Quantity:  30 tablet  Refills:  2     cholecalciferol 25 mcg (1000 units) capsule  Commonly known as:  VITAMIN D3  Used for:  Bipolar 1 disorder (H)      Dose:  1 capsule  Take 1 capsule (1,000 Units) by mouth daily  Quantity:  90 capsule  Refills:  3     clonazePAM 0.5 MG tablet  Commonly known as:  klonoPIN  Used for:  Bipolar 1 disorder (H)      Dose:  0.25 mg  Take 0.5 tablets (0.25 mg) by mouth daily  Quantity:  15 tablet  Refills:  1     diphenoxylate-atropine 2.5-0.025 MG tablet  Commonly known as:  LOMOTIL  Used for:  Diarrhea, unspecified type      Dose:  1 tablet  Take 1 tablet by mouth 4 times daily as needed for diarrhea  Quantity:  30 tablet  Refills:  3     divalproex sodium delayed-release 500 MG DR tablet  Commonly known as:  DEPAKOTE  Used for:  Bipolar 1 disorder (H)      Dose:  1,000 mg  Take 2 tablets (1,000 mg) by mouth At Bedtime  Quantity:  60 tablet  Refills:  3     IMODIUM OR      Refills:  0     multivitamin tablet  Used for:  Bipolar 1 disorder (H)      Dose:  1 tablet  Take 1 tablet by mouth daily  Quantity:  90 tablet  Refills:  3     risperiDONE 4 MG  tablet  Commonly known as:  risperDAL  Used for:  Bipolar 1 disorder (H)      Dose:  4 mg  Take 1 tablet (4 mg) by mouth At Bedtime  Quantity:  30 tablet  Refills:  1              Protect others around you: Learn how to safely use, store and throw away your medicines at www.disposemymeds.org.       Follow-ups after your visit       Your next 10 appointments already scheduled    Aug 05, 2020  6:30 AM CDT  Procedure 6.5 hour with U2A ROOM 15  Unit 2[address] [facility] [place] ([facility]) [address] [address]      Aug 05, 2020  8:30 AM CDT  IR PERIPHERAL VISCERAL EMBOLIZATION with UUIR1  [facility], [place], Interventional Radiology ([facility]) [address] [address] [phone]   The day before the exam:    You may eat your regular diet.    You are encouraged to drink at least 8 eight ounce glasses of clear liquids.    Drink no alcoholic beverages for 24 hours before or after the exam.    The day of the exam:    Do not eat any solid food or milk products for 6 hours prior to the exam. You may drink clear liquids until 2 hours prior to the exam. Clear liquids include the following: water, Jell-O, clear broth, apple juice or any non-carbonated drink that you can see through (no pop!)    The morning of the exam you may take medications as directed with a sip of water.    You should not have received barium (x-ray contrast) within 48 hours of this exam.    Please wear loose clothing, such as a sweat suit or jogging clothes. Avoid snaps, zippers and other metal. We may ask you to undress and put on a hospital gown.    Please bring any scans or X-rays taken at other hospitals, if similar tests were done. Also bring a list of your medicines, including vitamins, minerals and over-the-counter drugs. It is safest to leave personal items at home.    Someone will need to drive you  to and from the hospital.    Tell your doctor:    If you have allergies to x-ray contrast or iodine.    If you are or may be pregnant.    If you are taking Coumadin (or any other blood thinners) 5 days prior to the exam for any special instructions.    If you are diabetic to determine if your insulin needs have to be adjusted for the exam.  Your doctor will:    Need to do a history and physical within 30 days before this procedure.    Determine whether you need a 12 lead EKG    Determine which medications (if any) that should not be taken the morning of the exam.    Obtain necessary laboratory tests prior to the exam (creatinine, Hgb/Hct, platelet count, and INR).    Following the exam:    You will need to remain on complete bedrest for up to 6 hours. The nurse will monitor your vital signs, puncture site, and the pulses and temperature of the arm or leg that was punctured.    If you were given sedation, you cannot drive for 24 hours after the procedure, and an adult must be with you until then.    If you have any questions, please call the Imaging Department where you will have your exam. Directions, parking instructions, and other information are available on our website, [email or web address].     Aug 05, 2020 12:00 PM CDT  NM SIRSPHERE THERAPY with UUNM2  [facility], [place], Nuclear Medicine ([facility] [place]) [address] [address] [phone]   How do I prepare for my exam? (Food and drink instructions)  No Food and Drink Restrictions, however please follow the instructions for your IR (Interventional Radiology) exam associated with this exam.    What should I wear: You should wear comfortable clothes. Leave your valuables at home.  You will be changed into procedural attire when you arrive for exam.    How long does the exam take:  Plan on being here for 5-6 hours.    What should I bring: Please bring a list of your medicines to the exam.  (Include vitamins, minerals and over-the-counter drugs.) Please bring related prior results and films.    Do I need a :  Follow the instructions given to you for the Interventional Radiology Exam    What do I need to tell my doctor?  Tell your doctor if you are breastfeeding or if there is any chance you may be pregnant.  If you have had a barium test within the past few days. Barium may change the results of certain exams.  If you think you may need sedation (medicine to help you relax).    What should I do after the exam: No restrictions, you may resume normal activities. The radioactive fluid will leave your body when you urinate (use the toilet). If you drink extra fluids, it will leave your body faster.    What is this test: The Interventional Radiologist will inject a radioactive activity material into your liver during the Interventional Radiology Exam to help treat metastatic liver cancer.   The procedure allows a concentrated dose of radiation to be delivered directly to the liver cancer, limiting damage to other tissue. Images will be taken in the Nuclear Medicine department after your Interventional radiology injection.    Who should I call with questions: Please call your Imaging Department at your exam site with any questions. Directions, parking instructions, and other information are available on our website, [email or web address].     Aug 24, 2020  9:30 AM CDT  Video Visit with Marcia Gracia MD  Psychiatry Clinic ([facility]) [place] [id number]  [address] [place] [address] [phone]   Psychiatry Clinic  Note: this is not an onsite visit; there is no need to come to the facility.  Please have a list of all current medications available for appointment.         Care Instructions       Further instructions from your care team       [facility]   Interventional Radiology  Discharge Instructions Post Mapping for Insertion of    Radioactive Microspheres to the Liver    AFTER YOU GO HOME          Relax and take it easy for 24 hours.       Drink plenty of fluids.       Resume your regular diet, unless otherwise instructed by your Primary Physician.       DO NOT smoke for at least 24 hours, if you were given any sedation.       DO NOT drink alcoholic beverages the day of your procedure.       Do not drive or operate machinery at home or at work for 24 hours.          DO NOT do any strenuous exercise or lifting for at least 2 days following your procedure.       DO NOT take a bath or shower for at least 12 hours following your procedure.       DO NOT make any important or legal decisions for 24 hours following your procedure.    CALL THE PHYSICIAN IF:       - You start bleeding from the procedure site. lie down flat and hold pressure on the site. A small lump or bruise is common at the puncture site. Your physician will tell you if you need to return to the hospital.      - You develop numbness, coolness or a change in color of the leg that was punctured.      - You experience increased pain or redness at the puncture site.      - You develop hives or a rash or unexplained itching.      - You develop a temperature of 101 degrees F or greater.    Additional Information:           Support the puncture site for coughing, sneezing, or moving your bowels for the first 48 hours  No tub bath, hot tubs, or swimming for 5 days  No lotion or powder to the puncture site for 3 day    Follow the Discharge Instructions for the Liver Brachytherapy; Contrast Instructions and Instructions for the Radiopharmaceuticals.     Closure Device:Mynx Closure Device            [facility] INTERVENTIONAL RADIOLOGY DEPARTMENT         Procedure Physician: Dr. Brooks                   Date of Procedure:July 30, 2020       Telephone Numbers:   [phone]     Monday-Friday 8:00 to 4:30 pm                                        [phone]     After 4:30 pm Monday-Friday,  "Weekend and Holidays. Ask for the Interventional Radiologist on call. Someone is on call 24 hrs/day.              Additional Information About Your Visit       [employer] Information    [employer] gives you secure access to your electronic health record. If you see a primary care provider, you can also send messages to your care team and make appointments. If you have questions, please call your primary care clinic.  If you do not have a primary care provider, please call [phone] and they will assist you.       Care EveryWhere ID    This is your Care EveryWhere ID. This could be used by other organizations to access your [facility] medical records  [id number]       Your Vitals Were  Most recent update: 7/30/2020  1:13 PM    Blood Pressure   109/60 (BP Location: Left arm)          Pulse   71          Temperature   97.7  F (36.5  C) (Oral)          Respirations   16          Height   1.88 m (6' 2\")             Weight   89.4 kg (197 lb)    Pulse Oximetry   99%    BMI (Body Mass Index)   25.29 kg/m           Primary Care Provider Office Phone # Fax #    Babar Mckinney [phone]      Equal Access to Services    FLASH GOTTI : Hadii tj piper hadasho Soomaali, waaxda luqadaha, qaybta kaalmada adekalaniyaramón, jeannette villaseñor . So [facility] [phone].    ATENCIÓN: Si habla español, tiene a hanks disposición servicios gratuitos de asistencia lingüística. Llame al [phone].    We comply with applicable federal and state civil rights laws, including the [place] Human Rights Act. We do not discriminate on the basis of race, color, creed, Lutheran, national origin, marital status, age, disability, sex, sexual orientation, or gender identity.       Thank you!    Thank you for choosing [facility] for your care. Our goal is always to provide you with excellent care. Hearing back from our patients is one way we can continue to improve our services. Please take a few minutes to complete the written " survey that you may receive in the mail after you visit with us. Thank you!            Medication List      ASK your doctor about these medications          Morning Afternoon Evening Bedtime As Needed    Arnicare Gel  INSTRUCTIONS:  Externally apply 1 Dose topically daily as needed (leg cramps)                     atomoxetine 18 MG capsule  Also known as:  STRATTERA  INSTRUCTIONS:  Take 1 capsule (18 mg) by mouth daily                     benztropine 0.5 MG tablet  Also known as:  COGENTIN  INSTRUCTIONS:  Take 1 tablet (0.5 mg) by mouth daily                     cholecalciferol 25 mcg (1000 units) capsule  Also known as:  VITAMIN D3  INSTRUCTIONS:  Take 1 capsule (1,000 Units) by mouth daily                     clonazePAM 0.5 MG tablet  Also known as:  klonoPIN  INSTRUCTIONS:  Take 0.5 tablets (0.25 mg) by mouth daily                     diphenoxylate-atropine 2.5-0.025 MG tablet  Also known as:  LOMOTIL  INSTRUCTIONS:  Take 1 tablet by mouth 4 times daily as needed for diarrhea                     divalproex sodium delayed-release 500 MG DR tablet  Also known as:  DEPAKOTE  INSTRUCTIONS:  Take 2 tablets (1,000 mg) by mouth At Bedtime                     IMODIUM OR                     multivitamin tablet  INSTRUCTIONS:  Take 1 tablet by mouth daily                     risperiDONE 4 MG tablet  Also known as:  risperDAL  INSTRUCTIONS:  Take 1 tablet (4 mg) by mouth At Bedtime

## 2020-07-30 NOTE — PROCEDURES
General acute hospital, [place]    Procedure: IR Procedure Note    Date/Time: 7/30/2020 10:55 AM  Performed by: Shaun Gill MD  Authorized by: Shaun Gill MD     UNIVERSAL PROTOCOL   Site Marked: NA  Prior Images Obtained and Reviewed:  Yes  Required items: Required blood products, implants, devices and special equipment available    Patient identity confirmed:  Verbally with patient, arm band, provided demographic data and hospital-assigned identification number  Patient was reevaluated immediately before administering moderate or deep sedation or anesthesia  Confirmation Checklist:  Patient's identity using two indicators, relevant allergies, procedure was appropriate and matched the consent or emergent situation and correct equipment/implants were available  Time out: Immediately prior to the procedure a time out was called    Universal Protocol: the Joint Commission Universal Protocol was followed    Preparation: Patient was prepped and draped in usual sterile fashion           ANESTHESIA    Anesthesia: Local infiltration  Local Anesthetic:  Lidocaine 1% without epinephrine      SEDATION    Patient Sedated: Yes    Sedation Type:  Moderate (conscious) sedation  Sedation:  Fentanyl and midazolam  Vital signs: Vital signs monitored during sedation    See dictated procedure note for full details.  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

## 2020-07-30 NOTE — DISCHARGE INSTRUCTIONS
[facility]   Interventional Radiology  Discharge Instructions Post Mapping for Insertion of   Radioactive Microspheres to the Liver    AFTER YOU GO HOME          Relax and take it easy for 24 hours.       Drink plenty of fluids.       Resume your regular diet, unless otherwise instructed by your Primary Physician.       DO NOT smoke for at least 24 hours, if you were given any sedation.       DO NOT drink alcoholic beverages the day of your procedure.       Do not drive or operate machinery at home or at work for 24 hours.          DO NOT do any strenuous exercise or lifting for at least 2 days following your procedure.       DO NOT take a bath or shower for at least 12 hours following your procedure.       DO NOT make any important or legal decisions for 24 hours following your procedure.    CALL THE PHYSICIAN IF:       - You start bleeding from the procedure site. lie down flat and hold pressure on the site. A small lump or bruise is common at the puncture site. Your physician will tell you if you need to return to the hospital.      - You develop numbness, coolness or a change in color of the leg that was punctured.      - You experience increased pain or redness at the puncture site.      - You develop hives or a rash or unexplained itching.      - You develop a temperature of 101 degrees F or greater.    Additional Information:           Support the puncture site for coughing, sneezing, or moving your bowels for the first 48 hours  No tub bath, hot tubs, or swimming for 5 days  No lotion or powder to the puncture site for 3 day    Follow the Discharge Instructions for the Liver Brachytherapy; Contrast Instructions and Instructions for the Radiopharmaceuticals.     Closure Device:Mynx Closure Device            [facility] INTERVENTIONAL RADIOLOGY DEPARTMENT         Procedure Physician: Dr. Brooks                   Date of Procedure:July 30, 2020       Telephone Numbers:   [phone]      Monday-Friday 8:00 to 4:30 pm                                        [phone]     After 4:30 pm Monday-Friday, Weekend and Holidays. Ask for the Interventional Radiologist on call. Someone is on call 24 hrs/day.

## 2020-07-30 NOTE — PROGRESS NOTES
Interventional Radiology Intra-procedural Nursing Note    Patient Name: Los Huang  Medical Record Number: [id number]  Today's Date: July 30, 2020       Start Time: 0900  End of procedure time: 1045  Procedure: Hepatic angiogram for Y-90 mapping  Fire Safety Score: 1    Consent Review/Timeout Performed by: Dr. Trinh Landis  Procedure Performed By: Dr. Shaun Gill    Fentanyl administered: 150 mcg  Versed administered: 3 mg    Start of Sedation Time: 0900  End of Sedation Time: 1045  Total Sedation Time: 105 minutes    Procedure start time: 0900  Puncture time: 0902  Procedure end time: 1045    Report given to: Brenna OLIVA 2A  Time pt departs:  1100  : NA    Other Notes:  Alert male transported via cart from  to IR Procedure Room 1 for planned intervention.  ID band confirmed and patient acknowledges understanding of planned procedure. Patient repositioned to procedure table via hover-mat and positioned supine.  Patient prepped and draped per policy see VS flowsheet, MAR for further information.       Prior to procedure, distal pulses were identified and marked via palpation.  Bilateral pulses +3, patient does confirm bilateral neuropathy in feet.    RFA identified under image guidance.  Sheath in place at 0910.     MAA delivery at 1035. For complete details, please see Providers procedure note from event.     Introducer removed at 1036.  Manual pressure held for 5 minutes.  Closure device deployed at 1037.  Groin site appearance is WDL.  Distal pulses post procedure are unchanged.  Right groin site is cleansed and dressed per protocol.     Per MD, bedrest for 3 Hours.  BEDREST UNTIL 1345.     Patient condition post procedure is stable.  Patient returned to  for post-procedure monitoring.    Cathie Lund RN

## 2020-07-30 NOTE — PROGRESS NOTES
IV discontinued. Chew catheter discontinued. Patient voided x1.  Discharge paperwork reviewed with patient and his daughter. Patients daughter will be transporting patient home.

## 2020-07-30 NOTE — PRE-PROCEDURE
GENERAL PRE-PROCEDURE:   Date/Time:  7/30/2020 7:29 AM    Written consent obtained?: Yes    Risks and benefits: Risks, benefits and alternatives were discussed    Consent given by:  Patient  Patient states understanding of procedure being performed: Yes    Patient's understanding of procedure matches consent: Yes    Procedure consent matches procedure scheduled: Yes    Expected level of sedation:  Moderate  Appropriately NPO:  Yes  ASA Class:  Class 2- mild systemic disease, no acute problems, no functional limitations  Mallampati  :  Grade 1- soft palate, uvula, tonsillar pillars, and posterior pharyngeal wall visible  Lungs:  Lungs clear with good breath sounds bilaterally  Heart:  Normal heart sounds and rate  History & Physical reviewed:  History and physical reviewed and no updates needed  Statement of review:  I have reviewed the lab findings, diagnostic data, medications, and the plan for sedation

## 2020-07-30 NOTE — IP AVS SNAPSHOT
[address] [address]                                    After Visit Summary   7/30/2020    Los Huang    MRN: [id number]           After Visit Summary Signature Page    I have received my discharge instructions, and my questions have been answered. I have discussed any challenges I see with this plan with the nurse or doctor.    ..........................................................................................................................................  Patient/Patient Representative Signature      ..........................................................................................................................................  Patient Representative Print Name and Relationship to Patient    ..................................................               ................................................  Date                                   Time    ..........................................................................................................................................  Reviewed by Signature/Title    ...................................................              ..............................................  Date                                               Time          22EPIC Rev 08/18

## 2020-07-30 NOTE — PROGRESS NOTES
Patient retured to 2A post Y90 mapping.  VSS.  Denies pain.  Right groin site C/D/I, no hematoma.  PO food and fluids at bedside.  3 hr bedrest, patient and family aware.

## 2020-07-31 DIAGNOSIS — Z11.59 ENCOUNTER FOR SCREENING FOR OTHER VIRAL DISEASES: Primary | ICD-10-CM

## 2020-08-02 DIAGNOSIS — Z11.59 ENCOUNTER FOR SCREENING FOR OTHER VIRAL DISEASES: ICD-10-CM

## 2020-08-02 PROCEDURE — U0003 INFECTIOUS AGENT DETECTION BY NUCLEIC ACID (DNA OR RNA); SEVERE ACUTE RESPIRATORY SYNDROME CORONAVIRUS 2 (SARS-COV-2) (CORONAVIRUS DISEASE [COVID-19]), AMPLIFIED PROBE TECHNIQUE, MAKING USE OF HIGH THROUGHPUT TECHNOLOGIES AS DESCRIBED BY CMS-2020-01-R: HCPCS | Performed by: RADIOLOGY

## 2020-08-03 LAB
SARS-COV-2 RNA SPEC QL NAA+PROBE: NOT DETECTED
SPECIMEN SOURCE: NORMAL

## 2020-08-04 ENCOUNTER — TELEPHONE (OUTPATIENT)
Dept: INTERVENTIONAL RADIOLOGY/VASCULAR | Facility: CLINIC | Age: 65
End: 2020-08-04

## 2020-08-04 ENCOUNTER — DOCUMENTATION ONLY (OUTPATIENT)
Dept: OTHER | Facility: CLINIC | Age: 65
End: 2020-08-04

## 2020-08-05 ENCOUNTER — HOSPITAL ENCOUNTER (OUTPATIENT)
Dept: NUCLEAR MEDICINE | Facility: CLINIC | Age: 65
Setting detail: NUCLEAR MEDICINE
Discharge: HOME | End: 2020-08-05
Attending: RADIOLOGY | Admitting: RADIOLOGY
Payer: MEDICARE

## 2020-08-05 ENCOUNTER — APPOINTMENT (OUTPATIENT)
Dept: INTERVENTIONAL RADIOLOGY/VASCULAR | Facility: CLINIC | Age: 65
End: 2020-08-05
Attending: RADIOLOGY
Payer: MEDICARE

## 2020-08-05 ENCOUNTER — HOSPITAL ENCOUNTER (OUTPATIENT)
Facility: CLINIC | Age: 65
Discharge: HOME | End: 2020-08-05
Attending: RADIOLOGY | Admitting: RADIOLOGY
Payer: MEDICARE

## 2020-08-05 ENCOUNTER — DOCUMENTATION ONLY (OUTPATIENT)
Dept: PHARMACY | Facility: CLINIC | Age: 65
End: 2020-08-05

## 2020-08-05 ENCOUNTER — APPOINTMENT (OUTPATIENT)
Dept: MEDSURG UNIT | Facility: CLINIC | Age: 65
End: 2020-08-05
Attending: RADIOLOGY
Payer: MEDICARE

## 2020-08-05 VITALS
HEIGHT: 74 IN | WEIGHT: 197 LBS | RESPIRATION RATE: 18 BRPM | HEART RATE: 72 BPM | SYSTOLIC BLOOD PRESSURE: 125 MMHG | DIASTOLIC BLOOD PRESSURE: 74 MMHG | BODY MASS INDEX: 25.28 KG/M2 | OXYGEN SATURATION: 97 % | TEMPERATURE: 97.7 F

## 2020-08-05 DIAGNOSIS — C19 COLORECTAL CANCER (H): ICD-10-CM

## 2020-08-05 DIAGNOSIS — C18.9 METASTATIC COLON CANCER TO LIVER (H): ICD-10-CM

## 2020-08-05 DIAGNOSIS — C78.7 METASTATIC COLON CANCER TO LIVER (H): ICD-10-CM

## 2020-08-05 DIAGNOSIS — C78.7 SECONDARY MALIGNANT NEOPLASM OF LIVER (H): ICD-10-CM

## 2020-08-05 DIAGNOSIS — C19 METASTATIC COLORECTAL CANCER: ICD-10-CM

## 2020-08-05 PROCEDURE — 36247 INS CATH ABD/L-EXT ART 3RD: CPT

## 2020-08-05 PROCEDURE — 36248 INS CATH ABD/L-EXT ART ADDL: CPT | Mod: GZ

## 2020-08-05 PROCEDURE — 27210732 ZZH ACCESSORY CR1

## 2020-08-05 PROCEDURE — 25800030 ZZH RX IP 258 OP 636: Performed by: PHYSICIAN ASSISTANT

## 2020-08-05 PROCEDURE — 27210780 ZZH KIT CR3

## 2020-08-05 PROCEDURE — 77300 RADIATION THERAPY DOSE PLAN: CPT | Mod: 59

## 2020-08-05 PROCEDURE — 37243 VASC EMBOLIZE/OCCLUDE ORGAN: CPT

## 2020-08-05 PROCEDURE — 27210738 ZZH ACCESSORY CR2

## 2020-08-05 PROCEDURE — 25000128 H RX IP 250 OP 636: Performed by: RADIOLOGY

## 2020-08-05 PROCEDURE — 25500064 ZZH RX 255 OP 636: Performed by: RADIOLOGY

## 2020-08-05 PROCEDURE — 99152 MOD SED SAME PHYS/QHP 5/>YRS: CPT | Mod: XU

## 2020-08-05 PROCEDURE — 27210910 ZZH NEEDLE CR6

## 2020-08-05 PROCEDURE — C2616 BRACHYTX, NON-STR,YTTRIUM-90: HCPCS | Performed by: RADIOLOGY

## 2020-08-05 PROCEDURE — 99153 MOD SED SAME PHYS/QHP EA: CPT

## 2020-08-05 PROCEDURE — 25000125 ZZHC RX 250: Performed by: RADIOLOGY

## 2020-08-05 PROCEDURE — C1887 CATHETER, GUIDING: HCPCS

## 2020-08-05 PROCEDURE — C9113 INJ PANTOPRAZOLE SODIUM, VIA: HCPCS | Performed by: PHYSICIAN ASSISTANT

## 2020-08-05 PROCEDURE — C1769 GUIDE WIRE: HCPCS

## 2020-08-05 PROCEDURE — 27210888 ZZH ACCESSORY CR7

## 2020-08-05 PROCEDURE — 27210889 ZZH ACCESSORY CR8

## 2020-08-05 PROCEDURE — 27210804 ZZH SHEATH CR3

## 2020-08-05 PROCEDURE — C1760 CLOSURE DEV, VASC: HCPCS

## 2020-08-05 PROCEDURE — 27810301 ZZH RX 278: Performed by: RADIOLOGY

## 2020-08-05 PROCEDURE — 75726 ARTERY X-RAYS ABDOMEN: CPT

## 2020-08-05 PROCEDURE — 27210908 ZZH NEEDLE CR4

## 2020-08-05 PROCEDURE — 27210886 ZZH ACCESSORY CR5

## 2020-08-05 PROCEDURE — 25000128 H RX IP 250 OP 636: Performed by: PHYSICIAN ASSISTANT

## 2020-08-05 PROCEDURE — 40000168 ZZH STATISTIC PP CARE STAGE 3

## 2020-08-05 RX ORDER — ONDANSETRON 4 MG/1
4-8 TABLET, FILM COATED ORAL EVERY 6 HOURS PRN
Qty: 40 TABLET | Refills: 0 | Status: SHIPPED | OUTPATIENT
Start: 2020-08-05 | End: 2020-10-26

## 2020-08-05 RX ORDER — NICOTINE POLACRILEX 4 MG
15-30 LOZENGE BUCCAL
Status: DISCONTINUED | OUTPATIENT
Start: 2020-08-05 | End: 2020-08-05 | Stop reason: HOSPADM

## 2020-08-05 RX ORDER — DEXTROSE MONOHYDRATE 25 G/50ML
25-50 INJECTION, SOLUTION INTRAVENOUS
Status: DISCONTINUED | OUTPATIENT
Start: 2020-08-05 | End: 2020-08-05 | Stop reason: HOSPADM

## 2020-08-05 RX ORDER — ONDANSETRON 2 MG/ML
4 INJECTION INTRAMUSCULAR; INTRAVENOUS ONCE
Status: COMPLETED | OUTPATIENT
Start: 2020-08-05 | End: 2020-08-05

## 2020-08-05 RX ORDER — PANTOPRAZOLE SODIUM 40 MG/1
40 TABLET, DELAYED RELEASE ORAL DAILY
Qty: 30 TABLET | Refills: 0 | Status: SHIPPED | OUTPATIENT
Start: 2020-08-05 | End: 2020-10-26

## 2020-08-05 RX ORDER — FLUMAZENIL 0.1 MG/ML
0.2 INJECTION, SOLUTION INTRAVENOUS
Status: DISCONTINUED | OUTPATIENT
Start: 2020-08-05 | End: 2020-08-05 | Stop reason: HOSPADM

## 2020-08-05 RX ORDER — HYDROMORPHONE HYDROCHLORIDE 2 MG/1
2-4 TABLET ORAL EVERY 4 HOURS PRN
Status: DISCONTINUED | OUTPATIENT
Start: 2020-08-05 | End: 2020-08-05 | Stop reason: HOSPADM

## 2020-08-05 RX ORDER — NALOXONE HYDROCHLORIDE 0.4 MG/ML
.1-.4 INJECTION, SOLUTION INTRAMUSCULAR; INTRAVENOUS; SUBCUTANEOUS
Status: DISCONTINUED | OUTPATIENT
Start: 2020-08-05 | End: 2020-08-05 | Stop reason: HOSPADM

## 2020-08-05 RX ORDER — ACETAMINOPHEN 325 MG/1
650 TABLET ORAL EVERY 4 HOURS PRN
Status: DISCONTINUED | OUTPATIENT
Start: 2020-08-05 | End: 2020-08-05 | Stop reason: HOSPADM

## 2020-08-05 RX ORDER — LIDOCAINE 40 MG/G
CREAM TOPICAL
Status: DISCONTINUED | OUTPATIENT
Start: 2020-08-05 | End: 2020-08-05 | Stop reason: HOSPADM

## 2020-08-05 RX ORDER — METHYLPREDNISOLONE 4 MG
TABLET, DOSE PACK ORAL
Qty: 21 TABLET | Refills: 0 | Status: SHIPPED | OUTPATIENT
Start: 2020-08-05 | End: 2020-10-26

## 2020-08-05 RX ORDER — SODIUM CHLORIDE 9 MG/ML
INJECTION, SOLUTION INTRAVENOUS CONTINUOUS
Status: DISCONTINUED | OUTPATIENT
Start: 2020-08-05 | End: 2020-08-05 | Stop reason: HOSPADM

## 2020-08-05 RX ORDER — HEPARIN SODIUM 200 [USP'U]/100ML
1 INJECTION, SOLUTION INTRAVENOUS CONTINUOUS PRN
Status: DISCONTINUED | OUTPATIENT
Start: 2020-08-05 | End: 2020-08-05 | Stop reason: HOSPADM

## 2020-08-05 RX ORDER — OXYCODONE HYDROCHLORIDE 5 MG/1
5-10 TABLET ORAL EVERY 6 HOURS PRN
Qty: 10 TABLET | Refills: 0 | Status: SHIPPED | OUTPATIENT
Start: 2020-08-05 | End: 2020-10-26

## 2020-08-05 RX ORDER — HEPARIN SODIUM (PORCINE) LOCK FLUSH IV SOLN 100 UNIT/ML 100 UNIT/ML
5 SOLUTION INTRAVENOUS
Status: COMPLETED | OUTPATIENT
Start: 2020-08-05 | End: 2020-08-05

## 2020-08-05 RX ORDER — AMPICILLIN AND SULBACTAM 2; 1 G/1; G/1
3 INJECTION, POWDER, FOR SOLUTION INTRAMUSCULAR; INTRAVENOUS
Status: COMPLETED | OUTPATIENT
Start: 2020-08-05 | End: 2020-08-05

## 2020-08-05 RX ORDER — FENTANYL CITRATE 50 UG/ML
25-50 INJECTION, SOLUTION INTRAMUSCULAR; INTRAVENOUS EVERY 5 MIN PRN
Status: DISCONTINUED | OUTPATIENT
Start: 2020-08-05 | End: 2020-08-05 | Stop reason: HOSPADM

## 2020-08-05 RX ORDER — IODIXANOL 320 MG/ML
150 INJECTION, SOLUTION INTRAVASCULAR ONCE
Status: COMPLETED | OUTPATIENT
Start: 2020-08-05 | End: 2020-08-05

## 2020-08-05 RX ADMIN — Medication 39.5 MCI.: at 13:30

## 2020-08-05 RX ADMIN — HYDROCORTISONE SODIUM SUCCINATE 100 MG: 100 INJECTION, POWDER, FOR SOLUTION INTRAMUSCULAR; INTRAVENOUS at 07:20

## 2020-08-05 RX ADMIN — IODIXANOL 75 ML: 320 INJECTION, SOLUTION INTRAVASCULAR at 10:28

## 2020-08-05 RX ADMIN — FENTANYL CITRATE 25 MCG: 50 INJECTION, SOLUTION INTRAMUSCULAR; INTRAVENOUS at 09:31

## 2020-08-05 RX ADMIN — ONDANSETRON 4 MG: 2 INJECTION INTRAMUSCULAR; INTRAVENOUS at 10:00

## 2020-08-05 RX ADMIN — MIDAZOLAM 1 MG: 1 INJECTION INTRAMUSCULAR; INTRAVENOUS at 09:01

## 2020-08-05 RX ADMIN — PANTOPRAZOLE SODIUM 40 MG: 40 INJECTION, POWDER, FOR SOLUTION INTRAVENOUS at 07:19

## 2020-08-05 RX ADMIN — FENTANYL CITRATE 50 MCG: 50 INJECTION, SOLUTION INTRAMUSCULAR; INTRAVENOUS at 10:22

## 2020-08-05 RX ADMIN — LIDOCAINE HYDROCHLORIDE 5 ML: 10 INJECTION, SOLUTION EPIDURAL; INFILTRATION; INTRACAUDAL; PERINEURAL at 10:02

## 2020-08-05 RX ADMIN — MIDAZOLAM 1 MG: 1 INJECTION INTRAMUSCULAR; INTRAVENOUS at 10:22

## 2020-08-05 RX ADMIN — FENTANYL CITRATE 50 MCG: 50 INJECTION, SOLUTION INTRAMUSCULAR; INTRAVENOUS at 09:01

## 2020-08-05 RX ADMIN — MIDAZOLAM 0.5 MG: 1 INJECTION INTRAMUSCULAR; INTRAVENOUS at 09:31

## 2020-08-05 RX ADMIN — SODIUM CHLORIDE: 9 INJECTION, SOLUTION INTRAVENOUS at 07:17

## 2020-08-05 RX ADMIN — Medication 5 ML: at 13:31

## 2020-08-05 RX ADMIN — AMPICILLIN SODIUM AND SULBACTAM SODIUM 3 G: 2; 1 INJECTION, POWDER, FOR SOLUTION INTRAMUSCULAR; INTRAVENOUS at 07:17

## 2020-08-05 RX ADMIN — HEPARIN SODIUM 2 BAG: 200 INJECTION, SOLUTION INTRAVENOUS at 10:03

## 2020-08-05 RX ADMIN — FENTANYL CITRATE 25 MCG: 50 INJECTION, SOLUTION INTRAMUSCULAR; INTRAVENOUS at 10:02

## 2020-08-05 RX ADMIN — MIDAZOLAM 0.5 MG: 1 INJECTION INTRAMUSCULAR; INTRAVENOUS at 10:02

## 2020-08-05 ASSESSMENT — MIFFLIN-ST. JEOR: SCORE: 1743.34

## 2020-08-05 NOTE — PROGRESS NOTES
Prior Authorization Approval    ondansetron 4mg tabs  Date Initiated: 8/5/2020  Date Completed: 8/5/2020  Prior Auth Type: B vs D                Status: Approved    Effective Date: 05/07/2020 - 08/05/2021.  Copay: 1.20     Filling Pharmacy: [facility] - [place], [place] - [address] [place] [address]    Insurance: Medicare Blue - Phone [phone] Fax [phone]  ID: [id number]  Case Number: [id number]   Submitted Via: Noemy Mobley  [facility] Liaison  Ph: [phone] Page: [phone]

## 2020-08-05 NOTE — IP AVS SNAPSHOT
[address] [address]                                    After Visit Summary   8/5/2020    Los Huang    MRN: [id number]           After Visit Summary Signature Page    I have received my discharge instructions, and my questions have been answered. I have discussed any challenges I see with this plan with the nurse or doctor.    ..........................................................................................................................................  Patient/Patient Representative Signature      ..........................................................................................................................................  Patient Representative Print Name and Relationship to Patient    ..................................................               ................................................  Date                                   Time    ..........................................................................................................................................  Reviewed by Signature/Title    ...................................................              ..............................................  Date                                               Time          22EPIC Rev 08/18

## 2020-08-05 NOTE — DISCHARGE INSTRUCTIONS
[facility]   Interventional Radiology  Discharge Instructions Post Angiography for Insertion of   Radioactive Microspheres to the Liver    AFTER YOU GO HOME          Relax and take it easy for 24 hours.       Drink plenty of fluids.       Resume your regular diet, unless otherwise instructed by your Primary Physician.       DO NOT smoke for at least 24 hours, if you were given any sedation.       DO NOT drink alcoholic beverages the day of your procedure.       Do not drive or operate machinery at home or at work for 24 hours.          DO NOT do any strenuous exercise or lifting for at least 2 days following your procedure.       DO NOT take a bath or shower for at least 12 hours following your procedure.       DO NOT make any important or legal decisions for 24 hours following your procedure.    CALL THE PHYSICIAN IF:       - You start bleeding from the procedure site. lie down flat and hold pressure on the site. A small lump or bruise is common at the puncture site. Your physician will tell you if you need to return to the hospital.      - You develop numbness, coolness or a change in color of the leg that was punctured.      - You experience increased pain or redness at the puncture site.      - You develop hives or a rash or unexplained itching.      - You develop a temperature of 101 degrees F or greater.    Additional Information:           Support the puncture site for coughing, sneezing, or moving your bowels for the first 48 hours  No tub bath, hot tubs, or swimming for 5 days  No lotion or powder to the puncture site for 3 day    Follow the Discharge Instructions for the Liver Brachytherapy; Contrast Instructions and Instructions for the [employer].     Closure Device: Mynx            [facility] INTERVENTIONAL RADIOLOGY DEPARTMENT         Procedure Physician: Dr. Landis and Dr. Gill                              Date of Procedure:August 5, 2020       Telephone Numbers:    [phone]     Monday-Friday 8:00 to 4:30 pm                                        [phone]     After 4:30 pm Monday-Friday, Weekend and Holidays. Ask for the Interventional Radiologist on call. Someone is on call 24 hrs/day.

## 2020-08-05 NOTE — PROCEDURES
[facility] [place]    Procedure: IR Procedure Note    Date/Time: 8/5/2020 10:37 AM  Performed by: Shaun Gill MD  Authorized by: Shaun Gill MD     UNIVERSAL PROTOCOL   Site Marked: NA  Prior Images Obtained and Reviewed:  Yes  Required items: Required blood products, implants, devices and special equipment available    Patient identity confirmed:  Verbally with patient, arm band, provided demographic data and hospital-assigned identification number  Patient was reevaluated immediately before administering moderate or deep sedation or anesthesia  Confirmation Checklist:  Patient's identity using two indicators, relevant allergies, procedure was appropriate and matched the consent or emergent situation and correct equipment/implants were available  Time out: Immediately prior to the procedure a time out was called    Universal Protocol: the Joint Commission Universal Protocol was followed    Preparation: Patient was prepped and draped in usual sterile fashion           ANESTHESIA    Anesthesia: Local infiltration  Local Anesthetic:  Lidocaine 1% without epinephrine      SEDATION    Patient Sedated: Yes    Sedation Type:  Moderate (conscious) sedation  Sedation:  Fentanyl and midazolam  Vital signs: Vital signs monitored during sedation    See dictated procedure note for full details.  Findings: Y90 split dose delivery to right lobe tumor via seg 5/8 brenches and seg 4 branch    Specimens: none    Complications: None    Condition: Stable    Plan: NM Brehmsstrahlung scan, 3 hrs bed rest    PROCEDURE   Patient Tolerance:  Patient tolerated the procedure well with no immediate complications    Length of time physician/provider present for 1:1 monitoring during sedation: 90

## 2020-08-05 NOTE — PROGRESS NOTES
Patient tolerated recovery stage well. VSS, right groin site clean/dry/intact, no hematoma, and denies pain. Patient tolerated PO food and fluids. Teaching was done and discharge instructions were given. Patient ambulated, voided, and port was de-accessed and heparin-locked.  Daughter to  discharge prescriptions from pharmacy.  Patient discharged to nuclear medicine scan and then to home with daughter, Yuliet @ 3694.

## 2020-08-05 NOTE — PROGRESS NOTES
Interventional Radiology Intra-procedural Nursing Note    Patient Name: Los Huang  Medical Record Number: [id number]  Today's Date: August 5, 2020    Start Time: 0900  End of procedure time: 1025  Procedure: Y-90 delivery via femoral approach  Fire Safety Score: 1    Consent Review/Timeout Performed by: Dr. Trinh Landis  Procedure Performed By: Dr. Shaun Gill    Fentanyl administered: 150 mcg  Versed administered: 3 mg  Zofran administered: 4 mg    Start of Sedation Time: 0900  End of Sedation Time: 1025  Total Sedation Time: 85 minutes    Procedure start time: 0900  Puncture time: 0902  Procedure end time: 1025    Report given to: Polly OLIVA  Time pt departs:  1040  : JAISON    Other Notes:  Alert male transported via cart from  to IR Procedure Room 1 for planned intervention.  ID band confirmed and patient acknowledges understanding of planned procedure. Patient repositioned to procedure table via hover-mat and positioned supine.  Patient prepped and draped per policy see VS flowsheet, MAR for further information.   Prior to procedure, distal pulses were identified and marked via palpation. +3, +CMS.  Patient does endorse baseline bilateral neuropathy.    RFA identified under image guidance.  Sheath in place at 0907.      Introducer removed at 1022.  Manual pressure held for 5 minutes.  MYNX Closure device deployed at 1022.  Groin site appearance is WDL.  Distal pulses post procedure are unchanged.  Right groin site is cleansed and dressed per protocol.     Per MD, bedrest for 3  Hours. BEDREST UNTIL 1330.    Patient condition post procedure is stable.   Patient returned to  for post-procedure monitoring.    Cathie Lund RN

## 2020-08-05 NOTE — PROGRESS NOTES
Patient returned to 2A post Y90 delivery.  VSS.  Denies pain.  Right groin site C/D/I, no hematoma.  PO food and fluids at bedside.  3 hr bedrest.  Patient and family aware.

## 2020-08-05 NOTE — PRE-PROCEDURE
GENERAL PRE-PROCEDURE:   Procedure:  Y 90 delivery    Written consent obtained?: Yes    Risks and benefits: Risks, benefits and alternatives were discussed    Consent given by:  Patient  Patient states understanding of procedure being performed: Yes    Patient's understanding of procedure matches consent: Yes    Procedure consent matches procedure scheduled: Yes    Expected level of sedation:  Moderate  Appropriately NPO:  Yes  ASA Class:  Class 3- Severe systemic disease, definite functional limitations  Mallampati  :  Grade 2- soft palate, base of uvula, tonsillar pillars, and portion of posterior pharyngeal wall visible  Lungs:  Lungs clear with good breath sounds bilaterally  Heart:  Normal heart sounds and rate  History & Physical reviewed:  History and physical reviewed and no updates needed  Statement of review:  I have reviewed the lab findings, diagnostic data, medications, and the plan for sedation

## 2020-08-05 NOTE — PROGRESS NOTES
Patient arrived on 2A for Y90 delivery.  Port accessed, pre-medications given.  No new labs ordered for today.  Consent done.  Groin prepped, pulses marked, +3 bilaterally.  Of note patient had requested Chew catheter last week due to inability to urinate while supine.  Will hold off on catheter placement today and straight cath PRN if needed post-procedure.  Prep complete.

## 2020-08-07 ENCOUNTER — TELEPHONE (OUTPATIENT)
Dept: VASCULAR SURGERY | Facility: CLINIC | Age: 65
End: 2020-08-07

## 2020-08-07 DIAGNOSIS — C78.7 METASTATIC COLON CANCER TO LIVER (H): Primary | ICD-10-CM

## 2020-08-07 DIAGNOSIS — C18.9 METASTATIC COLON CANCER TO LIVER (H): Primary | ICD-10-CM

## 2020-08-07 NOTE — TELEPHONE ENCOUNTER
Called pt and left a VM    Inquired as to how he is doing s/p Y90 treatment.    Inquired as to how he is doing and for him to return my call   Left my direct line for him.     Elvira HESS RN, BSN  Interventional Radiology Nurse Coordinator   Phone: [phone]

## 2020-08-10 NOTE — TELEPHONE ENCOUNTER
Called pt again in regards to fup on Y90.    He states that he did have some headaches in which he is taking ibuprofen which helps.    He states that he did have some constipation in which this has resolved    He states that he is also having dark urine in which I did ask about his hydration     He states that he's having 4 glasses of water a day.    He Is wondering if its b/c of his steroids.   He did finish the pack already too.     Asked about fevers: denies  N/V: denies    Pain: he states that he's had some shoulder pain in which ibuprofen does help.     He did have some body aches but is gone now.     1. Advised increased hydration.  2. Try to use a hot pack on his shoulder also.     We talked about f/up as well in which we will have our schedulers contact him.     He states that he feels that he's not quite confident in regards to the Y90 treatment. He states that he's skeptical and not quite sure how the doctor feels.     I informed him that we will schedule for him for an in-person visit so that way we can review the imaging with him and compare the imaging for him.     He agrees to plan.     Elvira HESS RN, BSN  Interventional Radiology Nurse Coordinator   Phone: [phone]

## 2020-08-14 ENCOUNTER — TELEPHONE (OUTPATIENT)
Dept: VASCULAR SURGERY | Facility: CLINIC | Age: 65
End: 2020-08-14

## 2020-08-14 NOTE — TELEPHONE ENCOUNTER
Called pt and left a VM     Returning his call regarding fatigue.  He states that he's been feeling like this for quite sometime after treatment and is wondering how long this will last.     Informed him that this can last up to 2 weeks post treatment and encouraged him to try to do little activity day by day.    I asked that he return my call to talk further in detail.     Left my direct line.     Elvira HESS RN, BSN  Interventional Radiology Nurse Coordinator   Phone: [phone]

## 2020-08-24 ENCOUNTER — VIRTUAL VISIT (OUTPATIENT)
Dept: PSYCHIATRY | Facility: CLINIC | Age: 65
End: 2020-08-24
Attending: PSYCHIATRY & NEUROLOGY
Payer: MEDICARE

## 2020-08-24 DIAGNOSIS — F90.1 ATTENTION DEFICIT HYPERACTIVITY DISORDER (ADHD), PREDOMINANTLY HYPERACTIVE TYPE: ICD-10-CM

## 2020-08-24 DIAGNOSIS — F31.9 BIPOLAR 1 DISORDER (H): ICD-10-CM

## 2020-08-24 RX ORDER — CLONAZEPAM 0.5 MG/1
0.25 TABLET ORAL DAILY
Qty: 15 TABLET | Refills: 1 | Status: SHIPPED | OUTPATIENT
Start: 2020-08-24 | End: 2020-09-17

## 2020-08-24 RX ORDER — RISPERIDONE 1 MG/1
TABLET ORAL
Qty: 9 TABLET | Refills: 0 | Status: SHIPPED | OUTPATIENT
Start: 2020-08-24 | End: 2020-10-26

## 2020-08-24 RX ORDER — DIVALPROEX SODIUM 500 MG/1
1000 TABLET, DELAYED RELEASE ORAL AT BEDTIME
Qty: 60 TABLET | Refills: 3 | Status: SHIPPED | OUTPATIENT
Start: 2020-08-24 | End: 2020-09-17

## 2020-08-24 RX ORDER — ATOMOXETINE 18 MG/1
18 CAPSULE ORAL DAILY
Qty: 30 CAPSULE | Refills: 3 | Status: SHIPPED | OUTPATIENT
Start: 2020-08-24 | End: 2020-09-17

## 2020-08-24 RX ORDER — BENZTROPINE MESYLATE 0.5 MG/1
0.5 TABLET ORAL DAILY
Qty: 30 TABLET | Refills: 3 | Status: SHIPPED | OUTPATIENT
Start: 2020-08-24 | End: 2020-12-15

## 2020-08-24 ASSESSMENT — PAIN SCALES - GENERAL: PAINLEVEL: NO PAIN (0)

## 2020-08-24 NOTE — PROGRESS NOTES
"VIDEO VISIT  Los Huang is a 64 year old patient who is being evaluated via a billable video visit.      The patient has been notified of following:   \"This video visit will be conducted via a call between you and your physician/provider. We have found that certain health care needs can be provided without the need for an in-person physical exam. This service lets us provide the care you need with a video conversation. If a prescription is necessary we can send it directly to your pharmacy. If lab work is needed we can place an order for that and you can then stop by our lab to have the test done at a later time. Insurers are generally covering virtual visits as they would in-office visits so billing should not be different than normal.  If for some reason you do get billed incorrectly, you should contact the billing office to correct it and that number is in the AVS .    Video Conference to be completed via:  Rajan    Patient has given verbal consent for video visit?:  Yes    Patient would prefer that any video invitations be sent by: Text to cell phone: [phone]      How would patient like to obtain AVS?:  Catapooolt    AVS SmartPhrase [PsychAVS] has been placed in 'Patient Instructions':  Yes    "
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
## 2020-08-27 ENCOUNTER — TELEPHONE (OUTPATIENT)
Dept: PSYCHIATRY | Facility: CLINIC | Age: 65
End: 2020-08-27

## 2020-08-27 NOTE — TELEPHONE ENCOUNTER
Received a faxed 3 page approval letter from [employer] [place] for Rx Vraylar Capsules:    APPROVAL  Rec'd approval of Vraylar Capsules.  Effective 05/25/2020 to end date 08/24/2023.  PA # [id number].  Faxed approval letter to pharmacy.      Copies sent to scanning:  -completed PA   -copies of each also retained in clinic until scanning can be confirmed    .Corina Pruitt LPN

## 2020-09-17 ENCOUNTER — VIRTUAL VISIT (OUTPATIENT)
Dept: PSYCHIATRY | Facility: CLINIC | Age: 65
End: 2020-09-17
Attending: PSYCHIATRY & NEUROLOGY
Payer: MEDICARE

## 2020-09-17 DIAGNOSIS — F90.1 ATTENTION DEFICIT HYPERACTIVITY DISORDER (ADHD), PREDOMINANTLY HYPERACTIVE TYPE: ICD-10-CM

## 2020-09-17 DIAGNOSIS — F31.9 BIPOLAR 1 DISORDER (H): ICD-10-CM

## 2020-09-17 RX ORDER — ATOMOXETINE 18 MG/1
18 CAPSULE ORAL DAILY
Qty: 30 CAPSULE | Refills: 3 | Status: ON HOLD | OUTPATIENT
Start: 2020-09-17 | End: 2021-01-11

## 2020-09-17 RX ORDER — CLONAZEPAM 0.5 MG/1
0.25 TABLET ORAL DAILY
Qty: 15 TABLET | Refills: 1 | Status: ON HOLD | OUTPATIENT
Start: 2020-09-17 | End: 2021-01-11

## 2020-09-17 RX ORDER — DIVALPROEX SODIUM 500 MG/1
1000 TABLET, DELAYED RELEASE ORAL AT BEDTIME
Qty: 60 TABLET | Refills: 3 | Status: SHIPPED | OUTPATIENT
Start: 2020-09-17 | End: 2021-01-14

## 2020-09-17 RX ORDER — TRAZODONE HYDROCHLORIDE 50 MG/1
50 TABLET, FILM COATED ORAL AT BEDTIME
Qty: 30 TABLET | Refills: 3 | Status: ON HOLD | OUTPATIENT
Start: 2020-09-17 | End: 2021-01-11

## 2020-09-17 ASSESSMENT — PAIN SCALES - GENERAL: PAINLEVEL: MODERATE PAIN (4)

## 2020-09-17 NOTE — PROGRESS NOTES
"VIDEO VISIT  Los Huang is a 65 year old patient who is being evaluated via a billable video visit.      The patient has been notified of following:   \"This video visit will be conducted via a call between you and your physician/provider. We have found that certain health care needs can be provided without the need for an in-person physical exam. This service lets us provide the care you need with a video conversation. If a prescription is necessary we can send it directly to your pharmacy. If lab work is needed we can place an order for that and you can then stop by our lab to have the test done at a later time. Insurers are generally covering virtual visits as they would in-office visits so billing should not be different than normal.  If for some reason you do get billed incorrectly, you should contact the billing office to correct it and that number is in the AVS .    Video Conference to be completed via:  Rajan    Patient has given verbal consent for video visit?:  Yes    Patient would prefer that any video invitations be sent by: Text to cell phone: [phone]      How would patient like to obtain AVS?:  [employer]    AVS SmartPhrase [PsychAVS] has been placed in 'Patient Instructions':  Yes    "
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
## 2020-09-17 NOTE — PATIENT INSTRUCTIONS
Thank you for coming to the PSYCHIATRY CLINIC.    Lab Testing:  If you had lab testing today and your results are reassuring or normal they will be mailed to you or sent through [employer] within 7 days. If the lab tests need quick action we will call you with the results. The phone number we will call with results is # none (home) . If this is not the best number please call our clinic and change the number.    Medication Refills:  If you need any refills please call your pharmacy and they will contact us. Our fax number for refills is [phone]. Please allow three business for refill processing. If you need to  your refill at a new pharmacy, please contact the new pharmacy directly. The new pharmacy will help you get your medications transferred.     Scheduling:  If you have any concerns about today's visit or wish to schedule another appointment please call our office during normal business hours [phone] (8-5:00 M-F)    Contact Us:  Please call [phone] during business hours (8-5:00 M-F).  If after clinic hours, or on the weekend, please call  [phone].    Financial Assistance [phone]  "[employer]ealth Billing [phone]  Central Billing Office, [facility]: [phone]  [facility] [phone]  Medical Records [phone]      MENTAL HEALTH CRISIS NUMBERS:  For a medical emergency please call  911 or go to the nearest ER.     [facility]:   [facility] -[phone]   Crisis Residence [facility] -[phone]   Walk-In Counseling Center [phone]   COPE 24/7 [place] Mobile Team -[phone] (adults)/[phone] (child)  CHILD: [facility] needs assessment team - [phone]      [facility]:   [facility] - [phone]   Walk-in counseling [facility] - [phone]   Walk-in counseling [facility] - [phone]   Crisis Residence [facility] - [phone]  Urgent Care Adult  Mental Hnvnzr-[phone] mobile unit/ 24/7 crisis line    National Crisis Numbers:   National Suicide Prevention Lifeline: 3-939-525-TALK (120-680-1882)  Poison Control Center - 0-933-580-3381  nokisaki.com/resources for a list of additional resources (SOS)  Trans Lifeline a hotline for transgender people 0-335-869-8523  The [employer] a hotline for LGBT youth 1-600.915.7740  Crisis Text Line: For any crisis 24/7   To: 570239  see www.crisistextline.org  - IF MAKING A CALL FEELS TOO HARD, send a text!         Again thank you for choosing PSYCHIATRY CLINIC and please let us know how we can best partner with you to improve you and your family's health.    You may be receiving a survey regarding this appointment. We would love to have your feedback, both positive and negative. The survey is done by an external company, so your answers are anonymous.

## 2020-10-14 ENCOUNTER — TELEPHONE (OUTPATIENT)
Dept: VASCULAR SURGERY | Facility: CLINIC | Age: 65
End: 2020-10-14

## 2020-10-14 NOTE — TELEPHONE ENCOUNTER
Pt calling to inquire on his appt    States that he's been suffering from emotional stuff due to medication change but is doing better now    He has canceled multiple appts in which he is ready to r/s and see Dr. Landis again.    I informed him that we will see him in clinic follow up on Tues 11/3 at 12pm   He is to call make his MRI and lab appt in which I gave him the phone number.     He agrees to plan     Evlira HESS RN, BSN  Interventional Radiology/Vascular  Nurse Coordinator   Phone: [phone]  Fax: [phone]

## 2020-10-19 ENCOUNTER — TELEPHONE (OUTPATIENT)
Dept: FAMILY MEDICINE | Facility: CLINIC | Age: 65
End: 2020-10-19

## 2020-10-19 NOTE — TELEPHONE ENCOUNTER
"Called patient  Noted he was in ED yesterday for adverse reaction from med  Asked patient if this was for the same issue he is calling today  He stated that he is not sure which med is causing his leg and foot cramping  Offered an appointment with Dr. Mckinney for this but he declined  He agreed to call back to schedule if he changes his mind  He wonders if Dr. Mckinney can help him get off the Vraylar as he does not like its affects (see ED notes from yesterday, \"patient reports that his relatively new medication Vraylar makes him very \"horny\" and have the urge to masturbate often\")  This is a med from psychiatry.   Asked him if he has contacted psychiatry for this  He stated that every time he tries to get in with psychiatry, \"they are booked 6 weeks out\"  Explained that he can send a mychart or make a phone call to psychiatry regarding his concerns and they may be able to advise further without an appointment  He agreed to do so    Mariam Torres RN  "

## 2020-10-19 NOTE — TELEPHONE ENCOUNTER
Reason for call:  Symptom   Symptom or request: Leg and foot cramping    Duration (how long have symptoms been present): Months  Have you been treated for this before? Yes    Additional comments: Thinks it's being caused by his new medication     Phone number to reach patient:  Cell number on file:    Telephone Information:   Mobile [phone]       Best Time:  any    Can we leave a detailed message on this number?  YES    Travel screening: Not Applicable

## 2020-10-21 ENCOUNTER — MYC MEDICAL ADVICE (OUTPATIENT)
Dept: PSYCHIATRY | Facility: CLINIC | Age: 65
End: 2020-10-21

## 2020-10-26 ENCOUNTER — VIRTUAL VISIT (OUTPATIENT)
Dept: PHARMACY | Facility: CLINIC | Age: 65
End: 2020-10-26
Payer: COMMERCIAL

## 2020-10-26 DIAGNOSIS — F31.9 BIPOLAR 1 DISORDER (H): Primary | ICD-10-CM

## 2020-10-26 PROCEDURE — 99207 PR NO CHARGE LOS: CPT | Mod: TEL | Performed by: PHARMACIST

## 2020-10-26 RX ORDER — CALCIUM POLYCARBOPHIL 625 MG 625 MG/1
1 TABLET ORAL DAILY PRN
COMMUNITY
End: 2023-01-01

## 2020-10-26 NOTE — PROGRESS NOTES
"Clinical Pharmacy Consult:                                                    Los Huang is a 65 year old male called for a clinical pharmacist consult.  He was self-referred to me for this visit. Last [facility] visit 7/8/2019.     Reason for Consult: \"Is being horny a common side effect?\"    Discussion:   Patient is currently taking Depakote DR 1000mg at bedtime, Vraylar 3mg at bedtime, atomoxetine 18mg daily, benztropine 0.5mg daily, clonazepam 0.25mg daily (pt takes 0.25mg-0.5mg daily), trazodone 50mg daily.    Patient wanted to provide update that he is now taking Vraylar, switched from risperidone late 8/2020, and feels that mental health is stable.  He denied any sx of carlos, hallucinations or delusions.  Pt reported that, in addition to feeling symptom stability, he likes the med overall because \"it makes me feel horny, which is a good thing for my age,\" and wonders if this is common. Discussed that there are notes in his chart that indicate he did not like the increased libido from Vraylar, though today patient reported believing that there was miscommunication at recent [place] ED visit and he did not have a great experience there.  He is not concerned with this effect and does not feel that it warrants a medication change.  Discussed that is not a commonly reported side effect and some patients report reduced libido with medications, though his experience may be related to feeling better overall- pt acknowledged.    Pt described feeling anxious from \"being caged up\" due to COVID, but feels that anxiety is improved in some ways, as he is able to do things that previously felt difficult, such as calling the mayor of his city to share concerns. He reported typically taking 0.25-0.5mg clonazepam per dose (Rx 0.25mg), but doesn't need any doses on some days. We discussed that Vraylar can cause some restlessness and anxiety, but overall sounds like his sx are improved- pt agreed. He noted that tremor and muscle " weakness seem improved since switching risperidone to Vraylar.  Per last psychiatry note, pt was going to stop benztropine due to improved EPSE, but he has not yet done that. Pt also noted that the medication is more constipating for him, which he appreciates (described significant diarrhea on aripiprazole).  He has been taking Fibercon tablet once daily with positive result for regular bowel movements.    Plan:  1. Continue current medications.  2. Follow up with Dr. Gracia on 10/29 and review discontinuing benztropine    Shameka Curtis, PharmD  Medication Therapy Management Pharmacist  [facility]  Phone: [phone]

## 2020-10-26 NOTE — Clinical Note
Amado Kennedy-  I spoke with Solo this morning.  I'd had a few MTM visits with him in years past, but haven't talked to him since 7/2019.  It sounds as though things are going well, but he hasn't yet stopped the benztropine as it appears you two discussed at your last visit.    Please see note as fyi and let me know if you have questions.  Thanks,  Shameka

## 2020-10-29 ENCOUNTER — TELEPHONE (OUTPATIENT)
Dept: PSYCHIATRY | Facility: CLINIC | Age: 65
End: 2020-10-29

## 2020-10-29 ENCOUNTER — TRANSFERRED RECORDS (OUTPATIENT)
Dept: HEALTH INFORMATION MANAGEMENT | Facility: CLINIC | Age: 65
End: 2020-10-29

## 2020-10-29 NOTE — TELEPHONE ENCOUNTER
[facility] Call Center    Phone Message    May a detailed message be left on voicemail: yes     Reason for Call: Other: Pt wanted to discuss a change in his medication, possibly lowering it. He would like to discuss that with the nurse.      Action Taken: Other: [facility]    Travel Screening: Not Applicable

## 2020-10-29 NOTE — TELEPHONE ENCOUNTER
"Writer placed a call to patient to obtain additional information. Patient reports being seen with pharmacist on 10/26 and wanted to discuss medication change with provider. Patient at this time is wanting to increase Klonopin from 0.25 mg to 0.5 mg. He reports getting angry easily that causes him to be anxious. He shared getting a call from the bank this morning and he could not recall his pass code which made him angry. Patient also feels trapped at home due to COVID which also plans a role in anxiety. He denies any other concerns. Reports sleep and appetite is normal. He shared becoming \"tolerant\" to Klonopin a this time. He usually takes Klonopin 0.25 mg to 0.5 mg depending on the day. He also mentioned that at times he does not take the medication at all in a day. Patient is wanting to know if provider would consider increasing Klonopin. Writer agreed to pass this along to provider.    "

## 2020-10-30 NOTE — TELEPHONE ENCOUNTER
Marcia Gracia MD  You 10 hours ago (10:33 PM)     Amado Rader,     I would like to avoid increasing the Klonopin as this is actually not a good long term anxiety medication and has dangerous side effects for those over 60 including mental fogginess and falls.  I think we can explore other options for anxiety.  I have reached out to Brook the PharmD Solo met with last week and will see what anti-anxiety medication she recommends.     Thank you,   Marcia     Message text      Writer placed a call to patient to relay the above information. No answer at number provided. LVM, requesting a call back. Clinic number provided.

## 2020-11-09 ENCOUNTER — MYC REFILL (OUTPATIENT)
Dept: PSYCHIATRY | Facility: CLINIC | Age: 65
End: 2020-11-09

## 2020-11-09 DIAGNOSIS — F90.1 ATTENTION DEFICIT HYPERACTIVITY DISORDER (ADHD), PREDOMINANTLY HYPERACTIVE TYPE: ICD-10-CM

## 2020-11-09 DIAGNOSIS — F31.9 BIPOLAR 1 DISORDER (H): ICD-10-CM

## 2020-11-09 RX ORDER — BENZTROPINE MESYLATE 0.5 MG/1
0.5 TABLET ORAL DAILY
Qty: 30 TABLET | Refills: 3 | Status: CANCELLED | OUTPATIENT
Start: 2020-11-09

## 2020-11-09 RX ORDER — TRAZODONE HYDROCHLORIDE 50 MG/1
50 TABLET, FILM COATED ORAL AT BEDTIME
Qty: 30 TABLET | Refills: 3 | Status: CANCELLED | OUTPATIENT
Start: 2020-11-09

## 2020-11-09 RX ORDER — CLONAZEPAM 0.5 MG/1
0.25 TABLET ORAL DAILY
Qty: 15 TABLET | Refills: 1 | Status: CANCELLED | OUTPATIENT
Start: 2020-11-09

## 2020-11-09 RX ORDER — ATOMOXETINE 18 MG/1
18 CAPSULE ORAL DAILY
Qty: 30 CAPSULE | Refills: 3 | Status: CANCELLED | OUTPATIENT
Start: 2020-11-09

## 2020-11-09 RX ORDER — DIVALPROEX SODIUM 500 MG/1
1000 TABLET, DELAYED RELEASE ORAL AT BEDTIME
Qty: 60 TABLET | Refills: 3 | Status: CANCELLED | OUTPATIENT
Start: 2020-11-09

## 2020-11-10 ENCOUNTER — MYC REFILL (OUTPATIENT)
Dept: PSYCHIATRY | Facility: CLINIC | Age: 65
End: 2020-11-10

## 2020-11-10 DIAGNOSIS — F31.9 BIPOLAR 1 DISORDER (H): ICD-10-CM

## 2020-11-10 DIAGNOSIS — F90.1 ATTENTION DEFICIT HYPERACTIVITY DISORDER (ADHD), PREDOMINANTLY HYPERACTIVE TYPE: ICD-10-CM

## 2020-11-10 RX ORDER — ATOMOXETINE 18 MG/1
18 CAPSULE ORAL DAILY
Qty: 30 CAPSULE | Refills: 3 | Status: CANCELLED | OUTPATIENT
Start: 2020-11-10

## 2020-11-10 RX ORDER — BENZTROPINE MESYLATE 0.5 MG/1
0.5 TABLET ORAL DAILY
Qty: 30 TABLET | Refills: 3 | Status: CANCELLED | OUTPATIENT
Start: 2020-11-10

## 2020-11-10 RX ORDER — TRAZODONE HYDROCHLORIDE 50 MG/1
50 TABLET, FILM COATED ORAL AT BEDTIME
Qty: 30 TABLET | Refills: 3 | Status: CANCELLED | OUTPATIENT
Start: 2020-11-10

## 2020-11-10 RX ORDER — DIVALPROEX SODIUM 500 MG/1
1000 TABLET, DELAYED RELEASE ORAL AT BEDTIME
Qty: 60 TABLET | Refills: 3 | Status: CANCELLED | OUTPATIENT
Start: 2020-11-10

## 2020-11-10 RX ORDER — CLONAZEPAM 0.5 MG/1
0.25 TABLET ORAL DAILY
Qty: 15 TABLET | Refills: 1 | Status: CANCELLED | OUTPATIENT
Start: 2020-11-10

## 2020-11-10 NOTE — TELEPHONE ENCOUNTER
Last seen: 9/17  RTC: 6 weeks   Cancel: 11/12, 10/29  No-show: none   Next appt: 12/7    Incoming refill from patient via MyChart     Medication requested: clonazePAM (KLONOPIN) 0.5 MG tablet  Directions: Take 0.5 tablets (0.25 mg) by mouth daily - Oral  Qty: 15  Last refilled: 10/23 #15, 8/24 #15, 7/16 #15     Disp Refills Start End ILIANA   benztropine (COGENTIN) 0.5 MG tablet 30 tablet 3 8/24/2020  No   Sig - Route: Take 1 tablet (0.5 mg) by mouth daily - Oral   Sent to pharmacy as: Benztropine Mesylate 0.5 MG Oral Tablet (COGENTIN)   Class: E-Prescribe   Order: [id number]   E-Prescribing Status: Receipt confirmed by pharmacy (8/24/2020 12:34 PM CDT)   - Has refills on file      Disp Refills Start End ILIANA   atomoxetine (STRATTERA) 18 MG capsule 30 capsule 3 9/17/2020  No   Sig - Route: Take 1 capsule (18 mg) by mouth daily - Oral   Sent to pharmacy as: Atomoxetine HCl 18 MG Oral Capsule (STRATTERA)   Class: E-Prescribe   Order: [id number]   E-Prescribing Status: Receipt confirmed by pharmacy (9/17/2020  1:12 PM CDT)   - Has refills on file      Disp Refills Start End ILIANA   cariprazine (VRAYLAR) 3 MG CAPS capsule 30 capsule 3 9/17/2020  No   Sig - Route: Take 1 capsule (3 mg) by mouth daily - Oral   Sent to pharmacy as: Cariprazine HCl 3 MG Oral Capsule (Vraylar)   Class: E-Prescribe   Order: [id number]   E-Prescribing Status: Receipt confirmed by pharmacy (9/17/2020  1:13 PM CDT)   Prior authorization: Closed - Prior Authorization not required for patient/medication   - Has refills on file      Disp Refills Start End ILIANA   traZODone (DESYREL) 50 MG tablet 30 tablet 3 9/17/2020  --   Sig - Route: Take 1 tablet (50 mg) by mouth At Bedtime - Oral   Sent to pharmacy as: traZODone HCl 50 MG Oral Tablet (DESYREL)   Class: E-Prescribe   Order: [id number]   E-Prescribing Status: Receipt confirmed by pharmacy (9/17/2020  1:13 PM CDT)   - Has refills on file      Disp Refills Start End ILIANA   divalproex sodium delayed-release  (DEPAKOTE) 500 MG DR tablet 60 tablet 3 9/17/2020  No   Sig - Route: Take 2 tablets (1,000 mg) by mouth At Bedtime - Oral   Sent to pharmacy as: Divalproex Sodium 500 MG Oral Tablet Delayed Release (DEPAKOTE)   Class: E-Prescribe   Order: [id number]   E-Prescribing Status: Receipt confirmed by pharmacy (9/17/2020  1:12 PM CDT)   - Has refills on file     Writer placed a call to [id number] [address] and confirmed refills on file for all medications. Will notify patient via [employer].

## 2020-12-14 ENCOUNTER — HOSPITAL ENCOUNTER (EMERGENCY)
Facility: CLINIC | Age: 65
Discharge: HOME | End: 2020-12-14
Attending: EMERGENCY MEDICINE | Admitting: EMERGENCY MEDICINE
Payer: MEDICARE

## 2020-12-14 VITALS
HEART RATE: 70 BPM | RESPIRATION RATE: 18 BRPM | OXYGEN SATURATION: 97 % | DIASTOLIC BLOOD PRESSURE: 81 MMHG | TEMPERATURE: 98.4 F | SYSTOLIC BLOOD PRESSURE: 135 MMHG

## 2020-12-14 DIAGNOSIS — K21.00 GASTROESOPHAGEAL REFLUX DISEASE WITH ESOPHAGITIS WITHOUT HEMORRHAGE: ICD-10-CM

## 2020-12-14 DIAGNOSIS — Z85.038 HISTORY OF COLON CANCER: ICD-10-CM

## 2020-12-14 LAB
ALBUMIN SERPL-MCNC: 3.7 G/DL (ref 3.4–5)
ALBUMIN UR-MCNC: NEGATIVE MG/DL
ALP SERPL-CCNC: 161 U/L (ref 40–150)
ALT SERPL W P-5'-P-CCNC: 80 U/L (ref 0–70)
ANION GAP SERPL CALCULATED.3IONS-SCNC: 7 MMOL/L (ref 3–14)
APPEARANCE UR: CLEAR
AST SERPL W P-5'-P-CCNC: 40 U/L (ref 0–45)
BASOPHILS # BLD AUTO: 0.1 10E9/L (ref 0–0.2)
BASOPHILS NFR BLD AUTO: 0.7 %
BILIRUB SERPL-MCNC: 0.4 MG/DL (ref 0.2–1.3)
BILIRUB UR QL STRIP: NEGATIVE
BUN SERPL-MCNC: 17 MG/DL (ref 7–30)
CALCIUM SERPL-MCNC: 9.1 MG/DL (ref 8.5–10.1)
CHLORIDE SERPL-SCNC: 109 MMOL/L (ref 94–109)
CO2 SERPL-SCNC: 25 MMOL/L (ref 20–32)
COLOR UR AUTO: YELLOW
CREAT SERPL-MCNC: 0.82 MG/DL (ref 0.66–1.25)
DIFFERENTIAL METHOD BLD: NORMAL
EOSINOPHIL # BLD AUTO: 0.2 10E9/L (ref 0–0.7)
EOSINOPHIL NFR BLD AUTO: 2.5 %
ERYTHROCYTE [DISTWIDTH] IN BLOOD BY AUTOMATED COUNT: 13.6 % (ref 10–15)
GFR SERPL CREATININE-BSD FRML MDRD: >90 ML/MIN/{1.73_M2}
GLUCOSE SERPL-MCNC: 88 MG/DL (ref 70–99)
GLUCOSE UR STRIP-MCNC: NEGATIVE MG/DL
HCT VFR BLD AUTO: 44.8 % (ref 40–53)
HGB BLD-MCNC: 14.8 G/DL (ref 13.3–17.7)
HGB UR QL STRIP: ABNORMAL
HYALINE CASTS #/AREA URNS LPF: 1 /LPF (ref 0–2)
IMM GRANULOCYTES # BLD: 0 10E9/L (ref 0–0.4)
IMM GRANULOCYTES NFR BLD: 0.4 %
KETONES UR STRIP-MCNC: 10 MG/DL
LEUKOCYTE ESTERASE UR QL STRIP: NEGATIVE
LIPASE SERPL-CCNC: 214 U/L (ref 73–393)
LYMPHOCYTES # BLD AUTO: 1.1 10E9/L (ref 0.8–5.3)
LYMPHOCYTES NFR BLD AUTO: 15.7 %
MCH RBC QN AUTO: 30 PG (ref 26.5–33)
MCHC RBC AUTO-ENTMCNC: 33 G/DL (ref 31.5–36.5)
MCV RBC AUTO: 91 FL (ref 78–100)
MONOCYTES # BLD AUTO: 0.6 10E9/L (ref 0–1.3)
MONOCYTES NFR BLD AUTO: 7.6 %
MUCOUS THREADS #/AREA URNS LPF: PRESENT /LPF
NEUTROPHILS # BLD AUTO: 5.3 10E9/L (ref 1.6–8.3)
NEUTROPHILS NFR BLD AUTO: 73.1 %
NITRATE UR QL: NEGATIVE
NRBC # BLD AUTO: 0 10*3/UL
NRBC BLD AUTO-RTO: 0 /100
PH UR STRIP: 5 PH (ref 5–7)
PLATELET # BLD AUTO: 176 10E9/L (ref 150–450)
POTASSIUM SERPL-SCNC: 4 MMOL/L (ref 3.4–5.3)
PROT SERPL-MCNC: 7.1 G/DL (ref 6.8–8.8)
RBC # BLD AUTO: 4.93 10E12/L (ref 4.4–5.9)
RBC #/AREA URNS AUTO: 1 /HPF (ref 0–2)
SODIUM SERPL-SCNC: 141 MMOL/L (ref 133–144)
SOURCE: ABNORMAL
SP GR UR STRIP: 1.01 (ref 1–1.03)
UROBILINOGEN UR STRIP-MCNC: NORMAL MG/DL (ref 0–2)
WBC # BLD AUTO: 7.2 10E9/L (ref 4–11)
WBC #/AREA URNS AUTO: <1 /HPF (ref 0–5)

## 2020-12-14 PROCEDURE — 99283 EMERGENCY DEPT VISIT LOW MDM: CPT | Performed by: EMERGENCY MEDICINE

## 2020-12-14 PROCEDURE — 85025 COMPLETE CBC W/AUTO DIFF WBC: CPT | Performed by: EMERGENCY MEDICINE

## 2020-12-14 PROCEDURE — 83690 ASSAY OF LIPASE: CPT | Performed by: EMERGENCY MEDICINE

## 2020-12-14 PROCEDURE — 81001 URINALYSIS AUTO W/SCOPE: CPT | Performed by: EMERGENCY MEDICINE

## 2020-12-14 PROCEDURE — 80053 COMPREHEN METABOLIC PANEL: CPT | Performed by: EMERGENCY MEDICINE

## 2020-12-14 PROCEDURE — 250N000013 HC RX MED GY IP 250 OP 250 PS 637: Performed by: EMERGENCY MEDICINE

## 2020-12-14 PROCEDURE — 250N000009 HC RX 250: Performed by: EMERGENCY MEDICINE

## 2020-12-14 RX ORDER — SUCRALFATE 1 G/1
1 TABLET ORAL 4 TIMES DAILY
Qty: 28 TABLET | Refills: 0 | Status: ON HOLD | OUTPATIENT
Start: 2020-12-14 | End: 2021-01-11

## 2020-12-14 RX ADMIN — LIDOCAINE HYDROCHLORIDE 30 ML: 20 SOLUTION ORAL; TOPICAL at 21:05

## 2020-12-14 ASSESSMENT — ENCOUNTER SYMPTOMS
ABDOMINAL PAIN: 1
NAUSEA: 0
DIFFICULTY URINATING: 0
DIARRHEA: 0
VOMITING: 0
DYSURIA: 0

## 2020-12-14 NOTE — ED AVS SNAPSHOT
[facility] Emergency Department  [address] [address]  Phone: [phone]                                    Los Huang   MRN: [id number]    Department: [facility] Emergency Department   Date of Visit: 12/14/2020           After Visit Summary Signature Page    I have received my discharge instructions, and my questions have been answered. I have discussed any challenges I see with this plan with the nurse or doctor.    ..........................................................................................................................................  Patient/Patient Representative Signature      ..........................................................................................................................................  Patient Representative Print Name and Relationship to Patient    ..................................................               ................................................  Date                                   Time    ..........................................................................................................................................  Reviewed by Signature/Title    ...................................................              ..............................................  Date                                               Time          22EPIC Rev 08/18

## 2020-12-15 ENCOUNTER — HOSPITAL ENCOUNTER (INPATIENT)
Facility: CLINIC | Age: 65
LOS: 27 days | Discharge: HOME | DRG: 885 | End: 2021-01-11
Attending: EMERGENCY MEDICINE | Admitting: PSYCHIATRY & NEUROLOGY
Payer: MEDICARE

## 2020-12-15 ENCOUNTER — TELEPHONE (OUTPATIENT)
Dept: BEHAVIORAL HEALTH | Facility: CLINIC | Age: 65
End: 2020-12-15

## 2020-12-15 DIAGNOSIS — C78.7 METASTATIC COLON CANCER TO LIVER (H): Primary | ICD-10-CM

## 2020-12-15 DIAGNOSIS — F15.90 OTHER STIMULANT USE, UNSPECIFIED, UNCOMPLICATED: ICD-10-CM

## 2020-12-15 DIAGNOSIS — F41.1 GENERALIZED ANXIETY DISORDER: ICD-10-CM

## 2020-12-15 DIAGNOSIS — F90.9 ATTENTION DEFICIT HYPERACTIVITY DISORDER (ADHD), UNSPECIFIED ADHD TYPE: ICD-10-CM

## 2020-12-15 DIAGNOSIS — Z20.828 EXPOSURE TO SARS-ASSOCIATED CORONAVIRUS: ICD-10-CM

## 2020-12-15 DIAGNOSIS — F15.159 OTH STIMULANT ABUSE W STIM-INDUCE PSYCHOTIC DISORDER, UNSP (H): ICD-10-CM

## 2020-12-15 DIAGNOSIS — F22 DELUSION (H): ICD-10-CM

## 2020-12-15 DIAGNOSIS — F31.9 BIPOLAR 1 DISORDER (H): ICD-10-CM

## 2020-12-15 DIAGNOSIS — F22 PARANOIA (H): ICD-10-CM

## 2020-12-15 DIAGNOSIS — F25.0 SCHIZOAFFECTIVE DISORDER, BIPOLAR TYPE (H): ICD-10-CM

## 2020-12-15 DIAGNOSIS — C18.9 METASTATIC COLON CANCER TO LIVER (H): Primary | ICD-10-CM

## 2020-12-15 LAB
AMPHETAMINES UR QL SCN: NEGATIVE
BARBITURATES UR QL: NEGATIVE
BENZODIAZ UR QL: NEGATIVE
CANNABINOIDS UR QL SCN: NEGATIVE
COCAINE UR QL: NEGATIVE
ETHANOL UR QL SCN: NEGATIVE
FLUAV+FLUBV RNA SPEC QL NAA+PROBE: NEGATIVE
FLUAV+FLUBV RNA SPEC QL NAA+PROBE: NEGATIVE
LABORATORY COMMENT REPORT: NORMAL
OPIATES UR QL SCN: NEGATIVE
RSV RNA SPEC QL NAA+PROBE: NEGATIVE
SARS-COV-2 RNA SPEC QL NAA+PROBE: NEGATIVE
SPECIMEN SOURCE: NORMAL

## 2020-12-15 PROCEDURE — 80307 DRUG TEST PRSMV CHEM ANLYZR: CPT | Performed by: EMERGENCY MEDICINE

## 2020-12-15 PROCEDURE — 250N000013 HC RX MED GY IP 250 OP 250 PS 637: Performed by: EMERGENCY MEDICINE

## 2020-12-15 PROCEDURE — C9803 HOPD COVID-19 SPEC COLLECT: HCPCS | Performed by: EMERGENCY MEDICINE

## 2020-12-15 PROCEDURE — 99285 EMERGENCY DEPT VISIT HI MDM: CPT | Mod: 25 | Performed by: EMERGENCY MEDICINE

## 2020-12-15 PROCEDURE — 87636 SARSCOV2 & INF A&B AMP PRB: CPT | Performed by: EMERGENCY MEDICINE

## 2020-12-15 PROCEDURE — 124N000002 HC R&B MH UMMC

## 2020-12-15 PROCEDURE — 90791 PSYCH DIAGNOSTIC EVALUATION: CPT

## 2020-12-15 PROCEDURE — 99285 EMERGENCY DEPT VISIT HI MDM: CPT | Performed by: EMERGENCY MEDICINE

## 2020-12-15 PROCEDURE — 80320 DRUG SCREEN QUANTALCOHOLS: CPT | Performed by: EMERGENCY MEDICINE

## 2020-12-15 RX ORDER — BENZTROPINE MESYLATE 0.5 MG/1
0.5 TABLET ORAL AT BEDTIME
Status: ON HOLD | COMMUNITY
End: 2021-01-11

## 2020-12-15 RX ORDER — ACETAMINOPHEN 325 MG/1
650 TABLET ORAL EVERY 4 HOURS PRN
Status: DISCONTINUED | OUTPATIENT
Start: 2020-12-15 | End: 2020-12-17

## 2020-12-15 RX ORDER — OLANZAPINE 5 MG/1
5 TABLET ORAL 3 TIMES DAILY PRN
Status: DISCONTINUED | OUTPATIENT
Start: 2020-12-15 | End: 2021-01-11 | Stop reason: HOSPADM

## 2020-12-15 RX ORDER — HYDROXYZINE HYDROCHLORIDE 25 MG/1
25 TABLET, FILM COATED ORAL EVERY 4 HOURS PRN
Status: DISCONTINUED | OUTPATIENT
Start: 2020-12-15 | End: 2021-01-11 | Stop reason: HOSPADM

## 2020-12-15 RX ORDER — TRAZODONE HYDROCHLORIDE 50 MG/1
50 TABLET, FILM COATED ORAL
Status: DISCONTINUED | OUTPATIENT
Start: 2020-12-15 | End: 2020-12-31

## 2020-12-15 RX ORDER — TRAZODONE HYDROCHLORIDE 50 MG/1
50 TABLET, FILM COATED ORAL
Status: DISCONTINUED | OUTPATIENT
Start: 2020-12-15 | End: 2020-12-16

## 2020-12-15 RX ORDER — MAGNESIUM HYDROXIDE/ALUMINUM HYDROXICE/SIMETHICONE 120; 1200; 1200 MG/30ML; MG/30ML; MG/30ML
30 SUSPENSION ORAL EVERY 4 HOURS PRN
Status: DISCONTINUED | OUTPATIENT
Start: 2020-12-15 | End: 2021-01-11 | Stop reason: HOSPADM

## 2020-12-15 RX ORDER — IBUPROFEN 600 MG/1
600 TABLET, FILM COATED ORAL ONCE
Status: COMPLETED | OUTPATIENT
Start: 2020-12-15 | End: 2020-12-15

## 2020-12-15 RX ORDER — OLANZAPINE 10 MG/2ML
5 INJECTION, POWDER, FOR SOLUTION INTRAMUSCULAR 3 TIMES DAILY PRN
Status: DISCONTINUED | OUTPATIENT
Start: 2020-12-15 | End: 2021-01-11 | Stop reason: HOSPADM

## 2020-12-15 RX ORDER — DIVALPROEX SODIUM 500 MG/1
1000 TABLET, DELAYED RELEASE ORAL AT BEDTIME
Status: DISCONTINUED | OUTPATIENT
Start: 2020-12-15 | End: 2021-01-11 | Stop reason: HOSPADM

## 2020-12-15 RX ORDER — TRAZODONE HYDROCHLORIDE 50 MG/1
50 TABLET, FILM COATED ORAL AT BEDTIME
Status: DISCONTINUED | OUTPATIENT
Start: 2020-12-15 | End: 2020-12-31

## 2020-12-15 RX ADMIN — DIVALPROEX SODIUM 1000 MG: 500 TABLET, DELAYED RELEASE ORAL at 02:21

## 2020-12-15 RX ADMIN — TRAZODONE HYDROCHLORIDE 50 MG: 50 TABLET ORAL at 21:18

## 2020-12-15 RX ADMIN — DIVALPROEX SODIUM 1000 MG: 500 TABLET, DELAYED RELEASE ORAL at 21:18

## 2020-12-15 RX ADMIN — TRAZODONE HYDROCHLORIDE 50 MG: 50 TABLET ORAL at 02:21

## 2020-12-15 RX ADMIN — IBUPROFEN 600 MG: 600 TABLET ORAL at 04:53

## 2020-12-15 ASSESSMENT — ACTIVITIES OF DAILY LIVING (ADL)
LAUNDRY: WITH SUPERVISION
HYGIENE/GROOMING: INDEPENDENT
DRESS: INDEPENDENT
ORAL_HYGIENE: INDEPENDENT

## 2020-12-15 ASSESSMENT — ENCOUNTER SYMPTOMS
NECK PAIN: 0
FEVER: 0
COLOR CHANGE: 0
COUGH: 0
BACK PAIN: 0
CONSTIPATION: 0
PALPITATIONS: 0
FREQUENCY: 0
WEAKNESS: 0
FATIGUE: 0
ABDOMINAL DISTENTION: 0
MYALGIAS: 0
HEADACHES: 0
ARTHRALGIAS: 0
SORE THROAT: 0
EYE PAIN: 0
CONFUSION: 0
SHORTNESS OF BREATH: 0
CHILLS: 0
DIZZINESS: 0
CHEST TIGHTNESS: 0

## 2020-12-15 NOTE — ED PROVIDER NOTES
[facility] EMERGENCY DEPARTMENT ([facility])     December 15, 2020    History     Chief Complaint   Patient presents with     Altered Mental Status     Pt was seen at  and brought over by [facility], Pt not making sense in triage.     HPI  Los Huang is a 65 year old male who has a PMH of schizoaffective disorder, stimulant use disorder (amphetamines), bipolar 1 disorder, HUGO, ADHD, and h/x of metastatic colon cancer (2014) s/p cholecystectomy and chemotherapy, who presents to the Emergency Department via EMS with altered mental status.     Los tells me that he would like a mental assessment as he did not feel that his current medications are appropriately balanced that he feels somewhat hypomanic.  He states that his care is managed by a psychiatry resident at [facility] [place] is a diagnosis of bipolar disorder/schizoaffective.  He states that he has been on a variety of medications over the years including risperidone when it first came out 1984 but also newer medications such as Zyprexa.  He states that he has been taking his medications but will occasionally miss a dose.  He states that he saw his current resident Marcia feels him in October and that she gave him enough medications to last 3 months.    He states that tonight he was discharged from the [place] ED for abdominal pain which he states is not a major concern him now and although he has a place to live he did not have a ride post-midnight and so he became scared that he might freeze to death outside and so he jumped out in front of a car.  He states that his intent was not to commit suicide but to catch the 's attention for assistance.  He states he has not had much come in contact here in the [facility] which is unfortunate because he initially came here for family support.  He states that Covid has  him from his family.    He states that his goal is to get his medication balance right so he can live the remainder of  "his lifespan peacefully.  He states that his new med, Atomoxetine, makes him happy because it increases libido but also makes him feel out of balance.  He states that with this medication he does not feel he needs much sleep. In the past trazodone has helped him get sleep, but this is no longer sufficient.    When asked if he has paranoia, he denies this but also states that he lives in a high tech apartment and he believes that he is being monitored everywhere in the apartment including through the bathroom mirror.  He states that he has not taken any medications since this morning.    Patient seen recently on 12/7 with paranoia and was stopped on ritalin. Patient was at the [facility] and was being seen for epigastric abdominal pain extending into the esophagus. They did not opt to rescan patient given recent imaging from 12/6.  Labs including lipase, CMP, CBC were unremarkable. Macroscopic UA was notable for: mucus, 10 ketones in urine and trace blood.  Per report from police patient reported that he was \"k[facility] out of the [facility]\" and that [facility] he was brought in by [facility].    EXAM: CT ABDOMEN PELVIS W IV 12/06/2020  IMPRESSION:   1. Right hepatectomy. Two solid lesions in the superior liver suspicious for metastatic lesions. Embolization coils along the hepatic resection margin.  2. Ileocolonic anastomosis. No bowel obstruction.  3. Several benign renal cysts. There is a 1.6 cm hypodense lesion in the upper pole left kidney not a simple cyst by CT. Ultrasound is recommended in further evaluation.  4. Prostatic enlargement.     PAST MEDICAL HISTORY:   Past Medical History:   Diagnosis Date     Cancer (H)      Depressive disorder      Post-traumatic osteoarthritis of left shoulder 3/9/2020     Schizoaffective disorder (H)        PAST SURGICAL HISTORY:   Past Surgical History:   Procedure Laterality Date     ABDOMEN SURGERY       " BIOPSY       CHOLECYSTECTOMY       COLONOSCOPY       GENITOURINARY SURGERY      Ureteroscopy gone awry     H NEEDLE POWER PORT Left 2014    PLACED IN [place]     IR SIRT (SELECTIVE INTERNAL RADIO THERAPY)  2020     IR VISCERAL ANGIOGRAM  2020     IR VISCERAL EMBOLIZATION  2020     ORTHOPEDIC SURGERY         Past medical history, past surgical history, medications, and allergies were reviewed with the patient. Additional pertinent items: None    FAMILY HISTORY:   Family History   Problem Relation Age of Onset     Osteoporosis Sister      Thyroid Disease Sister         Thyroid killed     Thyroid Disease Sister         Thyroid removed       SOCIAL HISTORY:   Social History     Tobacco Use     Smoking status: Former Smoker     Packs/day: 1.00     Years: 34.00     Pack years: 34.00     Types: Cigarettes     Start date:      Quit date:      Years since quittin.9     Smokeless tobacco: Former User     Types: Snuff     Quit date:      Tobacco comment: Smoked sporadically in high school and during college until    Substance Use Topics     Alcohol use: Not Currently     Frequency: Monthly or less     Comment: NONE IN 1.5 YRS--2020     Social history was reviewed with the patient. Additional pertinent items: None      Patient's Medications   New Prescriptions    No medications on file   Previous Medications    ATOMOXETINE (STRATTERA) 18 MG CAPSULE    Take 1 capsule (18 mg) by mouth daily    BENZTROPINE (COGENTIN) 0.5 MG TABLET    Take 1 tablet (0.5 mg) by mouth daily    CALCIUM POLYCARBOPHIL (FIBERCON) 625 MG TABLET    Take 1 tablet by mouth daily     CARIPRAZINE (VRAYLAR) 3 MG CAPS CAPSULE    Take 1 capsule (3 mg) by mouth daily    CHOLECALCIFEROL (VITAMIN D3) 1000 UNITS (25 MCG) CAPSULE    Take 1 capsule (1,000 Units) by mouth daily    CLONAZEPAM (KLONOPIN) 0.5 MG TABLET    Take 0.5 tablets (0.25 mg) by mouth daily    DIVALPROEX SODIUM DELAYED-RELEASE (DEPAKOTE) 500 MG DR TABLET     Take 2 tablets (1,000 mg) by mouth At Bedtime    HOMEOPATHIC PRODUCTS (ARNICARE) GEL    Externally apply 1 Dose topically daily as needed (leg cramps)    LOPERAMIDE HCL (IMODIUM OR)        MULTIVITAMIN (CENTRUM SILVER) TABLET    Take 1 tablet by mouth daily    SUCRALFATE (CARAFATE) 1 GM TABLET    Take 1 tablet (1 g) by mouth 4 times daily    TRAZODONE (DESYREL) 50 MG TABLET    Take 1 tablet (50 mg) by mouth At Bedtime   Modified Medications    No medications on file   Discontinued Medications    No medications on file          Allergies   Allergen Reactions     Animal Dander         Review of Systems  A complete review of systems was performed with pertinent positives and negatives noted in the HPI, and all other systems negative.    Physical Exam   BP: 107/76  Pulse: 92  Temp: 97.9  F (36.6  C)  Resp: 18  Weight: 85.3 kg (188 lb)  SpO2: 96 %      Physical Exam  General: Afebrile, no acute distress   HEENT: Normocephalic, atraumatic, conjunctivae normal. MMM  Neck: non-tender, supple  Cardio: regular rate. regular rhythm   Resp: Normal work of breathing, no respiratory distress, lungs clear bilaterally, no wheezing, rhonchi, rales  Chest/Back: no visual signs of trauma, no CVA tenderness   Abdomen: soft, non distension, no tenderness, no peritoneal signs   Neuro: alert and fully oriented. CN II-XII grossly intact. Grossly normal strength and sensation in all extremities.   MSK: no deformities. Normal range of motion  Integumentary/Skin: no rash visualized, normal color  Psych: normal affect, normal behavior, denies suicide ideation, homicidal ideation or intent to self harm. Delusional thought content.     ED Course        Procedures    Results for orders placed or performed during the hospital encounter of 12/14/20 (from the past 24 hour(s))   CBC with platelets differential   Result Value Ref Range    WBC 7.2 4.0 - 11.0 10e9/L    RBC Count 4.93 4.4 - 5.9 10e12/L    Hemoglobin 14.8 13.3 - 17.7 g/dL    Hematocrit  44.8 40.0 - 53.0 %    MCV 91 78 - 100 fl    MCH 30.0 26.5 - 33.0 pg    MCHC 33.0 31.5 - 36.5 g/dL    RDW 13.6 10.0 - 15.0 %    Platelet Count 176 150 - 450 10e9/L    Diff Method Automated Method     % Neutrophils 73.1 %    % Lymphocytes 15.7 %    % Monocytes 7.6 %    % Eosinophils 2.5 %    % Basophils 0.7 %    % Immature Granulocytes 0.4 %    Nucleated RBCs 0 0 /100    Absolute Neutrophil 5.3 1.6 - 8.3 10e9/L    Absolute Lymphocytes 1.1 0.8 - 5.3 10e9/L    Absolute Monocytes 0.6 0.0 - 1.3 10e9/L    Absolute Eosinophils 0.2 0.0 - 0.7 10e9/L    Absolute Basophils 0.1 0.0 - 0.2 10e9/L    Abs Immature Granulocytes 0.0 0 - 0.4 10e9/L    Absolute Nucleated RBC 0.0    Comprehensive metabolic panel   Result Value Ref Range    Sodium 141 133 - 144 mmol/L    Potassium 4.0 3.4 - 5.3 mmol/L    Chloride 109 94 - 109 mmol/L    Carbon Dioxide 25 20 - 32 mmol/L    Anion Gap 7 3 - 14 mmol/L    Glucose 88 70 - 99 mg/dL    Urea Nitrogen 17 7 - 30 mg/dL    Creatinine 0.82 0.66 - 1.25 mg/dL    GFR Estimate >90 >60 mL/min/[1.73_m2]    GFR Estimate If Black >90 >60 mL/min/[1.73_m2]    Calcium 9.1 8.5 - 10.1 mg/dL    Bilirubin Total 0.4 0.2 - 1.3 mg/dL    Albumin 3.7 3.4 - 5.0 g/dL    Protein Total 7.1 6.8 - 8.8 g/dL    Alkaline Phosphatase 161 (H) 40 - 150 U/L    ALT 80 (H) 0 - 70 U/L    AST 40 0 - 45 U/L   Lipase   Result Value Ref Range    Lipase 214 73 - 393 U/L   UA reflex to Microscopic and Culture    Specimen: Urine Midstream; Midstream Urine   Result Value Ref Range    Color Urine Yellow     Appearance Urine Clear     Glucose Urine Negative NEG^Negative mg/dL    Bilirubin Urine Negative NEG^Negative    Ketones Urine 10 (A) NEG^Negative mg/dL    Specific Gravity Urine 1.013 1.003 - 1.035    Blood Urine Trace (A) NEG^Negative    pH Urine 5.0 5.0 - 7.0 pH    Protein Albumin Urine Negative NEG^Negative mg/dL    Urobilinogen mg/dL Normal 0.0 - 2.0 mg/dL    Nitrite Urine Negative NEG^Negative    Leukocyte Esterase Urine Negative  NEG^Negative    Source Midstream Urine     RBC Urine 1 0 - 2 /HPF    WBC Urine <1 0 - 5 /HPF    Mucous Urine Present (A) NEG^Negative /LPF    Hyaline Casts 1 0 - 2 /LPF     Medications   divalproex sodium delayed-release (DEPAKOTE) DR tablet 1,000 mg (1,000 mg Oral Given 12/15/20 0221)   traZODone (DESYREL) tablet 50 mg (50 mg Oral Given 12/15/20 0221)   ibuprofen (ADVIL/MOTRIN) tablet 600 mg (600 mg Oral Given 12/15/20 1153)             Assessments & Plan (with Medical Decision Making)   Los Huang is a 65 year old male who has a PMH of schizoaffective disorder, stimulant use disorder (amphetamines), bipolar 1 disorder, HUGO, ADHD, and h/x of metastatic colon cancer (2014) s/p cholecystectomy and chemotherapy, who presents to the Emergency Department via EMS with altered mental status and for mental health evaluation.  Upon arrival patient is well-appearing, afebrile, no distress.  Patient was recently seen at the [place] emergency department for some abdominal pain, reports he was feeling better, was discharged, and states that he did not have a ride home at that time.  Patient states that he then started to walk to the surgery center but reports that his behaviors were concerning to him and was concerned that he was having manic behaviors.  Patient states he was walking in front of cars not to harm himself but to get them to stop.  Patient also reports that he has been taking his medications as directed but still having some delusions, and paranoia, believes he is being monitored in his apartment in the walls and in the bathroom year.  Patient denies any suicide ideation, homicidal ideation, or intent to self-harm.  Patient presents today for mental health evaluation and concerned that he may need a medication adjustment.    Behavioral health  evaluated the patient as well as myself and at this time will plan on voluntary admission for further evaluation and stabilization of patients  schizoaffective disorder with increased delusions and paranoia. Patient understands and agrees with the plan.         I have reviewed the nursing notes.    I have reviewed the findings, diagnosis, plan and need for follow up with the patient.    New Prescriptions    No medications on file       Final diagnoses:   Schizoaffective disorder, bipolar type (H)   Paranoia (H)   Delusion (H)       12/15/2020   [facility] EMERGENCY DEPARTMENT     Kaylee Andrews MD  12/15/20 0610

## 2020-12-15 NOTE — ED NOTES
Pt does not think his medications are correct and believes he is manic at this time. He would like to be seen for a psych eval

## 2020-12-15 NOTE — ED PROVIDER NOTES
History     Chief Complaint   Patient presents with     Abdominal Pain     Delusional     The history is provided by the patient and medical records.     Los Huang is a 65 year old male with a medical history signficant for schizoaffective disorder, depressive disorder, bipolar 1 disorder, HUGO, ADHD, and h/x of metastatic colon cancer (2014) s/p cholecystectomy and chemotherapy who presents to the Emergency Department via EMS for evaluation of abdominal pain. Patient reports mid abdominal pain that radiates up his esophagus after swallowing a spicy and hot sloppy nae. He states he has had abdominal pain for years, noting he has colon cancer metastatic to his liver as well as a history of colectomy and liver resection. Patient states his abdominal pain has increased over the past couple days since he swallowed the hot ground beef. Patient notes he has been on pantoprazole in the past. He endorses a slight cough. Patient denies nausea, vomiting, or diarrhea. States his urination and bowel movements have been fine. Patient states last week he had a bowel movement that caused his right abdomen to stretch out and was seen at [facility] ED at that time. Notes and imaging from that visit are below. Patient reports he had been on a stimulant for 8 years and was recently taken off. Per chart review, he was taken off ritalin at his 12/7 ED visit.      Per Care Everywhere, patient was seen at [facility] from 12/05 due to RLQ abdominal pain; labs were unremarkable and CT showed two liver lesions suspicious for metastasis; patient was discharged with a referral to followup with oncology. Patient was then seen at [facility] from 12/06 due to paranoid disorder; patient reports that majority of his symptoms was likely due to his Ritalin use. Patient was recommended to use trazodone.     EXAM: CT ABDOMEN PELVIS W IV 12/06/2020  IMPRESSION:   1. Right hepatectomy. Two solid lesions in the superior liver  suspicious for metastatic lesions. Embolization coils along the hepatic resection margin.  2. Ileocolonic anastomosis. No bowel obstruction.  3. Several benign renal cysts. There is a 1.6 cm hypodense lesion in the upper pole left kidney not a simple cyst by CT. Ultrasound is recommended in further evaluation.  4. Prostatic enlargement.     I have reviewed the Medications, Allergies, Past Medical and Surgical History, and Social History in the [facility] system.  PAST MEDICAL HISTORY:   Past Medical History:   Diagnosis Date     Cancer (H)      Depressive disorder      Post-traumatic osteoarthritis of left shoulder 3/9/2020     Schizoaffective disorder (H)        PAST SURGICAL HISTORY:   Past Surgical History:   Procedure Laterality Date     ABDOMEN SURGERY       BIOPSY       CHOLECYSTECTOMY       COLONOSCOPY       GENITOURINARY SURGERY      Ureteroscopy gone awry     H NEEDLE POWER PORT Left 2014    PLACED IN [place]     IR SIRT (SELECTIVE INTERNAL RADIO THERAPY)  2020     IR VISCERAL ANGIOGRAM  2020     IR VISCERAL EMBOLIZATION  2020     ORTHOPEDIC SURGERY         Past medical history, past surgical history, medications, and allergies were reviewed with the patient. Additional pertinent items: None    FAMILY HISTORY:   Family History   Problem Relation Age of Onset     Osteoporosis Sister      Thyroid Disease Sister         Thyroid killed     Thyroid Disease Sister         Thyroid removed       SOCIAL HISTORY:   Social History     Tobacco Use     Smoking status: Former Smoker     Packs/day: 1.00     Years: 34.00     Pack years: 34.00     Types: Cigarettes     Start date:      Quit date:      Years since quittin.9     Smokeless tobacco: Former User     Types: Snuff     Quit date:      Tobacco comment: Smoked sporadically in high school and during college until    Substance Use Topics     Alcohol use: Not Currently     Frequency: Monthly or less     Comment: NONE IN 1.5 YRS--2020      Social history was reviewed with the patient. Additional pertinent items: None      Discharge Medication List as of 12/14/2020 10:50 PM      START taking these medications    Details   sucralfate (CARAFATE) 1 GM tablet Take 1 tablet (1 g) by mouth 4 times daily, Disp-28 tablet, R-0, Local Print         CONTINUE these medications which have NOT CHANGED    Details   atomoxetine (STRATTERA) 18 MG capsule Take 1 capsule (18 mg) by mouth daily, Disp-30 capsule,R-3, E-Prescribe      benztropine (COGENTIN) 0.5 MG tablet Take 1 tablet (0.5 mg) by mouth daily, Disp-30 tablet,R-3, E-Prescribe      calcium polycarbophil (FIBERCON) 625 MG tablet Take 1 tablet by mouth daily , Historical      cariprazine (VRAYLAR) 3 MG CAPS capsule Take 1 capsule (3 mg) by mouth daily, Disp-30 capsule,R-3, E-Prescribe      cholecalciferol (VITAMIN D3) 1000 units (25 mcg) capsule Take 1 capsule (1,000 Units) by mouth daily, Disp-90 capsule, R-3, E-Prescribe      clonazePAM (KLONOPIN) 0.5 MG tablet Take 0.5 tablets (0.25 mg) by mouth daily, Disp-15 tablet,R-1, E-Prescribe      divalproex sodium delayed-release (DEPAKOTE) 500 MG DR tablet Take 2 tablets (1,000 mg) by mouth At Bedtime, Disp-60 tablet,R-3, E-Prescribe      Homeopathic Products (ARNICARE) GEL Externally apply 1 Dose topically daily as needed (leg cramps), Historical      Loperamide HCl (IMODIUM OR) Historical      multivitamin (CENTRUM SILVER) tablet Take 1 tablet by mouth daily, Disp-90 tablet, R-3, E-Prescribe      traZODone (DESYREL) 50 MG tablet Take 1 tablet (50 mg) by mouth At Bedtime, Disp-30 tablet,R-3, E-Prescribe                Allergies   Allergen Reactions     Animal Dander         Review of Systems   Constitutional: Negative for chills, fatigue and fever.   HENT: Negative for congestion and sore throat.    Eyes: Negative for pain and visual disturbance.   Respiratory: Negative for cough (slight), chest tightness and shortness of breath.    Cardiovascular: Negative for  chest pain and palpitations.   Gastrointestinal: Positive for abdominal pain (mid rad up esophagus). Negative for abdominal distention, constipation, diarrhea, nausea and vomiting.   Genitourinary: Negative for decreased urine volume, difficulty urinating, dysuria, frequency and urgency.   Musculoskeletal: Negative for arthralgias, back pain, myalgias and neck pain.   Skin: Negative for color change and rash.   Neurological: Negative for dizziness, weakness and headaches.   Psychiatric/Behavioral: Negative for confusion.         Physical Exam   BP: 111/64  Pulse: 78  Temp: 98.4  F (36.9  C)  Resp: 16  SpO2: 100 %      Physical Exam  Vitals signs and nursing note reviewed.   Constitutional:       General: He is not in acute distress.     Appearance: Normal appearance. He is not ill-appearing or toxic-appearing.   HENT:      Head: Normocephalic and atraumatic.      Nose: Nose normal.      Mouth/Throat:      Mouth: Mucous membranes are moist.   Eyes:      Pupils: Pupils are equal, round, and reactive to light.   Neck:      Musculoskeletal: Normal range of motion. No neck rigidity.   Cardiovascular:      Rate and Rhythm: Normal rate.      Pulses: Normal pulses.      Heart sounds: Normal heart sounds.   Pulmonary:      Effort: Pulmonary effort is normal. No respiratory distress.      Breath sounds: Normal breath sounds.   Abdominal:      General: Abdomen is flat. There is no distension.      Tenderness: There is no abdominal tenderness. There is no right CVA tenderness, left CVA tenderness or guarding.   Musculoskeletal: Normal range of motion.         General: No swelling or deformity.   Skin:     General: Skin is warm.      Capillary Refill: Capillary refill takes less than 2 seconds.   Neurological:      Mental Status: He is alert and oriented to person, place, and time.   Psychiatric:         Mood and Affect: Mood normal.         ED Course   8:42 PM  The patient was seen and examined by Eric Granados DO in Room  ED20.       Procedures               Results for orders placed or performed during the hospital encounter of 12/14/20 (from the past 24 hour(s))   CBC with platelets differential   Result Value Ref Range    WBC 7.2 4.0 - 11.0 10e9/L    RBC Count 4.93 4.4 - 5.9 10e12/L    Hemoglobin 14.8 13.3 - 17.7 g/dL    Hematocrit 44.8 40.0 - 53.0 %    MCV 91 78 - 100 fl    MCH 30.0 26.5 - 33.0 pg    MCHC 33.0 31.5 - 36.5 g/dL    RDW 13.6 10.0 - 15.0 %    Platelet Count 176 150 - 450 10e9/L    Diff Method Automated Method     % Neutrophils 73.1 %    % Lymphocytes 15.7 %    % Monocytes 7.6 %    % Eosinophils 2.5 %    % Basophils 0.7 %    % Immature Granulocytes 0.4 %    Nucleated RBCs 0 0 /100    Absolute Neutrophil 5.3 1.6 - 8.3 10e9/L    Absolute Lymphocytes 1.1 0.8 - 5.3 10e9/L    Absolute Monocytes 0.6 0.0 - 1.3 10e9/L    Absolute Eosinophils 0.2 0.0 - 0.7 10e9/L    Absolute Basophils 0.1 0.0 - 0.2 10e9/L    Abs Immature Granulocytes 0.0 0 - 0.4 10e9/L    Absolute Nucleated RBC 0.0    Comprehensive metabolic panel   Result Value Ref Range    Sodium 141 133 - 144 mmol/L    Potassium 4.0 3.4 - 5.3 mmol/L    Chloride 109 94 - 109 mmol/L    Carbon Dioxide 25 20 - 32 mmol/L    Anion Gap 7 3 - 14 mmol/L    Glucose 88 70 - 99 mg/dL    Urea Nitrogen 17 7 - 30 mg/dL    Creatinine 0.82 0.66 - 1.25 mg/dL    GFR Estimate >90 >60 mL/min/[1.73_m2]    GFR Estimate If Black >90 >60 mL/min/[1.73_m2]    Calcium 9.1 8.5 - 10.1 mg/dL    Bilirubin Total 0.4 0.2 - 1.3 mg/dL    Albumin 3.7 3.4 - 5.0 g/dL    Protein Total 7.1 6.8 - 8.8 g/dL    Alkaline Phosphatase 161 (H) 40 - 150 U/L    ALT 80 (H) 0 - 70 U/L    AST 40 0 - 45 U/L   Lipase   Result Value Ref Range    Lipase 214 73 - 393 U/L   UA reflex to Microscopic and Culture    Specimen: Urine Midstream; Midstream Urine   Result Value Ref Range    Color Urine Yellow     Appearance Urine Clear     Glucose Urine Negative NEG^Negative mg/dL    Bilirubin Urine Negative NEG^Negative    Ketones Urine 10  (A) NEG^Negative mg/dL    Specific Gravity Urine 1.013 1.003 - 1.035    Blood Urine Trace (A) NEG^Negative    pH Urine 5.0 5.0 - 7.0 pH    Protein Albumin Urine Negative NEG^Negative mg/dL    Urobilinogen mg/dL Normal 0.0 - 2.0 mg/dL    Nitrite Urine Negative NEG^Negative    Leukocyte Esterase Urine Negative NEG^Negative    Source Midstream Urine     RBC Urine 1 0 - 2 /HPF    WBC Urine <1 0 - 5 /HPF    Mucous Urine Present (A) NEG^Negative /LPF    Hyaline Casts 1 0 - 2 /LPF     Medications   lidocaine (XYLOCAINE) 2 % 15 mL, alum & mag hydroxide-simethicone (MAALOX) 15 mL GI Cocktail (30 mLs Oral Given 12/14/20 2105)             Assessments & Plan (with Medical Decision Making)   Patient with history of metastatic colon cancer presents the emergency department plain abdominal pain.  States he has had pain for several years.  Recently underwent hepatectomy and colectomy.  Today complains worsening of his pain after he ate a spicy sloppy [place] and now has pain extending from his esophagus to his epigastric area.  Was seen at [facility] last week for similar complaints.  CT scan at that time was negative.    Differential diagnosis includes worsening metastatic disease, gastritis, GERD, peptic ulcer disease.    On arrival, patient has normal vital signs.  He overall appears well and nondistressed.  His speech is fairly tangential, however, according to previous notes it appears this is fairly consistent with his baseline.  Does have a history of schizophrenia, has had many mental health admissions previously.  Currently denies any acute mental health symptoms and denies any homicidal suicidal ideation.    On physical exam, his abdomen is soft and mainly nontender.  He overall appears well and nontoxic.    Reviewed CT scan from outside hospital without any signs of acute disease.  Given that his symptomology is largely unchanged, do not feel that repeat imaging is warranted at this time.  Plan for CBC, CMP, lipase,  urinalysis and GI cocktail.    Laboratory work-up is unremarkable.  On reassessment, patient states he is much better after GI cocktail.  His abdominal exam continues to be benign.  Do not feel that repeat imaging is warranted at this time.  We discussed the need for follow-up with his primary care physician for likely gastritis/esophageal reflux disease in addition to reestablishment with his oncologist for further management of his colon cancer.  States he has Nexium at home.  Will be given Carafate in addition.    Findings discussed with the patient.  He understands and is comfortable with this plan.    I have reviewed the nursing notes.    I have reviewed the findings, diagnosis, plan and need for follow up with the patient.    Discharge Medication List as of 12/14/2020 10:50 PM      START taking these medications    Details   sucralfate (CARAFATE) 1 GM tablet Take 1 tablet (1 g) by mouth 4 times daily, Disp-28 tablet, R-0, Local Print             Final diagnoses:   Gastroesophageal reflux disease with esophagitis without hemorrhage   History of colon cancer   I, Neelam Lam, am serving as a trained medical scribe to document services personally performed by Eric Granados DO, based on the provider's statements to me.     I, Eric Granados DO, was physically present and have reviewed and verified the accuracy of this note documented by Neelam Lam.     --  Eric Granados DO  12/14/2020   [facility] EMERGENCY DEPARTMENT     Eric Granados DO  12/15/20 0118

## 2020-12-15 NOTE — SAFE
Los Huang  December 15, 2020    S: Pt has had multiple ED visits and is stating his medications for his mental health is not working. Pt is seeking IP MH placement voluntarily at this time.     B: Pt has had at least 20 IP MH placements while living in [place]. Pt states he takes his medications as prescribed but at times forgets. Pt is not connected to a therapist and doesn't care for his current providers. Pt moved to [place] about 23 months ago to be closer to his children because he states he is dying from cancer. Pt feels there are hidden cameras in his apartment and that management is hearing and seeing everything he is doing. Pt made other statements about secret service agents interviewing him due to information about the president, Murphy Anaya years ago that lead to an IP MH placement.     A: Pt had tangential thoughts, was expansive with his stories, was unable to answer many direct closed ended questions asked by the  and was vague regarding suicidal thoughts and whether he intended to complete suicide by jumping in front of the car earlier tonight or not. Pt states he wanted to get the 's attention and then made comments that it was a suicide attempt and was perceived as such.     R: IP MH placement for stabilization and safety.      Current Suicidal Ideation/Self-Injurious Concerns/Methods: Other Pt reports he jumped in front of a car at first to get 's attention and then states it was a suicide attempt.     Inappropriate Sexual Behavior: Unknown    Aggression/Homicidal Ideation: Agitation/Hyperactivity and Rumination      For additional details see full DEC assessment.       Elvira Shaikh, GRACIE, Riverview Psychiatric CenterSW

## 2020-12-15 NOTE — ED NOTES
"Dr. Granados updated on pt's port not getting blood return, but flushing with what appears to be infiltration around the site after pt swung left arm up above head during writer attempting to access port. Pt states \"they normally don't get any blood from there\" and that he does not know the last time it was accessed.   "

## 2020-12-15 NOTE — ED NOTES
Plan to admit to mental health.  Patient schizoaffective disorder with increasing delusional and paranoid ideations.  /76   Pulse 92   Temp 97.9  F (36.6  C) (Oral)   Resp 18   Wt 85.3 kg (188 lb)   SpO2 96%   BMI 24.14 kg/m         Pedrito Brady MD  12/15/20 0721

## 2020-12-15 NOTE — TELEPHONE ENCOUNTER
"Patient cleared and ready for behavioral bed placement: Yes     S: 65/M, [facility], carlos and SI. Report from Elvira Elizondo, DEC at 0645.     B: Schizoaffective bipolar d/o, HUGO, ADHD and stimulant use disorder.  Pt reportedly jumped in front of a car PTA. Police were called and was brought to the ED. Pt initially reported this was a suicide attempt; however later denied any SI. Pt presents as manic, tangential and paranoid in the ED. Pt reports that he feels his apartment is wired and that management is watching and listening to him. Pt also reported that \"under cover special agents\" came to his apartment to talk about and investigation regarding Murphy Anaya. Pt has had multiple ED visits and feels he needs help. Pt reports he is med-compliant and does not feel like his medications are working. Pt has OP services; however does not like his PCP or psychiatrist. Pt reports he recently moved from [place] and has had 20 plus IP MH admissions throughout lifetime. Pt cannot contract for safety outside of the hospital. Pt has been calm and cooperative in the ED.     A: Voluntary  Asymptomatic for Covid - test has been ordered, not collected.   Hx of cancer. Ambulates / eats / drinks ok.  Drug screen ordered.     R: Pt placed on wait list pending available placement.   "

## 2020-12-15 NOTE — PHARMACY-ADMISSION MEDICATION HISTORY
Admission Medication History Completed by Pharmacy    See [facility] Admission Navigator for allergy information, preferred outpatient pharmacy, prior to admission medications and immunization status.     Medication history sources:  patient via iPad interview, chart review, SureScripts dispense history    Changes made to PTA medication list (reason)  Added: N/A  Deleted: N/A  Changed:   - benztropine-->HS (per pt)    Additional medication history information:   - Sucralfate prescribed at [facility] ED visit yesterday, patient has not yet started taking.  - Patient denies taking any additional Rx/OTC medications other than the ones listed below.    Prior to Admission medications    Medication Sig Last Dose Taking? Auth Provider   atomoxetine (STRATTERA) 18 MG capsule Take 1 capsule (18 mg) by mouth daily 12/14/2020 Yes Marcia Gracia MD   benztropine (COGENTIN) 0.5 MG tablet Take 0.5 mg by mouth At Bedtime 12/13/2020 Yes Unknown, Entered By History   calcium polycarbophil (FIBERCON) 625 MG tablet Take 1 tablet by mouth daily (with lunch)  12/14/2020 Yes Reported, Patient   cariprazine (VRAYLAR) 3 MG CAPS capsule Take 1 capsule (3 mg) by mouth daily 12/14/2020 Yes Marcia Gracia MD   cholecalciferol (VITAMIN D3) 1000 units (25 mcg) capsule Take 1 capsule (1,000 Units) by mouth daily 12/14/2020 Yes Shaun Carrington MD   clonazePAM (KLONOPIN) 0.5 MG tablet Take 0.5 tablets (0.25 mg) by mouth daily 12/14/2020 Yes Sukhjinder Sierra MD   divalproex sodium delayed-release (DEPAKOTE) 500 MG DR tablet Take 2 tablets (1,000 mg) by mouth At Bedtime 12/15/2020 at 0221 in ED Yes Marcia Gracia MD   multivitamin (CENTRUM SILVER) tablet Take 1 tablet by mouth daily 12/14/2020 Yes Shaun Carrington MD   traZODone (DESYREL) 50 MG tablet Take 1 tablet (50 mg) by mouth At Bedtime 12/15/2020 at 0221 in ED Yes Marcia Gracia MD   sucralfate (CARAFATE) 1 GM tablet Take 1 tablet (1 g) by mouth 4 times daily Not yet started  Eric Granados,  DO     Date completed: 12/15/20    Medication history completed by:   Miguel Coto, PharmD, BCPS  [facility]  Emergency Department: Ascom *[id number]

## 2020-12-15 NOTE — DISCHARGE INSTRUCTIONS
Your symptoms in the emergency department are likely caused by irritation of the stomach lining/esophagus, caused by acid reflux.  You should begin to take Nexium which you have at home.  You were also prescribed a medication called Carafate which you should take as directed.  Follow-up with your primary care physician for continued management of this condition.      Please make an appointment to follow up with [facility] (phone: [phone]) or your previous cancer doctor as soon as possible for continued care of your colon cancer.

## 2020-12-15 NOTE — ED NOTES
Pt walked out of his room handing me a white [employer] Duo. He stated that he no longer wanted it because it does not work. Rather than handing it to me he threw it into the trash. Then returned to his room. I retrieved it from the trash and will place it with his other belongings

## 2020-12-16 ENCOUNTER — APPOINTMENT (OUTPATIENT)
Dept: GENERAL RADIOLOGY | Facility: CLINIC | Age: 65
DRG: 885 | End: 2020-12-16
Attending: PHYSICIAN ASSISTANT
Payer: MEDICARE

## 2020-12-16 LAB
ALBUMIN SERPL-MCNC: 4 G/DL (ref 3.4–5)
ALP SERPL-CCNC: 170 U/L (ref 40–150)
ALT SERPL W P-5'-P-CCNC: 84 U/L (ref 0–70)
ANION GAP SERPL CALCULATED.3IONS-SCNC: 8 MMOL/L (ref 3–14)
AST SERPL W P-5'-P-CCNC: 44 U/L (ref 0–45)
BILIRUB SERPL-MCNC: 0.8 MG/DL (ref 0.2–1.3)
BUN SERPL-MCNC: 15 MG/DL (ref 7–30)
CALCIUM SERPL-MCNC: 9.3 MG/DL (ref 8.5–10.1)
CHLORIDE SERPL-SCNC: 111 MMOL/L (ref 94–109)
CO2 SERPL-SCNC: 24 MMOL/L (ref 20–32)
CREAT SERPL-MCNC: 0.87 MG/DL (ref 0.66–1.25)
GFR SERPL CREATININE-BSD FRML MDRD: 89 ML/MIN/{1.73_M2}
GLUCOSE SERPL-MCNC: 103 MG/DL (ref 70–99)
POTASSIUM SERPL-SCNC: 4.2 MMOL/L (ref 3.4–5.3)
PROT SERPL-MCNC: 7.4 G/DL (ref 6.8–8.8)
SODIUM SERPL-SCNC: 143 MMOL/L (ref 133–144)
TSH SERPL DL<=0.005 MIU/L-ACNC: 0.83 MU/L (ref 0.4–4)
VALPROATE SERPL-MCNC: 88 MG/L (ref 50–100)

## 2020-12-16 PROCEDURE — 80053 COMPREHEN METABOLIC PANEL: CPT | Performed by: PSYCHIATRY & NEUROLOGY

## 2020-12-16 PROCEDURE — 124N000002 HC R&B MH UMMC

## 2020-12-16 PROCEDURE — G0177 OPPS/PHP; TRAIN & EDUC SERV: HCPCS

## 2020-12-16 PROCEDURE — 999N000122 CT MHEALTH OVERREAD

## 2020-12-16 PROCEDURE — 250N000013 HC RX MED GY IP 250 OP 250 PS 637: Performed by: EMERGENCY MEDICINE

## 2020-12-16 PROCEDURE — 80164 ASSAY DIPROPYLACETIC ACD TOT: CPT | Performed by: PSYCHIATRY & NEUROLOGY

## 2020-12-16 PROCEDURE — 99207 PR CONSULT E&M CHANGED TO SUBSEQUENT LEVEL: CPT | Performed by: PHYSICIAN ASSISTANT

## 2020-12-16 PROCEDURE — 99233 SBSQ HOSP IP/OBS HIGH 50: CPT | Performed by: PHYSICIAN ASSISTANT

## 2020-12-16 PROCEDURE — 36415 COLL VENOUS BLD VENIPUNCTURE: CPT | Performed by: PSYCHIATRY & NEUROLOGY

## 2020-12-16 PROCEDURE — 250N000013 HC RX MED GY IP 250 OP 250 PS 637: Performed by: PSYCHIATRY & NEUROLOGY

## 2020-12-16 PROCEDURE — 84443 ASSAY THYROID STIM HORMONE: CPT | Performed by: PSYCHIATRY & NEUROLOGY

## 2020-12-16 PROCEDURE — 74177 CT ABD & PELVIS W/CONTRAST: CPT | Mod: 26 | Performed by: RADIOLOGY

## 2020-12-16 PROCEDURE — 99222 1ST HOSP IP/OBS MODERATE 55: CPT | Mod: 95 | Performed by: PSYCHIATRY & NEUROLOGY

## 2020-12-16 RX ORDER — VITAMIN B COMPLEX
25 TABLET ORAL DAILY
Status: DISCONTINUED | OUTPATIENT
Start: 2020-12-16 | End: 2021-01-11 | Stop reason: HOSPADM

## 2020-12-16 RX ORDER — CALCIUM POLYCARBOPHIL 625 MG 625 MG/1
625 TABLET ORAL
Status: DISCONTINUED | OUTPATIENT
Start: 2020-12-16 | End: 2021-01-11 | Stop reason: HOSPADM

## 2020-12-16 RX ORDER — BENZTROPINE MESYLATE 0.5 MG/1
0.5 TABLET ORAL AT BEDTIME
Status: DISCONTINUED | OUTPATIENT
Start: 2020-12-16 | End: 2021-01-08

## 2020-12-16 RX ORDER — CLONAZEPAM 0.5 MG/1
0.25 TABLET ORAL DAILY
Status: DISCONTINUED | OUTPATIENT
Start: 2020-12-16 | End: 2021-01-07

## 2020-12-16 RX ORDER — ATOMOXETINE 18 MG/1
18 CAPSULE ORAL DAILY
Status: DISCONTINUED | OUTPATIENT
Start: 2020-12-16 | End: 2020-12-16

## 2020-12-16 RX ORDER — MULTIPLE VITAMINS W/ MINERALS TAB 9MG-400MCG
1 TAB ORAL DAILY
Status: DISCONTINUED | OUTPATIENT
Start: 2020-12-16 | End: 2021-01-11 | Stop reason: HOSPADM

## 2020-12-16 RX ADMIN — BENZTROPINE MESYLATE 0.5 MG: 0.5 TABLET ORAL at 22:23

## 2020-12-16 RX ADMIN — TRAZODONE HYDROCHLORIDE 50 MG: 50 TABLET ORAL at 22:24

## 2020-12-16 RX ADMIN — DIVALPROEX SODIUM 1000 MG: 500 TABLET, DELAYED RELEASE ORAL at 22:23

## 2020-12-16 RX ADMIN — CARIPRAZINE 3 MG: 1.5 CAPSULE, GELATIN COATED ORAL at 12:38

## 2020-12-16 RX ADMIN — MULTIPLE VITAMINS W/ MINERALS TAB 1 TABLET: TAB at 12:37

## 2020-12-16 RX ADMIN — Medication 0.25 MG: at 12:38

## 2020-12-16 RX ADMIN — Medication 25 MCG: at 12:37

## 2020-12-16 RX ADMIN — CALCIUM POLYCARBOPHIL 625 MG: 625 TABLET, FILM COATED ORAL at 18:27

## 2020-12-16 ASSESSMENT — ACTIVITIES OF DAILY LIVING (ADL)
ORAL_HYGIENE: INDEPENDENT
DRESS: SCRUBS (BEHAVIORAL HEALTH);INDEPENDENT
HYGIENE/GROOMING: INDEPENDENT
LAUNDRY: WITH SUPERVISION

## 2020-12-16 NOTE — CONSULTS
Internal Medicine Initial Visit      Los Huang MRN# [id number]   YOB: 1955 Age: 65 year old   Date of Admission: 12/15/2020  PCP: Babar Mckinney    Referring Provider: Behavioral Health - Enrrqiue Main MD  Reason for Visit: Evaluation of his history of liver lesions         Assessment and Recommendations:   Los Huang is a 65 year old male with a history of schizoaffective disorder, depressive disorder, bipolar 1 disorder, HUGO, ADHD and history of metastatic colon cancer (2014) with mets to liver s/p cholecystectomy, hepatectomy and colectomy and concern for possible renal cell carcinoma who is admitted to [facility] with altered mental status. Internal Medicine consultation was ordered by Dr. Main for evaluation of his history of liver lesions.       # Schizoaffective disorder  # Depressive disorder  # Bipolar 1 disorder  # HUGO, ADHD:  Presented to the ED on 12/15 for altered mental status. Per ED note, he had jumped in front of a care, not to harm himself, but to attempt to get a ride as he was scared he didn't have a ride home from the hospital. Patient is tangential. He reports increased paranoia recently. He lives alone, but his daughter/son live locally but his daughter is reportedly on vacation. He has been having trouble getting rides to clinic follow-ups and the pandemic has been a stressor for him. Denies headache, fall, change in vision, paresthesias, or focal weakness.   - Management per psychiatry  - Discussed with Dr. Srivastava (see below), patient's primary Oncologist. Given change in psychiatric state - recommended Brain MRI to rule out any metastatic lesions   - Nutrition consulted    # History of metastatic colon cancer with mets to liver:  Follows with Dr. Srivastava, Oncology - last seen 5/2020. He was diagnosed with metastatic colon cancer in 2014 with mets to the liver s/p R hepatectomy, cholecystectomy and ascending colectomy in 2014. He also had abdominal wall  met that was resected in 2016. He was treated with chemotherapy (FOLFOX x2, with change to FOLFIRI and completed 10 cycles in 2015). He had eventual recurrent hepatic mets in 3/2016. He has been treated with multiple liver directed therapies with Y90 (2017) but then with TACE x4 (2018). PET scan 2/2019 showed 2 FDG avid liver lesions which had enlarged since 10/2018. He was referred to [facility] to assess for liver directed therapy, in which he underwent Y90 treatment on 7/30 and 8/5 for liver lesions. Patient reports he was supposed to follow-up for MRI abdomen after Y90 to assess his known liver lesions but had not followed up given his paranoia as well as difficulty getting rides to clinic and navigating stress during pandemic. Had CT abd/pelvis 12/6 in the ED at [facility] for abdominal pain showing R hepatectomy, two solid lesions in the superior liver suspicious for metastatic lesions with embolization coils along hepatic resection margin. No sign of bowel obstruction. Today, denies any pain. Abdominal exam with known mild hernia at RUQ site but is reducible. No tenderness. No focal neurological deficits on exam. No neurologic symptoms.   - Discussed with Primary Oncologist Dr. Srivastava today   - Given his psychiatric decompensation, Dr. Srivastava recommended obtaining Brain MRI to evaluate for any metastatic lesion as he has not yet had this performed in the past   - Dr. Srivastava recommended attempting to have our Radiologist read the CT abd/pelvis imaging study from [facility] to assess if the lesions are the same, and to assess the size of the lesions after Y90 therapy to see if there is improvement in lesion size   - Called Radiology at [facility] who are pushing over CT abd/pelvis study from 12/6   - Will need follow up with Dr. Srivastava, Oncology following discharge     # Several benign renal cysts  # Hypodense lesion in upper pole of left kidney  # Concern for possible renal cell carcinoma of the  right kidney:  He has a history of renal cysts- per Oncology notes from 5/28/2020 he has a history of a indeterminant left superior pole kidney lesion and stable 3.0cm heterogenously enhancing exophytic lesion on the R kidney that likely represents renal cell carcinoma. Patient had met with Dr. Chairez, Urology and at that time, patient elected watchful waiting. CT abd/pelvis 12-6 with   - Follow up with Urology for follow-up of possible renal cell carcinoma and monitoring of kidney lesions  - Follow up with Oncology, Dr. Srivastava for follow up     # History of right lung nodule:  Has hx of R lung nodule- at oncology visit 5/28/2020, Dr. Srivastava discussed lung nodule slightly bigger c/t previous. It is indeterminant but Onc has suspicion for malignancy. Discussed that systemic chemotherapy would be resumed but patient was not interested in that, and elected to have repeat CT scan in 3 months. Denies any cardiopulmonary symptoms or cough.  - Follow up with Oncology for follow-up and CT imaging to monitor pulmonary nodule    # Transaminitis:  AST 44, ALT mildly elevated at 84, and AlkP 170 (from ALT 80 and ALKP of 161 on admission). T bili WNL. Denies any new RUQ pain. Negative denise's sign. Could be in setting of metastatic lesions as discussed above.   - Pharmacy consulted to assess for medication culprits that can cause LFT elevation  - Trend CMP on 12/18   - Monitor, please notify medicine if any new abdominal pain, N/V     # Papule on abdomen:  Would recommend OP Dermatology evaluation for a skin lesion/papule that is dark brown in color on upper abdomen.  - Referral for Dermatology given in discharge navigator    # Chronic constipation:  - Continue PTA Fibercon daily    Medicine will continue to follow-up on Brain MRI, OSH imaging and CMP on 12/18, and pharmacy consult-  please page with any additional concerns.     ANI JeffreyC  Hospitalist Service   Pager: [phone]     Reason for Admission:   Altered  mental status          History of Present Illness:   History is obtained from the patient and medical record.     Los Huang is a 65 year old male with a history of schizoaffective disorder, depressive disorder, bipolar 1 disorder, HUGO, ADHD and history of metastatic colon cancer (2014) with mets to liver s/p cholecystectomy, hepatectomy and colectomy and concern for possible renal cell carcinoma who is admitted to station [facility] with altered mental status. Internal Medicine consultation was ordered by Dr. Main for evaluation of his history of liver lesions.    Patient was diagnosed with metastatic colorectal cancer in 2014 with oligometastatic disease to liver at time of diagnosis. He follows with Dr. Srivastava, Oncology. Last visit was 5/2020. He was treated with ascending colectomy and right hepatectomy (2014) and was treated with chemotherapy (FOLFOX x2, with change to FOLFIRI and completed 10 cycles in 2015). He had eventual recurrent hepatic mets in 3/2016. He had a RUQ abdominal wall met that was resected 12/2016. He had cetuximab in 2016. He has been treated with multiple liver directed therapies with Y90 (2017) but then with TACE x4 (2018).     Per Dr. Srivastava's, Oncology note on 5/23/2020 there is also has possible concern for renal cell carcinoma of the Right lower pole of the kidney which has not been treated - patient had seen Urology, Dr. Chairez and patient elected to do watchful waiting.     Patient had a PET scan 2/2019 showing 2 FDG avid liver lesions which had enlarged since 10/2018. He was referred to Dr. Landis in 5/2020 to assess for liver directed therapy. He underwent Y90 treatment on 7/30 and 8/5 for liver lesions. He has not followed up after therapy treatments, but patient reports he was supposed to have MRI imaging to assess lesion size.    He reports that he presented to [facility] ED on 12/6 for abdominal pain. He had a CT abd/pelvis at that time with R hepatectomy, and two solid  "lesions in the superior liver suspicious for metastatic lesions. Embolization coils along the hepatic resection margin. Also CT with several benign renal cysts and a 1.6 cm hypodense lesion in the upper pole left kidney not a simple cyst by CT.      He reports that he has not had avaliale rides to get to clinic visits and that's why he hadn't followed up. He does report that his daughter and son live locally and can give him rides when available. He lives alone.     He currently denies any chest pain, SOB, abdominal pain, N/V, headache, paresthesias, change in vision, or focal weakness. He reports he doesn't eat all that much. He states he wants to live at peace. He reports he thinks he is acting more \"hypomanic.\" He follows with Psychiatrist as outpatient.         Review of Systems:    ROS: 10 point ROS neg other than the symptoms noted above in the HPI.          Past Medical History:   Reviewed and updated in Epic.  Past Medical History:   Diagnosis Date     Cancer (H)      Depressive disorder      Post-traumatic osteoarthritis of left shoulder 3/9/2020     Schizoaffective disorder (H)              Past Surgical History:   Reviewed and updated in Epic.  Past Surgical History:   Procedure Laterality Date     ABDOMEN SURGERY       BIOPSY       CHOLECYSTECTOMY       COLONOSCOPY       GENITOURINARY SURGERY      Ureteroscopy gone awry     H NEEDLE POWER PORT Left     PLACED IN [place]     IR SIRT (SELECTIVE INTERNAL RADIO THERAPY)  2020     IR VISCERAL ANGIOGRAM  2020     IR VISCERAL EMBOLIZATION  2020     ORTHOPEDIC SURGERY               Social History:     Social History     Tobacco Use     Smoking status: Former Smoker     Packs/day: 1.00     Years: 34.00     Pack years: 34.00     Types: Cigarettes     Start date:      Quit date:      Years since quittin.9     Smokeless tobacco: Former User     Types: Snuff     Quit date:      Tobacco comment: Smoked sporadically in high school " and during college until 1979   Substance Use Topics     Alcohol use: Not Currently     Frequency: Monthly or less     Comment: NONE IN 1.5 YRS--7/2020     Drug use: Not Currently     Types: Marijuana, Amphetamines, Benzodiazepines, Hashish, LSD, Mescaline, Methaqualone, Methylphenidate, Nitrous oxide, PCP, Psilocybin     Comment: [id number]             Family History:   Reviewed and updated in Epic.  Family History   Problem Relation Age of Onset     Osteoporosis Sister      Thyroid Disease Sister         Thyroid killed     Thyroid Disease Sister         Thyroid removed             Allergies:     Allergies   Allergen Reactions     Animal Dander              Medications:     Medications Prior to Admission   Medication Sig Dispense Refill Last Dose     atomoxetine (STRATTERA) 18 MG capsule Take 1 capsule (18 mg) by mouth daily 30 capsule 3 12/14/2020     benztropine (COGENTIN) 0.5 MG tablet Take 0.5 mg by mouth At Bedtime   12/13/2020     calcium polycarbophil (FIBERCON) 625 MG tablet Take 1 tablet by mouth daily (with lunch)    12/14/2020     cariprazine (VRAYLAR) 3 MG CAPS capsule Take 1 capsule (3 mg) by mouth daily 30 capsule 3 12/14/2020     cholecalciferol (VITAMIN D3) 1000 units (25 mcg) capsule Take 1 capsule (1,000 Units) by mouth daily 90 capsule 3 12/14/2020     clonazePAM (KLONOPIN) 0.5 MG tablet Take 0.5 tablets (0.25 mg) by mouth daily 15 tablet 1 12/14/2020     divalproex sodium delayed-release (DEPAKOTE) 500 MG DR tablet Take 2 tablets (1,000 mg) by mouth At Bedtime 60 tablet 3 12/15/2020 at 0221 in ED     multivitamin (CENTRUM SILVER) tablet Take 1 tablet by mouth daily 90 tablet 3 12/14/2020     traZODone (DESYREL) 50 MG tablet Take 1 tablet (50 mg) by mouth At Bedtime 30 tablet 3 12/15/2020 at 0221 in ED     sucralfate (CARAFATE) 1 GM tablet Take 1 tablet (1 g) by mouth 4 times daily 28 tablet 0 Not yet started        Current Facility-Administered Medications   Medication     acetaminophen (TYLENOL)  tablet 650 mg     alum & mag hydroxide-simethicone (MAALOX) suspension 30 mL     benztropine (COGENTIN) tablet 0.5 mg     calcium polycarbophil (FIBERCON) tablet 625 mg     cariprazine (VRAYLAR) capsule CAPS 3 mg     clonazePAM (klonoPIN) half-tab 0.25 mg     divalproex sodium delayed-release (DEPAKOTE) DR tablet 1,000 mg     hydrOXYzine (ATARAX) tablet 25 mg     multivitamin w/minerals (THERA-VIT-M) tablet 1 tablet     OLANZapine (zyPREXA) tablet 5 mg    Or     OLANZapine (zyPREXA) injection 5 mg     traZODone (DESYREL) tablet 50 mg     traZODone (DESYREL) tablet 50 mg     Vitamin D3 (CHOLECALCIFEROL) tablet 25 mcg            Physical Exam:   Blood pressure 131/82, pulse 83, temperature 97.9  F (36.6  C), temperature source Oral, resp. rate 18, weight 85.3 kg (188 lb), SpO2 95 %.  Body mass index is 24.14 kg/m .  GENERAL: Alert and oriented x 3. Pleasant. Speech is pressured and tangential. Thin.   HEENT: Anicteric sclera. Mucous membranes moist.   CV: RRR. S1, S2. No murmurs appreciated.   RESPIRATORY: Effort normal on room air. Lungs CTAB with no wheezing, rales, rhonchi.   GI: Abdomen soft, mildly distended, non tender. Has mild chronic abdominal wall hernia in RUQ that is reducible. No tenderness to palpation. No guarding, rigidity or rebound tenderness.  MUSCULOSKELETAL: No joint swelling or tenderness.  NEUROLOGICAL: No focal deficits. Moves all extremities.   EXTREMITIES: No peripheral edema. Intact bilateral pedal pulses.   SKIN: No jaundice. No rashes. 1cm circular dark brown plaque on the upper epigastric region.           Data:   CBC:  Recent Labs   Lab Test 12/14/20  2133   WBC 7.2   RBC 4.93   HGB 14.8   HCT 44.8   MCV 91   MCH 30.0   MCHC 33.0   RDW 13.6          CMP:  Recent Labs   Lab Test 12/16/20  0728      POTASSIUM 4.2   CHLORIDE 111*   GEOVANNA 9.3   CO2 24   BUN 15   CR 0.87   *   AST 44   ALT 84*   BILITOTAL 0.8   ALBUMIN 4.0   PROTTOTAL 7.4   ALKPHOS 170*       TSH:  TSH    Date Value Ref Range Status   12/16/2020 0.83 0.40 - 4.00 mU/L Final       Tox screen: negative    Unresulted Labs Ordered in the Past 30 Days of this Admission     No orders found for last 31 day(s).

## 2020-12-16 NOTE — ED NOTES
ED to Behavioral Floor Handoff    SITUATION  Los Huang is a 65 year old male who speaks English and lives in a home alone The patient arrived in the ED by ambulance from home with a complaint of Altered Mental Status (Pt was seen at  and brought over by [facility], Pt not making sense in triage.)  .The patient's current symptoms started/worsened 1 month(s) ago and during this time the symptoms have increased.   In the ED, pt was diagnosed with   Final diagnoses:   Schizoaffective disorder, bipolar type (H)   Paranoia (H)   Delusion (H)        Initial vitals were: BP: 107/76  Pulse: 92  Temp: 97.9  F (36.6  C)  Resp: 18  Weight: 85.3 kg (188 lb)  SpO2: 96 %   --------  Is the patient diabetic? No   If yes, last blood glucose? --     If yes, was this treated in the ED? --  --------  Is the patient inebriated (ETOH) No or Impaired on other substances? No  MSSA done? N/A  Last MSSA score: --    Were withdrawal symptoms treated? N/A  Does the patient have a seizure history? No. If yes, date of most recent seizure--  --------  Is the patient patient experiencing suicidal ideation? denies current or recent suicidal ideation     Homicidal ideation? denies current or recent homicidal ideation or behaviors.    Self-injurious behavior/urges? denies current or recent self injurious behavior or ideation.  ------  Was pt aggressive in the ED No  Was a code called No  Is the pt now cooperative? Yes  -------  Meds given in ED:   Medications   divalproex sodium delayed-release (DEPAKOTE) DR tablet 1,000 mg (1,000 mg Oral Given 12/15/20 0221)   traZODone (DESYREL) tablet 50 mg (50 mg Oral Given 12/15/20 0221)   ibuprofen (ADVIL/MOTRIN) tablet 600 mg (600 mg Oral Given 12/15/20 0453)      Family present during ED course? No  Family currently present? No    BACKGROUND  Does the patient have a cognitive impairment or developmental disability? No  Allergies:   Allergies   Allergen Reactions     Animal Dander    .   Social  demographics are   Social History     Socioeconomic History     Marital status:      Spouse name: None     Number of children: None     Years of education: None     Highest education level: None   Occupational History     None   Social Needs     Financial resource strain: None     Food insecurity     Worry: None     Inability: None     Transportation needs     Medical: None     Non-medical: None   Tobacco Use     Smoking status: Former Smoker     Packs/day: 1.00     Years: 34.00     Pack years: 34.00     Types: Cigarettes     Start date:      Quit date:      Years since quittin.9     Smokeless tobacco: Former User     Types: Snuff     Quit date:      Tobacco comment: Smoked sporadically in high school and during college until    Substance and Sexual Activity     Alcohol use: Not Currently     Frequency: Monthly or less     Comment: NONE IN 1.5 YRS--2020     Drug use: Not Currently     Types: Marijuana, Amphetamines, Benzodiazepines, Hashish, LSD, Mescaline, Methaqualone, Methylphenidate, Nitrous oxide, PCP, Psilocybin     Comment: [id number]     Sexual activity: Never   Lifestyle     Physical activity     Days per week: None     Minutes per session: None     Stress: None   Relationships     Social connections     Talks on phone: None     Gets together: None     Attends Quaker service: None     Active member of club or organization: None     Attends meetings of clubs or organizations: None     Relationship status: None     Intimate partner violence     Fear of current or ex partner: None     Emotionally abused: None     Physically abused: None     Forced sexual activity: None   Other Topics Concern     Parent/sibling w/ CABG, MI or angioplasty before 65F 55M? No   Social History Narrative     None        ASSESSMENT  Labs results   Labs Ordered and Resulted from Time of ED Arrival Up to the Time of Departure from the ED   INFLUENZA A/B & SARS-COV2 PCR MULTIPLEX   DRUG ABUSE SCREEN 6  CHEM DEP URINE ([employer])      Imaging Studies: No results found for this or any previous visit (from the past 24 hour(s)).   Most recent vital signs /58   Pulse 92   Temp 98.8  F (37.1  C) (Oral)   Resp 18   Wt 85.3 kg (188 lb)   SpO2 97%   BMI 24.14 kg/m     Abnormal labs/tests/findings requiring intervention:---   Pain control: pt had none  Nausea control: pt had none    RECOMMENDATION  Are any infection precautions needed (MRSA, VRE, etc.)? No If yes, what infection? --  ---  Does the patient have mobility issues? independently. If yes, what device does the pt use? ---  ---  Is patient on 72 hour hold or commitment? No If on 72 hour hold, have hold and rights been given to patient? N/A  Are admitting orders written if after 10 p.m. ?N/A  Tasks needing to be completed:---     Charlotte Babcock RN    [phone] [place] [facility]   [phone] [facility] ED

## 2020-12-16 NOTE — PROGRESS NOTES
12/15/20 2112   Patient Belongings   Did you bring any home meds/supplements to the hospital?  No   Patient Belongings locker;sent to security per site process   Patient Belongings Put in Hospital Secure Location (Security or Locker, etc.) cash/credit card;clothing;keys   Belongings Search Yes   Clothing Search Yes       In locker:    Keys   (12/25: pt's jeans ripped at the seam and he has chosen to throw them away)  Underwear  Belt  One pair socks  Tennis shoes  Flannel shirt  Brown sweater  Wallet  Compression wrap  Cell phone brought up by security 12/16/2020    Sent to security:    [facility] [phone]  [employer] [phone]  [employer] [phone]  [employer] [phone]  [employer]shan [employer] [phone]    A               Admission:  I am responsible for any personal items that are not sent to the safe or pharmacy.  [facility] is not responsible for loss, theft or damage of any property in my possession.    Signature:  _________________________________ Date: _______  Time: _____                                              Staff Signature:  ____________________________ Date: ________  Time: _____      2nd Staff person, if patient is unable/unwilling to sign:    Signature: ________________________________ Date: ________  Time: _____     Discharge:  [facility] has returned all of my personal belongings:    Signature: _________________________________ Date: ________  Time: _____                                          Staff Signature:  ____________________________ Date: ________  Time: _____

## 2020-12-16 NOTE — PLAN OF CARE
Initial Psychosocial Assessment    I have reviewed the chart, met with the patient, and developed Care Plan.  Information for assessment was obtained from:     Patient: . and Chart: review of admission and historical encounters    Presenting Problem:  patient is a 64yo male with a history of schizoaffective disorder, bipolar type who was admitted with altered mental status.   Presents with symptoms concerning for carlos and psychosis. Stressors include increased social isolation due to COVID-19.   It does appear he has missed his recent scheduled psychiatry appointment however has been in contact with provider clinic regarding medications refills. Given his cancer diagnosis internal medicine is consulted to coordinate with oncology as well for recommendations regarding rule out of altered mental status secondary to medical condition. Please refer to H&P for further details.         History of Mental Health and Chemical Dependency:   per diagnostic assessment/ Dr. Ziggy Guevara - 3/18/19 -([facility]) [facility]  first experienced mental health issues around the age of 28 and has received treatment for Bipolar I Disorder with severe manic episodes accompanied by psychosis.  Notably, had a major first psychotic carlos in 1983     Patient reports multiple past mental health admissions -  Most recent known hospitalizations -     4/17/2019 - 4/22/2019 (5 days) [facility] - [facility] ([facility] plus unit)       Family Description (Constellation, Family Psychiatric History):  patient born in [place] and spent formative years and [place] and [place]   Was the youngest of three kids, and has two older sisters    his wife in 1992 after 16 years of marriage.  Retained joint custody of their two kids (one son and one daughter)       Significant Life Events (Illness, Abuse, Trauma, Death):  In 2014 was diagnosed with stage IV colon  cancer and was told that his life expectancy would be five years, a milestone which was reached in May 2019.  Continues to work closely with oncology       Living Situation:  Lives alone in an apartment - had been participating in social events at apartment building prior to COVID-19 - has had increase isolation since pandemic.     Educational Background:  Completed his degree at the [employer] in business finance       Occupational History:  Pt has worked as an . Has been unemployed since 2001.     Financial Status:  SSDI.since 2001     Legal Issues:  No known    Ethnic/Cultural Issues:  n/a    Spiritual Orientation:  Congregational        Service History:  ROTC in college       Social Functioning (organization, interests):  enjoys artistic activities/ music  - plays guitar and piano .  Current Treatment Providers are:    Psychiatrist -   Dr. Marcia Gracia -Psychiatry Resident, PGY  [facility] ([facility]) - [facility] [address]nd Floor [address] [place] [address]. [facility], [address] phone: [phone]  - no upcoming appointments scheduled at time of admission  - Epic inbox message sent to Dr. Gracia by this writer alerting her to admission.     PCP -   Babar Mckinney MD [address] [address] ph: [phone]    Oncologist - Patrice Srivastava MD  [facility]  Clinics and Surgery Center at [address] [place] [id number] [phone]      Social Service Assessment/Plan:  Patient has been admitted for altered mental status. Patient will have psychiatric assessment and medication management by the psychiatrist. Medications will be reviewed and adjusted per MD/DO as indicated. The treatment team will continue to assess and stabilize the patient's mental health symptoms with the use of medications and therapeutic programming. Hospital staff will  provide a safe environment and a therapeutic milieu. Staff will continue to assess patient as needed. Patient will participate in unit groups and activities. Patient will receive individual and group support on the unit.  CTC will do individual inpatient treatment planning and after care planning. CTC will discuss options for increasing community supports with the patient. CTC will coordinate with outpatient providers and will place referrals to ensure appropriate follow up care is in place.  Patient would benefit from: Medication management

## 2020-12-16 NOTE — PLAN OF CARE
"INITIAL OT NOTE  Problem: OT General Care Plan  Goal: OT Goal 1    Pt attended 1 out of 3 OT groups offered. Pt actively participated in a structured occupational therapy group with a focus on facilitating self-awareness, self-esteem, and social engagement. Pt appeared comfortable sharing positive personal information, and was respectful in listening and responding to peers. Pt identified \"patience\" as a positive personal strength. He reported that he is \"hypomanic.\" Responses to prompts were appropriate to topic, though hyperverbal and tangential at times.  Many responses to prompts revolved around playing the guitar and his desire to get better at this skill. He identified his son and daughter as important people in his life. Pleasant, cooperative, and engaged. Polite with a bright affect in interactions. Will continue to assess.    "

## 2020-12-16 NOTE — PROGRESS NOTES
Observed to have slept well for approx 6.75  Hrs w/ several brief periods of observed wakefulness with no problem readily returning to sleep.  Pleasantly conversant but presents as grossly disorganized, tangential and quite delusional.  Supportive intervention provided as appropriate.  Was noted that pt. has an abdominal binder in his locker which he was requesting although there is no order for same. Unable to provide reliable info re: his need for the binder.  Endorsed to AM staff for follow-up this morning.  Safe, therapeutic environment provided and maintained.
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
## 2020-12-16 NOTE — PROGRESS NOTES
Pt was visible in the milieu, restless, asking odd questions to staff and peers. Pt appears paranoid, suggested he didn't know if his doctor was who he said he was, and could have been a . Pt was calm and cooperative, social with peers.       12/16/20 1523   Behavioral Health   Hallucinations denies / not responding to hallucinations   Thinking distractable;delusional;paranoid;poor concentration   Orientation person: oriented;place: oriented;date: oriented   Memory baseline memory   Insight poor   Judgement impaired   Eye Contact at examiner   Affect blunted, flat   Mood anxious   Physical Appearance/Attire attire appropriate to age and situation   Hygiene well groomed   Activity restless   Speech tangential   Medication Sensitivity no stated side effects;no observed side effects   Psychomotor / Gait balanced;steady   Activities of Daily Living   Hygiene/Grooming independent   Oral Hygiene independent   Dress scrubs (behavioral health);independent   Laundry with supervision   Room Organization independent
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
## 2020-12-16 NOTE — PLAN OF CARE
BEHAVIORAL TEAM DISCUSSION    Participants:   MD Harleen Allred, MSW, [facility] Clinical Treatment Coordinator  Magaly Aldrich, PJ  Progress: initial team meeting   Anticipated length of stay: assessment ongoing  Continued Stay Criteria/Rationale: patient is admitted for assessment/ treatment due to presentation with altered mental status - symptoms of psychosis and carlos  Medical/Physical:   Please refer to Internal Medicine Consult Note 12/16/2020 by Mary Ellen Bales PA-C  re the following:  # History of metastatic colon cancer with mets to liver:  # Several benign renal cysts  # Hypodense lesion in upper pole of left kidney  # Concern for possible renal cell carcinoma of the right kidney:  # History of right lung nodule:  # Transaminitis:  # Papule on abdomen:  # Chronic constipation:  Precautions:   Behavioral Orders   Procedures    Code 1 - Restrict to Unit    Routine Programming     As clinically indicated    Single Room    Status 15     Every 15 minutes.     Plan: Psychiatric assessment. Medication management. Therapeutic Milieu. Individual care planning and after care planning. Patient to participate in unit groups and activities. Individual and group support on unit.   Rationale for change in precautions or plan: per initial assessment.

## 2020-12-17 ENCOUNTER — APPOINTMENT (OUTPATIENT)
Dept: MRI IMAGING | Facility: CLINIC | Age: 65
DRG: 885 | End: 2020-12-17
Attending: PHYSICIAN ASSISTANT
Payer: MEDICARE

## 2020-12-17 LAB — AMMONIA PLAS-SCNC: 48 UMOL/L (ref 10–50)

## 2020-12-17 PROCEDURE — 255N000002 HC RX 255 OP 636: Performed by: PSYCHIATRY & NEUROLOGY

## 2020-12-17 PROCEDURE — 36415 COLL VENOUS BLD VENIPUNCTURE: CPT | Performed by: PHYSICIAN ASSISTANT

## 2020-12-17 PROCEDURE — 250N000013 HC RX MED GY IP 250 OP 250 PS 637: Performed by: EMERGENCY MEDICINE

## 2020-12-17 PROCEDURE — 82140 ASSAY OF AMMONIA: CPT | Performed by: PHYSICIAN ASSISTANT

## 2020-12-17 PROCEDURE — 250N000013 HC RX MED GY IP 250 OP 250 PS 637: Performed by: PSYCHIATRY & NEUROLOGY

## 2020-12-17 PROCEDURE — 70553 MRI BRAIN STEM W/O & W/DYE: CPT

## 2020-12-17 PROCEDURE — 70553 MRI BRAIN STEM W/O & W/DYE: CPT | Mod: 26 | Performed by: RADIOLOGY

## 2020-12-17 PROCEDURE — 99232 SBSQ HOSP IP/OBS MODERATE 35: CPT | Mod: 95 | Performed by: PSYCHIATRY & NEUROLOGY

## 2020-12-17 PROCEDURE — G0177 OPPS/PHP; TRAIN & EDUC SERV: HCPCS

## 2020-12-17 PROCEDURE — A9585 GADOBUTROL INJECTION: HCPCS | Performed by: PSYCHIATRY & NEUROLOGY

## 2020-12-17 PROCEDURE — 124N000002 HC R&B MH UMMC

## 2020-12-17 RX ORDER — GADOBUTROL 604.72 MG/ML
10 INJECTION INTRAVENOUS ONCE
Status: COMPLETED | OUTPATIENT
Start: 2020-12-17 | End: 2020-12-17

## 2020-12-17 RX ORDER — ACETAMINOPHEN 325 MG/1
650 TABLET ORAL EVERY 8 HOURS PRN
Status: DISCONTINUED | OUTPATIENT
Start: 2020-12-17 | End: 2020-12-18

## 2020-12-17 RX ADMIN — GADOBUTROL 8.5 ML: 604.72 INJECTION INTRAVENOUS at 18:22

## 2020-12-17 RX ADMIN — Medication 0.25 MG: at 09:14

## 2020-12-17 RX ADMIN — CALCIUM POLYCARBOPHIL 625 MG: 625 TABLET, FILM COATED ORAL at 09:15

## 2020-12-17 RX ADMIN — BENZTROPINE MESYLATE 0.5 MG: 0.5 TABLET ORAL at 21:14

## 2020-12-17 RX ADMIN — MULTIPLE VITAMINS W/ MINERALS TAB 1 TABLET: TAB at 09:15

## 2020-12-17 RX ADMIN — CARIPRAZINE 3 MG: 1.5 CAPSULE, GELATIN COATED ORAL at 13:44

## 2020-12-17 RX ADMIN — DIVALPROEX SODIUM 1000 MG: 500 TABLET, DELAYED RELEASE ORAL at 21:14

## 2020-12-17 RX ADMIN — TRAZODONE HYDROCHLORIDE 50 MG: 50 TABLET ORAL at 21:14

## 2020-12-17 RX ADMIN — Medication 25 MCG: at 09:15

## 2020-12-17 ASSESSMENT — ACTIVITIES OF DAILY LIVING (ADL)
LAUNDRY: WITH SUPERVISION
ORAL_HYGIENE: INDEPENDENT
DRESS: INDEPENDENT
HYGIENE/GROOMING: INDEPENDENT

## 2020-12-17 NOTE — PROGRESS NOTES
"CLINICAL NUTRITION SERVICES - ASSESSMENT NOTE     Nutrition Prescription    RECOMMENDATIONS FOR MDs/PROVIDERS TO ORDER:  None today    Malnutrition Status:    Unable to assess     Recommendations already ordered by Registered Dietitian (RD):  Diet education   PM snack: fruit HS snack: vanilla ice cream or orange fruit ice     Future/Additional Recommendations:  Monitor intakes and wt  Adjust snacks per pt preference      REASON FOR ASSESSMENT  Los Huang is a 65 year old male assessed by the dietitian for Provider Order - \"evaluate for malnutrition, history of metastatic colon cancer\"  PMH significant for schizoaffective disorder, stimulant use disorder, bipolar disorder, HUGO, ADHD and metastatic colon cancer to liver (2014) s/p cholecystectomy, partial colon resection (3/6/20) and chemotherapy admitted for AM.  NUTRITION HISTORY  Information obtained from pt over the phone. Pt reports a very good appetite PTA. Stated he has always cleared his plate and has never had concerns regarding intakes. Usually eats 3 meals/day at home and occasional snacks. He reports concern with metabolic syndrome. Stated he was told to monitor BG (was previously doing daily blood glucose checks at home) d/t a medication he was on. Also reports concern regarding cholesterol levels and BP (stated he is usually hypotensive and has increased sodium in food, now stated hes been hypertensive). Reports UBW of 205# (noted loss of 15#/7.4% in 6 months). Believes its d/t illness and medications (stimulants).     CURRENT NUTRITION ORDERS  Diet: Regular  Intake/Tolerance: Pt reports continue good intakes. Stated he has eaten 100% of meals since admit. Pt takes a fiber supplements at home (fibercon) d/t diarrhea post colon resection. Stated diarrhea has improved since starting supplements. .Discussed reducing sodium in diet (previously trying to add additional sodium into diet), discussed heart healthy diet for cholesterol. Discussed current " "a1c (5.5). Unable to provide additional diet education as pt was needing to get labs drawn. Pt did request snacks of vanilla ice cream/orange sherbet and fruit.     LABS  Labs reviewed:  Results for NEVIN DICKERSON (MRN [id number]) as of 12/17/2020 09:40   Ref. Range 12/16/2020 07:28   Sodium Latest Ref Range: 133 - 144 mmol/L 143   Potassium Latest Ref Range: 3.4 - 5.3 mmol/L 4.2   Chloride Latest Ref Range: 94 - 109 mmol/L 111 (H)   Carbon Dioxide Latest Ref Range: 20 - 32 mmol/L 24   Urea Nitrogen Latest Ref Range: 7 - 30 mg/dL 15   Creatinine Latest Ref Range: 0.66 - 1.25 mg/dL 0.87   GFR Estimate Latest Ref Range: >60 mL/min/1.73_m2 89   GFR Estimate If Black Latest Ref Range: >60 mL/min/1.73_m2 >90   Calcium Latest Ref Range: 8.5 - 10.1 mg/dL 9.3   Anion Gap Latest Ref Range: 3 - 14 mmol/L 8   Albumin Latest Ref Range: 3.4 - 5.0 g/dL 4.0   Protein Total Latest Ref Range: 6.8 - 8.8 g/dL 7.4   Bilirubin Total Latest Ref Range: 0.2 - 1.3 mg/dL 0.8   Alkaline Phosphatase Latest Ref Range: 40 - 150 U/L 170 (H)   ALT Latest Ref Range: 0 - 70 U/L 84 (H)   AST Latest Ref Range: 0 - 45 U/L 44   Results for NEVIN DICKERSON (MRN [id number]) as of 12/17/2020 09:40   Ref. Range 7/25/2019 16:49   Hemoglobin A1C Latest Ref Range: 0 - 5.6 % 5.5   Results for NEVIN DICKERSON (MRN [id number]) as of 12/17/2020 12:07   Ref. Range 7/25/2019 16:49   Cholesterol Latest Ref Range: <200 mg/dL 186   HDL Cholesterol Latest Ref Range: >39 mg/dL 40   LDL Cholesterol Calculated Latest Ref Range: <100 mg/dL 110 (H)   Non HDL Cholesterol Latest Ref Range: <130 mg/dL 146 (H)   Triglycerides Latest Ref Range: <150 mg/dL 181 (H)     MEDICATIONS  Medications reviewed:  fibercon  thera-vit-m  Vit D3  PRN: maalox    ANTHROPOMETRICS  Height: 188 cm (6'2\")  Most Recent Weight: 85.3 kg (188 lb)    IBW: 86.3 kg  BMI: 24.14 kg/m^2, Normal BMI  Weight History: 15# (7.4%) wt loss in 6 months  Wt Readings from Last 10 Encounters:   12/15/20 85.3 kg (188 " lb)   08/05/20 89.4 kg (197 lb)   07/30/20 89.4 kg (197 lb)   06/29/20 92.3 kg (203 lb 6.4 oz)   03/09/20 93 kg (205 lb)   01/16/20 94.9 kg (209 lb 3.2 oz)   11/14/19 93 kg (205 lb)   07/31/19 93.8 kg (206 lb 14.4 oz)   07/18/19 94.3 kg (208 lb)   06/27/19 94.2 kg (207 lb 11.2 oz)     Dosing Weight: 85.3 kg (current)    ASSESSED NUTRITION NEEDS1  Estimated Energy Needs: 2093-4007 kcals/day (25 - 30 kcals/kg)  Justification: Maintenance, increased needs  Estimated Protein Needs:  grams protein/day (1 - 1.2 grams of pro/kg)  Justification: Increased needs  Estimated Fluid Needs: 2389-9050 mL/day (1 mL/kcal)   Justification: Maintenance or Per provider pending fluid status    PHYSICAL FINDINGS  See malnutrition section below.    MALNUTRITION  % Intake: No decreased intake noted  % Weight Loss: Weight loss does not meet criteria  Subcutaneous Fat Loss: Unable to assess  Muscle Loss: Unable to assess  Fluid Accumulation/Edema: None noted  Malnutrition Diagnosis: Unable to determine due to no NFPA completed     NUTRITION DIAGNOSIS  Predicted inadequate nutrient intake related to increased needs  as evidenced by 15# wt loss in 6 months despite pt reported good intakes       INTERVENTIONS  Implementation  Diet education: Discussed role of RD, menu ordering and available snacks. Answered pts questions regarding diet, fiber, cholesterol, blood  sugar and blood pressure. Encouraged pt to reduce intakes of sodium. Encouraged more frequent meals and snacks for wt maintenance.    PM snack: fruit HS snack: vanilla ice cream or orange fruit ice     Goals  Patient to consume % of nutritionally adequate meal trays TID, or the equivalent with supplements/snacks.     Monitoring/Evaluation  Progress toward goals will be monitored and evaluated per protocol.    Zuleyma Guzman MS, RD, LDN  Unit Pager [phone]

## 2020-12-17 NOTE — PROGRESS NOTES
Brief Medicine Note    Medicine following peripherally for Brain MRI as well as CT scan overread from 12/6. Called MRI and they discussed Brain MRI will likely occur sometime this evening. Called Radiology and they are aware of the order for the CT overread and this is in process. Contacted by pharmacy to consider adding on Ammonia level - which was 48 and WNL. Will plan to trend CMP in AM for LFT monitoring.    Please call medicine with any additional questions or concerns.     Mary Ellen Bales PA-C  Hospitalist Service  Pager [phone]

## 2020-12-17 NOTE — PROGRESS NOTES
Pt appeared to have a good shift. Pt was observed pacing the hallway, laying in room, using the phone, and eating dinner. Pt presented with a blunt/flat affect and remained calm throughout the shift. Pt did have a delay in speech while communicating. Pt denied experiencing any mental health symptoms including SI, HI, or SIB. However, another staff told this  that he appeared internally preoccupied.

## 2020-12-17 NOTE — PROGRESS NOTES
"Rec'd pt. sleeping w/o noted distress as shift commenced. Respirations regular and unlabored.  Observed early morning awakening but was able to briefly and readily return to sleep 3 to 4 times before gettining up for the day at approx 0500.  Pleasant and appropriately engaging w/ staff.  On one occcasion repeated \"HO,HO, HO\" as he paced in the newman then came to staff and said \"maybe I shouldn't  said that\".  When reassured that it was quite acceptable and in fact nice to hear, pt. gave a big smile.  Appropriately reminsces  About things from his past and will inquire of staff if we remember certain entertainers, old cars, etc. then enjoys sharing his memories. Appears less disorganized tonight than on admission w/o observed paranoia or delusions on the night previous.  Back to bed at approx 0600 and readily went back to sleep w/ regular non-labored respirations.     Observed to have slept for approx 5.5 hrs overnight.  Safe, therapeutic environment maintained.    "

## 2020-12-17 NOTE — PROGRESS NOTES
"[facility], [place]   Psychiatric Progress Note  Hospital Day: 2        Interim History:   The patient's care was discussed with the treatment team during the daily team meeting and/or staff's chart notes were reviewed.  Staff report patient appeared internally preoccupied yesterday, denied symptoms of SI, HI or AVH, was observed pacing the hallway, calm, taking medications as prescribed with exception of Vrylar which he declined this AM until talking with provider with concern it is causing increase in BP, no acute events overnight, MRI unable to be completed yesterday, hopefully will be done today.    Upon interview, the patient reports mood is \"alright\", he went into discussing a variety of topics from wearing his binder and home clothing, concerns about having a urologic problem, discussed difficulty with orgasms with masturbation, redirected back to treatment plan. He has concerns Vrylar is causing his BP to be too high, reviewed his vitals have been stable and encouraged him to continue to take this medication. He denied SI and HI. Denies AVH, he continued to revert back to physical concerns including mole on his back he thinks is turning black. He then mentioned \"all the cameras\". Reviewed he will have an MRI of his brain hopefully today. No additional concerns at this time.          Medications:       benztropine  0.5 mg Oral At Bedtime     calcium polycarbophil  625 mg Oral Daily with lunch     cariprazine  3 mg Oral Daily     clonazePAM  0.25 mg Oral Daily     divalproex sodium delayed-release  1,000 mg Oral At Bedtime     multivitamin w/minerals  1 tablet Oral Daily     traZODone  50 mg Oral At Bedtime     Vitamin D3  25 mcg Oral Daily          Allergies:     Allergies   Allergen Reactions     Animal Dander           Labs:     Recent Results (from the past 48 hour(s))   TSH with free T4 reflex and/or T3 as indicated    Collection Time: 12/16/20  7:28 AM   Result Value Ref " "Range    TSH 0.83 0.40 - 4.00 mU/L   Valproic acid    Collection Time: 12/16/20  7:28 AM   Result Value Ref Range    Valproic Acid Level 88 50 - 100 mg/L   Comprehensive metabolic panel    Collection Time: 12/16/20  7:28 AM   Result Value Ref Range    Sodium 143 133 - 144 mmol/L    Potassium 4.2 3.4 - 5.3 mmol/L    Chloride 111 (H) 94 - 109 mmol/L    Carbon Dioxide 24 20 - 32 mmol/L    Anion Gap 8 3 - 14 mmol/L    Glucose 103 (H) 70 - 99 mg/dL    Urea Nitrogen 15 7 - 30 mg/dL    Creatinine 0.87 0.66 - 1.25 mg/dL    GFR Estimate 89 >60 mL/min/[1.73_m2]    GFR Estimate If Black >90 >60 mL/min/[1.73_m2]    Calcium 9.3 8.5 - 10.1 mg/dL    Bilirubin Total 0.8 0.2 - 1.3 mg/dL    Albumin 4.0 3.4 - 5.0 g/dL    Protein Total 7.4 6.8 - 8.8 g/dL    Alkaline Phosphatase 170 (H) 40 - 150 U/L    ALT 84 (H) 0 - 70 U/L    AST 44 0 - 45 U/L          Psychiatric Examination:     /75   Pulse 84   Temp 97.3  F (36.3  C) (Tympanic)   Resp 16   Wt 85.3 kg (188 lb)   SpO2 94%   BMI 24.14 kg/m    Weight is 188 lbs 0 oz  Body mass index is 24.14 kg/m .    Orthostatic Vitals       Most Recent      Sitting Orthostatic /73 12/17 0828    Sitting Orthostatic Pulse (bpm) 78 12/17 0828    Standing Orthostatic /81 12/17 0828    Standing Orthostatic Pulse (bpm) 89 12/17 0828          Appearance: awake, alert and adequately groomed  Attitude:  cooperative  Eye Contact:  fair  Mood:  \"alright\"  Affect:  mood congruent  Speech:  clear, coherent  Language: fluent and intact in English  Psychomotor, Gait, Musculoskeletal:  no evidence of tardive dyskinesia, dystonia, or tics  Thought Process:  disorganized  Associations:  loosening of associations present  Thought Content:  no evidence of suicidal ideation or homicidal ideation and paranoia/delusional thoughts present  Insight:  limited  Judgement:  limited  Oriented to:  time, person, and place  Attention Span and Concentration:  limited  Recent and Remote Memory:  fair  Fund of " Knowledge:  appropriate    Clinical Global Impressions  First:  Considering your total clinical experience with this particular patient population, how severe are the patient's symptoms at this time?: 7 (12/16/20 0544)  Compared to the patient's condition at the START of treatment, this patient's condition is: 4 (12/16/20 0544)  Most recent:  Considering your total clinical experience with this particular patient population, how severe are the patient's symptoms at this time?: 7 (12/16/20 0544)  Compared to the patient's condition at the START of treatment, this patient's condition is: 4 (12/16/20 0544)           Precautions:     Behavioral Orders   Procedures     Code 1 - Restrict to Unit     Routine Programming     As clinically indicated     Single Room     Status 15     Every 15 minutes.          Diagnoses:      Bipolar disorder, type 1, current episode mixed with concern for psychosis symptoms  Generalized anxiety by history  History of ADHD  Stimulant abuse by history   R/O psychosis and mood disorder 2/2 medical condition (Metastatic colon cancer)         Assessment & Plan:   Assessment and hospital summary:  64 yo M with history of bipolar disorder type 1, anxiety, ADHD, stimulant abuse and complex medical history who presented to ED with AMS in context of stepping in front of a truck PTA, he reports trying to flag truck down for help and denied this was a suicide attempt. His PTA medications were continued, with exception of Strattera for concern of causing elevated mood state; he reported adequate adherence to medications PTA. IM consult placed given complexity of medical history including metastatic colon cancer, there is a plan for MRI brain to be completed to evaluate for lesions that could be contributing to patients presentation.     Psychiatric treatment/inteventions:  Medications:   -continue PTA Depakote 1000mg at bedtime for mood stabilization  -continue PTA Vraylar 3mg daily for mood  stabilization and psychosis  -continue PTA clonazepam 0.25mg daily for anxiety  -continue PTA benztropine 0.5mg at bedtime  -continue PTA Vitamin D3 1000U daily     Laboratory/Imaging: no new labs per psychiatry, previous labs reviewed, IM working on obtaining additional imaging (MRI Brain)     Patient will be treated in therapeutic milieu with appropriate individual and group therapies as described.     Medical treatment/interventions:  Medical concerns: Pt has complex medical history including metastatic colon cancer, IM consult was placed for co-management and evaluation, appreciate assistance, they have been coordinating with oncology for additional work up. See initial consult note by Mary Ellen Bales PA-C on 12/16 for complete details, agree with assessment and plan as outlined by IM.       This note was created by undersigned using a Dragon dictation system. All typing errors or contextual distortion are unintentional and software inherent.     Disposition Plan   Reason for ongoing admission: is unable to care for self due to severe psychosis or carlos  Discharge location: TBD, likely discharge in 5-7 days once stabilized in AMS and mood/psychosis  Discharge Medications: not ordered  Follow-up Appointments: not scheduled  Legal Status: voluntary  Entered by: Cassy Noriega on 12/17/2020 at 9:57 AM       Video-Visit Details    Type of service:  Video Visit    Video Start Time (time video started): 1235    Video End Time (time video stopped): 1255    Originating Location (pt. Location): [address]    Distant Location (provider location): Provider remote location    Mode of Communication:  Video Conference via [employer]    Physician has received verbal consent for a Video Visit from the patient? Yes    Cassy Noriega, DO

## 2020-12-17 NOTE — PLAN OF CARE
Work Completed  - chart review  - team meeting      Discharge plan or goal  Assessment ongoing for appropriate discharge plan    Barriers to discharge  Patient requires further psychiatric stabilization

## 2020-12-17 NOTE — PHARMACY-CONSULT NOTE
Pharmacy Consult    Solo Huang is a 65-year-old male admitted for altered mental status. Solo has a history of schizoaffective disorder, stimulant use disorder (amphetamines), bipolar 1 disorder, HUGO, ADHD, and h/x of metastatic colon cancer (2014) s/p cholecystectomy and chemotherapy. Mary Ellen Bales requested a pharmacist to evaluate his medications for any causing elevations in his liver function tests.      Medications Prior to Admission  Atomoxetine 18 mg daily (12 outpatient prescription fills in the last 12 months)  Benztropine 0.5 mg QHS (11 outpatient prescription fills in the last 12 months)  Fibercon 625 mg daily with lunch  Vraylar 3 mg daily (5 outpatient prescription fills in the last 12 months - started 08/2020 as he was switch from Risperidone to Vraylar at that time)  Cholecalciferol 1000 unit daily  Clonazepam 0.25 mg daily (9 outpatient prescription fills in the last 12 months)  Depakote DR 1000 mg QHS (13 outpatient prescription fills in the last 12 months)  Multivitamin 1 tablet daily  Trazodone 50 mg QHS (4 outpatient prescription fills in the last 12 months - started 09/2020)  Sucralfate 1 gram QID    Current Medications  Acetaminophen 650 mg Q4 hours PRN (no doses given since admission)  Maalox 30 ml Q4 hours PRN (no doses given since admission)  Benztropine 0.5 mg QHS (one dose given since admission)  Fibercon 625 mg daily with lunch (two doses given since admission)  Vraylar 3 mg daily (one dose given since admission)  Cholecalciferol 1000 unit daily (two doses given since admission)  Clonazepam 0.25 mg daily (two doses given since admission)  Depakote DR 1000 mg QHS (three doses given since admission)  Hydroxyzine 25 mg Q4 hours PRN (no doses given since admission)  Multivitamin 1 tablet daily (two doses given since admission)  Zyprexa 5 mg PO/IM TID PRN agitation associated with psychosis or carlos (no doses given since admission)  Trazodone 50 mg QHS (three doses given since  admission)  Trazodone 50 mg QHS PRN sleep (no doses given since admission)      Liver Function Tests in 2020 05/26/2020 07/30/2020 12/14/2020 12/16/2020   Alkaline Phosphatase 72 83 161 170   ALT 41 33 80 84   AST 17 17 40 44       Findings:  Acetaminophen - effects on the liver can include cirrhosis of the liver, decreased liver function, hepatic fibrosis, hepatoxicity, increased liver enzymes (incidence 1-5%), liver failure. LiverTox hepatotoxicity score: A (well established cause of liver injury, but severe cases occur only with high doses)     Atomoxetine - effects on the liver can include severe liver injury (liver enzymes were elevated (incidence about 0.5%) and normalized upon discontinuation of atomoxetine), liver failure (a case report in post marketing surveillance). LiverTox hepatotoxicity score: C (probable cause of clinically apparent liver injury)    Vraylar - effects on the liver can include hepatitis (incidence up to 0.1% and has been reported in post marketing evaluation). LiverTox hepatotoxicity score: E* (unproven but suspected cause of clinically apparent liver injury)    Clonazepam - effects on the liver can include hepatomegaly, hepatotoxicity (report of transient elevations of liver enzymes and alkaline phosphatase levels). LiverTox hepatotoxicity score: D (possible  but rare cause of clinically apparent liver injury)    Depakote - effects on the liver can include elevations in ALT/AST (incidence 1-5%), hepatitis, hepatoxicity, liver failure, vanishing bile duct syndrome. LiverTox hepatotoxicity score: A (well established cause of liver injury)    Trazodone - effects on the liver can include cholestasis, hepatitis, elevations in ALT/AST. LiverTox hepatotoxicity score: B (Likely but rare cause apparent liver injury)    Zyprexa - effects on the liver can include hepatitis, elevations in ALT/AST, elevations in alkaline phosphatase (incidence 1% or more), hepatoxicity. LiverTox hepatotoxicity  score: no score provided - range of liver toxicity and time to onset vary but resolve with discontinuation.    References: Micromedex (accessed 12/17/2020) and LiverTox (accessed 12/17/2020)    Things to consider:  1. Obtain ammonia level. Depakote can raise ammonia level and cause confusion.  2. Consider avoiding acetaminophen (LiverTox hepatotoxicity score: A).  3. Agree with holding atomoxetine.  4. Discuss with psychiatrist about benefit versus risk of continuing the following medications: Vraylar, clonazepam, Depakote (LiverTox hepatotoxicity score: A), trazodone (LiverTox hepatotoxicity score: B), Zyprexa      Thank you for the consult,  Ludmila Fuller, PharmD, BCPP  Behavioral Health Pharmacist  [facility] Ascom: [phone] or [phone]

## 2020-12-17 NOTE — PLAN OF CARE
Work Completed  - chart review  - team meeting  - behavioral team discussion note completed for plan of care  - initial psychosocial assessment completed  - Epic inbox message sent to out patient psychiatrist - Dr. Gracia ([facility]) to ensure informed of admission.       Discharge plan or goal  Assessment ongoing    Barriers to discharge  Further assessment and psychiatric stabilization needed.

## 2020-12-17 NOTE — PROGRESS NOTES
"Pt denies SI/SIB, rated anxiety 2/10 and depression 2/10. Pt stated \" I don't know why they didn't do my MRI today I was told I was going to have my MRI\". Pt was clam and cooperate but seems disorganized and has disorganized thoughts.      12/17/20 1515   Behavioral Health   Hallucinations denies / not responding to hallucinations   Thinking poor concentration   Orientation person, disoriented;place: oriented;date: oriented   Memory baseline memory   Insight poor   Judgement impaired   Affect blunted, flat   Mood mood is calm   1. Wish to be Dead (Recent) No   2. Non-Specific Active Suicidal Thoughts (Recent) No     "

## 2020-12-17 NOTE — PROGRESS NOTES
Brief Medicine Note    Contacted MRI technician and MRI is scheduled for ~1800 this evening. Called and spoke with station 22N to ensure patient has PIV in place with nurse flyer.     Mary Ellen Bales PA-C  Hospitalist Service  Pager [phone]

## 2020-12-18 LAB
ALBUMIN SERPL-MCNC: 3.9 G/DL (ref 3.4–5)
ALP SERPL-CCNC: 164 U/L (ref 40–150)
ALT SERPL W P-5'-P-CCNC: 105 U/L (ref 0–70)
ANION GAP SERPL CALCULATED.3IONS-SCNC: 6 MMOL/L (ref 3–14)
AST SERPL W P-5'-P-CCNC: 57 U/L (ref 0–45)
BILIRUB SERPL-MCNC: 0.6 MG/DL (ref 0.2–1.3)
BUN SERPL-MCNC: 18 MG/DL (ref 7–30)
CALCIUM SERPL-MCNC: 9 MG/DL (ref 8.5–10.1)
CHLORIDE SERPL-SCNC: 109 MMOL/L (ref 94–109)
CK SERPL-CCNC: 163 U/L (ref 30–300)
CO2 SERPL-SCNC: 25 MMOL/L (ref 20–32)
CREAT SERPL-MCNC: 0.84 MG/DL (ref 0.66–1.25)
FOLATE SERPL-MCNC: 21.9 NG/ML
GFR SERPL CREATININE-BSD FRML MDRD: >90 ML/MIN/{1.73_M2}
GLUCOSE SERPL-MCNC: 98 MG/DL (ref 70–99)
INR PPP: 1.07 (ref 0.86–1.14)
POTASSIUM SERPL-SCNC: 4.1 MMOL/L (ref 3.4–5.3)
PROT SERPL-MCNC: 7.3 G/DL (ref 6.8–8.8)
SODIUM SERPL-SCNC: 140 MMOL/L (ref 133–144)
TROPONIN I SERPL-MCNC: <0.015 UG/L (ref 0–0.04)
VIT B12 SERPL-MCNC: 779 PG/ML (ref 193–986)

## 2020-12-18 PROCEDURE — 82607 VITAMIN B-12: CPT | Performed by: PHYSICIAN ASSISTANT

## 2020-12-18 PROCEDURE — 124N000002 HC R&B MH UMMC

## 2020-12-18 PROCEDURE — 86803 HEPATITIS C AB TEST: CPT | Performed by: PHYSICIAN ASSISTANT

## 2020-12-18 PROCEDURE — 87340 HEPATITIS B SURFACE AG IA: CPT | Performed by: PHYSICIAN ASSISTANT

## 2020-12-18 PROCEDURE — 93005 ELECTROCARDIOGRAM TRACING: CPT

## 2020-12-18 PROCEDURE — 82746 ASSAY OF FOLIC ACID SERUM: CPT | Performed by: PHYSICIAN ASSISTANT

## 2020-12-18 PROCEDURE — 86706 HEP B SURFACE ANTIBODY: CPT | Performed by: PHYSICIAN ASSISTANT

## 2020-12-18 PROCEDURE — 82550 ASSAY OF CK (CPK): CPT | Performed by: PHYSICIAN ASSISTANT

## 2020-12-18 PROCEDURE — 99232 SBSQ HOSP IP/OBS MODERATE 35: CPT | Mod: 95 | Performed by: PSYCHIATRY & NEUROLOGY

## 2020-12-18 PROCEDURE — 93010 ELECTROCARDIOGRAM REPORT: CPT | Performed by: INTERNAL MEDICINE

## 2020-12-18 PROCEDURE — 80053 COMPREHEN METABOLIC PANEL: CPT | Performed by: PHYSICIAN ASSISTANT

## 2020-12-18 PROCEDURE — 84484 ASSAY OF TROPONIN QUANT: CPT | Performed by: PHYSICIAN ASSISTANT

## 2020-12-18 PROCEDURE — 85610 PROTHROMBIN TIME: CPT | Performed by: PHYSICIAN ASSISTANT

## 2020-12-18 PROCEDURE — 36415 COLL VENOUS BLD VENIPUNCTURE: CPT | Performed by: PHYSICIAN ASSISTANT

## 2020-12-18 PROCEDURE — 250N000013 HC RX MED GY IP 250 OP 250 PS 637: Performed by: PSYCHIATRY & NEUROLOGY

## 2020-12-18 PROCEDURE — 86709 HEPATITIS A IGM ANTIBODY: CPT | Performed by: PHYSICIAN ASSISTANT

## 2020-12-18 PROCEDURE — 250N000013 HC RX MED GY IP 250 OP 250 PS 637: Performed by: EMERGENCY MEDICINE

## 2020-12-18 RX ADMIN — CARIPRAZINE 3 MG: 1.5 CAPSULE, GELATIN COATED ORAL at 08:52

## 2020-12-18 RX ADMIN — Medication 0.25 MG: at 08:52

## 2020-12-18 RX ADMIN — DIVALPROEX SODIUM 1000 MG: 500 TABLET, DELAYED RELEASE ORAL at 22:37

## 2020-12-18 RX ADMIN — Medication 25 MCG: at 08:52

## 2020-12-18 RX ADMIN — MULTIPLE VITAMINS W/ MINERALS TAB 1 TABLET: TAB at 08:52

## 2020-12-18 RX ADMIN — BENZTROPINE MESYLATE 0.5 MG: 0.5 TABLET ORAL at 22:37

## 2020-12-18 RX ADMIN — TRAZODONE HYDROCHLORIDE 50 MG: 50 TABLET ORAL at 22:37

## 2020-12-18 RX ADMIN — CALCIUM POLYCARBOPHIL 625 MG: 625 TABLET, FILM COATED ORAL at 08:52

## 2020-12-18 ASSESSMENT — ACTIVITIES OF DAILY LIVING (ADL)
DRESS: INDEPENDENT
ORAL_HYGIENE: INDEPENDENT
ORAL_HYGIENE: INDEPENDENT
LAUNDRY: WITH SUPERVISION
DRESS: INDEPENDENT;STREET CLOTHES
HYGIENE/GROOMING: INDEPENDENT
LAUNDRY: WITH SUPERVISION
HYGIENE/GROOMING: INDEPENDENT

## 2020-12-18 NOTE — PLAN OF CARE
Pt had Peripheral IV inserted to his L arm before going for his MRI at 1800, MRI checklist completed. Pleasant/Cooperative.  Code 2 order obtained, escorted by staff for the procedure. Per Pt MRI was nerve racking but states procedure went well and he felt relaxed because there was [employer] music playing. Denies hearing voices although he thought he heard choir music during the procedure. States he looks over his shoulder and scratches his head because that is the language of baseball players. Hyperverbal but pleasant. At dinner, denies medication adverse effects. Denies SI/SIB. Withdrawn to his room.

## 2020-12-18 NOTE — PROGRESS NOTES
Pt woke up around 0330 and came out into the lounge. Socialized with a peer while there. Patient was offered sleepy time tea. Pt attempted to sleep but unable to do so. Declined offer of a prn medication stating that 5 hours is all he needs. [facility] there talking to the same peer requested for a cup of coffee. No other issues.

## 2020-12-18 NOTE — PROGRESS NOTES
Pt was visible in the milieu, rambling during conversations, distractible. Pt denies SI/SIB, hallucinations, and medication side effects. Pt report discomfort on his sternum, chest, and abdomin. Pt was calm and cooperative throughout the day. Stated he feels like he is on the wrong unit and should be on a medical unit due to his medical concerns.       12/18/20 1300   Behavioral Health   Hallucinations denies / not responding to hallucinations   Thinking poor concentration   Orientation person: oriented;place: oriented   Memory baseline memory   Insight poor   Judgement impaired   Eye Contact at examiner   Affect blunted, flat   Mood mood is calm   Physical Appearance/Attire attire appropriate to age and situation   Hygiene well groomed   Activity withdrawn;isolative   Speech flight of ideas;rambling   Medication Sensitivity no stated side effects;no observed side effects   Psychomotor / Gait steady;balanced   Activities of Daily Living   Hygiene/Grooming independent   Oral Hygiene independent   Dress independent;street clothes   Laundry with supervision   Room Organization independent

## 2020-12-18 NOTE — PROGRESS NOTES
Pt had a calm shift.  Pt spent much of their time resting in their room.  Pt is social and appropriate when prompted.  Pt did not attend OT this evening.  Pt ate dinner and snacks.  Pt went to MRI and returned.       12/17/20 1012   Behavioral Health   Hallucinations denies / not responding to hallucinations   Thinking poor concentration   Orientation person: oriented;place: oriented;date: oriented;time: oriented   Memory baseline memory   Insight poor   Judgement impaired   Affect blunted, flat   Mood mood is calm   Physical Appearance/Attire attire appropriate to age and situation   Hygiene well groomed   Suicidality other (see comments)   1. Wish to be Dead (Recent) No   2. Non-Specific Active Suicidal Thoughts (Recent) No   Self Injury other (see comment)  (Pt denies)   Activity isolative;withdrawn   Speech clear   Psychomotor / Gait balanced;steady   Activities of Daily Living   Hygiene/Grooming independent   Oral Hygiene independent   Dress independent   Laundry with supervision   Room Organization independent

## 2020-12-18 NOTE — PROGRESS NOTES
"Brief Medicine Note    Medicine following up on MRI Brain as well as CT overread from OSH CT scan on 12/6 to monitor for liver lesion/size. CMP with AlkP 164,  and AST 57. MRI Brain was unremarkable without any metastatic lesions. CT overread preliminary report with no significant change in size to hepatic lesions in the superior R lobe and left lobe. No new focal liver lesion. There is indeterminate hypodense lesion in superior pole of left kidney (unchanged) and post-surgical changes and enlarged prostate.     Today, patient was up in his room playing a game he made up where he throws gauze into cups. He reports he is doing, \"fine.\" His speech is pressured and tangential. He reported multiple vague complaints. First he reports that he wanted to know if his heart was ok and if I can see into his eyes if he has cataracts. He states that months ago he had a sharp pain in chest and thought it may be due to nerve pain, or pain radiating from his shoulder or from his port. He states this is now gone but wants to make sure his heart is ok. He states it has a vague sensation in his chest. No chest pain or pleuritic chest pain. No cough. No dyspnea. No dizziness or lightheadedness. No history of cardiac disease.    He also notes a history of neuropathy on his feet and asked if his feet \"look ok.\" He denies any abdominal pain or N/V. Eating well.     Today's vital signs, medications, and nursing notes were reviewed.     /75   Pulse 76   Temp 97.5  F (36.4  C) (Tympanic)   Resp 18   Wt 85.3 kg (188 lb)   SpO2 97%   BMI 24.14 kg/m    GENERAL: Alert and oriented x 3. Appears comfortable. Answering questions appropriately.   HEENT: Anicteric sclera. Mucous membranes moist.   CV: RRR. S1, S2. No murmurs appreciated.   RESPIRATORY: Effort normal on room air. Lungs CTAB with no wheezing, rales, rhonchi.   GI: Abdomen soft and non distended, bowel sounds present. Surgical scar well healed in the RUQ with chronic " mild hernia that is reducible. No tenderness, rebound, guarding.   MUSCULOSKELETAL: No joint swelling or tenderness.   NEUROLOGICAL: No focal deficits. Moves all extremities.   EXTREMITIES: No peripheral edema. Intact bilateral pedal pulses. Sensation intact in the lower extremities.   SKIN: No jaundice. No rashes.       A/P:  # History of metastatic colon cancer with mets to liver:  Follows with Dr. Srivastava, Oncology - last seen 5/2020. He was diagnosed with metastatic colon cancer in 2014 with mets to the liver s/p R hepatectomy, cholecystectomy and ascending colectomy in 2014. He also had abdominal wall met that was resected in 2016. He was treated with chemotherapy (FOLFOX x2, with change to FOLFIRI and completed 10 cycles in 2015). He had eventual recurrent hepatic mets in 3/2016. He has been treated with multiple liver directed therapies with Y90 (2017) but then with TACE x4 (2018). PET scan 2/2019 showed 2 FDG avid liver lesions which had enlarged since 10/2018. He was referred to [facility] to assess for liver directed therapy, in which he underwent Y90 treatment on 7/30 and 8/5 for liver lesions. Patient reports he was supposed to follow-up for MRI abdomen after Y90 to assess his known liver lesions but had not followed up given his paranoia as well as difficulty getting rides to clinic and navigating stress during pandemic. Had CT abd/pelvis 12/6 in the ED at [facility] for abdominal pain showing R hepatectomy, two solid lesions in the superior liver suspicious for metastatic lesions with embolization coils along hepatic resection margin. No sign of bowel obstruction. Discussed with Primary Oncologist Dr. Srivastava on 12/16 who recommended obtaining Brain MRI to evaluate for any metastatic lesion - MRI Brain this admission negative for acute pathology and no sign of brain mets. He also recommended ordering an overread so we can compare the CT abd/pelvis imaging study from [facility] to assess if the  liver lesions are the same, and to assess the size of the lesions after Y90 therapy to see if there is improvement in lesion size.   - Brain MRI negative for brain mets-  - CT overread preliminary report with no significant change in size to hepatic lesions in the superior R lobe and left lobe. No new focal liver lesion. There is indeterminate hypodense lesion in superior pole of left kidney (unchanged) and post-surgical changes and enlarged prostate. Will await finalized report.   - Will need follow up with Dr. Srivastava, Oncology following discharge for follow-up of metastatic colon cancer, liver mets as well as to monitor his kidney lesions / concern for renal cell carcinoma (see below)      # Several benign renal cysts  # Hypodense lesion in upper pole of left kidney  # Concern for possible renal cell carcinoma of the right kidney:  He has a history of renal cysts- per Oncology notes from 5/28/2020 he has a history of a indeterminant left superior pole kidney lesion and stable 3.0cm heterogenously enhancing exophytic lesion on the R kidney that likely represents renal cell carcinoma. Patient had met with Dr. Chairez, Urology and at that time, patient elected watchful waiting. CT abd/pelvis 12-6 with CT overread preliminary report with no significant change in size to hepatic lesions in the superior R lobe and left lobe. No new focal liver lesion. There is indeterminate hypodense lesion in superior pole of left kidney (unchanged) and post-surgical changes and enlarged prostate.   - Follow up with Urology for follow-up of possible renal cell carcinoma and monitoring of kidney lesions  - Follow up with Oncology, Dr. Srivastava for follow up - consider renal US or MRI for further evaluation as ouatpatient     # History of right lung nodule:  Has hx of R lung nodule- at oncology visit 5/28/2020, Dr. Srivastava discussed lung nodule slightly bigger c/t previous. It is indeterminant but Onc has suspicion for malignancy. Discussed that  "systemic chemotherapy would be resumed but patient was not interested in that, and elected to have repeat CT scan in 3 months. Denies any cardiopulmonary symptoms or cough.  - Follow up with Oncology for follow-up and CT imaging to monitor pulmonary nodule     # Transaminitis:  AST 57 (44),  (84) and AlkP 164 (170). T bili WNL. Ammonia level WNL. INR WNL. Denies any new RUQ pain, as some intermittent pain at baseline due to previous surgeries per patient. Negative denise's sign. Could be in setting of metastatic lesions as discussed above. Denies any IVDU or herbal supplements.   - Pharmacy consulted on 12/17 and discussed following medications can cause LFT elevations: tylenol (discontinued), atomoxetine (held), vraylar (0.1%), clonazepam, depakote (1-5%), trazodone (unlikely), zyprexa (1%). Management per psychiatry   - Hepatitis A, B, and C studies ordered  - CK WNL   - Abdominal US in AM for assessment (NPO at midnight)  - Trend CMP in AM    - Monitor, please notify medicine if any new abdominal pain, N/V     # Atypical chest pain:   Patient has pressured and tangential speech and discussed that he would like his \"heart evaluated.\" Reports \"vague sensation,\" in his chest. No chest pain. No pleuritic chest pain. VSS without hypoxia or tachycardia. Lungs CTA. RRR w/o murmurs. No history of CAD or cardiac disease. ECG ordered with NSR no ischemic changes, Troponin negative - no sign of ACS. VSS and no pleuritic chest pain, no history of DVT/PE, no hemoptysis and no calf pain. Do not suspect PE at this time.   - Please notify medicine if any changing/new symptoms of chest pain, SOB, or acute change in vital signs     # Neuropathy:  Lower extremities NV intact. No wounds on feet bilaterally. TSH WNL. Glucose WNL.   - B12 and folate ordered  - Monitor     Medicine will continue to follow peripherally for LFT monitoring, Abdominal US. Please notify medicine with any additional questions or concerns.    Mary Ellen" ALPHONSO Bales  Hospitalist Service  Pager [phone]

## 2020-12-18 NOTE — PROGRESS NOTES
"[facility], [place]   Psychiatric Progress Note  Hospital Day: 3        Interim History:   The patient's care was discussed with the treatment team during the daily team meeting and/or staff's chart notes were reviewed.  Staff report patient was visible in the milieu, appropriate in interactions, still seems disorganized and confused at times, taking medications as prescribed, had MRI done last night, no acute events overnight.     Upon interview, reports his night was okay, he had his MRI which he tolerated stated he had \"Devi with me the whole time\", able to determine he was listening to [employer] music during the imaging. He then jumped to talking about neuropathy in his feet and calluses and discussed having podiatry follow up on discharge, redirected back to treatment plan and he stopped saying \"I guess Im going to stop and let you ask me how I'm doing\". He denies feeling depressed, but mood is more down due to being in the hospital. He agrees he has been confused and not acting like himself. He states he needs to be here. He then went back into having some chest pain and IM has ordered an EKG per chart review. He denies SI or HI, denies AVH. Tolerating medications. Interview was terminated to EKG could be done.          Medications:       benztropine  0.5 mg Oral At Bedtime     calcium polycarbophil  625 mg Oral Daily with lunch     cariprazine  3 mg Oral Daily     clonazePAM  0.25 mg Oral Daily     divalproex sodium delayed-release  1,000 mg Oral At Bedtime     multivitamin w/minerals  1 tablet Oral Daily     traZODone  50 mg Oral At Bedtime     Vitamin D3  25 mcg Oral Daily          Allergies:     Allergies   Allergen Reactions     Animal Dander           Labs:     Recent Results (from the past 48 hour(s))   Ammonia    Collection Time: 12/17/20 10:12 AM   Result Value Ref Range    Ammonia 48 10 - 50 umol/L          Psychiatric Examination:     /75   Pulse 76   Temp 97.5 " " F (36.4  C) (Tympanic)   Resp 18   Wt 85.3 kg (188 lb)   SpO2 97%   BMI 24.14 kg/m    Weight is 188 lbs 0 oz  Body mass index is 24.14 kg/m .    Orthostatic Vitals       Most Recent      Sitting Orthostatic /75 12/18 0755    Sitting Orthostatic Pulse (bpm) 76 12/18 0755    Standing Orthostatic /78 12/18 0755    Standing Orthostatic Pulse (bpm) 84 12/18 0755          Appearance: awake, alert and adequately groomed  Attitude:  cooperative  Eye Contact:  fair  Mood:  \"not depressed\" but low  Affect:  mood congruent  Speech:  clear, coherent  Language: fluent and intact in English  Psychomotor, Gait, Musculoskeletal:  no evidence of tardive dyskinesia, dystonia, or tics  Thought Process:  disorganized but slightly improved   Associations:  loosening of associations present  Thought Content:  no evidence of suicidal ideation or homicidal ideation and paranoia/delusional thoughts present  Insight:  limited  Judgement:  limited  Oriented to:  time, person, and place  Attention Span and Concentration:  limited  Recent and Remote Memory:  fair  Fund of Knowledge:  appropriate    Clinical Global Impressions  First:  Considering your total clinical experience with this particular patient population, how severe are the patient's symptoms at this time?: 7 (12/16/20 0544)  Compared to the patient's condition at the START of treatment, this patient's condition is: 4 (12/16/20 0544)  Most recent:  Considering your total clinical experience with this particular patient population, how severe are the patient's symptoms at this time?: 7 (12/16/20 0544)  Compared to the patient's condition at the START of treatment, this patient's condition is: 4 (12/16/20 0544)           Precautions:     Behavioral Orders   Procedures     Code 1 - Restrict to Unit     Code 2     MRI     Routine Programming     As clinically indicated     Single Room     Status 15     Every 15 minutes.          Diagnoses:      Bipolar disorder, type 1, " current episode mixed with concern for psychosis symptoms  Generalized anxiety by history  History of ADHD  Stimulant abuse by history   R/O psychosis and mood disorder 2/2 medical condition (Metastatic colon cancer)         Assessment & Plan:   Assessment and hospital summary:  66 yo M with history of bipolar disorder type 1, anxiety, ADHD, stimulant abuse and complex medical history who presented to ED with AMS in context of stepping in front of a truck PTA, he reports trying to flag truck down for help and denied this was a suicide attempt. His PTA medications were continued, with exception of Strattera for concern of causing elevated mood state; he reported adequate adherence to medications PTA. IM consult placed given complexity of medical history including metastatic colon cancer, MRI Brain was unremarkable without evidence of mets. IM ordered abdominal U/S given ongoing elevation of LFTs, will look at structural abnormalities and then make adjustments in medications if needed. Suspect part of decompensation may have been due to poor adherence to Vrylar PTA given inconsistent reports about taking this medication but will continue to evaluate need for additional adjustments in psychiatric medications.     Psychiatric treatment/inteventions:  Medications:   -continue PTA Depakote 1000mg at bedtime for mood stabilization  -continue PTA Vraylar 3mg daily for mood stabilization and psychosis  -continue PTA clonazepam 0.25mg daily for anxiety  -continue PTA benztropine 0.5mg at bedtime  -continue PTA Vitamin D3 1000U daily     Laboratory/Imaging: no new labs per psychiatry      Patient will be treated in therapeutic milieu with appropriate individual and group therapies as described.     Medical treatment/interventions:  Medical concerns: Pt has complex medical history including metastatic colon cancer, IM consult was placed for co-management and evaluation, appreciate assistance, they have been coordinating with  oncology for additional work up. See initial consult note by Mary Ellen Bales PA-C on 12/16 and most recent progress note 12/18 for complete details, agree with assessment and plan as outlined by IM.       This note was created by undersigned using a Dragon dictation system. All typing errors or contextual distortion are unintentional and software inherent.     Disposition Plan   Reason for ongoing admission: is unable to care for self due to severe psychosis or carlos  Discharge location: TBD, likely discharge in 5-7 days once stabilized in AMS and mood/psychosis  Discharge Medications: not ordered  Follow-up Appointments: not scheduled  Legal Status: voluntary  Entered by: Cassy Noriega on 12/18/2020 at 8:15 AM       Video-Visit Details    Type of service:  Video Visit    Video Start Time (time video started): 1210    Video End Time (time video stopped): 1225    Originating Location (pt. Location): [address]    Distant Location (provider location): Provider remote location    Mode of Communication:  Video Conference via [employer]    Physician has received verbal consent for a Video Visit from the patient? Yes    Cassy Noriega, DO

## 2020-12-19 ENCOUNTER — APPOINTMENT (OUTPATIENT)
Dept: ULTRASOUND IMAGING | Facility: CLINIC | Age: 65
DRG: 885 | End: 2020-12-19
Attending: PHYSICIAN ASSISTANT
Payer: MEDICARE

## 2020-12-19 LAB
ALBUMIN SERPL-MCNC: 4 G/DL (ref 3.4–5)
ALP SERPL-CCNC: 158 U/L (ref 40–150)
ALT SERPL W P-5'-P-CCNC: 105 U/L (ref 0–70)
ANION GAP SERPL CALCULATED.3IONS-SCNC: 5 MMOL/L (ref 3–14)
AST SERPL W P-5'-P-CCNC: 56 U/L (ref 0–45)
BILIRUB SERPL-MCNC: 0.6 MG/DL (ref 0.2–1.3)
BUN SERPL-MCNC: 17 MG/DL (ref 7–30)
CALCIUM SERPL-MCNC: 9 MG/DL (ref 8.5–10.1)
CHLORIDE SERPL-SCNC: 108 MMOL/L (ref 94–109)
CO2 SERPL-SCNC: 26 MMOL/L (ref 20–32)
CREAT SERPL-MCNC: 0.89 MG/DL (ref 0.66–1.25)
GFR SERPL CREATININE-BSD FRML MDRD: 88 ML/MIN/{1.73_M2}
GLUCOSE SERPL-MCNC: 94 MG/DL (ref 70–99)
POTASSIUM SERPL-SCNC: 4 MMOL/L (ref 3.4–5.3)
PROT SERPL-MCNC: 7.2 G/DL (ref 6.8–8.8)
SODIUM SERPL-SCNC: 139 MMOL/L (ref 133–144)

## 2020-12-19 PROCEDURE — 250N000013 HC RX MED GY IP 250 OP 250 PS 637: Performed by: NURSE PRACTITIONER

## 2020-12-19 PROCEDURE — 93975 VASCULAR STUDY: CPT

## 2020-12-19 PROCEDURE — 250N000013 HC RX MED GY IP 250 OP 250 PS 637: Performed by: PSYCHIATRY & NEUROLOGY

## 2020-12-19 PROCEDURE — 93975 VASCULAR STUDY: CPT | Mod: 26 | Performed by: RADIOLOGY

## 2020-12-19 PROCEDURE — 80053 COMPREHEN METABOLIC PANEL: CPT | Performed by: PHYSICIAN ASSISTANT

## 2020-12-19 PROCEDURE — 36415 COLL VENOUS BLD VENIPUNCTURE: CPT | Performed by: PHYSICIAN ASSISTANT

## 2020-12-19 PROCEDURE — 124N000002 HC R&B MH UMMC

## 2020-12-19 PROCEDURE — 250N000013 HC RX MED GY IP 250 OP 250 PS 637: Performed by: EMERGENCY MEDICINE

## 2020-12-19 RX ORDER — IBUPROFEN 200 MG
600 TABLET ORAL ONCE
Status: COMPLETED | OUTPATIENT
Start: 2020-12-19 | End: 2020-12-19

## 2020-12-19 RX ADMIN — BENZTROPINE MESYLATE 0.5 MG: 0.5 TABLET ORAL at 21:03

## 2020-12-19 RX ADMIN — DIVALPROEX SODIUM 1000 MG: 500 TABLET, DELAYED RELEASE ORAL at 21:04

## 2020-12-19 RX ADMIN — Medication 0.25 MG: at 08:19

## 2020-12-19 RX ADMIN — TRAZODONE HYDROCHLORIDE 50 MG: 50 TABLET ORAL at 21:05

## 2020-12-19 RX ADMIN — MULTIPLE VITAMINS W/ MINERALS TAB 1 TABLET: TAB at 08:20

## 2020-12-19 RX ADMIN — CARIPRAZINE 3 MG: 1.5 CAPSULE, GELATIN COATED ORAL at 08:19

## 2020-12-19 RX ADMIN — Medication 25 MCG: at 08:19

## 2020-12-19 RX ADMIN — IBUPROFEN 600 MG: 200 TABLET, FILM COATED ORAL at 05:30

## 2020-12-19 RX ADMIN — CALCIUM POLYCARBOPHIL 625 MG: 625 TABLET, FILM COATED ORAL at 08:19

## 2020-12-19 NOTE — PROGRESS NOTES
"Observed to have slept well till approx 0430 at which time pt. Voided freely in bathroom then came out to nurses station for conversation. Instructed re:  NPO status for US of the abdomen today.  Questioned re: the procedure then verbalized appropriate understanding.  Minutes later observed at the water cooler getting water.  Required remainder re:  NPO as he had quickly forgotten.  Will monitor closely.  C/o pain Left heel r/t callous (Will endorse to AM staff for f/U)  and abdominal pain.  No prn med ordered for pain.  Endorsed that he takes Ibuprophen and \"a little aspirin\" at home.  Call placed to on call Dr. Cathie Gipson following chart review.  Case discussed.  Rec'd one time order for  Ibuprophen 600mg.  Given with sip of water.  Effective w/i the hr. Enjoying sitting out in the lounge rocker rocking.  Pleasantly singings at intervals in his room.  Thought processes remain disorganized. Encouragement and supportive intervention provided as appropriate.  Safe, therapeutic environment maintained.    "

## 2020-12-19 NOTE — PLAN OF CARE
"Patient preoccupied and distracted much of shift. Suspicious and anxious at times. Wonders who is controlling the water in his room that it just turns on. He had difficulty calling his sister from the unit phone. States \"The government man cut into my call and it was making all kinds of beeping sounds\" Staff helped him make the call and he was able to spend time talking to her. He is med compliant. Denies SI and SIB. Magaly Aldrich RN    "

## 2020-12-19 NOTE — PROGRESS NOTES
Brief Medicine Note    Medicine following peripherally for follow-up of CMP for LFTs, as well as folate/b12 level. Folate and B12 WNL. CMP with stable LFTs: AST 56 (57),  (105) and AlkP 158 (164). Hepatitis studies pending. US abdomen pending.     Discussed with Dr. Mon. Will plan to review US abdomen and discuss case with Oncology team on Monday 12/21/20.     Please notify medicine with any additional questions or concerns.     Mary Ellen Bales PA-C  Hospitalist Service  Pager [phone]

## 2020-12-19 NOTE — PLAN OF CARE
Pt briefly attended the structured Therapeutic Recreation group, participating in a group activity. Group was focused on leisure participation as a healthy outlet to gain self-esteem, manage behaviors, improve social skills, and decrease isolation.  Pt entered group late and then left early. During his time in group, he contributed to the clues and descriptions for others to guess.

## 2020-12-20 LAB
HAV IGM SERPL QL IA: NONREACTIVE
HBV SURFACE AB SERPL IA-ACNC: 0.19 M[IU]/ML
HBV SURFACE AG SERPL QL IA: NONREACTIVE
HCV AB SERPL QL IA: NONREACTIVE

## 2020-12-20 PROCEDURE — 250N000013 HC RX MED GY IP 250 OP 250 PS 637: Performed by: PSYCHIATRY & NEUROLOGY

## 2020-12-20 PROCEDURE — 250N000013 HC RX MED GY IP 250 OP 250 PS 637: Performed by: EMERGENCY MEDICINE

## 2020-12-20 PROCEDURE — 124N000002 HC R&B MH UMMC

## 2020-12-20 RX ORDER — DIPHENHYDRAMINE HCL 50 MG
50 CAPSULE ORAL EVERY 6 HOURS PRN
Status: DISCONTINUED | OUTPATIENT
Start: 2020-12-20 | End: 2021-01-11 | Stop reason: HOSPADM

## 2020-12-20 RX ORDER — IBUPROFEN 200 MG
400 TABLET ORAL 2 TIMES DAILY PRN
Status: DISCONTINUED | OUTPATIENT
Start: 2020-12-20 | End: 2021-01-11 | Stop reason: HOSPADM

## 2020-12-20 RX ADMIN — HYDROXYZINE HYDROCHLORIDE 25 MG: 25 TABLET, FILM COATED ORAL at 02:39

## 2020-12-20 RX ADMIN — CALCIUM POLYCARBOPHIL 625 MG: 625 TABLET, FILM COATED ORAL at 08:48

## 2020-12-20 RX ADMIN — CARIPRAZINE 3 MG: 1.5 CAPSULE, GELATIN COATED ORAL at 08:49

## 2020-12-20 RX ADMIN — DIVALPROEX SODIUM 1000 MG: 500 TABLET, DELAYED RELEASE ORAL at 21:52

## 2020-12-20 RX ADMIN — MULTIPLE VITAMINS W/ MINERALS TAB 1 TABLET: TAB at 08:48

## 2020-12-20 RX ADMIN — BENZTROPINE MESYLATE 0.5 MG: 0.5 TABLET ORAL at 21:52

## 2020-12-20 RX ADMIN — TRAZODONE HYDROCHLORIDE 50 MG: 50 TABLET ORAL at 21:52

## 2020-12-20 RX ADMIN — Medication 0.25 MG: at 08:48

## 2020-12-20 RX ADMIN — DIPHENHYDRAMINE HYDROCHLORIDE 50 MG: 50 CAPSULE ORAL at 21:30

## 2020-12-20 RX ADMIN — Medication 25 MCG: at 08:49

## 2020-12-20 ASSESSMENT — ACTIVITIES OF DAILY LIVING (ADL)
DRESS: INDEPENDENT
HYGIENE/GROOMING: INDEPENDENT
LAUNDRY: WITH SUPERVISION
ORAL_HYGIENE: INDEPENDENT

## 2020-12-20 NOTE — PROGRESS NOTES
"From 12/19 evening shift: Pt talked at length with writer about how he is being watched by the government and entire international community on camera in his apartment. Pt also talked about technology and how it hard for older people like himself to keep up in today's world. Pt expressed some concern that surgeons may lie to patient's about their conditions because, \"they're excited to play with their robot toys.\" Pt was calm and pleasant throughout the shift.   "

## 2020-12-20 NOTE — PROGRESS NOTES
Brief Medicine Note    Abdominal US reviewed from 12/19 without acute pathology:     Impression:  1. The known liver lesions are not well identified sonographically.  2. Patent Doppler evaluation of the liver.  3. Borderline splenomegaly.    A:P  # History of metastatic colon cancer with mets to liver:  Follows with Dr. Srivastava, Oncology - last seen 5/2020. He was diagnosed with metastatic colon cancer in 2014 with mets to the liver s/p R hepatectomy, cholecystectomy and ascending colectomy in 2014. He also had abdominal wall met that was resected in 2016. He was treated with chemotherapy (FOLFOX x2, with change to FOLFIRI and completed 10 cycles in 2015). He had eventual recurrent hepatic mets in 3/2016. He has been treated with multiple liver directed therapies with Y90 (2017) but then with TACE x4 (2018). PET scan 2/2019 showed 2 FDG avid liver lesions which had enlarged since 10/2018. He was referred to Dr. Landis IR to assess for liver directed therapy, in which he underwent Y90 treatment on 7/30 and 8/5 for liver lesions. Patient reports he was supposed to follow-up for MRI abdomen after Y90 to assess his known liver lesions but had not followed up given his paranoia as well as difficulty getting rides to clinic and navigating stress during pandemic. Had CT abd/pelvis 12/6 in the ED at [facility] for abdominal pain showing R hepatectomy, two solid lesions in the superior liver suspicious for metastatic lesions with embolization coils along hepatic resection margin. No sign of bowel obstruction. Discussed with Primary Oncologist Dr. Srivastava on 12/16 who recommended obtaining Brain MRI to evaluate for any metastatic lesion - MRI Brain this admission negative for acute pathology and no sign of brain mets. He also recommended ordering an overread so we can compare the CT abd/pelvis imaging study from [facility] to assess if the liver lesions are the same, and to assess the size of the lesions after Y90  therapy to see if there is improvement in lesion size.  CT overread with interval mild decrease in size of at least 2 hepatic lesions in the superior right lobe and superior left lobe cyst, significantly more so in the lesion along the resection margin. 1a. Stable cluster of what appears to be tiny dilated biliary ducts in segment 2 in the location of 9 mm lesion seen on June 2019 exam. Attention on follow-up. 1b. No new focal liver lesion.  - Will discuss CT findings and LFT elevations tomorrow with his primary Oncologist Dr. Patrice Srivastava given tiny dilated biliary ducts seen on CT overread as underlined above (if no response from OP Oncologist will discuss with Oncology on call)   - Trend CMP as per below  - Will need close follow up with Dr. Srivastava, Oncology at discharge    # Transaminitis:  AST 56 (57),  and AlkP 158 (165). T bili WNL. Ammonia level WNL. INR WNL. Hepatitis A, B, and C studies non-reactive. Denies any new RUQ pain, as some intermittent pain at baseline due to previous surgeries per patient. Negative denise's sign. Could be in setting of metastatic lesions as discussed above. Denies any IVDU or herbal supplements. Abdominal US with borderline splenomegaly otherwise patent doppler and no other acute findings. CBD 4mm. He is s/p cholecystectomy.   - Trend CMP in AM  - Pharmacy consulted on 12/17 and discussed following medications can cause LFT elevations: tylenol (discontinued), atomoxetine (held), vraylar (0.1%), clonazepam, depakote (1-5%), trazodone (unlikely), zyprexa (1%). Management per psychiatry   - Trend CMP in AM    - Monitor, please notify medicine if any new abdominal pain, N/V       # Several benign renal cysts  # Hypodense lesion in upper pole of left kidney  # Concern for possible renal cell carcinoma of the right kidney:  He has a history of renal cysts- per Oncology notes from 5/28/2020 he has a history of a indeterminant left superior pole kidney lesion and stable 3.0cm  heterogenously enhancing exophytic lesion on the R kidney that likely represents renal cell carcinoma. Patient had met with Dr. Chairez, Urology and at that time, patient elected watchful waiting. CT abd/pelvis 12-6 overread demonstrated that the 1.9 cm left renal lesion is at the most minimally enlarged by 1 to 2 mm since 2017. Furthermore, demonstrated no internal enhancement on 3 phase contrast exam from 2017. This is favored represent  hemorrhagic/proteinaceous cyst. Recommend attention on follow-up.  - Follow up with Urology for follow-up of possible renal cell carcinoma and monitoring of kidney lesions  - Follow up with Oncology, Dr. Srivastava for follow up - consider renal US or MRI for further evaluation as ouatpatient       Medicine will follow-up with Oncology in AM as well as [facility]. Please notify medicine with any additional questions or concerns.       Mary Ellen Bales PA-C  Hospitalist Service  Pager [phone]

## 2020-12-20 NOTE — PROGRESS NOTES
C/0 headached at around 0235, no pain medication scheduled or prn.  Pt stated he would wait till morning and discuss with the team. Pt did receive a one time order for IBU previous night for pain. Pt was offered and took Hydroxyzine for anxiety and to aid with sleep. Will endorse to day shift nurse to follow up.

## 2020-12-20 NOTE — PROGRESS NOTES
Solo appeared to sleep approximately 4.5 hours this night shift.  Awake and in and out of his room and the lounge/desk area since 0230.  No further statements about a HA but did express concern that he felt his cheekbones appeared sunk in.  Wondering if fluids had been draining from his head and face.  Singing and whistling Chloe nelson, etc. in his room since awakening at 0230.  Will continue to monitor and support patient.

## 2020-12-21 LAB
ALBUMIN SERPL-MCNC: 3.5 G/DL (ref 3.4–5)
ALP SERPL-CCNC: 149 U/L (ref 40–150)
ALT SERPL W P-5'-P-CCNC: 118 U/L (ref 0–70)
ANION GAP SERPL CALCULATED.3IONS-SCNC: 6 MMOL/L (ref 3–14)
AST SERPL W P-5'-P-CCNC: 61 U/L (ref 0–45)
BILIRUB SERPL-MCNC: 1 MG/DL (ref 0.2–1.3)
BUN SERPL-MCNC: 13 MG/DL (ref 7–30)
CALCIUM SERPL-MCNC: 8.6 MG/DL (ref 8.5–10.1)
CHLORIDE SERPL-SCNC: 108 MMOL/L (ref 94–109)
CO2 SERPL-SCNC: 25 MMOL/L (ref 20–32)
CREAT SERPL-MCNC: 0.83 MG/DL (ref 0.66–1.25)
GFR SERPL CREATININE-BSD FRML MDRD: >90 ML/MIN/{1.73_M2}
GLUCOSE SERPL-MCNC: 111 MG/DL (ref 70–99)
INTERPRETATION ECG - MUSE: NORMAL
MAGNESIUM SERPL-MCNC: 2 MG/DL (ref 1.6–2.3)
POTASSIUM SERPL-SCNC: 3.6 MMOL/L (ref 3.4–5.3)
PROT SERPL-MCNC: 6.5 G/DL (ref 6.8–8.8)
SODIUM SERPL-SCNC: 139 MMOL/L (ref 133–144)

## 2020-12-21 PROCEDURE — 80053 COMPREHEN METABOLIC PANEL: CPT | Performed by: PHYSICIAN ASSISTANT

## 2020-12-21 PROCEDURE — 250N000013 HC RX MED GY IP 250 OP 250 PS 637: Performed by: EMERGENCY MEDICINE

## 2020-12-21 PROCEDURE — 250N000013 HC RX MED GY IP 250 OP 250 PS 637: Performed by: PSYCHIATRY & NEUROLOGY

## 2020-12-21 PROCEDURE — 99232 SBSQ HOSP IP/OBS MODERATE 35: CPT | Mod: 95 | Performed by: PSYCHIATRY & NEUROLOGY

## 2020-12-21 PROCEDURE — 99207 PR CDG-MDM COMPONENT: MEETS MODERATE - UP CODED: CPT | Performed by: PHYSICIAN ASSISTANT

## 2020-12-21 PROCEDURE — G0177 OPPS/PHP; TRAIN & EDUC SERV: HCPCS

## 2020-12-21 PROCEDURE — 124N000002 HC R&B MH UMMC

## 2020-12-21 PROCEDURE — 36415 COLL VENOUS BLD VENIPUNCTURE: CPT | Performed by: PHYSICIAN ASSISTANT

## 2020-12-21 PROCEDURE — 83735 ASSAY OF MAGNESIUM: CPT | Performed by: PHYSICIAN ASSISTANT

## 2020-12-21 PROCEDURE — 99233 SBSQ HOSP IP/OBS HIGH 50: CPT | Performed by: PHYSICIAN ASSISTANT

## 2020-12-21 PROCEDURE — 250N000013 HC RX MED GY IP 250 OP 250 PS 637: Performed by: PHYSICIAN ASSISTANT

## 2020-12-21 RX ORDER — DOCUSATE SODIUM 100 MG/1
100 CAPSULE, LIQUID FILLED ORAL 2 TIMES DAILY
Status: DISCONTINUED | OUTPATIENT
Start: 2020-12-21 | End: 2021-01-11 | Stop reason: HOSPADM

## 2020-12-21 RX ORDER — POLYETHYLENE GLYCOL 3350 17 G/17G
17 POWDER, FOR SOLUTION ORAL DAILY PRN
Status: DISCONTINUED | OUTPATIENT
Start: 2020-12-21 | End: 2021-01-11 | Stop reason: HOSPADM

## 2020-12-21 RX ADMIN — DOCUSATE SODIUM 100 MG: 100 CAPSULE, LIQUID FILLED ORAL at 13:02

## 2020-12-21 RX ADMIN — TRAZODONE HYDROCHLORIDE 50 MG: 50 TABLET ORAL at 22:06

## 2020-12-21 RX ADMIN — Medication 25 MCG: at 09:02

## 2020-12-21 RX ADMIN — DOCUSATE SODIUM 100 MG: 100 CAPSULE, LIQUID FILLED ORAL at 22:04

## 2020-12-21 RX ADMIN — Medication 0.25 MG: at 09:02

## 2020-12-21 RX ADMIN — DIVALPROEX SODIUM 1000 MG: 500 TABLET, DELAYED RELEASE ORAL at 22:05

## 2020-12-21 RX ADMIN — CALCIUM POLYCARBOPHIL 625 MG: 625 TABLET, FILM COATED ORAL at 09:02

## 2020-12-21 RX ADMIN — BENZTROPINE MESYLATE 0.5 MG: 0.5 TABLET ORAL at 22:06

## 2020-12-21 RX ADMIN — MULTIPLE VITAMINS W/ MINERALS TAB 1 TABLET: TAB at 09:02

## 2020-12-21 RX ADMIN — OLANZAPINE 5 MG: 5 TABLET, FILM COATED ORAL at 02:25

## 2020-12-21 RX ADMIN — CARIPRAZINE 4.5 MG: 1.5 CAPSULE, GELATIN COATED ORAL at 09:02

## 2020-12-21 NOTE — PROGRESS NOTES
"[facility], [place]   Psychiatric Progress Note  Hospital Day: 6        Interim History:   The patient's care was discussed with the treatment team during the daily team meeting and/or staff's chart notes were reviewed.  Staff report patient has been visible in the milieu, taking medications as prescribed, he appeared more manic as not sleeping at night,labile moods, reporting AH, able to be redirected by staff for the most part. IM continues to follow for lab abnormalities and coordinating with oncology.     Upon interview, he reports his weekend was okay, he has had some anxious moments as he is worried about tardive dyskinesia, he denies noticing any abnormal movements, he does state he will repeatedly reach towards his back should as he has a mole or wart on his back he is concerned about. He feels the hydroxyzine helps with his anxiety, when asked about AVH he states he knows he is hallucinating as he saw [place] outside his window when he looked out instead of [place]. Discussed increasing medciation, Vraylar to better target this along with mood stability and he was in agreement, will continue to monitor for any abnormal movements. He denies SI or HI, states he believes staff may be afraid of him due to his abdominal binder making him look like a martial arts expert. He feels his muscles are getting weaker, thinks the MRI he had recently effected his prostate and he needs a new liver. Reviewed that IM continues to evaluate his medical needs. He then jumped to having concern that he won't be able ot go home as the roads have been changed by people who don't want him to get back to where he lives and he feels people are observing him throughout the day. He then made a comment about people talking to writer through their headphones including \"the team\" with Dr. Oh. Assured patient there was nobody in writers headphones except him and he appeared relieved by this. No additional " concerns at this time.          Medications:       benztropine  0.5 mg Oral At Bedtime     calcium polycarbophil  625 mg Oral Daily with lunch     cariprazine  3 mg Oral Daily     clonazePAM  0.25 mg Oral Daily     divalproex sodium delayed-release  1,000 mg Oral At Bedtime     multivitamin w/minerals  1 tablet Oral Daily     traZODone  50 mg Oral At Bedtime     Vitamin D3  25 mcg Oral Daily          Allergies:     Allergies   Allergen Reactions     Animal Dander           Labs:     Recent Results (from the past 48 hour(s))   Comprehensive metabolic panel    Collection Time: 12/19/20  7:58 AM   Result Value Ref Range    Sodium 139 133 - 144 mmol/L    Potassium 4.0 3.4 - 5.3 mmol/L    Chloride 108 94 - 109 mmol/L    Carbon Dioxide 26 20 - 32 mmol/L    Anion Gap 5 3 - 14 mmol/L    Glucose 94 70 - 99 mg/dL    Urea Nitrogen 17 7 - 30 mg/dL    Creatinine 0.89 0.66 - 1.25 mg/dL    GFR Estimate 88 >60 mL/min/[1.73_m2]    GFR Estimate If Black >90 >60 mL/min/[1.73_m2]    Calcium 9.0 8.5 - 10.1 mg/dL    Bilirubin Total 0.6 0.2 - 1.3 mg/dL    Albumin 4.0 3.4 - 5.0 g/dL    Protein Total 7.2 6.8 - 8.8 g/dL    Alkaline Phosphatase 158 (H) 40 - 150 U/L     (H) 0 - 70 U/L    AST 56 (H) 0 - 45 U/L          Psychiatric Examination:     /78   Pulse 75   Temp 97.4  F (36.3  C)   Resp 20   Wt 85.3 kg (188 lb)   SpO2 96%   BMI 24.14 kg/m    Weight is 188 lbs 0 oz  Body mass index is 24.14 kg/m .    Orthostatic Vitals       Most Recent      Sitting Orthostatic /70 12/21 0758    Sitting Orthostatic Pulse (bpm) 68 12/21 0758    Standing Orthostatic /78 12/21 0758    Standing Orthostatic Pulse (bpm) 79 12/21 0758        Appearance: awake, alert and adequately groomed  Attitude:  cooperative  Eye Contact:  fair  Mood:  anxious  Affect:  mood congruent  Speech:  clear, coherent  Language: fluent and intact in English  Psychomotor, Gait, Musculoskeletal:  no evidence of tardive dyskinesia, dystonia, or  tics  Thought Process: still disorganized but slightly improved   Associations:  loosening of associations present  Thought Content:  no evidence of suicidal ideation or homicidal ideation and paranoia/delusional thoughts present  Insight:  limited  Judgement:  limited  Oriented to:  time, person, and place  Attention Span and Concentration:  limited  Recent and Remote Memory:  fair  Fund of Knowledge:  appropriate    Clinical Global Impressions  First:  Considering your total clinical experience with this particular patient population, how severe are the patient's symptoms at this time?: 7 (12/16/20 0544)  Compared to the patient's condition at the START of treatment, this patient's condition is: 4 (12/16/20 0544)  Most recent:  Considering your total clinical experience with this particular patient population, how severe are the patient's symptoms at this time?: 7 (12/16/20 0544)  Compared to the patient's condition at the START of treatment, this patient's condition is: 4 (12/16/20 0544)           Precautions:     Behavioral Orders   Procedures     Code 1 - Restrict to Unit     Code 2     MRI     Routine Programming     As clinically indicated     Single Room     Status 15     Every 15 minutes.          Diagnoses:      Bipolar disorder, type 1, current episode mixed with concern for psychosis symptoms  Generalized anxiety by history  History of ADHD  Stimulant abuse by history   R/O psychosis and mood disorder 2/2 medical condition (Metastatic colon cancer)         Assessment & Plan:   Assessment and hospital summary:  66 yo M with history of bipolar disorder type 1, anxiety, ADHD, stimulant abuse and complex medical history who presented to ED with AMS in context of stepping in front of a truck PTA, he reports trying to flag truck down for help and denied this was a suicide attempt. His PTA medications were continued, with exception of Strattera for concern of causing elevated mood state; he reported adequate  adherence to medications PTA. IM consult placed given complexity of medical history including metastatic colon cancer, MRI Brain was unremarkable without evidence of mets. IM ordered abdominal U/S given ongoing elevation of LFTs, will look at structural abnormalities and then make adjustments in medications if needed. Suspect part of decompensation may have been due to poor adherence to Vrylar PTA given inconsistent reports about taking this medication but will continue to evaluate need for additional adjustments in psychiatric medications.    Psychiatric treatment/inteventions:  Medications:   -continue PTA Depakote 1000mg at bedtime for mood stabilization  -increase PTA Vraylar to 4.5mg daily for mood stabilization and psychosis  -continue PTA clonazepam 0.25mg daily for anxiety  -continue PTA benztropine 0.5mg at bedtime  -continue PTA Vitamin D3 1000U daily    -discussed case further with PharmD today, may consider changing from Depakote to Marie for further stabilization in symptoms if increasing Vraylar does not show symptom improvement      Laboratory/Imaging: no new labs per psychiatry      Patient will be treated in therapeutic milieu with appropriate individual and group therapies as described.     Medical treatment/interventions:  Medical concerns: Pt has complex medical history including metastatic colon cancer, IM consult was placed for co-management and evaluation, appreciate assistance, they have been coordinating with oncology for additional work up. See initial consult note by Mary Ellen Bales PA-C on 12/16 and most recent progress note 12/21 for complete details, agree with assessment and plan as outlined by IM.       This note was created by undersigned using a Dragon dictation system. All typing errors or contextual distortion are unintentional and software inherent.     Disposition Plan   Reason for ongoing admission: is unable to care for self due to severe psychosis or carlos  Discharge  location: TBD, likely discharge in 7-10 days once stabilized in AMS and mood/psychosis  Discharge Medications: not ordered  Follow-up Appointments: not scheduled  Legal Status: voluntary  Entered by: Cassy Noriega on 12/21/2020 at 7:03 AM       Video-Visit Details    Type of service:  Video Visit    Video Start Time (time video started): 0830    Video End Time (time video stopped): 0845    Originating Location (pt. Location): [address]    Distant Location (provider location): Provider remote location    Mode of Communication:  Video Conference via [employer]    Physician has received verbal consent for a Video Visit from the patient? Yes    Cassy Noriega, DO

## 2020-12-21 NOTE — PROGRESS NOTES
Brief Medicine Note    Followed up regarding transaminitis in the setting of likely liver mets    Today's vital signs, medications, and nursing notes were reviewed. Labs reviewed.     /70   Pulse 68   Temp 97.6  F (36.4  C) (Oral)   Resp 18   Wt 85.3 kg (188 lb)   SpO2 97%   BMI 24.14 kg/m    General: A&O. NAD. Ambulatory and mobile  Psych: pleasant and cooperative, answers questions appropriately, no apparent hallucinations   HEENT: NC AT, mmm  Chest: nonlabored breathing  GI: NABS, + palpable mass superior to medial margin on RUQ incisional scar that is nontender and mobile and smooth--may be c/w lipoma, + mild RUQ tenderness to palpation  Ext: no edema, moves all extremities  Spine: no apparent deformity, no point tenderness, stepoffs or crepitus, no lesions    A/P:  Metastatic colon cancer   (Please see also note by Mary Ellen Bales PA-C 12/20)  - Discussed with Dr. Srivastava, Patient's primary oncologist and appreciate input  - at this time give the normal bilirubin the elevations in LFTs are not explained by the patient's malignancy  - CT overread notes interval decrease in size of 2 liver lesions  - LFTs remain stable with mild AST and ALT elevations  - note that cariprazine can increase LFTs up to 2-3x ULN in 2-4% patients (See UpToDate)--no indication to discontinue at this time  - repeat LFTs in 4-6 weeks  - follow up with Dr. Srivastava outpatient in the next ~1 month    Bilateral LE peripheral neuropathy  Documented since 2019 and is attributed to oxalaplatin therapy and is not changed.  Pt reports low back pain.   - BMP normal  - add on Mg  - lumbar Xray    Will follow up on lumbar Xray and Mg    Suzy Kirk PA-C  Hospitalist Service  Pager: [phone]

## 2020-12-21 NOTE — PLAN OF CARE
"Pt denies SI/SIB and HI. Pt endorses \"mean voices\". Pt had some questions regarding help when he leaves here. Directed pt to [facility]. Pt has been polite and pleasant. Pt is still tangential and has flight of ideas. Pt is med compliant, but doesn't attend groups. Pt made a weird comment of how after talking to writer his back is now broken. Pt did have complaints of back pain to the Dr. Pt will go for spinal xray. Will continue to monitor.  "

## 2020-12-21 NOTE — PROGRESS NOTES
Solo  attended 1  of 3 OT groups today. Pt appeared more disorganized and tangential this date. During a  discussion group on basic human needs (Choice Theory), pt's remarks were loose and circuitous. He brought up strange references, such as the murder of Raudel Pierce and the [place]. Rambled on about these subjects in ways that had little relevance to the discussion topic at hand. Appeared slightly confused.     12/21/20 1600   Occupational Therapy   Type of Intervention structured groups   Response Participates   Hours 1

## 2020-12-21 NOTE — PLAN OF CARE
Work Completed  - chart review  - team meeting  - post team rounds team meeting / discussion  - individual meeting with patient       Discharge plan or goal  Assessment ongoing    Barriers to discharge  Patient requires further psychiatric stabilization

## 2020-12-21 NOTE — PROGRESS NOTES
"Rec'd pt. Sleeping w/o any observable distress as shift commenced. Respirations regular and non-labored.  Observed wakeful at approx 0215.  Could be overheard w/ copious voiding in bathroom upon awakening.  Hurriedly came out of room and went down the newman asking in a very tense demeanor and loud voice \"why are those voices telling me to commit suicide?  Talking over staff and becoming increasing more agitated while staff was making every effort to be supportive.  Swearing insults such as \"racist staff\".  \"Lets kill the son- of- a- bitvíctor who started all this\".  Quite hyperverbal, hyperactive w/ manicky behaviors and non-sensical comments.  Zyprexa offered , encouraged, yet refused initially but w/ continued supportive intervention did agree to take same. Grandiose and delusional.  Rapid mood swings observed.  Would be singing loudly though pleasantly  in his room, in the same breath, would begin swearing in an agitated, threatening manner.  When requested to lower his voices out of respect for sleeping peers, Solo screamed out at this staff: \"If you wouldn't put that G-d isaiah microphone in here you wouldn't be able to hear me\".  Followed this staff out to nurses station loudly asking  \"Do you know what the 11th Ammendment is, Danny didn't write it down.  Corrinebea shalt not dance to the music of a living human being in power!\" Paced the halls, stopping by nurses station to make disconnected and disorganized comments.  Loudly remarked to staff at one point \"I'm ready to be electrocuted.  I've done it before but it didn't do any good;  I stuck my finger in a socket, but it won't help now.\" monitored closely for any such behavior.  Medication eventually effective but for short term only.  Of signifance pt's observed behavior tonight has been consistently and increasingly  more manicky  w/ worsening agitation than has been noted on any previous nights.  Very dificult to manage w/ only  approx 4.25 hrs. sleep.overnight. Safe, " therapeutic environment maintained.

## 2020-12-22 ENCOUNTER — APPOINTMENT (OUTPATIENT)
Dept: GENERAL RADIOLOGY | Facility: CLINIC | Age: 65
DRG: 885 | End: 2020-12-22
Attending: PHYSICIAN ASSISTANT
Payer: MEDICARE

## 2020-12-22 PROCEDURE — 99232 SBSQ HOSP IP/OBS MODERATE 35: CPT | Mod: 95 | Performed by: PSYCHIATRY & NEUROLOGY

## 2020-12-22 PROCEDURE — 250N000013 HC RX MED GY IP 250 OP 250 PS 637: Performed by: PSYCHIATRY & NEUROLOGY

## 2020-12-22 PROCEDURE — 250N000013 HC RX MED GY IP 250 OP 250 PS 637: Performed by: EMERGENCY MEDICINE

## 2020-12-22 PROCEDURE — 72100 X-RAY EXAM L-S SPINE 2/3 VWS: CPT

## 2020-12-22 PROCEDURE — G0177 OPPS/PHP; TRAIN & EDUC SERV: HCPCS

## 2020-12-22 PROCEDURE — 72100 X-RAY EXAM L-S SPINE 2/3 VWS: CPT | Mod: 26 | Performed by: RADIOLOGY

## 2020-12-22 PROCEDURE — 124N000002 HC R&B MH UMMC

## 2020-12-22 PROCEDURE — 250N000013 HC RX MED GY IP 250 OP 250 PS 637: Performed by: PHYSICIAN ASSISTANT

## 2020-12-22 RX ADMIN — DOCUSATE SODIUM 100 MG: 100 CAPSULE, LIQUID FILLED ORAL at 09:05

## 2020-12-22 RX ADMIN — Medication 0.25 MG: at 09:05

## 2020-12-22 RX ADMIN — Medication 25 MCG: at 09:06

## 2020-12-22 RX ADMIN — TRAZODONE HYDROCHLORIDE 50 MG: 50 TABLET ORAL at 21:20

## 2020-12-22 RX ADMIN — MULTIPLE VITAMINS W/ MINERALS TAB 1 TABLET: TAB at 09:05

## 2020-12-22 RX ADMIN — DIVALPROEX SODIUM 1000 MG: 500 TABLET, DELAYED RELEASE ORAL at 21:20

## 2020-12-22 RX ADMIN — CARIPRAZINE 4.5 MG: 1.5 CAPSULE, GELATIN COATED ORAL at 09:05

## 2020-12-22 RX ADMIN — IBUPROFEN 400 MG: 200 TABLET, FILM COATED ORAL at 03:09

## 2020-12-22 RX ADMIN — BENZTROPINE MESYLATE 0.5 MG: 0.5 TABLET ORAL at 21:20

## 2020-12-22 RX ADMIN — DIPHENHYDRAMINE HYDROCHLORIDE 50 MG: 50 CAPSULE ORAL at 03:08

## 2020-12-22 RX ADMIN — IBUPROFEN 400 MG: 200 TABLET, FILM COATED ORAL at 16:40

## 2020-12-22 RX ADMIN — CALCIUM POLYCARBOPHIL 625 MG: 625 TABLET, FILM COATED ORAL at 09:03

## 2020-12-22 NOTE — PLAN OF CARE
Pt denies si but has depression and feeling tired , Med compliant , isolative to his room, most comfortable lying in bed . Out for very short periods to watch tv

## 2020-12-22 NOTE — PROGRESS NOTES
"Brief Medicine Note     followed up regarding lumbar Xray and Mg Re lower back pain     Today's vital signs, medications, and nursing notes were reviewed. Labs reviewed    BP (!) 105/95   Pulse 76   Temp 98  F (36.7  C)   Resp 18   Wt 85.3 kg (188 lb)   SpO2 100%   BMI 24.14 kg/m      A/P:  Low back pain  XRAy lumbar spine today with no acute abnormality, \"Lumbar disc spaces maintained. Lower lumbar facet hypertrophy.\"  - recommend conservative mng of pain   - follow up with PCP and primary oncologist (Dr. Srivastava) on discharge     Medicine will sign off at this time, please call with questions or concerns    Suzy Kirk PA-C  Hospitalist Service  Pager: [phone]    "

## 2020-12-22 NOTE — PROGRESS NOTES
"   12/22/20 3095   Behavioral Health   Hallucinations denies / not responding to hallucinations   Thinking delusional;paranoid   Orientation person: oriented;place: oriented;date, disoriented   Insight poor   Judgement impaired   Eye Contact at examiner   Affect blunted, flat   Mood hopeless;depressed;anxious   Physical Appearance/Attire untidy   Hygiene neglected grooming - unclean body, hair, teeth   Suicidality other (see comments)  (Denies)   1. Wish to be Dead (Recent) No   2. Non-Specific Active Suicidal Thoughts (Recent) No   Self Injury other (see comment)  (None observed or reported )   Elopement   (None observed or reported )   Activity other (see comment)  (In and out of room)   Speech clear   Medication Sensitivity no stated side effects   Psychomotor / Gait balanced     Pt has been in and out of his room this shift. Affect is down/flat. Mood is depressed. Appearance is untidy. Pt was observed to attend some groups, social with select peers, and watched tv. During 1:1, pt stated he has concerns about other pts targeting him. Stated, \"someone from the outside called and told all these people about my past, and that makes me upset.\" Writer assured pt's information is kept private. Pt appears delusional, and has flight of ideas. Pt began talking about politics, and that someone from the \"office\" is listening to our current conversations. He stated he feels safe on the unit. Expressed his dislike for western world medicines, and stated, \"I have to stand up to these doctors; Zyprexa is the worst medication made by man.\" He denies thoughts to hurt himself or others. Contracts for safety.   "

## 2020-12-22 NOTE — PROGRESS NOTES
"[facility], [place]   Psychiatric Progress Note  Hospital Day: 7        Interim History:   The patient's care was discussed with the treatment team during the daily team meeting and/or staff's chart notes were reviewed.  Staff report patient was visible in the milieu, attending some groups, continues to make some loose statements in groups, was more appropriate last night, sleeping more and less manic like behaviors, requesting PRNs for various concerns/complaints, continues to appear somatically preoccupied, taking medications as prescribed, no acute events overnight.     Upon interview, patient reports he is still worried about TD, he acknowledges he is not noticing typical symptoms of TD and he thought part of this was due to getting Atarax as he thought it was for TD, reviewed this medication is used for anxiety and not TD. He is tolerating medications otherwise, reports mood is \"alright\", has some insight into having some hallucinations and delusions during hospitalization and mentioned again seeing [place] outside the window. Also mentioned voices being \"piped into\" his room and the belief there is a microphone in his room that people are listening, he believes Elijah or [facility] is listening to him and when asked why he became guarded and stated he did not wish to talk about it. He denies SI or HI. Aware that IM ordered imaging for ongoing physical health concerns. Is aware he will be seen by another provider tomorrow, no additional concerns at this time.          Medications:       benztropine  0.5 mg Oral At Bedtime     calcium polycarbophil  625 mg Oral Daily with lunch     cariprazine  4.5 mg Oral Daily     clonazePAM  0.25 mg Oral Daily     divalproex sodium delayed-release  1,000 mg Oral At Bedtime     docusate sodium  100 mg Oral BID     multivitamin w/minerals  1 tablet Oral Daily     traZODone  50 mg Oral At Bedtime     Vitamin D3  25 mcg Oral Daily          Allergies: " "    Allergies   Allergen Reactions     Animal Dander           Labs:     Recent Results (from the past 48 hour(s))   Comprehensive metabolic panel    Collection Time: 12/21/20  7:46 AM   Result Value Ref Range    Sodium 139 133 - 144 mmol/L    Potassium 3.6 3.4 - 5.3 mmol/L    Chloride 108 94 - 109 mmol/L    Carbon Dioxide 25 20 - 32 mmol/L    Anion Gap 6 3 - 14 mmol/L    Glucose 111 (H) 70 - 99 mg/dL    Urea Nitrogen 13 7 - 30 mg/dL    Creatinine 0.83 0.66 - 1.25 mg/dL    GFR Estimate >90 >60 mL/min/[1.73_m2]    GFR Estimate If Black >90 >60 mL/min/[1.73_m2]    Calcium 8.6 8.5 - 10.1 mg/dL    Bilirubin Total 1.0 0.2 - 1.3 mg/dL    Albumin 3.5 3.4 - 5.0 g/dL    Protein Total 6.5 (L) 6.8 - 8.8 g/dL    Alkaline Phosphatase 149 40 - 150 U/L     (H) 0 - 70 U/L    AST 61 (H) 0 - 45 U/L   Magnesium    Collection Time: 12/21/20  7:46 AM   Result Value Ref Range    Magnesium 2.0 1.6 - 2.3 mg/dL          Psychiatric Examination:     BP (!) 105/95   Pulse 76   Temp 98.4  F (36.9  C) (Oral)   Resp 18   Wt 85.3 kg (188 lb)   SpO2 97%   BMI 24.14 kg/m    Weight is 188 lbs 0 oz  Body mass index is 24.14 kg/m .    Orthostatic Vitals       Most Recent      Sitting Orthostatic /70 12/22 0700    Sitting Orthostatic Pulse (bpm) 90 12/22 0700    Standing Orthostatic /87 12/22 0700    Standing Orthostatic Pulse (bpm) 90 12/22 0700        Appearance: awake, alert and adequately groomed  Attitude:  cooperative  Eye Contact:  fair  Mood:  still having some anxiety, \"alright\"  Affect:  mood congruent  Speech:  clear, coherent  Language: fluent and intact in English  Psychomotor, Gait, Musculoskeletal:  no evidence of tardive dyskinesia, dystonia, or tics  Thought Process: less disorganized, still tangential   Associations:  loosening of associations present, but improved  Thought Content:  no evidence of suicidal ideation or homicidal ideation and paranoia/delusional thoughts present  Insight:  limited  Judgement:  " limited  Oriented to:  time, person, and place  Attention Span and Concentration:  limited  Recent and Remote Memory:  fair  Fund of Knowledge:  appropriate    Clinical Global Impressions  First:  Considering your total clinical experience with this particular patient population, how severe are the patient's symptoms at this time?: 7 (12/16/20 0544)  Compared to the patient's condition at the START of treatment, this patient's condition is: 4 (12/16/20 0544)  Most recent:  Considering your total clinical experience with this particular patient population, how severe are the patient's symptoms at this time?: 7 (12/16/20 0544)  Compared to the patient's condition at the START of treatment, this patient's condition is: 4 (12/16/20 0544)           Precautions:     Behavioral Orders   Procedures     Code 1 - Restrict to Unit     Code 2     MRI     Routine Programming     As clinically indicated     Single Room     Status 15     Every 15 minutes.          Diagnoses:      Bipolar disorder, type 1, current episode mixed with concern for psychosis symptoms  Generalized anxiety by history  History of ADHD  Stimulant abuse by history   R/O psychosis and mood disorder 2/2 medical condition (Metastatic colon cancer)         Assessment & Plan:   Assessment and hospital summary:  66 yo M with history of bipolar disorder type 1, anxiety, ADHD, stimulant abuse and complex medical history who presented to ED with AMS in context of stepping in front of a truck PTA, he reports trying to flag truck down for help and denied this was a suicide attempt. His PTA medications were continued, with exception of Strattera for concern of causing elevated mood state; he reported adequate adherence to medications PTA. IM consult placed given complexity of medical history including metastatic colon cancer, MRI Brain was unremarkable without evidence of mets. IM ordered abdominal U/S given ongoing elevation of LFTs, will look at structural  abnormalities and then make adjustments in medications if needed. Suspect part of decompensation may have been due to poor adherence to Vrylar PTA given inconsistent reports about taking this medication but will continue to evaluate need for additional adjustments in psychiatric medications.    Psychiatric treatment/inteventions:  Medications:   -continue PTA Depakote 1000mg at bedtime for mood stabilization  -continue PTA Vraylar at increased dose of 4.5mg daily for mood stabilization and psychosis  -continue PTA clonazepam 0.25mg daily for anxiety  -continue PTA benztropine 0.5mg at bedtime  -continue PTA Vitamin D3 1000U daily    -discussed case further with PharmD 12/21, may consider changing from Depakote to [place] for further stabilization in symptoms if increasing Vraylar does not show symptom improvement      Laboratory/Imaging: no new labs per psychiatry      Patient will be treated in therapeutic milieu with appropriate individual and group therapies as described.     Medical treatment/interventions:  Medical concerns: Pt has complex medical history including metastatic colon cancer, IM consult was placed for co-management and evaluation, appreciate assistance, they have been coordinating with oncology for additional work up. See initial consult note by Mary Ellen Bales PA-C on 12/16 and most recent progress note 12/21 for complete details, agree with assessment and plan as outlined by IM.       This note was created by undersigned using a Dragon dictation system. All typing errors or contextual distortion are unintentional and software inherent.     Disposition Plan   Reason for ongoing admission: is unable to care for self due to severe psychosis or carlos  Discharge location: TBD, likely discharge in 7-10 days once stabilized in AMS and mood/psychosis  Discharge Medications: not ordered  Follow-up Appointments: not scheduled  Legal Status: voluntary   Entered by: Cassy Noriega on 12/22/2020 at 7:24  AM       Video-Visit Details    Type of service:  Video Visit    Video Start Time (time video started): 0810    Video End Time (time video stopped): 0830    Originating Location (pt. Location): [address]    Distant Location (provider location): Provider remote location    Mode of Communication:  Video Conference via [employer]    Physician has received verbal consent for a Video Visit from the patient? Yes    Cassy Noriega, DO

## 2020-12-22 NOTE — PLAN OF CARE
"  Problem: Adult Inpatient Plan of Care  Goal: Plan of Care Review  Outcome: Improving    Pt exhibits paranoia/delusional behavior and statements. Pt has an Apple fruit sticker on top of his nose, states it will lead him to [place]. Requested for IBU for pain on bottom of feet and calfs and requested to see the packet the medication came in. No aggressive behavior. Pt calm on approach. Occasionally out in the milieu having conversation with staff,  reading the newspaper and watching TV.  Made a phone call. Pt has flight of ideas and moves from one topic to another. States that the TV is talking to him and that people on \"[employer]\" are talking about him. States he is mixed up with some wealthy people who may even own this hospital. Denies SIB/SI/AVH. Ate dinner, denies medication side effects.     "

## 2020-12-22 NOTE — PROGRESS NOTES
"Observed to have slept well till approx 0225.  Up for voiding upon awakening, then out to nurses station inquiring as to medications used for treating Tardive Dyskinesia.  Meds reviewed.  Then remarked, well I'm ok now but when they treat it, I want to take Benadryl;  That's only when they start treating the Tardive Dykinesia\".  Assured that this info would be made available to team via chart documentation.  After sitting in the lounge for a brief while pt. Came back to nurses station reporting \"I do think I'm having a little of that tardive dyskinesia now.  It's hard to explain but I'm having a hard time trying to make myself aware of not moving my arms because they feel like they want to move.  Further complained of pain and itching  in his Left arm and attributes this to past nerve injury in the same arm and possible allergy to the deodorant here. Will refrain from using the deodorant  for a couple of days.   Requested Ibuprophen and Benadryl.  Administered as ordered.  Realistic supportive intervention provided.  Sat out in the lounge for a brief period then ret'd to bed and was able to readily return to sleep w/ regular on-labored respirations. Much more appropriate tonight w/o any agitation or manic-like behaviors as were present on previous night.  Observed to have slept (broken sleep) for approx 5.25 hrs overnight.  Respirations regular and non-labored.  Safe, supportive, therapeutic environment maintained.    "

## 2020-12-23 LAB
ALBUMIN SERPL-MCNC: 3.8 G/DL (ref 3.4–5)
ALP SERPL-CCNC: 163 U/L (ref 40–150)
ALT SERPL W P-5'-P-CCNC: 152 U/L (ref 0–70)
ANION GAP SERPL CALCULATED.3IONS-SCNC: 7 MMOL/L (ref 3–14)
AST SERPL W P-5'-P-CCNC: 70 U/L (ref 0–45)
BILIRUB SERPL-MCNC: 0.6 MG/DL (ref 0.2–1.3)
BUN SERPL-MCNC: 15 MG/DL (ref 7–30)
CALCIUM SERPL-MCNC: 8.7 MG/DL (ref 8.5–10.1)
CHLORIDE SERPL-SCNC: 108 MMOL/L (ref 94–109)
CO2 SERPL-SCNC: 24 MMOL/L (ref 20–32)
CREAT SERPL-MCNC: 0.8 MG/DL (ref 0.66–1.25)
GFR SERPL CREATININE-BSD FRML MDRD: >90 ML/MIN/{1.73_M2}
GLUCOSE SERPL-MCNC: 90 MG/DL (ref 70–99)
POTASSIUM SERPL-SCNC: 4 MMOL/L (ref 3.4–5.3)
PROT SERPL-MCNC: 7 G/DL (ref 6.8–8.8)
SODIUM SERPL-SCNC: 139 MMOL/L (ref 133–144)

## 2020-12-23 PROCEDURE — 250N000013 HC RX MED GY IP 250 OP 250 PS 637: Performed by: PSYCHIATRY & NEUROLOGY

## 2020-12-23 PROCEDURE — 80053 COMPREHEN METABOLIC PANEL: CPT | Performed by: PHYSICIAN ASSISTANT

## 2020-12-23 PROCEDURE — 124N000002 HC R&B MH UMMC

## 2020-12-23 PROCEDURE — 36415 COLL VENOUS BLD VENIPUNCTURE: CPT | Performed by: PHYSICIAN ASSISTANT

## 2020-12-23 PROCEDURE — 250N000013 HC RX MED GY IP 250 OP 250 PS 637: Performed by: PHYSICIAN ASSISTANT

## 2020-12-23 PROCEDURE — 250N000013 HC RX MED GY IP 250 OP 250 PS 637: Performed by: EMERGENCY MEDICINE

## 2020-12-23 PROCEDURE — 99232 SBSQ HOSP IP/OBS MODERATE 35: CPT | Mod: 95 | Performed by: CLINICAL NURSE SPECIALIST

## 2020-12-23 RX ADMIN — TRAZODONE HYDROCHLORIDE 50 MG: 50 TABLET ORAL at 22:01

## 2020-12-23 RX ADMIN — Medication 25 MCG: at 09:14

## 2020-12-23 RX ADMIN — MULTIPLE VITAMINS W/ MINERALS TAB 1 TABLET: TAB at 09:14

## 2020-12-23 RX ADMIN — CALCIUM POLYCARBOPHIL 625 MG: 625 TABLET, FILM COATED ORAL at 09:14

## 2020-12-23 RX ADMIN — IBUPROFEN 400 MG: 200 TABLET, FILM COATED ORAL at 20:30

## 2020-12-23 RX ADMIN — DIVALPROEX SODIUM 1000 MG: 500 TABLET, DELAYED RELEASE ORAL at 22:01

## 2020-12-23 RX ADMIN — HYDROXYZINE HYDROCHLORIDE 25 MG: 25 TABLET, FILM COATED ORAL at 04:32

## 2020-12-23 RX ADMIN — Medication 0.25 MG: at 09:14

## 2020-12-23 RX ADMIN — BENZTROPINE MESYLATE 0.5 MG: 0.5 TABLET ORAL at 22:01

## 2020-12-23 RX ADMIN — IBUPROFEN 400 MG: 200 TABLET, FILM COATED ORAL at 04:32

## 2020-12-23 RX ADMIN — DOCUSATE SODIUM 100 MG: 100 CAPSULE, LIQUID FILLED ORAL at 22:01

## 2020-12-23 RX ADMIN — CARIPRAZINE 4.5 MG: 1.5 CAPSULE, GELATIN COATED ORAL at 09:14

## 2020-12-23 ASSESSMENT — ACTIVITIES OF DAILY LIVING (ADL)
DRESS: INDEPENDENT
ORAL_HYGIENE: INDEPENDENT
HYGIENE/GROOMING: INDEPENDENT
LAUNDRY: WITH SUPERVISION

## 2020-12-23 NOTE — PLAN OF CARE
"\"The thing that happened with us and the coffee pot was the funniest thing that has ever happened to me!\" The incident that occurred was at 0700 this morning, patient asked staff to get him some coffee. Staff opened the kitchen door and grabbed the coffee pot. Staff stood in the doorway and poured it for him. He was overheard at desk saying \"what if a stranger hands you something and you dont know what it is so you throw it, and it turns out to be a Molotov Cocktail. And if it explodes, is it really your fault?\" He is social at times with staff and patients but is more often in his room laying on the bed. He is med compliant. Denies SI and SIB. Patient declined colace say that his stool was too soft.   Magaly Aldrich RN  "

## 2020-12-23 NOTE — PROGRESS NOTES
Pt slept well until 0315 when he got out to get some water. He then sat in the lounge and walked around for a bit. Pt went back to his room only to come out a few moments later. Requested for IBU for generalized aches. Offered and took Hydroxyzine. Pt then asked for a pencil and went into his room to color. At about 0600, Pt was out of his room again and walked around in the lounge. No other issues. Observed to have slept for 4.5 hours.

## 2020-12-23 NOTE — PROGRESS NOTES
"[facility], [place]   Psychiatric Progress Note        Interim History:   The patient's care was discussed with the treatment team during the daily team meeting and/or staff's chart notes were reviewed.  Staff report patient isolates to room     Psychiatric symptoms and interventions Patient is disorganized.  Patient reported he has been chanting all morning. Patient was pleasant but difficult to keep on track with conversation. Patient stated he chanting and mediating on the Beatles. He talked about her \"favorite Beatles\" . Patient did not have any pain complaints. He was concerned about his cheeks \"looking hollow\" and thought he \"might have lost his sinuses.\"  He is concerned that the nurses may be reading his thoughts. He knows that he can read the nurses thoughts.     Medical: Patient has a complex medical history including metastatic colon cancer.   Reviewed medicine note dated 12/21 for consult on coordination with oncology. LFT's are slightly elevated.     Behavioral/psychological/social:         Medications:       benztropine  0.5 mg Oral At Bedtime     calcium polycarbophil  625 mg Oral Daily with lunch     cariprazine  4.5 mg Oral Daily     clonazePAM  0.25 mg Oral Daily     divalproex sodium delayed-release  1,000 mg Oral At Bedtime     docusate sodium  100 mg Oral BID     multivitamin w/minerals  1 tablet Oral Daily     traZODone  50 mg Oral At Bedtime     Vitamin D3  25 mcg Oral Daily          Allergies:     Allergies   Allergen Reactions     Animal Dander           Labs:     Recent Results (from the past 24 hour(s))   Comprehensive metabolic panel    Collection Time: 12/23/20  7:26 AM   Result Value Ref Range    Sodium 139 133 - 144 mmol/L    Potassium 4.0 3.4 - 5.3 mmol/L    Chloride 108 94 - 109 mmol/L    Carbon Dioxide 24 20 - 32 mmol/L    Anion Gap 7 3 - 14 mmol/L    Glucose 90 70 - 99 mg/dL    Urea Nitrogen 15 7 - 30 mg/dL    Creatinine 0.80 0.66 - 1.25 mg/dL    GFR " Estimate >90 >60 mL/min/[1.73_m2]    GFR Estimate If Black >90 >60 mL/min/[1.73_m2]    Calcium 8.7 8.5 - 10.1 mg/dL    Bilirubin Total 0.6 0.2 - 1.3 mg/dL    Albumin 3.8 3.4 - 5.0 g/dL    Protein Total 7.0 6.8 - 8.8 g/dL    Alkaline Phosphatase 163 (H) 40 - 150 U/L     (H) 0 - 70 U/L    AST 70 (H) 0 - 45 U/L          Psychiatric Examination:     /79   Pulse 90   Temp 98.2  F (36.8  C) (Tympanic)   Resp 14   Wt 85.3 kg (188 lb)   SpO2 96%   BMI 24.14 kg/m    Weight is 188 lbs 0 oz  Body mass index is 24.14 kg/m .  Orthostatic Vitals       Most Recent      Sitting Orthostatic /78 12/23 0851    Sitting Orthostatic Pulse (bpm) 88 12/23 0851    Standing Orthostatic /78 12/23 0851    Standing Orthostatic Pulse (bpm) 88 12/23 0851            Appearance: awake, alert, adequately groomed and dressed in hospital scrubs  Attitude:  cooperative  Eye Contact:  good  Mood:  better  Affect:  intensity is blunted  Speech:  normal prosody  Psychomotor Behavior:  no evidence of tardive dyskinesia, dystonia, or tics  Throught Process:  disorganized  Associations:  loosening of associations present  Thought Content:  no evidence of suicidal ideation or homicidal ideation  Insight:  limited  Judgement:  limited  Oriented to:  time, person, and place  Attention Span and Concentration:  limited  Recent and Remote Memory:  fair    Clinical Global Impressions  First:  Considering your total clinical experience with this particular patient population, how severe are the patient's symptoms at this time?: 7 (12/16/20 0544)  Compared to the patient's condition at the START of treatment, this patient's condition is: 4 (12/16/20 0544)  Most recent:  Considering your total clinical experience with this particular patient population, how severe are the patient's symptoms at this time?: 7 (12/16/20 0544)  Compared to the patient's condition at the START of treatment, this patient's condition is: 4 (12/16/20 0544)          Precautions:     Behavioral Orders   Procedures     Code 1 - Restrict to Unit     Code 2     imaging     Routine Programming     As clinically indicated     Single Room     Status 15     Every 15 minutes.          DIagnoses:      Bipolar disorder, type 1, current episode mixed with concern for psychosis symptoms  Generalized anxiety by history  History of ADHD  Stimulant abuse by history   R/O psychosis and mood disorder 2/2 medical condition (Metastatic colon cancer)         Plan:   Legal status: Voluntary     Medication management:   No medication changes  Continue :  Cogentin 0.5 mg HS  Vraylar 4.5 mg - Continue to monitor due to increase in Vraylar.   Klonopin 0.25 mg daily   Depakote DR 1,0000 mg HS  Trazodone 50 mg   Vitamin D3-  25 mcg    Disposition plan:   Reason for continued hospitalization   Patient is psychotic, disorganized. He is unable to care for self.   Patient will return to his living situation.    CONSENT FOR TELEMEDICINE VISIT:  The patient's condition can be safely assessed and treated via synchronous audio and visual telemedicine encounter.      START TIME:  12:01 pm      STOP TIME: 12:15 pm      REASON FOR TELEMEDICINE VISIT:  COVID-19.      ORIGINATING SITE (PATIENT LOCATION):  [facility], [address].      DISTANT SITE (PROVIDER LOCATION):  Provider in remote setting.      CONSENT:  The patient/guardian has verbally consented to potential risks and benefits of telemedicine (video visit) versus in-person care, bill my insurance or make self-payment for services provided and responsibility for payment of noncovered services.      MODE OF COMMUNICATION:  Video conference via [employer].      As the provider, I attest to compliance with applicable laws and regulations related to telemedicine

## 2020-12-23 NOTE — PROGRESS NOTES
CLINICAL NUTRITION SERVICES - REASSESSMENT NOTE     Nutrition Prescription    RECOMMENDATIONS FOR MDs/PROVIDERS TO ORDER:  None today    Malnutrition Status:    Unable to determine     Recommendations already ordered by Registered Dietitian (RD):  Continue snacks as ordered    Future/Additional Recommendations:  If intakes decline, consider sending snacks     Pt was busy during attempt. RN reports he is eating well and reports they will contact RD if pt has any nutrition related questions or concerns.     EVALUATION OF THE PROGRESS TOWARD GOALS   Diet: Regular  PM snack: fruit HS snack: vanilla ice cream or orange fruit ice  Intake: 100% of meals per flowsheet.      NEW FINDINGS   No updated wt since admit. Previously, he lost 9 lbs (4.6%) over the last 4 months.  Wt Readings from Last 4 Encounters:   12/15/20 85.3 kg (188 lb)   08/05/20 89.4 kg (197 lb)   07/30/20 89.4 kg (197 lb)   06/29/20 92.3 kg (203 lb 6.4 oz)     MALNUTRITION  % Intake: No decreased intake noted  % Weight Loss: Weight loss does not meet criteria  Subcutaneous Fat Loss: Unable to assess  Muscle Loss: Unable to assess  Fluid Accumulation/Edema: None noted  Malnutrition Diagnosis: Unable to determine     Previous Goals   Patient to consume % of nutritionally adequate meal trays TID, or the equivalent with supplements/snacks.  Evaluation: Met    Previous Nutrition Diagnosis  Predicted inadequate nutrient intake related to increased needs  as evidenced by 15# wt loss in 6 months despite pt reported good intakes     Evaluation: No change    CURRENT NUTRITION DIAGNOSIS  No nutrition diagnosis at this time    INTERVENTIONS  Implementation  Modify composition of meals/snacks    Monitoring/Evaluation  Progress toward goals will be monitored and evaluated per protocol q 14 days.      Kathie Harding RD, LD  5A/OB/Behavioral Health RD Pager: [phone]

## 2020-12-24 PROCEDURE — 124N000002 HC R&B MH UMMC

## 2020-12-24 PROCEDURE — 250N000013 HC RX MED GY IP 250 OP 250 PS 637: Performed by: PSYCHIATRY & NEUROLOGY

## 2020-12-24 PROCEDURE — 250N000013 HC RX MED GY IP 250 OP 250 PS 637: Performed by: EMERGENCY MEDICINE

## 2020-12-24 PROCEDURE — G0177 OPPS/PHP; TRAIN & EDUC SERV: HCPCS

## 2020-12-24 PROCEDURE — 99232 SBSQ HOSP IP/OBS MODERATE 35: CPT | Mod: 95 | Performed by: CLINICAL NURSE SPECIALIST

## 2020-12-24 PROCEDURE — 250N000013 HC RX MED GY IP 250 OP 250 PS 637: Performed by: PHYSICIAN ASSISTANT

## 2020-12-24 RX ADMIN — CARIPRAZINE 4.5 MG: 1.5 CAPSULE, GELATIN COATED ORAL at 08:38

## 2020-12-24 RX ADMIN — Medication 25 MCG: at 08:38

## 2020-12-24 RX ADMIN — Medication 0.25 MG: at 08:38

## 2020-12-24 RX ADMIN — BENZTROPINE MESYLATE 0.5 MG: 0.5 TABLET ORAL at 21:42

## 2020-12-24 RX ADMIN — HYDROXYZINE HYDROCHLORIDE 25 MG: 25 TABLET, FILM COATED ORAL at 05:47

## 2020-12-24 RX ADMIN — DIVALPROEX SODIUM 1000 MG: 500 TABLET, DELAYED RELEASE ORAL at 21:42

## 2020-12-24 RX ADMIN — CALCIUM POLYCARBOPHIL 625 MG: 625 TABLET, FILM COATED ORAL at 08:39

## 2020-12-24 RX ADMIN — IBUPROFEN 400 MG: 200 TABLET, FILM COATED ORAL at 03:35

## 2020-12-24 RX ADMIN — DOCUSATE SODIUM 100 MG: 100 CAPSULE, LIQUID FILLED ORAL at 08:38

## 2020-12-24 RX ADMIN — MULTIPLE VITAMINS W/ MINERALS TAB 1 TABLET: TAB at 08:38

## 2020-12-24 RX ADMIN — DOCUSATE SODIUM 100 MG: 100 CAPSULE, LIQUID FILLED ORAL at 20:42

## 2020-12-24 RX ADMIN — TRAZODONE HYDROCHLORIDE 50 MG: 50 TABLET ORAL at 21:42

## 2020-12-24 ASSESSMENT — ACTIVITIES OF DAILY LIVING (ADL)
ORAL_HYGIENE: INDEPENDENT
DRESS: INDEPENDENT
HYGIENE/GROOMING: INDEPENDENT

## 2020-12-24 NOTE — PLAN OF CARE
"Solo had a good shift. He was in and out of his room in the morning, out of his room nearly the entire afternoon. Attended groups. Pleasant, cooperative, appropriately social with staff and select peers.     He states he feels better than when he first got into the hospital but doesn't exactly know how. He says he can \"see and think\" more clearly. He attributes some of this to the hydroxyzine helping his anxiety and getting off the stimulant.       "

## 2020-12-24 NOTE — PROGRESS NOTES
"[facility], [place]   Psychiatric Progress Note        Interim History:   The patient's care was discussed with the treatment team during the daily team meeting and/or staff's chart notes were reviewed.  Staff report patient isolative to room and withdrawn.    Psychiatric symptoms and interventions Patient reports ,\"I'm feeling blue.\"  Patient reports that the nurses are reading his thoughts. He states it makes him anxious. He has been taking hydroxyzine. Patient describes self as a \"nervous person\". Patient was disorganized and tangential. He is worried that her peers don't like him. Patient is worried the TV is talking to him.  No behavior concerns.     No changes in medications. Vraylar was recently increased.     Medical: Patient has a complex medical history including metastatic colon cancer.   Reviewed medicine note dated 12/21 for consult on coordination with oncology. LFT's are slightly elevated.      Behavioral/psychological/social:  Encouraged patient to go to groups but his paranoia is preventing him             Medications:       benztropine  0.5 mg Oral At Bedtime     calcium polycarbophil  625 mg Oral Daily with lunch     cariprazine  4.5 mg Oral Daily     clonazePAM  0.25 mg Oral Daily     divalproex sodium delayed-release  1,000 mg Oral At Bedtime     docusate sodium  100 mg Oral BID     multivitamin w/minerals  1 tablet Oral Daily     traZODone  50 mg Oral At Bedtime     Vitamin D3  25 mcg Oral Daily          Allergies:     Allergies   Allergen Reactions     Animal Dander           Labs:   No results found for this or any previous visit (from the past 24 hour(s)).       Psychiatric Examination:     /75   Pulse 69   Temp 97.5  F (36.4  C) (Oral)   Resp 16   Wt 85.3 kg (188 lb)   SpO2 96%   BMI 24.14 kg/m    Weight is 188 lbs 0 oz  Body mass index is 24.14 kg/m .  Orthostatic Vitals       Most Recent      Sitting Orthostatic /75 12/24 0757    Sitting " Orthostatic Pulse (bpm) 69 12/24 0757    Standing Orthostatic /72 12/24 0757    Standing Orthostatic Pulse (bpm) 78 12/24 0757          Appearance: awake, alert, adequately groomed and dressed in hospital scrubs  Attitude:  cooperative  Eye Contact:  good  Mood:  better  Affect:  intensity is blunted  Speech:  normal prosody  Psychomotor Behavior:  no evidence of tardive dyskinesia, dystonia, or tics  Throught Process:  disorganized  Associations:  loosening of associations present  Thought Content:  no evidence of suicidal ideation or homicidal ideation  Insight:  limited  Judgement:  limited  Oriented to:  time, person, and place  Attention Span and Concentration:  limited  Recent and Remote Memory:  fair  Clinical Global Impressions  First:  Considering your total clinical experience with this particular patient population, how severe are the patient's symptoms at this time?: 7 (12/16/20 0544)  Compared to the patient's condition at the START of treatment, this patient's condition is: 4 (12/16/20 0544)  Most recent:  Considering your total clinical experience with this particular patient population, how severe are the patient's symptoms at this time?: 7 (12/16/20 0544)  Compared to the patient's condition at the START of treatment, this patient's condition is: 4 (12/16/20 0544)         Precautions:     Behavioral Orders   Procedures     Code 1 - Restrict to Unit     Code 2     imaging     Routine Programming     As clinically indicated     Single Room     Status 15     Every 15 minutes.          DIagnoses:   Bipolar disorder, type 1, current episode mixed with concern for psychosis symptoms  Generalized anxiety by history  History of ADHD  Stimulant abuse by history   R/O psychosis and mood disorder 2/2 medical condition (Metastatic colon cancer)         Plan:     Legal status: Voluntary      Medication management:   No medication changes  Continue :  Cogentin 0.5 mg HS  Vraylar 4.5 mg - Continue to monitor  due to increase in Vraylar.   Klonopin 0.25 mg daily   Depakote DR 1,0000 mg HS  Trazodone 50 mg   Vitamin D3-  25 mcg     Disposition plan:   Reason for continued hospitalization   Patient is psychotic, disorganized. He is unable to care for self.   Patient will return to his living situation.     CONSENT FOR TELEMEDICINE VISIT:  The patient's condition can be safely assessed and treated via synchronous audio and visual telemedicine encounter.      START TIME:  12:15 pm     STOP TIME: 12:30 pm      REASON FOR TELEMEDICINE VISIT:  COVID-19.      ORIGINATING SITE (PATIENT LOCATION):  [facility], [address].      DISTANT SITE (PROVIDER LOCATION):  Provider in remote setting.      CONSENT:  The patient/guardian has verbally consented to potential risks and benefits of telemedicine (video visit) versus in-person care, bill my insurance or make self-payment for services provided and responsibility for payment of noncovered services.      MODE OF COMMUNICATION:  Video conference via [employer].      As the provider, I attest to compliance with applicable laws and regulations related to telemedicine

## 2020-12-24 NOTE — PROGRESS NOTES
"NOC Shift Report    Pt in bed at beginning of shift, breathing quiet and unlabored.     Pt awake and came out for water at 0245. Pt quietly went back to room without speaking to staff. Pt seems to be laying/sitting awake in bed with light on. He can be heard talking to himself and singing to himself at times. At 0330, he came out and requested PRN ibuprofen for foot pain which was given. He rambled about the calluses on his feet and how concerned he was about them. Writer reassured him that calluses are quite normal and they shouldn't impair his walking. pt verbalized relief to hear this. He did ask me what drug is used to treat tardive dyskinesia and we discussed this. He stated \"I try to diagnose myself because the professionals only have so much time to spend with you\". He was offered hydroxyzine to help him go back to sleep which he declined. He has been pleasant upon interaction, in and out of his room intermittently. Around 0545 he came back and requested PRN hydroxyzine which was given. He made a comment about being dizzy. Writer told him it is likely because he is tired or dehydrated and recommended he drink fluids and go lay down for awhile. He verbalized understanding.       Pt slept 3 hours.     No pt complaints or concerns at this time.     No PRNs given. Will continue to monitor.     Precautions:   Single room  "

## 2020-12-24 NOTE — PLAN OF CARE
Pt confused and forgetful , he couldn't remember if he took ibuprofen or not . He was con versive in lounge with another male peer. He requested and given tuck pads for perineal pain

## 2020-12-25 PROCEDURE — 250N000013 HC RX MED GY IP 250 OP 250 PS 637: Performed by: EMERGENCY MEDICINE

## 2020-12-25 PROCEDURE — 250N000013 HC RX MED GY IP 250 OP 250 PS 637: Performed by: PSYCHIATRY & NEUROLOGY

## 2020-12-25 PROCEDURE — 250N000013 HC RX MED GY IP 250 OP 250 PS 637: Performed by: PHYSICIAN ASSISTANT

## 2020-12-25 PROCEDURE — 124N000002 HC R&B MH UMMC

## 2020-12-25 RX ADMIN — DIVALPROEX SODIUM 1000 MG: 500 TABLET, DELAYED RELEASE ORAL at 22:25

## 2020-12-25 RX ADMIN — DOCUSATE SODIUM 100 MG: 100 CAPSULE, LIQUID FILLED ORAL at 20:22

## 2020-12-25 RX ADMIN — Medication 25 MCG: at 08:45

## 2020-12-25 RX ADMIN — CALCIUM POLYCARBOPHIL 625 MG: 625 TABLET, FILM COATED ORAL at 08:45

## 2020-12-25 RX ADMIN — DOCUSATE SODIUM 100 MG: 100 CAPSULE, LIQUID FILLED ORAL at 08:45

## 2020-12-25 RX ADMIN — ALUMINUM HYDROXIDE, MAGNESIUM HYDROXIDE, AND SIMETHICONE 30 ML: 200; 200; 20 SUSPENSION ORAL at 22:25

## 2020-12-25 RX ADMIN — TRAZODONE HYDROCHLORIDE 50 MG: 50 TABLET ORAL at 22:25

## 2020-12-25 RX ADMIN — HYDROXYZINE HYDROCHLORIDE 25 MG: 25 TABLET, FILM COATED ORAL at 06:01

## 2020-12-25 RX ADMIN — MULTIPLE VITAMINS W/ MINERALS TAB 1 TABLET: TAB at 08:45

## 2020-12-25 RX ADMIN — TRAZODONE HYDROCHLORIDE 50 MG: 50 TABLET ORAL at 22:30

## 2020-12-25 RX ADMIN — Medication 0.25 MG: at 08:45

## 2020-12-25 RX ADMIN — CARIPRAZINE 4.5 MG: 1.5 CAPSULE, GELATIN COATED ORAL at 08:45

## 2020-12-25 RX ADMIN — BENZTROPINE MESYLATE 0.5 MG: 0.5 TABLET ORAL at 22:25

## 2020-12-25 RX ADMIN — IBUPROFEN 400 MG: 200 TABLET, FILM COATED ORAL at 03:41

## 2020-12-25 RX ADMIN — HYDROXYZINE HYDROCHLORIDE 25 MG: 25 TABLET, FILM COATED ORAL at 14:08

## 2020-12-25 ASSESSMENT — ACTIVITIES OF DAILY LIVING (ADL)
DRESS: INDEPENDENT
LAUNDRY: WITH SUPERVISION
ORAL_HYGIENE: INDEPENDENT
HYGIENE/GROOMING: INDEPENDENT

## 2020-12-25 NOTE — PLAN OF CARE
Problem: Behavioral Health Plan of Care  Goal: Plan of Care Review  Outcome: Improving     Out in the milieu intermittently and back to his room. Noted lying on his stomach and stated he had pain on his feet. Offered IBU but refused stating he took it and it did not help.  Pt states he does not like the combination of medication he is taking but will discuss this with the doctor in the morning when he comes back. Also Hydroxyzine does not help per Pt. Socially isolated this shift but states he is just tired. In no acute distress and appears relaxed.

## 2020-12-25 NOTE — PLAN OF CARE
"Pt in bed sleeping at start of shift. Breathing quiet and unlabored. Awake for most of the shift. Talking to himself in his room and whistling/singing at times. Ripped his jeans and decided to throw them away. (see progress note) Belongings sheet updated and signed by patient. Pt stated, \"You might want to put those in the incinerator you know, because of DNA.\" Asked writer what DNA was. Writer explained that it is a blueprint for cell production. Pt replied, \"I hope you mean body cells and not the other kind of cell.\" Very pleasant. Given ibuprofen and hydroxyzine during the night for back/abdominal pain and anxiety. Appeared to be sleeping for 3.75 hours during the night.     Single Room order with no additional precautions.     Will continue to monitor and assess.       Problem: Sleep Disturbance  Goal: Adequate Sleep/Rest  Outcome: No Change     "

## 2020-12-25 NOTE — PLAN OF CARE
"48 Hour assessment     Problem: Suicidal Behavior  Goal: Suicidal Behavior is Absent or Managed  Outcome: Improving    Patient denies SI/SIB/HI.      D: Pt presents with calm affect. Patient is organized and logical in conversation. Patient presents well-groomed. Patient continues to be medication compliant     Patient states \" I have been isolating to my room, talking to myself so yeah I guess I'm a little depressed\"      Patient reports some anxiety but is well managed with hydroxyzine.     A: Provided active listening, emotional encouragement and goal setting, safety planning safety checks q 15min, and medication administration.    R: Patient was behaviorally appropriate during this shift.  Did not require any restraints or seclusion.     "

## 2020-12-25 NOTE — PROGRESS NOTES
"PT woke and c/o back and abdominal pain. Requested and was given ibuprofen.   Pt also expressed concern that he \"tore oniel lindquist again, maybe the wise men can help with that.\"  Also worried that \"you probably can't search carbon monoxide on the Internet right now.\"   "

## 2020-12-26 PROCEDURE — 250N000013 HC RX MED GY IP 250 OP 250 PS 637: Performed by: PHYSICIAN ASSISTANT

## 2020-12-26 PROCEDURE — 250N000013 HC RX MED GY IP 250 OP 250 PS 637: Performed by: PSYCHIATRY & NEUROLOGY

## 2020-12-26 PROCEDURE — 124N000002 HC R&B MH UMMC

## 2020-12-26 PROCEDURE — 250N000013 HC RX MED GY IP 250 OP 250 PS 637: Performed by: EMERGENCY MEDICINE

## 2020-12-26 RX ADMIN — DOCUSATE SODIUM 100 MG: 100 CAPSULE, LIQUID FILLED ORAL at 09:37

## 2020-12-26 RX ADMIN — HYDROXYZINE HYDROCHLORIDE 25 MG: 25 TABLET, FILM COATED ORAL at 06:04

## 2020-12-26 RX ADMIN — TRAZODONE HYDROCHLORIDE 50 MG: 50 TABLET ORAL at 02:09

## 2020-12-26 RX ADMIN — CALCIUM POLYCARBOPHIL 625 MG: 625 TABLET, FILM COATED ORAL at 09:38

## 2020-12-26 RX ADMIN — BENZTROPINE MESYLATE 0.5 MG: 0.5 TABLET ORAL at 21:55

## 2020-12-26 RX ADMIN — MULTIPLE VITAMINS W/ MINERALS TAB 1 TABLET: TAB at 09:37

## 2020-12-26 RX ADMIN — DOCUSATE SODIUM 100 MG: 100 CAPSULE, LIQUID FILLED ORAL at 19:56

## 2020-12-26 RX ADMIN — Medication 25 MCG: at 09:38

## 2020-12-26 RX ADMIN — TRAZODONE HYDROCHLORIDE 50 MG: 50 TABLET ORAL at 21:55

## 2020-12-26 RX ADMIN — Medication 0.25 MG: at 09:37

## 2020-12-26 RX ADMIN — DIVALPROEX SODIUM 1000 MG: 500 TABLET, DELAYED RELEASE ORAL at 21:55

## 2020-12-26 RX ADMIN — HYDROXYZINE HYDROCHLORIDE 25 MG: 25 TABLET, FILM COATED ORAL at 20:22

## 2020-12-26 RX ADMIN — CARIPRAZINE 4.5 MG: 1.5 CAPSULE, GELATIN COATED ORAL at 09:37

## 2020-12-26 ASSESSMENT — ACTIVITIES OF DAILY LIVING (ADL)
HYGIENE/GROOMING: INDEPENDENT
LAUNDRY: WITH SUPERVISION
ORAL_HYGIENE: INDEPENDENT
DRESS: INDEPENDENT

## 2020-12-26 NOTE — PLAN OF CARE
"  Problem: Decreased Participation and Engagement (Psychotic Signs/Symptoms)  Goal: Increased Participation and Engagement (Psychotic Signs/Symptoms)  Outcome: Improving    Pt isolative to room this shift but only came out for supper. Pt states he thinks Depakote is making him susceptible to pressure ulcers and bruises. Writer only saw a bruise from recent blood draw. Pt continues to move from one topic to another. He thinks people are watching him but reassured that staff checks on him q15 minutes for his safety. States, \"you know they do. There is drama here, there, everywhere and there are drama queens everywhere too.\" Pt smiled to writer. Appeared calm, pleasant. Denies SI/SIB. Med compliant. Patient played arielle music on a guitar for others  [facility] and they seemed to enjoy it. Pt states that Trazodone does not help with sleep and that he has been having poor sleep.      "

## 2020-12-26 NOTE — PROGRESS NOTES
Pt woke up at around 0200 and c/o not being able to fall sleep. Offered a repeat Trazodone for insomnia. Out again at 0345 for water then stopped at the nurses station and socialized with staff for a few minutes before going back to his room but came back out and sat in the lounge writing in his notebook. Pt then went to his room and could be heard singing. Observed to have slept for 4.25 hours.

## 2020-12-27 PROCEDURE — 250N000013 HC RX MED GY IP 250 OP 250 PS 637: Performed by: PHYSICIAN ASSISTANT

## 2020-12-27 PROCEDURE — 250N000013 HC RX MED GY IP 250 OP 250 PS 637: Performed by: PSYCHIATRY & NEUROLOGY

## 2020-12-27 PROCEDURE — 124N000002 HC R&B MH UMMC

## 2020-12-27 PROCEDURE — 250N000013 HC RX MED GY IP 250 OP 250 PS 637: Performed by: EMERGENCY MEDICINE

## 2020-12-27 RX ADMIN — Medication 0.25 MG: at 09:36

## 2020-12-27 RX ADMIN — DOCUSATE SODIUM 100 MG: 100 CAPSULE, LIQUID FILLED ORAL at 19:39

## 2020-12-27 RX ADMIN — TRAZODONE HYDROCHLORIDE 50 MG: 50 TABLET ORAL at 21:36

## 2020-12-27 RX ADMIN — DOCUSATE SODIUM 100 MG: 100 CAPSULE, LIQUID FILLED ORAL at 09:36

## 2020-12-27 RX ADMIN — HYDROXYZINE HYDROCHLORIDE 25 MG: 25 TABLET, FILM COATED ORAL at 02:43

## 2020-12-27 RX ADMIN — CALCIUM POLYCARBOPHIL 625 MG: 625 TABLET, FILM COATED ORAL at 09:36

## 2020-12-27 RX ADMIN — ALUMINUM HYDROXIDE, MAGNESIUM HYDROXIDE, AND SIMETHICONE 30 ML: 200; 200; 20 SUSPENSION ORAL at 19:39

## 2020-12-27 RX ADMIN — MULTIPLE VITAMINS W/ MINERALS TAB 1 TABLET: TAB at 09:36

## 2020-12-27 RX ADMIN — CARIPRAZINE 4.5 MG: 1.5 CAPSULE, GELATIN COATED ORAL at 09:35

## 2020-12-27 RX ADMIN — DIVALPROEX SODIUM 1000 MG: 500 TABLET, DELAYED RELEASE ORAL at 21:36

## 2020-12-27 RX ADMIN — BENZTROPINE MESYLATE 0.5 MG: 0.5 TABLET ORAL at 21:37

## 2020-12-27 RX ADMIN — HYDROXYZINE HYDROCHLORIDE 25 MG: 25 TABLET, FILM COATED ORAL at 14:51

## 2020-12-27 RX ADMIN — IBUPROFEN 400 MG: 200 TABLET, FILM COATED ORAL at 02:42

## 2020-12-27 RX ADMIN — Medication 25 MCG: at 09:35

## 2020-12-27 RX ADMIN — HYDROXYZINE HYDROCHLORIDE 25 MG: 25 TABLET, FILM COATED ORAL at 19:40

## 2020-12-27 ASSESSMENT — ACTIVITIES OF DAILY LIVING (ADL)
DRESS: SCRUBS (BEHAVIORAL HEALTH)
HYGIENE/GROOMING: INDEPENDENT
ORAL_HYGIENE: INDEPENDENT
LAUNDRY: WITH SUPERVISION

## 2020-12-27 NOTE — PLAN OF CARE
"    Problem: Adult Inpatient Plan of Care  Goal: Optimal Comfort and Wellbeing  Outcome: Improving  Pt out in the milieu socializing with peers. Took a shower. Ate meals. Requested for Hydroxyzine for anxiety and Maalox for gas at 1941. Appeared as if in a  good mood, observed singing songs in the lounge.  During check in,  Pt was upset stating, \"I am so mad with the Governor of [place] and the owner of [facility] because they put me here! You can write that in your notes.\"  Pt's mood seemed to fluctuate throughout the shift. Also told this writer that he did not want his Vlaylar medication tonight. Informed that the med is not scheduled for toningt. Pt went to bed early tonight. Denies SI, med compliant. Went to bed early.     "

## 2020-12-27 NOTE — PROGRESS NOTES
Patient slept 4.5 hours overnight. Patient unable to remain asleep. Patient making innocuous conversation with staff. Friendly upon approach.

## 2020-12-27 NOTE — PROGRESS NOTES
"Pt asked to speak with writer in his room. Wanted to discuss \"Geodon\" medication. States he has taken this medication before and did not get a chance to try it long term. States he has used all kinds of antipsychotics and thinks he has become tolerant of them. Requesting if he could get a monthly injection of antipsychotics upon discharge. He believes Zyprexa does control symptoms but also causes weight gain and Diabetes for those who are borderline so the side effects are undesirable. He want to gain weight while here and lose it after discharge. Encouraged to discuss these concerns with the team on Monday. Pleasant. Denies SI/SIB/HI. In and out of his room intermittently for short times. Continues to endorse poor sleep. Requested for Hydroxyzine for anxiety at 2025. Ate dinner. No other concerns.  "

## 2020-12-27 NOTE — PLAN OF CARE
"Patient was anxious this shift. He requested Hydroxyzine prn and was given 25 mg as ordered. Patient showed this writer his right hand and said, \"See this dry skin, I think it's from the medication [Vraylar] that they put me on. I'd have bed sores if I couldn't get out of bed.\" Patient was out in the milieu most of shift and conversed with other patients. Patient took morning medications without issue. He denied having auditory and visual hallucinations, as well as suicidal ideation. He made a few odd statements that did not make much sense during conversation. Will continue to monitor and assess.  /75 (BP Location: Right arm)   Pulse 76   Temp 98  F (36.7  C)   Resp 16   Wt 85.3 kg (188 lb)   SpO2 98%   BMI 24.14 kg/m      "

## 2020-12-28 PROCEDURE — 250N000013 HC RX MED GY IP 250 OP 250 PS 637: Performed by: PSYCHIATRY & NEUROLOGY

## 2020-12-28 PROCEDURE — 250N000013 HC RX MED GY IP 250 OP 250 PS 637: Performed by: EMERGENCY MEDICINE

## 2020-12-28 PROCEDURE — 250N000013 HC RX MED GY IP 250 OP 250 PS 637: Performed by: PHYSICIAN ASSISTANT

## 2020-12-28 PROCEDURE — 124N000002 HC R&B MH UMMC

## 2020-12-28 PROCEDURE — 99232 SBSQ HOSP IP/OBS MODERATE 35: CPT | Mod: 95 | Performed by: PSYCHIATRY & NEUROLOGY

## 2020-12-28 PROCEDURE — G0177 OPPS/PHP; TRAIN & EDUC SERV: HCPCS

## 2020-12-28 RX ADMIN — HYDROXYZINE HYDROCHLORIDE 25 MG: 25 TABLET, FILM COATED ORAL at 05:30

## 2020-12-28 RX ADMIN — CARIPRAZINE 4.5 MG: 1.5 CAPSULE, GELATIN COATED ORAL at 08:55

## 2020-12-28 RX ADMIN — MULTIPLE VITAMINS W/ MINERALS TAB 1 TABLET: TAB at 08:55

## 2020-12-28 RX ADMIN — TRAZODONE HYDROCHLORIDE 50 MG: 50 TABLET ORAL at 21:32

## 2020-12-28 RX ADMIN — CALCIUM POLYCARBOPHIL 625 MG: 625 TABLET, FILM COATED ORAL at 08:55

## 2020-12-28 RX ADMIN — BENZTROPINE MESYLATE 0.5 MG: 0.5 TABLET ORAL at 21:32

## 2020-12-28 RX ADMIN — DIVALPROEX SODIUM 1000 MG: 500 TABLET, DELAYED RELEASE ORAL at 21:32

## 2020-12-28 RX ADMIN — HYDROXYZINE HYDROCHLORIDE 25 MG: 25 TABLET, FILM COATED ORAL at 17:56

## 2020-12-28 RX ADMIN — DOCUSATE SODIUM 100 MG: 100 CAPSULE, LIQUID FILLED ORAL at 20:05

## 2020-12-28 RX ADMIN — Medication 25 MCG: at 08:55

## 2020-12-28 RX ADMIN — Medication 0.25 MG: at 08:55

## 2020-12-28 RX ADMIN — DOCUSATE SODIUM 100 MG: 100 CAPSULE, LIQUID FILLED ORAL at 08:55

## 2020-12-28 ASSESSMENT — ACTIVITIES OF DAILY LIVING (ADL)
DRESS: SCRUBS (BEHAVIORAL HEALTH);STREET CLOTHES
ORAL_HYGIENE: INDEPENDENT;PROMPTS
HYGIENE/GROOMING: PROMPTS;INDEPENDENT
LAUNDRY: WITH SUPERVISION

## 2020-12-28 NOTE — PROGRESS NOTES
"Observed to have slept  Well till approx. 0215 at which time pt. Awakened for toileting.  Out to nurses station pleasantly requesting a snack:  Had briseyda crackers and pudding.  Quite engaging in conversation, less tangential than on previous nights.  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  Described having been asked to change a pt. Who had wet himself. Endorses that the pt.  as he was finishing changing him.  \" Right then I knew that I never wanted to be a Dr\". [facility] then ret'd to room/bed however remained wakeful and  in a jovial mood.  Could be frequently overheard   Singing from his room.  Denied any discomforts/needs.  Declined offer for prns to aide sleep.  Remained wakeful, in /out of room pleasantly conversing w/ staff at intervals in generalized conversation.  Requested hydroxyzine for \"restlessness\".  Administered at 0530.  Back to room/bed reading in an effort to relax and try to sleep a little more.  Medication effective in relieving restlessness although pt. Remained wakeful throughout remainder of shift interacting appropriately w/wakeful peers. Observed to have slept for approx 3.5 hrs overnight.  Safe, therapeutic environment maintained.      "

## 2020-12-28 NOTE — PROGRESS NOTES
[facility]  Psychiatric Progress Note  Hospital Day: 13        Interim History:   The patient's care was discussed with the treatment team during the daily team meeting and/or staff's chart notes were reviewed.  Staff report patient continues to appear disorganized.    Upon interview, the patient reported multiple somatic complaints. He also indicated that he still had hypersexual thoughts. He believes that the Vraylar is contributing to, not diminishing, these thoughts, but after further conversation, he is more hopeful that it will help him and agrees with plan to continue.    Psychiatric Symptoms: somatic complaints, hypersexual thoughts, disorganization    Medication side effects reported: none    Other issues reported by patient: none         Medications:       benztropine  0.5 mg Oral At Bedtime     calcium polycarbophil  625 mg Oral Daily with lunch     cariprazine  4.5 mg Oral Daily     clonazePAM  0.25 mg Oral Daily     divalproex sodium delayed-release  1,000 mg Oral At Bedtime     docusate sodium  100 mg Oral BID     multivitamin w/minerals  1 tablet Oral Daily     traZODone  50 mg Oral At Bedtime     Vitamin D3  25 mcg Oral Daily          Allergies:     Allergies   Allergen Reactions     Animal Dander           Labs:   No results found for this or any previous visit (from the past 48 hour(s)).       Psychiatric Examination:     /74   Pulse 83   Temp 98.4  F (36.9  C) (Oral)   Resp 16   Wt 85.3 kg (188 lb)   SpO2 95%   BMI 24.14 kg/m    Weight is 188 lbs 0 oz  Body mass index is 24.14 kg/m .    Orthostatic Vitals       Most Recent      Sitting Orthostatic /74 12/28 0906    Sitting Orthostatic Pulse (bpm) 83 12/28 0906    Standing Orthostatic /78 12/28 0906    Standing Orthostatic Pulse (bpm) 91 12/28 0906            Appearance: awake, alert, dressed in hospital scrubs and slightly unkempt  Attitude:  cooperative  Eye Contact:  intense  Mood:  anxious  Affect:  intensity is  heightened  Speech:  pressured speech  Language: fluent and intact in English  Psychomotor, Gait, Musculoskeletal:  no evidence of tardive dyskinesia, dystonia, or tics and intact station, gait and muscle tone  Throught Process:  disorganized  Associations:  no loose associations  Thought Content:  hypersexual thoughts  Insight:  limited  Judgement:  fair  Oriented to:  time, person, and place  Attention Span and Concentration:  intact  Recent and Remote Memory:  intact  Fund of Knowledge:  appropriate    Clinical Global Impressions  First:  Considering your total clinical experience with this particular patient population, how severe are the patient's symptoms at this time?: 7 (12/16/20 0544)  Compared to the patient's condition at the START of treatment, this patient's condition is: 4 (12/16/20 0544)  Most recent:  Considering your total clinical experience with this particular patient population, how severe are the patient's symptoms at this time?: 7 (12/28/20 1318)  Compared to the patient's condition at the START of treatment, this patient's condition is: 4 (12/28/20 1318)           Precautions:     Behavioral Orders   Procedures     Code 1 - Restrict to Unit     Code 2     imaging     Routine Programming     As clinically indicated     Single Room     Status 15     Every 15 minutes.          Diagnoses:      Bipolar disorder, type 1, current episode mixed with concern for psychosis symptoms  Generalized anxiety by history  History of ADHD  Stimulant abuse by history   R/O psychosis and mood disorder 2/2 medical condition (Metastatic colon cancer)            Assessment & Plan:       Target psychiatric symptoms and interventions:  Lori and psychosis  -continue Vraylar at 4.5 mg daily. Consider increase to 6 mg if symptom improvement stalls    Continue other medications    Medical Problems and Treatments:  Continue current medications    Behavioral/Psychological/Social:  Encourage unit  programming        Disposition Plan   Reason for ongoing admission: is unable to care for self due to severe psychosis or carlos  Discharge location: home with self-care  Discharge Medications: not ordered  Follow-up Appointments: scheduled  Legal Status: voluntary  Entered by: Los Hayes on 12/28/2020 at 1:18 PM            Video-Visit Details    Type of service:  Video Visit    Video Start Time (time video started): 1326    Video End Time (time video stopped): 1340    Originating Location (pt. Location): Other [facility]    Distant Location (provider location): Provider remote location    Mode of Communication:  Video Conference via [employer]    Physician has received verbal consent for a Video Visit from the patient? Yes      Los Hayes MD

## 2020-12-28 NOTE — PROGRESS NOTES
Solo  attended 3 of 3 OT groups today. No major changes. Agreeable with long-winded, tangential speech. Easily distracted and at times confused. He made a tie dye t-shirt during OT clinic this a.m. & participated in a creative writing group this afternoon.      12/28/20 1400   Occupational Therapy   Type of Intervention structured groups   Response Initiates, socially acceptable   Hours 3

## 2020-12-28 NOTE — PLAN OF CARE
"  Problem: Adult Inpatient Plan of Care  Goal: Plan of Care Review  Outcome: No Change  Flowsheets (Taken 12/28/2020 1443)  Plan of Care Reviewed With: patient  Progress: no change     Pt denies SI/SIB. Pt's speech is tangential and rambling. Pt did not answer whether or not he experiences hallucinations. Pt is otherwise social and pleasant with peers. Pt napped during some parts of the day and attended community meeting.    Appetite = eating and tolerating meals    Sleep = pt reported he woke up in the middle of night. Pt reported he had a dream for the \"first time in a long time\" after taking PRN hydroxyzine from overnight    Medication side effects = denies    ADLs = independent; no shower this shift    Recommendation  Continue with plan of care.   "

## 2020-12-29 PROCEDURE — 250N000013 HC RX MED GY IP 250 OP 250 PS 637: Performed by: PSYCHIATRY & NEUROLOGY

## 2020-12-29 PROCEDURE — G0177 OPPS/PHP; TRAIN & EDUC SERV: HCPCS

## 2020-12-29 PROCEDURE — 250N000013 HC RX MED GY IP 250 OP 250 PS 637: Performed by: PHYSICIAN ASSISTANT

## 2020-12-29 PROCEDURE — H2032 ACTIVITY THERAPY, PER 15 MIN: HCPCS

## 2020-12-29 PROCEDURE — 250N000013 HC RX MED GY IP 250 OP 250 PS 637: Performed by: EMERGENCY MEDICINE

## 2020-12-29 PROCEDURE — 99232 SBSQ HOSP IP/OBS MODERATE 35: CPT | Mod: 95 | Performed by: PSYCHIATRY & NEUROLOGY

## 2020-12-29 PROCEDURE — 124N000002 HC R&B MH UMMC

## 2020-12-29 RX ADMIN — DIVALPROEX SODIUM 1000 MG: 500 TABLET, DELAYED RELEASE ORAL at 21:53

## 2020-12-29 RX ADMIN — HYDROXYZINE HYDROCHLORIDE 25 MG: 25 TABLET, FILM COATED ORAL at 12:14

## 2020-12-29 RX ADMIN — HYDROXYZINE HYDROCHLORIDE 25 MG: 25 TABLET, FILM COATED ORAL at 18:50

## 2020-12-29 RX ADMIN — TRAZODONE HYDROCHLORIDE 50 MG: 50 TABLET ORAL at 21:53

## 2020-12-29 RX ADMIN — BENZTROPINE MESYLATE 0.5 MG: 0.5 TABLET ORAL at 21:53

## 2020-12-29 RX ADMIN — Medication 25 MCG: at 08:08

## 2020-12-29 RX ADMIN — DOCUSATE SODIUM 100 MG: 100 CAPSULE, LIQUID FILLED ORAL at 20:57

## 2020-12-29 RX ADMIN — HYDROXYZINE HYDROCHLORIDE 25 MG: 25 TABLET, FILM COATED ORAL at 02:22

## 2020-12-29 RX ADMIN — CALCIUM POLYCARBOPHIL 625 MG: 625 TABLET, FILM COATED ORAL at 08:07

## 2020-12-29 RX ADMIN — CARIPRAZINE 4.5 MG: 1.5 CAPSULE, GELATIN COATED ORAL at 08:07

## 2020-12-29 RX ADMIN — MULTIPLE VITAMINS W/ MINERALS TAB 1 TABLET: TAB at 08:07

## 2020-12-29 RX ADMIN — ALUMINUM HYDROXIDE, MAGNESIUM HYDROXIDE, AND SIMETHICONE 30 ML: 200; 200; 20 SUSPENSION ORAL at 13:17

## 2020-12-29 RX ADMIN — Medication 0.25 MG: at 08:07

## 2020-12-29 RX ADMIN — DOCUSATE SODIUM 100 MG: 100 CAPSULE, LIQUID FILLED ORAL at 08:07

## 2020-12-29 NOTE — PLAN OF CARE
"  Problem: Behavior Regulation Impairment (Psychotic Signs/Symptoms)  Goal: Improved Behavioral Control (Psychotic Signs/Symptoms)  Outcome: Improving   Patient has been up and visible in the milieu.  Quite talkative with a peer, and also for interview with this writer.  Somewhat rambling.   Denies SI/SIB.  When asked if hearing voices, he was somewhat ambiguous and talked about hearing voices before he came to the hospital.  Reports sleep was \"ok\" but was up \"at least once\".  Denies concerns regarding appetite.  Is concerned about some side effects of Vraylar--specifically that he does not have good veins for blood draws in his right arm.  Pleasant and cooperative.   "

## 2020-12-29 NOTE — PROGRESS NOTES
"Rec'd pt. Sleeping well w/o complaints or noted distress as shift commenced.  Respirations regular and unlabored.  Wakeful , up to BR for voiding at approx 0215, then out to nurses station requesting hydroxyzine \"to prevent on-coming anxiety; I want to stop it before it starts\". Med given as ordered with effectiveness.  Briefly ret'd to sleep though wakeful again at approx 0330 w/ c/o \"cramp in left leg\" . Resolved with ambulation.  Requested and provided a snack , then ret'd to bed in effort to try and sleep again. Unable to return to sleep.  Spent quiet time in lounge area . Observed to have slept for approx 5.5. hrs overnight.  Safe, therapeutic environment maintained.   "

## 2020-12-29 NOTE — PROGRESS NOTES
[facility]  Psychiatric Progress Note  Hospital Day: 14        Interim History:   The patient's care was discussed with the treatment team during the daily team meeting and/or staff's chart notes were reviewed.  Staff report patient continues to be tangential and disorganized.    Upon interview, the patient was quite paranoid. He first spoke of belief that some of the patients here have been in the hospital long enough to now be staff. Any topic he discussed returned to him being kept in an institution against his will. He states that the television is monitoring his thoughts and what he blogs about on the internet. Patient also mentioned that he thought about flying to [place] when he leaves.     Psychiatric Symptoms: somatic complaints, hypersexual thoughts, disorganization, paranoid thoughts    Medication side effects reported: none    Other issues reported by patient: none         Medications:       benztropine  0.5 mg Oral At Bedtime     calcium polycarbophil  625 mg Oral Daily with lunch     cariprazine  4.5 mg Oral Daily     clonazePAM  0.25 mg Oral Daily     divalproex sodium delayed-release  1,000 mg Oral At Bedtime     docusate sodium  100 mg Oral BID     multivitamin w/minerals  1 tablet Oral Daily     traZODone  50 mg Oral At Bedtime     Vitamin D3  25 mcg Oral Daily          Allergies:     Allergies   Allergen Reactions     Animal Dander           Labs:   No results found for this or any previous visit (from the past 48 hour(s)).            Psychiatric Examination:     /77   Pulse 94   Temp 97.8  F (36.6  C) (Tympanic)   Resp 14   Wt 85.3 kg (188 lb)   SpO2 94%   BMI 24.14 kg/m    Weight is 188 lbs 0 oz  Body mass index is 24.14 kg/m .                Appearance: awake, alert, dressed in hospital scrubs and slightly unkempt  Attitude:  cooperative  Eye Contact:  intense  Mood:  anxious  Affect:  intensity is heightened  Speech:  pressured speech  Language: fluent and intact in  English  Psychomotor, Gait, Musculoskeletal:  no evidence of tardive dyskinesia, dystonia, or tics and intact station, gait and muscle tone  Throught Process:  disorganized  Associations:  no loose associations  Thought Content:  hypersexual thoughts  Insight:  limited  Judgement:  fair  Oriented to:  time, person, and place  Attention Span and Concentration:  intact  Recent and Remote Memory:  intact  Fund of Knowledge:  appropriate    Clinical Global Impressions  First:  Considering your total clinical experience with this particular patient population, how severe are the patient's symptoms at this time?: 7 (12/16/20 0544)  Compared to the patient's condition at the START of treatment, this patient's condition is: 4 (12/16/20 0544)  Most recent:  Considering your total clinical experience with this particular patient population, how severe are the patient's symptoms at this time?: 7 (12/28/20 1318)  Compared to the patient's condition at the START of treatment, this patient's condition is: 4 (12/28/20 1318)           Precautions:     Behavioral Orders   Procedures     Code 1 - Restrict to Unit     Code 2     imaging     Routine Programming     As clinically indicated     Single Room     Status 15     Every 15 minutes.          Diagnoses:      Bipolar disorder, type 1, current episode mixed with concern for psychosis symptoms  Generalized anxiety by history  History of ADHD  Stimulant abuse by history   R/O psychosis and mood disorder 2/2 medical condition (Metastatic colon cancer)            Assessment & Plan:       Target psychiatric symptoms and interventions:  Lori and psychosis  -increase Vraylar to 6 mg    Continue other medications    Medical Problems and Treatments:  Continue current medications    Behavioral/Psychological/Social:  Encourage unit programming        Disposition Plan   Reason for ongoing admission: is unable to care for self due to severe psychosis or lori  Discharge location: home with  self-care  Discharge Medications: not ordered  Follow-up Appointments: scheduled  Legal Status: voluntary  Entered by: Los Hayes on 12/29/2020 at 11:34 AM            Video-Visit Details    Type of service:  Video Visit    Video Start Time (time video started): 1133    Video End Time (time video stopped): 1200    Originating Location (pt. Location): Other [facility]    Distant Location (provider location): Provider remote location    Mode of Communication:  Video Conference via [employer]    Physician has received verbal consent for a Video Visit from the patient? Yes      Los Hayes MD

## 2020-12-30 PROCEDURE — 99233 SBSQ HOSP IP/OBS HIGH 50: CPT | Mod: 95 | Performed by: PSYCHIATRY & NEUROLOGY

## 2020-12-30 PROCEDURE — 124N000002 HC R&B MH UMMC

## 2020-12-30 PROCEDURE — 250N000013 HC RX MED GY IP 250 OP 250 PS 637: Performed by: EMERGENCY MEDICINE

## 2020-12-30 PROCEDURE — 250N000013 HC RX MED GY IP 250 OP 250 PS 637: Performed by: PSYCHIATRY & NEUROLOGY

## 2020-12-30 PROCEDURE — G0177 OPPS/PHP; TRAIN & EDUC SERV: HCPCS

## 2020-12-30 PROCEDURE — 250N000013 HC RX MED GY IP 250 OP 250 PS 637: Performed by: PHYSICIAN ASSISTANT

## 2020-12-30 RX ADMIN — CARIPRAZINE 6 MG: 1.5 CAPSULE, GELATIN COATED ORAL at 09:15

## 2020-12-30 RX ADMIN — DIVALPROEX SODIUM 1000 MG: 500 TABLET, DELAYED RELEASE ORAL at 21:13

## 2020-12-30 RX ADMIN — TRAZODONE HYDROCHLORIDE 50 MG: 50 TABLET ORAL at 21:13

## 2020-12-30 RX ADMIN — DOCUSATE SODIUM 100 MG: 100 CAPSULE, LIQUID FILLED ORAL at 21:12

## 2020-12-30 RX ADMIN — MULTIPLE VITAMINS W/ MINERALS TAB 1 TABLET: TAB at 09:15

## 2020-12-30 RX ADMIN — Medication 25 MCG: at 09:15

## 2020-12-30 RX ADMIN — TRAZODONE HYDROCHLORIDE 50 MG: 50 TABLET ORAL at 02:21

## 2020-12-30 RX ADMIN — IBUPROFEN 400 MG: 200 TABLET, FILM COATED ORAL at 16:16

## 2020-12-30 RX ADMIN — HYDROXYZINE HYDROCHLORIDE 25 MG: 25 TABLET, FILM COATED ORAL at 02:21

## 2020-12-30 RX ADMIN — Medication 0.25 MG: at 09:15

## 2020-12-30 RX ADMIN — BENZTROPINE MESYLATE 0.5 MG: 0.5 TABLET ORAL at 21:13

## 2020-12-30 RX ADMIN — DOCUSATE SODIUM 100 MG: 100 CAPSULE, LIQUID FILLED ORAL at 09:15

## 2020-12-30 NOTE — PLAN OF CARE
"Solo checked into Music Therapy group feeling \"scared and worried\", though he appeared comfortable and at ease sharing in group.  Intervention was processing current issues through song representation in the group setting.  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  He requested the song Carley Patton by , which Music Therapist playing, strategically skipping past the suicidal line in the beginning, to which Solo said, \"Hey!  You skipped it! That's the line I was waiting to hear and the reason I chose this song!\"  He recovered from his disappointment quickly, and was accepting of this redirection.  Would recommend staff to possibly continue to check in on this during his suicide checks/questions.  He was very talkative to MT at the end of session, presenting as somewhat \"loose\"/very open and not contained.  Then he abruptly stopped talking when his food arrived.    "

## 2020-12-30 NOTE — PROGRESS NOTES
Pt was visible in the milieu socializing with staff and peers. Pt ate his meals and relaxed in the dining area. Pt was bright upon approach. Pt spent some time pacing in his room. Pt attended OT group.

## 2020-12-30 NOTE — PLAN OF CARE
Pt received in bed sleeping at start of shift. Breathing quiet and unlabored. Out sitting in the lounge for a short time around 0200. Chatted with staff for a couple minutes before going back to sleep. Out again at 0500 to have a snack and returned to his room.     Appeared to be sleeping for 6.25 hours during the night.     Single room order with no additional precautions ordered.    Will continue to monitor and assess.       Problem: Sleep Disturbance  Goal: Adequate Sleep/Rest  Outcome: Improving

## 2020-12-30 NOTE — PROGRESS NOTES
Pt spent most of the shift in his room, he did not initiate or engage in conversation with peers or staff. He ate meals and displayed a flat and sad affect. He did however attend music therapy.

## 2020-12-30 NOTE — PLAN OF CARE
"Problem: OT General Care Plan  Goal: OT Goal 1    Pt attended 1 out of 3 OT groups offered. Pt actively participated in a structured occupational therapy group of 3 patients total x45 minutes with a focus on identifying and prioritizing personal values. Pt contributed to a group discussion that facilitated self-reflection and application of personal values. Using a list of values, pt identified love, friends, free time, calmness, and relaxation (\"because I'm always churning on the inside and feeling nervous\") as his most important values. Responses to prompts were hyperverbal and tangential, though generally appropriate to topic. Pleasant, cooperative, and engaged.        "

## 2020-12-30 NOTE — PLAN OF CARE
BEHAVIORAL TEAM DISCUSSION    Participants:   Los Hayes MD Psychiatrist   Harleen Calhoun MSW, [facility] Clinical Treatment Coordinator  Magaly Aldrich RN  Progress: symptoms continue to be present  Anticipated length of stay: assessment ongoing  Continued Stay Criteria/Rationale: symptoms of psychosis and carlos present requiring ongoing medication management and assessment.   Medical/Physical: continue with scheduled medications -medical consults obtained - 12/16/20   - history of metastatic colon cancer with mets to liver  Precautions:   Behavioral Orders   Procedures    Code 1 - Restrict to Unit    Code 2     imaging    Routine Programming     As clinically indicated    Single Room    Status 15     Every 15 minutes.     Plan: continue with medication management and assessment on unit. patient will benefit from referral for community mental health . Consider referrals for increased social supports such a day treatment programing.   Rationale for change in precautions or plan: per ongoing assessment.

## 2020-12-30 NOTE — PROGRESS NOTES
Pt was out in the milieu and back to his room. Socialized with peers briefly, ate meals and was med compliant. Attended OT group. Pt took prn Hydroxyzine. Pt denies adverse effects from medication. Denies SI/SIB/AVH.

## 2020-12-30 NOTE — PLAN OF CARE
Work Completed  - chart review  - team meeting  - behavioral team discussion note completed for weekly plan of care.     Discharge plan or goal  Assessment ongoing for appropriate discharge plan    Barriers to discharge  Patient requires further psychiatric stabilization

## 2020-12-30 NOTE — PROGRESS NOTES
"[facility]  Psychiatric Progress Note  Hospital Day: 15        Interim History:   The patient's care was discussed with the treatment team during the daily team meeting and/or staff's chart notes were reviewed.  Staff report patient continues to show some delusional and disorganized thoughts.    Upon interview, the patient reports that he is waiting on Isaak to walk onto the unit and make fun of him. The patient reports that he is scared and that he has been acting in ways that are unusual for him. \"If I unscramble the word scared, I get sacred.\" He then indicates that someone on the unit is trying to send him a message. The patient speaks of Bohemian Rhapsody by Queen and connects that to Zeke Oneal, the  because \"Zeke Get-man\".     Psychiatric Symptoms: disorganization, paranoid thoughts, ideas of reference, loose associations    Medication side effects reported: none    Other issues reported by patient: none         Medications:       benztropine  0.5 mg Oral At Bedtime     calcium polycarbophil  625 mg Oral Daily with lunch     cariprazine  6 mg Oral Daily     clonazePAM  0.25 mg Oral Daily     divalproex sodium delayed-release  1,000 mg Oral At Bedtime     docusate sodium  100 mg Oral BID     multivitamin w/minerals  1 tablet Oral Daily     traZODone  50 mg Oral At Bedtime     Vitamin D3  25 mcg Oral Daily          Allergies:     Allergies   Allergen Reactions     Animal Dander           Labs:   No results found for this or any previous visit (from the past 48 hour(s)).            Psychiatric Examination:     /69 (BP Location: Left arm)   Pulse 78   Temp 98  F (36.7  C) (Oral)   Resp 14   Wt 85.3 kg (188 lb)   SpO2 96%   BMI 24.14 kg/m    Weight is 188 lbs 0 oz  Body mass index is 24.14 kg/m .                Appearance: awake, alert, dressed in hospital scrubs and slightly unkempt  Attitude:  cooperative  Eye Contact:  good  Mood:  better  Affect:  intensity is " normal  Speech:  clear, coherent  Language: fluent and intact in English  Psychomotor, Gait, Musculoskeletal:  no evidence of tardive dyskinesia, dystonia, or tics and intact station, gait and muscle tone  Throught Process:  disorganized, illogical and tangental  Associations:  loosening of associations present  Thought Content:  some Muslim thinking, delusional thinking  Insight:  limited  Judgement:  fair  Oriented to:  time, person, and place  Attention Span and Concentration:  intact  Recent and Remote Memory:  intact  Fund of Knowledge:  appropriate    Clinical Global Impressions  First:  Considering your total clinical experience with this particular patient population, how severe are the patient's symptoms at this time?: 7 (12/16/20 0544)  Compared to the patient's condition at the START of treatment, this patient's condition is: 4 (12/16/20 0544)  Most recent:  Considering your total clinical experience with this particular patient population, how severe are the patient's symptoms at this time?: 7 (12/28/20 1318)  Compared to the patient's condition at the START of treatment, this patient's condition is: 4 (12/28/20 1318)           Precautions:     Behavioral Orders   Procedures     Code 1 - Restrict to Unit     Code 2     imaging     Routine Programming     As clinically indicated     Single Room     Status 15     Every 15 minutes.          Diagnoses:      Bipolar disorder, type 1, current episode mixed with concern for psychosis symptoms  Generalized anxiety by history  History of ADHD  Stimulant abuse by history   R/O psychosis and mood disorder 2/2 medical condition (Metastatic colon cancer)            Assessment & Plan:       Target psychiatric symptoms and interventions:  Lori and psychosis  -increased Vraylar to 6 mg (first day 12/30)    Continue other medications    Medical Problems and Treatments:  Continue current medications    Behavioral/Psychological/Social:  Encourage unit  programming      Disposition Plan   Reason for ongoing admission: is unable to care for self due to severe psychosis or carlos  Discharge location: home with self-care  Discharge Medications: not ordered  Follow-up Appointments: scheduled  Legal Status: voluntary  Entered by: Los Hayes on 12/30/2020 at 12:56 PM            Video-Visit Details    Type of service:  Video Visit    Video Start Time (time video started): 1256    Video End Time (time video stopped): 1311    Originating Location (pt. Location): Other [facility]    Distant Location (provider location): Provider remote location    Mode of Communication:  Video Conference via [employer]    Physician has received verbal consent for a Video Visit from the patient? Yes      Los Hayes MD

## 2020-12-31 PROCEDURE — 99232 SBSQ HOSP IP/OBS MODERATE 35: CPT | Mod: 95 | Performed by: PSYCHIATRY & NEUROLOGY

## 2020-12-31 PROCEDURE — 250N000013 HC RX MED GY IP 250 OP 250 PS 637: Performed by: PSYCHIATRY & NEUROLOGY

## 2020-12-31 PROCEDURE — 250N000013 HC RX MED GY IP 250 OP 250 PS 637: Performed by: PHYSICIAN ASSISTANT

## 2020-12-31 PROCEDURE — 250N000013 HC RX MED GY IP 250 OP 250 PS 637: Performed by: EMERGENCY MEDICINE

## 2020-12-31 PROCEDURE — 124N000002 HC R&B MH UMMC

## 2020-12-31 RX ORDER — QUETIAPINE FUMARATE 50 MG/1
50 TABLET, FILM COATED ORAL
Status: DISCONTINUED | OUTPATIENT
Start: 2020-12-31 | End: 2021-01-11 | Stop reason: HOSPADM

## 2020-12-31 RX ADMIN — BENZTROPINE MESYLATE 0.5 MG: 0.5 TABLET ORAL at 21:19

## 2020-12-31 RX ADMIN — IBUPROFEN 400 MG: 200 TABLET, FILM COATED ORAL at 16:40

## 2020-12-31 RX ADMIN — Medication 25 MCG: at 09:06

## 2020-12-31 RX ADMIN — MULTIPLE VITAMINS W/ MINERALS TAB 1 TABLET: TAB at 09:06

## 2020-12-31 RX ADMIN — HYDROXYZINE HYDROCHLORIDE 25 MG: 25 TABLET, FILM COATED ORAL at 16:40

## 2020-12-31 RX ADMIN — DOCUSATE SODIUM 100 MG: 100 CAPSULE, LIQUID FILLED ORAL at 21:19

## 2020-12-31 RX ADMIN — DOCUSATE SODIUM 100 MG: 100 CAPSULE, LIQUID FILLED ORAL at 09:06

## 2020-12-31 RX ADMIN — DIVALPROEX SODIUM 1000 MG: 500 TABLET, DELAYED RELEASE ORAL at 21:19

## 2020-12-31 RX ADMIN — QUETIAPINE FUMARATE 50 MG: 50 TABLET ORAL at 21:22

## 2020-12-31 RX ADMIN — CARIPRAZINE 6 MG: 1.5 CAPSULE, GELATIN COATED ORAL at 09:06

## 2020-12-31 RX ADMIN — Medication 0.25 MG: at 09:06

## 2020-12-31 NOTE — PROGRESS NOTES
patient reports excitement about his new sleep medication, reporting that an inability to sleep has made his life much more difficult. Otherwise, he reports a fairly positive day and a sense of acceptance in that he will leave when and how the doctor decides.

## 2020-12-31 NOTE — PROGRESS NOTES
"[facility]  Psychiatric Progress Note  Hospital Day: 16        Interim History:   The patient's care was discussed with the treatment team during the daily team meeting and/or staff's chart notes were reviewed.  Staff report patient hasn't demonstrated much change.    Upon interview, the patient indicates he only got 4 hours of sleep. He initially said he wanted his meds at 11 PM, but reasoning for this wasn't clear. He agreed to changing trazodone to Seroquel PRN. He talked about \"something going on\" on the unit. He then indicated that everyone knows his whole history, this dates back to his time in [place]. He again speaks of Isaak making fun of him and wonders if other patients will think he's God.    The patient ended the interview by saying, \"as long as no space ships land where I live, I'll be okay.\"    Psychiatric Symptoms: disorganization, paranoid thoughts, ideas of reference, loose associations    Medication side effects reported: none    Other issues reported by patient: none         Medications:       benztropine  0.5 mg Oral At Bedtime     calcium polycarbophil  625 mg Oral Daily with lunch     cariprazine  6 mg Oral Daily     clonazePAM  0.25 mg Oral Daily     divalproex sodium delayed-release  1,000 mg Oral At Bedtime     docusate sodium  100 mg Oral BID     multivitamin w/minerals  1 tablet Oral Daily     traZODone  50 mg Oral At Bedtime     Vitamin D3  25 mcg Oral Daily          Allergies:     Allergies   Allergen Reactions     Animal Dander           Labs:   No results found for this or any previous visit (from the past 48 hour(s)).            Psychiatric Examination:     /64   Pulse 82   Temp 97.5  F (36.4  C)   Resp 14   Wt 85.3 kg (188 lb)   SpO2 95%   BMI 24.14 kg/m    Weight is 188 lbs 0 oz  Body mass index is 24.14 kg/m .                Appearance: awake, alert, dressed in hospital scrubs and slightly unkempt  Attitude:  cooperative  Eye Contact:  good  Mood:  " better  Affect:  intensity is normal  Speech:  clear, coherent  Language: fluent and intact in English  Psychomotor, Gait, Musculoskeletal:  no evidence of tardive dyskinesia, dystonia, or tics and intact station, gait and muscle tone  Throught Process:  disorganized, illogical and tangental  Associations:  loosening of associations present  Thought Content:  some Scientologist thinking, delusional thinking  Insight:  limited  Judgement:  fair  Oriented to:  time, person, and place  Attention Span and Concentration:  intact  Recent and Remote Memory:  intact  Fund of Knowledge:  appropriate    Clinical Global Impressions  First:  Considering your total clinical experience with this particular patient population, how severe are the patient's symptoms at this time?: 7 (12/16/20 0544)  Compared to the patient's condition at the START of treatment, this patient's condition is: 4 (12/16/20 0544)  Most recent:  Considering your total clinical experience with this particular patient population, how severe are the patient's symptoms at this time?: 7 (12/28/20 1318)  Compared to the patient's condition at the START of treatment, this patient's condition is: 4 (12/28/20 1318)           Precautions:     Behavioral Orders   Procedures     Code 1 - Restrict to Unit     Code 2     imaging     Routine Programming     As clinically indicated     Single Room     Status 15     Every 15 minutes.          Diagnoses:      Bipolar disorder, type 1, current episode mixed with concern for psychosis symptoms  Generalized anxiety by history  History of ADHD  Stimulant abuse by history   R/O psychosis and mood disorder 2/2 medical condition (Metastatic colon cancer)            Assessment & Plan:       Target psychiatric symptoms and interventions:  Lori and psychosis  -increased Vraylar to 6 mg (first day 12/30)    Discontinue trazodone due to concerns it is worsening Lori  Trial of Seroquel 50 mg at bedtime PRN     Medical Problems and  Treatments:  Continue current medications    Behavioral/Psychological/Social:  Encourage unit programming      Disposition Plan   Reason for ongoing admission: is unable to care for self due to severe psychosis or carlos  Discharge location: home with self-care  Discharge Medications: not ordered  Follow-up Appointments: scheduled  Legal Status: voluntary  Entered by: Los Hayes on 12/31/2020 at 11:00 AM            Video-Visit Details    Type of service:  Video Visit    Video Start Time (time video started): 1100    Video End Time (time video stopped): 1312    Originating Location (pt. Location): Other [facility]    Distant Location (provider location): Provider remote location    Mode of Communication:  Video Conference via [employer]    Physician has received verbal consent for a Video Visit from the patient? Yes      Los Hayes MD

## 2020-12-31 NOTE — PLAN OF CARE
Pt states he is feeling tired tonight , spent more time in his bed resting , denies si , med compliant . He did wash his clothed tonight

## 2020-12-31 NOTE — PROGRESS NOTES
Patient [id number] overnight. Patient unable to remain asleep. [facility] patient spent time resting in bed. Friendly upon approach.

## 2021-01-01 PROCEDURE — 250N000013 HC RX MED GY IP 250 OP 250 PS 637: Performed by: PHYSICIAN ASSISTANT

## 2021-01-01 PROCEDURE — 250N000013 HC RX MED GY IP 250 OP 250 PS 637: Performed by: PSYCHIATRY & NEUROLOGY

## 2021-01-01 PROCEDURE — G0177 OPPS/PHP; TRAIN & EDUC SERV: HCPCS

## 2021-01-01 PROCEDURE — H2032 ACTIVITY THERAPY, PER 15 MIN: HCPCS

## 2021-01-01 PROCEDURE — 250N000013 HC RX MED GY IP 250 OP 250 PS 637: Performed by: EMERGENCY MEDICINE

## 2021-01-01 PROCEDURE — 124N000002 HC R&B MH UMMC

## 2021-01-01 RX ADMIN — DOCUSATE SODIUM 100 MG: 100 CAPSULE, LIQUID FILLED ORAL at 21:01

## 2021-01-01 RX ADMIN — CARIPRAZINE 6 MG: 1.5 CAPSULE, GELATIN COATED ORAL at 09:02

## 2021-01-01 RX ADMIN — Medication 0.25 MG: at 09:02

## 2021-01-01 RX ADMIN — MULTIPLE VITAMINS W/ MINERALS TAB 1 TABLET: TAB at 09:02

## 2021-01-01 RX ADMIN — Medication 25 MCG: at 09:02

## 2021-01-01 RX ADMIN — BENZTROPINE MESYLATE 0.5 MG: 0.5 TABLET ORAL at 21:01

## 2021-01-01 RX ADMIN — HYDROXYZINE HYDROCHLORIDE 25 MG: 25 TABLET, FILM COATED ORAL at 21:01

## 2021-01-01 RX ADMIN — DOCUSATE SODIUM 100 MG: 100 CAPSULE, LIQUID FILLED ORAL at 09:02

## 2021-01-01 RX ADMIN — DIVALPROEX SODIUM 1000 MG: 500 TABLET, DELAYED RELEASE ORAL at 21:01

## 2021-01-01 ASSESSMENT — ACTIVITIES OF DAILY LIVING (ADL)
HYGIENE/GROOMING: INDEPENDENT
ORAL_HYGIENE: INDEPENDENT
DRESS: SCRUBS (BEHAVIORAL HEALTH)

## 2021-01-01 NOTE — PROGRESS NOTES
01/01/21 1400   Behavioral Health   Hallucinations denies / not responding to hallucinations   Thinking poor concentration   Orientation person: oriented;place: oriented;date: oriented   Memory baseline memory   Insight poor   Judgement impaired   Eye Contact at examiner   Affect full range affect   Mood mood is calm   Physical Appearance/Attire attire appropriate to age and situation   Hygiene well groomed   1. Wish to be Dead (Recent) No   2. Non-Specific Active Suicidal Thoughts (Recent) No       Pt stayed in his room most of the day. Came out for groups and meals. Denied si/sib. Pt mentioned feeling like he gained weight when he looked at himself in the mirror. Pt also mentioned that the new meds he started taking makes him think about the past more often.   Pt said his son was going to bring him jeans when discharged. No concerns.

## 2021-01-01 NOTE — PROGRESS NOTES
"Patient slept 5 hours overnight. Patient basically quiet and keeps to himself. At one point patient asked staff \"who killed my daughter\" and when staff asked for clarification patient walked away. Friendly upon approach.  "

## 2021-01-01 NOTE — PROGRESS NOTES
"Pt was intermittently present in the milieu but remained mostly isolative and socially withdrawn unless interacted with. Pt was observed pacing around room, talking to himself, singing, eating dinner, and laying in bed. Pt presented with a full range affect and remained calm throughout the shift. During staff check-in pt displayed some coherent thought but after some time talking it strayed into tangential/rambling speech patterns filled with delusional content. This  called one one particular piece of delusional content. (e.g. pt stated \"Everyone is watching me on the TV out there.\") After this was challenged the pt displayed some insight asking if it was \"delusional\" but then stated he didn't believe me and became a bit irritated and defensive. I did not press the issue just encouraged the pt to come to staff if he exp[ereinces more of these thoughts or if he becomes anxious and overwhelmed with these thoughts. Pt then denied experiencing SI, HI, or SIB. However, at one point the pt stated \"maybe I should just kill myself\" after denying SI. It appeared as though the pt was saying that he should do this because he believes everyone is watching him on their devices (e.g. cell phones). Although it was quite difficult for this  to track the conversation.  "

## 2021-01-02 PROCEDURE — 250N000013 HC RX MED GY IP 250 OP 250 PS 637: Performed by: PSYCHIATRY & NEUROLOGY

## 2021-01-02 PROCEDURE — 250N000013 HC RX MED GY IP 250 OP 250 PS 637: Performed by: PHYSICIAN ASSISTANT

## 2021-01-02 PROCEDURE — 250N000013 HC RX MED GY IP 250 OP 250 PS 637: Performed by: EMERGENCY MEDICINE

## 2021-01-02 PROCEDURE — 124N000002 HC R&B MH UMMC

## 2021-01-02 RX ADMIN — DOCUSATE SODIUM 100 MG: 100 CAPSULE, LIQUID FILLED ORAL at 09:13

## 2021-01-02 RX ADMIN — DIVALPROEX SODIUM 1000 MG: 500 TABLET, DELAYED RELEASE ORAL at 22:08

## 2021-01-02 RX ADMIN — DOCUSATE SODIUM 100 MG: 100 CAPSULE, LIQUID FILLED ORAL at 22:07

## 2021-01-02 RX ADMIN — HYDROXYZINE HYDROCHLORIDE 25 MG: 25 TABLET, FILM COATED ORAL at 16:23

## 2021-01-02 RX ADMIN — HYDROXYZINE HYDROCHLORIDE 25 MG: 25 TABLET, FILM COATED ORAL at 02:43

## 2021-01-02 RX ADMIN — Medication 25 MCG: at 09:14

## 2021-01-02 RX ADMIN — BENZTROPINE MESYLATE 0.5 MG: 0.5 TABLET ORAL at 22:07

## 2021-01-02 RX ADMIN — Medication 0.25 MG: at 09:13

## 2021-01-02 RX ADMIN — HYDROXYZINE HYDROCHLORIDE 25 MG: 25 TABLET, FILM COATED ORAL at 22:10

## 2021-01-02 RX ADMIN — MULTIPLE VITAMINS W/ MINERALS TAB 1 TABLET: TAB at 09:14

## 2021-01-02 RX ADMIN — IBUPROFEN 400 MG: 200 TABLET, FILM COATED ORAL at 02:43

## 2021-01-02 RX ADMIN — CARIPRAZINE 6 MG: 1.5 CAPSULE, GELATIN COATED ORAL at 09:13

## 2021-01-02 ASSESSMENT — ACTIVITIES OF DAILY LIVING (ADL)
LAUNDRY: WITH SUPERVISION
DRESS: SCRUBS (BEHAVIORAL HEALTH);INDEPENDENT
HYGIENE/GROOMING: INDEPENDENT
ORAL_HYGIENE: INDEPENDENT

## 2021-01-02 NOTE — PLAN OF CARE
Pt participated in a structured Therapeutic Recreation group with a focus on leisure participation, relaxation, and exercise. Pt participated in the guided exercise for the full duration of the group. Pt followed along, engaged in the guided chair exercise routine.  Pt was encouraged to use positive imagery with the deep breathing and stretching to foster relaxation, improves focus, and reduce stress. Throughout the group, pt was often talking about many different topics, showing less focus on the exercises. Pt stated he was more relaxed at the end of the group.

## 2021-01-02 NOTE — PROGRESS NOTES
"Occasionally resting in room otherwise in lounge watching football and talking with staff and peers. At one point, approached staff and stated he was waiting for a call from a famous . He could not elaborate as to why and his attempts to explain became too convoluted to follow. He said he has a son, then states: \"I think I might have a daughter too but I can't remember.\" Pt shaved and attended TR/OT group. He ate his dinner. He denies current HI/SI. His energy level was appropriate as compared to previous evenings when he was observed singing loudly in room and halls.       01/01/21 2200   Psycho Education   Type of Intervention 1:1 intervention   Response participates, initiates socially appropriate   Hours 0.5   Treatment Detail   (check in)   Behavioral Health   Hallucinations denies / not responding to hallucinations   Thinking distractable;delusional   Orientation person: oriented;place: oriented   Insight poor   Judgement impaired   Eye Contact at examiner   Affect blunted, flat   Mood mood is calm   Hygiene other (see comment)  (WNL)   Suicidality other (see comments)  (denies current)   Self Injury other (see comment)  (none observed or reported)   Elopement   (none noted)   Activity other (see comment)  (occasionally resting in room but in milieu much of the eveni)   Speech pressured;rambling   Activities of Daily Living   Hygiene/Grooming independent   Oral Hygiene independent   Dress scrubs (behavioral health)   Room Organization independent     "

## 2021-01-02 NOTE — PROGRESS NOTES
"   01/02/21 1400   Behavioral Health   Thoughts/Cognition (WDL) WDL   Hallucinations denies / not responding to hallucinations   Thinking distractable;poor concentration   Orientation place: oriented   Memory baseline memory   Insight poor   Judgement impaired   Eye Contact at examiner   Affect/Mood (WDL) WDL   Affect blunted, flat   Mood labile   ADL Assessment (WDL) WDL   Physical Appearance/Attire attire appropriate to age and situation   Hygiene other (see comment)   Suicidality (WDL) WDL   Behavior WDL   Behavior WDL X   Interactions appropriate to situation;cooperative   Emotion Mood WDL   Emotion/Mood/Affect WDL X   Affect flat   Emotion/Mood calm   Speech WDL   Speech WDL X   Perceptual State WDL   Perceptual State WDL X   Hallucinations denies hallucinations   Thought Process WDL   Thought Process WDL X   Delusions no delusions   Pt pleasant upon approach. Isolative in his room most of the shift. Pt attributes that to boredom,states\"I don't have anybody to talk to, there is no groups\". Pt mood is calm. Denies all MH issues. Med compliant. Ate all meals. Will continue to monitor  "

## 2021-01-02 NOTE — PROGRESS NOTES
Patient slept 6 hours overnight. Patient spent his awake time resting in bed. Friendly upon approach.

## 2021-01-03 ENCOUNTER — HEALTH MAINTENANCE LETTER (OUTPATIENT)
Age: 66
End: 2021-01-03

## 2021-01-03 PROCEDURE — 250N000013 HC RX MED GY IP 250 OP 250 PS 637: Performed by: PHYSICIAN ASSISTANT

## 2021-01-03 PROCEDURE — 250N000013 HC RX MED GY IP 250 OP 250 PS 637: Performed by: PSYCHIATRY & NEUROLOGY

## 2021-01-03 PROCEDURE — 250N000013 HC RX MED GY IP 250 OP 250 PS 637: Performed by: EMERGENCY MEDICINE

## 2021-01-03 PROCEDURE — 124N000002 HC R&B MH UMMC

## 2021-01-03 RX ADMIN — DOCUSATE SODIUM 100 MG: 100 CAPSULE, LIQUID FILLED ORAL at 08:32

## 2021-01-03 RX ADMIN — CALCIUM POLYCARBOPHIL 625 MG: 625 TABLET, FILM COATED ORAL at 13:40

## 2021-01-03 RX ADMIN — DOCUSATE SODIUM 100 MG: 100 CAPSULE, LIQUID FILLED ORAL at 21:52

## 2021-01-03 RX ADMIN — Medication 25 MCG: at 08:33

## 2021-01-03 RX ADMIN — HYDROXYZINE HYDROCHLORIDE 25 MG: 25 TABLET, FILM COATED ORAL at 16:50

## 2021-01-03 RX ADMIN — MULTIPLE VITAMINS W/ MINERALS TAB 1 TABLET: TAB at 08:32

## 2021-01-03 RX ADMIN — Medication 0.25 MG: at 08:32

## 2021-01-03 RX ADMIN — CARIPRAZINE 6 MG: 1.5 CAPSULE, GELATIN COATED ORAL at 08:32

## 2021-01-03 RX ADMIN — DIVALPROEX SODIUM 1000 MG: 500 TABLET, DELAYED RELEASE ORAL at 21:52

## 2021-01-03 RX ADMIN — BENZTROPINE MESYLATE 0.5 MG: 0.5 TABLET ORAL at 21:52

## 2021-01-03 NOTE — PLAN OF CARE
"  Problem: Behavioral Health Plan of Care  Goal: Plan of Care Review  Outcome: Improving  Flowsheets  Taken 1/2/2021 2151  Plan of Care Reviewed With: patient  Patient Agreement with Plan of Care: agrees  Goal: Optimized Coping Skills in Response to Life Stressors  Outcome: Improving  Patient spent the evening in and out of his room. He watched television with peers and was observed on the stationary bike though it wasn't powered. Patient endorsed chronic depression attributed to \"living with cancer\" and anxiety rated as 4/10 due to his \"current situation.\" He denied wanting to be dead telling writer \"I left [place] and came here to live with my children and grand children. So, why would I want to kill myself?\" Patient reported having a \"good\" appetite and getting adequate sleep; denied all medication side-effects. Requested and administered Atarax PRN for anxiety with effect. No concerns reported or observed.   "

## 2021-01-03 NOTE — PROGRESS NOTES
Observed to have slept well for approx 6 Hrs overnight with early morning awakening at approx 0430.  Pleasant upon awakening w/o complaints or any observable distress. Engaged readily in generalized conversation with staff w/o injection of delusional content matter.  Sat out in lounge in [place] for brief time.  Spent quite some time journaling.  Respirations regular and unlabored.  Safe, therapeutic environment maintained.

## 2021-01-04 PROCEDURE — H2032 ACTIVITY THERAPY, PER 15 MIN: HCPCS

## 2021-01-04 PROCEDURE — 250N000013 HC RX MED GY IP 250 OP 250 PS 637: Performed by: PSYCHIATRY & NEUROLOGY

## 2021-01-04 PROCEDURE — 124N000002 HC R&B MH UMMC

## 2021-01-04 PROCEDURE — G0177 OPPS/PHP; TRAIN & EDUC SERV: HCPCS

## 2021-01-04 PROCEDURE — 250N000013 HC RX MED GY IP 250 OP 250 PS 637: Performed by: PHYSICIAN ASSISTANT

## 2021-01-04 PROCEDURE — 250N000013 HC RX MED GY IP 250 OP 250 PS 637: Performed by: EMERGENCY MEDICINE

## 2021-01-04 PROCEDURE — 99232 SBSQ HOSP IP/OBS MODERATE 35: CPT | Mod: 95 | Performed by: PSYCHIATRY & NEUROLOGY

## 2021-01-04 RX ADMIN — HYDROXYZINE HYDROCHLORIDE 25 MG: 25 TABLET, FILM COATED ORAL at 02:22

## 2021-01-04 RX ADMIN — MULTIPLE VITAMINS W/ MINERALS TAB 1 TABLET: TAB at 08:30

## 2021-01-04 RX ADMIN — HYDROXYZINE HYDROCHLORIDE 25 MG: 25 TABLET, FILM COATED ORAL at 20:39

## 2021-01-04 RX ADMIN — CARIPRAZINE 6 MG: 1.5 CAPSULE, GELATIN COATED ORAL at 08:29

## 2021-01-04 RX ADMIN — DIVALPROEX SODIUM 1000 MG: 500 TABLET, DELAYED RELEASE ORAL at 21:27

## 2021-01-04 RX ADMIN — BENZTROPINE MESYLATE 0.5 MG: 0.5 TABLET ORAL at 21:27

## 2021-01-04 RX ADMIN — IBUPROFEN 400 MG: 200 TABLET, FILM COATED ORAL at 02:22

## 2021-01-04 RX ADMIN — Medication 0.25 MG: at 08:30

## 2021-01-04 RX ADMIN — CALCIUM POLYCARBOPHIL 625 MG: 625 TABLET, FILM COATED ORAL at 12:03

## 2021-01-04 RX ADMIN — DOCUSATE SODIUM 100 MG: 100 CAPSULE, LIQUID FILLED ORAL at 19:36

## 2021-01-04 RX ADMIN — Medication 25 MCG: at 08:30

## 2021-01-04 ASSESSMENT — ACTIVITIES OF DAILY LIVING (ADL)
ORAL_HYGIENE: INDEPENDENT
DRESS: SCRUBS (BEHAVIORAL HEALTH)
LAUNDRY: WITH SUPERVISION
DRESS: INDEPENDENT
HYGIENE/GROOMING: INDEPENDENT
ORAL_HYGIENE: INDEPENDENT
HYGIENE/GROOMING: INDEPENDENT

## 2021-01-04 NOTE — PROGRESS NOTES
"[facility], [place]   Psychiatric Progress Note  Hospital Day: 20        Interim History:   The patient's care was discussed with the treatment team during the daily team meeting and/or staff's chart notes were reviewed.  Staff report patient has been visible in the milieu, sleeping well, requested PRNs overnight for anxiety and pain, making less delusional statements, no acute events over weekend.     Upon interview, patient reports he feels he is improving in terms of his confusion and symptoms than when he last saw writer prior to the holidays. He has not had any further episodes of having VH or beliefs he was seeing [place]. He is fully oriented, he endorses he was not behaving like himself, reflected on some hypersexuality symptoms he had as part of his hypomania or carlos during this hospitalization and in the past. He reported some shame around this and provided education about symptoms and that his care team does not apply judgement to symptoms. He would like his children to be involved in his care and agreed to sign ROIs as he does not believe he has done this yet. Will communicate this to [facility]. He is tolerating medications, denies side effects, reports mood is \"good\" with exception of having some unhappiness in regards to his physical health issues but described this in appropriateness to context of physical health. Denies SI or HI. He does still have some thoughts at times that he is being recorded or watched, but able to reality check this and state staff could hear him from the hallway and was reassured there are no cameras or microphones in his room. Discussed how he is improving and will start to make plans for discharge if this continues. Will also recheck LFTs tomorrow. He reports feeling physically well today. No additional concerns at this time.          Medications:       benztropine  0.5 mg Oral At Bedtime     calcium polycarbophil  625 mg Oral Daily with lunch     " "cariprazine  6 mg Oral Daily     clonazePAM  0.25 mg Oral Daily     divalproex sodium delayed-release  1,000 mg Oral At Bedtime     docusate sodium  100 mg Oral BID     multivitamin w/minerals  1 tablet Oral Daily     Vitamin D3  25 mcg Oral Daily          Allergies:     Allergies   Allergen Reactions     Animal Dander           Labs:     No results found for this or any previous visit (from the past 48 hour(s)).       Psychiatric Examination:     /70 (BP Location: Left arm)   Pulse 77   Temp 98.8  F (37.1  C) (Tympanic)   Resp 14   Wt 85.3 kg (188 lb)   SpO2 95%   BMI 24.14 kg/m    Weight is 188 lbs 0 oz  Body mass index is 24.14 kg/m .    Orthostatic Vitals       Most Recent      Sitting Orthostatic /73 01/03 1607    Sitting Orthostatic Pulse (bpm) 77 01/03 1607    Standing Orthostatic /77 01/03 0751    Standing Orthostatic Pulse (bpm) 77 01/03 0751        Appearance: awake, alert and adequately groomed  Attitude:  cooperative  Eye Contact:  good  Mood:  \"good\", appears euthymic  Affect:  mood congruent, full range  Speech:  clear, coherent  Language: fluent and intact in English  Psychomotor, Gait, Musculoskeletal:  no evidence of tardive dyskinesia, dystonia, or tics  Thought Process: tangential but more organized  Associations:  no loose associations  Thought Content:  no evidence of suicidal ideation or homicidal ideation and some paranoia still present but improved  Insight:  fair  Judgement:  fair  Oriented to:  time, person, and place  Attention Span and Concentration:  fair  Recent and Remote Memory:  fair  Fund of Knowledge:  appropriate    Clinical Global Impressions  First:  Considering your total clinical experience with this particular patient population, how severe are the patient's symptoms at this time?: 7 (12/16/20 0544)  Compared to the patient's condition at the START of treatment, this patient's condition is: 4 (12/16/20 0544)  Most recent:  Considering your total " clinical experience with this particular patient population, how severe are the patient's symptoms at this time?: 7 (12/28/20 1318)  Compared to the patient's condition at the START of treatment, this patient's condition is: 4 (12/28/20 1318)           Precautions:     Behavioral Orders   Procedures     Code 1 - Restrict to Unit     Code 2     imaging     Routine Programming     As clinically indicated     Single Room     Status 15     Every 15 minutes.          Diagnoses:      Bipolar disorder, type 1, current episode mixed with concern for psychosis symptoms  Generalized anxiety by history  History of ADHD  Stimulant abuse by history   R/O psychosis and mood disorder 2/2 medical condition (Metastatic colon cancer)         Assessment & Plan:   Assessment and hospital summary:  64 yo M with history of bipolar disorder type 1, anxiety, ADHD, stimulant abuse and complex medical history who presented to ED with AMS in context of stepping in front of a truck PTA, he reports trying to flag truck down for help and denied this was a suicide attempt. His PTA medications were continued, with exception of Strattera for concern of causing elevated mood state; he reported adequate adherence to medications PTA. IM consult placed given complexity of medical history including metastatic colon cancer, MRI Brain was unremarkable without evidence of mets. IM ordered abdominal U/S given ongoing elevation of LFTs. Suspect part of decompensation may have been due to poor adherence to Vrylar PTA given inconsistent reports about taking this medication, patient showed some improvement but still having evidence of psychosis and carlos, Vraylar was increased further to target this. PRN trazodone was discontinued for concern for possibly worsening carlos and PRN quetiapine started for sleep.     Psychiatric treatment/inteventions:  Medications:   -continue PTA Depakote 1000mg at bedtime for mood stabilization  -continue PTA Vraylar at increased  dose of 6mg daily for mood stabilization and psychosis  -continue PTA clonazepam 0.25mg daily for anxiety  -continue PTA benztropine 0.5mg at bedtime for EPSE  -continue PTA Vitamin D3 1000U daily  -continue quetiapine 50mg at bedtime PRN    -discussed case further with PharmD 12/21, may consider changing from Depakote to [place]inckerhoff for further stabilization in symptoms if increasing Vraylar does not continue to show symptom improvement      Laboratory/Imaging: will recheck CMP 1/5/2021     Patient will be treated in therapeutic milieu with appropriate individual and group therapies as described.     Medical treatment/interventions:  Medical concerns: Pt has complex medical history including metastatic colon cancer, IM consult was placed for co-management and evaluation, appreciate assistance, they have been coordinating with oncology for additional work up. See initial consult note by Mary Ellen Bales PA-C on 12/16 and most recent progress note 12/22 for complete details, agree with assessment and plan as outlined by IM. IM has now signed off with recommendations for outpatient follow up on discharge with PCP and primary oncologist Dr. Srivastava. Will recheck CMP as above given patients ongoing hospitalization.       This note was created by undersigned using a Dragon dictation system. All typing errors or contextual distortion are unintentional and software inherent.     Disposition Plan   Reason for ongoing admission: is unable to care for self due to severe psychosis or carlos  Discharge location: TBD, likely discharge in 3-5 days once stabilized in mood/psychosis  Discharge Medications: not ordered  Follow-up Appointments: not scheduled , will work on scheduling   Legal Status: voluntary   Entered by: Cassy oNriega on 1/4/2021 at 6:48 AM       Video-Visit Details    Type of service:  Video Visit    Video Start Time (time video started): 1115    Video End Time (time video stopped): 1140    Originating Location (pt.  Location): [address]    Distant Location (provider location): Provider remote location    Mode of Communication:  Video Conference via [employer]    Physician has received verbal consent for a Video Visit from the patient? Yes    Cassy Noriega, DO

## 2021-01-04 NOTE — PROGRESS NOTES
"Pt seemed to have a good shift overall. He was calm, social and visible in milieu. He expressed sadness about communication difficulties with his daughter, and wished for better technique and rapport with her. He also said he hopes his elective surgery becomes more urgent as he is worried about his CA.    He reports feeling \"still a little bit paranoid\" and uneasy overall. He cited the Zeb and Benoit song \"Like a Bridge over [place]\" to express how he feels. He fears homelessness and expressed some worry about his late mother's Trust and familial financial challenges. He was otherwise quite cooperative, calm and showed good insight/judgement. He denied overt AH and other psychotic sx  "

## 2021-01-04 NOTE — PLAN OF CARE
Work Completed  - chart review  - team meeting  -scheduled follow psychiatry appointment with out patient provider Dr. Marcia Gracia ([facility]) for Thursday January 14, 2021 - 8:30am - will be video visit  - per message from unit RN pt did sign agatha for family/ children and his daughter Yuliet called - had reported to nurse concern about patient returning to his current living- this writer will call patient's daughter Yuliet tomorrow and discuss / educate       Discharge plan or goal  Assessment ongoing for appropriate discharge plan    Barriers to discharge  Patient requires further psychiatric stabilization

## 2021-01-04 NOTE — PLAN OF CARE
"\"I'm a lot better, I'm down off of the carlos.\" Denies hallucinations and depression. Has \"a little anxiety.\" Thoughts are more clear and is able to focus better. Has paranoia thinking someone changed his lock at home, \"I would like one of my kids to take me home in case my lock was changed.\" Attends groups, is minimally social.   "

## 2021-01-04 NOTE — PROGRESS NOTES
"Recd. pt sleeping w/o observable distress.  Respirations regular and unlabored.  Wakeful, up to nurses station at approx 0220  Requesting Hydroxyzine for anxiety and Ibuprophen for c/o lower back pain.  States \"these 2 medications work well for me;  I don't want to take the Seroquel\".  Easily ret'd to sleep w/i 1/2 hr appearing comfortable.  Observed to have slept for approx  6  Hrs overnight.  W/ regular non labored respirations.  Safe, therapeutic environment maintained.   "

## 2021-01-04 NOTE — PROGRESS NOTES
Pt was sociably and light-hearted this afternoon. He  participated in a group leisure activity that required brainstorming & word retrieval.      01/04/21 1500   Occupational Therapy   Type of Intervention structured groups   Response Initiates, socially acceptable   Hours 1

## 2021-01-05 LAB
ALBUMIN SERPL-MCNC: 3.8 G/DL (ref 3.4–5)
ALP SERPL-CCNC: 185 U/L (ref 40–150)
ALT SERPL W P-5'-P-CCNC: 126 U/L (ref 0–70)
ANION GAP SERPL CALCULATED.3IONS-SCNC: 4 MMOL/L (ref 3–14)
AST SERPL W P-5'-P-CCNC: 65 U/L (ref 0–45)
BILIRUB SERPL-MCNC: 0.7 MG/DL (ref 0.2–1.3)
BUN SERPL-MCNC: 16 MG/DL (ref 7–30)
CALCIUM SERPL-MCNC: 8.8 MG/DL (ref 8.5–10.1)
CHLORIDE SERPL-SCNC: 106 MMOL/L (ref 94–109)
CO2 SERPL-SCNC: 29 MMOL/L (ref 20–32)
CREAT SERPL-MCNC: 0.93 MG/DL (ref 0.66–1.25)
GFR SERPL CREATININE-BSD FRML MDRD: 85 ML/MIN/{1.73_M2}
GLUCOSE SERPL-MCNC: 94 MG/DL (ref 70–99)
POTASSIUM SERPL-SCNC: 4 MMOL/L (ref 3.4–5.3)
PROT SERPL-MCNC: 7.1 G/DL (ref 6.8–8.8)
SODIUM SERPL-SCNC: 139 MMOL/L (ref 133–144)

## 2021-01-05 PROCEDURE — G0177 OPPS/PHP; TRAIN & EDUC SERV: HCPCS

## 2021-01-05 PROCEDURE — 80053 COMPREHEN METABOLIC PANEL: CPT | Performed by: PSYCHIATRY & NEUROLOGY

## 2021-01-05 PROCEDURE — 124N000002 HC R&B MH UMMC

## 2021-01-05 PROCEDURE — 250N000013 HC RX MED GY IP 250 OP 250 PS 637: Performed by: EMERGENCY MEDICINE

## 2021-01-05 PROCEDURE — 250N000013 HC RX MED GY IP 250 OP 250 PS 637: Performed by: PSYCHIATRY & NEUROLOGY

## 2021-01-05 PROCEDURE — 250N000013 HC RX MED GY IP 250 OP 250 PS 637: Performed by: PHYSICIAN ASSISTANT

## 2021-01-05 PROCEDURE — 99232 SBSQ HOSP IP/OBS MODERATE 35: CPT | Mod: 95 | Performed by: PSYCHIATRY & NEUROLOGY

## 2021-01-05 PROCEDURE — 36415 COLL VENOUS BLD VENIPUNCTURE: CPT | Performed by: PSYCHIATRY & NEUROLOGY

## 2021-01-05 RX ADMIN — DIVALPROEX SODIUM 1000 MG: 500 TABLET, DELAYED RELEASE ORAL at 21:50

## 2021-01-05 RX ADMIN — DOCUSATE SODIUM 100 MG: 100 CAPSULE, LIQUID FILLED ORAL at 09:04

## 2021-01-05 RX ADMIN — BENZTROPINE MESYLATE 0.5 MG: 0.5 TABLET ORAL at 21:50

## 2021-01-05 RX ADMIN — IBUPROFEN 400 MG: 200 TABLET, FILM COATED ORAL at 03:05

## 2021-01-05 RX ADMIN — Medication 0.25 MG: at 09:04

## 2021-01-05 RX ADMIN — HYDROXYZINE HYDROCHLORIDE 25 MG: 25 TABLET, FILM COATED ORAL at 03:07

## 2021-01-05 RX ADMIN — MULTIPLE VITAMINS W/ MINERALS TAB 1 TABLET: TAB at 09:04

## 2021-01-05 RX ADMIN — CARIPRAZINE 6 MG: 1.5 CAPSULE, GELATIN COATED ORAL at 09:04

## 2021-01-05 RX ADMIN — DOCUSATE SODIUM 100 MG: 100 CAPSULE, LIQUID FILLED ORAL at 19:33

## 2021-01-05 RX ADMIN — Medication 25 MCG: at 09:04

## 2021-01-05 RX ADMIN — CALCIUM POLYCARBOPHIL 625 MG: 625 TABLET, FILM COATED ORAL at 11:50

## 2021-01-05 ASSESSMENT — ACTIVITIES OF DAILY LIVING (ADL)
DRESS: INDEPENDENT
LAUNDRY: WITH SUPERVISION
HYGIENE/GROOMING: INDEPENDENT
DRESS: INDEPENDENT
HYGIENE/GROOMING: INDEPENDENT
ORAL_HYGIENE: INDEPENDENT
ORAL_HYGIENE: INDEPENDENT
LAUNDRY: WITH SUPERVISION

## 2021-01-05 NOTE — PROGRESS NOTES
"Pt out in the milieu socializing with select peers and staff. Attended OT group and also enjoyed playing the  [place]. Ate dinner, med compliant and no reported adverse effects. Pt denies mental health symptoms including SI/SIB/AVH. Appeared calm on approach. Pt requested for Hydroxyzine. Voiced concerns about his abdomen and while holding it and smiling stated, \"I need medical help. They keep on cutting my abdomen and before long, there won't be anything left to cut. The doctor gave me 6 months to live. Maybe they will send me to a nursing home.\" Pt moved from different topics. Told writer that he was told by a doctor in [place] that he is like a brittle Diabetic because he takes medication but still suffers mental issues. Says that he was accused of not taking meds but did in fact take them and only forgot a few times.   "

## 2021-01-05 NOTE — PROGRESS NOTES
Pt slept earlier in the shift with non labored respirations. Pt woke up at around 0300. Requested and was given Hydroxyzine for anxiety and IBU for lower back pain at 0307. Pt stayed his room but unable to fall asleep for any length of time. Came out for water. Slept for 4.75. Pleasant on approach.  No behavioral issues. Observed to have slept for 4.75 hours.

## 2021-01-05 NOTE — PLAN OF CARE
Problem: General Rehab Plan of Care  Goal: Therapeutic Recreation/Music Therapy Goal (Art Therapy)  Description: The patient and/or their representative will achieve their patient-specific goals related to the plan of care.  The patient-specific goals include: emotional expression, trauma containment  Outcome: No Change   AT directive is to create a drawing of a safe place and to identify five items within safe place that represent each of the five senses. Goals of directive: emotional expression, trauma containment.  Pt was an active participant, focused on task for the full duration of group. Pt finished drawing and briefly shared with group. Pt shared that his safe place is his music studio at home. Pt has some concerns about not being able to play guitar anymore due to pain when he rests guitar on stomach due to surgeries in that area due to recent cancer. Pt is brainstorming other instruments he may be able to play and also fine art as a means of self expression.  Pts mood was calm, pleasant participant.

## 2021-01-05 NOTE — PROGRESS NOTES
"[facility], [place]   Psychiatric Progress Note  Hospital Day: 21        Interim History:   The patient's care was discussed with the treatment team during the daily team meeting and/or staff's chart notes were reviewed.  Staff report patient was visible in the milieu, appropriate in interactions, discussed concerns about physical health, taking medications as prescribed, receiving some PRNs for pain and anxiety, no acute events overnight.     Upon interview, patient reports his mood is \"okay\", he has some anxiety about causing his abdominal issues to be worse by not being adherent to binder as well as he feels is recommended. He also reports he has been watching more TV and watching the news and feels \"things are just as crazy out there as they were before I came in here\". He has some paranoia around how others perceive him or what he says. He is unsure of what the best discharge plan would be, he has been thinking about more supportive living environment given his physical health issues and wonders what his children would think of this. [facility] plans to talk with daughter today now that patient has signed release. He is tolerating medications, denies side effects. Denies SI or HI. Reviewed that his labs are holding steady and won't be making any further changes today, if he continues to have improvement in symptoms with increased Vraylar may be able to discharge by end of week or early next week. No additional concerns at this time.          Medications:       benztropine  0.5 mg Oral At Bedtime     calcium polycarbophil  625 mg Oral Daily with lunch     cariprazine  6 mg Oral Daily     clonazePAM  0.25 mg Oral Daily     divalproex sodium delayed-release  1,000 mg Oral At Bedtime     docusate sodium  100 mg Oral BID     multivitamin w/minerals  1 tablet Oral Daily     Vitamin D3  25 mcg Oral Daily          Allergies:     Allergies   Allergen Reactions     Animal Dander           Labs: " "    Recent Results (from the past 48 hour(s))   Comprehensive metabolic panel    Collection Time: 01/05/21  7:28 AM   Result Value Ref Range    Sodium 139 133 - 144 mmol/L    Potassium 4.0 3.4 - 5.3 mmol/L    Chloride 106 94 - 109 mmol/L    Carbon Dioxide 29 20 - 32 mmol/L    Anion Gap 4 3 - 14 mmol/L    Glucose 94 70 - 99 mg/dL    Urea Nitrogen 16 7 - 30 mg/dL    Creatinine 0.93 0.66 - 1.25 mg/dL    GFR Estimate 85 >60 mL/min/[1.73_m2]    GFR Estimate If Black >90 >60 mL/min/[1.73_m2]    Calcium 8.8 8.5 - 10.1 mg/dL    Bilirubin Total 0.7 0.2 - 1.3 mg/dL    Albumin 3.8 3.4 - 5.0 g/dL    Protein Total 7.1 6.8 - 8.8 g/dL    Alkaline Phosphatase 185 (H) 40 - 150 U/L     (H) 0 - 70 U/L    AST 65 (H) 0 - 45 U/L          Psychiatric Examination:     /66 (BP Location: Left arm)   Pulse 85   Temp 97.4  F (36.3  C) (Oral)   Resp 17   Wt 84.5 kg (186 lb 3.2 oz)   SpO2 98%   BMI 23.91 kg/m    Weight is 186 lbs 3.2 oz  Body mass index is 23.91 kg/m .    Orthostatic Vitals       Most Recent      Sitting Orthostatic /72 01/05 0752    Sitting Orthostatic Pulse (bpm) 75 01/05 0752    Standing Orthostatic /83 01/05 0752    Standing Orthostatic Pulse (bpm) 75 01/05 0752        Appearance: awake, alert and adequately groomed  Attitude:  cooperative  Eye Contact:  good  Mood:  \"okay\", appears euthymic  Affect:  mood congruent, full range  Speech:  clear, coherent  Language: fluent and intact in English  Psychomotor, Gait, Musculoskeletal:  no evidence of tardive dyskinesia, dystonia, or tics  Thought Process: tangential but more organized  Associations:  a little loose  Thought Content:  no evidence of suicidal ideation or homicidal ideation and some paranoia still present but improved  Insight:  fair  Judgement:  fair  Oriented to:  time, person, and place  Attention Span and Concentration:  fair  Recent and Remote Memory:  fair  Fund of Knowledge:  appropriate    Clinical Global " Impressions  First:  Considering your total clinical experience with this particular patient population, how severe are the patient's symptoms at this time?: 7 (12/16/20 0544)  Compared to the patient's condition at the START of treatment, this patient's condition is: 4 (12/16/20 0544)  Most recent:  Considering your total clinical experience with this particular patient population, how severe are the patient's symptoms at this time?: 6 (01/05/21 0841)  Compared to the patient's condition at the START of treatment, this patient's condition is: 3 (01/05/21 0841)           Precautions:     Behavioral Orders   Procedures     Code 1 - Restrict to Unit     Code 2     imaging     Routine Programming     As clinically indicated     Single Room     Status 15     Every 15 minutes.          Diagnoses:      Bipolar disorder, type 1, current episode mixed with psychosis symptoms  Generalized anxiety  History of ADHD  Stimulant abuse by history   R/O psychosis and mood disorder 2/2 medical condition (Metastatic colon cancer)         Assessment & Plan:   Assessment and hospital summary:  64 yo M with history of bipolar disorder type 1, anxiety, ADHD, stimulant abuse and complex medical history who presented to ED with AMS in context of stepping in front of a truck PTA, he reports trying to flag truck down for help and denied this was a suicide attempt. His PTA medications were continued, with exception of Strattera for concern of causing elevated mood state; he reported adequate adherence to medications PTA. IM consult placed given complexity of medical history including metastatic colon cancer, MRI Brain was unremarkable without evidence of mets. IM ordered abdominal U/S given ongoing elevation of LFTs. Suspect part of decompensation may have been due to poor adherence to Vrylar PTA given inconsistent reports about taking this medication, patient showed some improvement but still having evidence of psychosis and carlos, Vraylar  was increased further to target this. PRN trazodone was discontinued for concern for possibly worsening carlos and PRN quetiapine started for sleep.     Psychiatric treatment/inteventions:  Medications:   -continue PTA Depakote 1000mg at bedtime for mood stabilization  -continue PTA Vraylar at increased dose of 6mg daily for mood stabilization and psychosis  -continue PTA clonazepam 0.25mg daily for anxiety  -continue PTA benztropine 0.5mg at bedtime for EPSE  -continue PTA Vitamin D3 1000U daily  -continue quetiapine 50mg at bedtime PRN    -discussed case further with PharmD 12/21, may consider changing from Depakote to Pabellones for further stabilization in symptoms if increasing Vraylar does not continue to show symptom improvement      Laboratory/Imaging: no new labs per psychiatry, reviewed labs from today     Patient will be treated in therapeutic milieu with appropriate individual and group therapies as described.     Medical treatment/interventions:  Medical concerns: Pt has complex medical history including metastatic colon cancer, IM consult was placed for co-management and evaluation, appreciate assistance, they have been coordinating with oncology for additional work up. See initial consult note by Mary Ellen Bales PA-C on 12/16 and most recent progress note 12/22 for complete details, agree with assessment and plan as outlined by IM. IM has now signed off with recommendations for outpatient follow up on discharge with PCP and primary oncologist Dr. Srivastava. Recheck CMP 1/5/2021 that showed LFTs remaining stable.      This note was created by undersigned using a Dragon dictation system. All typing errors or contextual distortion are unintentional and software inherent.     Disposition Plan   Reason for ongoing admission: is unable to care for self due to severe psychosis or carols  Discharge location: TBD, likely discharge in 3-5 days once stabilized in mood/psychosis  Discharge Medications: not  ordered  Follow-up Appointments: not scheduled , will work on scheduling   Legal Status: voluntary   Entered by: Cassy Noriega on 1/5/2021 at 11:45 AM       Video-Visit Details    Type of service:  Video Visit    Video Start Time (time video started): 0835    Video End Time (time video stopped): 0855    Originating Location (pt. Location): [address]    Distant Location (provider location): Provider remote location    Mode of Communication:  Video Conference via [employer]    Physician has received verbal consent for a Video Visit from the patient? Yes    Cassy Noriega, DO

## 2021-01-05 NOTE — PLAN OF CARE
Work Completed  - chart review  - team meeting  - post team rounds team meeting / discussion  - Spoke with patient's daughter Yuliet provided update discussed-further support options in the community including referral for [facility] contracted mental health  long-term goal of exploration of skilled nursing informed her that patient has brought up believing he needs more supportive living himself.  Current plan is in is going to coordinate with her brother to see if there is a time for the 3 of us to have a phone meeting about options brother is financial power of  she will call my line leave a message as to exact date and time for a phone meeting        Discharge plan or goal  Assessment is ongoing likely will discharge to his current apartment with follow-up with psychiatry and referrals for targeted mental health case management.    Barriers to discharge  Medication management and assessment ongoing for targeted symptom improvement

## 2021-01-05 NOTE — PROGRESS NOTES
Pt presents distracted , poor concentration. Pleasant and polite. Flights of ideas persist. Declines groups. Isolative to his room most of shift. Ate meals. Showered.       01/05/21 1000   Behavioral Health   Hallucinations denies / not responding to hallucinations   Thinking distractable;poor concentration   Orientation person: oriented;place: oriented   Memory baseline memory   Insight poor   Judgement impaired   Affect blunted, flat   Mood depressed   Physical Appearance/Attire untidy   Hygiene neglected grooming - unclean body, hair, teeth   1. Wish to be Dead (Recent) No   2. Non-Specific Active Suicidal Thoughts (Recent) No   Activity refusal;isolative;withdrawn   Speech clear;flight of ideas   Psychomotor / Gait balanced;steady   Psycho Education   Type of Intervention structured groups   Response refuses   Activities of Daily Living   Hygiene/Grooming independent   Oral Hygiene independent   Dress independent   Laundry with supervision   Room Organization independent

## 2021-01-06 PROCEDURE — H2032 ACTIVITY THERAPY, PER 15 MIN: HCPCS

## 2021-01-06 PROCEDURE — 124N000002 HC R&B MH UMMC

## 2021-01-06 PROCEDURE — 250N000013 HC RX MED GY IP 250 OP 250 PS 637: Performed by: PSYCHIATRY & NEUROLOGY

## 2021-01-06 PROCEDURE — 250N000013 HC RX MED GY IP 250 OP 250 PS 637: Performed by: EMERGENCY MEDICINE

## 2021-01-06 PROCEDURE — 250N000013 HC RX MED GY IP 250 OP 250 PS 637: Performed by: PHYSICIAN ASSISTANT

## 2021-01-06 RX ADMIN — BENZTROPINE MESYLATE 0.5 MG: 0.5 TABLET ORAL at 22:12

## 2021-01-06 RX ADMIN — CALCIUM POLYCARBOPHIL 625 MG: 625 TABLET, FILM COATED ORAL at 12:31

## 2021-01-06 RX ADMIN — DOCUSATE SODIUM 100 MG: 100 CAPSULE, LIQUID FILLED ORAL at 22:12

## 2021-01-06 RX ADMIN — IBUPROFEN 400 MG: 200 TABLET, FILM COATED ORAL at 01:13

## 2021-01-06 RX ADMIN — Medication 25 MCG: at 08:21

## 2021-01-06 RX ADMIN — HYDROXYZINE HYDROCHLORIDE 25 MG: 25 TABLET, FILM COATED ORAL at 01:13

## 2021-01-06 RX ADMIN — HYDROXYZINE HYDROCHLORIDE 25 MG: 25 TABLET, FILM COATED ORAL at 17:45

## 2021-01-06 RX ADMIN — DIVALPROEX SODIUM 1000 MG: 500 TABLET, DELAYED RELEASE ORAL at 22:12

## 2021-01-06 RX ADMIN — CARIPRAZINE 4.5 MG: 1.5 CAPSULE, GELATIN COATED ORAL at 12:31

## 2021-01-06 RX ADMIN — DOCUSATE SODIUM 100 MG: 100 CAPSULE, LIQUID FILLED ORAL at 08:21

## 2021-01-06 RX ADMIN — Medication 0.25 MG: at 08:21

## 2021-01-06 RX ADMIN — MULTIPLE VITAMINS W/ MINERALS TAB 1 TABLET: TAB at 08:21

## 2021-01-06 ASSESSMENT — ACTIVITIES OF DAILY LIVING (ADL)
DRESS: INDEPENDENT
ORAL_HYGIENE: INDEPENDENT
DRESS: INDEPENDENT
HYGIENE/GROOMING: INDEPENDENT
ORAL_HYGIENE: INDEPENDENT
HYGIENE/GROOMING: INDEPENDENT

## 2021-01-06 NOTE — PLAN OF CARE
"  Work Completed  - chart review  - team meeting  - brief check in with patient in the am - discussed that will meet for further period in afternoon. - patient made comments that he is upset that felt was being told by provider yesterday that he should move into a nursing home and also that his children are trying to force him. Also asked about the information in his DEC assessment \"how will that effect me in the future. I said some weird things\" \"I know their is HIPAA but what would stop a person from telling other people they know\". patient speech was pressured this point, also unclear if paranoid regarding health information. This writer reassured him that currently he is his own decision maker for his health care regarding his statements about family making choices for him. Reassured him that we will meet more today.    - phone call / conference call with patient's daughter Yuliet and son Juventino. Per Yuliet patient was upset with her on the phone last evening as topic of family inquiring about process for increased supports including if in the future setting such as nursing home. He was also telling her that he wants to move immediately to [place]. Both Yuliet and Juventino expressed concern that if patient is saying he intends to immediatly leave the state he doesn't have the resources to do so know and they are concerned about having supports if he went back there. Both also expressed concern that it appears over time his ability to make decisions has been getting worse, asked if it is anticipated that this could improve as mental health symptoms improve from current episode. This writer spoke with them about impairments seen in acute episodes that often do improve ongoing as timeline for improvement and stabilization on medication regimen will be multiple weeks. In the future further testing could be indicated regarding cognitive functioning as acute episode improves.   Discussed with them that current plan is for " "ongoing assessment as to if current medication regimen is helping ongoing. Both are aware that immediate plan will be to return to his current living situation. Plan for referral for targeted mental health case management through the county with goal to have continued support for mental health and with goal for overall assessment for further community supports that could be available. Both agree that this is a good plan for initial needs upon discharge. He does have his current apartment. There was a period in they estimate March of this year that patient was given a warning about behaviors that could risk eviction (eviction warning). At the time patient was leaving messages and emailing a supervisor there - their recall was content related to being upset about covid restrictions etc. They also report that that supervisor no longer works there and they aren't aware of any current risk for housing stability. Both report that they hope to continue to support their dad in anyway they can, acknowledge it is difficult at times when he is experienced heightened symptoms which can impair his perception of their intentions and at times his requests when manic can't be met in a way / time line that he feels is helpful.   - this writer met with patient individually at length. Speech was less pressured during this meeting. patient expressed feeling not ready to give up his apartment, that he had thought maybe something with  More supports could be helpful in the future, said \"I think the only places like that are nursing homes.\" also commented that \"I don't think any nursing home would take me because they probably ask if I have ever been on medications for psychiatry\" \"unless there are some other places here in [place]\".   this writer informed him that both the treatment team and his children are all on the same page with him in that he will discharge to his current apartment. In regards to discussion of topics such as increased " "support programs spoke with him about wanting to make sure that we do talk about things that could be helpful to him in the future if he later decides.   This writer did inform him that if ever did decide to explore options such as \"nursing home\" that [place] does have multiple options where they welcome persons with mental health treatment needs.   patient is agreeable ongoing to referral for Targeted mental health case management through [facility] - does say he likes the idea of having a  who can help explore what further options for resources are in the community. He signed a release of information for [facility].         Discharge plan or goal  Assessment is ongoing likely will discharge to his current apartment with follow-up with psychiatry and referrals for targeted mental health case management.    Barriers to discharge  Medication management and assessment ongoing for targeted symptom improvement  "

## 2021-01-06 NOTE — PROGRESS NOTES
"CLINICAL NUTRITION SERVICES - REASSESSMENT NOTE     Nutrition Prescription    RECOMMENDATIONS FOR MDs/PROVIDERS TO ORDER:  None today    Malnutrition Status:    Unable to determine    Recommendations already ordered by Registered Dietitian (RD):  1. Remove PM snack of fruit  2. Continue HS snack vanilla ice cream/orange fruit ice    Future/Additional Recommendations:  If intake declines, consider sending additional snacks      EVALUATION OF THE PROGRESS TOWARD GOALS   Diet: Regular  PM snack: fruit HS snack: vanilla ice cream or orange fruit ice  Intake: Consistently eating meals and snacks per nursing notes     1/6: Pt reports eating and appetite are great. Pt reports consistently eating 3 meals per days and utilizing snacks on the unit as well as eating scheduled snacks. Pt reports eating 100% of meals. Pt reports some GI symptoms which include gas and inconsistent stooling - reports some BMs are fine and others are not and that he recently had a \"colon clearing expereince.\" Pt reports cutting back on fruit to mitigate gas and that it has been helpful. Pt would like to stop receiving PM snack of fruit. Alternative snack choices were offered but pt feels comfortable with the snack choices on the unit.      NEW FINDINGS   2lb wt loss since admit wt of 85.3kg (188lb) on 12/15  Wt Readings from Last 5 Encounters:   01/05/21 84.5 kg (186 lb 3.2 oz)   08/05/20 89.4 kg (197 lb)   07/30/20 89.4 kg (197 lb)   06/29/20 92.3 kg (203 lb 6.4 oz)   03/09/20 93 kg (205 lb)     MALNUTRITION  % Intake: No decreased intake noted  % Weight Loss: Weight loss does not meet criteria  Subcutaneous Fat Loss: unable to assess  Muscle Loss: unable to assess  Fluid Accumulation/Edema: None noted in chart  Malnutrition Diagnosis: Unable to determine     Previous Nutrition Diagnosis  No nutrition diagnosis at this time  Evaluation: No change    CURRENT NUTRITION DIAGNOSIS  No nutrition diagnosis at this " time    INTERVENTIONS  Implementation  Modify composition of meals/snacks    Monitoring/Evaluation  Progress toward goals will be monitored and evaluated per protocol q 14 days.    Tammie Alves  Dietetic Intern

## 2021-01-06 NOTE — PLAN OF CARE
Solo had a day fairly consistent with previous days. He was up earlier than the other patients, and in the lounge most of the morning. Some watching of TV, and in and out of his room. He was appropriately social with staff and peers.  Current psychosis is difficult to assess- he is oriented x 4, but often when asked questions will give circular answerers or lead him to ask unrelated questions in return. He is pleasant, but also can become frustrated at times with things such as length of stay issues and not meeting with a doctor directly today.     He showered independently, attended groups.

## 2021-01-06 NOTE — PROGRESS NOTES
"Solo requested to have his vraylar reduced to 4.5mg. He stated that since his dose increase he feels that he has experienced a \"deadening of senses\". Discussed with Md pavon who reduced dose until further discussion with dr potter.   "

## 2021-01-06 NOTE — PROGRESS NOTES
"Pt spent the night in the milieu and in his room. Pt presented with a blunted, flat affect and his mood was calm. Pt initiated conversations with staff. Pt was more hesitant to interact with peers. When asked what he did today, pt stated he has been shoot baskets in his room with a ball of paper into his trash can. Pt stated he really enjoyed doing it and it helped pass some of the time. Pt denies SI, SIB, HI, or other mental health symptoms. Pt does not seem to be responding to internal stimuli. Pt told writer his goal for tomorrow is to shoot more baskets and spend more time in the lounge. Pt contracts for safety and had an uneventful evening.     At 2200, writer had a conversation with the pt about why he is still in the hospital. Pt stated to writer he feels like he is ready to go home and wants to go home to his apartment. Writer listed to pt and stated he should share these feelings with his doctor. Pt stated he has but he is worried the doctor is being controlled by the [facility] and the governor of [place]. Pt stated \"maybe my kids want to control my life because their lives are screwed up. Maybe they want to keep me here because they have already moved all of my stuff out of my apartment and don't want me to find out about it.\" Writer reinforced to pt that was unlikely and his belongings were safe. Pt seemed to accept that answer a little and was still insistent on the issues that were in his head. Writer suggested to pt that he share these with his doctor so he can be on the same page as his care team moving forward with peace of mind. Writer offered coping skills available on the unit to help pt de-stress and work toward becoming calmer. Pt denied resources.     A little while later, pt came back to writer and expressed some of the same paranoid thoughts. Pt asked writer if she has ever seen a pt go crazy from being in the hospital for too long. Writer asked pt if he felt like he was going crazy. Pt said no but " would like to leave. Pt was speaking calmly in an elevated voice. Pt also stated he wasn't sure if he could trust all of the staff here because he believed we were all medicated or stoned while working. Writer reassured pt that everyone who works on this unit comes to work sober. Writer also suggested a lavender patch for pt as aroma therapy to help relax him. Pt stated he hasn't tried aroma therapy before and was willing to give it a try. Pt was given a lavender patch for his shirt and seemed to really enjoy it.      01/05/21 4201   Psycho Education   Type of Intervention 1:1 intervention   Response participates, initiates socially appropriate   Hours 0.5   Treatment Detail Check-In   Behavioral Health   Hallucinations denies / not responding to hallucinations   Thinking intact   Orientation person: oriented;place: oriented;date: oriented;time: oriented   Memory baseline memory   Insight insight appropriate to situation   Judgement impaired   Eye Contact at examiner   Affect blunted, flat   Mood mood is calm   Physical Appearance/Attire disheveled   Hygiene neglected grooming - unclean body, hair, teeth   1. Wish to be Dead (Recent) No   2. Non-Specific Active Suicidal Thoughts (Recent) No   Self Injury   (none stated or observed)   Activity withdrawn   Speech clear;coherent   Medication Sensitivity no stated side effects;no observed side effects   Psychomotor / Gait balanced;steady   Activities of Daily Living   Hygiene/Grooming independent   Oral Hygiene independent   Dress independent   Laundry with supervision   Room Organization independent

## 2021-01-06 NOTE — PROGRESS NOTES
"Pt participated in dance/movement therapy (DMT) group that focused on experiencing the places in the body that provide \"windows of opportunity\" for healing.  He was initially uncertain and hesitant about joining the session, explaining problems with muscle cramping.  Pt was encouraged to join and listen closely to what his muscles needed, as well as find ways to support his body during severe cramping.  Pt identified the session as \"gratifying\" and felt he was able to both give and receive during the group.       01/06/21 1130   Dance Movement Therapy   Type of Intervention structured groups   Response participates with encouragement   Hours 1     "

## 2021-01-06 NOTE — PROGRESS NOTES
Pt. observed to have slept a total of 4.75 hours. Pt. woke up  at 0100 and c/o of left calf pain rated at 9/10 on a scale of 0-10 and anxiety. Pt. requested and was given hydroxyzine and ibuprofen with some relief.

## 2021-01-07 PROCEDURE — G0177 OPPS/PHP; TRAIN & EDUC SERV: HCPCS

## 2021-01-07 PROCEDURE — 250N000013 HC RX MED GY IP 250 OP 250 PS 637: Performed by: PSYCHIATRY & NEUROLOGY

## 2021-01-07 PROCEDURE — 250N000013 HC RX MED GY IP 250 OP 250 PS 637: Performed by: EMERGENCY MEDICINE

## 2021-01-07 PROCEDURE — 99232 SBSQ HOSP IP/OBS MODERATE 35: CPT | Mod: 95 | Performed by: PSYCHIATRY & NEUROLOGY

## 2021-01-07 PROCEDURE — 250N000013 HC RX MED GY IP 250 OP 250 PS 637: Performed by: PHYSICIAN ASSISTANT

## 2021-01-07 PROCEDURE — 124N000002 HC R&B MH UMMC

## 2021-01-07 PROCEDURE — 90853 GROUP PSYCHOTHERAPY: CPT

## 2021-01-07 RX ORDER — SIMETHICONE 80 MG
80 TABLET,CHEWABLE ORAL EVERY 6 HOURS PRN
Status: DISCONTINUED | OUTPATIENT
Start: 2021-01-07 | End: 2021-01-11 | Stop reason: HOSPADM

## 2021-01-07 RX ORDER — ATOMOXETINE 10 MG/1
10 CAPSULE ORAL DAILY
Status: DISCONTINUED | OUTPATIENT
Start: 2021-01-07 | End: 2021-01-11 | Stop reason: HOSPADM

## 2021-01-07 RX ORDER — CLONAZEPAM 0.5 MG/1
0.5 TABLET ORAL AT BEDTIME
Status: DISCONTINUED | OUTPATIENT
Start: 2021-01-07 | End: 2021-01-11 | Stop reason: HOSPADM

## 2021-01-07 RX ADMIN — CARIPRAZINE 1.5 MG: 1.5 CAPSULE, GELATIN COATED ORAL at 11:04

## 2021-01-07 RX ADMIN — MULTIPLE VITAMINS W/ MINERALS TAB 1 TABLET: TAB at 08:10

## 2021-01-07 RX ADMIN — BENZTROPINE MESYLATE 0.5 MG: 0.5 TABLET ORAL at 21:56

## 2021-01-07 RX ADMIN — HYDROXYZINE HYDROCHLORIDE 25 MG: 25 TABLET, FILM COATED ORAL at 08:11

## 2021-01-07 RX ADMIN — CALCIUM POLYCARBOPHIL 625 MG: 625 TABLET, FILM COATED ORAL at 11:05

## 2021-01-07 RX ADMIN — Medication 25 MCG: at 08:11

## 2021-01-07 RX ADMIN — ATOMOXETINE 10 MG: 10 CAPSULE ORAL at 11:05

## 2021-01-07 RX ADMIN — DOCUSATE SODIUM 100 MG: 100 CAPSULE, LIQUID FILLED ORAL at 21:55

## 2021-01-07 RX ADMIN — Medication 0.25 MG: at 08:10

## 2021-01-07 RX ADMIN — SIMETHICONE CHEW TAB 80 MG 80 MG: 80 TABLET ORAL at 17:00

## 2021-01-07 RX ADMIN — IBUPROFEN 400 MG: 200 TABLET, FILM COATED ORAL at 02:29

## 2021-01-07 RX ADMIN — Medication 0.5 MG: at 21:56

## 2021-01-07 RX ADMIN — CARIPRAZINE 4.5 MG: 1.5 CAPSULE, GELATIN COATED ORAL at 08:10

## 2021-01-07 RX ADMIN — DIVALPROEX SODIUM 1000 MG: 500 TABLET, DELAYED RELEASE ORAL at 21:56

## 2021-01-07 RX ADMIN — DOCUSATE SODIUM 100 MG: 100 CAPSULE, LIQUID FILLED ORAL at 08:10

## 2021-01-07 RX ADMIN — HYDROXYZINE HYDROCHLORIDE 25 MG: 25 TABLET, FILM COATED ORAL at 02:29

## 2021-01-07 NOTE — PROGRESS NOTES
"Pt was intermittently present in the milieu. Pt was observed using the telephone, laying in bed, pacing in room, socializing with staff/peers, and watching TV. Pt presented with a blunt/flat to full range affect and remained calm throughout the evening. At times pt appeared confused and he did have a delay in speech while conversing. The content of some of his speech could also be described as delusional. When this  approached him to take his vitals he begin talking about good machines (e.g. the vitals machine) and bad machines (e.g. \"ones that rip you open\" and then pointed to his stomach). Pt did not report experiencing SI, HI, or SIB and no clear indication of these symptoms was observed.  "

## 2021-01-07 NOTE — PLAN OF CARE
Pt briefly attended the Therapeutic Recreation group this evening. Pt left group prior to the activity, but was part of the discussion for several minutes. During the discussion of healthy recreation, pt made many tangential and paranoia statements about being on camera everywhere he goes and not wanting that to happen. Pt did not stay on the topic of the benefits of healthy recreation for very long. As the group transitioned to the activity, pt left the group for the remainder of the time.

## 2021-01-07 NOTE — PLAN OF CARE
Patient spending more time out in the milieu. He is social with others. Reported anxiety earlier in the morning. He was given hydroxyzine per his request. Later he was softly played guitar with another patient who was playing the piano.He appeared less anxious during this time. Smiled when staff thanked him for the relaxing music. He is med compliant. Denies SI and SIB . Magaly Aldrich RN

## 2021-01-07 NOTE — PLAN OF CARE
Work Completed  - chart review  - team meeting  - RE - Mental Health Case Management Referral [facility] -spoke with Mariposa in mental health intake -verified that this writer has correct version of diagnostic assessment statement of need for referral. Per [employer] this writer should complete DA SOA fax that along with hospital demographic sheet and H&P to her attention at fax :[phone].  There process is that the patient patient will first be assigned to a [facility] employee  for initial assessment research program etc. determine together eventual transfer to longer term case management at which time a contracted agency may be utilized.  Discussed anticipated timeline for hospitalization and that referral forms will include this writer's contact.  Forms completed and faxed along with requested chart information to the county  -Patient individually discussed referral planning ongoing.    Discharge plan or goal  Assessment is ongoing likely will discharge to his current apartment with follow-up with psychiatry and referrals for targeted mental health case management.    Barriers to discharge  Medication management and assessment ongoing for targeted symptom improvement

## 2021-01-07 NOTE — PROGRESS NOTES
"[place]  attended 3 of 3 OT groups today. Reportedly distressed about the [place] Chatted with peers during group & played Connect 4 with this writer during OT clinic. Bizarrely stated several times at one point that he is from [place] (\"[place], [place]\").      01/07/21 1400   Occupational Therapy   Type of Intervention structured groups   Response Initiates, socially acceptable   Hours 3       "

## 2021-01-07 NOTE — PROGRESS NOTES
Received Pt sleeping in his bed beginning of the shift. Woke up around 0230, requested for IBU for low back pain and Hydroxyzine for anxiety. Pt able to sleep deeply till around 0500 when he came out and sat in the lounge. Engaged staff with conversation for a short while before going to his room where he stayed awake for the rest of the shift. Pleasant. Observed to have slept for 4.0 hours.

## 2021-01-07 NOTE — PROGRESS NOTES
"[facility], [place]   Psychiatric Progress Note  Hospital Day: 23        Interim History:   The patient's care was discussed with the treatment team during the daily team meeting and/or staff's chart notes were reviewed.  Staff report patient was visible in the milieu, continues to make some paranoid/delusional statements but did not appear bothered by them, taking medications as prescribed, continues to receive PRN medications for pain and anxiety overnight, no acute events overnight, slept approximately 4 hours.     Upon interview, patient reports he was having more anxiety this morning due to events on the news. He took a PRN for this which was helpful, discussed changing his  clonazepam to bedtime and increasing dose givne he has been requesting medication for anxiety at night and having some difficulty with sleep. He is agreeable to this. He also requests to restart Straterra as he feels he is having some difficulty focusing, discussed concern about possible impact on LFTs given these have been abnormal, he noted he \"isn't long for this world\" and discussed his mortality given medical/oncology issues and would rather be able to function/focus with whatever time he has left. He denied SI. Denied AVH. Still has some paranoia and guardedness around events and his family. Discussed again that he is a voluntary patient and will work to make decision together about discharge and he agrees to remain in the hospital a few more days for medicaiton adjustments. He had requested his Vraylar be decreased yesterday and he further explained he was concern about possible side effects, including sexual side effects but denies having any side effects to 6mg dose currently. He agrees to increase dose back to 6mg given concern for ongoing hypomania/carlos symptoms in order to help him progress towards discharge. Reports mood overall today has been \"fair\". Denies physical health concerns. No additional " "concerns at this time.            Medications:       benztropine  0.5 mg Oral At Bedtime     calcium polycarbophil  625 mg Oral Daily with lunch     cariprazine  4.5 mg Oral Daily     clonazePAM  0.25 mg Oral Daily     divalproex sodium delayed-release  1,000 mg Oral At Bedtime     docusate sodium  100 mg Oral BID     multivitamin w/minerals  1 tablet Oral Daily     Vitamin D3  25 mcg Oral Daily          Allergies:     Allergies   Allergen Reactions     Animal Dander           Labs:     Recent Results (from the past 48 hour(s))   Comprehensive metabolic panel    Collection Time: 01/05/21  7:28 AM   Result Value Ref Range    Sodium 139 133 - 144 mmol/L    Potassium 4.0 3.4 - 5.3 mmol/L    Chloride 106 94 - 109 mmol/L    Carbon Dioxide 29 20 - 32 mmol/L    Anion Gap 4 3 - 14 mmol/L    Glucose 94 70 - 99 mg/dL    Urea Nitrogen 16 7 - 30 mg/dL    Creatinine 0.93 0.66 - 1.25 mg/dL    GFR Estimate 85 >60 mL/min/[1.73_m2]    GFR Estimate If Black >90 >60 mL/min/[1.73_m2]    Calcium 8.8 8.5 - 10.1 mg/dL    Bilirubin Total 0.7 0.2 - 1.3 mg/dL    Albumin 3.8 3.4 - 5.0 g/dL    Protein Total 7.1 6.8 - 8.8 g/dL    Alkaline Phosphatase 185 (H) 40 - 150 U/L     (H) 0 - 70 U/L    AST 65 (H) 0 - 45 U/L          Psychiatric Examination:     /75 (BP Location: Left arm)   Pulse 72   Temp 98  F (36.7  C) (Oral)   Resp 17   Wt 84.5 kg (186 lb 3.2 oz)   SpO2 96%   BMI 23.91 kg/m    Weight is 186 lbs 3.2 oz  Body mass index is 23.91 kg/m .    Orthostatic Vitals       Most Recent      Sitting Orthostatic /72 01/07 0821    Sitting Orthostatic Pulse (bpm) 73 01/07 0821    Standing Orthostatic /75 01/07 0821    Standing Orthostatic Pulse (bpm) 83 01/07 0821        Appearance: awake, alert and adequately groomed  Attitude:  cooperative  Eye Contact:  good  Mood:  \"fair\", appears euthymic with some anxiety around stressors   Affect:  mood congruent, full range  Speech:  clear, coherent  Language: fluent and intact " in English  Psychomotor, Gait, Musculoskeletal:  no evidence of tardive dyskinesia, dystonia, or tics  Thought Process: tangential but more organized than admission  Associations:  a little loose  Thought Content:  no evidence of suicidal ideation or homicidal ideation and some paranoia still present but improved  Insight:  fair  Judgement:  fair  Oriented to:  time, person, and place  Attention Span and Concentration:  fair  Recent and Remote Memory:  fair  Fund of Knowledge:  appropriate    Clinical Global Impressions  First:  Considering your total clinical experience with this particular patient population, how severe are the patient's symptoms at this time?: 7 (12/16/20 0544)  Compared to the patient's condition at the START of treatment, this patient's condition is: 4 (12/16/20 0544)  Most recent:  Considering your total clinical experience with this particular patient population, how severe are the patient's symptoms at this time?: 6 (01/05/21 0841)  Compared to the patient's condition at the START of treatment, this patient's condition is: 3 (01/05/21 0841)           Precautions:     Behavioral Orders   Procedures     Code 1 - Restrict to Unit     Code 2     imaging     Routine Programming     As clinically indicated     Single Room     Status 15     Every 15 minutes.          Diagnoses:      Bipolar disorder, type 1, current episode mixed with psychosis symptoms  Generalized anxiety  History of ADHD  Stimulant abuse by history   R/O psychosis and mood disorder 2/2 medical condition (Metastatic colon cancer)         Assessment & Plan:   Assessment and hospital summary:  66 yo M with history of bipolar disorder type 1, anxiety, ADHD, stimulant abuse and complex medical history who presented to ED with AMS in context of stepping in front of a truck PTA, he reports trying to flag truck down for help and denied this was a suicide attempt. His PTA medications were continued, with exception of Strattera for  concern of causing elevated mood state; he reported adequate adherence to medications PTA. IM consult placed given complexity of medical history including metastatic colon cancer, MRI Brain was unremarkable without evidence of mets. IM ordered abdominal U/S given ongoing elevation of LFTs. Suspect part of decompensation may have been due to poor adherence to Vrylar PTA given inconsistent reports about taking this medication, patient showed some improvement but still having evidence of psychosis and carlos, Vraylar was increased further to target this. PRN trazodone was discontinued for concern for possibly worsening carlos and PRN quetiapine started for sleep.     Psychiatric treatment/inteventions:  Medications:   -continue PTA Depakote 1000mg at bedtime for mood stabilization  -continue PTA Vraylar and resume increased dose of 6mg daily for mood stabilization and psychosis  -continue PTA clonazepam but increase to 0.5mg at move to bedtime to target sleep and anxiety  -continue PTA benztropine 0.5mg at bedtime for EPSE  -continue PTA Vitamin D3 1000U daily  -continue quetiapine 50mg at bedtime PRN  -resume PTA Strattera 10mg daily with caution at patients request for ADHD, will monitor closely for worsening mood instability, psychosis or impact on LFTs     Laboratory/Imaging: no new labs per psychiatry, will plan to recheck LFTs prior to discharge     Patient will be treated in therapeutic milieu with appropriate individual and group therapies as described.     Medical treatment/interventions:  Medical concerns: Pt has complex medical history including metastatic colon cancer, IM consult was placed for co-management and evaluation, appreciate assistance, they have been coordinating with oncology for additional work up. See initial consult note by Mary Ellen Bales PA-C on 12/16 and most recent progress note 12/22 for complete details, agree with assessment and plan as outlined by IM. IM has now signed off with  recommendations for outpatient follow up on discharge with PCP and primary oncologist Dr. Srivastava. Recheck CMP 1/5/2021 that showed LFTs remaining stable.      This note was created by undersigned using a Dragon dictation system. All typing errors or contextual distortion are unintentional and software inherent.     Disposition Plan   Reason for ongoing admission: is unable to care for self due to severe psychosis or carlos  Discharge location: TBD, likely discharge in 3-5 days once stabilized in mood/psychosis  Discharge Medications: not ordered  Follow-up Appointments: scheduled for next Thursday 1/15  Legal Status: voluntary   Entered by: Cassy Noriega on 1/7/2021 at 7:24 AM       Video-Visit Details    Type of service:  Video Visit    Video Start Time (time video started): 0830    Video End Time (time video stopped): 0850    Originating Location (pt. Location): [address]    Distant Location (provider location): Provider remote location    Mode of Communication:  Video Conference via [employer]    Physician has received verbal consent for a Video Visit from the patient? Yes    Cassy Noriega, DO

## 2021-01-08 PROCEDURE — 250N000013 HC RX MED GY IP 250 OP 250 PS 637: Performed by: PSYCHIATRY & NEUROLOGY

## 2021-01-08 PROCEDURE — 124N000002 HC R&B MH UMMC

## 2021-01-08 PROCEDURE — 99232 SBSQ HOSP IP/OBS MODERATE 35: CPT | Mod: 95 | Performed by: PSYCHIATRY & NEUROLOGY

## 2021-01-08 PROCEDURE — 250N000013 HC RX MED GY IP 250 OP 250 PS 637: Performed by: PHYSICIAN ASSISTANT

## 2021-01-08 PROCEDURE — G0177 OPPS/PHP; TRAIN & EDUC SERV: HCPCS

## 2021-01-08 PROCEDURE — H2032 ACTIVITY THERAPY, PER 15 MIN: HCPCS

## 2021-01-08 PROCEDURE — 250N000013 HC RX MED GY IP 250 OP 250 PS 637: Performed by: EMERGENCY MEDICINE

## 2021-01-08 RX ORDER — BENZTROPINE MESYLATE 1 MG/1
1 TABLET ORAL AT BEDTIME
Status: DISCONTINUED | OUTPATIENT
Start: 2021-01-08 | End: 2021-01-11 | Stop reason: HOSPADM

## 2021-01-08 RX ADMIN — BENZTROPINE MESYLATE 1 MG: 1 TABLET ORAL at 21:35

## 2021-01-08 RX ADMIN — CALCIUM POLYCARBOPHIL 625 MG: 625 TABLET, FILM COATED ORAL at 13:04

## 2021-01-08 RX ADMIN — GLYCERIN, PETROLATUM, PHENYLEPHRINE HCL, PRAMOXINE HCL: 144; 2.5; 10; 15 CREAM TOPICAL at 22:33

## 2021-01-08 RX ADMIN — ATOMOXETINE 10 MG: 10 CAPSULE ORAL at 08:09

## 2021-01-08 RX ADMIN — Medication 25 MCG: at 08:08

## 2021-01-08 RX ADMIN — DOCUSATE SODIUM 100 MG: 100 CAPSULE, LIQUID FILLED ORAL at 19:35

## 2021-01-08 RX ADMIN — DOCUSATE SODIUM 100 MG: 100 CAPSULE, LIQUID FILLED ORAL at 08:08

## 2021-01-08 RX ADMIN — DIVALPROEX SODIUM 1000 MG: 500 TABLET, DELAYED RELEASE ORAL at 21:35

## 2021-01-08 RX ADMIN — IBUPROFEN 400 MG: 200 TABLET, FILM COATED ORAL at 02:35

## 2021-01-08 RX ADMIN — SIMETHICONE CHEW TAB 80 MG 80 MG: 80 TABLET ORAL at 15:49

## 2021-01-08 RX ADMIN — CARIPRAZINE 6 MG: 1.5 CAPSULE, GELATIN COATED ORAL at 08:09

## 2021-01-08 RX ADMIN — MULTIPLE VITAMINS W/ MINERALS TAB 1 TABLET: TAB at 08:08

## 2021-01-08 RX ADMIN — SIMETHICONE CHEW TAB 80 MG 80 MG: 80 TABLET ORAL at 09:29

## 2021-01-08 RX ADMIN — SIMETHICONE CHEW TAB 80 MG 80 MG: 80 TABLET ORAL at 02:38

## 2021-01-08 RX ADMIN — Medication 0.5 MG: at 21:35

## 2021-01-08 ASSESSMENT — ACTIVITIES OF DAILY LIVING (ADL)
HYGIENE/GROOMING: INDEPENDENT
DRESS: INDEPENDENT
LAUNDRY: WITH SUPERVISION
ORAL_HYGIENE: INDEPENDENT
ORAL_HYGIENE: INDEPENDENT
HYGIENE/GROOMING: INDEPENDENT
DRESS: INDEPENDENT

## 2021-01-08 NOTE — PROGRESS NOTES
[facility], [place], [place]   Psychiatric Progress Note  Hospital Day: 24        Interim History:   The patient's care was discussed with the treatment team during the daily team meeting and/or staff's chart notes were reviewed.  Staff report patient was visible in the milieu, attending groups, making some bizarre and delusional statements, pleasant in interactions, taking medications as prescribed, requesting PRNs for pain and GI upset, no acute events overnight.     Upon interview, patient reports that he slept well last night, has been going to bed around 8:20pm and so hours charted for sleep are not accurate when just tracked over night shift. He feels he is getting close to 6 hours which is his baseline. He reflected on manic symptoms present on admission and what has improved, he does endorse some ongoing hypomania symptoms but feels he is improving to point of approaching readiness to discharge. Tolerating medications, feels taking clonazepam at bedtime is helpful for sleep and anxiety, having some stiffness at night and wonders about increasing cogentin, which will be trialed over the weekend. Denies SI or HI, denies AVH. Does have some ongoing suspiciousness. Reports feeling pretty well physically, worries about his abdominal binder losing its effectiveness due to being stretched out and requests to have another one if able. Writer will look into this, no additional concerns at this time.          Medications:       atomoxetine  10 mg Oral Daily     benztropine  0.5 mg Oral At Bedtime     calcium polycarbophil  625 mg Oral Daily with lunch     cariprazine  6 mg Oral Daily     clonazePAM  0.5 mg Oral At Bedtime     divalproex sodium delayed-release  1,000 mg Oral At Bedtime     docusate sodium  100 mg Oral BID     multivitamin w/minerals  1 tablet Oral Daily     Vitamin D3  25 mcg Oral Daily          Allergies:     Allergies   Allergen Reactions     Animal Dander           Labs:     No  results found for this or any previous visit (from the past 48 hour(s)).       Psychiatric Examination:     /78 (BP Location: Right arm)   Pulse 81   Temp 98.8  F (37.1  C) (Oral)   Resp 17   Wt 84.7 kg (186 lb 12.8 oz)   SpO2 97%   BMI 23.98 kg/m    Weight is 186 lbs 12.8 oz  Body mass index is 23.98 kg/m .    Orthostatic Vitals       Most Recent      Sitting Orthostatic /73 01/08 0744    Sitting Orthostatic Pulse (bpm) 75 01/08 0744    Standing Orthostatic /77 01/08 0744    Standing Orthostatic Pulse (bpm) 74 01/08 0744        Appearance: awake, alert and adequately groomed  Attitude:  cooperative   Eye Contact:  good  Mood:  better, appears approaching euthymia with some anxiety around stressors and periods of mild elevated states  Affect:  mood congruent, full range  Speech:  clear, coherent  Language: fluent and intact in English  Psychomotor, Gait, Musculoskeletal:  no evidence of tardive dyskinesia, dystonia, or tics  Thought Process: tangential but more organized than on admission  Associations:  a little loose  Thought Content:  no evidence of suicidal ideation or homicidal ideation and some paranoia still present but improved  Insight:  fair  Judgement:  fair  Oriented to:  time, person, and place  Attention Span and Concentration:  fair  Recent and Remote Memory:  fair  Fund of Knowledge:  appropriate    Clinical Global Impressions  First:  Considering your total clinical experience with this particular patient population, how severe are the patient's symptoms at this time?: 7 (12/16/20 0544)  Compared to the patient's condition at the START of treatment, this patient's condition is: 4 (12/16/20 0544)  Most recent:  Considering your total clinical experience with this particular patient population, how severe are the patient's symptoms at this time?: 6 (01/05/21 0841)  Compared to the patient's condition at the START of treatment, this patient's condition is: 3 (01/05/21  0841)           Precautions:     Behavioral Orders   Procedures     Code 1 - Restrict to Unit     Code 2     imaging     Routine Programming     As clinically indicated     Single Room     Status 15     Every 15 minutes.          Diagnoses:      Bipolar disorder, type 1, current episode mixed with psychosis symptoms  Generalized anxiety  History of ADHD  Stimulant abuse by history   R/O psychosis and mood disorder 2/2 medical condition (Metastatic colon cancer)         Assessment & Plan:   Assessment and hospital summary:  64 yo M with history of bipolar disorder type 1, anxiety, ADHD, stimulant abuse and complex medical history who presented to ED with AMS in context of stepping in front of a truck PTA, he reports trying to flag truck down for help and denied this was a suicide attempt. His PTA medications were continued, with exception of Strattera for concern of causing elevated mood state; he reported adequate adherence to medications PTA. IM consult placed given complexity of medical history including metastatic colon cancer, MRI Brain was unremarkable without evidence of mets. IM ordered abdominal U/S given ongoing elevation of LFTs. Suspect part of decompensation may have been due to poor adherence to Vrylar PTA given inconsistent reports about taking this medication, patient showed some improvement but still having evidence of psychosis and carlos, Vraylar was increased further to target this. PRN trazodone was discontinued for concern for possibly worsening carlos and PRN quetiapine started for sleep. Clonazepam increased and moved to bedtime to target consistently waking up with anxiety and poor sleep.    Psychiatric treatment/inteventions:  Medications:   -continue PTA Depakote 1000mg at bedtime for mood stabilization  -continue PTA Vraylar at increased dose of 6mg daily for mood stabilization and psychosis  -continue PTA clonazepam at 0.5mg at bedtime to target sleep and anxiety  -increase PTA benztropine  to 1mg at bedtime for EPSE per patients request  -continue PTA Vitamin D3 1000U daily  -continue quetiapine 50mg at bedtime PRN  -continue PTA Strattera 10mg daily with caution at patients request for ADHD, will monitor closely for worsening mood instability, psychosis or impact on LFTs     Laboratory/Imaging: no new labs per psychiatry, will plan to recheck LFTs prior to discharge     Patient will be treated in therapeutic milieu with appropriate individual and group therapies as described.     Medical treatment/interventions:  Medical concerns: Pt has complex medical history including metastatic colon cancer, IM consult was placed for co-management and evaluation, appreciate assistance, they have been coordinating with oncology for additional work up. See initial consult note by Mary Ellen Bales PA-C on 12/16 and most recent progress note 12/22 for complete details, agree with assessment and plan as outlined by IM. IM has now signed off with recommendations for outpatient follow up on discharge with PCP and primary oncologist Dr. Srivastava. Recheck CMP 1/5/2021 that showed LFTs remaining stable.      This note was created by undersigned using a Dragon dictation system. All typing errors or contextual distortion are unintentional and software inherent.     Disposition Plan   Reason for ongoing admission: is unable to care for self due to severe psychosis or carlos  Discharge location: TBD, likely discharge in 3-5 days once stabilized in mood/psychosis  Discharge Medications: not ordered  Follow-up Appointments: scheduled for next Thursday 1/15  Legal Status: voluntary   Entered by: Cassy Noriega on 1/8/2021 at 7:14 AM       Video-Visit Details    Type of service:  Video Visit    Video Start Time (time video started): 0820    Video End Time (time video stopped): 0840    Originating Location (pt. Location): [address]    Distant Location (provider location): Provider remote location    Mode of  Communication:  Video Conference via [employer]    Physician has received verbal consent for a Video Visit from the patient? Yes    Cassy Noriega, DO

## 2021-01-08 NOTE — PLAN OF CARE
Work Completed  - chart review  - team meeting      Discharge plan or goal  Assessment is ongoing likely will discharge to his current apartment with follow-up with psychiatry and referrals for targeted mental health case management.    Barriers to discharge  Medication management and assessment ongoing for targeted symptom improvement

## 2021-01-08 NOTE — PLAN OF CARE
Pt spent a majority of the shift in the milieu.  He is selectively social with his peers and staff.  Pt is very polite and soft-spoken.  Pt did inquire about replacing his abdominal binder.  Provider has ordered a replacement but sizing is ambiguous.  Nursing staff will assess the size requirement and modify the order if necessary.  Pt has required only PRN simethicone so far this shift.  Pt displays a full range affect. Pt denies SI/SIB.

## 2021-01-08 NOTE — PROGRESS NOTES
Pt asked for prn Simethicone at start of this shift. Took a short nap thereafter. Out for meals, med compliant and denies MH symptoms including SI/SIB/HI. Pt  Was sociable with some peers and also staff. C/o burning sensation when having a BM and thinks hemorrhoids my start to act up. Requesting for Preparation H which has helped in the past. Reminded that he has an order for Witch Hazel Pads but states those help and he uses them to clean the flap on his anus. On examination no inflammation or redness observed on the anal area. Order given by Dr Robertson for Prep H cream and Pt received the medication.

## 2021-01-08 NOTE — PROGRESS NOTES
Pt c/o back pain and upset stomach at 0240 and received prn ibuprofen and mylicon with relief of discomfort as reviewed in one hour

## 2021-01-08 NOTE — PROGRESS NOTES
Pt sleep intermittently,appeared restless at times, he was in the lounge at one point discussing politics in a humorous manner,he slept a total of 2.75 hours this night

## 2021-01-08 NOTE — PROGRESS NOTES
"  01/07/21 1500   Groups   Details    (Psychotherapy)   The  Psychotherapy group goal is to promote insight to positive choice and change. Group processing is within a supportive and safe environment. Patients will process emotions using verbal group and expressive psychotherapy interventions including visual art/writing interventions.     Group interventions support patients by : cultivating resilience, fostering self awareness, self expression, self esteem and self compassion.  Groups will provide tools to encourage self and others in group, to practice communication/ social skills and supports, learn positive coping mechanisms, self efficacy, conflict resolution, empowerment, optimism ,hope , understanding ones emotions,and a sense of self and community.  Tools will be provided to manage life stressors  and individual's diagnosis      Modalities to reach these goals include: positive and solution focused psychology, CBT, DBT, ACT, Narrative psychology, Adlerian psychology, FLOW, Expressive Arts Continuum Therapies( Art Therapy) and Mindfulness based directives and discussions.     Subjective -patient report of mood today-\"less anxious than I was earlier\"     Objective/ Intervention- Goal of group and Therapeutic modality utilized-active listening/ encouragement/ gratitude/ Positive Psychology- \"3 good things\"     Group Response-group of 4 pts very engaged 1 hour group     Patient Response- Pt said He had a positive conversation with is doctor and felt like he was given choices about his healthcare \" I don't like to be told what to do. He spoke about many things. He said watching the sitcom reminded him of his divorce and of golfing , he said \" I still haven't gotten over it.\" He spoke of his cancer multiple times and some support his daughter gave him with food when he was having cancer treatments that \" I failed to thank her for.\" Writer encouraged it isn't too late to tell her thank you and that a group " "reminded him of that. He said he was from [place] and through chart review writer saw that he said he was from \" [place].\" He is enjoying playing music with a male peer on the unit. His mood seemed improved after group. Writer observed he was in the milieu eating and talking to himself  . He had black beans on his dinner plate and he said \" I don't like black eyed peas I like green peas \" and appeared irritated about this.     Oumar Carpenter, GISELLE, ATR-BC          "

## 2021-01-09 PROCEDURE — 250N000013 HC RX MED GY IP 250 OP 250 PS 637: Performed by: PSYCHIATRY & NEUROLOGY

## 2021-01-09 PROCEDURE — 124N000002 HC R&B MH UMMC

## 2021-01-09 PROCEDURE — 250N000013 HC RX MED GY IP 250 OP 250 PS 637: Performed by: EMERGENCY MEDICINE

## 2021-01-09 PROCEDURE — 250N000013 HC RX MED GY IP 250 OP 250 PS 637: Performed by: PHYSICIAN ASSISTANT

## 2021-01-09 RX ADMIN — Medication 25 MCG: at 09:09

## 2021-01-09 RX ADMIN — SIMETHICONE CHEW TAB 80 MG 80 MG: 80 TABLET ORAL at 19:32

## 2021-01-09 RX ADMIN — CARIPRAZINE 6 MG: 1.5 CAPSULE, GELATIN COATED ORAL at 09:10

## 2021-01-09 RX ADMIN — MULTIPLE VITAMINS W/ MINERALS TAB 1 TABLET: TAB at 09:10

## 2021-01-09 RX ADMIN — DIVALPROEX SODIUM 1000 MG: 500 TABLET, DELAYED RELEASE ORAL at 21:28

## 2021-01-09 RX ADMIN — HYDROXYZINE HYDROCHLORIDE 25 MG: 25 TABLET, FILM COATED ORAL at 14:58

## 2021-01-09 RX ADMIN — SIMETHICONE CHEW TAB 80 MG 80 MG: 80 TABLET ORAL at 09:11

## 2021-01-09 RX ADMIN — DOCUSATE SODIUM 100 MG: 100 CAPSULE, LIQUID FILLED ORAL at 09:10

## 2021-01-09 RX ADMIN — DOCUSATE SODIUM 100 MG: 100 CAPSULE, LIQUID FILLED ORAL at 19:27

## 2021-01-09 RX ADMIN — ATOMOXETINE 10 MG: 10 CAPSULE ORAL at 09:09

## 2021-01-09 RX ADMIN — Medication 0.5 MG: at 21:28

## 2021-01-09 RX ADMIN — HYDROXYZINE HYDROCHLORIDE 25 MG: 25 TABLET, FILM COATED ORAL at 02:41

## 2021-01-09 RX ADMIN — IBUPROFEN 400 MG: 200 TABLET, FILM COATED ORAL at 02:41

## 2021-01-09 RX ADMIN — CALCIUM POLYCARBOPHIL 625 MG: 625 TABLET, FILM COATED ORAL at 14:58

## 2021-01-09 RX ADMIN — BENZTROPINE MESYLATE 1 MG: 1 TABLET ORAL at 21:28

## 2021-01-09 NOTE — PROGRESS NOTES
Pt spent their time resting in their room, watching TV, and socializing.  Pt is actively social.  Pt occasionally asks bizarre questions, such as if the staff listen in on outgoing pt phone calls and if staff record the numbers.  Pt is otherwise pleasant and sociable.  Pt attended OT.  Pt ate dinner and snacks.         01/08/21 2200   Behavioral Health   Hallucinations denies / not responding to hallucinations   Thinking distractable   Orientation person: oriented;place: oriented;date: oriented;time: oriented   Memory baseline memory   Insight poor   Judgement impaired   Eye Contact at examiner   Affect full range affect   Mood mood is calm   Physical Appearance/Attire attire appropriate to age and situation   Hygiene well groomed   Suicidality other (see comments)  (Pt denies)   1. Wish to be Dead (Recent) No   2. Non-Specific Active Suicidal Thoughts (Recent) No   Activity other (see comment)  (Active on the unit)   Speech clear;coherent   Psychomotor / Gait balanced;steady   Activities of Daily Living   Hygiene/Grooming independent   Oral Hygiene independent   Dress independent   Laundry with supervision   Room Organization independent

## 2021-01-09 NOTE — PROGRESS NOTES
"Pt has slept intermittently this night,he awakened at approximately 0300 with c/o lower back pain,\"i think I need to see a chiropractor for my back pain again\",pt was given ibuprofen and hydroxzine at this time with stated relief of lower back pain issues,he was pleasant but somewhat anxious on approach  "

## 2021-01-09 NOTE — PLAN OF CARE
"Patient spending time in the milieu. He is pleasant in conversation. Asked staff \"Could you work it so I could stay another week? I have a room and a bathroom and nurses are helping me. I am right across from the nurses station. I just feel better about that.\" He talks of how he is always worried about his physical problems. Reports anxiety at times and states that the hydroxyzine is helpful. He attended community meeting. He is med compliant. Denies SI and SIB. Magaly Aldrich RN   "

## 2021-01-10 PROCEDURE — 250N000013 HC RX MED GY IP 250 OP 250 PS 637: Performed by: PSYCHIATRY & NEUROLOGY

## 2021-01-10 PROCEDURE — 250N000013 HC RX MED GY IP 250 OP 250 PS 637: Performed by: EMERGENCY MEDICINE

## 2021-01-10 PROCEDURE — 250N000013 HC RX MED GY IP 250 OP 250 PS 637: Performed by: PHYSICIAN ASSISTANT

## 2021-01-10 PROCEDURE — 124N000002 HC R&B MH UMMC

## 2021-01-10 RX ADMIN — HYDROXYZINE HYDROCHLORIDE 25 MG: 25 TABLET, FILM COATED ORAL at 03:24

## 2021-01-10 RX ADMIN — MULTIPLE VITAMINS W/ MINERALS TAB 1 TABLET: TAB at 09:32

## 2021-01-10 RX ADMIN — Medication 25 MCG: at 09:32

## 2021-01-10 RX ADMIN — DOCUSATE SODIUM 100 MG: 100 CAPSULE, LIQUID FILLED ORAL at 09:32

## 2021-01-10 RX ADMIN — ATOMOXETINE 10 MG: 10 CAPSULE ORAL at 09:32

## 2021-01-10 RX ADMIN — DOCUSATE SODIUM 100 MG: 100 CAPSULE, LIQUID FILLED ORAL at 19:04

## 2021-01-10 RX ADMIN — DIVALPROEX SODIUM 1000 MG: 500 TABLET, DELAYED RELEASE ORAL at 21:32

## 2021-01-10 RX ADMIN — CALCIUM POLYCARBOPHIL 625 MG: 625 TABLET, FILM COATED ORAL at 19:57

## 2021-01-10 RX ADMIN — BENZTROPINE MESYLATE 1 MG: 1 TABLET ORAL at 21:32

## 2021-01-10 RX ADMIN — IBUPROFEN 400 MG: 200 TABLET, FILM COATED ORAL at 03:42

## 2021-01-10 RX ADMIN — CARIPRAZINE 6 MG: 1.5 CAPSULE, GELATIN COATED ORAL at 09:31

## 2021-01-10 RX ADMIN — Medication 0.5 MG: at 21:32

## 2021-01-10 ASSESSMENT — ACTIVITIES OF DAILY LIVING (ADL)
LAUNDRY: WITH SUPERVISION
HYGIENE/GROOMING: INDEPENDENT
ORAL_HYGIENE: INDEPENDENT
DRESS: INDEPENDENT
ORAL_HYGIENE: INDEPENDENT
DRESS: INDEPENDENT
HYGIENE/GROOMING: INDEPENDENT
LAUNDRY: WITH SUPERVISION

## 2021-01-10 NOTE — PROGRESS NOTES
Pt was visible in the milieu, social with peers, calm and corporative. Pt reports feeling worried and fearful about not receiving medical care during covid. Pt watched football and played games with staff, bright affect.        01/10/21 1511   Behavioral Health   Hallucinations denies / not responding to hallucinations   Thinking distractable   Orientation person: oriented;date: oriented;place: oriented;time: oriented   Memory baseline memory   Insight poor   Judgement impaired   Eye Contact at examiner   Affect full range affect   Mood mood is calm   Physical Appearance/Attire attire appropriate to age and situation   Hygiene well groomed   Suicidality other (see comments)  (denies)   Activity other (see comment)  (visible in the milieu )   Speech clear;coherent   Medication Sensitivity no stated side effects;no observed side effects   Psychomotor / Gait steady;balanced   Activities of Daily Living   Hygiene/Grooming independent   Oral Hygiene independent   Dress independent   Laundry with supervision   Room Organization independent

## 2021-01-10 NOTE — PLAN OF CARE
Problem: Adult Inpatient Plan of Care  Goal: Readiness for Transition of Care  Outcome: Improving    Pt out in the milieu with others and intermittently socializing. Ate supper, med compliant and denies adverse effects. Denies SI/SIB/AVH. No c/o burning when having a BM. Requested for prn Simethicone this shift. No concerns.

## 2021-01-10 NOTE — PROGRESS NOTES
Patient was sleeping beginning of the shift. At mid shift, Pt requested and received IBU for pain on his abdomen to his back. Also requested for Hydroxyzine and readily went to bed. Requested for information regarding his recent ultrasound results but Pt was already asleep.  Pt was given those results upon wakening at 0600. No behavioral issues. Observed to have slept for 6.0 hours.

## 2021-01-11 VITALS
TEMPERATURE: 98.6 F | WEIGHT: 187.5 LBS | HEART RATE: 85 BPM | SYSTOLIC BLOOD PRESSURE: 121 MMHG | BODY MASS INDEX: 24.07 KG/M2 | OXYGEN SATURATION: 95 % | DIASTOLIC BLOOD PRESSURE: 75 MMHG | RESPIRATION RATE: 15 BRPM

## 2021-01-11 LAB
ALBUMIN SERPL-MCNC: 3.8 G/DL (ref 3.4–5)
ALP SERPL-CCNC: 198 U/L (ref 40–150)
ALT SERPL W P-5'-P-CCNC: 137 U/L (ref 0–70)
AST SERPL W P-5'-P-CCNC: 77 U/L (ref 0–45)
BILIRUB DIRECT SERPL-MCNC: 0.1 MG/DL (ref 0–0.2)
BILIRUB SERPL-MCNC: 0.5 MG/DL (ref 0.2–1.3)
PROT SERPL-MCNC: 7.2 G/DL (ref 6.8–8.8)

## 2021-01-11 PROCEDURE — G0177 OPPS/PHP; TRAIN & EDUC SERV: HCPCS

## 2021-01-11 PROCEDURE — 80076 HEPATIC FUNCTION PANEL: CPT | Performed by: PSYCHIATRY & NEUROLOGY

## 2021-01-11 PROCEDURE — 250N000013 HC RX MED GY IP 250 OP 250 PS 637: Performed by: PSYCHIATRY & NEUROLOGY

## 2021-01-11 PROCEDURE — 250N000013 HC RX MED GY IP 250 OP 250 PS 637: Performed by: PHYSICIAN ASSISTANT

## 2021-01-11 PROCEDURE — 99239 HOSP IP/OBS DSCHRG MGMT >30: CPT | Mod: 95 | Performed by: PSYCHIATRY & NEUROLOGY

## 2021-01-11 PROCEDURE — 36415 COLL VENOUS BLD VENIPUNCTURE: CPT | Performed by: PSYCHIATRY & NEUROLOGY

## 2021-01-11 RX ORDER — DOCUSATE SODIUM 100 MG/1
100 CAPSULE, LIQUID FILLED ORAL 2 TIMES DAILY
Qty: 60 CAPSULE | Refills: 0 | Status: SHIPPED | OUTPATIENT
Start: 2021-01-11 | End: 2022-07-14

## 2021-01-11 RX ORDER — CLONAZEPAM 0.5 MG/1
0.5 TABLET ORAL AT BEDTIME
Qty: 30 TABLET | Refills: 0 | Status: SHIPPED | OUTPATIENT
Start: 2021-01-11 | End: 2021-01-19

## 2021-01-11 RX ORDER — ATOMOXETINE 10 MG/1
10 CAPSULE ORAL DAILY
Qty: 30 CAPSULE | Refills: 0 | Status: SHIPPED | OUTPATIENT
Start: 2021-01-12 | End: 2021-01-14

## 2021-01-11 RX ORDER — BENZTROPINE MESYLATE 1 MG/1
1 TABLET ORAL AT BEDTIME
Qty: 30 TABLET | Refills: 0 | Status: SHIPPED | OUTPATIENT
Start: 2021-01-11 | End: 2021-01-14

## 2021-01-11 RX ORDER — SUCRALFATE 1 G/1
1 TABLET ORAL 4 TIMES DAILY
Qty: 28 TABLET | Refills: 0 | Status: SHIPPED | OUTPATIENT
Start: 2021-01-11 | End: 2021-05-06

## 2021-01-11 RX ORDER — SIMETHICONE 80 MG
80 TABLET,CHEWABLE ORAL EVERY 6 HOURS PRN
Qty: 15 TABLET | Refills: 0 | Status: SHIPPED | OUTPATIENT
Start: 2021-01-11

## 2021-01-11 RX ORDER — HYDROXYZINE HYDROCHLORIDE 25 MG/1
25 TABLET, FILM COATED ORAL EVERY 4 HOURS PRN
Qty: 30 TABLET | Refills: 0 | Status: SHIPPED | OUTPATIENT
Start: 2021-01-11 | End: 2021-01-14

## 2021-01-11 RX ORDER — IBUPROFEN 400 MG/1
400 TABLET, FILM COATED ORAL 2 TIMES DAILY PRN
Qty: 60 TABLET | Refills: 0 | Status: SHIPPED | OUTPATIENT
Start: 2021-01-11 | End: 2021-06-10

## 2021-01-11 RX ADMIN — Medication 25 MCG: at 09:16

## 2021-01-11 RX ADMIN — HYDROXYZINE HYDROCHLORIDE 25 MG: 25 TABLET, FILM COATED ORAL at 02:51

## 2021-01-11 RX ADMIN — CARIPRAZINE 6 MG: 1.5 CAPSULE, GELATIN COATED ORAL at 09:15

## 2021-01-11 RX ADMIN — IBUPROFEN 400 MG: 200 TABLET, FILM COATED ORAL at 02:51

## 2021-01-11 RX ADMIN — ATOMOXETINE 10 MG: 10 CAPSULE ORAL at 09:16

## 2021-01-11 RX ADMIN — SIMETHICONE CHEW TAB 80 MG 80 MG: 80 TABLET ORAL at 12:07

## 2021-01-11 RX ADMIN — MULTIPLE VITAMINS W/ MINERALS TAB 1 TABLET: TAB at 09:16

## 2021-01-11 RX ADMIN — CALCIUM POLYCARBOPHIL 625 MG: 625 TABLET, FILM COATED ORAL at 12:07

## 2021-01-11 RX ADMIN — HYDROXYZINE HYDROCHLORIDE 25 MG: 25 TABLET, FILM COATED ORAL at 16:16

## 2021-01-11 RX ADMIN — DOCUSATE SODIUM 100 MG: 100 CAPSULE, LIQUID FILLED ORAL at 09:16

## 2021-01-11 ASSESSMENT — ACTIVITIES OF DAILY LIVING (ADL)
LAUNDRY: WITH SUPERVISION
HYGIENE/GROOMING: INDEPENDENT
ORAL_HYGIENE: INDEPENDENT
DRESS: INDEPENDENT

## 2021-01-11 NOTE — PROGRESS NOTES
Pt had a calm shift.  Pt spent their time resting in their room, watching TV, and socializing.  Pt is appropriate when social, but occasionally has off beat questions and conversations.  Pt ate dinner and snacks.  Pt attended OT.  Pt denies SI and SIB.       01/10/21 1955   Behavioral Health   Hallucinations denies / not responding to hallucinations   Thinking distractable   Orientation person: oriented;place: oriented;date: oriented;time: oriented   Memory baseline memory   Insight poor   Judgement impaired   Eye Contact at examiner   Affect full range affect   Mood mood is calm   Physical Appearance/Attire attire appropriate to age and situation   Hygiene well groomed   Suicidality other (see comments)  (Pt denies)   1. Wish to be Dead (Recent) No   2. Non-Specific Active Suicidal Thoughts (Recent) No   Speech clear;coherent   Psychomotor / Gait balanced;steady   Activities of Daily Living   Hygiene/Grooming independent   Oral Hygiene independent   Dress independent   Laundry with supervision   Room Organization independent

## 2021-01-11 NOTE — DISCHARGE SUMMARY
"Psychiatric Discharge Summary    Los Huang MRN# [id number]   Age: 65 year old YOB: 1955     Date of Admission:  12/15/2020  Date of Discharge:  1/11/2021  Admitting Physician:  Enrrique Main MD  Discharge Physician:  Cassy Noriega DO         Event Leading to Hospitalization:   The patient is a 66yo male with a history of schizoaffective disorder, bipolar type who was admitted with altered mental status. Today, the patient is rambling and tangential but pleasant. Is agreeable to resume his other medications and reports that he is taking them regularly but hasn't had them since Sunday. Mood is \"crying a little.\" Reports good sleep and appetite. Some paranoia noted as he feels that cameras are \"watching me all the time.\" When asked about SI, says, \"not really\" and says that he was trying to \"flag down that truck for help\" and that this wasn't a suicide attempt. Says that he was asked to contact a therapist by his outpatient provider and \"I felt like they were interviewing me.\"       Per ER:  Los Huang is a 65 year old male who has a PMH of schizoaffective disorder, stimulant use disorder (amphetamines), bipolar 1 disorder, HUGO, ADHD, and h/x of metastatic colon cancer (2014) s/p cholecystectomy and chemotherapy, who presents to the Emergency Department via EMS with altered mental status.   Los tells me that he would like a mental assessment as he did not feel that his current medications are appropriately balanced that he feels somewhat hypomanic.  He states that his care is managed by a psychiatry resident at the [facility] there is a diagnosis of bipolar disorder/schizoaffective.  He states that he has been on a variety of medications over the years including risperidone when it first came out 1984 but also newer medications such as Zyprexa.  He states that he has been taking his medications but will occasionally miss a dose.  He states that he saw his current resident Marcia feels " "him in October and that she gave him enough medications to last 3 months.  [facility] pain which he states is not a major concern him now and although he has a place to live he did not have a ride post-midnight and so he became scared that he might freeze to death outside and so he jumped out in front of a car.  He states that his intent was not to commit suicide but to catch the 's attention for assistance.  He states he has not had much come in contact here in the [facility] which is unfortunate because he initially came here for family support.  He states that Covid has  him from his family.  He states that his goal is to get his medication balance right so he can live the remainder of his lifespan peacefully.  He states that his new med, Atomoxetine, makes him happy because it increases libido but also makes him feel out of balance.  He states that with this medication he does not feel he needs much sleep. In the past trazodone has helped him get sleep, but this is no longer sufficient.  When asked if he has paranoia, he denies this but also states that he lives in a high tech apartment and he believes that he is being monitored everywhere in the apartment including through the bathroom mirror.  He states that he has not taken any medications since this morning.  Patient seen recently on 12/7 with paranoia and was stopped on ritalin. Patient was at the [facility] and was being seen for epigastric abdominal pain extending into the esophagus. They did not opt to rescan patient given recent imaging from 12/6.  Labs including lipase, CMP, CBC were unremarkable. Macroscopic UA was notable for: mucus, 10 ketones in urine and trace blood.  Per report from police patient reported that he was \"k[facility] out of the [facility]\" and that he then went to the [facility] where he was brought in by " police.       See Admission note by Enrrique Main MD on 12/16/2020 for additional details.          Diagnoses:     Bipolar disorder, type 1, current episode mixed with psychosis symptoms  Generalized anxiety  History of ADHD  Stimulant abuse by history   Transaminitis, suspected 2/2 medication use and not metastasis   Metastatic colon cancer  Benign Renal Cysts and hypodense lesion in upper pole of L kidney, concern for possible RCC of R kidney  Hx of R lung nodule  Papule on abdomen  Chronic constipation         Labs:     Recent Results (from the past 672 hour(s))   CBC with platelets differential    Collection Time: 12/14/20  9:33 PM   Result Value Ref Range    WBC 7.2 4.0 - 11.0 10e9/L    RBC Count 4.93 4.4 - 5.9 10e12/L    Hemoglobin 14.8 13.3 - 17.7 g/dL    Hematocrit 44.8 40.0 - 53.0 %    MCV 91 78 - 100 fl    MCH 30.0 26.5 - 33.0 pg    MCHC 33.0 31.5 - 36.5 g/dL    RDW 13.6 10.0 - 15.0 %    Platelet Count 176 150 - 450 10e9/L    Diff Method Automated Method     % Neutrophils 73.1 %    % Lymphocytes 15.7 %    % Monocytes 7.6 %    % Eosinophils 2.5 %    % Basophils 0.7 %    % Immature Granulocytes 0.4 %    Nucleated RBCs 0 0 /100    Absolute Neutrophil 5.3 1.6 - 8.3 10e9/L    Absolute Lymphocytes 1.1 0.8 - 5.3 10e9/L    Absolute Monocytes 0.6 0.0 - 1.3 10e9/L    Absolute Eosinophils 0.2 0.0 - 0.7 10e9/L    Absolute Basophils 0.1 0.0 - 0.2 10e9/L    Abs Immature Granulocytes 0.0 0 - 0.4 10e9/L    Absolute Nucleated RBC 0.0    Comprehensive metabolic panel    Collection Time: 12/14/20  9:33 PM   Result Value Ref Range    Sodium 141 133 - 144 mmol/L    Potassium 4.0 3.4 - 5.3 mmol/L    Chloride 109 94 - 109 mmol/L    Carbon Dioxide 25 20 - 32 mmol/L    Anion Gap 7 3 - 14 mmol/L    Glucose 88 70 - 99 mg/dL    Urea Nitrogen 17 7 - 30 mg/dL    Creatinine 0.82 0.66 - 1.25 mg/dL    GFR Estimate >90 >60 mL/min/[1.73_m2]    GFR Estimate If Black >90 >60 mL/min/[1.73_m2]    Calcium 9.1 8.5 - 10.1 mg/dL    Bilirubin Total  0.4 0.2 - 1.3 mg/dL    Albumin 3.7 3.4 - 5.0 g/dL    Protein Total 7.1 6.8 - 8.8 g/dL    Alkaline Phosphatase 161 (H) 40 - 150 U/L    ALT 80 (H) 0 - 70 U/L    AST 40 0 - 45 U/L   Lipase    Collection Time: 12/14/20  9:33 PM   Result Value Ref Range    Lipase 214 73 - 393 U/L   UA reflex to Microscopic and Culture    Collection Time: 12/14/20  9:33 PM    Specimen: Urine Midstream; Midstream Urine   Result Value Ref Range    Color Urine Yellow     Appearance Urine Clear     Glucose Urine Negative NEG^Negative mg/dL    Bilirubin Urine Negative NEG^Negative    Ketones Urine 10 (A) NEG^Negative mg/dL    Specific Gravity Urine 1.013 1.003 - 1.035    Blood Urine Trace (A) NEG^Negative    pH Urine 5.0 5.0 - 7.0 pH    Protein Albumin Urine Negative NEG^Negative mg/dL    Urobilinogen mg/dL Normal 0.0 - 2.0 mg/dL    Nitrite Urine Negative NEG^Negative    Leukocyte Esterase Urine Negative NEG^Negative    Source Midstream Urine     RBC Urine 1 0 - 2 /HPF    WBC Urine <1 0 - 5 /HPF    Mucous Urine Present (A) NEG^Negative /LPF    Hyaline Casts 1 0 - 2 /LPF   Asymptomatic Influenza A/B & SARS-CoV2 (COVID-19) Virus PCR Multiplex    Collection Time: 12/15/20  8:12 AM    Specimen: Nasopharyngeal   Result Value Ref Range    Flu A/B & SARS-COV-2 PCR Source Nasopharyngeal     SARS-CoV-2 PCR Result NEGATIVE     Influenza A PCR Negative NEG^Negative    Influenza B PCR Negative NEG^Negative    Respiratory Syncytial Virus PCR Negative NEG^Negative    Flu A/B & SARS-CoV-2 PCR Comment (Note)    Drug abuse screen 6 urine (tox)    Collection Time: 12/15/20  9:23 AM   Result Value Ref Range    Amphetamine Qual Urine Negative NEG^Negative    Barbiturates Qual Urine Negative NEG^Negative    Benzodiazepine Qual Urine Negative NEG^Negative    Cannabinoids Qual Urine Negative NEG^Negative    Cocaine Qual Urine Negative NEG^Negative    Ethanol Qual Urine Negative NEG^Negative    Opiates Qualitative Urine Negative NEG^Negative   TSH with free T4 reflex  and/or T3 as indicated    Collection Time: 12/16/20  7:28 AM   Result Value Ref Range    TSH 0.83 0.40 - 4.00 mU/L   Valproic acid    Collection Time: 12/16/20  7:28 AM   Result Value Ref Range    Valproic Acid Level 88 50 - 100 mg/L   Comprehensive metabolic panel    Collection Time: 12/16/20  7:28 AM   Result Value Ref Range    Sodium 143 133 - 144 mmol/L    Potassium 4.2 3.4 - 5.3 mmol/L    Chloride 111 (H) 94 - 109 mmol/L    Carbon Dioxide 24 20 - 32 mmol/L    Anion Gap 8 3 - 14 mmol/L    Glucose 103 (H) 70 - 99 mg/dL    Urea Nitrogen 15 7 - 30 mg/dL    Creatinine 0.87 0.66 - 1.25 mg/dL    GFR Estimate 89 >60 mL/min/[1.73_m2]    GFR Estimate If Black >90 >60 mL/min/[1.73_m2]    Calcium 9.3 8.5 - 10.1 mg/dL    Bilirubin Total 0.8 0.2 - 1.3 mg/dL    Albumin 4.0 3.4 - 5.0 g/dL    Protein Total 7.4 6.8 - 8.8 g/dL    Alkaline Phosphatase 170 (H) 40 - 150 U/L    ALT 84 (H) 0 - 70 U/L    AST 44 0 - 45 U/L   Ammonia    Collection Time: 12/17/20 10:12 AM   Result Value Ref Range    Ammonia 48 10 - 50 umol/L   Comprehensive metabolic panel    Collection Time: 12/18/20  7:57 AM   Result Value Ref Range    Sodium 140 133 - 144 mmol/L    Potassium 4.1 3.4 - 5.3 mmol/L    Chloride 109 94 - 109 mmol/L    Carbon Dioxide 25 20 - 32 mmol/L    Anion Gap 6 3 - 14 mmol/L    Glucose 98 70 - 99 mg/dL    Urea Nitrogen 18 7 - 30 mg/dL    Creatinine 0.84 0.66 - 1.25 mg/dL    GFR Estimate >90 >60 mL/min/[1.73_m2]    GFR Estimate If Black >90 >60 mL/min/[1.73_m2]    Calcium 9.0 8.5 - 10.1 mg/dL    Bilirubin Total 0.6 0.2 - 1.3 mg/dL    Albumin 3.9 3.4 - 5.0 g/dL    Protein Total 7.3 6.8 - 8.8 g/dL    Alkaline Phosphatase 164 (H) 40 - 150 U/L     (H) 0 - 70 U/L    AST 57 (H) 0 - 45 U/L   Hepatitis B Surface Antibody    Collection Time: 12/18/20 10:29 AM   Result Value Ref Range    Hepatitis B Surface Antibody 0.19 <8.00 m[IU]/mL   INR    Collection Time: 12/18/20 10:29 AM   Result Value Ref Range    INR 1.07 0.86 - 1.14   Hepatitis C  antibody    Collection Time: 12/18/20 10:29 AM   Result Value Ref Range    Hepatitis C Antibody Nonreactive NR^Nonreactive   Hepatitis B surface antigen    Collection Time: 12/18/20 10:29 AM   Result Value Ref Range    Hep B Surface Agn Nonreactive NR^Nonreactive   Hepatitis A antibody IgM    Collection Time: 12/18/20 10:29 AM   Result Value Ref Range    Hepatitis A IgM Lorelei Nonreactive NR^Nonreactive   CK total    Collection Time: 12/18/20 10:29 AM   Result Value Ref Range    CK Total 163 30 - 300 U/L   Troponin I    Collection Time: 12/18/20 10:29 AM   Result Value Ref Range    Troponin I ES <0.015 0.000 - 0.045 ug/L   Folate    Collection Time: 12/18/20 10:29 AM   Result Value Ref Range    Folate 21.9 >5.4 ng/mL   Vitamin B12    Collection Time: 12/18/20 10:29 AM   Result Value Ref Range    Vitamin B12 779 193 - 986 pg/mL   EKG 12-lead, complete    Collection Time: 12/18/20 12:27 PM   Result Value Ref Range    Interpretation ECG Click View Image link to view waveform and result    Comprehensive metabolic panel    Collection Time: 12/19/20  7:58 AM   Result Value Ref Range    Sodium 139 133 - 144 mmol/L    Potassium 4.0 3.4 - 5.3 mmol/L    Chloride 108 94 - 109 mmol/L    Carbon Dioxide 26 20 - 32 mmol/L    Anion Gap 5 3 - 14 mmol/L    Glucose 94 70 - 99 mg/dL    Urea Nitrogen 17 7 - 30 mg/dL    Creatinine 0.89 0.66 - 1.25 mg/dL    GFR Estimate 88 >60 mL/min/[1.73_m2]    GFR Estimate If Black >90 >60 mL/min/[1.73_m2]    Calcium 9.0 8.5 - 10.1 mg/dL    Bilirubin Total 0.6 0.2 - 1.3 mg/dL    Albumin 4.0 3.4 - 5.0 g/dL    Protein Total 7.2 6.8 - 8.8 g/dL    Alkaline Phosphatase 158 (H) 40 - 150 U/L     (H) 0 - 70 U/L    AST 56 (H) 0 - 45 U/L   Comprehensive metabolic panel    Collection Time: 12/21/20  7:46 AM   Result Value Ref Range    Sodium 139 133 - 144 mmol/L    Potassium 3.6 3.4 - 5.3 mmol/L    Chloride 108 94 - 109 mmol/L    Carbon Dioxide 25 20 - 32 mmol/L    Anion Gap 6 3 - 14 mmol/L    Glucose 111 (H)  70 - 99 mg/dL    Urea Nitrogen 13 7 - 30 mg/dL    Creatinine 0.83 0.66 - 1.25 mg/dL    GFR Estimate >90 >60 mL/min/[1.73_m2]    GFR Estimate If Black >90 >60 mL/min/[1.73_m2]    Calcium 8.6 8.5 - 10.1 mg/dL    Bilirubin Total 1.0 0.2 - 1.3 mg/dL    Albumin 3.5 3.4 - 5.0 g/dL    Protein Total 6.5 (L) 6.8 - 8.8 g/dL    Alkaline Phosphatase 149 40 - 150 U/L     (H) 0 - 70 U/L    AST 61 (H) 0 - 45 U/L   Magnesium    Collection Time: 12/21/20  7:46 AM   Result Value Ref Range    Magnesium 2.0 1.6 - 2.3 mg/dL   Comprehensive metabolic panel    Collection Time: 12/23/20  7:26 AM   Result Value Ref Range    Sodium 139 133 - 144 mmol/L    Potassium 4.0 3.4 - 5.3 mmol/L    Chloride 108 94 - 109 mmol/L    Carbon Dioxide 24 20 - 32 mmol/L    Anion Gap 7 3 - 14 mmol/L    Glucose 90 70 - 99 mg/dL    Urea Nitrogen 15 7 - 30 mg/dL    Creatinine 0.80 0.66 - 1.25 mg/dL    GFR Estimate >90 >60 mL/min/[1.73_m2]    GFR Estimate If Black >90 >60 mL/min/[1.73_m2]    Calcium 8.7 8.5 - 10.1 mg/dL    Bilirubin Total 0.6 0.2 - 1.3 mg/dL    Albumin 3.8 3.4 - 5.0 g/dL    Protein Total 7.0 6.8 - 8.8 g/dL    Alkaline Phosphatase 163 (H) 40 - 150 U/L     (H) 0 - 70 U/L    AST 70 (H) 0 - 45 U/L   Comprehensive metabolic panel    Collection Time: 01/05/21  7:28 AM   Result Value Ref Range    Sodium 139 133 - 144 mmol/L    Potassium 4.0 3.4 - 5.3 mmol/L    Chloride 106 94 - 109 mmol/L    Carbon Dioxide 29 20 - 32 mmol/L    Anion Gap 4 3 - 14 mmol/L    Glucose 94 70 - 99 mg/dL    Urea Nitrogen 16 7 - 30 mg/dL    Creatinine 0.93 0.66 - 1.25 mg/dL    GFR Estimate 85 >60 mL/min/[1.73_m2]    GFR Estimate If Black >90 >60 mL/min/[1.73_m2]    Calcium 8.8 8.5 - 10.1 mg/dL    Bilirubin Total 0.7 0.2 - 1.3 mg/dL    Albumin 3.8 3.4 - 5.0 g/dL    Protein Total 7.1 6.8 - 8.8 g/dL    Alkaline Phosphatase 185 (H) 40 - 150 U/L     (H) 0 - 70 U/L    AST 65 (H) 0 - 45 U/L   Hepatic panel    Collection Time: 01/11/21 11:22 AM   Result Value Ref  Range    Bilirubin Direct 0.1 0.0 - 0.2 mg/dL    Bilirubin Total 0.5 0.2 - 1.3 mg/dL    Albumin 3.8 3.4 - 5.0 g/dL    Protein Total 7.2 6.8 - 8.8 g/dL    Alkaline Phosphatase 198 (H) 40 - 150 U/L     (H) 0 - 70 U/L    AST 77 (H) 0 - 45 U/L            Consults:   Consultation during this admission received from internal medicine and pharmacy:    Internal Medicine Initial Visit       Los Huang MRN# [id number]   YOB: 1955 Age: 65 year old   Date of Admission: 12/15/2020  PCP: Babar Mckinney     Referring Provider: Behavioral Health - Enrrique Main MD  Reason for Visit: Evaluation of his history of liver lesions          Assessment and Recommendations:   Los Huang is a 65 year old male with a history of schizoaffective disorder, depressive disorder, bipolar 1 disorder, HUGO, ADHD and history of metastatic colon cancer (2014) with mets to liver s/p cholecystectomy, hepatectomy and colectomy and concern for possible renal cell carcinoma who is admitted to station [facility] with altered mental status. Internal Medicine consultation was ordered by Dr. Main for evaluation of his history of liver lesions.       # Schizoaffective disorder  # Depressive disorder  # Bipolar 1 disorder  # HUGO, ADHD:  Presented to the ED on 12/15 for altered mental status. Per ED note, he had jumped in front of a care, not to harm himself, but to attempt to get a ride as he was scared he didn't have a ride home from the hospital. Patient is tangential. He reports increased paranoia recently. He lives alone, but his daughter/son live locally but his daughter is reportedly on vacation. He has been having trouble getting rides to clinic follow-ups and the pandemic has been a stressor for him. Denies headache, fall, change in vision, paresthesias, or focal weakness.   - Management per psychiatry  - Discussed with Dr. Srivastava (see below), patient's primary Oncologist. Given change in psychiatric state - recommended  Brain MRI to rule out any metastatic lesions   - Nutrition consulted     # History of metastatic colon cancer with mets to liver:  Follows with Dr. Srivastava, Oncology - last seen 5/2020. He was diagnosed with metastatic colon cancer in 2014 with mets to the liver s/p R hepatectomy, cholecystectomy and ascending colectomy in 2014. He also had abdominal wall met that was resected in 2016. He was treated with chemotherapy (FOLFOX x2, with change to FOLFIRI and completed 10 cycles in 2015). He had eventual recurrent hepatic mets in 3/2016. He has been treated with multiple liver directed therapies with Y90 (2017) but then with TACE x4 (2018). PET scan 2/2019 showed 2 FDG avid liver lesions which had enlarged since 10/2018. He was referred to [facility] to assess for liver directed therapy, in which he underwent Y90 treatment on 7/30 and 8/5 for liver lesions. Patient reports he was supposed to follow-up for MRI abdomen after Y90 to assess his known liver lesions but had not followed up given his paranoia as well as difficulty getting rides to clinic and navigating stress during pandemic. Had CT abd/pelvis 12/6 in the ED at [facility] for abdominal pain showing R hepatectomy, two solid lesions in the superior liver suspicious for metastatic lesions with embolization coils along hepatic resection margin. No sign of bowel obstruction. Today, denies any pain. Abdominal exam with known mild hernia at RUQ site but is reducible. No tenderness. No focal neurological deficits on exam. No neurologic symptoms.   - Discussed with Primary Oncologist Dr. Srivastava today   - Given his psychiatric decompensation, Dr. Srivastava recommended obtaining Brain MRI to evaluate for any metastatic lesion as he has not yet had this performed in the past   - Dr. Srivastava recommended attempting to have our Radiologist read the CT abd/pelvis imaging study from [facility] to assess if the lesions are the same, and to assess the size of the lesions  after Y90 therapy to see if there is improvement in lesion size   - Called Radiology at [facility] who are pushing over CT abd/pelvis study from 12/6   - Will need follow up with Dr. Srivastava, Oncology following discharge      # Several benign renal cysts  # Hypodense lesion in upper pole of left kidney  # Concern for possible renal cell carcinoma of the right kidney:  He has a history of renal cysts- per Oncology notes from 5/28/2020 he has a history of a indeterminant left superior pole kidney lesion and stable 3.0cm heterogenously enhancing exophytic lesion on the R kidney that likely represents renal cell carcinoma. Patient had met with Dr. Chairez, Urology and at that time, patient elected watchful waiting. CT abd/pelvis 12-6 with   - Follow up with Urology for follow-up of possible renal cell carcinoma and monitoring of kidney lesions  - Follow up with Oncology, Dr. Srivastava for follow up      # History of right lung nodule:  Has hx of R lung nodule- at oncology visit 5/28/2020, Dr. Srivastava discussed lung nodule slightly bigger c/t previous. It is indeterminant but Onc has suspicion for malignancy. Discussed that systemic chemotherapy would be resumed but patient was not interested in that, and elected to have repeat CT scan in 3 months. Denies any cardiopulmonary symptoms or cough.  - Follow up with Oncology for follow-up and CT imaging to monitor pulmonary nodule     # Transaminitis:  AST 44, ALT mildly elevated at 84, and AlkP 170 (from ALT 80 and ALKP of 161 on admission). T bili WNL. Denies any new RUQ pain. Negative denise's sign. Could be in setting of metastatic lesions as discussed above.   - Pharmacy consulted to assess for medication culprits that can cause LFT elevation  - Trend CMP on 12/18   - Monitor, please notify medicine if any new abdominal pain, N/V      # Papule on abdomen:  Would recommend OP Dermatology evaluation for a skin lesion/papule that is dark brown in color on upper abdomen.  - Referral  for Dermatology given in discharge navigator     # Chronic constipation:  - Continue PTA Fibercon daily     Medicine will continue to follow-up on Brain MRI, OSH imaging and CMP on 12/18, and pharmacy consult-  please page with any additional concerns.      Mary Ellen Bales PA-C  Hospitalist Service          12/17/2020: Brief Medicine Note     Medicine following peripherally for Brain MRI as well as CT scan overread from 12/6. Called MRI and they discussed Brain MRI will likely occur sometime this evening. Called Radiology and they are aware of the order for the CT overread and this is in process. Contacted by pharmacy to consider adding on Ammonia level - which was 48 and WNL. Will plan to trend CMP in AM for LFT monitoring.     Please call medicine with any additional questions or concerns.      Mary Ellen Bales PA-C  Hospitalist Service        12/17/2020 Pharmacy Consult     Solo Huang is a 65-year-old male admitted for altered mental status. Solo has a history of schizoaffective disorder, stimulant use disorder (amphetamines), bipolar 1 disorder, HUGO, ADHD, and h/x of metastatic colon cancer (2014) s/p cholecystectomy and chemotherapy. Mary Ellen Bales requested a pharmacist to evaluate his medications for any causing elevations in his liver function tests.        Medications Prior to Admission  Atomoxetine 18 mg daily (12 outpatient prescription fills in the last 12 months)  Benztropine 0.5 mg QHS (11 outpatient prescription fills in the last 12 months)  Fibercon 625 mg daily with lunch  Vraylar 3 mg daily (5 outpatient prescription fills in the last 12 months - started 08/2020 as he was switch from Risperidone to Vraylar at that time)  Cholecalciferol 1000 unit daily  Clonazepam 0.25 mg daily (9 outpatient prescription fills in the last 12 months)  Depakote DR 1000 mg QHS (13 outpatient prescription fills in the last 12 months)  Multivitamin 1 tablet daily  Trazodone 50 mg QHS (4 outpatient prescription  fills in the last 12 months - started 09/2020)  Sucralfate 1 gram QID     Current Medications  Acetaminophen 650 mg Q4 hours PRN (no doses given since admission)  Maalox 30 ml Q4 hours PRN (no doses given since admission)  Benztropine 0.5 mg QHS (one dose given since admission)  Fibercon 625 mg daily with lunch (two doses given since admission)  Vraylar 3 mg daily (one dose given since admission)  Cholecalciferol 1000 unit daily (two doses given since admission)  Clonazepam 0.25 mg daily (two doses given since admission)  Depakote DR 1000 mg QHS (three doses given since admission)  Hydroxyzine 25 mg Q4 hours PRN (no doses given since admission)  Multivitamin 1 tablet daily (two doses given since admission)  Zyprexa 5 mg PO/IM TID PRN agitation associated with psychosis or carlos (no doses given since admission)  Trazodone 50 mg QHS (three doses given since admission)  Trazodone 50 mg QHS PRN sleep (no doses given since admission)        Liver Function Tests in 2020 05/26/2020 07/30/2020 12/14/2020 12/16/2020   Alkaline Phosphatase 72 83 161 170   ALT 41 33 80 84   AST 17 17 40 44         Findings:  Acetaminophen - effects on the liver can include cirrhosis of the liver, decreased liver function, hepatic fibrosis, hepatoxicity, increased liver enzymes (incidence 1-5%), liver failure. LiverTox hepatotoxicity score: A (well established cause of liver injury, but severe cases occur only with high doses)      Atomoxetine - effects on the liver can include severe liver injury (liver enzymes were elevated (incidence about 0.5%) and normalized upon discontinuation of atomoxetine), liver failure (a case report in post marketing surveillance). LiverTox hepatotoxicity score: C (probable cause of clinically apparent liver injury)     Vraylar - effects on the liver can include hepatitis (incidence up to 0.1% and has been reported in post marketing evaluation). LiverTox hepatotoxicity score: E* (unproven but suspected cause of  clinically apparent liver injury)     Clonazepam - effects on the liver can include hepatomegaly, hepatotoxicity (report of transient elevations of liver enzymes and alkaline phosphatase levels). LiverTox hepatotoxicity score: D (possible  but rare cause of clinically apparent liver injury)     Depakote - effects on the liver can include elevations in ALT/AST (incidence 1-5%), hepatitis, hepatoxicity, liver failure, vanishing bile duct syndrome. LiverTox hepatotoxicity score: A (well established cause of liver injury)     Trazodone - effects on the liver can include cholestasis, hepatitis, elevations in ALT/AST. LiverTox hepatotoxicity score: B (Likely but rare cause apparent liver injury)     Zyprexa - effects on the liver can include hepatitis, elevations in ALT/AST, elevations in alkaline phosphatase (incidence 1% or more), hepatoxicity. LiverTox hepatotoxicity score: no score provided - range of liver toxicity and time to onset vary but resolve with discontinuation.     References: Micromedex (accessed 12/17/2020) and LiverTox (accessed 12/17/2020)     Things to consider:  1. Obtain ammonia level. Depakote can raise ammonia level and cause confusion.  2. Consider avoiding acetaminophen (LiverTox hepatotoxicity score: A).  3. Agree with holding atomoxetine.  4. Discuss with psychiatrist about benefit versus risk of continuing the following medications: Vraylar, clonazepam, Depakote (LiverTox hepatotoxicity score: A), trazodone (LiverTox hepatotoxicity score: B), Zyprexa        Thank you for the consult,  Ludmila Fuller, PharmD, BCPP  Behavioral Health Pharmacist  [facility])      12/18/2020 Brief Medicine Note     Medicine following up on MRI Brain as well as CT overread from OSH CT scan on 12/6 to monitor for liver lesion/size. CMP with AlkP 164,  and AST 57. MRI Brain was unremarkable without any metastatic lesions. CT overread preliminary report with no  "significant change in size to hepatic lesions in the superior R lobe and left lobe. No new focal liver lesion. There is indeterminate hypodense lesion in superior pole of left kidney (unchanged) and post-surgical changes and enlarged prostate.      Today, patient was up in his room playing a game he made up where he throws gauze into cups. He reports he is doing, \"fine.\" His speech is pressured and tangential. He reported multiple vague complaints. First he reports that he wanted to know if his heart was ok and if I can see into his eyes if he has cataracts. He states that months ago he had a sharp pain in chest and thought it may be due to nerve pain, or pain radiating from his shoulder or from his port. He states this is now gone but wants to make sure his heart is ok. He states it has a vague sensation in his chest. No chest pain or pleuritic chest pain. No cough. No dyspnea. No dizziness or lightheadedness. No history of cardiac disease.     He also notes a history of neuropathy on his feet and asked if his feet \"look ok.\" He denies any abdominal pain or N/V. Eating well.      Today's vital signs, medications, and nursing notes were reviewed.      /75   Pulse 76   Temp 97.5  F (36.4  C) (Tympanic)   Resp 18   Wt 85.3 kg (188 lb)   SpO2 97%   BMI 24.14 kg/m    GENERAL: Alert and oriented x 3. Appears comfortable. Answering questions appropriately.   HEENT: Anicteric sclera. Mucous membranes moist.   CV: RRR. S1, S2. No murmurs appreciated.   RESPIRATORY: Effort normal on room air. Lungs CTAB with no wheezing, rales, rhonchi.   GI: Abdomen soft and non distended, bowel sounds present. Surgical scar well healed in the RUQ with chronic mild hernia that is reducible. No tenderness, rebound, guarding.   MUSCULOSKELETAL: No joint swelling or tenderness.   NEUROLOGICAL: No focal deficits. Moves all extremities.   EXTREMITIES: No peripheral edema. Intact bilateral pedal pulses. Sensation intact in the lower " extremities.   SKIN: No jaundice. No rashes.         A/P:  # History of metastatic colon cancer with mets to liver:  Follows with Dr. Srivastava, Oncology - last seen 5/2020. He was diagnosed with metastatic colon cancer in 2014 with mets to the liver s/p R hepatectomy, cholecystectomy and ascending colectomy in 2014. He also had abdominal wall met that was resected in 2016. He was treated with chemotherapy (FOLFOX x2, with change to FOLFIRI and completed 10 cycles in 2015). He had eventual recurrent hepatic mets in 3/2016. He has been treated with multiple liver directed therapies with Y90 (2017) but then with TACE x4 (2018). PET scan 2/2019 showed 2 FDG avid liver lesions which had enlarged since 10/2018. He was referred to [facility] to assess for liver directed therapy, in which he underwent Y90 treatment on 7/30 and 8/5 for liver lesions. Patient reports he was supposed to follow-up for MRI abdomen after Y90 to assess his known liver lesions but had not followed up given his paranoia as well as difficulty getting rides to clinic and navigating stress during pandemic. Had CT abd/pelvis 12/6 in the ED at [facility] for abdominal pain showing R hepatectomy, two solid lesions in the superior liver suspicious for metastatic lesions with embolization coils along hepatic resection margin. No sign of bowel obstruction. Discussed with Primary Oncologist Dr. Srivastava on 12/16 who recommended obtaining Brain MRI to evaluate for any metastatic lesion - MRI Brain this admission negative for acute pathology and no sign of brain mets. He also recommended ordering an overread so we can compare the CT abd/pelvis imaging study from [facility] to assess if the liver lesions are the same, and to assess the size of the lesions after Y90 therapy to see if there is improvement in lesion size.   - Brain MRI negative for brain mets-  - CT overread preliminary report with no significant change in size to hepatic lesions in the  superior R lobe and left lobe. No new focal liver lesion. There is indeterminate hypodense lesion in superior pole of left kidney (unchanged) and post-surgical changes and enlarged prostate. Will await finalized report.   - Will need follow up with Dr. Srivastava, Oncology following discharge for follow-up of metastatic colon cancer, liver mets as well as to monitor his kidney lesions / concern for renal cell carcinoma (see below)      # Several benign renal cysts  # Hypodense lesion in upper pole of left kidney  # Concern for possible renal cell carcinoma of the right kidney:  He has a history of renal cysts- per Oncology notes from 5/28/2020 he has a history of a indeterminant left superior pole kidney lesion and stable 3.0cm heterogenously enhancing exophytic lesion on the R kidney that likely represents renal cell carcinoma. Patient had met with Dr. Chairez, Urology and at that time, patient elected watchful waiting. CT abd/pelvis 12-6 with CT overread preliminary report with no significant change in size to hepatic lesions in the superior R lobe and left lobe. No new focal liver lesion. There is indeterminate hypodense lesion in superior pole of left kidney (unchanged) and post-surgical changes and enlarged prostate.   - Follow up with Urology for follow-up of possible renal cell carcinoma and monitoring of kidney lesions  - Follow up with Oncology, Dr. Srivastava for follow up - consider renal US or MRI for further evaluation as ouatpatient     # History of right lung nodule:  Has hx of R lung nodule- at oncology visit 5/28/2020, Dr. Srivastava discussed lung nodule slightly bigger c/t previous. It is indeterminant but Onc has suspicion for malignancy. Discussed that systemic chemotherapy would be resumed but patient was not interested in that, and elected to have repeat CT scan in 3 months. Denies any cardiopulmonary symptoms or cough.  - Follow up with Oncology for follow-up and CT imaging to monitor pulmonary nodule     #  "Transaminitis:  AST 57 (44),  (84) and AlkP 164 (170). T bili WNL. Ammonia level WNL. INR WNL. Denies any new RUQ pain, as some intermittent pain at baseline due to previous surgeries per patient. Negative denise's sign. Could be in setting of metastatic lesions as discussed above. Denies any IVDU or herbal supplements.   - Pharmacy consulted on 12/17 and discussed following medications can cause LFT elevations: tylenol (discontinued), atomoxetine (held), vraylar (0.1%), clonazepam, depakote (1-5%), trazodone (unlikely), zyprexa (1%). Management per psychiatry   - Hepatitis A, B, and C studies ordered  - CK WNL   - Abdominal US in AM for assessment (NPO at midnight)  - Trend CMP in AM    - Monitor, please notify medicine if any new abdominal pain, N/V      # Atypical chest pain:   Patient has pressured and tangential speech and discussed that he would like his \"heart evaluated.\" Reports \"vague sensation,\" in his chest. No chest pain. No pleuritic chest pain. VSS without hypoxia or tachycardia. Lungs CTA. RRR w/o murmurs. No history of CAD or cardiac disease. ECG ordered with NSR no ischemic changes, Troponin negative - no sign of ACS. VSS and no pleuritic chest pain, no history of DVT/PE, no hemoptysis and no calf pain. Do not suspect PE at this time.   - Please notify medicine if any changing/new symptoms of chest pain, SOB, or acute change in vital signs      # Neuropathy:  Lower extremities NV intact. No wounds on feet bilaterally. TSH WNL. Glucose WNL.   - B12 and folate ordered  - Monitor     Medicine will continue to follow peripherally for LFT monitoring, Abdominal US. Please notify medicine with any additional questions or concerns.     Mary Ellen Bales PA-C  Hospitalist Service        12/19/2020 Brief Medicine Note     Medicine following peripherally for follow-up of CMP for LFTs, as well as folate/b12 level. Folate and B12 WNL. CMP with stable LFTs: AST 56 (57),  (105) and AlkP 158 (164). " Hepatitis studies pending. US abdomen pending.      Discussed with Dr. Mon. Will plan to review US abdomen and discuss case with Oncology team on Monday 12/21/20.      Please notify medicine with any additional questions or concerns.      Mary Ellen Bales PA-C  Hospitalist Service      12/20/2020 Brief Medicine Note     Abdominal US reviewed from 12/19 without acute pathology:      Impression:  1. The known liver lesions are not well identified sonographically.  2. Patent Doppler evaluation of the liver.  3. Borderline splenomegaly.     A:P  # History of metastatic colon cancer with mets to liver:  Follows with Dr. Srivastava, Oncology - last seen 5/2020. He was diagnosed with metastatic colon cancer in 2014 with mets to the liver s/p R hepatectomy, cholecystectomy and ascending colectomy in 2014. He also had abdominal wall met that was resected in 2016. He was treated with chemotherapy (FOLFOX x2, with change to FOLFIRI and completed 10 cycles in 2015). He had eventual recurrent hepatic mets in 3/2016. He has been treated with multiple liver directed therapies with Y90 (2017) but then with TACE x4 (2018). PET scan 2/2019 showed 2 FDG avid liver lesions which had enlarged since 10/2018. He was referred to [facility] to assess for liver directed therapy, in which he underwent Y90 treatment on 7/30 and 8/5 for liver lesions. Patient reports he was supposed to follow-up for MRI abdomen after Y90 to assess his known liver lesions but had not followed up given his paranoia as well as difficulty getting rides to clinic and navigating stress during pandemic. Had CT abd/pelvis 12/6 in the ED at [facility] for abdominal pain showing R hepatectomy, two solid lesions in the superior liver suspicious for metastatic lesions with embolization coils along hepatic resection margin. No sign of bowel obstruction. Discussed with Primary Oncologist Dr. Srivastava on 12/16 who recommended obtaining Brain MRI to evaluate for any  metastatic lesion - MRI Brain this admission negative for acute pathology and no sign of brain mets. He also recommended ordering an overread so we can compare the CT abd/pelvis imaging study from [facility] to assess if the liver lesions are the same, and to assess the size of the lesions after Y90 therapy to see if there is improvement in lesion size.  CT overread with interval mild decrease in size of at least 2 hepatic lesions in the superior right lobe and superior left lobe cyst, significantly more so in the lesion along the resection margin. 1a. Stable cluster of what appears to be tiny dilated biliary ducts in segment 2 in the location of 9 mm lesion seen on June 2019 exam. Attention on follow-up. 1b. No new focal liver lesion.  - Will discuss CT findings and LFT elevations tomorrow with his primary Oncologist Dr. Patrice Srivastava given tiny dilated biliary ducts seen on CT overread as underlined above (if no response from OP Oncologist will discuss with Oncology on call)   - Trend CMP as per below  - Will need close follow up with Dr. Srivastava, Oncology at discharge     # Transaminitis:  AST 56 (57),  and AlkP 158 (165). T bili WNL. Ammonia level WNL. INR WNL. Hepatitis A, B, and C studies non-reactive. Denies any new RUQ pain, as some intermittent pain at baseline due to previous surgeries per patient. Negative denise's sign. Could be in setting of metastatic lesions as discussed above. Denies any IVDU or herbal supplements. Abdominal US with borderline splenomegaly otherwise patent doppler and no other acute findings. CBD 4mm. He is s/p cholecystectomy.   - Trend CMP in AM  - Pharmacy consulted on 12/17 and discussed following medications can cause LFT elevations: tylenol (discontinued), atomoxetine (held), vraylar (0.1%), clonazepam, depakote (1-5%), trazodone (unlikely), zyprexa (1%). Management per psychiatry   - Trend CMP in AM    - Monitor, please notify medicine if any new abdominal pain,  N/V         # Several benign renal cysts  # Hypodense lesion in upper pole of left kidney  # Concern for possible renal cell carcinoma of the right kidney:  He has a history of renal cysts- per Oncology notes from 5/28/2020 he has a history of a indeterminant left superior pole kidney lesion and stable 3.0cm heterogenously enhancing exophytic lesion on the R kidney that likely represents renal cell carcinoma. Patient had met with Dr. Chairez, Urology and at that time, patient elected watchful waiting. CT abd/pelvis 12-6 overread demonstrated that the 1.9 cm left renal lesion is at the most minimally enlarged by 1 to 2 mm since 2017. Furthermore, demonstrated no internal enhancement on 3 phase contrast exam from 2017. This is favored represent  hemorrhagic/proteinaceous cyst. Recommend attention on follow-up.  - Follow up with Urology for follow-up of possible renal cell carcinoma and monitoring of kidney lesions  - Follow up with Oncology, Dr. Srivastava for follow up - consider renal US or MRI for further evaluation as ouatpatient        Medicine will follow-up with Oncology in AM as well as [facility]. Please notify medicine with any additional questions or concerns.       Mary Ellen Bales PA-C  Hospitalist Service      12/21/2020 Brief Medicine Note     Followed up regarding transaminitis in the setting of likely liver mets     Today's vital signs, medications, and nursing notes were reviewed. Labs reviewed.      /70   Pulse 68   Temp 97.6  F (36.4  C) (Oral)   Resp 18   Wt 85.3 kg (188 lb)   SpO2 97%   BMI 24.14 kg/m    General: A&O. NAD. Ambulatory and mobile  Psych: pleasant and cooperative, answers questions appropriately, no apparent hallucinations   HEENT: NC AT, mmm  Chest: nonlabored breathing  GI: NABS, + palpable mass superior to medial margin on RUQ incisional scar that is nontender and mobile and smooth--may be c/w lipoma, + mild RUQ tenderness to palpation  Ext: no edema, moves all extremities  Spine:  "no apparent deformity, no point tenderness, stepoffs or crepitus, no lesions     A/P:  Metastatic colon cancer   (Please see also note by Mary Ellen Bales PA-C 12/20)  - Discussed with Dr. Srivastava, Patient's primary oncologist and appreciate input  - at this time give the normal bilirubin the elevations in LFTs are not explained by the patient's malignancy  - CT overread notes interval decrease in size of 2 liver lesions  - LFTs remain stable with mild AST and ALT elevations  - note that cariprazine can increase LFTs up to 2-3x ULN in 2-4% patients (See UpToDate)--no indication to discontinue at this time  - repeat LFTs in 4-6 weeks  - follow up with Dr. Srivastava outpatient in the next ~1 month     Bilateral LE peripheral neuropathy  Documented since 2019 and is attributed to oxalaplatin therapy and is not changed.  Pt reports low back pain.   - BMP normal  - add on Mg  - lumbar Xray     Will follow up on lumbar Xray and Mg     Suzy Kirk PA-C  Hospitalist Service        12/22/2020 Brief Medicine Note      followed up regarding lumbar Xray and Mg Re lower back pain      Today's vital signs, medications, and nursing notes were reviewed. Labs reviewed     BP (!) 105/95   Pulse 76   Temp 98  F (36.7  C)   Resp 18   Wt 85.3 kg (188 lb)   SpO2 100%   BMI 24.14 kg/m       A/P:  Low back pain  XRAy lumbar spine today with no acute abnormality, \"Lumbar disc spaces maintained. Lower lumbar facet hypertrophy.\"  - recommend conservative mng of pain   - follow up with PCP and primary oncologist (Dr. Srivastava) on discharge      Medicine will sign off at this time, please call with questions or concerns     Suzy Kirk PA-C  Hospitalist Service         Hospital Course:   Los Huang was admitted to [facility] with attending Cassy Noriega MD as a voluntary patient. The patient was placed under status 15 (15 minute checks) to ensure patient safety.     64 yo M with history of bipolar disorder type 1, anxiety, ADHD, " "stimulant abuse and complex medical history who presented to ED with AMS in context of stepping in front of a truck PTA, he reports trying to flag truck down for help and denied this was a suicide attempt. His PTA medications were continued, with exception of Strattera for concern of causing elevated mood state; he reported adequate adherence to medications PTA but gave inconsistent history regarding this, as his mental status improved he mentioned taking old methylphenidate prescription PTA. IM consult placed given complexity of medical history including metastatic colon cancer, MRI Brain was unremarkable without evidence of mets. IM ordered abdominal U/S given ongoing elevation of LFTs. Suspect part of decompensation may have been due to poor adherence to Vrylar PTA given inconsistent reports about taking this medication, patient showed some improvement but still having evidence of psychosis and carlos, Vraylar was increased further to target this. PRN trazodone was discontinued for concern for possibly worsening carlos and PRN quetiapine started for sleep. Clonazepam increased and moved to bedtime to target consistently waking up with anxiety and poor sleep with good effect. He also reported some stiffness at night and requested increase in Cogentin. He noted he was having difficult with concentration/focus and with discussion of R/B/A resumed low dose of Strattera. Much consideration was given to patients co-morbid medical issues and his limited life expectancy when discussing risk vs benefit of medication changes. He often stated he would rather enjoy himself, including having some ongoing hypomania symptoms instead of being over medicated or sedated as he felt with \"stronger\" neuroleptics such as clozapine; also having ability to focus and sleep. He reported he was feeling \"great\" and back to his \"old self\" and requested to discharge 1/11/2021. His confusion had improved, he was more linear and organized, he was " not making as many delusional and hypersexual statements.     Los Huang did participate in groups and was visible in the milieu.     Today Los Huang reports he has no thoughts of harming self or others and denies any AVH or paranoia. In addition, he has notable risk factors for self-harm, including age, single status, psychosis and comorbid medical condition of terminal illness. However, risk is mitigated by commitment to family, Jehovah's witness beliefs, absence of past attempts, ability to volunteer a safety plan and history of seeking help when needed. Therefore, based on all available evidence including the factors cited above, he does not appear to be at imminent risk for self-harm, does not meet criteria for a 72-hr hold, and therefore remains appropriate for ongoing outpatient level of care. Voluntary referral for increased supports through case management through [facility] was offered, he accepted this offer.    Los Huang was discharged to home. At the time of discharge Los Huang was determined to not be a danger to himself or others.          Discharge Medications:     Current Discharge Medication List      START taking these medications    Details   docusate sodium (COLACE) 100 MG capsule Take 1 capsule (100 mg) by mouth 2 times daily  Qty: 60 capsule, Refills: 0    Associated Diagnoses: Metastatic colon cancer to liver (H)      hydrOXYzine (ATARAX) 25 MG tablet Take 1 tablet (25 mg) by mouth every 4 hours as needed for anxiety  Qty: 30 tablet, Refills: 0    Associated Diagnoses: Bipolar 1 disorder (H)      ibuprofen (ADVIL/MOTRIN) 400 MG tablet Take 1 tablet (400 mg) by mouth 2 times daily as needed for moderate pain  Qty: 60 tablet, Refills: 0    Associated Diagnoses: Metastatic colon cancer to liver (H)      simethicone (MYLICON) 80 MG chewable tablet Take 1 tablet (80 mg) by mouth every 6 hours as needed for cramping  Qty: 15 tablet, Refills: 0    Associated Diagnoses: Metastatic  "colon cancer to liver (H)      witch hazel-glycerin (TUCKS) pad Apply topically every hour as needed for hemorrhoids  Qty:           CONTINUE these medications which have CHANGED    Details   atomoxetine (STRATTERA) 10 MG capsule Take 1 capsule (10 mg) by mouth daily  Qty: 30 capsule, Refills: 0    Associated Diagnoses: Bipolar 1 disorder (H)      benztropine (COGENTIN) 1 MG tablet Take 1 tablet (1 mg) by mouth At Bedtime  Qty: 30 tablet, Refills: 0    Associated Diagnoses: Bipolar 1 disorder (H)      cariprazine (VRAYLAR) 6 MG CAPS capsule Take 1 capsule (6 mg) by mouth daily  Qty: 30 capsule, Refills: 0    Associated Diagnoses: Bipolar 1 disorder (H)      clonazePAM (KLONOPIN) 0.5 MG tablet Take 1 tablet (0.5 mg) by mouth At Bedtime  Qty: 30 tablet, Refills: 0    Associated Diagnoses: Bipolar 1 disorder (H)      sucralfate (CARAFATE) 1 GM tablet Take 1 tablet (1 g) by mouth 4 times daily  Qty: 28 tablet, Refills: 0    Associated Diagnoses: Metastatic colon cancer to liver (H)         CONTINUE these medications which have NOT CHANGED    Details   calcium polycarbophil (FIBERCON) 625 MG tablet Take 1 tablet by mouth daily (with lunch)       cholecalciferol (VITAMIN D3) 1000 units (25 mcg) capsule Take 1 capsule (1,000 Units) by mouth daily  Qty: 90 capsule, Refills: 3    Associated Diagnoses: Bipolar 1 disorder (H)      divalproex sodium delayed-release (DEPAKOTE) 500 MG DR tablet Take 2 tablets (1,000 mg) by mouth At Bedtime  Qty: 60 tablet, Refills: 3    Associated Diagnoses: Bipolar 1 disorder (H)      multivitamin (CENTRUM SILVER) tablet Take 1 tablet by mouth daily  Qty: 90 tablet, Refills: 3    Associated Diagnoses: Bipolar 1 disorder (H)         STOP taking these medications       traZODone (DESYREL) 50 MG tablet Comments:   Reason for Stopping:                    Psychiatric Examination:   Appearance:  awake, alert and adequately groomed  Attitude:  cooperative  Eye Contact:  fair  Mood:  \"good\"  Affect:  " appropriate and in normal range and mood congruent  Speech:  clear, coherent and normal prosody  Psychomotor Behavior:  no evidence of tardive dyskinesia, dystonia, or tics  Thought Process:  more linear and goal oriented  Associations:  no loose associations  Thought Content:  no evidence of suicidal ideation or homicidal ideation and very minimal paranoia/suspiciousness noted, much improved from admission and may be baseline  Insight:  fair  Judgment:  fair, adequate for safety  Oriented to:  time, person, and place  Attention Span and Concentration:  intact  Recent and Remote Memory:  intact  Language: English, fluent  Fund of Knowledge: appropriate  Muscle Strength and Tone: normal  Gait and Station: Normal, ambulates independently          Discharge Plan:   Health Care Follow-up Appointments:        Wednesday January 13, 2021 - 10:00am  Pharmacist Miguel Ochoa PharmD - telephone appt   Medication Therapy Management (MTM) is a meeting with a pharmacist to review your medications, including the reasons for taking them and how to know if they are working, and important monitoring for side effects and safety.  The pharmacist can communicate with your doctor to help manage your medications and answer any medication questions you may have.     You will receive a call for this appointment which will be over the phone. You have identified the best phone number to reach you as [phone]        Thursday January 14, 2021 - 8:30am - Psychiatrist - Dr. Marcia Gracia - video visit  [facility] ([facility]) - [facility] 2nd Floor [address] [facility], [address] phone: [phone]        [facility], [place]  - Referral in process - you will be contacted by assigned worker  If you have questions or have not heard from a worker within the next 7 days you can contact the Critical access hospital  who is processing your referral - Xiao  ph:[phone]       Thursday January 28, 2021 - 10:00am -  Dr. Srivastava -  Oncology - video visit  [facility] - 2nd Floor [address] [facility] [address]  [phone]     Attend all scheduled appointments with your outpatient providers. Call at least 24 hours in advance if you need to reschedule an appointment to ensure continued access to your outpatient providers.     Attestation:  Patient has been seen and evaluated by me, Cassy Noriega DO on day of discharge. 45 minutes were spent in coordination of discharge planning.           Video-Visit Details    Type of service:  Video Visit    Video Start Time (time video started): 0845    Video End Time (time video stopped): 0905    Originating Location (pt. Location): [address]    Distant Location (provider location): Provider remote location    Mode of Communication:  Video Conference via Polycom    Physician has received verbal consent for a Video Visit from the patient? Yes    Cassy Noriega DO       '

## 2021-01-11 NOTE — PLAN OF CARE
"Problem: OT General Care Plan  Goal: OT Goal 1    Pt attended 1 out of 3 OT groups offered. Pt actively participated in a structured occupational therapy topic group of 3-4 patients total x45 minutes involving identification of coping skills beginning with every letter of the alphabet facilitated through group discussion. Pt consistently contributed ideas of positive coping skills, and was receptive and respectful of ideas contributed by peers. Pt identified \"optimism, practicing, and walking\" as his most important coping skills, and identified \"drawing\" as a coping skill that he intends to start using more frequently. Pt was receptive to learning a new breathing technique to implement as a coping skill. He demonstrated insight by explaining that he originally thought his son named his dog \"Fuzzy\" out of spite because pt is allergic to dogs, but now recognizes that he may have misinterpreted this, which he attributed to symptoms related to mental illness. Calm, pleasant, and cooperative throughout group.       "

## 2021-01-11 NOTE — PROGRESS NOTES
Pt had a good shift; awoke around 0248 and requested for ibuprofen for back pain and hydroxyzine for anxiety. Pt received the medication and returned back to sleep shortly after.     Pt appeared have slept for 4.5 hours this shift. Will continue to monitor and assess.

## 2021-01-11 NOTE — DISCHARGE INSTRUCTIONS
Behavioral Discharge Planning and Instructions      Summary:  You were admitted on 12/15/2020  due to Psychotic Symptomology and Manic Symptomology.  You were treated by Dr. Cassy Noriega DO , Dr. Enrrique Maldonado MD and Dr. Los Hayes MD, and discharged on 1/11/21 from [facility] to Home      Principal Diagnosis:   Bipolar disorder, type 1, current episode mixed with psychosis symptoms  Generalized anxiety  History of ADHD  Stimulant abuse by history   R/O psychosis and mood disorder 2/2 medical condition (Metastatic colon cancer)       Health Care Follow-up Appointments:       Wednesday January 13, 2021 - 10:00am  Pharmacist Miguel Ochoa PharmD - telephone appt   Medication Therapy Management (MTM) is a meeting with a pharmacist to review your medications, including the reasons for taking them and how to know if they are working, and important monitoring for side effects and safety.  The pharmacist can communicate with your doctor to help manage your medications and answer any medication questions you may have.    You will receive a call for this appointment which will be over the phone. You have identified the best phone number to reach you as [phone]       Thursday January 14, 2021 - 8:30am - Psychiatrist - Dr. Marcia Gracia - video visit  [facility] ([facility]) - [facility] 2nd Floor [address] [place] [address], [address] phone: [phone]      [facility]  - Referral in process - you will be contacted by assigned worker  If you have questions or have not heard from a worker within the next 7 days you can contact the Critical access hospital  who is processing your referral - Xiao ph:[phone]      Thursday January 28, 2021 - 10:00am -  Dr. Srivastava -  Oncology - video visit  [facility] [place] - 2nd Floor [address] [facility] [address] Ph [phone]    Attend all  "scheduled appointments with your outpatient providers. Call at least 24 hours in advance if you need to reschedule an appointment to ensure continued access to your outpatient providers.   Major Treatments, Procedures and Findings:  You were provided with: a psychiatric assessment, assessed for medical stability, medication evaluation and/or management, group therapy, milieu management and medical interventions    Symptoms to Report: feeling more aggressive, increased confusion, losing more sleep, mood getting worse or thoughts of suicide    Early warning signs can include: increased depression or anxiety sleep disturbances increased thoughts or behaviors of suicide or self-harm  increased unusual thinking, such as paranoia or hearing voices    Safety and Wellness:  Take all medicines as directed.  Make no changes unless your doctor suggests them.      Follow treatment recommendations.  Refrain from alcohol and non-prescribed drugs.     Resources:   Crisis Intervention: 892.721.6256 or 724-468-0780 (TTY: 705.334.5504).  Call anytime for help.  National Boyd on Mental Illness (www.[place].christian.org): 946.954.3454 or 397-470-9282.  [facility] Crisis Response 332 007-3149  Text 4 Life: txt \"LIFE\" to 03879 for immediate support and crisis intervention      The treatment team has appreciated the opportunity to work with you.     If you have any questions or concerns our unit number is [phone]      "

## 2021-01-11 NOTE — PLAN OF CARE
Work Completed  - chart review  - team meeting -discussed plan for discharge today  - EDUAR Dennis Case management referral -received call from Mariposa  who took referral she was calling to ask status if patient is still in the hospital as is working on assigning.  Informed her he will discharge this afternoon.  She reports will be assiging a worker to contact him in the community says to list her phone number and his instructions to contact if questions or needs arise  -Message left with patient's son and attempt to discuss discharge her today  -Schedule patient for follow-up with oncology  -Completed AVS discharge instructions  -Behavioral team discussion note completed for weekly plan of care  -Spoke with patient's son confirm discharge plan -he will come to  patient at 5:30 PM as instructions for the [place] building door and to call unit    Discharge plan or goal  Discharge to home today with follow-up scheduled    Barriers to discharge  None we will discharge today

## 2021-01-11 NOTE — PLAN OF CARE
Problem: Mood Impairment (Psychotic Signs/Symptoms)  Goal: Improved Mood Symptoms (Psychotic Signs/Symptoms)  Outcome: Adequate for Discharge   Pt Psychotic symptoms greatly improved. Mood is calm, full range affect. Denies SI/SIB/HI or hallucinations. Looking forward to joining the community and family. Pt is discharging home with family. Took his belongings and medication with him. Discharge teaching completed. Suicide risk completed. Follow up care  and appointments discussed. Pt understands  the information. Encouraged to seek help if symptoms reoccur.

## 2021-01-11 NOTE — PLAN OF CARE
BEHAVIORAL TEAM DISCUSSION    Participants:   Cassy Noriega DO Psychiatrist  Harleen Calhoun, MSW, [facility] Clinical Treatment Coordinator  Estrellita Doll RN  Progress: Improved -adequate for discharge  Anticipated length of stay: We will discharge today  Continued Stay Criteria/Rationale: We will be discharging  Medical/Physical: Follow-up scheduled  Precautions:   Behavioral Orders   Procedures    Code 1 - Restrict to Unit    Code 2     imaging    Routine Programming     As clinically indicated    Single Room    Status 15     Every 15 minutes.     Plan: Patient will discharge to home this evening has follow-up scheduled for psychiatry follow-up also scheduled oncology as recommended per internal consult.  Referrals for increased  in place.  Rationale for change in precautions or plan: Per ongoing assessment

## 2021-01-11 NOTE — PLAN OF CARE
"Pt attended groups throughout the day, socializes with staff. Sleep, hygiene, and appetite are normal. Pt is polite, tends to ramble during conversations. Currently denies all MH sx. Med compliant with no stated or observed side effects. PRN simethicone given. During check in pt stated he was \"frustrated about his relationship with his kids and grandkids. I feel like my daughter in law purposefully tries to keep my grandkids from me and that she got a dog to keep me away from them knowing I'm allergic to animal dander.\" No other complaints or concerns. Plan is for pt to discharge tonight, waiting for son to be available to give pt ride.     "

## 2021-01-13 ENCOUNTER — PATIENT OUTREACH (OUTPATIENT)
Dept: CARE COORDINATION | Facility: CLINIC | Age: 66
End: 2021-01-13

## 2021-01-13 DIAGNOSIS — F25.0 SCHIZOAFFECTIVE DISORDER, BIPOLAR TYPE (H): Primary | ICD-10-CM

## 2021-01-14 ENCOUNTER — VIRTUAL VISIT (OUTPATIENT)
Dept: PSYCHIATRY | Facility: CLINIC | Age: 66
End: 2021-01-14
Attending: PSYCHIATRY & NEUROLOGY
Payer: MEDICARE

## 2021-01-14 DIAGNOSIS — F31.9 BIPOLAR 1 DISORDER (H): ICD-10-CM

## 2021-01-14 PROCEDURE — 99214 OFFICE O/P EST MOD 30 MIN: CPT | Mod: GC | Performed by: STUDENT IN AN ORGANIZED HEALTH CARE EDUCATION/TRAINING PROGRAM

## 2021-01-14 RX ORDER — ATOMOXETINE 10 MG/1
10 CAPSULE ORAL DAILY
Qty: 30 CAPSULE | Refills: 2 | Status: SHIPPED | OUTPATIENT
Start: 2021-01-14 | End: 2021-03-22

## 2021-01-14 RX ORDER — BENZTROPINE MESYLATE 1 MG/1
1 TABLET ORAL AT BEDTIME
Qty: 30 TABLET | Refills: 2 | Status: SHIPPED | OUTPATIENT
Start: 2021-01-14 | End: 2021-03-22

## 2021-01-14 RX ORDER — HYDROXYZINE HYDROCHLORIDE 25 MG/1
25 TABLET, FILM COATED ORAL EVERY 4 HOURS PRN
Qty: 30 TABLET | Refills: 0 | Status: SHIPPED | OUTPATIENT
Start: 2021-01-14 | End: 2021-03-22

## 2021-01-14 RX ORDER — DIVALPROEX SODIUM 500 MG/1
1000 TABLET, DELAYED RELEASE ORAL AT BEDTIME
Qty: 60 TABLET | Refills: 3 | Status: SHIPPED | OUTPATIENT
Start: 2021-01-14 | End: 2021-03-22

## 2021-01-14 ASSESSMENT — PAIN SCALES - GENERAL: PAINLEVEL: MILD PAIN (3)

## 2021-01-14 NOTE — PROGRESS NOTES
"VIDEO VISIT  Los Huang is a 65 year old patient who is being evaluated via a billable video visit.      The patient has been notified of following:   \"This video visit will be conducted via a call between you and your physician/provider. We have found that certain health care needs can be provided without the need for an in-person physical exam. This service lets us provide the care you need with a video conversation. If a prescription is necessary we can send it directly to your pharmacy. If lab work is needed we can place an order for that and you can then stop by our lab to have the test done at a later time. Insurers are generally covering virtual visits as they would in-office visits so billing should not be different than normal.  If for some reason you do get billed incorrectly, you should contact the billing office to correct it and that number is in the AVS .    Video Conference to be completed via:  Rajan    Patient has given verbal consent for video visit?:  Yes    Patient would prefer that any video invitations be sent by: Text to cell phone: [phone]      How would patient like to obtain AVS?:  Fanbouts    AVS SmartPhrase [PsychAVS] has been placed in 'Patient Instructions':  Yes    "

## 2021-01-14 NOTE — PATIENT INSTRUCTIONS
Thank you for coming to the [facility] [place] [facility].    Lab Testing:  If you had lab testing today and your results are reassuring or normal they will be mailed to you or sent through Respicardia within 7 days. If the lab tests need quick action we will call you with the results. The phone number we will call with results is # [phone] (home) . If this is not the best number please call our clinic and change the number.    Medication Refills:  If you need any refills please call your pharmacy and they will contact us. Our fax number for refills is [phone]. Please allow three business for refill processing. If you need to  your refill at a new pharmacy, please contact the new pharmacy directly. The new pharmacy will help you get your medications transferred.     Scheduling:  If you have any concerns about today's visit or wish to schedule another appointment please call our office during normal business hours [phone] (8-5:00 M-F)    Contact Us:  Please call [phone] during business hours (8-5:00 M-F).  If after clinic hours, or on the weekend, please call  [phone].    Financial Assistance [phone]  [facility] Billing [phone]  Central Billing Office, [facility]: [phone]  [facility] Billing [phone]  Medical Records [phone]  [facility] Patient Bill of Rights [email or web address]       MENTAL HEALTH CRISIS NUMBERS:  For a medical emergency please call  911 or go to the nearest ER.     [facility]:   [facility] -[phone]   Crisis Residence [facility] Page Residence -[phone]   Walk-In Counseling Center [facility] -[phone]   COPE 24/7 [place] Mobile Team -[phone] (adults)/[phone] (child)  CHILD: [facility] needs assessment team - [phone]      [facility]:   [facility] - [phone]   Walk-in counseling [facility]  [facility] - [phone]   Walk-in counseling [facility] - [phone]   Crisis Residence [facility] Residence - [phone]  Urgent Care Adult Mental Bdfzhc-[phone] mobile unit/ 24/7 crisis line    National Crisis Numbers:   National Suicide Prevention Lifeline: 0-823-021-TALK (207-713-3706)  Poison Control Center - 1-718.839.1191  [place]/resources for a list of additional resources (SOS)  Trans Lifeline a hotline for transgender people 7-553-670-5744  The [employer] a hotline for LGBT youth 1-129.608.3843  Crisis Text Line: For any crisis 24/7   To: 484693  see www.crisistextline.org  - IF MAKING A CALL FEELS TOO HARD, send a text!         Again thank you for choosing [facility] and please let us know how we can best partner with you to improve you and your family's health.    You may be receiving a survey regarding this appointment. We would love to have your feedback, both positive and negative. The survey is done by an external company, so your answers are anonymous.

## 2021-01-14 NOTE — TELEPHONE ENCOUNTER
Clinic Care Coordination Contact--late entry due to issues with Epic   [email or web address]    Clinical Data: Care Coordinator Outreach. Patient has dx of schizoaffective disorder, Bipolar type.  [facility] discharge on 1/11/2021.  Dx of altered mental status    Outreach attempted x 2.  Could not leave  on first call, sent TopLog message on second attempt     Plan: Care Coordinator will review in 3-5 business days     Marylou Acuna, DOV, MSW   [facility]  Care Coordination  [facility]  [phone]  1/14/2021 4:16 PM

## 2021-01-15 ENCOUNTER — TELEPHONE (OUTPATIENT)
Dept: PSYCHIATRY | Facility: CLINIC | Age: 66
End: 2021-01-15

## 2021-01-15 NOTE — TELEPHONE ENCOUNTER
Clinic Care Coordination Contact  [facility]/Voicemail    Clinical Data: Care Coordinator Outreach.  Patient left return message last night at again this morning. He stated in message his preference for phone call rather than MyChart     Outreach attempted x 1.  Left message on patient's voicemail with call back information and requested return call.  Plan: Care Coordinator will try to reach patient again in 1-2 business days.    Marylou Acuna, SHARONW, MSW   [facility]  Care Coordination  [facility]  [phone]  1/15/2021 12:38 PM

## 2021-01-15 NOTE — TELEPHONE ENCOUNTER
On 1/15/2021 a PA was received from [facility] and is required for the Vraylar 3mg caps, this was routed to Erika and placed in her folder. Promise Bush CMA

## 2021-01-19 ENCOUNTER — TELEPHONE (OUTPATIENT)
Dept: PSYCHIATRY | Facility: CLINIC | Age: 66
End: 2021-01-19

## 2021-01-19 RX ORDER — CLONAZEPAM 0.5 MG/1
0.5 TABLET ORAL AT BEDTIME
Qty: 30 TABLET | Refills: 0 | Status: SHIPPED | OUTPATIENT
Start: 2021-01-19 | End: 2021-03-11

## 2021-01-19 NOTE — TELEPHONE ENCOUNTER
[facility] Call Center    Phone Message    May a detailed message be left on voicemail: yes     Reason for Call: Other: Pt is calling to discuss his vraylar medication. He stated that he wants to get off it as soon as possible and switch to Abilify. He would like a call to the number provided.      Action Taken: Other: Sent to Dior OLIVA    Travel Screening: Not Applicable

## 2021-01-19 NOTE — TELEPHONE ENCOUNTER
"Prior Authorization Retail Medication Request    Medication/Dose: cariprazine (VRAYLAR) 3 MG CAPS capsule-  Take 2 capsule (6 mg) by mouth daily - Oral  ICD code (if different than what is on RX):  Bipolar 1 disorder (H) [F31.9]      Previously Tried and Failed:  Abilify, Depakote, Ritalin, Zyprexa, Seroquel, Risperidone, Trazodone     Medication  Dose (mg) Effect  Dates of Use   Navane     [id number]   Clozapine   Stability, agranulocytosis 9519-5322   Geodon         Latuda         Lithium   \"never felt like it did anything\"     perphenazine         methylphenidate   Helps with concentration        Rationale: Patient has a standing history of bipolar disorder type 1, anxiety, ADHD, stimulant abuse and complex medical history who presented to ED with AMS in context of stepping in front of a truck PTA, he reports trying to flag truck down for help and denied this was a suicide attempt. His PTA medications were continued, with exception of Strattera for concern of causing elevated mood state; he reported adequate adherence to medications PTA. IM consult placed given complexity of medical history including metastatic colon cancer, MRI Brain was unremarkable without evidence of mets. IM ordered abdominal U/S given ongoing elevation of LFTs. Suspect part of decompensation may have been due to poor adherence to Vrylar PTA given inconsistent reports about taking this medication, patient showed some improvement but still having evidence of psychosis and carlos, Vraylar was increased further to target this. PRN trazodone was discontinued for concern for possibly worsening carlos and PRN quetiapine started for sleep. Clonazepam increased and moved to bedtime to target consistently waking up with anxiety and poor sleep. Patient has been tolerating this medication since the past 5 months and would be at a high risk for being hospitalized if he was not able to continue this medication.     Insurance Name:  Not provided   Insurance " ID:  Not provided        Pharmacy Information (if different than what is on RX)  Name:  Same   Phone:  Same

## 2021-01-19 NOTE — TELEPHONE ENCOUNTER
Medication Request (Vraylar )    Marcia Gracia MD  You; Alex Munguia MD 48 minutes ago (2:52 PM)     Amado Rader,     I recommend we don't make any medication changes at this time because he just recently had a major manic episode and it is important to have a period of stability before making med changes that will impact brain chemistry.   Has he used his Hydroxyzine for anxiety?     Thank you,   Marcia     Message text      Writer placed a call to patient and relayed the above information. Patient agreed to continue Vraylar until next appt. He also confirmed using Hydroxyzine for Anxiety. He agreed to call back if symptoms worsen.     No further action needed by this writer

## 2021-01-19 NOTE — TELEPHONE ENCOUNTER
Central Prior Authorization Team   Phone: [phone]      PA Initiation (Quanity limit) 6mg caps is on backorder    Medication: cariprazine (VRAYLAR) 3 MG CAPS capsule  Insurance Company: Medicare Blue - Phone [phone] Fax [phone]  Pharmacy Filling the Rx: [facility][id number] - [place]  Filling Pharmacy Phone: [phone]  Filling Pharmacy Fax:    Start Date: 1/19/2021

## 2021-01-19 NOTE — TELEPHONE ENCOUNTER
Writer placed a call to patient to obtain additional information. Patient reports being admitted in the hospital for about one month on 12/15. He was started on Vraylar 6 mg and was asked to continue the mediation. Patient is reporting side effects of the medication and is hoping to switch to Abilify. He shared experiencing anxiety and feeling nervous at all times and also hypersexual side effects. He reports discussing this with provider at the last visit on 1/14. He is hoping to switch medications in between visits but is also willing to wait till the next appt if absolutely necessary. Writer agreed to reach out to provider for further recommendations.

## 2021-01-20 SDOH — SOCIAL STABILITY: SOCIAL NETWORK
DO YOU BELONG TO ANY CLUBS OR ORGANIZATIONS SUCH AS CHURCH GROUPS UNIONS, FRATERNAL OR ATHLETIC GROUPS, OR SCHOOL GROUPS?: PATIENT DECLINED

## 2021-01-20 SDOH — SOCIAL STABILITY: SOCIAL INSECURITY: WITHIN THE LAST YEAR, HAVE YOU BEEN AFRAID OF YOUR PARTNER OR EX-PARTNER?: PATIENT DECLINED

## 2021-01-20 SDOH — SOCIAL STABILITY: SOCIAL INSECURITY
WITHIN THE LAST YEAR, HAVE YOU BEEN HUMILIATED OR EMOTIONALLY ABUSED IN OTHER WAYS BY YOUR PARTNER OR EX-PARTNER?: PATIENT DECLINED

## 2021-01-20 SDOH — SOCIAL STABILITY: SOCIAL INSECURITY
WITHIN THE LAST YEAR, HAVE YOU BEEN KICKED, HIT, SLAPPED, OR OTHERWISE PHYSICALLY HURT BY YOUR PARTNER OR EX-PARTNER?: PATIENT DECLINED

## 2021-01-20 SDOH — SOCIAL STABILITY: SOCIAL NETWORK: ARE YOU MARRIED, WIDOWED, DIVORCED, SEPARATED, NEVER MARRIED, OR LIVING WITH A PARTNER?: PATIENT DECLINED

## 2021-01-20 SDOH — HEALTH STABILITY: PHYSICAL HEALTH
ON AVERAGE, HOW MANY DAYS PER WEEK DO YOU ENGAGE IN MODERATE TO STRENUOUS EXERCISE (LIKE A BRISK WALK)?: PATIENT DECLINED

## 2021-01-20 SDOH — SOCIAL STABILITY: SOCIAL NETWORK: HOW OFTEN DO YOU ATTEND CHURCH OR RELIGIOUS SERVICES?: PATIENT DECLINED

## 2021-01-20 SDOH — SOCIAL STABILITY: SOCIAL NETWORK: HOW OFTEN DO YOU GET TOGETHER WITH FRIENDS OR RELATIVES?: PATIENT DECLINED

## 2021-01-20 SDOH — ECONOMIC STABILITY: FOOD INSECURITY: WITHIN THE PAST 12 MONTHS, THE FOOD YOU BOUGHT JUST DIDN'T LAST AND YOU DIDN'T HAVE MONEY TO GET MORE.: NEVER TRUE

## 2021-01-20 SDOH — ECONOMIC STABILITY: FOOD INSECURITY: WITHIN THE PAST 12 MONTHS, YOU WORRIED THAT YOUR FOOD WOULD RUN OUT BEFORE YOU GOT MONEY TO BUY MORE.: NEVER TRUE

## 2021-01-20 SDOH — SOCIAL STABILITY: SOCIAL NETWORK: IN A TYPICAL WEEK, HOW MANY TIMES DO YOU TALK ON THE PHONE WITH FAMILY, FRIENDS, OR NEIGHBORS?: PATIENT DECLINED

## 2021-01-20 SDOH — HEALTH STABILITY: PHYSICAL HEALTH: ON AVERAGE, HOW MANY MINUTES DO YOU ENGAGE IN EXERCISE AT THIS LEVEL?: PATIENT DECLINED

## 2021-01-20 SDOH — ECONOMIC STABILITY: INCOME INSECURITY: HOW HARD IS IT FOR YOU TO PAY FOR THE VERY BASICS LIKE FOOD, HOUSING, MEDICAL CARE, AND HEATING?: NOT HARD AT ALL

## 2021-01-20 SDOH — ECONOMIC STABILITY: TRANSPORTATION INSECURITY
IN THE PAST 12 MONTHS, HAS THE LACK OF TRANSPORTATION KEPT YOU FROM MEDICAL APPOINTMENTS OR FROM GETTING MEDICATIONS?: NO

## 2021-01-20 SDOH — SOCIAL STABILITY: SOCIAL INSECURITY
WITHIN THE LAST YEAR, HAVE TO BEEN RAPED OR FORCED TO HAVE ANY KIND OF SEXUAL ACTIVITY BY YOUR PARTNER OR EX-PARTNER?: PATIENT DECLINED

## 2021-01-20 SDOH — ECONOMIC STABILITY: TRANSPORTATION INSECURITY
IN THE PAST 12 MONTHS, HAS LACK OF TRANSPORTATION KEPT YOU FROM MEETINGS, WORK, OR FROM GETTING THINGS NEEDED FOR DAILY LIVING?: NO

## 2021-01-20 SDOH — SOCIAL STABILITY: SOCIAL NETWORK: HOW OFTEN DO YOU ATTENT MEETINGS OF THE CLUB OR ORGANIZATION YOU BELONG TO?: PATIENT DECLINED

## 2021-01-20 NOTE — TELEPHONE ENCOUNTER
Prior Authorization Approval    Authorization Effective Date: 10/21/2020  Authorization Expiration Date: 1/19/2022  Medication: cariprazine (VRAYLAR) 3 MG CAPS capsule- APPROVED  Approved Dose/Quantity:   Reference #:     Insurance Company: Medicare Blue - Phone [phone] Fax [phone]  Expected CoPay:       CoPay Card Available:      Foundation Assistance Needed:    Which Pharmacy is filling the prescription (Not needed for infusion/clinic administered): [id number] - [place]  Pharmacy Notified: Yes-Pharmacy will notify patient when ready.  Patient Notified: No

## 2021-01-20 NOTE — TELEPHONE ENCOUNTER
Writer placed a call to patient and updated him of the approved PA for Vraylar. Patient verbalized understanding and appreciation.

## 2021-01-20 NOTE — TELEPHONE ENCOUNTER
Clinic Care Coordination Contact    Clinic Care Coordination Contact  OUTREACH    Referral Information: Patient has dx of schizoaffective disorder, Bipolar type.  [facility] discharge on 1/11/2021.  Dx of altered mental status    Chief Complaint   Patient presents with     Clinic Care Coordination - Post Hospital             [place] Utilization: Patient has dx of schizoaffective disorder, Bipolar type.  [facility] discharge on 1/11/2021.  Dx of altered mental status    Clinical Concerns: Patient has dx of schizoaffective disorder, Bipolar type.  [facility] discharge on 1/11/2021.  Dx of altered mental status.  Patient reports he is working with [facility] worker on some other needs, did not wish to discuss.  Patient prefers tro call  if there is something needed, he reports his focus is his medical needs and concerns, he has SW # for future need     Current Medical Concerns:    Patient Active Problem List   Diagnosis     Metastatic colon cancer to liver (H)     Bipolar 1 disorder (H)     Attention deficit hyperactivity disorder (ADHD), unspecified ADHD type     H/O partial resection of colon     Post-traumatic osteoarthritis of left shoulder     Polysubstance dependence (H)     Mood disorder with psychosis (H)     Generalized anxiety disorder     Colon cancer (H)     Schizoaffective disorder, bipolar type (H)     Delusion (H)     Paranoia (H)      Current Behavioral Concerns: not assessed   Education Provided to patient: differences between this  and [facility]      Health Maintenance Reviewed:   Health Maintenance Due   Topic Date Due     COLORECTAL CANCER SCREENING  09/12/1963     HEPATITIS B IMMUNIZATION (1 of 3 - Risk 3-dose series) 09/12/1972     MEDICARE ANNUAL WELLNESS VISIT  09/12/2018     INFLUENZA VACCINE (1) 09/01/2020     PHQ-2  01/01/2021     Clinical Pathway: None    Medication Management:  Patient reports he takes them, can afford       Functional Status:  Independent in ADL and IADLs    Living Situation:  Lives alone     Lifestyle & Psychosocial Needs:        Socioeconomic History     Marital status:      Spouse name: Not on file     Number of children: Not on file     Years of education: Not on file     Highest education level: Not on file     Tobacco Use     Smoking status: Former Smoker     Packs/day: 1.00     Years: 34.00     Pack years: 34.00     Types: Cigarettes     Start date:      Quit date:      Years since quittin.0     Smokeless tobacco: Former User     Types: Snuff     Quit date:      Tobacco comment: Smoked sporadically in high school and during college until    Substance and Sexual Activity     Alcohol use: Not Currently     Frequency: Monthly or less     Comment: NONE IN 1.5 YRS--2020     Drug use: Not Currently     Types: Marijuana, Amphetamines, Benzodiazepines, Hashish, LSD, Mescaline, Methaqualone, Methylphenidate, Nitrous oxide, PCP, Psilocybin     Comment: [id number]     Sexual activity: Never          Resources and Interventions:none  Current Resources: oncology        Goals: N/A, see below       Patient/Caregiver understanding: Patient declined, he has SW # for future need     Plan: Patient declined, he has SW # for future need     Marylou Acuna, SHARONW, MSW   [facility]  Care Coordination  [facility]  [phone]  2021 2:07 PM

## 2021-01-24 ENCOUNTER — MYC MEDICAL ADVICE (OUTPATIENT)
Dept: INTERNAL MEDICINE | Facility: CLINIC | Age: 66
End: 2021-01-24

## 2021-01-25 NOTE — TELEPHONE ENCOUNTER
Please see [email or web address] message and call patient, help arrange for what he needs. It's mainly schedule 40 minutes appointment with me as soon as possible     Babar Mckinney MD
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
## 2021-01-29 ENCOUNTER — OFFICE VISIT (OUTPATIENT)
Dept: INTERNAL MEDICINE | Facility: CLINIC | Age: 66
End: 2021-01-29
Payer: MEDICARE

## 2021-01-29 VITALS
WEIGHT: 190.8 LBS | RESPIRATION RATE: 14 BRPM | HEART RATE: 90 BPM | BODY MASS INDEX: 24.49 KG/M2 | DIASTOLIC BLOOD PRESSURE: 54 MMHG | OXYGEN SATURATION: 98 % | SYSTOLIC BLOOD PRESSURE: 123 MMHG | TEMPERATURE: 98.6 F | HEIGHT: 74 IN

## 2021-01-29 DIAGNOSIS — L98.9 SKIN LESION: Primary | ICD-10-CM

## 2021-01-29 DIAGNOSIS — C18.9 METASTATIC COLON CANCER TO LIVER (H): ICD-10-CM

## 2021-01-29 DIAGNOSIS — K43.9 VENTRAL HERNIA WITHOUT OBSTRUCTION OR GANGRENE: ICD-10-CM

## 2021-01-29 DIAGNOSIS — C78.7 METASTATIC COLON CANCER TO LIVER (H): ICD-10-CM

## 2021-01-29 PROCEDURE — 99214 OFFICE O/P EST MOD 30 MIN: CPT | Performed by: INTERNAL MEDICINE

## 2021-01-29 ASSESSMENT — MIFFLIN-ST. JEOR: SCORE: 1710.21

## 2021-01-29 NOTE — PROGRESS NOTES
Assessment & Plan     Skin lesion  During this visit patient was quite concerned with multiple skin lesions but these are all seemingly entirely benign. I am seeing a multitude of cherry hemangiomas and seborrheic keratosis and some moles, nothing rise to the level of of needing dermatologist consultation. We reviewed the abcd guide to mole-watching , update me if significant changes have taken place      Ventral hernia without obstruction or gangrene  Patient has had a partial hepatectomy and other abdominal surgeries . He's developed some fixation I think to the fact that around his incisions he has some persistent abdominal wall defects. I am suspicious of probably a component of small ventral hernia but this is entirely harmless in my opinion. He tells me that there's been zero evidence of things worsening. There's no ballooning out of these abdominal wall defects and no pain or obstruction symptoms. In consideration of the multiple levels of issues this patient has, I would not recommend sending this patient to a general surgeon     Metastatic colon cancer to liver (H)  Underneath all of this is a much more complicated question. This patient was tagged with a diagnosis of metastatic colon cancer and he had a partial colon resection and partial hepatectomy roughly 2017 and based on the initial understanding of this patients case I would have thought he might have had progressive disease and actually would have had a 3-9 month estimated life expectancy  But instead he's benefit entirely stable. This brought his diagnosis of metastatic disease into question. I did in fact discuss this this patients case with Patrice Srivastava , hematologist with [facility] who agreed with me that there are times when a cancer diagnosis just doesn't follow the presumed script and there can be atypia. Patient did have intra-aterial embolization treatment some months ago and has two known apparently stable liver  "lesions . There's current radiographic images showing stability here. I did my best to review all the relevant issues here including the fact that patient was once told he had renal cell cancer based on the appearance of a left renal cyst but this is entirely stable and now thought just to be a proteinaceous or possibly hemorraghic cyst and likely harmless. At this point we conclude two things    1. He is in point of fact overdue for getting his colonoscopy and this is ordered   2. Based on today's discussed with patient and his daughter Caro, we agree that patient MAY seek out a second opinion with [facility] and referral order is placed for this   - Oncology/Hematology Adult Referral; Future  - GASTROENTEROLOGY ADULT REF PROCEDURE ONLY; Future    Review of external notes as documented above   [facility]      25 minutes spent on the date of the encounter doing chart review, history and exam, documentation and further activities as noted above      Return in about 6 months (around 7/29/2021).    Babar Mckinney MD  [facility] FERNANDA Banda is a 65 year old who presents to clinic today for the following health issues   Encounter Diagnoses   Name Primary?     Skin lesion Yes     Ventral hernia without obstruction or gangrene      Metastatic colon cancer to liver (H)      accompanied by his daughter :    Here with daughter Caro    HPI   Chief Complaint   Patient presents with     Hernia     Results     liver  check          Review of Systems   Constitutional, HEENT, cardiovascular, pulmonary, gi and gu systems are negative, except as otherwise noted.      Objective    /54   Pulse 90   Temp 98.6  F (37  C) (Oral)   Resp 14   Ht 1.88 m (6' 2\")   Wt 86.5 kg (190 lb 12.8 oz)   SpO2 98%   BMI 24.50 kg/m    Body mass index is 24.5 kg/m .  Physical Exam   GENERAL: healthy, alert and no " distress  RESP: lungs clear to auscultation - no rales, rhonchi or wheezes  CV: regular rate and rhythm, normal S1 S2, no S3 or S4, no murmur, click or rub, no peripheral edema and peripheral pulses strong  ABDOMEN: soft, nontender, no hepatosplenomegaly, no masses and bowel sounds normal, surgical scars appropriate for past surgical history with some lowgrade ventral herniation associated with incision  MS: no gross musculoskeletal defects noted, no edema  SKIN: multiple benign skin lesions , liberally distributed across entire body      I spent a total of 25 minutes face-to-face with Los Huang during today's office visit.  Over 50% of this time was spent counseling the patient and/or coordinating care regarding   Encounter Diagnoses   Name Primary?     Skin lesion Yes     Ventral hernia without obstruction or gangrene      Metastatic colon cancer to liver (H)     .  See note for details.

## 2021-02-09 ENCOUNTER — HOSPITAL ENCOUNTER (OUTPATIENT)
Facility: AMBULATORY SURGERY CENTER | Age: 66
End: 2021-02-09
Attending: INTERNAL MEDICINE
Payer: MEDICARE

## 2021-02-13 ENCOUNTER — MYC MEDICAL ADVICE (OUTPATIENT)
Dept: INTERNAL MEDICINE | Facility: CLINIC | Age: 66
End: 2021-02-13

## 2021-02-16 ENCOUNTER — PATIENT OUTREACH (OUTPATIENT)
Dept: ONCOLOGY | Facility: CLINIC | Age: 66
End: 2021-02-16

## 2021-02-16 DIAGNOSIS — C78.7 METASTATIC COLON CANCER TO LIVER (H): Primary | ICD-10-CM

## 2021-02-16 DIAGNOSIS — C18.9 METASTATIC COLON CANCER TO LIVER (H): Primary | ICD-10-CM

## 2021-03-08 ENCOUNTER — IMMUNIZATION (OUTPATIENT)
Dept: NURSING | Facility: CLINIC | Age: 66
End: 2021-03-08
Payer: MEDICARE

## 2021-03-08 PROCEDURE — 91303 PR COVID VAC JANSSEN AD26 0.5ML: CPT

## 2021-03-08 PROCEDURE — 0031A PR COVID VAC JANSSEN AD26 0.5ML: CPT

## 2021-03-10 DIAGNOSIS — F31.9 BIPOLAR 1 DISORDER (H): ICD-10-CM

## 2021-03-10 NOTE — TELEPHONE ENCOUNTER
Last seen: 1/14/21  RTC: 6 weeks   Cancel: 3/1/21  No-show: none  Next appt: 3/17/21 (will need to be rescheduled)      Incoming refill from pharmacy via interface     Medication requested: clonazePAM (KLONOPIN) 0.5 MG tablet  Directions: Sig - Route: Take 1 tablet (0.5 mg) by mouth At Bedtime - Oral  Qty: 30 tablet  Last refilled: 2/10/21 #30, 1/12/21 #30, 11/19/20 #15     Medication refill pended and routed to provider for approval.

## 2021-03-11 ENCOUNTER — TELEPHONE (OUTPATIENT)
Dept: PSYCHIATRY | Facility: CLINIC | Age: 66
End: 2021-03-11

## 2021-03-11 RX ORDER — CLONAZEPAM 0.5 MG/1
0.5 TABLET ORAL AT BEDTIME
Qty: 30 TABLET | Refills: 0 | Status: SHIPPED | OUTPATIENT
Start: 2021-03-11 | End: 2021-03-22

## 2021-03-11 NOTE — TELEPHONE ENCOUNTER
"  [facility] NURSING QUICK ASSESSMENT NOTE       SITUATION                                                                                                                 Los Huang presents via telephone for the following reason: [facility] clinical assessment      BACKGROUND                                                                                                                   Provider unavailable for 3/17/21 appointment.       ASSESSMENT                                                                                                                      ==> Suicide, safety <==    If detailed assessment needed, see CAMS Suicide Status Form on [facility] Share.    Suicidal thoughts: no  Homicidal thoughts: no  Thoughts of self-harm: no  Concerns for safety in home or relationships (physical and emotional): no  Plan for suicide / self-harm: no  Has the patient acted on any of these? N/A  Access to means (firearm, rope, knife, medications, automobile): N/A  Change in frequency and/or intensity of thoughts: no  New stressors or life changes: no  Hope for future, motivation to not attempt suicide: yes  Strengths and resources, skills and abilities, vulnerabilities: yes  Support system (joanne, family, through relationships and from professionals (including therapist)): yes  Likelihood to act on these thoughts: N/A  Does the patient feel a need to be hospitalized? no  Does the patient feel a need a referral to a crisis residence? no  What do you plan to do today / over the weekend / until your next appointment? \"filling easter eggs\"      ==> Medications <==    Review ALL meds with patient.  Include coaching on effects of alcohol and other substances.    Taking medications as prescribed: yes  Difficulty obtaining refills: no  Medication side effect concerns: no  Concerns with pregnancy and medications: N/A  Questions about meds: yes  Any new meds prescribed outside of psych clinic: no      Is " "there anything else you want me to know? no      RECOMMENDATION                                                                                                     PHONE CALL NOTES:  - patient denies SI/HI/SIB  - writer occasionally had a difficult time following patient during conversation; he became tangential in talking about his dislike for the company [employer] and said he was \"aggravated\" because the candy he ordered online came in plastic easter eggs   - reported no issues with medications denied the need for refills. He did report wanting to talk to Dr. Smith about Vrylar, as he thinks this medication could become too expensive if he has a lapse in insurance coverage       **patient has been rescheduled for a video visit with Dr. Smith 3/22/21 at 3:30pm. He verbalized understanding to call clinic or go to emergency department should she experience any change or worsening in symptoms**    "

## 2021-03-19 ENCOUNTER — TELEPHONE (OUTPATIENT)
Dept: PSYCHIATRY | Facility: CLINIC | Age: 66
End: 2021-03-19

## 2021-03-19 NOTE — TELEPHONE ENCOUNTER
"Patient called to confirm upcoming appointment date/time with Dr. Smith. He reported that he searched Dr. Smith on the internet and \"saw that she wants to do a fellowship in forensics.\" He then reported concern that he would somehow end up in the criminal justice system after having an appointment with her. Writer reassured him this would not be the case and that the goal of meeting with Dr. Smith was to provide sooner treatment. Patient verbalized understanding and thanked writer for the clarification.   "

## 2021-03-22 ENCOUNTER — VIRTUAL VISIT (OUTPATIENT)
Dept: PSYCHIATRY | Facility: CLINIC | Age: 66
End: 2021-03-22
Attending: PSYCHOLOGIST
Payer: MEDICARE

## 2021-03-22 DIAGNOSIS — F31.9 BIPOLAR 1 DISORDER (H): ICD-10-CM

## 2021-03-22 PROCEDURE — 99443 PR PHYSICIAN TELEPHONE EVALUATION 21-30 MIN: CPT | Mod: GC | Performed by: STUDENT IN AN ORGANIZED HEALTH CARE EDUCATION/TRAINING PROGRAM

## 2021-03-22 RX ORDER — BENZTROPINE MESYLATE 1 MG/1
1 TABLET ORAL AT BEDTIME
Qty: 30 TABLET | Refills: 2 | Status: SHIPPED | OUTPATIENT
Start: 2021-03-22 | End: 2021-05-21

## 2021-03-22 RX ORDER — DIVALPROEX SODIUM 500 MG/1
1000 TABLET, DELAYED RELEASE ORAL AT BEDTIME
Qty: 60 TABLET | Refills: 3 | Status: SHIPPED | OUTPATIENT
Start: 2021-03-22 | End: 2021-05-21

## 2021-03-22 RX ORDER — HYDROXYZINE HYDROCHLORIDE 25 MG/1
25 TABLET, FILM COATED ORAL EVERY 4 HOURS PRN
Qty: 30 TABLET | Refills: 0 | Status: SHIPPED | OUTPATIENT
Start: 2021-03-22 | End: 2021-05-21

## 2021-03-22 RX ORDER — CLONAZEPAM 0.5 MG/1
0.5 TABLET ORAL AT BEDTIME
Qty: 30 TABLET | Refills: 1 | Status: SHIPPED | OUTPATIENT
Start: 2021-04-09 | End: 2021-08-27

## 2021-03-22 RX ORDER — ATOMOXETINE 10 MG/1
10 CAPSULE ORAL DAILY
Qty: 30 CAPSULE | Refills: 2 | Status: SHIPPED | OUTPATIENT
Start: 2021-03-22 | End: 2021-05-21 | Stop reason: ALTCHOICE

## 2021-03-22 ASSESSMENT — PAIN SCALES - GENERAL: PAINLEVEL: NO PAIN (0)
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
## 2021-03-22 NOTE — PROGRESS NOTES
"VIDEO VISIT  Los Huang is a 65 year old patient who is being evaluated via a billable video visit.      The patient has been notified of following:   \"This video visit will be conducted via a call between you and your physician/provider. We have found that certain health care needs can be provided without the need for an in-person physical exam. This service lets us provide the care you need with a video conversation. If a prescription is necessary we can send it directly to your pharmacy. If lab work is needed we can place an order for that and you can then stop by our lab to have the test done at a later time. Insurers are generally covering virtual visits as they would in-office visits so billing should not be different than normal.  If for some reason you do get billed incorrectly, you should contact the billing office to correct it and that number is in the AVS .    Video Conference to be completed via:  Rajan    Patient has given verbal consent for video visit?:  Yes    Patient would prefer that any video invitations be sent by: Send to e-mail at: [email or web address]      How would patient like to obtain AVS?:  Smitha[place]    AVS SmartPhrase [PsychAVS] has been placed in 'Patient Instructions':  Yes    "
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
## 2021-03-22 NOTE — PATIENT INSTRUCTIONS
**For crisis resources, please see the information at the end of this document**     Patient Education      Thank you for coming to the [facility] [place] [facility].    Lab Testing:  If you had lab testing today and your results are reassuring or normal they will be mailed to you or sent through [employer] within 7 days. If the lab tests need quick action we will call you with the results. The phone number we will call with results is # [phone] (home) . If this is not the best number please call our clinic and change the number.    Medication Refills:  If you need any refills please call your pharmacy and they will contact us. Our fax number for refills is [phone]. Please allow three business for refill processing. If you need to  your refill at a new pharmacy, please contact the new pharmacy directly. The new pharmacy will help you get your medications transferred.     Scheduling:  If you have any concerns about today's visit or wish to schedule another appointment please call our office during normal business hours [phone] (8-5:00 M-F)    Contact Us:  Please call [phone] during business hours (8-5:00 M-F).  If after clinic hours, or on the weekend, please call  [phone].    Financial Assistance [phone]  [facility] Billing [phone]  Central Billing Office, [facility]: [phone]  [facility] Billing [phone]  Medical Records [phone]  [facility] Patient Bill of Rights [email or web address]       MENTAL HEALTH CRISIS NUMBERS:  For a medical emergency please call  911 or go to the nearest ER.     [facility]:   [facility] -[phone]   Crisis Residence [facility] -[phone]   Walk-In Counseling Center [facility] -[phone]   COPE 24/7 [facility] Mobile Team -[phone] (adults)/[phone] (child)  CHILD: [facility] needs assessment  team - [phone]      [facility]:   [facility] - [phone]   Walk-in counseling [facility] - [phone]   Walk-in counseling  - [phone]   Crisis Residence [facility] - [phone]  Urgent Care Adult Mental [place]-[phone] mobile unit/ 24/7 crisis line    National Crisis Numbers:   National Suicide Prevention Lifeline: 9-475-322-TALK (105-462-2931)  Poison Control Center - 7-410-830-0886  PushPoint/resources for a list of additional resources (SOS)  Trans Lifeline a hotline for transgender people 1-926.360.3575  The [employer] a hotline for LGBT youth 5-965-376-1559  Crisis Text Line: For any crisis 24/7   To: 988951  see www.crisistextline.org  - IF MAKING A CALL FEELS TOO HARD, send a text!         Again thank you for choosing [facility] M[facility] and please let us know how we can best partner with you to improve you and your family's health.    You may be receiving a survey regarding this appointment. We would love to have your feedback, both positive and negative. The survey is done by an external company, so your answers are anonymous.

## 2021-03-26 ENCOUNTER — TRANSFERRED RECORDS (OUTPATIENT)
Dept: HEALTH INFORMATION MANAGEMENT | Facility: CLINIC | Age: 66
End: 2021-03-26

## 2021-04-02 NOTE — ED TRIAGE NOTES
Per EMS report, patients speak is tangential. At one point during their conversation, the patient reported abdominal pain and specifically requested to come to the [facility] for evaluation. On arrival to the ER, the patient is delusion. He complains of abdominal burning due to some sloppy joes that he ate two days ago. He is also concerned that he is missing two ribs. He denies suicidal/homicidal ideation, but states that if someone wanted to hypothetically kill him, they could make it look like a suicide.   
02-Apr-2021
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
## 2021-04-21 ENCOUNTER — TELEPHONE (OUTPATIENT)
Dept: PSYCHIATRY | Facility: CLINIC | Age: 66
End: 2021-04-21

## 2021-04-21 NOTE — TELEPHONE ENCOUNTER
"Patient called to inform writer that he cancelled appointment with Dr. Simth and states he will be keeping upcoming appointment with Dr. Gracia. Patient states he's \"doing really well\" and denies any additional questions/concerns.     " 6b/glasses

## 2021-04-25 ENCOUNTER — HEALTH MAINTENANCE LETTER (OUTPATIENT)
Age: 66
End: 2021-04-25

## 2021-04-28 RX ORDER — TRAZODONE HYDROCHLORIDE 50 MG/1
50 TABLET, FILM COATED ORAL AT BEDTIME
Qty: 30 TABLET | Refills: 3 | OUTPATIENT
Start: 2021-04-28

## 2021-04-29 ENCOUNTER — TELEPHONE (OUTPATIENT)
Dept: PSYCHIATRY | Facility: CLINIC | Age: 66
End: 2021-04-29

## 2021-04-29 NOTE — TELEPHONE ENCOUNTER
"Writer received the following forwarded message from scheduling department:   Solo Huang  Patient Appointment Cancel Request Pool 9 hours ago (10:42 PM)        Appointment canceled for Los Huang ([id number])  Visit Type: VIDEO VISIT RETURN  Date        Time      Length    Provider                  Department  5/24/2021   10:00 AM  30 mins.  Marcia Gracia MD       [facility]     Reason for Cancellation: Cancelled via [employer]     Patient Comments: I'm nobody     Writer attempted to call patient d/t concerning cancellation comment of \"I'm nobody.\" No answer. LVM with clinic number requesting call back. Reached out to patient via [employer] as well.   "

## 2021-04-29 NOTE — TELEPHONE ENCOUNTER
Marcia Gracia MD Rambo, Taylor, RN   Caller: Unspecified (Today,  8:19 AM)             Amado Marcelino,     I talked with Dr. Munguia.  At this time we don't have any concrete safety concerns that would warrant us calling the police or doing a welfare check.  I did send Solo a [employer] message requesting he check in with us so we can ensure he is getting all the help he needs. If you have get any additional information about him today please let me know.     Thank you,   Marcia        Attempted to call patient again, no answer. LVM requesting call back. As of now, neither [facility] message from writer or provider have been read by patient. Will continue to reach patient.

## 2021-05-03 NOTE — TELEPHONE ENCOUNTER
"Incoming call from patient. He states \"everything is going fine.\" He reports he was recently seen in the ED for back pain and was frustrated with the outcome; didn't feel he received help. He reports his depakote level was found to be low in the ED however writer views the following lab result from 3/31/21 in care everywhere:   VALPROIC ACID,TOTAL 73.9 50.0 - 100.0 ug/mL [facility] LAB[facility]      *5/6 update: writer able to view following lab result from 4/27/21 in care everywhere:  VALPROIC ACID TOTAL (04/27/2021 4:17 PM CDT)   Component Value Ref Range Performed At Pathologist Signature   VALPROIC ACID,TOTAL 12.7 (L) 50.0 - 100.0 ug/mL [facility]      Throughout conversation, patient was calm but tangential with ongoing complaints about \"doctors and higher ups.\" He reports he cancelled his appointment with Dr. Gracia because he doesn't want her to change any of his medications. He also reports a strong dislike for virtual appointments. Patient states he is hesitant to continue care at this clinic because he was disappointed after being kicked out of [facility] in December. However he also states he does not want to be seen at [facility] because \"they're going on strike soon.\"     Writer asked patient if he has been taking his medications and he replied, \"yeah for the last four nights at least.\" He was unable to answer if there was a lapse prior. He also reports he hasn't been sleeping very much. He stated, \"I don't have a need to sleep because I'm meditating. 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     Will route to provider as update and figure out scheduling options.   "

## 2021-05-03 NOTE — TELEPHONE ENCOUNTER
"Patient sent two separate [employer] messages 5/3 at 3:32pm stating \"drunk\" and \"smell.\"     Writer attempted to call patient to obtain more information. No answer. LVM requesting call back.     Awaiting input from providers regarding who to schedule patient with for appointment.   "

## 2021-05-06 ENCOUNTER — VIRTUAL VISIT (OUTPATIENT)
Dept: INTERNAL MEDICINE | Facility: CLINIC | Age: 66
End: 2021-05-06
Payer: MEDICARE

## 2021-05-06 ENCOUNTER — TELEPHONE (OUTPATIENT)
Dept: PSYCHIATRY | Facility: CLINIC | Age: 66
End: 2021-05-06

## 2021-05-06 DIAGNOSIS — G47.62 NOCTURNAL LEG CRAMPS: Primary | ICD-10-CM

## 2021-05-06 DIAGNOSIS — M54.50 BILATERAL LOW BACK PAIN WITHOUT SCIATICA, UNSPECIFIED CHRONICITY: ICD-10-CM

## 2021-05-06 PROCEDURE — 99213 OFFICE O/P EST LOW 20 MIN: CPT | Performed by: INTERNAL MEDICINE

## 2021-05-06 NOTE — TELEPHONE ENCOUNTER
On 5/6/2021, 12 pages of records were received from [facility]. This writer sent the records to urgent scanning, this was also routed to Dr. Gracia.  Writer will confirm document in scanning in the coming days. Promise Bush, CMA

## 2021-05-06 NOTE — TELEPHONE ENCOUNTER
"Incoming call from patient. He again confirms he will attend 5/14 appointment with Dr. Jauregui at 8:00am.     Writer reported mild concern regarding [employer] messages that patient had sent and questioned if he was doing okay/needed help. He stated, \"I like drunk and I smell like a skunk all the time, I think the medication makes me stink. I don't know Chari, It could also be my twisted gut. When I said that, I don't know what I was talking about. Sometimes people think I've been drinking when I haven't, I didn't mean anything by those messages. I wasn't drunk. Anyway, happy nurse day.\"     Writer asked if patient had any safety concerns and he denied. He was unable to verify if he's been taking medication consistently.     Writer attempted to call [facility] to obtain discharge summary from patient's 4/2 inpatient discharge. Called [phone] which connected to an inpatient unit. They were unable to help and call was transferred to medical records. Unable to make contact with anyone once call was transferred.  "

## 2021-05-06 NOTE — PATIENT INSTRUCTIONS
these symptoms are thought to be a dysfunction at the neuromuscular junction, they've been described in the medical literature for 100's of years. The precise mechanism of action of this phenomenon is not known for years quinine was a supplement tried but more recent controlled trials shed a negative light on this treatment as no more effective then placebo. Quinine could be tried in my opinion however now due to possible side effects, the use of quinine has been restricted to it's original use in malaria s I understand it. Definitely good hydration is advised and attention to toning of the calf muscles is helpful. Use of a station stationary exercise bicycle regularly, about 90 minutes before sleep has been recommended along with use of a calf-rake, to tone the calf muscles. Potassium and sodium and calcium level should be checked but these electrolyte abnormalities are rarely to blame for nocturnal leg cramps.

## 2021-05-06 NOTE — TELEPHONE ENCOUNTER
Called [facility] number ([phone]) and was connected to release of information department. They stated they will fax discharge summary from 4/2 to our clinic today.

## 2021-05-06 NOTE — PROGRESS NOTES
Solo is a 65 year old who is being evaluated via a billable telephone visit.      What phone number would you like to be contacted at? [phone]  How would you like to obtain your AVS? MyChart    Assessment & Plan     Nocturnal leg cramps  I met with this patient to try to understand his chief complaint which was a bit difficult as patient is a rambling historian at times and covered a great many different issues including recent hospitalization at psychiatric [facility] at [facility], see care everywhere. Another hospitalization with [facility]. Last appointment with me was in January 2021. This is a complicated patient with many different problems. In the end I am not hearing any clear cut explanation for his symptoms and I am suspicious of a garden variety nocturnal leg cramps, I reviewed this frustrating diagnosis with patient. It can help to drink more water especially Gatorade or other electrolyte rich fluid. Exercise can also be of benefit. He would want to have a basic metabolic panel drawn with the next week or so but he says these have been done. We decided on a one month office visit recheck. See patient after-visit summary      Bilateral low back pain without sciatica, unspecified chronicity  We reviewed his recent emergency room evaluation. I am just not able to really do much of an evaluation via telephone MD visit and strongly encouraged patient to schedule face to face encounter as soon as possible, at the very most one month and he's agreed.      Prescription drug management  12 minutes spent on the date of the encounter doing chart review, history and exam, documentation and further activities per the note       Return in about 1 month (around 6/6/2021).    Babar Mckinney MD  [facility] [place]    Subjective   Solo is a 65 year old who presents for the following health issues   Encounter Diagnoses   Name Primary?     Nocturnal leg cramps Yes     Bilateral low back pain  without sciatica, unspecified chronicity        HPI   Chief Complaint   Patient presents with     Cramps     foot and leg cramps     Recheck Medication     Esophagus     swallowing air causing alot of air      Patient had a hospitalization a few weeks ago for a week at [facility] , he hurt his abdomen there. A table pushed against his gut, right there at the end of the sternum. He's not sure what to do, he wears a chest binder thing. That along with the right shoulder is bothering him to the point that he's in a lot of pain. He was also at the [facility]. He wondered if he needed an xray or a lumbar mri evaluation although he says this was never done.    He was also getting some leg cramps , almost all are when he has a boost of adrenaline ? He drinks lots of water to try and stay hydrated.    He needs help relaxing. He wants to go off of his clonazePAM (KLONOPIN) , he's tried Melatonin and some other over the counter nonprescription products.    With his back he was given some Toradol (ketorolac) injection and liquid ibuprofen       Review of Systems   Constitutional, HEENT, cardiovascular, pulmonary, gi and gu systems are negative, except as otherwise noted.      Objective           Vitals:  No vitals were obtained today due to virtual visit.    Physical Exam   healthy, alert and no distress  PSYCH: Alert and oriented times 3; coherent speech, normal   rate and volume, able to articulate logical thoughts, able   to abstract reason, no tangential thoughts, no hallucinations   or delusions  Solo O Sal's affect is normal  RESP: No cough, no audible wheezing, able to talk in full sentences  Remainder of exam unable to be completed due to telephone visits        Wants to have a complete physical exam scheduled for within one month . The worries about a chiropractor for his low back pain.    Phone call duration: 12 minutes    5:22 PM   5:34 PM

## 2021-05-07 ENCOUNTER — MYC MEDICAL ADVICE (OUTPATIENT)
Dept: PSYCHIATRY | Facility: CLINIC | Age: 66
End: 2021-05-07

## 2021-05-07 NOTE — TELEPHONE ENCOUNTER
Patient requesting refill of atomoxetine (STRATTERA) 10 MG capsule. Per chart review patient should have remaining refills. Called patient's pharmacy who confirmed patient has 3 month supply of refills available. Notified patient via [employer].

## 2021-05-09 ENCOUNTER — MYC MEDICAL ADVICE (OUTPATIENT)
Dept: PSYCHIATRY | Facility: CLINIC | Age: 66
End: 2021-05-09

## 2021-05-09 ENCOUNTER — MYC MEDICAL ADVICE (OUTPATIENT)
Dept: INTERNAL MEDICINE | Facility: CLINIC | Age: 66
End: 2021-05-09

## 2021-05-10 NOTE — TELEPHONE ENCOUNTER
1:03 PM     I called patient to review his question about being referred to a new primary health care provider. I thought it appropriate to review more closely what he seeks and why prior to replying. No answer at this time on the telephone. Voice mail message left . Will try back later today .    Babar Mckinney MD

## 2021-05-10 NOTE — TELEPHONE ENCOUNTER
Last seen: 3/22 with Dr. Smiht  RTC: 6 weeks  Cancel: multiple  No-show: none  Next appt: 5/14 with Dr. Jauregui     Incoming refill from patient via [employer]      Medication requested: clonazePAM (KLONOPIN) 0.5 MG tablet  Directions: Sig - Route: Take 1 tablet (0.5 mg) by mouth At Bedtime - Oral  Qty: 30 tablet  Last refilled: 4/12/21 #30, 3/12/21 #30, 2/10/21 #30 -- should have one refill remaining     Medication requested: atomoxetine (STRATTERA) 10 MG capsule  Directions: Sig - Route: Take 1 capsule (10 mg) by mouth daily - Oral  Qty: 30 capsule   Last refilled: 5/7/21 -- refill not needed at this time      Medication requested: benztropine (COGENTIN) 1 MG tablet  Directions: Sig - Route: Take 1 tablet (1 mg) by mouth At Bedtime - Oral  Qty: 30 tablet  Last refilled: 4/9/21 -- should have two refills remaining      Medication requested: cariprazine (VRAYLAR) 6 MG CAPS capsule  Directions: Sig - Route: Take 1 capsule (6 mg) by mouth daily - Oral  Qty: 30 capsule  Last refilled: 3/22/21 (3 mg capsules) -- does not appear patient is taking, should have three refills remaining      Medication requested: divalproex sodium delayed-release (DEPAKOTE) 500 MG DR tablet  Directions: Sig - Route: Take 2 tablets (1,000 mg) by mouth At Bedtime - Oral  Qty: 60 tablet  Last refilled: 4/9/21 -- should have three refills remaining      Medication requested: hydrOXYzine (ATARAX) 25 MG tablet  Directions: Sig - Route: Take 1 tablet (25 mg) by mouth every 4 hours as needed for anxiety - Oral  Qty: 30 tablet   Last refilled: 2/6/21 -- should have one refill remaining     Per chart review, no refills needed. Patient updated via [employer].

## 2021-05-11 NOTE — TELEPHONE ENCOUNTER
3rd call and no answer at this time on the telephone. . I have therefore sent  then a [employer] message and tried to answer patients questions     Babar Mckinney MD

## 2021-05-12 ENCOUNTER — TELEPHONE (OUTPATIENT)
Dept: PSYCHIATRY | Facility: CLINIC | Age: 66
End: 2021-05-12

## 2021-05-12 NOTE — TELEPHONE ENCOUNTER
Attempted to call patient, no answer. LVM offering in-person appointment and requested call back. Reached out via [employer] as well.

## 2021-05-12 NOTE — TELEPHONE ENCOUNTER
----- Message -----  From: Jonny Jauregui MD  Sent: 5/12/2021   9:56 AM CDT  To: Jennie Schwab, RN, Chari Gallegos RN  Subject: Option for patient to have visit in person       Hi Oumou,    I am seeing Solo this Friday AM for an urgent add-on. It looks like he has not been doing well lately, and his psychosis symptoms have possibly increased.    I can be in clinic if needed on Friday AM, and it would be good to see him in person. Could you reach out to him and see if he would be willing to come in person? I am looping Chari in because I believe you have spoken to him recently.    Thanks!    Jonny CAMILO

## 2021-05-18 ENCOUNTER — PATIENT OUTREACH (OUTPATIENT)
Dept: CARE COORDINATION | Facility: CLINIC | Age: 66
End: 2021-05-18

## 2021-05-18 DIAGNOSIS — F29 PSYCHOSIS (H): Primary | ICD-10-CM

## 2021-05-18 NOTE — TELEPHONE ENCOUNTER
"Reason for call:  Other   Patient called regarding (reason for call): appointment  Additional comments: Message sent via scheduling request  \"I have been incarcerated at [facility] for the past week. Sorry that I wasn't at home to answer your three attempts to call me. I became manic and needed to get my meds tuned again. As for asking for another PCP.  Please forget that request and excuse my belligerence of the moment. It came out of nowhere and I wish to ask for your help in the future. Which leaves me wanting to request an annual checkup if you feel that is necessary. Can you have Sophie call me to schedule? Thanks, Solo\"     Phone number to reach patient:  Cell number on file:    Telephone Information:   Mobile [phone]       Best Time:  anytime    Can we leave a detailed message on this number?  YES    Travel screening: Not Applicable    "

## 2021-05-18 NOTE — LETTER
[facility] CARE COORDINATION  [address]  [place] [address]    May 19, 2021    Los Huang  [address]   [place] [address]      Dear Los,    I am a clinic care coordinator who works with Babar Mckinney MD at [facility]. Below is a description of clinic care coordination and how I can further assist you.      The clinic care coordination team is made up of a registered nurse,  and community health worker who understand the health care system. The goal of clinic care coordination is to help you manage your health and improve access to the health care system in the most efficient manner. The team can assist you in meeting your health care goals by providing education, coordinating services, strengthening the communication among your providers and supporting you with any resource needs.    Please feel free to contact me at [phone] with any questions or concerns. We are focused on providing you with the highest-quality healthcare experience possible and that all starts with you.     Sincerely,     Marylou Acuna, DOV, MSW Clinic   [facility]  Care Coordination  [facility]   [email or web address]  Office: [phone]  Employed by [facility]

## 2021-05-18 NOTE — PROGRESS NOTES
Clinic Care Coordination Contact  [facility]/Voicemail    Referral Source: Non-[facility]  Clinical Data: Care Coordinator Outreach.  Patient presented to [facility] 5/10/2021 for self reported hallucinations, he discharged on 5/17/2021 home.  Per chart review, Patient diagnoses include anxiety disorder with care seeking behavior, mood disorder with psychosis, polysubstance dependency with ongoing intermittent stimulant abuse, HUGO, schizoaffective disorder, ADHD, delusional disorder, somatization and chronic noncompliance noted in chart review.  Patient is own guardian, he was last in hospital April 2021, he denied SI and HI.  Psychiatry attempting to connect with patient to schedule appt    Outreach attempted x 1.  VM not accepting messages   Plan: Care Coordinator will try to reach patient again in 1-2 business days.    Marylou Acuna, DOV, MSW   [facility]  Care Coordination  [facility]  [phone]  5/18/2021 10:02 AM

## 2021-05-19 NOTE — PROGRESS NOTES
Clinic Care Coordination Contact  [facility]/Voicemail    Referral Source: Non-[facility]  Clinical Data: Care Coordinator Outreach  Outreach attempted x 2.  Left message on patient's voicemail with call back information and requested return call.  Plan: Care Coordinator sent care coordination introduction letter today via [employer]. Care Coordinator will do no further outreaches at this time    DOV Devine, MSW   [facility]  Care Coordination  [facility]  [phone]  5/19/2021 11:16 AM

## 2021-05-20 ENCOUNTER — TELEPHONE (OUTPATIENT)
Dept: INTERNAL MEDICINE | Facility: CLINIC | Age: 66
End: 2021-05-20

## 2021-05-20 NOTE — TELEPHONE ENCOUNTER
Appointment Request From: Los Huang     With Provider: Babar Mckinney MD [[facility]]     Preferred Date Range: Any     Preferred Times: Any Time     Reason for visit: Request an Appointment     Comments:  I have been incarcerated at [facility] for the past week. Sorry that I wasn't at home to answer your three attempts to call me. I became manic and needed to get my meds tuned again. As for asking for another PCP.  Please forget that request and excuse my belligerence of the moment. It came out of nowhere and I wish to ask for your help in the future. Which leaves me wanting to request an annual checkup if you feel that is necessary. Can you have Sophie call me to schedule? Thanks, Solo     i

## 2021-05-20 NOTE — TELEPHONE ENCOUNTER
Called patient and left message to call clinic back.       Sophie RODNEY CMA ([facility])
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
## 2021-05-21 ENCOUNTER — VIRTUAL VISIT (OUTPATIENT)
Dept: PSYCHIATRY | Facility: CLINIC | Age: 66
End: 2021-05-21
Attending: PSYCHIATRY & NEUROLOGY
Payer: MEDICARE

## 2021-05-21 DIAGNOSIS — F31.9 BIPOLAR 1 DISORDER (H): ICD-10-CM

## 2021-05-21 PROCEDURE — 99214 OFFICE O/P EST MOD 30 MIN: CPT | Mod: GC | Performed by: STUDENT IN AN ORGANIZED HEALTH CARE EDUCATION/TRAINING PROGRAM

## 2021-05-21 RX ORDER — DIVALPROEX SODIUM 500 MG/1
1000 TABLET, DELAYED RELEASE ORAL AT BEDTIME
Qty: 60 TABLET | Refills: 1 | Status: SHIPPED | OUTPATIENT
Start: 2021-05-21 | End: 2021-08-27

## 2021-05-21 RX ORDER — TRAZODONE HYDROCHLORIDE 50 MG/1
50 TABLET, FILM COATED ORAL
Qty: 30 TABLET | Refills: 0 | Status: SHIPPED | OUTPATIENT
Start: 2021-05-21 | End: 2021-08-27

## 2021-05-21 RX ORDER — HYDROXYZINE HYDROCHLORIDE 25 MG/1
25 TABLET, FILM COATED ORAL EVERY 4 HOURS PRN
Qty: 30 TABLET | Refills: 0 | Status: SHIPPED | OUTPATIENT
Start: 2021-05-21 | End: 2022-07-14

## 2021-05-21 RX ORDER — BENZTROPINE MESYLATE 1 MG/1
1 TABLET ORAL AT BEDTIME
Qty: 30 TABLET | Refills: 1 | Status: SHIPPED | OUTPATIENT
Start: 2021-05-21 | End: 2021-08-27

## 2021-05-21 NOTE — PATIENT INSTRUCTIONS
It was nice to visit with you today Solo    We will not make any changes to your medications today. I have sent in refills of your medications, including for hydroxyzine and trazodone    I will have our  call you about scheduled an appointment with Dr. Gracia and also with our               **For crisis resources, please see the information at the end of this document**     Patient Education      Thank you for coming to the [facility] [place] [facility].    Lab Testing:  If you had lab testing today and your results are reassuring or normal they will be mailed to you or sent through [employer] AS within 7 days. If the lab tests need quick action we will call you with the results. The phone number we will call with results is # [phone] (home) . If this is not the best number please call our clinic and change the number.    Medication Refills:  If you need any refills please call your pharmacy and they will contact us. Our fax number for refills is [phone]. Please allow three business for refill processing. If you need to  your refill at a new pharmacy, please contact the new pharmacy directly. The new pharmacy will help you get your medications transferred.     Scheduling:  If you have any concerns about today's visit or wish to schedule another appointment please call our office during normal business hours [phone] (8-5:00 M-F)    Contact Us:  Please call [phone] during business hours (8-5:00 M-F).  If after clinic hours, or on the weekend, please call  [phone].    Financial Assistance [phone]  [facility] Billing [phone]  Central Billing Office, [facility]: [phone]  [place] Billing [phone]  Medical Records [phone]  [place] Patient Bill of Rights [email or web address]       MENTAL HEALTH CRISIS NUMBERS:  For a medical emergency please call  911 or go to  the nearest ER.     [facility]:   [facility] -[phone]   Crisis Residence [facility] -[phone]   Walk-In Counseling Center [facility] -[phone]   COPE 24/7 [place] Mobile Team -[phone] (adults)/[phone] (child)  CHILD: [facility] needs assessment team - [phone]      [facility]:   [facility] - [phone]   Walk-in counseling [facility] - [phone]   Walk-in counseling [facility] - [phone]   Crisis Residence [facility] - [phone]  Urgent Care Adult Mental [facility]-[phone] mobile unit/ 24/7 crisis line    National Crisis Numbers:   National Suicide Prevention Lifeline: 7-053-638-TALK (387-640-1866)  Poison Control Center - 1-927.887.3781  Smart Holograms/resources for a list of additional resources (SOS)  Trans Lifeline a hotline for transgender people 1-300.239.4121  The [employer] Project a hotline for LGBT youth 1-933.608.3763  Crisis Text Line: For any crisis 24/7   To: 123564  see www.crisistextline.org  - IF MAKING A CALL FEELS TOO HARD, send a text!         Again thank you for choosing [facility] MENTAL HEALTH & ADDICTION [facility] and please let us know how we can best partner with you to improve you and your family's health.    You may be receiving a survey regarding this appointment. We would love to have your feedback, both positive and negative. The survey is done by an external company, so your answers are anonymous.

## 2021-05-24 ENCOUNTER — TELEPHONE (OUTPATIENT)
Dept: PSYCHIATRY | Facility: CLINIC | Age: 66
End: 2021-05-24

## 2021-05-24 NOTE — TELEPHONE ENCOUNTER
Writer left message for Solo, outlining Dr. Jauregui's request for a non-medical diagnostic assessment, as well as possible discussion of resources. Noted appointment could be completed via telehealth and provided possible times in next few weeks.     Writer provided contact information and requested call back. Will place follow up call in 2-3 business days if no response.    Update: Solo returned call. He let writer know he is completing the appointment upon the recommendation of Dr. Jauregui rather than because he wants to meet with writer. Provide acknowledged feelings. Writer briefly reviewed what would take place. Solo requested video visit and would prefer via [employer]. Appt made for Thursday Christina 3 at 1:30.    KIM Gaming, [facility]  [phone]

## 2021-05-24 NOTE — TELEPHONE ENCOUNTER
----- Message from Jonny Jauregui MD sent at 5/21/2021 11:33 AM CDT -----  Regarding: Request for non-medical DA  Good morning,    I met with Solo this morning for a post-hospital visit. There is some diagnostic uncertainty, and I think he would benefit from more psychosocial interventions. Could he be scheduled with a non-medical DA with a member of the social work team? This would ideally include a MINI and result in suggestions for psychosocial interventions.    Thanks    Jonny CAMILO

## 2021-05-28 ENCOUNTER — DOCUMENTATION ONLY (OUTPATIENT)
Dept: PSYCHIATRY | Facility: CLINIC | Age: 66
End: 2021-05-28

## 2021-05-28 NOTE — PROGRESS NOTES
Solo sent a message to our clinic stating he no longer wished to receive psychiatric service from our clinic. He also made several nonsensical statements (see Med Advice note dated 08/28 for details).  Solo was last seen by Dr. Jonny Jauregui on 05/21 and he has canceled all future appointments at our clinic. He has sent messages similar to this over the past several months and he has been hospitalized twice during that time.     This writer called Solo and left voicemail encouraging him to call our clinic and let us know how he is doing.  He was also encouraged to schedule an appointment for check in and to go to the nearest ED if he feels unsafe at home or needs immediate assistance.     Marcia Gracia, DO

## 2021-06-01 ENCOUNTER — TELEPHONE (OUTPATIENT)
Dept: PSYCHIATRY | Facility: CLINIC | Age: 66
End: 2021-06-01

## 2021-06-01 NOTE — TELEPHONE ENCOUNTER
"Incoming call from patient. He reports \"he sent a concerning e-mail to the clinic.\" He expressed disappointment in current medication regimen. He states his appetite has increased and he is gaining weight in abdominal area. He also stated he was upset that Dr. Jauregui discontinued atomoxetine (appears this was discontinued during most recent hospital stay during chart review). He states since stopping, he's been drinking more caffeine and sleeping less.     He was not able to give a clear answer when asked if he is interested in scheduling an appointment. He said, \"it's a weird entity you have there. I'll leave it in your hands now.\" He expressed frustration regarding having been seen by multiple psychiatrists.     Denies any safety concerns. Was not interesed in continuing conversation with writer for further assessment. Writer agreed to pass message along to providers.   "

## 2021-06-03 NOTE — TELEPHONE ENCOUNTER
Alex Munguia MD  You; Marcia Gracia MD; Mignon Pardo MD Yesterday (8:41 AM)     Thank you Marcia Marcelino has been planning to call Solo and will discuss this request with him, along with following up on his message last week re: terminating treatment with our clinic.  Marcia will likely encourage him to continue treatment with our clinic, with a new resident, until he can establish psychiatric treatment with a provider that will be able to treat him on an ongoing, long-term basis.  If he is not interested in continuing treatment here, we shall provide him with a list of psychiatric clinics in the area that he could transfer treatment to.  We could offer him Social Work support to find a psychiatrist in the community as well, if he's interested.       Please keep us posted if you have additional information or requests.

## 2021-06-10 ENCOUNTER — VIRTUAL VISIT (OUTPATIENT)
Dept: ONCOLOGY | Facility: CLINIC | Age: 66
End: 2021-06-10
Attending: INTERNAL MEDICINE
Payer: MEDICARE

## 2021-06-10 DIAGNOSIS — R91.8 PULMONARY NODULES: ICD-10-CM

## 2021-06-10 DIAGNOSIS — C18.9 MALIGNANT NEOPLASM OF COLON, UNSPECIFIED PART OF COLON (H): Primary | ICD-10-CM

## 2021-06-10 PROCEDURE — 999N001193 HC VIDEO/TELEPHONE VISIT; NO CHARGE

## 2021-06-10 PROCEDURE — 99214 OFFICE O/P EST MOD 30 MIN: CPT | Mod: 95 | Performed by: INTERNAL MEDICINE

## 2021-06-10 NOTE — PROGRESS NOTES
Oncology follow up visit:  Date on this visit: 6/10/21     Los Huang  is referred by Dr.Jafar Landis for an oncology consultation. He requires evaluation for metastatic colon adenocarcinoma- [facility].    Primary Physician: No Ref-Primary, Physician     History Of Present Illness:    Please see previous notes for detail.  I have copied and updated from prior note.    Mr. Huang is a 65 year old male who was diagnosed with metastatic colorectal cancer in 2014 with oligometastatic disease to liver at the time of diagnosis.  He was treated with ascending colectomy 6/14 and R hepatectomy in 8/14.  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  He completed 10 cycles of FOLFIRI around February of March 2015.), eventually had recurrance of hepatic mets in March 2016.  He was seeking several different opinions from different oncologist at that time so his treatment got delayed and he got cetuximab for 2 cycles during the summer 2016 and he had issues with skin the rash from it. He also had an right upper quadrant abdominal wall met that was resected in Dec 2016.    Since then has been treated with multiple liver directed therapies, initially with Y90 in Dec 2017 but since then with TACE x4, 1/9/18, 6/14/18, 7/10/18 and 8/22/18.        He also has possible Renal cell cancer of the Right lower pole of the kidney which has not been treated.    He recently relocated from [place] to [place].      We did a PET scan on 2/22/2019 which showed 2 FDG avid liver lesions which had enlarged since October 2018.  At that point I referred him to Dr. Landis for possibility of liver directed therapy.  At that point patient was not sure whether he want to pursue more liver directed therapy or not.  He eventually decided on repeating his scans after a few months  and he had a repeat CT abdomen and pelvis on 6/7/2019.  The CT scan shows overall stable disease.  The exophytic lesion in the inferior pole of the right kidney has continued to slowly increase in size and now measures 3 cm as opposed to 2.2 cm in June 2017.  This is concerning for renal cell cancer.     He was seen by Dr. Reyes in July 2019 who offered him microwave ablation via an open procedure but patient was not interested in that.     He was seen by Dr. Chairez from urology in November 2019 for the right kidney inferior pole lesion, likely a renal cell cancer but he opted for watchful waiting.     CT scan in Dec 2019     Interval history  This is a video visit.  Overall feels well. He has had issues with admissions for psychosis.  In May 2021 he was noted to have several new lung nodules. He mentions that at that time he had head cold and some cough but those symptoms resolved. COVID test was negative. No nausea or vomiting or diarrhea or constipation. He has occasionally noticed little amount of blood in rectum.  Energy is fine. No pain. He has mild numbness in the feet.       ECOG 1    ROS:  Rest of the comprehensive review of the system was essentially unremarkable.      I reviewed other history in epic as below.      Past Medical/Surgical History:  Past Medical History:   Diagnosis Date     Cancer (H)      Depressive disorder      Post-traumatic osteoarthritis of left shoulder 3/9/2020     Schizoaffective disorder (H)      Past Surgical History:   Procedure Laterality Date     ABDOMEN SURGERY       BIOPSY       CHOLECYSTECTOMY       COLONOSCOPY       GENITOURINARY SURGERY  1997    Ureteroscopy gone awry     H NEEDLE POWER PORT Left 2014    PLACED IN [place]     IR SIRT (SELECTIVE INTERNAL RADIO THERAPY)  7/30/2020     IR VISCERAL ANGIOGRAM  7/30/2020     IR VISCERAL EMBOLIZATION  8/5/2020     ORTHOPEDIC SURGERY        Bipolar disorder  Schizoaffective dx  Colon cancer    Cancer History:   As  above    Allergies:  Allergies as of 06/10/2021 - Reviewed 06/10/2021   Allergen Reaction Noted     Animal dander  02/14/2019     Current Medications:  Current Outpatient Medications   Medication Sig Dispense Refill     benztropine (COGENTIN) 1 MG tablet Take 1 tablet (1 mg) by mouth At Bedtime 30 tablet 1     calcium polycarbophil (FIBERCON) 625 MG tablet Take 1 tablet by mouth daily (with lunch)        cariprazine (VRAYLAR) 6 MG CAPS capsule Take 1 capsule (6 mg) by mouth daily 30 capsule 1     cholecalciferol (VITAMIN D3) 1000 units (25 mcg) capsule Take 1 capsule (1,000 Units) by mouth daily 90 capsule 3     clonazePAM (KLONOPIN) 0.5 MG tablet Take 1 tablet (0.5 mg) by mouth At Bedtime 30 tablet 1     divalproex sodium delayed-release (DEPAKOTE) 500 MG DR tablet Take 2 tablets (1,000 mg) by mouth At Bedtime 60 tablet 1     docusate sodium (COLACE) 100 MG capsule Take 1 capsule (100 mg) by mouth 2 times daily 60 capsule 0     hydrOXYzine (ATARAX) 25 MG tablet Take 1 tablet (25 mg) by mouth every 4 hours as needed for anxiety 30 tablet 0     multivitamin (CENTRUM SILVER) tablet Take 1 tablet by mouth daily 90 tablet 3     simethicone (MYLICON) 80 MG chewable tablet Take 1 tablet (80 mg) by mouth every 6 hours as needed for cramping 15 tablet 0     traZODone (DESYREL) 50 MG tablet Take 1 tablet (50 mg) by mouth nightly as needed for sleep 30 tablet 0     witch hazel-glycerin (TUCKS) pad Apply topically every hour as needed for hemorrhoids        Family History:  Family History   Problem Relation Age of Onset     Osteoporosis Sister      Thyroid Disease Sister         Thyroid killed     Anxiety Disorder Sister      Substance Abuse Sister      Thyroid Disease Sister         Thyroid removed     Anxiety Disorder Sister      Depression Son      Depression Other      Mental Illness Mother      Mental Illness Father       No known family history of cancers.  He has a son and a daughter who are healthy.  Social  History:  Social History     Socioeconomic History     Marital status:      Spouse name: Not on file     Number of children: Not on file     Years of education: Not on file     Highest education level: Not on file   Occupational History     Not on file   Social Needs     Financial resource strain: Not hard at all     Food insecurity     Worry: Never true     Inability: Never true     Transportation needs     Medical: No     Non-medical: No   Tobacco Use     Smoking status: Former Smoker     Packs/day: 1.00     Years: 34.00     Pack years: 34.00     Types: Cigarettes     Start date:      Quit date:      Years since quittin.4     Smokeless tobacco: Former User     Types: Snuff     Quit date:      Tobacco comment: Smoked sporadically in high school and during college until  then started back in    Substance and Sexual Activity     Alcohol use: Not Currently     Frequency: Monthly or less     Comment: NONE IN 1.5 YRS--2020     Drug use: Not Currently     Types: Marijuana, Amphetamines, Benzodiazepines, Hashish, LSD, Mescaline, Methaqualone, Methylphenidate, Nitrous oxide, PCP, Psilocybin     Comment: [id number]     Sexual activity: Not Currently     Birth control/protection: Male Surgical   Lifestyle     Physical activity     Days per week: Patient refused     Minutes per session: Patient refused     Stress: Not on file   Relationships     Social connections     Talks on phone: Patient refused     Gets together: Patient refused     Attends Yarsanism service: Patient refused     Active member of club or organization: Patient refused     Attends meetings of clubs or organizations: Patient refused     Relationship status: Patient refused     Intimate partner violence     Fear of current or ex partner: Patient refused     Emotionally abused: Patient refused     Physically abused: Patient refused     Forced sexual activity: Patient refused   Other Topics Concern     Parent/sibling w/ CABG,  MI or angioplasty before 65F 55M? No   Social History Narrative     Not on file   He used to smoke but quit in 2003.  He drinks alcohol occasionally.  He was in [place] but recently relocated to [place] and he is going to move in his own apartment tomorrow.  His daughter lives close by.    Physical Exam:  There were no vitals taken for this visit.   Wt Readings from Last 4 Encounters:   01/29/21 86.5 kg (190 lb 12.8 oz)   01/10/21 85 kg (187 lb 8 oz)   08/05/20 89.4 kg (197 lb)   07/30/20 89.4 kg (197 lb)         Constitutional.  Looks well and in no apparent distress.   Eyes.  Without eye redness or apparent jaundice.   Respiratory.  Non labored breathing. Speaking in full sentences.    Skin.  No concerning skin rashes on the skin visualized.   Neurological.  Is alert and oriented.  Psychiatric.  Mood and affect seem appropriate.      The rest of a comprehensive physical examination is deferred due to Public Health Emergency video visit restrictions.      Laboratory/Imaging Studies    Reviewed  5/11/2021  LFTs showed .  AST 49.  5/10/2021.  Chemistry unremarkable.  CBC unremarkable.    CT of the chest showed multiple bilateral pulmonary nodules predominantly involving the lower lobes and more numerous on the left side.  These appear new as compared to the prior CT scan from December 2020.  The largest one in the right lower lobe measures up to 16 mm.  No lymphadenopathy.  No pleural fluid.  Port-A-Cath is in place.    CT abdomen and pelvis 5/10/2021 showed postop changes of ileocecal resection and right hepatectomy with unchanged indeterminate low-attenuation lesions in the residual left lobe as compared to December 2020.  No new liver lesions identified.  2.6 cm enhancing solid mass at the posterior lower pole of the right kidney, highly concerning for renal cell carcinoma.  Indeterminate 1.7 cm lesion at the upper pole of the left kidney could represent a complex hemorrhagic/proteinaceous cyst or a solid  mass, which would also be concerning for renal cell carcinoma.  Small residual or recurrent hernia at the inferior margin of hernia repair mesh in the right mid abdomen. This involves a knuckle of small bowel with no evidence of complication.    ASSESSMENT/PLAN:      He has metastatic colorectal cancer to the liver.  [facility] Int[facility].  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 He was treated with ascending colectomy in June 2014 followed by hepatectomy in August 2014.  He received FOLFOX x2 and FOLFIRI x10 after that.  He had recurrence of the liver metastases in 2016. He got 2 doses of Cetuximab in 2016. He has received multiple liver directed therapy since then the last one was in August 2018.  He also had excision of the right upper quadrant abdominal wall metastasis in December 2016.  On PET scan from October 2018 he had 2 residual metabolically active liver lesions and these were less conspicuous as compared to the previous CT scan.  He also had 2 FDG avid left axillary lymph nodes which likely were due to extravasation of the tracer which was injected in the left arm.    We did a PET scan on 2/22/2019 which showed 2 FDG avid liver lesions which had enlarged since October 2018.  At that point I referred him to Dr. Landis for possibility of liver directed therapy.  At that point patient was not sure whether he want to pursue more liver directed therapy or not.  He eventually decided on repeating his scans after a few months and he had a repeat CT abdomen and pelvis on 6/7/2019.  The CT scan shows overall stable disease.  The exophytic lesion in the inferior pole of the right kidney has continued to slowly increase in size and now measures 3 cm as opposed to 2.2 cm in June 2017.  This is concerning for renal cell cancer.      Repeat CT CAP from 12/13/19 shows stable disease and liver lesions.  We opted for watchful waiting.    Repeat CT scan shows segment Kelli appears increased in size,  measuring approximately 4.2 x 3.9 cm, previously 3.5 x 3.6 cm.   RLL lung nodule is slightly bigger. Otherwise scan is stable.    On previous scan in May 2021, new lung nodules have appeared.  He was also having some viral upper respiratory tract infection symptoms at that time.  Now he has recovered from that.  I would like to do a PET scan in the next few days for the lung nodules.  We might need to biopsy any suspicious lung nodule.  If we choose the chemotherapy in the future then I will she will FOLFIRI plus minus Panitumumab.        Kidney lesions.  These seem pretty stable.  He has indeterminate 1.7 cm left superior pole kidney lesion which looks fairly stable and stable 2.6 cm cm heterogeneously enhancing exophytic lesion in the inferior right kidney  This likely represents RCC.  He met with Dr. Chairez and at that time watchful waiting was decided. The kidney lesions seem stable.  Continue to follow with urology.    We did not address the following today.    Discussion regarding health care directive  He tells me that he has this completed.       See me back in 3 weeks.    I answered all of his questions to his satisfaction.  He is agreeable and comfortable with the plan.    Patrice Srivastava

## 2021-06-10 NOTE — PROGRESS NOTES
"Solo is a 65 year old who is being evaluated via a billable video visit.      How would you like to obtain your AVS? MyChart     If the video visit is dropped, the invitation should be resent by: Text to cell phone: [phone]     Will anyone else be joining your video visit? No          Vitals - Patient Reported  Weight (Patient Reported): 87.5 kg (193 lb)  Height (Patient Reported): 188 cm (6' 2.02\")  BMI (Based on Pt Reported Ht/Wt): 24.77  Pain Score: No Pain (0)    Harvinder Marquez LPN    Video Start Time: 8:26 AM  Video-Visit Details    Type of service:  Video Visit    Video End Time:8:37 AM    Originating Location (pt. Location): Home    Distant Location (provider location):  [facility]     Platform used for Video Visit: Lisandro  "

## 2021-06-10 NOTE — LETTER
"    6/10/2021         RE: Los Huang  [address]  [facility] [address]        Dear Colleague,    Thank you for referring your patient, Los Huang, to the [facility]. Please see a copy of my visit note below.    Solo is a 65 year old who is being evaluated via a billable video visit.      How would you like to obtain your AVS? MyChart     If the video visit is dropped, the invitation should be resent by: Text to cell phone: [phone]     Will anyone else be joining your video visit? No          Vitals - Patient Reported  Weight (Patient Reported): 87.5 kg (193 lb)  Height (Patient Reported): 188 cm (6' 2.02\")  BMI (Based on Pt Reported Ht/Wt): 24.77  Pain Score: No Pain (0)    Harvinder Marquez LPN    Video Start Time: 8:26 AM  Video-Visit Details    Type of service:  Video Visit    Video End Time:8:37 AM    Originating Location (pt. Location): Home    Distant Location (provider location):  [facility]     Platform used for Video Visit: [employer]    Oncology follow up visit:  Date on this visit: 6/10/21     Los Huang  is referred by Dr.Jafar Landis for an oncology consultation. He requires evaluation for metastatic colon adenocarcinoma- [facility]SI-Intact.    Primary Physician: No Ref-Primary, Physician     History Of Present Illness:    Please see previous notes for detail.  I have copied and updated from prior note.    Mr. Huang is a 65 year old male who was diagnosed with metastatic colorectal cancer in 2014 with oligometastatic disease to liver at the time of diagnosis.  He was treated with ascending colectomy 6/14 and R hepatectomy in 8/14.  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  He completed 10 cycles of FOLFIRI around February of March 2015.), " eventually had recurrance of hepatic mets in March 2016.  He was seeking several different opinions from different oncologist at that time so his treatment got delayed and he got cetuximab for 2 cycles during the summer 2016 and he had issues with skin the rash from it. He also had an right upper quadrant abdominal wall met that was resected in Dec 2016.    Since then has been treated with multiple liver directed therapies, initially with Y90 in Dec 2017 but since then with TACE x4, 1/9/18, 6/14/18, 7/10/18 and 8/22/18.        He also has possible Renal cell cancer of the Right lower pole of the kidney which has not been treated.    He recently relocated from [place] to [place].      We did a PET scan on 2/22/2019 which showed 2 FDG avid liver lesions which had enlarged since October 2018.  At that point I referred him to Dr. Landis for possibility of liver directed therapy.  At that point patient was not sure whether he want to pursue more liver directed therapy or not.  He eventually decided on repeating his scans after a few months and he had a repeat CT abdomen and pelvis on 6/7/2019.  The CT scan shows overall stable disease.  The exophytic lesion in the inferior pole of the right kidney has continued to slowly increase in size and now measures 3 cm as opposed to 2.2 cm in June 2017.  This is concerning for renal cell cancer.     He was seen by Dr. Reyes in July 2019 who offered him microwave ablation via an open procedure but patient was not interested in that.     He was seen by Dr. Chairez from urology in November 2019 for the right kidney inferior pole lesion, likely a renal cell cancer but he opted for watchful waiting.     CT scan in Dec 2019     Interval history  This is a video visit.  Overall feels well. He has had issues with admissions for psychosis.  In May 2021 he was noted to have several new lung nodules. He mentions that at that time he had head cold and some cough but those symptoms  resolved. COVID test was negative. No nausea or vomiting or diarrhea or constipation. He has occasionally noticed little amount of blood in rectum.  Energy is fine. No pain. He has mild numbness in the feet.       ECOG 1    ROS:  Rest of the comprehensive review of the system was essentially unremarkable.      I reviewed other history in epic as below.      Past Medical/Surgical History:  Past Medical History:   Diagnosis Date     Cancer (H)      Depressive disorder      Post-traumatic osteoarthritis of left shoulder 3/9/2020     Schizoaffective disorder (H)      Past Surgical History:   Procedure Laterality Date     ABDOMEN SURGERY       BIOPSY       CHOLECYSTECTOMY       COLONOSCOPY       GENITOURINARY SURGERY  1997    Ureteroscopy gone awry     H NEEDLE POWER PORT Left 2014    PLACED IN [place]     IR SIRT (SELECTIVE INTERNAL RADIO THERAPY)  7/30/2020     IR VISCERAL ANGIOGRAM  7/30/2020     IR VISCERAL EMBOLIZATION  8/5/2020     ORTHOPEDIC SURGERY        Bipolar disorder  Schizoaffective dx  Colon cancer    Cancer History:   As above    Allergies:  Allergies as of 06/10/2021 - Reviewed 06/10/2021   Allergen Reaction Noted     Animal dander  02/14/2019     Current Medications:  Current Outpatient Medications   Medication Sig Dispense Refill     benztropine (COGENTIN) 1 MG tablet Take 1 tablet (1 mg) by mouth At Bedtime 30 tablet 1     calcium polycarbophil (FIBERCON) 625 MG tablet Take 1 tablet by mouth daily (with lunch)        cariprazine (VRAYLAR) 6 MG CAPS capsule Take 1 capsule (6 mg) by mouth daily 30 capsule 1     cholecalciferol (VITAMIN D3) 1000 units (25 mcg) capsule Take 1 capsule (1,000 Units) by mouth daily 90 capsule 3     clonazePAM (KLONOPIN) 0.5 MG tablet Take 1 tablet (0.5 mg) by mouth At Bedtime 30 tablet 1     divalproex sodium delayed-release (DEPAKOTE) 500 MG DR tablet Take 2 tablets (1,000 mg) by mouth At Bedtime 60 tablet 1     docusate sodium (COLACE) 100 MG capsule Take 1 capsule (100  mg) by mouth 2 times daily 60 capsule 0     hydrOXYzine (ATARAX) 25 MG tablet Take 1 tablet (25 mg) by mouth every 4 hours as needed for anxiety 30 tablet 0     multivitamin (CENTRUM SILVER) tablet Take 1 tablet by mouth daily 90 tablet 3     simethicone (MYLICON) 80 MG chewable tablet Take 1 tablet (80 mg) by mouth every 6 hours as needed for cramping 15 tablet 0     traZODone (DESYREL) 50 MG tablet Take 1 tablet (50 mg) by mouth nightly as needed for sleep 30 tablet 0     witch hazel-glycerin (TUCKS) pad Apply topically every hour as needed for hemorrhoids        Family History:  Family History   Problem Relation Age of Onset     Osteoporosis Sister      Thyroid Disease Sister         Thyroid killed     Anxiety Disorder Sister      Substance Abuse Sister      Thyroid Disease Sister         Thyroid removed     Anxiety Disorder Sister      Depression Son      Depression Other      Mental Illness Mother      Mental Illness Father       No known family history of cancers.  He has a son and a daughter who are healthy.  Social History:  Social History     Socioeconomic History     Marital status:      Spouse name: Not on file     Number of children: Not on file     Years of education: Not on file     Highest education level: Not on file   Occupational History     Not on file   Social Needs     Financial resource strain: Not hard at all     Food insecurity     Worry: Never true     Inability: Never true     Transportation needs     Medical: No     Non-medical: No   Tobacco Use     Smoking status: Former Smoker     Packs/day: 1.00     Years: 34.00     Pack years: 34.00     Types: Cigarettes     Start date:      Quit date:      Years since quittin.4     Smokeless tobacco: Former User     Types: Snuff     Quit date:      Tobacco comment: Smoked sporadically in high school and during college until  then started back in    Substance and Sexual Activity     Alcohol use: Not Currently      Frequency: Monthly or less     Comment: NONE IN 1.5 YRS--7/2020     Drug use: Not Currently     Types: Marijuana, Amphetamines, Benzodiazepines, Hashish, LSD, Mescaline, Methaqualone, Methylphenidate, Nitrous oxide, PCP, Psilocybin     Comment: [id number]     Sexual activity: Not Currently     Birth control/protection: Male Surgical   Lifestyle     Physical activity     Days per week: Patient refused     Minutes per session: Patient refused     Stress: Not on file   Relationships     Social connections     Talks on phone: Patient refused     Gets together: Patient refused     Attends Anabaptism service: Patient refused     Active member of club or organization: Patient refused     Attends meetings of clubs or organizations: Patient refused     Relationship status: Patient refused     Intimate partner violence     Fear of current or ex partner: Patient refused     Emotionally abused: Patient refused     Physically abused: Patient refused     Forced sexual activity: Patient refused   Other Topics Concern     Parent/sibling w/ CABG, MI or angioplasty before 65F 55M? No   Social History Narrative     Not on file   He used to smoke but quit in 2003.  He drinks alcohol occasionally.  He was in [place] but recently relocated to [place] and he is going to move in his own apartment tomorrow.  His daughter lives close by.    Physical Exam:  There were no vitals taken for this visit.   Wt Readings from Last 4 Encounters:   01/29/21 86.5 kg (190 lb 12.8 oz)   01/10/21 85 kg (187 lb 8 oz)   08/05/20 89.4 kg (197 lb)   07/30/20 89.4 kg (197 lb)         Constitutional.  Looks well and in no apparent distress.   Eyes.  Without eye redness or apparent jaundice.   Respiratory.  Non labored breathing. Speaking in full sentences.    Skin.  No concerning skin rashes on the skin visualized.   Neurological.  Is alert and oriented.  Psychiatric.  Mood and affect seem appropriate.      The rest of a comprehensive physical examination is  deferred due to Public Health Emergency video visit restrictions.      Laboratory/Imaging Studies    Reviewed  5/11/2021  LFTs showed .  AST 49.  5/10/2021.  Chemistry unremarkable.  CBC unremarkable.    CT of the chest showed multiple bilateral pulmonary nodules predominantly involving the lower lobes and more numerous on the left side.  These appear new as compared to the prior CT scan from December 2020.  The largest one in the right lower lobe measures up to 16 mm.  No lymphadenopathy.  No pleural fluid.  Port-A-Cath is in place.    CT abdomen and pelvis 5/10/2021 showed postop changes of ileocecal resection and right hepatectomy with unchanged indeterminate low-attenuation lesions in the residual left lobe as compared to December 2020.  No new liver lesions identified.  2.6 cm enhancing solid mass at the posterior lower pole of the right kidney, highly concerning for renal cell carcinoma.  Indeterminate 1.7 cm lesion at the upper pole of the left kidney could represent a complex hemorrhagic/proteinaceous cyst or a solid mass, which would also be concerning for renal cell carcinoma.  Small residual or recurrent hernia at the inferior margin of hernia repair mesh in the right mid abdomen. This involves a knuckle of small bowel with no evidence of complication.    ASSESSMENT/PLAN:      He has metastatic colorectal cancer to the liver.  MSI Intact.  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 He was treated with ascending colectomy in June 2014 followed by hepatectomy in August 2014.  He received FOLFOX x2 and FOLFIRI x10 after that.  He had recurrence of the liver metastases in 2016. He got 2 doses of Cetuximab in 2016. He has received multiple liver directed therapy since then the last one was in August 2018.  He also had excision of the right upper quadrant abdominal wall metastasis in December 2016.  On PET scan from October 2018 he had 2 residual metabolically active liver lesions  and these were less conspicuous as compared to the previous CT scan.  He also had 2 FDG avid left axillary lymph nodes which likely were due to extravasation of the tracer which was injected in the left arm.    We did a PET scan on 2/22/2019 which showed 2 FDG avid liver lesions which had enlarged since October 2018.  At that point I referred him to Dr. Landis for possibility of liver directed therapy.  At that point patient was not sure whether he want to pursue more liver directed therapy or not.  He eventually decided on repeating his scans after a few months and he had a repeat CT abdomen and pelvis on 6/7/2019.  The CT scan shows overall stable disease.  The exophytic lesion in the inferior pole of the right kidney has continued to slowly increase in size and now measures 3 cm as opposed to 2.2 cm in June 2017.  This is concerning for renal cell cancer.      Repeat CT CAP from 12/13/19 shows stable disease and liver lesions.  We opted for watchful waiting.    Repeat CT scan shows segment Kelli appears increased in size, measuring approximately 4.2 x 3.9 cm, previously 3.5 x 3.6 cm.   RLL lung nodule is slightly bigger. Otherwise scan is stable.    On previous scan in May 2021, new lung nodules have appeared.  He was also having some viral upper respiratory tract infection symptoms at that time.  Now he has recovered from that.  I would like to do a PET scan in the next few days for the lung nodules.  We might need to biopsy any suspicious lung nodule.  If we choose the chemotherapy in the future then I will she will FOLFIRI plus minus Panitumumab.        Kidney lesions.  These seem pretty stable.  He has indeterminate 1.7 cm left superior pole kidney lesion which looks fairly stable and stable 2.6 cm cm heterogeneously enhancing exophytic lesion in the inferior right kidney  This likely represents RCC.  He met with Dr. Chairez and at that time watchful waiting was decided. The kidney lesions seem stable.   Continue to follow with urology.    We did not address the following today.    Discussion regarding health care directive  He tells me that he has this completed.       See me back in 3 weeks.    I answered all of his questions to his satisfaction.  He is agreeable and comfortable with the plan.    Patrice Srivastava        Again, thank you for allowing me to participate in the care of your patient.        Sincerely,        Patrice Srivastava MD

## 2021-06-15 ENCOUNTER — DOCUMENTATION ONLY (OUTPATIENT)
Dept: PSYCHIATRY | Facility: CLINIC | Age: 66
End: 2021-06-15

## 2021-06-15 ENCOUNTER — PATIENT OUTREACH (OUTPATIENT)
Dept: ONCOLOGY | Facility: CLINIC | Age: 66
End: 2021-06-15

## 2021-06-15 NOTE — PROGRESS NOTES
RN CARE COORDINATION    Outgoing Call:   Spoke with Solo following his concerning MyChart message sent on 6/15 regarding his medication and possibly going to the ED. He stated he does not plan on going to the ED, he only wanted to let Dr. Srivastava know of his concerns about interactions with his current meds and any chemotherapy he may start in the near future. Reassured Solo any possible interactions would be reviewed by our pharmacy team. He also voiced concerns about possible cost of chemotherapy. Assured him our Pharmacy Finance team would check his insurance and let him know if it is not covered. Reviewed with Solo that there is no plan in place yet for chemotherapy and that he should plan on completing his scans on 6/24 and his visit with Dr. Srivastava on 7/1 so a plan can be determined. Solo verbalized understanding. Solo also wanted to let Dr. Srivastava know he has an appointment with Urology in July re: his possible RCC.     Writer did not discuss Solo's recent confusing and random Mychart messages, but did alert Solo's Psychiatric team. Per Dior Dick RN, they have been trying to get in touch with Solo. Encouraged Solo to give Dior. Solo verbalized agreement to call her. Conversation with Solo was coherent and pleasant and writer has no concerns at this time for his mental health.     Encouraged Solo to call with any additional questions or concerns.         SHAY Hernandez, RN  RN Care Coordinator  [facility]

## 2021-06-15 NOTE — PROGRESS NOTES
Our clinic has received several MyChart messages from Solo over the past month containing odd and nonsensical statements. He has also sent messages indicating he would like to discontinue psychiatric care with our clinic.    I have attempted to contact Solo via phone to gather more information. However there is no answer.  I have left Solo a detailed voicemail encouraging him to call our clinic to discuss any concerns he has and if he would like to continue care with us.  If he is having any suicidal ideation or safety concerns I suggest he present to the nearest emergency department for evaluation.       Marcia Gracia, DO  PGY-3 Psychiatry Resident

## 2021-06-21 ENCOUNTER — TELEPHONE (OUTPATIENT)
Dept: PSYCHIATRY | Facility: CLINIC | Age: 66
End: 2021-06-21

## 2021-07-01 ENCOUNTER — VIRTUAL VISIT (OUTPATIENT)
Dept: ONCOLOGY | Facility: CLINIC | Age: 66
End: 2021-07-01
Attending: INTERNAL MEDICINE
Payer: MEDICARE

## 2021-07-01 ENCOUNTER — TELEPHONE (OUTPATIENT)
Dept: PSYCHIATRY | Facility: CLINIC | Age: 66
End: 2021-07-01

## 2021-07-01 DIAGNOSIS — C18.9 MALIGNANT NEOPLASM OF COLON, UNSPECIFIED PART OF COLON (H): Primary | ICD-10-CM

## 2021-07-01 DIAGNOSIS — C18.9 METASTATIC COLON CANCER TO LIVER (H): ICD-10-CM

## 2021-07-01 DIAGNOSIS — R91.8 PULMONARY NODULES: ICD-10-CM

## 2021-07-01 DIAGNOSIS — C78.7 METASTATIC COLON CANCER TO LIVER (H): ICD-10-CM

## 2021-07-01 PROCEDURE — 999N001193 HC VIDEO/TELEPHONE VISIT; NO CHARGE

## 2021-07-01 PROCEDURE — 99214 OFFICE O/P EST MOD 30 MIN: CPT | Mod: 95 | Performed by: INTERNAL MEDICINE

## 2021-07-01 NOTE — LETTER
"    7/1/2021         RE: Los Huang  [address]  [facility] [address]        Dear Colleague,    Thank you for referring your patient, Los Huang, to the [facility]. Please see a copy of my visit note below.    Solo is a 65 year old who is being evaluated via a billable video visit.      How would you like to obtain your AVS? MyChart     If the video visit is dropped, the invitation should be resent by: Text to cell phone: Atif     Will anyone else be joining your video visit? No          Vitals - Patient Reported  Weight (Patient Reported): 86.6 kg (191 lb)  Height (Patient Reported): 188 cm (6' 2.02\")  BMI (Based on Pt Reported Ht/Wt): 24.51  Pain Score: No Pain (0)    Harvinder Marquez LPN    Video Start Time: 8:00 AM  Video-Visit Details    Type of service:  Video Visit    Video End Time:8:09 AM    Originating Location (pt. Location): Home    Distant Location (provider location):  [facility]     Platform used for Video Visit: [employer]    Oncology follow up visit:  Date on this visit: 7/01/21     Los Huang  is referred by Dr.Jafar Landis for an oncology consultation. He requires evaluation for metastatic colon adenocarcinoma- [facility].    Primary Physician: No Ref-Primary, Physician     History Of Present Illness:    Please see previous notes for detail.  I have copied and updated from prior note.    Mr. Huang is a 65 year old male who was diagnosed with metastatic colorectal cancer in 2014 with oligometastatic disease to liver at the time of diagnosis.  He was treated with ascending colectomy 6/14 and R hepatectomy in 8/14.  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  He completed 10 cycles of FOLFIRI around February of March 2015.), eventually " had recurrance of hepatic mets in March 2016.  He was seeking several different opinions from different oncologist at that time so his treatment got delayed and he got cetuximab for 2 cycles during the summer 2016 and he had issues with skin the rash from it. He also had an right upper quadrant abdominal wall met that was resected in Dec 2016.    Since then has been treated with multiple liver directed therapies, initially with Y90 in Dec 2017 but since then with TACE x4, 1/9/18, 6/14/18, 7/10/18 and 8/22/18.        He also has possible Renal cell cancer of the Right lower pole of the kidney which has not been treated.    He recently relocated from [place] to [place].      We did a PET scan on 2/22/2019 which showed 2 FDG avid liver lesions which had enlarged since October 2018.  At that point I referred him to Dr. Landis for possibility of liver directed therapy.  At that point patient was not sure whether he want to pursue more liver directed therapy or not.  He eventually decided on repeating his scans after a few months and he had a repeat CT abdomen and pelvis on 6/7/2019.  The CT scan shows overall stable disease.  The exophytic lesion in the inferior pole of the right kidney has continued to slowly increase in size and now measures 3 cm as opposed to 2.2 cm in June 2017.  This is concerning for renal cell cancer.     He was seen by Dr. Reyes in July 2019 who offered him microwave ablation via an open procedure but patient was not interested in that.     He was seen by Dr. Chairez from urology in November 2019 for the right kidney inferior pole lesion, likely a renal cell cancer but he opted for watchful waiting.     CT scan in Dec 2019     Interval history  This is a video visit.  Overall he feels reasonably well.  Denies any nausea vomiting diarrhea constipation bleeding.  No trouble swallowing.  No difficulty breathing.  Overall energy is the same.  He did not get the blood work and PET scan as I had  recommended.  He mentions that he had issues with his family who were not supportive and he did not want to come to the hospital to get these tests done so that is why he canceled.  Now he tells me that he will do the tests if they are rescheduled.          ECOG 1    ROS:  Rest of the review of the system was unremarkable.      I reviewed other history in epic as below.      Past Medical/Surgical History:  Past Medical History:   Diagnosis Date     Cancer (H)      Depressive disorder      Post-traumatic osteoarthritis of left shoulder 3/9/2020     Schizoaffective disorder (H)      Past Surgical History:   Procedure Laterality Date     ABDOMEN SURGERY       BIOPSY       CHOLECYSTECTOMY       COLONOSCOPY       GENITOURINARY SURGERY  1997    Ureteroscopy gone awry     H NEEDLE POWER PORT Left 2014    PLACED IN [place]     IR SIRT (SELECTIVE INTERNAL RADIO THERAPY)  7/30/2020     IR VISCERAL ANGIOGRAM  7/30/2020     IR VISCERAL EMBOLIZATION  8/5/2020     ORTHOPEDIC SURGERY        Bipolar disorder  Schizoaffective dx  Colon cancer    Cancer History:   As above    Allergies:  Allergies as of 07/01/2021 - Reviewed 07/01/2021   Allergen Reaction Noted     Animal dander  02/14/2019     Current Medications:  Current Outpatient Medications   Medication Sig Dispense Refill     benztropine (COGENTIN) 1 MG tablet Take 1 tablet (1 mg) by mouth At Bedtime 30 tablet 1     calcium polycarbophil (FIBERCON) 625 MG tablet Take 1 tablet by mouth daily (with lunch)        cariprazine (VRAYLAR) 6 MG CAPS capsule Take 1 capsule (6 mg) by mouth daily 30 capsule 1     cholecalciferol (VITAMIN D3) 1000 units (25 mcg) capsule Take 1 capsule (1,000 Units) by mouth daily 90 capsule 3     clonazePAM (KLONOPIN) 0.5 MG tablet Take 1 tablet (0.5 mg) by mouth At Bedtime 30 tablet 1     divalproex sodium delayed-release (DEPAKOTE) 500 MG DR tablet Take 2 tablets (1,000 mg) by mouth At Bedtime 60 tablet 1     docusate sodium (COLACE) 100 MG capsule Take  1 capsule (100 mg) by mouth 2 times daily 60 capsule 0     hydrOXYzine (ATARAX) 25 MG tablet Take 1 tablet (25 mg) by mouth every 4 hours as needed for anxiety 30 tablet 0     multivitamin (CENTRUM SILVER) tablet Take 1 tablet by mouth daily 90 tablet 3     simethicone (MYLICON) 80 MG chewable tablet Take 1 tablet (80 mg) by mouth every 6 hours as needed for cramping 15 tablet 0     traZODone (DESYREL) 50 MG tablet Take 1 tablet (50 mg) by mouth nightly as needed for sleep 30 tablet 0     witch hazel-glycerin (TUCKS) pad Apply topically every hour as needed for hemorrhoids        Family History:  Family History   Problem Relation Age of Onset     Osteoporosis Sister      Thyroid Disease Sister         Thyroid killed     Anxiety Disorder Sister      Substance Abuse Sister      Thyroid Disease Sister         Thyroid removed     Anxiety Disorder Sister      Depression Son      Depression Other      Mental Illness Mother      Mental Illness Father       No known family history of cancers.  He has a son and a daughter who are healthy.  Social History:  Social History     Socioeconomic History     Marital status:      Spouse name: Not on file     Number of children: Not on file     Years of education: Not on file     Highest education level: Not on file   Occupational History     Not on file   Social Needs     Financial resource strain: Not hard at all     Food insecurity     Worry: Never true     Inability: Never true     Transportation needs     Medical: No     Non-medical: No   Tobacco Use     Smoking status: Former Smoker     Packs/day: 1.00     Years: 34.00     Pack years: 34.00     Types: Cigarettes     Start date:      Quit date:      Years since quittin.5     Smokeless tobacco: Former User     Types: Snuff     Quit date:      Tobacco comment: Smoked sporadically in high school and during college until  then started back in    Substance and Sexual Activity     Alcohol use: Not  Currently     Frequency: Monthly or less     Comment: NONE IN 1.5 YRS--7/2020     Drug use: Not Currently     Types: Marijuana, Amphetamines, Benzodiazepines, Hashish, LSD, Mescaline, Methaqualone, Methylphenidate, Nitrous oxide, PCP, Psilocybin     Comment: [id number]     Sexual activity: Not Currently     Birth control/protection: Male Surgical   Lifestyle     Physical activity     Days per week: Patient refused     Minutes per session: Patient refused     Stress: Not on file   Relationships     Social connections     Talks on phone: Patient refused     Gets together: Patient refused     Attends [employer] service: Patient refused     Active member of club or organization: Patient refused     Attends meetings of clubs or organizations: Patient refused     Relationship status: Patient refused     Intimate partner violence     Fear of current or ex partner: Patient refused     Emotionally abused: Patient refused     Physically abused: Patient refused     Forced sexual activity: Patient refused   Other Topics Concern     Parent/sibling w/ CABG, MI or angioplasty before 65F 55M? No   Social History Narrative     Not on file   He used to smoke but quit in 2003.  He drinks alcohol occasionally.  He was in [place] but recently relocated to [place] and he is going to move in his own apartment tomorrow.  His daughter lives close by.    Physical Exam:  There were no vitals taken for this visit.   Wt Readings from Last 4 Encounters:   01/29/21 86.5 kg (190 lb 12.8 oz)   01/10/21 85 kg (187 lb 8 oz)   08/05/20 89.4 kg (197 lb)   07/30/20 89.4 kg (197 lb)           Constitutional.  Does not seem to be in any acute distress.  Eyes.  No redness or discharge noted.  Respiratory.  Speaking in full sentences.  Breathing seems comfortable without any accessory use of muscles.    Skin.  Visualized his skin does not show any obvious rashes.  Musculoskeletal.  Range of motion for visualized areas is intact.  Neurological.  Alert and  oriented x3.  Psychiatric.  Mood, mentation and affect are normal.  Decision making capacity is intact.      The rest of a comprehensive physical examination is deferred due to Public Health Emergency video visit restrictions.      Laboratory/Imaging Studies    Reviewed  5/11/2021  LFTs showed .  AST 49.  5/10/2021.  Chemistry unremarkable.  CBC unremarkable.    CT of the chest showed multiple bilateral pulmonary nodules predominantly involving the lower lobes and more numerous on the left side.  These appear new as compared to the prior CT scan from December 2020.  The largest one in the right lower lobe measures up to 16 mm.  No lymphadenopathy.  No pleural fluid.  Port-A-Cath is in place.    CT abdomen and pelvis 5/10/2021 showed postop changes of ileocecal resection and right hepatectomy with unchanged indeterminate low-attenuation lesions in the residual left lobe as compared to December 2020.  No new liver lesions identified.  2.6 cm enhancing solid mass at the posterior lower pole of the right kidney, highly concerning for renal cell carcinoma.  Indeterminate 1.7 cm lesion at the upper pole of the left kidney could represent a complex hemorrhagic/proteinaceous cyst or a solid mass, which would also be concerning for renal cell carcinoma.  Small residual or recurrent hernia at the inferior margin of hernia repair mesh in the right mid abdomen. This involves a knuckle of small bowel with no evidence of complication.    ASSESSMENT/PLAN:      He has metastatic colorectal cancer to the liver.  MSI Intact.  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 He was treated with ascending colectomy in June 2014 followed by hepatectomy in August 2014.  He received FOLFOX x2 and FOLFIRI x10 after that.  He had recurrence of the liver metastases in 2016. He got 2 doses of Cetuximab in 2016. He has received multiple liver directed therapy since then the last one was in August 2018.  He also had  excision of the right upper quadrant abdominal wall metastasis in December 2016.  On PET scan from October 2018 he had 2 residual metabolically active liver lesions and these were less conspicuous as compared to the previous CT scan.  He also had 2 FDG avid left axillary lymph nodes which likely were due to extravasation of the tracer which was injected in the left arm.    We did a PET scan on 2/22/2019 which showed 2 FDG avid liver lesions which had enlarged since October 2018.  At that point I referred him to Dr. Landis for possibility of liver directed therapy.  At that point patient was not sure whether he want to pursue more liver directed therapy or not.  He eventually decided on repeating his scans after a few months and he had a repeat CT abdomen and pelvis on 6/7/2019.  The CT scan shows overall stable disease.  The exophytic lesion in the inferior pole of the right kidney has continued to slowly increase in size and now measures 3 cm as opposed to 2.2 cm in June 2017.  This is concerning for renal cell cancer.      Repeat CT CAP from 12/13/19 shows stable disease and liver lesions.  We opted for watchful waiting.    Repeat CT scan shows segment Kelli appears increased in size, measuring approximately 4.2 x 3.9 cm, previously 3.5 x 3.6 cm.   RLL lung nodule is slightly bigger. Otherwise scan is stable.    On scan in May 2021, new lung nodules have appeared.  He was also having some viral upper respiratory tract infection symptoms at that time.    I had recommended to do a PET scan to have a better look at the lung nodules and potentially biopsy of the lung nodule.   He did not get the PET scan and we discussed again the importance of it and now he agrees to get it done in the next few days.  We will try to schedule it at a place of his liking and then I will see him back in follow-up.  If we choose the chemotherapy in the future then I will she will FOLFIRI plus minus Panitumumab.    We did not address the  following today.    Kidney lesions.  These seem pretty stable.  He has indeterminate 1.7 cm left superior pole kidney lesion which looks fairly stable and stable 2.6 cm cm heterogeneously enhancing exophytic lesion in the inferior right kidney  This likely represents RCC.  He met with Dr. Chairez and at that time watchful waiting was decided. The kidney lesions seem stable.  Continue to follow with urology.        Discussion regarding health care directive  He tells me that he has this completed.       See me back few days after the PET scan and blood work.    I answered all of his questions to his satisfaction.  He is agreeable and comfortable with the plan.    Patrice Srivastava        Again, thank you for allowing me to participate in the care of your patient.        Sincerely,        Patrice Srivastava MD

## 2021-07-01 NOTE — TELEPHONE ENCOUNTER
Appointment canceled for Los Huang ([id number])  Visit Type: VIDEO VISIT RETURN  Date        Time      Length    Provider                  Department  7/7/2021     1:00 PM  60 mins.  Nigel Ash MD [facility]     Reason for Cancellation: Other     Patient Comments: Scared of U           Patient cancelled upcoming appointment with the above messaged. Reached out via [employer] [employer] to check-in.

## 2021-07-01 NOTE — PROGRESS NOTES
Oncology follow up visit:  Date on this visit: 7/01/21     Los Huang  is referred by Dr.Jafar Landis for an oncology consultation. He requires evaluation for metastatic colon adenocarcinoma- [facility].    Primary Physician: No Ref-Primary, Physician     History Of Present Illness:    Please see previous notes for detail.  I have copied and updated from prior note.    Mr. Huang is a 65 year old male who was diagnosed with metastatic colorectal cancer in 2014 with oligometastatic disease to liver at the time of diagnosis.  He was treated with ascending colectomy 6/14 and R hepatectomy in 8/14.  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  He completed 10 cycles of FOLFIRI around February of March 2015.), eventually had recurrance of hepatic mets in March 2016.  He was seeking several different opinions from different oncologist at that time so his treatment got delayed and he got cetuximab for 2 cycles during the summer 2016 and he had issues with skin the rash from it. He also had an right upper quadrant abdominal wall met that was resected in Dec 2016.    Since then has been treated with multiple liver directed therapies, initially with Y90 in Dec 2017 but since then with TACE x4, 1/9/18, 6/14/18, 7/10/18 and 8/22/18.        He also has possible Renal cell cancer of the Right lower pole of the kidney which has not been treated.    He recently relocated from [place] to [place].      We did a PET scan on 2/22/2019 which showed 2 FDG avid liver lesions which had enlarged since October 2018.  At that point I referred him to Dr. Landis for possibility of liver directed therapy.  At that point patient was not sure whether he want to pursue more liver directed therapy or not.  He eventually decided on repeating his scans after a few months  and he had a repeat CT abdomen and pelvis on 6/7/2019.  The CT scan shows overall stable disease.  The exophytic lesion in the inferior pole of the right kidney has continued to slowly increase in size and now measures 3 cm as opposed to 2.2 cm in June 2017.  This is concerning for renal cell cancer.     He was seen by Dr. Reyes in July 2019 who offered him microwave ablation via an open procedure but patient was not interested in that.     He was seen by Dr. Chairez from urology in November 2019 for the right kidney inferior pole lesion, likely a renal cell cancer but he opted for watchful waiting.     CT scan in Dec 2019     Interval history  This is a video visit.  Overall he feels reasonably well.  Denies any nausea vomiting diarrhea constipation bleeding.  No trouble swallowing.  No difficulty breathing.  Overall energy is the same.  He did not get the blood work and PET scan as I had recommended.  He mentions that he had issues with his family who were not supportive and he did not want to come to the hospital to get these tests done so that is why he canceled.  Now he tells me that he will do the tests if they are rescheduled.          ECOG 1    ROS:  Rest of the review of the system was unremarkable.      I reviewed other history in epic as below.      Past Medical/Surgical History:  Past Medical History:   Diagnosis Date     Cancer (H)      Depressive disorder      Post-traumatic osteoarthritis of left shoulder 3/9/2020     Schizoaffective disorder (H)      Past Surgical History:   Procedure Laterality Date     ABDOMEN SURGERY       BIOPSY       CHOLECYSTECTOMY       COLONOSCOPY       GENITOURINARY SURGERY  1997    Ureteroscopy gone awry     H NEEDLE POWER PORT Left 2014    PLACED IN [place]     IR SIRT (SELECTIVE INTERNAL RADIO THERAPY)  7/30/2020     IR VISCERAL ANGIOGRAM  7/30/2020     IR VISCERAL EMBOLIZATION  8/5/2020     ORTHOPEDIC SURGERY        Bipolar disorder  Schizoaffective dx  Colon  cancer    Cancer History:   As above    Allergies:  Allergies as of 07/01/2021 - Reviewed 07/01/2021   Allergen Reaction Noted     Animal dander  02/14/2019     Current Medications:  Current Outpatient Medications   Medication Sig Dispense Refill     benztropine (COGENTIN) 1 MG tablet Take 1 tablet (1 mg) by mouth At Bedtime 30 tablet 1     calcium polycarbophil (FIBERCON) 625 MG tablet Take 1 tablet by mouth daily (with lunch)        cariprazine (VRAYLAR) 6 MG CAPS capsule Take 1 capsule (6 mg) by mouth daily 30 capsule 1     cholecalciferol (VITAMIN D3) 1000 units (25 mcg) capsule Take 1 capsule (1,000 Units) by mouth daily 90 capsule 3     clonazePAM (KLONOPIN) 0.5 MG tablet Take 1 tablet (0.5 mg) by mouth At Bedtime 30 tablet 1     divalproex sodium delayed-release (DEPAKOTE) 500 MG DR tablet Take 2 tablets (1,000 mg) by mouth At Bedtime 60 tablet 1     docusate sodium (COLACE) 100 MG capsule Take 1 capsule (100 mg) by mouth 2 times daily 60 capsule 0     hydrOXYzine (ATARAX) 25 MG tablet Take 1 tablet (25 mg) by mouth every 4 hours as needed for anxiety 30 tablet 0     multivitamin (CENTRUM SILVER) tablet Take 1 tablet by mouth daily 90 tablet 3     simethicone (MYLICON) 80 MG chewable tablet Take 1 tablet (80 mg) by mouth every 6 hours as needed for cramping 15 tablet 0     traZODone (DESYREL) 50 MG tablet Take 1 tablet (50 mg) by mouth nightly as needed for sleep 30 tablet 0     witch hazel-glycerin (TUCKS) pad Apply topically every hour as needed for hemorrhoids        Family History:  Family History   Problem Relation Age of Onset     Osteoporosis Sister      Thyroid Disease Sister         Thyroid killed     Anxiety Disorder Sister      Substance Abuse Sister      Thyroid Disease Sister         Thyroid removed     Anxiety Disorder Sister      Depression Son      Depression Other      Mental Illness Mother      Mental Illness Father       No known family history of cancers.  He has a son and a daughter who  are healthy.  Social History:  Social History     Socioeconomic History     Marital status:      Spouse name: Not on file     Number of children: Not on file     Years of education: Not on file     Highest education level: Not on file   Occupational History     Not on file   Social Needs     Financial resource strain: Not hard at all     Food insecurity     Worry: Never true     Inability: Never true     Transportation needs     Medical: No     Non-medical: No   Tobacco Use     Smoking status: Former Smoker     Packs/day: 1.00     Years: 34.00     Pack years: 34.00     Types: Cigarettes     Start date:      Quit date:      Years since quittin.5     Smokeless tobacco: Former User     Types: Snuff     Quit date:      Tobacco comment: Smoked sporadically in high school and during college until  then started back in    Substance and Sexual Activity     Alcohol use: Not Currently     Frequency: Monthly or less     Comment: NONE IN 1.5 YRS--2020     Drug use: Not Currently     Types: Marijuana, Amphetamines, Benzodiazepines, Hashish, LSD, Mescaline, Methaqualone, Methylphenidate, Nitrous oxide, PCP, Psilocybin     Comment: [id number]     Sexual activity: Not Currently     Birth control/protection: Male Surgical   Lifestyle     Physical activity     Days per week: Patient refused     Minutes per session: Patient refused     Stress: Not on file   Relationships     Social connections     Talks on phone: Patient refused     Gets together: Patient refused     Attends Orthodoxy service: Patient refused     Active member of club or organization: Patient refused     Attends meetings of clubs or organizations: Patient refused     Relationship status: Patient refused     Intimate partner violence     Fear of current or ex partner: Patient refused     Emotionally abused: Patient refused     Physically abused: Patient refused     Forced sexual activity: Patient refused   Other Topics Concern      Parent/sibling w/ CABG, MI or angioplasty before 65F 55M? No   Social History Narrative     Not on file   He used to smoke but quit in 2003.  He drinks alcohol occasionally.  He was in [place] but recently relocated to [place] and he is going to move in his own apartment tomorrow.  His daughter lives close by.    Physical Exam:  There were no vitals taken for this visit.   Wt Readings from Last 4 Encounters:   01/29/21 86.5 kg (190 lb 12.8 oz)   01/10/21 85 kg (187 lb 8 oz)   08/05/20 89.4 kg (197 lb)   07/30/20 89.4 kg (197 lb)           Constitutional.  Does not seem to be in any acute distress.  Eyes.  No redness or discharge noted.  Respiratory.  Speaking in full sentences.  Breathing seems comfortable without any accessory use of muscles.    Skin.  Visualized his skin does not show any obvious rashes.  Musculoskeletal.  Range of motion for visualized areas is intact.  Neurological.  Alert and oriented x3.  Psychiatric.  Mood, mentation and affect are normal.  Decision making capacity is intact.      The rest of a comprehensive physical examination is deferred due to Public Health Emergency video visit restrictions.      Laboratory/Imaging Studies    Reviewed  5/11/2021  LFTs showed .  AST 49.  5/10/2021.  Chemistry unremarkable.  CBC unremarkable.    CT of the chest showed multiple bilateral pulmonary nodules predominantly involving the lower lobes and more numerous on the left side.  These appear new as compared to the prior CT scan from December 2020.  The largest one in the right lower lobe measures up to 16 mm.  No lymphadenopathy.  No pleural fluid.  Port-A-Cath is in place.    CT abdomen and pelvis 5/10/2021 showed postop changes of ileocecal resection and right hepatectomy with unchanged indeterminate low-attenuation lesions in the residual left lobe as compared to December 2020.  No new liver lesions identified.  2.6 cm enhancing solid mass at the posterior lower pole of the right kidney,  highly concerning for renal cell carcinoma.  Indeterminate 1.7 cm lesion at the upper pole of the left kidney could represent a complex hemorrhagic/proteinaceous cyst or a solid mass, which would also be concerning for renal cell carcinoma.  Small residual or recurrent hernia at the inferior margin of hernia repair mesh in the right mid abdomen. This involves a knuckle of small bowel with no evidence of complication.    ASSESSMENT/PLAN:      He has metastatic colorectal cancer to the liver.  MSI Intact.  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 He was treated with ascending colectomy in June 2014 followed by hepatectomy in August 2014.  He received FOLFOX x2 and FOLFIRI x10 after that.  He had recurrence of the liver metastases in 2016. He got 2 doses of Cetuximab in 2016. He has received multiple liver directed therapy since then the last one was in August 2018.  He also had excision of the right upper quadrant abdominal wall metastasis in December 2016.  On PET scan from October 2018 he had 2 residual metabolically active liver lesions and these were less conspicuous as compared to the previous CT scan.  He also had 2 FDG avid left axillary lymph nodes which likely were due to extravasation of the tracer which was injected in the left arm.    We did a PET scan on 2/22/2019 which showed 2 FDG avid liver lesions which had enlarged since October 2018.  At that point I referred him to Dr. Landis for possibility of liver directed therapy.  At that point patient was not sure whether he want to pursue more liver directed therapy or not.  He eventually decided on repeating his scans after a few months and he had a repeat CT abdomen and pelvis on 6/7/2019.  The CT scan shows overall stable disease.  The exophytic lesion in the inferior pole of the right kidney has continued to slowly increase in size and now measures 3 cm as opposed to 2.2 cm in June 2017.  This is concerning for renal cell  cancer.      Repeat CT CAP from 12/13/19 shows stable disease and liver lesions.  We opted for watchful waiting.    Repeat CT scan shows segment Kelli appears increased in size, measuring approximately 4.2 x 3.9 cm, previously 3.5 x 3.6 cm.   RLL lung nodule is slightly bigger. Otherwise scan is stable.    On scan in May 2021, new lung nodules have appeared.  He was also having some viral upper respiratory tract infection symptoms at that time.    I had recommended to do a PET scan to have a better look at the lung nodules and potentially biopsy of the lung nodule.   He did not get the PET scan and we discussed again the importance of it and now he agrees to get it done in the next few days.  We will try to schedule it at a place of his liking and then I will see him back in follow-up.  If we choose the chemotherapy in the future then I will she will FOLFIRI plus minus Panitumumab.    We did not address the following today.    Kidney lesions.  These seem pretty stable.  He has indeterminate 1.7 cm left superior pole kidney lesion which looks fairly stable and stable 2.6 cm cm heterogeneously enhancing exophytic lesion in the inferior right kidney  This likely represents RCC.  He met with Dr. Chairez and at that time watchful waiting was decided. The kidney lesions seem stable.  Continue to follow with urology.        Discussion regarding health care directive  He tells me that he has this completed.       See me back few days after the PET scan and blood work.    I answered all of his questions to his satisfaction.  He is agreeable and comfortable with the plan.    Patrice Srivastava

## 2021-07-01 NOTE — PROGRESS NOTES
"Solo is a 65 year old who is being evaluated via a billable video visit.      How would you like to obtain your AVS? MyChart     If the video visit is dropped, the invitation should be resent by: Text to cell phone: Atif     Will anyone else be joining your video visit? No          Vitals - Patient Reported  Weight (Patient Reported): 86.6 kg (191 lb)  Height (Patient Reported): 188 cm (6' 2.02\")  BMI (Based on Pt Reported Ht/Wt): 24.51  Pain Score: No Pain (0)    Harvinder Marquez LPN    Video Start Time: 8:00 AM  Video-Visit Details    Type of service:  Video Visit    Video End Time:8:09 AM    Originating Location (pt. Location): Home    Distant Location (provider location):  [facility]     Platform used for Video Visit: Lisandro"

## 2021-07-01 NOTE — PATIENT INSTRUCTIONS
Schedule labs and PET CT next few days- please call to discuss with him options of where the scan/labs could be done    See me a few days after that

## 2021-07-06 ENCOUNTER — TELEPHONE (OUTPATIENT)
Dept: ONCOLOGY | Facility: CLINIC | Age: 66
End: 2021-07-06

## 2021-07-06 NOTE — TELEPHONE ENCOUNTER
7/6/21 - [facility] to call [phone] opt5, opt2 to schedule the following for this week or next week:    1.  PET/CT scan  2.  Labs prior to scan  3.  Video visit with Dr. Srivastava to go over results of scan    -Mickey

## 2021-07-07 ENCOUNTER — TELEPHONE (OUTPATIENT)
Dept: PSYCHIATRY | Facility: CLINIC | Age: 66
End: 2021-07-07

## 2021-07-07 NOTE — TELEPHONE ENCOUNTER
Incoming call from patient reporting he cancelled upcoming appointment. He realized he needs to continue care here in order to receive medications and requests to be rescheduled. Writer unable to view patient on transfer list. Appears he had appointments scheduled with both Dr. Gracia and Dr. Justin Lopez. Will reach out to scheduling for assistance.

## 2021-07-07 NOTE — TELEPHONE ENCOUNTER
Annamarie Díaz Taylor, RN   Caller: Unspecified (Today,  9:08 AM)             Pt has been scheduled for Tx U w/ Dr. Anderson on 07/22.     Please let me know if you need anything further,   Lyric

## 2021-07-07 NOTE — CONFIDENTIAL NOTE
SW called Solo to check on well-being and identify if he wanted to proceed with non-medical diagnostic assessment.    Patient reported that he did not meet with oncology as he is unsure if he wants to continue further treatment. Brief chart review indicates he may have attended appointment but did not complete scans and lab work prior to visit.    Discussed current community supports and provided psychoeducation on supports. Solo notes he was assigned a  after an ambulance ride/hospitalization a few months ago, but did not think it was helpful as he would prefer somebody to talk to. Writer provided education on difference between therapist and /care coordinator. Solo noted that the  suggested [facility], but he is unsure if he wants to attend. Earlier in conversation, he noted feeling lonely/wanting company. Writer encouraged him to think about [place] as an option. Writer will also identify possibly therapy referrals closer to his home and outside of the [facility] system.    Solo noted some fear over attending labs as he has had some difficult experiences with [facility] and going into the buildings reminds him of the negative experiences. Writer wondered if he could get labs and scans done at a different building or different provider. He noted that he had the number for [facility]. Writer encouraged him to call and see if this better met his needs.    Writer will follow up with patient in 2-3 weeks to see if he scheduled scans and followed through with medication management visit.     KIM Gaming, [facility]  [phone]

## 2021-07-19 ENCOUNTER — TELEPHONE (OUTPATIENT)
Dept: PSYCHIATRY | Facility: CLINIC | Age: 66
End: 2021-07-19

## 2021-07-19 NOTE — TELEPHONE ENCOUNTER
Solo Huang  Patient Appointment Cancel Request Pool 2 days ago     Appointment canceled for Los Huang ([id number])  Visit Type: VIDEO VISIT RETURN  Date        Time      Length    Provider                  Department  7/22/2021    8:15 AM  60 mins.  Nigel Ash MD [facility]     Reason for Cancellation: Provider Initiated     Patient Comments: [employer]      ============    Follow up:    - called and left generic VM requesting a return call regarding cancelled appointment.

## 2021-07-21 NOTE — TELEPHONE ENCOUNTER
Called patient and left VM requesting a return call. Explained that writer is following up on cancelled appointment for 7/22 and would like to see about rescheduling. Provided clinic number.     Sent a [employer] message as well.

## 2021-07-22 NOTE — TELEPHONE ENCOUNTER
"Took a call from patient who said that he rescheduled his appointment with Dr. Justin Lopez because he needs to get his medications that he needs to stay awake. He identified the medication as Strattera, which he said was discontinued because of concerns that it was making him manic.     He said that he hit his head on the wall as he was talking on the phone because he does not have a headboard. Later in the conversation, he said \"I hit my head on the wall again. I had a headboard I got from [employer] that doesn't fit my bed and if I put it behind my bed, it will keep banging on the wall when I masturbate and everyone in my apartment complex will hear, as well as all the people watching me on TV.\"    He said \"Everybody knows what I'm doing every minute of the day. I'm sure law enforcement called you guys and told you that I cancelled the appointment. That's why I'm hearing music coming from [place] while I was on hold.\"    He said that he would like to discuss \"What can be done about the situation I'm in - I'm being investigated by the [place] for crimes against mankind.\" He said that there are several [facility], so he is being investigated all over the world.    He stated, \"I think I'm okay but you guys think I'm nuts.   I'm being looked at by everyone on the planet. I'm in my apartment trying to survive the next minute.\"    Other thoughts from Solo are: \"Dr. Smith on maternity leave and works in group home. Dr. Pardo is on my waiting list.\"    \"Rakel Evangelista developed a horny pill for women called Zyprexa  Someone else developed a horny pill for men called Vraylar, but not sure who owns that.\"    \"I was brought into this complex full of Somalis and I think they like me here.\"    \"My vitamin regimen and other medications I take cause me to smell bad and I think the women like that.\"    He asked if writer was Dior, and when I reminded him of my name, he said, \"I can't tell the difference between Somalis who speak " "English, Hindus who speak English, but I can tell when someone can't understand English. I have a good vocabulary which surprises people since I'm from [place].\"    \"My children think I want to kill them. I don't know where they are, or even if they're alive.\"    \"I think people are scared of me because I'm strong and they know I've been hospitalized a few times, both north and south.\"    [facility]    He said \"Am I in court right now because of all the typing you're doing? Are you going to get me committed?\"    He said that he would like the message relayed to Dr. Anderson that he \"survived 10,000 days and am starting another 10,000 days.\" He then terminated the call.    Routed to Dr. Justin Lopez for FYI. Transfer appointment rescheduled to 7/27/21, and he confirmed that it will be a video appointment.       "

## 2021-07-26 ENCOUNTER — TELEPHONE (OUTPATIENT)
Dept: PSYCHIATRY | Facility: CLINIC | Age: 66
End: 2021-07-26

## 2021-07-26 NOTE — TELEPHONE ENCOUNTER
SW left message for Solo Huang. Writer following up on request for phone call regarding community providers.    Solo cancelled upcoming appointment with new resident provider. Chart review indicates that he has scheduled lab appointments for oncology.    Provided contact number and requested call back. Will follow up in 2-3 business days if no response.      Update: Solo returned writer's call. He is trying to find alternative providers in the community as he reports frustration with [facility]. Writer reviewed possible options including [facility], [facility], and [facility] (Dr. Jauregui recently started providing outpatient services). Solo saw Dr. Jauregui while he was a resident and is not interested in this option, but will research [facility] and [facility]. He may also be interested in [facility]. Writer reviewed need to have PCP through [facility] to schedule with psychiatry and also noted that due complicated medical concerns, it may be helpful to have services through the same health care system.     Solo agreed to have writer call him in approximately 2 weeks to check in.    Loreta Sibley, KIM, [facility]  [phone]

## 2021-07-26 NOTE — TELEPHONE ENCOUNTER
"Incoming call from patient notifying writer that he cancelled appointment with Dr. Justin Lopez. He stated, \"a reliable source told me she's transgender and the mayor [facility] which is why I cancelled.\" Patient continued to make odd statements such as:  - I'm in a strange frame of mind, I've been avoiding doctors for awhile   - I don't know where my children live, they think I'm out to get them   - I'm considering moving back to my home in [place]. I want to live in an apartment where I can take baths instead of showers.   - [employer] delivered a chair that electrocutes me     No acute safety concerns reported. Patient states he is waiting for a call from Catherine. Per chart review, he spoke with Catherine STOUT on 7/7. He would like resources for finding a new psychiatrist/hospital. He declined to be rescheduled at our clinic at this time.     Will route to care team as FYI.   "

## 2021-07-30 ENCOUNTER — TELEPHONE (OUTPATIENT)
Dept: PSYCHIATRY | Facility: CLINIC | Age: 66
End: 2021-07-30

## 2021-07-30 DIAGNOSIS — F31.9 BIPOLAR 1 DISORDER (H): ICD-10-CM

## 2021-07-30 RX ORDER — CARIPRAZINE 6 MG/1
CAPSULE, GELATIN COATED ORAL
Qty: 30 CAPSULE | Refills: 0 | OUTPATIENT
Start: 2021-07-30

## 2021-07-30 NOTE — TELEPHONE ENCOUNTER
Received RF request from patient     Last seen: 5/21/21 (urgent add on with Dr. Jauregui)  RTC: 1 month  Cancel: 5 - 7/7, 7/13, 7/22, 7/27, 8/2  No-show: none  Next appt: none scheduled (does not want to schedule)     Medication requested: cariprazine (VRAYLAR) 6 MG CAPS capsule  Directions: Take 1 capsule (6 mg) by mouth daily   Qty: 30  Last Rx written 5/21/21 for 30 ds with 1 rf       Plan per 5/21 virtual visit:    - Continue Vraylar 6mg PO daily   - Continue Depakote 1000mg  PO at bedtime  - discontinue Strattera 10mg daily (stopped in hospital)  - Continue Clonazepam 0.5mg daily PRN for anxiety or sleep  - Continue Benztropine 1mg HS  - hydroxyzine 25mg [id number] PRN for anxiety  - Trazodone 50 mg HS prn     He did not request the other medications listed, but should also be out.     Called patient to follow up on his refill request and cancelled appointment. He said that he cancelled the appointment with Dr. Anderson because he does not know her yet. Discussed with Solo that having the appointment would be a good way to meet her and give her the opportunity to help him. He agreed that this is reasonable and agreed to reschedule the cancelled video visit on Monday, August 2, at 8 am.    Regarding refills, he does not need them right now and can wait until the appointment. He said that he has about 13 days supply left, and just wanted to be proactive in case there was a lag time with the pharmacy mailing the prescriptions.     Routed to Dr. Justin Lopez for FYI.

## 2021-07-30 NOTE — TELEPHONE ENCOUNTER
[facility] Call Center    Phone Message    May a detailed message be left on voicemail: yes     Reason for Call: Medication Refill Request    Has the patient contacted the pharmacy for the refill? Yes   Name of medication being requested: vraylar  Provider who prescribed the medication: Marcia Gracia MD  Pharmacy: [id number] - [place], [address]  Date medication is needed: 10 days    Patient has canceled multiple transfer appointments with Dr. Anderson and is hesitant to reschedule. Patient was informed we typically require an appointment to be scheduled in order to provide refills.       Action Taken: Message routed to:  Other: Macie Watkins RNCC    Travel Screening: Not Applicable

## 2021-08-02 ENCOUNTER — TELEPHONE (OUTPATIENT)
Dept: PSYCHIATRY | Facility: CLINIC | Age: 66
End: 2021-08-02

## 2021-08-02 NOTE — TELEPHONE ENCOUNTER
On August 2, 2021, at 7:35 AM, writer called patient at mobile to confirm Virtual Visit. Writer unable to make contact with patient. Writer left detailed voice message for call back. [phone] left as call back number. Shaun Cortés, EMT

## 2021-08-02 NOTE — TELEPHONE ENCOUNTER
Called Mr. Huang at the time of his appointment and there was no answer. Left voicemail advising patient to call the clinic at [phone] if he needs to reschedule.    Nigel Lopez DO, MPH  PGY-3 Psychiatry Resident

## 2021-08-14 ENCOUNTER — MYC MEDICAL ADVICE (OUTPATIENT)
Dept: PSYCHIATRY | Facility: CLINIC | Age: 66
End: 2021-08-14

## 2021-08-16 NOTE — TELEPHONE ENCOUNTER
Called patient at preferred number (home [phone]) but he did not answer and there was no option to leave a message. Called his mobile ([phone]) and left VM requesting a return call. Provided clinic number. Writer will also send a response to his [facility] message.

## 2021-08-18 NOTE — TELEPHONE ENCOUNTER
Received a return call from patient. He said that he rescheduled his appointment with Dr. Justin Lopez after he missed the one scheduled for 8/2 as this was when he was hospitalized at [facility]. He said that he will try to get hospitalized at [facility] next time, although he does not have any plans of having this happen any time soon. He said that he is feeling better, but he is realizing that he is lonely. With the surge in Covid cases because of the delta variant, his apartment building is requiring the use of masks, and there is no one there he can really socialize with. He said that all the time spent alone at his apartment leads to rumination.  He said that he is okay and feels better, but feels lonely.    Asked him if he would be open to something like day treatment (55+) which might be virtual right now but would potentially be an avenue for support and socialization. He said that he would be open to discussing this at his appointment on 8/27. The appt is entered for in-person, but he would prefer to do a virtual appointment due to the surge in Covid cases due to the delta variant. Sent a message to scheduling requesting the adjustment.    Solo apologized about the message he sent - he said that when he saw the name Dr. Simona Jaime, he thought it was a joke because lid pertains to marijuana in the 70s, and he said something about [place]. He said that he did not think that was a real name. Explained that Dr. Jaime is Dr. Justin Lopez's attending MD and will be supervising his care.     Routed to Dr. Justin Lopez for FYI.

## 2021-08-25 ENCOUNTER — TELEPHONE (OUTPATIENT)
Dept: PSYCHIATRY | Facility: CLINIC | Age: 66
End: 2021-08-25

## 2021-08-25 NOTE — TELEPHONE ENCOUNTER
SW called patient to check in (Solo agreed to check in during previous phone call).    Solo reported that he was inpatient at the beginning of August and had tests indicating that cancer had gone to his lungs. He reports he is not interested in further treatment for the cancer. A cousin who had been diagnosed with colon cancer around the same time as Solo had passed this week and her  is this weekend (in home state, it does not appear he will be able to attend).     He is aware of appointment with Dr. Justin Lopez on Friday and plans to attend virtually. He reports he cancelled his upcoming appt with oncologist, Dr. Srivastava (writer consulted appointment calendar and he has an appt with Dr. Srivastava in September).     Writer encouraged him to follow up with oncologist, even if he decides not to pursue further cancer treatment. Writer reviewed that oncology office may be able to help him access palliative or hospice care, including pain management and other important supports. Solo notes he was connected to a  names Trixie ([phone]) with [facility] through his last hospital stay. Solo gave writer verbal permission to contact both Dr. Srivastava's office (within [facility] system) and Trixie.     Patient reported ambivalence in following through with next steps. Writer worked on identifying current emotions and thoughts attached to newest health updates.     Solo reported he was feeling short of breath and was ready to complete call. He agreed to let writer follow up again and coordinate with providers. Writer encouraged Solo to follow up with oncologist or PCP if shortness of breath continued or got worse.    LAURIE also left message for Trixie, asking to coordinate services and providing contact number.    KIM Gaming, [facility]  [phone]

## 2021-08-26 NOTE — CONFIDENTIAL NOTE
SW received call back from Trixie with [facility].     She explained current role: independent agency that will help individuals (most often seniors) find living options to meet their care needs. Solo was referred for discharge planning through hospital stay at [facility].    Discussed coordination needs. Trixie will let writer know if/when Solo may need support in following up with her and expresses ambivalence with change in living environment. Trixie talked with him yesterday and he initially seemed very excited about assisted living. She received a text later that expressed concern about AL's wifi and privacy options.     KIM Gaming, LICSW  [phone]

## 2021-08-27 ENCOUNTER — VIRTUAL VISIT (OUTPATIENT)
Dept: PSYCHIATRY | Facility: CLINIC | Age: 66
End: 2021-08-27
Attending: PSYCHIATRY & NEUROLOGY
Payer: MEDICARE

## 2021-08-27 ENCOUNTER — TELEPHONE (OUTPATIENT)
Dept: PSYCHIATRY | Facility: CLINIC | Age: 66
End: 2021-08-27

## 2021-08-27 ENCOUNTER — MYC MEDICAL ADVICE (OUTPATIENT)
Dept: PSYCHIATRY | Facility: CLINIC | Age: 66
End: 2021-08-27

## 2021-08-27 DIAGNOSIS — F90.1 ATTENTION DEFICIT HYPERACTIVITY DISORDER (ADHD), PREDOMINANTLY HYPERACTIVE TYPE: Primary | ICD-10-CM

## 2021-08-27 DIAGNOSIS — F31.9 BIPOLAR 1 DISORDER (H): ICD-10-CM

## 2021-08-27 PROCEDURE — 90792 PSYCH DIAG EVAL W/MED SRVCS: CPT | Mod: GC | Performed by: STUDENT IN AN ORGANIZED HEALTH CARE EDUCATION/TRAINING PROGRAM

## 2021-08-27 RX ORDER — BENZTROPINE MESYLATE 1 MG/1
1 TABLET ORAL AT BEDTIME
Qty: 30 TABLET | Refills: 1 | Status: ON HOLD | OUTPATIENT
Start: 2021-08-27 | End: 2023-01-01

## 2021-08-27 RX ORDER — TRAZODONE HYDROCHLORIDE 50 MG/1
50 TABLET, FILM COATED ORAL
Qty: 30 TABLET | Refills: 0 | Status: SHIPPED | OUTPATIENT
Start: 2021-08-27 | End: 2021-09-24

## 2021-08-27 RX ORDER — ATOMOXETINE 10 MG/1
10 CAPSULE ORAL DAILY
Qty: 30 CAPSULE | Refills: 0 | Status: SHIPPED | OUTPATIENT
Start: 2021-08-27 | End: 2022-07-14

## 2021-08-27 RX ORDER — ATOMOXETINE 10 MG/1
10 CAPSULE ORAL DAILY
COMMUNITY
End: 2021-08-27

## 2021-08-27 RX ORDER — DIVALPROEX SODIUM 500 MG/1
1000 TABLET, DELAYED RELEASE ORAL AT BEDTIME
Qty: 60 TABLET | Refills: 1 | Status: SHIPPED | OUTPATIENT
Start: 2021-08-27 | End: 2021-09-24

## 2021-08-27 RX ORDER — CLONAZEPAM 0.5 MG/1
0.5 TABLET ORAL AT BEDTIME
Qty: 30 TABLET | Refills: 0 | Status: SHIPPED | OUTPATIENT
Start: 2021-08-27 | End: 2022-07-14

## 2021-08-27 ASSESSMENT — PATIENT HEALTH QUESTIONNAIRE - PHQ9
SUM OF ALL RESPONSES TO PHQ QUESTIONS 1-9: 4
10. IF YOU CHECKED OFF ANY PROBLEMS, HOW DIFFICULT HAVE THESE PROBLEMS MADE IT FOR YOU TO DO YOUR WORK, TAKE CARE OF THINGS AT HOME, OR GET ALONG WITH OTHER PEOPLE: NOT DIFFICULT AT ALL
SUM OF ALL RESPONSES TO PHQ QUESTIONS 1-9: 4

## 2021-08-27 ASSESSMENT — PAIN SCALES - GENERAL: PAINLEVEL: SEVERE PAIN (6)

## 2021-08-27 NOTE — PATIENT INSTRUCTIONS
**For crisis resources, please see the information at the end of this document**     Patient Education      Thank you for coming to the [facility] [place] [facility].    Lab Testing:  If you had lab testing today and your results are reassuring or normal they will be mailed to you or sent through [employer] within 7 days. If the lab tests need quick action we will call you with the results. The phone number we will call with results is # [phone] (home) . If this is not the best number please call our clinic and change the number.    Medication Refills:  If you need any refills please call your pharmacy and they will contact us. Our fax number for refills is [phone]. Please allow three business for refill processing. If you need to  your refill at a new pharmacy, please contact the new pharmacy directly. The new pharmacy will help you get your medications transferred.     Scheduling:  If you have any concerns about today's visit or wish to schedule another appointment please call our office during normal business hours [phone] (8-5:00 M-F)    Contact Us:  Please call [phone] during business hours (8-5:00 M-F).  If after clinic hours, or on the weekend, please call  [phone].    Financial Assistance [phone]  [place] Billing [phone]  Central Billing Office, [facility]: [phone]  [place] Billing [phone]  Medical Records [phone]  [place] Patient Bill of Rights [email or web address]       MENTAL HEALTH CRISIS NUMBERS:  For a medical emergency please call  911 or go to the nearest ER.     [facility]:   [facility] -[phone]   Crisis Residence [facility] -[phone]   Walk-In Counseling Center [facility] -[phone]   COPE 24/7 [facility] Mobile Team -[phone] (adults)/[phone] (child)  CHILD: [facility] needs assessment  team - [phone]      The Medical Center:   [facility] - [phone]   Walk-in counseling [facility] - [phone]   Walk-in counseling [facility] - [phone]   Crisis Residence [facility] - [phone]  Urgent Care Adult Mental Gzwcjn-[phone] mobile unit/ 24/7 crisis line    National Crisis Numbers:   National Suicide Prevention Lifeline: 2-902-493-TALK (855-359-5719)  Poison Control Center - 9-771-868-0179  [employer]/resources for a list of additional resources (SOS)  Trans Lifeline a hotline for transgender people 1-403.603.9856  [employer] a hotline for LGBT youth 1-080-760-4547  Crisis Text Line: For any crisis 24/7   To: 547203  see www.crisistextline.org  - IF MAKING A CALL FEELS TOO HARD, send a text!         Again thank you for choosing [facility] MENTAL HEALTH & ADDICTION [facility] and please let us know how we can best partner with you to improve you and your family's health.    You may be receiving a survey regarding this appointment. We would love to have your feedback, both positive and negative. The survey is done by an external company, so your answers are anonymous.

## 2021-08-27 NOTE — PROGRESS NOTES
"VIDEO VISIT  Los Huang is a 65 year old patient who is being evaluated via a billable video visit.      The patient has been notified of following:   \"This video visit will be conducted via a call between you and your physician/provider. We have found that certain health care needs can be provided without the need for an in-person physical exam. This service lets us provide the care you need with a video conversation. If a prescription is necessary we can send it directly to your pharmacy. If lab work is needed we can place an order for that and you can then stop by our lab to have the test done at a later time. Insurers are generally covering virtual visits as they would in-office visits so billing should not be different than normal.  If for some reason you do get billed incorrectly, you should contact the billing office to correct it and that number is in the AVS .    Video Conference to be completed via:  Rajan    Patient has given verbal consent for video visit?:  Yes    Patient would prefer that any video invitations be sent by: Send to e-mail at: [email or web address]      How would patient like to obtain AVS?:  [employer]    AVS SmartPhrase [PsychAVS] has been placed in 'Patient Instructions':  Yes    Answers for HPI/ROS submitted by the patient on 8/27/2021  If you checked off any problems, how difficult have these problems made it for you to do your work, take care of things at home, or get along with other people?: Not difficult at all  PHQ9 TOTAL SCORE: 4      "

## 2021-08-27 NOTE — TELEPHONE ENCOUNTER
Received a call from patient who is following up on medication prescriptions and other things discussed earlier today. He said that he still has medications, so he called the pharmacy to make sure that medications sent today don't get filled automatically. He does not want to have too much on hand. Writer suggested that we could add a note to the pharmacy to fill only upon patient request, which Solo agreed with. He said unless there was a medication change (addition, dose change, etc), he believes he is okay for now. He is not sure about trazodone and atmoxetine.    He also wanted to follow up on counseling and housing - he stated that he would like to move as his current residence leaves him feeling isolated and lonely.    Routed to Dr. Justin Lopez and Catherine Sibley, [facility], for FYI.
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
## 2021-08-28 ASSESSMENT — PATIENT HEALTH QUESTIONNAIRE - PHQ9: SUM OF ALL RESPONSES TO PHQ QUESTIONS 1-9: 4

## 2021-09-03 ENCOUNTER — TELEPHONE (OUTPATIENT)
Dept: PSYCHIATRY | Facility: CLINIC | Age: 66
End: 2021-09-03

## 2021-09-03 NOTE — TELEPHONE ENCOUNTER
"----- Message from Payton Haley sent at 9/2/2021 10:10 AM CDT -----  Regarding: Pt would like to speak with you  Pt says he spoke with you last week, and he'd like to talk you again about it - requests call back at [phone]    SW talked briefly with Solo. He reports he is now planning to stay at current home. Writer checked on thoughts behind change in plan. He reports Trixie (housing worker) has not returned his calls. He reported he became \"bored\" and left her a message expressing suspicion that her company was working in his best interests and made reference to \"quadruplet twins.\" Writer briefly worked on identifying alternative reasons why housing person did not call. Writer also provided update that she talked with Trixie and she was very interested in helping Solo.    Writer also asked about request for therapist. Pt appeared ambivalent about therapy, but agreed to have writer call following day.  "

## 2021-09-08 ENCOUNTER — TELEPHONE (OUTPATIENT)
Dept: PSYCHIATRY | Facility: CLINIC | Age: 66
End: 2021-09-08

## 2021-09-08 NOTE — CONFIDENTIAL NOTE
SW left message for Solo. Writer hoping to check in on current status of talking with housing worker. Writer also wished patient a happy upcoming birthday.    Possible therapy recommendations when Solo returns call:    -[facility] [phone]  -[facility] [phone]  -[facility] [phone] x [phone]  -[facility] [phone]    Patient returned call. He reported cancelling upcoming appt with Dr. Anderson. Writer encouraged Solo to make a follow up appt with Dr. Anderson or with a community provider (after he expressed concern/frustration with seeing multiple residents). Writer reviewed medications and noted he appears to have enough of some medications until end of September, end of October for others.     Patient also cancelled oncology appts. He made various statements during phone call regarding possibly going into hospice and not doing anything. Writer encouraged him to follow up with Trixie, who may be able to assist with identifying more supportive housing.    Solo made several inappropriate/non-logical comments during phone call related to women, people of color.     He stated he may call writer to set up an appointment with Dr. Anderson, but wanted to think about this for a few days.    Loreta Sibley, KIM, [facility]  [phone]      Loreta Sibley, KIM, [facility]  [phone]

## 2021-09-09 ENCOUNTER — PATIENT OUTREACH (OUTPATIENT)
Dept: ONCOLOGY | Facility: CLINIC | Age: 66
End: 2021-09-09

## 2021-09-09 NOTE — PROGRESS NOTES
"RN CARE COORDINATION    Outgoing Call:   Spoke with Sool following his cancellation of upcoming Medical Oncology visit via [employer] on 9/8. Solo stated in comments \"death\" as reason for cancellation. Solo stated he cancelled because \"the longer I put off these appointments the longer I seem to live, but I know it won't always be that way.\" Encouraged Solo to consider rescheduling these appointments so we can continue to monitor his health, but reassured him how he moves forward due to any changes with his cancer was up to him. Offered to call Solo on 9/15 to review his decision about rescheduling his appointments. Solo verbalized agreement with this plan.         SHAY Hernandez, RN  RN Care Coordinator  [facility]      "

## 2021-09-15 DIAGNOSIS — F31.9 BIPOLAR 1 DISORDER (H): ICD-10-CM

## 2021-09-16 ENCOUNTER — MYC REFILL (OUTPATIENT)
Dept: PSYCHIATRY | Facility: CLINIC | Age: 66
End: 2021-09-16

## 2021-09-16 DIAGNOSIS — F31.9 BIPOLAR 1 DISORDER (H): ICD-10-CM

## 2021-09-16 RX ORDER — CLONAZEPAM 0.5 MG/1
TABLET ORAL
Qty: 30 TABLET | Refills: 0 | OUTPATIENT
Start: 2021-09-16

## 2021-09-16 RX ORDER — CLONAZEPAM 0.5 MG/1
0.5 TABLET ORAL AT BEDTIME
Qty: 30 TABLET | Refills: 0 | Status: CANCELLED | OUTPATIENT
Start: 2021-09-27

## 2021-09-16 NOTE — TELEPHONE ENCOUNTER
"Received a call from Solo. He said that he can wait on the refill for clonazepam for a few days and just wanted to see if he could get a refill sent today along with the Vraylar that his pharmacy was already sending this afternoon. Writer explained that the pharmacy will not be able to send a refill today for clonazepam as they cannot fill until 27 or 28 days after the last refill for controlled substances. He thought that he had just 5 tabs left, but he was able to find another full bottle on his medicine shelf.     He denies any concerns that needed to be addressed today. He reports that he is okay with waiting until 9/23 to follow up with Dr. Justin Lopez.     Regarding housing, he said that he is no longer interested in moving and would like to stay put where he is. He said \"If I need to have people come in at some point to take care of me, then I'll deal with that.\" He stated that he does not want to be in an assisted living facility and he has not heard from housing people anyway.     He got distracted by a delivery and wanted to terminate the call. A package for the wrong apartment was delivered, and he said that he had to go deliver that before he gets accused of theft. He made some comments about women in his building, and thanked writer for calling.    Routed to Dr. Justin Lopez and Catherine Sibley, [facility] for FYI.  "

## 2021-09-16 NOTE — TELEPHONE ENCOUNTER
Received RF request from patient     Last seen: 8/27/21  RTC: 4-6 weeks  Cancel: none  No-show: none  Next appt: 9/23/21     Medication requested: clonazePAM (KLONOPIN) 0.5 MG tablet  Directions: Take 1 tablet (0.5 mg) by mouth At Bedtime   Qty: 30  Last Rx written 8/27/21  [place] and last [place]illed on 8/30/21, 4/12/21       Plan per 8/27/21 virtual visit:    - Continue Vraylar 6mg PO daily   - Continue Depakote 1000mg  PO at bedtime  - Decrease to Strattera 10mg daily (pt discharged from hospital with 18 mg)  - Continue Clonazepam 0.5mg daily PRN for anxiety or sleep  - Continue Benztropine 1mg HS  - hydroxyzine 25mg [id number] PRN for anxiety  - Trazodone 50 mg HS prn       Medication refill should not be needed yet but pharmacy mails his prescriptions to him so they might need some lead time. Called patient and left VM asking for a return call.     Called the pharmacy and spoke with the pharmacist. She said the prescription can be sent at any time because they will not fill it until 27 days after the last fill.

## 2021-09-16 NOTE — TELEPHONE ENCOUNTER
Please see related encounter - My[facility] Refill dated 9/16/21.    Patient does not need refill today. He found a full bottle in his medicine shelf.
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
## 2021-09-23 ENCOUNTER — TELEPHONE (OUTPATIENT)
Dept: PSYCHIATRY | Facility: CLINIC | Age: 66
End: 2021-09-23

## 2021-09-23 ENCOUNTER — VIRTUAL VISIT (OUTPATIENT)
Dept: PSYCHIATRY | Facility: CLINIC | Age: 66
End: 2021-09-23
Attending: PSYCHIATRY & NEUROLOGY
Payer: MEDICARE

## 2021-09-23 ENCOUNTER — MYC MEDICAL ADVICE (OUTPATIENT)
Dept: PSYCHIATRY | Facility: CLINIC | Age: 66
End: 2021-09-23

## 2021-09-23 DIAGNOSIS — Z51.81 ENCOUNTER FOR THERAPEUTIC DRUG LEVEL MONITORING: Primary | ICD-10-CM

## 2021-09-23 DIAGNOSIS — F31.9 BIPOLAR 1 DISORDER (H): ICD-10-CM

## 2021-09-23 DIAGNOSIS — Z79.899 LONG-TERM USE OF HIGH-RISK MEDICATION: ICD-10-CM

## 2021-09-23 PROCEDURE — 99214 OFFICE O/P EST MOD 30 MIN: CPT | Mod: GC | Performed by: STUDENT IN AN ORGANIZED HEALTH CARE EDUCATION/TRAINING PROGRAM

## 2021-09-23 NOTE — TELEPHONE ENCOUNTER
Nigel Ash MD [place], Victoria, PJ Quijano,     Can you call Solo some time later this afternoon to check in and do a safety screen? He abruptly ended our appointment today while staffing because we were asking questions about manic symptoms and drinking alcohol. He made inappropriate comments to Dr. Jaime and then said he was taking his care elsewhere. I just tried calling both numbers listed but he did not answer.     Some context - The reason we asked about alcohol use is that he was drinking a clear liquid during our visit and told me it was vodka. Later during staffing, he said he was joking and that it was water. He then went on to discuss how women were wicked. He denied any safety concerns this morning. I will put this in my note as well.     We aren't sure if he is hypomanic - he seemed paranoid during the visit as well - or if he was actually joking/saying inappropriate things to get a reaction.     Please let me know if you are able to reach him. If he is willing to make an appointment some time in the next 2 weeks, that would be very helpful as well.     Thanks,   Nigel

## 2021-09-23 NOTE — TELEPHONE ENCOUNTER
On September 23, 2021, at 7:39 AM, writer called patient at mobile to confirm Virtual Visit. Writer unable to make contact with patient. Writer left detailed voice message for call back. [phone] left as call back number. Shaun Cortés, EMT    On September 23, 2021, at 8:03 AM, writer called patient at mobile to confirm Virtual Visit. Writer unable to make contact with patient. Writer emailed and texted links. Shaun Cortés, EMT

## 2021-09-23 NOTE — Clinical Note
Amado Manning  I see that this patient was admitted to inpatient psychiatry with delusional thinking/hypomania.  Let's discuss in our supervision next week.   Simona

## 2021-09-23 NOTE — TELEPHONE ENCOUNTER
"Received a call from patient. He said that he is calling to say that he does not want to receive care from [facility] anymore because he thinks that further contact will get him investigated by  and then he will be \"locked up and they will throw away the keys and I will be forgotten.\" Validated his concerns and tried to assure him that the psychiatry clinic is here to help him.    He talked about why he ended his appointment abruptly - he said that he wanted to show off his glass that he was using which is from the 1800s and was probably used to serve alcohol at bars. He loves the glass because it has rings around it which make it easy to hold. He also wanted to show off his new [employer] (or is it [employer]?) shoes which have no shoelaces and have velcro instead. He loves the shoes because they are comfortable, and he wanted to make a joke that if he got hospitalized, these would be the first shoes that [facility] will not ruin (no shoelaces).     He was distracted by Dr. Anderson being joined by Dr. Jaime and another person he did not know. He said that he did not like that he made a joke about \"wodka\" and the he got all these questions about drinking. He said that it was evident to him that the doctors are scientists and take things literally and cannot take a joke, and he is an artist.    Writer wanted to explore:  - any difficulties he has been having with his   - any issues with walking  - the necessity of having Dr. Jaime staff the visit with Dr. Anderson for insurance reasons  - the importance of taking comments seriously, especially about alcohol, as this would be contraindicated with his medications  - taking some time to think about scheduling a follow up.    However, patient said what he needed to say and did not allow for much space for writer to explore. He then said that he needs to go and if writer wants to talk some more, he would be open to a call. Writer agreed to call back, and asked if " there was a particular time frame tomorrow that might be best if unable to [place] back to him today. He did not respond and terminated the call.    Routed to Dr. Justin Lopez and Dr. Jaime for FYI.

## 2021-09-23 NOTE — TELEPHONE ENCOUNTER
We are sending out links after the first call back.  So we call for rooming but no answer, then we call like 20 mins later to see if they will answer.    In Ketan's doc it says he sent it out via text and email.

## 2021-09-23 NOTE — TELEPHONE ENCOUNTER
Called Solo to check in since he abruptly ended the morning appointment. He did not answer his home phone or mobile number.    Nigel Lopez DO, MPH  PGY-3 Psychiatry Resident

## 2021-09-24 ENCOUNTER — TELEPHONE (OUTPATIENT)
Dept: PSYCHIATRY | Facility: CLINIC | Age: 66
End: 2021-09-24

## 2021-09-24 RX ORDER — DIVALPROEX SODIUM 500 MG/1
1000 TABLET, DELAYED RELEASE ORAL AT BEDTIME
Qty: 60 TABLET | Refills: 1 | Status: SHIPPED | OUTPATIENT
Start: 2021-09-24 | End: 2022-07-14

## 2021-09-24 RX ORDER — TRAZODONE HYDROCHLORIDE 50 MG/1
50 TABLET, FILM COATED ORAL
Qty: 30 TABLET | Refills: 1 | Status: SHIPPED | OUTPATIENT
Start: 2021-09-24

## 2021-09-24 NOTE — TELEPHONE ENCOUNTER
Called patient to discuss plan since previous appointment ended abruptly. Left a detailed voicemail on his cell phone indicating that refills would be sent to his pharmacy, however Strattera would be discontinued at this time due to concern for hypomania and decreased sleep. Validated patient that although he is not concerned about his sleep, there are options available that can be discussed about helping improve sleep. Provided clinic number and asked patient to call to make a follow up appointment. Have also entered labs that were discussed at our appointment.    Nigel Lopez DO, MPH  PGY-3 Psychiatry Resident

## 2021-09-27 NOTE — TELEPHONE ENCOUNTER
Simona Jaime MD Shukla Bajgain, Kanjani, MD; Macie Watkins, RN  Caller: Unspecified (4 days ago,  2:45 PM)  Thank you Nigel for calling the patient today.   If not done yet, let's try to reach out with some appointment options in a week or two.   Perhaps the  staff can help with that too.     Simona       Follow up:    - called patient at preferred number, [phone], but he did not  and there was no option to leave     - called [phone] and the call was redirected to . Left  requesting a return call to check in and possibly scheduled a follow up appointment. Provided clinic number.

## 2021-09-28 ENCOUNTER — PATIENT OUTREACH (OUTPATIENT)
Dept: ONCOLOGY | Facility: CLINIC | Age: 66
End: 2021-09-28

## 2021-09-28 NOTE — PROGRESS NOTES
RN CARE COORDINATION    Outgoing Call:    L/M for Solo following his recent cancellation of his scan and visit with Dr. Srivastava via [employer].  Reviewed that if he prefers, we can delay the scan but we soul still like to talk with him to monitor his health. Encouraged him to reschedule his visit with Dr. Srivastava. Provided contact information and encouraged he call back to discuss.     Will continue to reach out to Solo.                    SHAY Hernandez, RN  RN Care Coordinator  [facility]

## 2021-09-30 ENCOUNTER — TELEPHONE (OUTPATIENT)
Dept: PSYCHIATRY | Facility: CLINIC | Age: 66
End: 2021-09-30

## 2021-09-30 NOTE — CONFIDENTIAL NOTE
Patient called SW to report that he has not heard anything from writer and is not going to be looking for a psychologist. Writer worked on engaging patient and assessing current symptoms and needs. He reports having an appt with Dr. Anderson last week that became intense. During the appointment he told her he had alcohol (vodka?) in his glass, although it does not appear that he did. He declined further discussion today, but agreed to let writer call him in a few weeks.     KIM Gaming, [facility]  [phone]

## 2021-10-01 ENCOUNTER — MYC MEDICAL ADVICE (OUTPATIENT)
Dept: PSYCHIATRY | Facility: CLINIC | Age: 66
End: 2021-10-01

## 2021-10-01 NOTE — TELEPHONE ENCOUNTER
Nigel Ash MD [place], Victoria, RN  Phone Number: [phone]     Amado Quijano,     I think 60 minutes is appropriate. Thank you!     Nigel Richard       Follow up:  - appointment scheduled for October 7 at 8:15 am for a video visit

## 2021-10-05 ENCOUNTER — TELEPHONE (OUTPATIENT)
Dept: ONCOLOGY | Facility: CLINIC | Age: 66
End: 2021-10-05

## 2021-10-05 NOTE — TELEPHONE ENCOUNTER
RN CARE COORDINATION    Outgoing Call:   L/M for Solo following his [employer] message stating he is interested in a referral for hospice. Requested a return call from Solo to discuss hospice care and confirm his desire to transition to hospice. Contact information provided. Awaiting return call.                                        SHAY Hernandez, RN  RN Care Coordinator  [facility]

## 2021-10-07 ENCOUNTER — TELEPHONE (OUTPATIENT)
Dept: PSYCHIATRY | Facility: CLINIC | Age: 66
End: 2021-10-07

## 2021-10-07 NOTE — TELEPHONE ENCOUNTER
Called patient x2 during time of appointment - at both home and mobile numbers. Left voicemail advising to call the  if he is still available at this time or to reschedule for a later date.     Nigel Lopez DO, MPH  PGY-3 Psychiatry Resident

## 2021-10-08 ENCOUNTER — TELEPHONE (OUTPATIENT)
Dept: PSYCHIATRY | Facility: CLINIC | Age: 66
End: 2021-10-08

## 2021-10-08 NOTE — TELEPHONE ENCOUNTER
[facility] Call Center    Phone Message    May a detailed message be left on voicemail:     Reason for Call: Other: Patient Update      Patient called to inform clinic that he is currently inpatient at [place] and the hospital will likely be calling to schedule a follow up with Dr. Anderson.  Patient says he is available for the appointment any time.      Patient is inpatient on a medical unit, and reports that he is being evaluated for liver function due to high enzyme levels.    Action Taken: Message routed to:  Other: Macie Watkins RN    Travel Screening: Not Applicable

## 2021-10-08 NOTE — TELEPHONE ENCOUNTER
Called Solo but call went immediately to . Left message requesting a return call or MyChart message. Writer calling to check in on how he is doing and to see if he has an idea when he might be discharged so we could schedule an appointment. Will also send MyChart message.     Routed to Dr. Justin Lopez and Catherine Sibley, [facility], for FYI.

## 2021-10-10 ENCOUNTER — HEALTH MAINTENANCE LETTER (OUTPATIENT)
Age: 66
End: 2021-10-10

## 2021-10-26 ENCOUNTER — CARE COORDINATION (OUTPATIENT)
Dept: PSYCHIATRY | Facility: CLINIC | Age: 66
End: 2021-10-26

## 2021-10-26 NOTE — PROGRESS NOTES
Nigel Ash MD Labossiere, Laura, RN  Phone Number: [phone]     Amado Schneider,     I spoke with Dr. Koroma last week. She updated me that patient was hospitalized and he wanted his outpatient psychiatrist to be aware of this. Can we reach out to get an official SERA?  I think Dr. Koroma may reach out again.     Nigel Alston             Previous Messages       ----- Message -----   From: Jennie Schwab, RN   Sent: 10/25/2021   8:28 AM CDT   To: Nigel Lopez MD   Subject: FW: Provider Consult Request - Justin Manning,     Just following up on this message. Anything you'd like me to do at this point? I don't see documentation for a SERA.     -Jennie   ----- Message -----   From: Jennie Schwab RN   Sent: 10/21/2021  11:52 AM CDT   To: Jennie Schwab RN   Subject: RE: Provider Consult Request - Justin               ----- Message -----   From: Annamarie Díaz   Sent: 10/21/2021  11:21 AM CDT   To: [facility]   Subject: Provider Consult Request - Justin                 [facility] Call Center     Phone Message     May a detailed message be left on voicemail: yes     Reason for Call: Other: Pt currently inpatient at [facility] geriatric psych unit. Dr. Koroma is requesting to speak with Dr. Anderson to coordinate care - faxing & emailing SERA.     Dr. Koroma's personal cell: [phone]     Action Taken: Message routed to:  Other: nursing     Travel Screening: Not Applicable

## 2021-11-16 ENCOUNTER — MEDICAL CORRESPONDENCE (OUTPATIENT)
Dept: HEALTH INFORMATION MANAGEMENT | Facility: CLINIC | Age: 66
End: 2021-11-16

## 2021-11-22 ENCOUNTER — TELEPHONE (OUTPATIENT)
Dept: PSYCHIATRY | Facility: CLINIC | Age: 66
End: 2021-11-22
Payer: OTHER MISCELLANEOUS

## 2021-11-22 NOTE — TELEPHONE ENCOUNTER
SW left voicemail for Solo. Writer reviewed chart and noted he was out of the hospital. Writer wished hm well and encouraged him to call if he had any needs for  clinic. Provided contact number.    KIM Gaming, [facility]  [phone]

## 2021-11-23 NOTE — TELEPHONE ENCOUNTER
Macie Watkins RN Peters-Carlson, Patricia Marie, [facility]; Nigel Ash MD 23 hours ago (4:45 PM)     VV    Hi! It looks like he has a psychiatrist at [facility] - Ree Craven MD.

## 2022-01-01 ENCOUNTER — LAB REQUISITION (OUTPATIENT)
Dept: LAB | Facility: CLINIC | Age: 67
End: 2022-01-01
Payer: OTHER MISCELLANEOUS

## 2022-01-01 ENCOUNTER — LAB REQUISITION (OUTPATIENT)
Dept: LAB | Facility: CLINIC | Age: 67
End: 2022-01-01
Payer: MEDICARE

## 2022-01-01 DIAGNOSIS — C18.9 MALIGNANT NEOPLASM OF COLON, UNSPECIFIED (H): ICD-10-CM

## 2022-01-01 LAB
ALBUMIN SERPL BCG-MCNC: 4.2 G/DL (ref 3.5–5.2)
ALP SERPL-CCNC: 281 U/L (ref 40–129)
ALT SERPL W P-5'-P-CCNC: 57 U/L (ref 10–50)
ANION GAP SERPL CALCULATED.3IONS-SCNC: 17 MMOL/L (ref 7–15)
AST SERPL W P-5'-P-CCNC: ABNORMAL U/L
BASOPHILS # BLD AUTO: 0 10E3/UL (ref 0–0.2)
BASOPHILS NFR BLD AUTO: 0 %
BILIRUB SERPL-MCNC: 0.8 MG/DL
BUN SERPL-MCNC: 11.5 MG/DL (ref 8–23)
CALCIUM SERPL-MCNC: 9.4 MG/DL (ref 8.8–10.2)
CEA SERPL-MCNC: 1.4 NG/ML
CHLORIDE SERPL-SCNC: 105 MMOL/L (ref 98–107)
CREAT SERPL-MCNC: 0.76 MG/DL (ref 0.67–1.17)
DEPRECATED HCO3 PLAS-SCNC: 17 MMOL/L (ref 22–29)
EOSINOPHIL # BLD AUTO: 0.2 10E3/UL (ref 0–0.7)
EOSINOPHIL NFR BLD AUTO: 4 %
ERYTHROCYTE [DISTWIDTH] IN BLOOD BY AUTOMATED COUNT: 21.4 % (ref 10–15)
GFR SERPL CREATININE-BSD FRML MDRD: >90 ML/MIN/1.73M2
GLUCOSE SERPL-MCNC: 165 MG/DL (ref 70–99)
HCT VFR BLD AUTO: 33.8 % (ref 40–53)
HGB BLD-MCNC: 10.5 G/DL (ref 13.3–17.7)
IMM GRANULOCYTES # BLD: 0 10E3/UL
IMM GRANULOCYTES NFR BLD: 0 %
LYMPHOCYTES # BLD AUTO: 0.6 10E3/UL (ref 0.8–5.3)
LYMPHOCYTES NFR BLD AUTO: 13 %
MCH RBC QN AUTO: 25.8 PG (ref 26.5–33)
MCHC RBC AUTO-ENTMCNC: 31.1 G/DL (ref 31.5–36.5)
MCV RBC AUTO: 83 FL (ref 78–100)
MONOCYTES # BLD AUTO: 0.5 10E3/UL (ref 0–1.3)
MONOCYTES NFR BLD AUTO: 10 %
NEUTROPHILS # BLD AUTO: 3.7 10E3/UL (ref 1.6–8.3)
NEUTROPHILS NFR BLD AUTO: 73 %
NRBC # BLD AUTO: 0 10E3/UL
NRBC BLD AUTO-RTO: 0 /100
PLATELET # BLD AUTO: 126 10E3/UL (ref 150–450)
POTASSIUM SERPL-SCNC: 3.9 MMOL/L (ref 3.4–5.3)
PROT SERPL-MCNC: 7.2 G/DL (ref 6.4–8.3)
RBC # BLD AUTO: 4.07 10E6/UL (ref 4.4–5.9)
SODIUM SERPL-SCNC: 139 MMOL/L (ref 136–145)
WBC # BLD AUTO: 5.1 10E3/UL (ref 4–11)

## 2022-01-01 PROCEDURE — 84155 ASSAY OF PROTEIN SERUM: CPT | Mod: ORL | Performed by: FAMILY MEDICINE

## 2022-01-01 PROCEDURE — P9603 ONE-WAY ALLOW PRORATED MILES: HCPCS | Mod: ORL | Performed by: FAMILY MEDICINE

## 2022-01-01 PROCEDURE — 36415 COLL VENOUS BLD VENIPUNCTURE: CPT | Mod: ORL | Performed by: FAMILY MEDICINE

## 2022-01-01 PROCEDURE — 85025 COMPLETE CBC W/AUTO DIFF WBC: CPT | Mod: ORL | Performed by: FAMILY MEDICINE

## 2022-01-01 PROCEDURE — 82378 CARCINOEMBRYONIC ANTIGEN: CPT | Mod: ORL | Performed by: FAMILY MEDICINE

## 2022-01-01 RX ORDER — BENZOCAINE/MENTHOL 6 MG-10 MG
LOZENGE MUCOUS MEMBRANE 4 TIMES DAILY PRN
OUTPATIENT
Start: 2022-01-01

## 2022-03-22 ENCOUNTER — TELEPHONE (OUTPATIENT)
Dept: PSYCHIATRY | Facility: CLINIC | Age: 67
End: 2022-03-22
Payer: OTHER MISCELLANEOUS

## 2022-03-22 NOTE — TELEPHONE ENCOUNTER
Social Work   Incoming Voicemail  [facility]    Incoming Voicemail From:  Solo Huang    Content of Voicemail:    Calling from [facility]. He is in an assisted living facility, doctors have him in hospice. He has not talked with anyone from [place] since checked out of hospital at [facility] for extended stay around 18th of November. He is requesting call. He is interested in speaking with writer, but is not sure about what. He will fill writer in on what is happening. Provided new phone number, it may be temporary.     Response/Plan:    SW returned call and left voicemail. Writer glad to hear from Solo. Provided contact number and availability (out of office next few days)      Will route to patient's current psychiatric provider(s) as an FYI.   Please call or EPIC message with any questions or concerns.    Loreta Sibley, KIM, LICSW

## 2022-04-06 NOTE — TELEPHONE ENCOUNTER
Addended by: ALONA JOHANSEN on: 10/22/2018 09:17 AM     Modules accepted: Orders     Writer attempted to place follow up call to Solo after no response back. Phone rang multiple times then call ended. Solo is in assisted living/hospice care facility.    KIM Gaming, [facility]  [phone]

## 2022-04-24 ENCOUNTER — LAB REQUISITION (OUTPATIENT)
Dept: LAB | Facility: CLINIC | Age: 67
End: 2022-04-24
Payer: COMMERCIAL

## 2022-04-24 DIAGNOSIS — C18.9 MALIGNANT NEOPLASM OF COLON, UNSPECIFIED (H): ICD-10-CM

## 2022-05-04 ENCOUNTER — LAB REQUISITION (OUTPATIENT)
Dept: LAB | Facility: CLINIC | Age: 67
End: 2022-05-04
Payer: OTHER MISCELLANEOUS

## 2022-05-04 DIAGNOSIS — C18.9 MALIGNANT NEOPLASM OF COLON, UNSPECIFIED (H): ICD-10-CM

## 2022-05-04 LAB
ALBUMIN SERPL-MCNC: 3.7 G/DL (ref 3.5–5)
ALP SERPL-CCNC: 232 U/L (ref 45–120)
ALT SERPL W P-5'-P-CCNC: 36 U/L (ref 0–45)
ANION GAP SERPL CALCULATED.3IONS-SCNC: 11 MMOL/L (ref 5–18)
AST SERPL W P-5'-P-CCNC: 35 U/L (ref 0–40)
BILIRUB SERPL-MCNC: 0.9 MG/DL (ref 0–1)
BUN SERPL-MCNC: 20 MG/DL (ref 8–22)
CALCIUM SERPL-MCNC: 9 MG/DL (ref 8.5–10.5)
CEA SERPL-MCNC: 3.6 NG/ML (ref 0–3)
CHLORIDE BLD-SCNC: 106 MMOL/L (ref 98–107)
CO2 SERPL-SCNC: 24 MMOL/L (ref 22–31)
CREAT SERPL-MCNC: 0.88 MG/DL (ref 0.6–1.3)
GFR SERPL CREATININE-BSD FRML MDRD: ABNORMAL ML/MIN/{1.73_M2}
GLUCOSE BLD-MCNC: 90 MG/DL (ref 70–125)
POTASSIUM BLD-SCNC: 3.7 MMOL/L (ref 3.5–5)
PROT SERPL-MCNC: 6.6 G/DL (ref 6–8)
SODIUM SERPL-SCNC: 141 MMOL/L (ref 136–145)

## 2022-05-04 PROCEDURE — 82378 CARCINOEMBRYONIC ANTIGEN: CPT | Mod: ORL | Performed by: FAMILY MEDICINE

## 2022-05-04 PROCEDURE — P9604 ONE-WAY ALLOW PRORATED TRIP: HCPCS | Mod: ORL | Performed by: FAMILY MEDICINE

## 2022-05-04 PROCEDURE — 80053 COMPREHEN METABOLIC PANEL: CPT | Mod: ORL | Performed by: FAMILY MEDICINE

## 2022-05-04 PROCEDURE — 36415 COLL VENOUS BLD VENIPUNCTURE: CPT | Mod: ORL | Performed by: FAMILY MEDICINE

## 2022-05-21 ENCOUNTER — HEALTH MAINTENANCE LETTER (OUTPATIENT)
Age: 67
End: 2022-05-21

## 2022-06-24 ENCOUNTER — TELEPHONE (OUTPATIENT)
Dept: PSYCHIATRY | Facility: CLINIC | Age: 67
End: 2022-06-24

## 2022-06-24 NOTE — TELEPHONE ENCOUNTER
LAURIE received voicemail from Solo. He does not know if the clinic is still there. He has concern about his current psychiatric care. He is currently at the [facility]. He is wondering if it is possible to return to the clinic, if it is still around and exists.    LAURIE returned call. Writer confirmed clinic still exists and is seeing patients. Solo provided brief update. He was on hospice care but has now been taken off. He was set up with services through [facility] through his assisted living facility. He does not agree with their care. He is now on Invega injection and he believes they increased the injection without telling him.    Writer expressed happiness that he is doing well and he called to check in. Reviewed that [employer] is typically a good company. Wrier encouraged him to give them a try for a few times and if he does not like them, he may be able to return to clinic (last appt was 9/2022). He appeared to agree to try suggestion, noting that [facility] providers are able to come out to him.    KIM Gaming, [facility]  [phone]

## 2022-07-14 ENCOUNTER — LAB REQUISITION (OUTPATIENT)
Dept: LAB | Facility: CLINIC | Age: 67
End: 2022-07-14
Payer: MEDICARE

## 2022-07-14 ENCOUNTER — APPOINTMENT (OUTPATIENT)
Dept: CT IMAGING | Facility: CLINIC | Age: 67
DRG: 432 | End: 2022-07-14
Attending: EMERGENCY MEDICINE
Payer: MEDICARE

## 2022-07-14 ENCOUNTER — HOSPITAL ENCOUNTER (INPATIENT)
Facility: CLINIC | Age: 67
LOS: 4 days | Discharge: NURSING, REHAB OR LONG-TERM CARE | DRG: 432 | End: 2022-07-18
Attending: EMERGENCY MEDICINE | Admitting: INTERNAL MEDICINE
Payer: MEDICARE

## 2022-07-14 DIAGNOSIS — C18.9 METASTASIS FROM COLON CANCER (H): ICD-10-CM

## 2022-07-14 DIAGNOSIS — S00.83XA TRAUMATIC HEMATOMA OF FOREHEAD, INITIAL ENCOUNTER: ICD-10-CM

## 2022-07-14 DIAGNOSIS — K51.00 PANCOLITIS (H): ICD-10-CM

## 2022-07-14 DIAGNOSIS — K64.4 EXTERNAL HEMORRHOIDS: ICD-10-CM

## 2022-07-14 DIAGNOSIS — E87.6 HYPOKALEMIA: Primary | ICD-10-CM

## 2022-07-14 DIAGNOSIS — S09.90XA CLOSED HEAD INJURY, INITIAL ENCOUNTER: ICD-10-CM

## 2022-07-14 DIAGNOSIS — C79.9 METASTASIS FROM COLON CANCER (H): ICD-10-CM

## 2022-07-14 DIAGNOSIS — R19.5 OTHER FECAL ABNORMALITIES: ICD-10-CM
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
## 2022-07-14 DIAGNOSIS — I85.01 BLEEDING ESOPHAGEAL VARICES, UNSPECIFIED ESOPHAGEAL VARICES TYPE (H): ICD-10-CM

## 2022-07-14 DIAGNOSIS — K92.1 GASTROINTESTINAL HEMORRHAGE WITH MELENA: ICD-10-CM

## 2022-07-14 LAB
ABO/RH(D): NORMAL
ABO/RH(D): NORMAL
ALBUMIN SERPL-MCNC: 2.8 G/DL (ref 3.4–5)
ALP SERPL-CCNC: 165 U/L (ref 40–150)
ALT SERPL W P-5'-P-CCNC: 44 U/L (ref 0–70)
ANION GAP SERPL CALCULATED.3IONS-SCNC: 15 MMOL/L (ref 3–14)
ANTIBODY SCREEN: NEGATIVE
APTT PPP: 22 SECONDS (ref 22–38)
AST SERPL W P-5'-P-CCNC: 42 U/L (ref 0–45)
BASOPHILS # BLD AUTO: 0 10E3/UL (ref 0–0.2)
BASOPHILS NFR BLD AUTO: 0 %
BILIRUB SERPL-MCNC: 0.9 MG/DL (ref 0.2–1.3)
BLD PROD TYP BPU: NORMAL
BLOOD COMPONENT TYPE: NORMAL
BUN SERPL-MCNC: 63 MG/DL (ref 7–30)
CALCIUM SERPL-MCNC: 8.2 MG/DL (ref 8.5–10.1)
CHLORIDE BLD-SCNC: 107 MMOL/L (ref 94–109)
CO2 SERPL-SCNC: 16 MMOL/L (ref 20–32)
CODING SYSTEM: NORMAL
CREAT SERPL-MCNC: 0.96 MG/DL (ref 0.52–1.25)
CROSSMATCH: NORMAL
EOSINOPHIL # BLD AUTO: 0 10E3/UL (ref 0–0.7)
EOSINOPHIL NFR BLD AUTO: 0 %
ERYTHROCYTE [DISTWIDTH] IN BLOOD BY AUTOMATED COUNT: 16.1 % (ref 10–15)
GFR SERPL CREATININE-BSD FRML MDRD: ABNORMAL ML/MIN/{1.73_M2}
GLUCOSE BLD-MCNC: 207 MG/DL (ref 70–99)
HCO3 BLDV-SCNC: 16 MMOL/L (ref 21–28)
HCO3 BLDV-SCNC: 20 MMOL/L (ref 21–28)
HCT VFR BLD AUTO: 22.9 % (ref 35–53)
HEMOCCULT STL QL: POSITIVE
HGB BLD-MCNC: 7.1 G/DL (ref 11.7–17.7)
HGB BLD-MCNC: 7.4 G/DL (ref 11.7–17.7)
IMM GRANULOCYTES # BLD: 0.1 10E3/UL
IMM GRANULOCYTES NFR BLD: 1 %
INR PPP: 1.36 (ref 0.85–1.15)
ISSUE DATE AND TIME: NORMAL
LACTATE BLD-SCNC: 2.4 MMOL/L
LACTATE BLD-SCNC: 6.8 MMOL/L
LYMPHOCYTES # BLD AUTO: 1.4 10E3/UL (ref 0.8–5.3)
LYMPHOCYTES NFR BLD AUTO: 8 %
MCH RBC QN AUTO: 30 PG (ref 26.5–33)
MCHC RBC AUTO-ENTMCNC: 32.3 G/DL (ref 31.5–36.5)
MCV RBC AUTO: 93 FL (ref 78–100)
MONOCYTES # BLD AUTO: 1.1 10E3/UL (ref 0–1.3)
MONOCYTES NFR BLD AUTO: 7 %
NEUTROPHILS # BLD AUTO: 14.3 10E3/UL (ref 1.6–8.3)
NEUTROPHILS NFR BLD AUTO: 84 %
NRBC # BLD AUTO: 0 10E3/UL
NRBC BLD AUTO-RTO: 0 /100
PCO2 BLDV: 27 MM HG (ref 40–50)
PCO2 BLDV: 31 MM HG (ref 40–50)
PH BLDV: 7.38 [PH] (ref 7.32–7.43)
PH BLDV: 7.41 [PH] (ref 7.32–7.43)
PLATELET # BLD AUTO: 221 10E3/UL (ref 150–450)
PO2 BLDV: 23 MM HG (ref 25–47)
PO2 BLDV: 34 MM HG (ref 25–47)
POTASSIUM BLD-SCNC: 3.9 MMOL/L (ref 3.4–5.3)
PROT SERPL-MCNC: 5.5 G/DL (ref 6.8–8.8)
RBC # BLD AUTO: 2.47 10E6/UL (ref 3.8–5.9)
SAO2 % BLDV: 39 % (ref 94–100)
SAO2 % BLDV: 68 % (ref 94–100)
SARS-COV-2 RNA RESP QL NAA+PROBE: NEGATIVE
SODIUM SERPL-SCNC: 138 MMOL/L (ref 133–144)
SPECIMEN EXPIRATION DATE: NORMAL
SPECIMEN EXPIRATION DATE: NORMAL
TROPONIN I SERPL HS-MCNC: 15 NG/L
UNIT ABO/RH: NORMAL
UNIT NUMBER: NORMAL
UNIT STATUS: NORMAL
UNIT TYPE ISBT: 6200
UNIT TYPE ISBT: 6200
UNIT TYPE ISBT: 9500
UPPER GI ENDOSCOPY: NORMAL
WBC # BLD AUTO: 17 10E3/UL (ref 4–11)

## 2022-07-14 PROCEDURE — 70450 CT HEAD/BRAIN W/O DYE: CPT | Mod: MA

## 2022-07-14 PROCEDURE — 86923 COMPATIBILITY TEST ELECTRIC: CPT | Performed by: HOSPITALIST

## 2022-07-14 PROCEDURE — 06L38CZ OCCLUSION OF ESOPHAGEAL VEIN WITH EXTRALUMINAL DEVICE, VIA NATURAL OR ARTIFICIAL OPENING ENDOSCOPIC: ICD-10-PCS | Performed by: INTERNAL MEDICINE

## 2022-07-14 PROCEDURE — 85730 THROMBOPLASTIN TIME PARTIAL: CPT | Performed by: EMERGENCY MEDICINE

## 2022-07-14 PROCEDURE — U0003 INFECTIOUS AGENT DETECTION BY NUCLEIC ACID (DNA OR RNA); SEVERE ACUTE RESPIRATORY SYNDROME CORONAVIRUS 2 (SARS-COV-2) (CORONAVIRUS DISEASE [COVID-19]), AMPLIFIED PROBE TECHNIQUE, MAKING USE OF HIGH THROUGHPUT TECHNOLOGIES AS DESCRIBED BY CMS-2020-01-R: HCPCS | Performed by: EMERGENCY MEDICINE

## 2022-07-14 PROCEDURE — 82272 OCCULT BLD FECES 1-3 TESTS: CPT | Performed by: EMERGENCY MEDICINE

## 2022-07-14 PROCEDURE — 72125 CT NECK SPINE W/O DYE: CPT | Mod: MA

## 2022-07-14 PROCEDURE — 86923 COMPATIBILITY TEST ELECTRIC: CPT | Performed by: INTERNAL MEDICINE

## 2022-07-14 PROCEDURE — 80053 COMPREHEN METABOLIC PANEL: CPT | Performed by: EMERGENCY MEDICINE

## 2022-07-14 PROCEDURE — 258N000003 HC RX IP 258 OP 636: Performed by: EMERGENCY MEDICINE

## 2022-07-14 PROCEDURE — 36430 TRANSFUSION BLD/BLD COMPNT: CPT

## 2022-07-14 PROCEDURE — 84484 ASSAY OF TROPONIN QUANT: CPT | Performed by: EMERGENCY MEDICINE

## 2022-07-14 PROCEDURE — 250N000011 HC RX IP 250 OP 636: Performed by: EMERGENCY MEDICINE

## 2022-07-14 PROCEDURE — 250N000011 HC RX IP 250 OP 636

## 2022-07-14 PROCEDURE — 99222 1ST HOSP IP/OBS MODERATE 55: CPT | Performed by: SURGERY

## 2022-07-14 PROCEDURE — G0390 TRAUMA RESPONS W/HOSP CRITI: HCPCS

## 2022-07-14 PROCEDURE — 74177 CT ABD & PELVIS W/CONTRAST: CPT | Mod: MA

## 2022-07-14 PROCEDURE — 86901 BLOOD TYPING SEROLOGIC RH(D): CPT | Performed by: EMERGENCY MEDICINE

## 2022-07-14 PROCEDURE — 82272 OCCULT BLD FECES 1-3 TESTS: CPT | Mod: ORL | Performed by: FAMILY MEDICINE

## 2022-07-14 PROCEDURE — 82040 ASSAY OF SERUM ALBUMIN: CPT | Performed by: EMERGENCY MEDICINE

## 2022-07-14 PROCEDURE — P9016 RBC LEUKOCYTES REDUCED: HCPCS | Performed by: INTERNAL MEDICINE

## 2022-07-14 PROCEDURE — 85018 HEMOGLOBIN: CPT | Performed by: INTERNAL MEDICINE

## 2022-07-14 PROCEDURE — 87040 BLOOD CULTURE FOR BACTERIA: CPT | Performed by: EMERGENCY MEDICINE

## 2022-07-14 PROCEDURE — 99223 1ST HOSP IP/OBS HIGH 75: CPT | Mod: AI | Performed by: INTERNAL MEDICINE

## 2022-07-14 PROCEDURE — 36415 COLL VENOUS BLD VENIPUNCTURE: CPT | Performed by: INTERNAL MEDICINE

## 2022-07-14 PROCEDURE — G0500 MOD SEDAT ENDO SERVICE >5YRS: HCPCS | Performed by: INTERNAL MEDICINE

## 2022-07-14 PROCEDURE — 250N000009 HC RX 250: Performed by: EMERGENCY MEDICINE

## 2022-07-14 PROCEDURE — 85610 PROTHROMBIN TIME: CPT | Performed by: EMERGENCY MEDICINE

## 2022-07-14 PROCEDURE — 36415 COLL VENOUS BLD VENIPUNCTURE: CPT | Performed by: EMERGENCY MEDICINE

## 2022-07-14 PROCEDURE — 96375 TX/PRO/DX INJ NEW DRUG ADDON: CPT

## 2022-07-14 PROCEDURE — 43244 EGD VARICES LIGATION: CPT | Performed by: INTERNAL MEDICINE

## 2022-07-14 PROCEDURE — 250N000011 HC RX IP 250 OP 636: Performed by: INTERNAL MEDICINE

## 2022-07-14 PROCEDURE — 86923 COMPATIBILITY TEST ELECTRIC: CPT | Performed by: EMERGENCY MEDICINE

## 2022-07-14 PROCEDURE — 99291 CRITICAL CARE FIRST HOUR: CPT | Mod: 25

## 2022-07-14 PROCEDURE — 82803 BLOOD GASES ANY COMBINATION: CPT

## 2022-07-14 PROCEDURE — 96365 THER/PROPH/DIAG IV INF INIT: CPT

## 2022-07-14 PROCEDURE — C9803 HOPD COVID-19 SPEC COLLECT: HCPCS

## 2022-07-14 PROCEDURE — C9113 INJ PANTOPRAZOLE SODIUM, VIA: HCPCS | Performed by: EMERGENCY MEDICINE

## 2022-07-14 PROCEDURE — 85025 COMPLETE CBC W/AUTO DIFF WBC: CPT | Performed by: EMERGENCY MEDICINE

## 2022-07-14 PROCEDURE — 120N000013 HC R&B IMCU

## 2022-07-14 PROCEDURE — 93005 ELECTROCARDIOGRAM TRACING: CPT

## 2022-07-14 RX ORDER — DOCUSATE SODIUM 100 MG/1
100 CAPSULE, LIQUID FILLED ORAL 2 TIMES DAILY PRN
COMMUNITY

## 2022-07-14 RX ORDER — LANOLIN ALCOHOL/MO/W.PET/CERES
6 CREAM (GRAM) TOPICAL
COMMUNITY

## 2022-07-14 RX ORDER — TRANEXAMIC ACID 10 MG/ML
1 INJECTION, SOLUTION INTRAVENOUS ONCE
Status: DISCONTINUED | OUTPATIENT
Start: 2022-07-14 | End: 2022-07-14 | Stop reason: CLARIF

## 2022-07-14 RX ORDER — OXYCODONE HYDROCHLORIDE 5 MG/1
5 TABLET ORAL
COMMUNITY
End: 2023-01-01

## 2022-07-14 RX ORDER — OXYCODONE HYDROCHLORIDE 5 MG/1
5 TABLET ORAL EVERY 6 HOURS
Status: ON HOLD | COMMUNITY
End: 2022-07-18

## 2022-07-14 RX ORDER — ONDANSETRON 2 MG/ML
4 INJECTION INTRAMUSCULAR; INTRAVENOUS ONCE
Status: COMPLETED | OUTPATIENT
Start: 2022-07-14 | End: 2022-07-14

## 2022-07-14 RX ORDER — POLYETHYLENE GLYCOL 400 AND PROPYLENE GLYCOL 4; 3 MG/ML; MG/ML
1-2 SOLUTION/ DROPS OPHTHALMIC
COMMUNITY

## 2022-07-14 RX ORDER — NALOXONE HYDROCHLORIDE 0.4 MG/ML
0.2 INJECTION, SOLUTION INTRAMUSCULAR; INTRAVENOUS; SUBCUTANEOUS
Status: DISCONTINUED | OUTPATIENT
Start: 2022-07-14 | End: 2022-07-18 | Stop reason: HOSPADM

## 2022-07-14 RX ORDER — FLUMAZENIL 0.1 MG/ML
0.2 INJECTION, SOLUTION INTRAVENOUS
Status: DISCONTINUED | OUTPATIENT
Start: 2022-07-14 | End: 2022-07-18 | Stop reason: HOSPADM

## 2022-07-14 RX ORDER — NALOXONE HYDROCHLORIDE 0.4 MG/ML
0.4 INJECTION, SOLUTION INTRAMUSCULAR; INTRAVENOUS; SUBCUTANEOUS
Status: DISCONTINUED | OUTPATIENT
Start: 2022-07-14 | End: 2022-07-18 | Stop reason: HOSPADM

## 2022-07-14 RX ORDER — LORAZEPAM 1 MG/1
1 TABLET ORAL EVERY 4 HOURS PRN
COMMUNITY
Start: 2021-11-17 | End: 2023-01-01

## 2022-07-14 RX ORDER — HYDROMORPHONE HCL IN WATER/PF 6 MG/30 ML
0.2 PATIENT CONTROLLED ANALGESIA SYRINGE INTRAVENOUS
Status: DISCONTINUED | OUTPATIENT
Start: 2022-07-14 | End: 2022-07-18 | Stop reason: HOSPADM

## 2022-07-14 RX ORDER — LIDOCAINE 40 MG/G
CREAM TOPICAL
Status: CANCELLED | OUTPATIENT
Start: 2022-07-14

## 2022-07-14 RX ORDER — FENTANYL CITRATE 50 UG/ML
100 INJECTION, SOLUTION INTRAMUSCULAR; INTRAVENOUS ONCE
Status: COMPLETED | OUTPATIENT
Start: 2022-07-14 | End: 2022-07-14

## 2022-07-14 RX ORDER — OCTREOTIDE ACETATE 50 UG/ML
50 INJECTION, SOLUTION INTRAVENOUS; SUBCUTANEOUS ONCE
Status: COMPLETED | OUTPATIENT
Start: 2022-07-14 | End: 2022-07-14

## 2022-07-14 RX ORDER — XYLITOL/YERBA SANTA
1 AEROSOL, SPRAY WITH PUMP (ML) MUCOUS MEMBRANE 2 TIMES DAILY PRN
COMMUNITY
Start: 2021-11-08 | End: 2023-01-01

## 2022-07-14 RX ORDER — OLANZAPINE 5 MG/1
5 TABLET, ORALLY DISINTEGRATING ORAL EVERY 6 HOURS PRN
COMMUNITY
Start: 2021-10-14 | End: 2023-01-01

## 2022-07-14 RX ORDER — DIPHENHYDRAMINE HYDROCHLORIDE 50 MG/ML
25-50 INJECTION INTRAMUSCULAR; INTRAVENOUS
Status: DISCONTINUED | OUTPATIENT
Start: 2022-07-14 | End: 2022-07-14

## 2022-07-14 RX ORDER — ASPIRIN 81 MG
2 TABLET, DELAYED RELEASE (ENTERIC COATED) ORAL 2 TIMES DAILY PRN
COMMUNITY
Start: 2021-11-27

## 2022-07-14 RX ORDER — ONDANSETRON 4 MG/1
4 TABLET, FILM COATED ORAL EVERY 8 HOURS PRN
COMMUNITY
Start: 2021-10-14

## 2022-07-14 RX ORDER — SIMETHICONE 40MG/0.6ML
133 SUSPENSION, DROPS(FINAL DOSAGE FORM)(ML) ORAL
Status: DISCONTINUED | OUTPATIENT
Start: 2022-07-14 | End: 2022-07-14

## 2022-07-14 RX ORDER — IOPAMIDOL 755 MG/ML
95 INJECTION, SOLUTION INTRAVASCULAR ONCE
Status: COMPLETED | OUTPATIENT
Start: 2022-07-14 | End: 2022-07-14

## 2022-07-14 RX ORDER — PIPERACILLIN SODIUM, TAZOBACTAM SODIUM 4; .5 G/20ML; G/20ML
4.5 INJECTION, POWDER, LYOPHILIZED, FOR SOLUTION INTRAVENOUS ONCE
Status: COMPLETED | OUTPATIENT
Start: 2022-07-14 | End: 2022-07-14

## 2022-07-14 RX ORDER — LORAZEPAM 1 MG/1
1 TABLET ORAL 2 TIMES DAILY
COMMUNITY
End: 2023-01-01

## 2022-07-14 RX ORDER — BENZOCAINE/MENTHOL 6 MG-10 MG
LOZENGE MUCOUS MEMBRANE 4 TIMES DAILY PRN
COMMUNITY
Start: 2021-11-18

## 2022-07-14 RX ORDER — ATROPINE SULFATE 0.1 MG/ML
1 INJECTION INTRAVENOUS
Status: DISCONTINUED | OUTPATIENT
Start: 2022-07-14 | End: 2022-07-14

## 2022-07-14 RX ORDER — FUROSEMIDE 40 MG
40 TABLET ORAL PRN
Status: ON HOLD | COMMUNITY
Start: 2022-07-11 | End: 2023-01-01

## 2022-07-14 RX ORDER — POLYETHYLENE GLYCOL 3350 17 G/17G
17 POWDER, FOR SOLUTION ORAL DAILY PRN
COMMUNITY
Start: 2021-10-15 | End: 2023-01-01

## 2022-07-14 RX ORDER — PALIPERIDONE PALMITATE 234 MG/1.5ML
234 INJECTION INTRAMUSCULAR
COMMUNITY
Start: 2022-06-19

## 2022-07-14 RX ORDER — FENTANYL CITRATE 50 UG/ML
50-100 INJECTION, SOLUTION INTRAMUSCULAR; INTRAVENOUS EVERY 5 MIN PRN
Status: DISCONTINUED | OUTPATIENT
Start: 2022-07-14 | End: 2022-07-14

## 2022-07-14 RX ORDER — EPINEPHRINE 1 MG/ML
0.1 INJECTION, SOLUTION, CONCENTRATE INTRAVENOUS
Status: DISCONTINUED | OUTPATIENT
Start: 2022-07-14 | End: 2022-07-14

## 2022-07-14 RX ORDER — CYCLOBENZAPRINE HCL 10 MG
10 TABLET ORAL 2 TIMES DAILY PRN
COMMUNITY
Start: 2022-06-15

## 2022-07-14 RX ADMIN — PIPERACILLIN AND TAZOBACTAM 4.5 G: 4; .5 INJECTION, POWDER, FOR SOLUTION INTRAVENOUS at 20:04

## 2022-07-14 RX ADMIN — MIDAZOLAM 3 MG: 1 INJECTION INTRAMUSCULAR; INTRAVENOUS at 19:48

## 2022-07-14 RX ADMIN — VANCOMYCIN HYDROCHLORIDE 1750 MG: 5 INJECTION, POWDER, LYOPHILIZED, FOR SOLUTION INTRAVENOUS at 20:34

## 2022-07-14 RX ADMIN — OCTREOTIDE ACETATE 50 MCG: 50 INJECTION, SOLUTION INTRAVENOUS; SUBCUTANEOUS at 20:18

## 2022-07-14 RX ADMIN — ONDANSETRON 4 MG: 2 INJECTION INTRAMUSCULAR; INTRAVENOUS at 20:40

## 2022-07-14 RX ADMIN — OCTREOTIDE ACETATE 50 MCG/HR: 200 INJECTION, SOLUTION INTRAVENOUS; SUBCUTANEOUS at 20:31

## 2022-07-14 RX ADMIN — PANTOPRAZOLE SODIUM 80 MG: 40 INJECTION, POWDER, FOR SOLUTION INTRAVENOUS at 19:57

## 2022-07-14 RX ADMIN — FENTANYL CITRATE 100 MCG: 50 INJECTION, SOLUTION INTRAMUSCULAR; INTRAVENOUS at 19:37

## 2022-07-14 RX ADMIN — SODIUM CHLORIDE 69 ML: 900 INJECTION INTRAVENOUS at 19:10

## 2022-07-14 RX ADMIN — IOPAMIDOL 95 ML: 755 INJECTION, SOLUTION INTRAVENOUS at 19:10

## 2022-07-14 ASSESSMENT — ACTIVITIES OF DAILY LIVING (ADL)
ADLS_ACUITY_SCORE: 37
ADLS_ACUITY_SCORE: 37

## 2022-07-14 NOTE — ED TRIAGE NOTES
Pt presents by [facility] from care facility after having an unwitnessed fall in the shower. Staff reports pt has black and tarry stool and pt had texted his daughter earlier today stating he had blood in the rectum. Pt is slow to respond on arrival, pale, diaphoretic and tachycardic/hypotensive. Pt has hematoma present to L forehead. Pt has hx of colon CA with mets to the liver and lungs. Daughter at bedside.   Pt was given 324 ASA, 2 SL nitroglycerin and 8 mg zofran by EMS for CP and SOB     Triage Assessment     Row Name 07/14/22 1820       Triage Assessment (Adult)    Airway WDL WDL       Respiratory WDL    Respiratory WDL WDL       Skin Circulation/Temperature WDL    Skin Circulation/Temperature WDL X  pale, diaphoretic        Cardiac WDL    Cardiac WDL X  tachycardic, hypotensive       Cognitive/Neuro/Behavioral WDL    Cognitive/Neuro/Behavioral WDL X  slow to respond, hematoma to R forehead       Sondra Coma Scale    Best Eye Response 4-->(E4) spontaneous    Best Motor Response 6-->(M6) obeys commands    Best Verbal Response 4-->(V4) confused    Sondra Coma Scale Score 14

## 2022-07-14 NOTE — ED PROVIDER NOTES
History   Chief Complaint:  Fall, Chest Pain, and Shortness of Breath     History limited by: altered mental status.      Los Huang is a 66 year old adult with history of metastatic colon cancer who presents via EMS from assisted living with unwitnessed fall, shortness of breath, and melena. Staff found patient down in the shower today and sustained with a hematoma above his left eyebrow. Staff noted tarry stool and daughter confirmed it. Patient has a history of metastatic colon cancer liver and is not undergoing chemo or radiation. No blood thinners. Per EMS, patient was tachy at 135 and hypotensive at 75/40. EMS gave him a full dose Aspirin and 2 nitroglycerin. No fever noted today. Patient did not eat lunch and he feels hungry. History limited by altered mental status. Patient is DNR/DNI.    Review of Systems   Unable to perform ROS: Mental status change     Allergies:  Animal Dander    Medications:  atomoxetine (STRATTERA) 10 MG capsule  benztropine (COGENTIN) 1 MG tablet  calcium polycarbophil (FIBERCON) 625 MG tablet  cariprazine (VRAYLAR) 6 MG CAPS capsule  clonazePAM (KLONOPIN) 0.5 MG tablet  divalproex sodium delayed-release (DEPAKOTE) 500 MG DR tablet  docusate sodium (COLACE) 100 MG capsule  hydrOXYzine (ATARAX) 25 MG tablet  multivitamin (CENTRUM SILVER) tablet  simethicone (MYLICON) 80 MG chewable tablet  traZODone (DESYREL) 50 MG tablet    Past Medical History:     Metastatic colon cancer to liver  Bipolar 1 disorder  ADHD  Post-traumatic osteoarthritis of left shoulder  Polysubstance dependence  Mood disorder with psychosis  Anxiety  Schizoaffective disorder  Delusion  Paranoia   Jaundice  COVID 19  Pulmonary nodules    Past Surgical History:    Abdomen surgery  Cholecystectomy  Colonoscopy  ERCP w/ sphicterotomy  IR SIRT  IR visceral angiogram  IR embolization  Ortho surgery    Family History:    Sister: osteoporosis, thyroid disease, anxiety, substance abuse  Parents: mental  "illness    Social History:  Presents with daughter via EMS.    Physical Exam     Patient Vitals for the past 24 hrs:   BP Temp Temp src Pulse Resp SpO2 Height Weight   07/14/22 2355 -- -- -- -- 12 -- -- --   07/14/22 2350 108/67 -- -- 101 10 100 % -- --   07/14/22 2345 90/52 98.5  F (36.9  C) Oral 96 14 100 % -- --   07/14/22 2100 104/67 -- -- 105 13 100 % -- --   07/14/22 2045 104/68 -- -- 106 16 100 % -- --   07/14/22 2030 112/72 -- -- 109 16 100 % -- --   07/14/22 2000 92/56 -- -- 112 14 100 % -- --   07/14/22 1945 90/54 -- -- 116 13 100 % -- --   07/14/22 1940 104/62 -- -- 113 16 100 % -- --   07/14/22 1935 104/58 -- -- 115 14 100 % -- --   07/14/22 1925 109/59 -- -- 117 10 100 % -- --   07/14/22 1902 -- -- -- -- -- -- 1.88 m (6' 2\") 80.3 kg (177 lb)   07/14/22 1900 (!) 86/58 -- -- (!) 123 (!) 121 -- -- --   07/14/22 1859 93/58 -- -- (!) 124 (!) 64 -- -- --   07/14/22 1850 (!) 87/57 -- -- (!) 121 (!) 121 -- -- --   07/14/22 1845 98/52 -- -- (!) 122 (!) 117 99 % -- --   07/14/22 1840 90/53 -- -- 120 23 93 % -- --   07/14/22 1839 99/57 -- -- 120 18 (!) 86 % -- --   07/14/22 1827 -- -- -- (!) 124 21 93 % -- --   07/14/22 1826 (!) 73/45 97.9  F (36.6  C) Oral (!) 124 13 93 % -- --   07/14/22 1819 (!) 67/39 -- -- (!) 135 22 95 % -- --   07/14/22 1817 (!) 76/42 -- -- (!) 137 12 96 % -- --   07/14/22 1816 (!) 67/39 -- -- (!) 135 15 96 % -- --     Physical Exam  General: Alert, appears frail and elderly, unwell. Cooperative.   HEENT:  Head:  Hematoma to left forehead.   Ears:  External ears are normal  Mouth/Throat:  Oropharynx is without erythema or exudate and mucous membranes are dry.   Eyes:   Conjunctivae normal and EOM are normal. No scleral icterus.  CV:  Tachycardic rate, regular rhythm, normal heart sounds and radial pulses are 2+ and symmetric.  No murmur.  Resp:  Breath sounds are clear bilaterally    Non-labored, no retractions or accessory muscle use  GI:  Abdomen is soft, no distension, no tenderness. No " rebound or guarding.  No CVA tenderness bilaterally  Rectal: There are black stools to the rectum.  No hemorrhoids or anal fissures.   MS:  Normal range of motion. No edema.    Back atraumatic.    No midline cervical, thoracic, or lumbar tenderness  Skin:  Warm and dry.  Hematoma to the left forehead.   Neuro: Alert. Globally weak.  GCS: 15  Psych:  Normal mood and affect.    Emergency Department Course   ECG  ECG taken at 1821, ECG read at 1830  Sinus tachycardia.   Possible left atrial enlargement.  Left anterior fascicular block.  Inferior infarct, age undetermined.  Cannot rule out anterior infarct, age undetermined.   No significant changes as compared to prior, dated 6/29/20.  Rate 130 bpm. MS interval 152 ms. QRS duration 90 ms. QT/QTc 308/453 ms. P-R-T axes 68 -85 62.     Imaging:  CT Chest/Abdomen/Pelvis w Contrast   Final Result   IMPRESSION:   1.  Postsurgical changes of right hemicolectomy with pancolitis in the residual colon, consider C. difficile. No evidence of active gastrointestinal bleeding on this single phase exam.   2.  Upper abdominal peritoneal and pulmonary metastases as above.   3.  No evidence of acute trauma in the chest, abdomen or pelvis.   4.  Small right pleural effusion.   5.  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 Consider attention on follow-up imaging.   6.  Prior right hepatectomy with cirrhotic morphology of the left hepatic lobe. Mild splenomegaly, could indicate portal hypertension.      CT Cervical Spine w/o Contrast   Final Result   IMPRESSION:   1.  No acute fracture or posttraumatic subluxation of the cervical spine.   2.  Multilevel degenerative changes, as described.   3.  Multiple thyroid nodules, largest on the right measuring over 15 mm. Recommend follow-up thyroid ultrasound.      CT Head w/o Contrast   Final Result   IMPRESSION:   1.  No definite CT findings of acute intracranial process.   2.  Ovoid  hyperdense nodule along the anterior aspect of the sella likely associated with the pituitary gland measuring 7-8 mm. In retrospect, there was probably a subtle lesion in this area on previous MRI brain of 12/17/2020. Because this lesion is    hyperdense, it could represent a hyperdense solid lesion or possibly a hemorrhagic or proteinaceous lesion. MRI of the brain without and with contrast (dynamic pituitary protocol) is recommended in follow-up for further characterization. Consider    correlation with pituitary labs to assess for the possibility of a functioning pituitary adenoma.   3.  Mild generalized brain parenchymal volume loss.   4.  Small left anterior frontal scalp hematoma without underlying calvarial fracture.        Report per radiology    Laboratory:  Labs Ordered and Resulted from Time of ED Arrival to Time of ED Departure   COMPREHENSIVE METABOLIC PANEL - Abnormal       Result Value    Sodium 138      Potassium 3.9      Chloride 107      Carbon Dioxide (CO2) 16 (*)     Anion Gap 15 (*)     Urea Nitrogen 63 (*)     Creatinine 0.96      Calcium 8.2 (*)     Glucose 207 (*)     Alkaline Phosphatase 165 (*)     AST 42      ALT 44      Protein Total 5.5 (*)     Albumin 2.8 (*)     Bilirubin Total 0.9      GFR Estimate       OCCULT BLOOD STOOL - Abnormal    Occult Blood Positive (*)    INR - Abnormal    INR 1.36 (*)    CBC WITH PLATELETS AND DIFFERENTIAL - Abnormal    WBC Count 17.0 (*)     RBC Count 2.47 (*)     Hemoglobin 7.4 (*)     Hematocrit 22.9 (*)     MCV 93      MCH 30.0      MCHC 32.3      RDW 16.1 (*)     Platelet Count 221      % Neutrophils 84      % Lymphocytes 8      % Monocytes 7      % Eosinophils 0      % Basophils 0      % Immature Granulocytes 1      NRBCs per 100 WBC 0      Absolute Neutrophils 14.3 (*)     Absolute Lymphocytes 1.4      Absolute Monocytes 1.1      Absolute Eosinophils 0.0      Absolute Basophils 0.0      Absolute Immature Granulocytes 0.1      Absolute NRBCs 0.0      ISTAT GASES LACTATE VENOUS POCT - Abnormal    Lactic Acid POCT 6.8 (*)     Bicarbonate Venous POCT 16 (*)     O2 Sat, Venous POCT 39 (*)     pCO2V Venous POCT 27 (*)     pH Venous POCT 7.38      pO2 Venous POCT 23 (*)    ISTAT GASES LACTATE VENOUS POCT - Abnormal    Lactic Acid POCT 2.4 (*)     Bicarbonate Venous POCT 20 (*)     O2 Sat, Venous POCT 68 (*)     pCO2V Venous POCT 31 (*)     pH Venous POCT 7.41      pO2 Venous POCT 34     HEMOGLOBIN - Abnormal    Hemoglobin 7.1 (*)    PARTIAL THROMBOPLASTIN TIME - Normal    aPTT 22     TROPONIN I - Normal    Troponin I High Sensitivity 15     COVID-19 VIRUS (CORONAVIRUS) BY PCR - Normal    SARS CoV2 PCR Negative     ROUTINE UA WITH MICROSCOPIC   HEMOGLOBIN   TYPE AND SCREEN, ADULT    ABO/RH(D) A POS      Antibody Screen Negative      SPECIMEN EXPIRATION DATE 20220717235900     ABO AND RH    ABO/RH(D) A POS      SPECIMEN EXPIRATION DATE [id number]     PREPARE RED BLOOD CELLS (UNIT)    CROSSMATCH Compatible      UNIT ABO/RH A Pos      Unit Number [id number]      Unit Status Ready      Blood Component Type Red Blood Cells      Product Code Y6822T83      CODING SYSTEM [id number]      UNIT TYPE ISBT 6200     PREPARE RED BLOOD CELLS (UNIT)    CROSSMATCH Compatible      UNIT ABO/RH A Pos      Unit Number [id number]      Unit Status Ready      Blood Component Type Red Blood Cells      Product Code H3361V07      CODING SYSTEM [id number]      UNIT TYPE ISBT 6200     PREPARE RED BLOOD CELLS (UNIT)    CROSSMATCH Compatible      UNIT ABO/RH O Neg      Unit Number [id number]      Unit Status Issued      Blood Component Type Red Blood Cells      Product Code F6258M99      CODING SYSTEM [id number]      UNIT TYPE ISBT 9500      ISSUE DATE AND TIME [id number]     PREPARE RED BLOOD CELLS (UNIT)   URINE CULTURE   BLOOD CULTURE   BLOOD CULTURE   ABO/RH TYPE AND SCREEN      Procedures    Emergency Department Course:     Reviewed:  I reviewed nursing notes, vitals, past medical  history, Care Everywhere and [facility]    Assessments:  1826 I obtained history and examined the patient as noted above.   1830 Trauma I activation.  1830 I ordered blood transfusion.  1835 Lactate 6.8.  1840 General surgeon evaluated patient at the bedside.     I rechecked the patient and explained findings.     Consults:  1845 I paged Dr. Pedroza in GI.   1850 I spoke to Dr. Pedroza.   I spoke with Dr. Maurer of the hospitalist service regarding patient's presentation, findings, and plan of care.    Interventions:  Medications   0.9% sodium chloride BOLUS (has no administration in time range)   flumazenil (ROMAZICON) injection 0.2 mg (has no administration in time range)   naloxone (NARCAN) injection 0.2 mg (has no administration in time range)     Or   naloxone (NARCAN) injection 0.4 mg (has no administration in time range)     Or   naloxone (NARCAN) injection 0.2 mg (has no administration in time range)     Or   naloxone (NARCAN) injection 0.4 mg (has no administration in time range)   melatonin tablet 1 mg (has no administration in time range)   sodium chloride 0.9% infusion ( Intravenous New Bag 7/15/22 0214)   HYDROmorphone (DILAUDID) injection 0.2 mg (has no administration in time range)   ondansetron (ZOFRAN ODT) ODT tab 4 mg (has no administration in time range)     Or   ondansetron (ZOFRAN) injection 4 mg (has no administration in time range)   prochlorperazine (COMPAZINE) injection 5 mg (has no administration in time range)     Or   prochlorperazine (COMPAZINE) tablet 5 mg (has no administration in time range)     Or   prochlorperazine (COMPAZINE) suppository 12.5 mg (has no administration in time range)   piperacillin-tazobactam (ZOSYN) 3.375 g vial to attach to  mL bag (3.375 g Intravenous New Bag 7/15/22 0218)   octreotide (sandoSTATIN) 1,250 mcg in D5W 250 mL (50 mcg/hr Intravenous Rate/Dose Verify 7/15/22 0143)   lidocaine 1 % 0.1-1 mL (has no administration in time range)   lidocaine (LMX4) cream  (has no administration in time range)   sodium chloride (PF) 0.9% PF flush 3 mL (3 mLs Intracatheter Given 7/15/22 0221)   sodium chloride (PF) 0.9% PF flush 3 mL (has no administration in time range)   nitroGLYcerin (NITROSTAT) sublingual tablet 0.4 mg (has no administration in time range)   pantoprazole (PROTONIX) IV push injection 80 mg (80 mg Intravenous Given 7/14/22 1957)   Saline Flush - CT (69 mLs Intravenous Given 7/14/22 1910)   iopamidol (ISOVUE-370) solution 95 mL (95 mLs Intravenous Given 7/14/22 1910)   piperacillin-tazobactam (ZOSYN) 4.5 g vial to attach to  mL bag (0 g Intravenous Stopped 7/14/22 2036)   vancomycin 1750 mg in 0.9% NaCl 500 ml intermittent infusion 1,750 mg (1,750 mg Intravenous Given 7/14/22 2034)   fentaNYL (PF) (SUBLIMAZE) injection 100 mcg (100 mcg Intravenous Given 7/14/22 1937)   midazolam (VERSED) injection 4 mg (3 mg Intravenous Given 7/14/22 1948)   octreotide (sandoSTATIN) injection 50 mcg (50 mcg Intravenous Given 7/14/22 2018)   ondansetron (ZOFRAN) injection 4 mg (4 mg Intravenous Given 7/14/22 2040)     Disposition:  The patient was admitted to the hospital under the care of Dr. Maurer.     Impression & Plan     CMS Diagnoses:   The patient has signs of septic shock as evidenced by:  1. Presence of Sepsis, AND  2. Lactic Acidosis with value greater than or equal to 4    Time septic shock diagnosis confirmed = 6.8  07/14/22   as this was the time when Lactate was resulted and the level was greater than or equal to 4    3 Hour Septic Shock Bundle Completion:  1. Initial Lactic Acid Result:   Recent Labs   Lab Test 07/14/22 2107 07/14/22  1833   LACT 2.4* 6.8*     2. Blood Cultures before Antibiotics: Yes  3. Broad Spectrum Antibiotics Administered:  yes       Anti-infectives (From admission through now)    Start     Dose/Rate Route Frequency Ordered Stop    07/14/22 1910  vancomycin 1750 mg in 0.9% NaCl 500 ml intermittent infusion 1,750 mg         1,750 mg  over 2  "Hours Intravenous ONCE 07/14/22 1908 07/14/22 2234    07/14/22 1855  piperacillin-tazobactam (ZOSYN) 4.5 g vial to attach to  mL bag        Note to Pharmacy: For [facility], [facility] and [facility]: For Zosyn-naive patients, use the \"Zosyn initial dose + extended infusion\" order panel.    4.5 g  over 30 Minutes Intravenous ONCE 07/14/22 1852 07/14/22 2036          4. IF 30 mL/kg bolus criteria met based on:  -Lactate > 4  OR  -Initial Hypotension:  Definition:  2 low BP readings (SBP <90, MAP <65, or decrease > 40 from baseline due to infection) within 3 hrs of each other during the time period of 6 hrs before and 3 hrs  after time zero  THEN: Fluid volume administered in ED:  Full 30 mL/kg bolus intentionally NOT administered to this patient due to blood pressure responded to a lesser amount of fluid and plan is to administer 2000 ml of IV fluids    BMI Readings from Last 1 Encounters:   07/14/22 22.73 kg/m      30 mL/kg fluids based on weight: 2,410 mL  30 mL/kg fluids based on IBW (must be >= 60 inches tall): Patient ideal weight not available.  Septic Shock reassessment:  1. Repeat Lactic Acid Level: 2.4  2. MAP>65 after initial IVF bolus, will continue to monitor fluid status and vital signs  I attest to having performed a repeat sepsis exam and assessment of perfusion at 2100 and the results demonstrate improved perfusion.    Trauma:  Level of trauma activation: Full  C-collar and immobilization: not indicated, cleared.  CSpine Clearance: C spine cleared clinically  GCS at arrival: 14  GCS at disposition: unchanged  Full Primary and Secondary survey with appropriate immobilization of spine completed in exam section.  Consults prior to admission or transfer: general surgery (Dr. Almendarez), Gastroenterology (Dr. Pedroza)  Procedures done in the ED: none    Medical Decision Making:  Patient is a 66-year-old male with a complex past medical history pertinent for metastatic colon cancer, bipolar, schizoaffective disorder, " polysubstance dependence who presents with weakness, melena, and unwitnessed fall with a hematoma to the left forehead.  Patient initially was severely hypotensive with initial systolics in the 70s.  I did activate a full trauma activation due to unclear etiology of hypotension whether it could be related to traumatic injury versus acute GI hemorrhage versus septic shock.  Performed parallel work-up regarding potential GI hemorrhage and septic shock in addition to the trauma evaluation.  Patient was sent for CT imaging of the head, cervical spine, and chest abdomen and pelvis.  Thankfully CT imaging of the head showed no acute intracranial processes.  There was a incidental finding of a ovoid hyperdense nodule in the anterior aspect of the sella.  There is also a forehead hematoma.  CT image of the cervical spine unremarkable for acute fracture or dislocation.  Lastly CT imaging of the chest abdomen pelvis showed postsurgical changes of the right hemicolectomy with pancolitis.  Patient also found to have relatively diffuse metastases.  Patient's initial hemoglobin returned concerning at 7.4 particularly in the setting of hypotension.  I contacted Dr. Pedroza of gastroenterology and he agreed to consult on the patient in the emergency department with plans for emergent endoscopy.  Please see his consultation note regarding emergent endoscopy and diagnosis of varices which were banded.  Patient received Protonix, octreotide bolus and subsequent drip.  We will continue to monitor closely for additional hemoglobin drops in next 12 hours.  He was transfused 1 unit of blood prior to endoscopy.  He also received 2 L of IV fluids which improved his initial hypotension.  Solo also had a significant leukocytosis and elevated lactate on arrival.  Whether this could represent septic shock challenging to interpret amongst the other ongoing abnormalities.  I did provide broad-spectrum antibiotics with Zosyn and vancomycin with a  unclear initial presentation.  A repeat lactate was obtained showing significant clearance from that initial severe elevation.  Blood cultures were obtained prior to antibiotic administration.  No evidence of septic etiology found on CT imaging tonight.  Although he has no urinary complaints still pending urinalysis at time of admission.  Patient remains critically ill but is stabilized after emergent endoscopy.  He will require admission to the hospital for further management of suspected GI hemorrhage with melena secondary to varices, possible septic shock, and unwitnessed fall with forehead hematoma.  Patient care discussed with Dr. Maurer who agreed to admission.     Critical Care time was 65 minutes for this patient excluding procedures.    Diagnosis:    ICD-10-CM    1. Gastrointestinal hemorrhage with melena  K92.1    2. Fall, initial encounter  W19.XXXA    3. Closed head injury, initial encounter  S09.90XA    4. Traumatic hematoma of forehead, initial encounter  S00.83XA    5. Metastasis from colon cancer (H)  C79.9     C18.9    6. Bleeding esophageal varices, unspecified esophageal varices type (H)  I85.01    7. Pancolitis (H)  K51.00      Scribe Disclosure:  I, Roque Fields, am serving as a scribe at 6:29 PM on 7/14/2022 to document services personally performed by Mirza Ortiz MD based on my observations and the provider's statements to me.          Mirza Ortiz MD  07/15/22 0225       Mirza Ortiz MD  07/15/22 0226

## 2022-07-15 LAB
ALBUMIN SERPL-MCNC: 2.5 G/DL (ref 3.4–5)
ALBUMIN UR-MCNC: NEGATIVE MG/DL
ALP SERPL-CCNC: 129 U/L (ref 40–150)
ALT SERPL W P-5'-P-CCNC: 38 U/L (ref 0–70)
AMMONIA PLAS-SCNC: 97 UMOL/L (ref 10–50)
ANION GAP SERPL CALCULATED.3IONS-SCNC: 6 MMOL/L (ref 3–14)
APPEARANCE UR: CLEAR
AST SERPL W P-5'-P-CCNC: 39 U/L (ref 0–45)
ATRIAL RATE - MUSE: 130 BPM
BILIRUB SERPL-MCNC: 0.7 MG/DL (ref 0.2–1.3)
BILIRUB UR QL STRIP: NEGATIVE
BLD PROD TYP BPU: NORMAL
BLOOD COMPONENT TYPE: NORMAL
BUN SERPL-MCNC: 59 MG/DL (ref 7–30)
CALCIUM SERPL-MCNC: 7.6 MG/DL (ref 8.5–10.1)
CHLORIDE BLD-SCNC: 113 MMOL/L (ref 94–109)
CO2 SERPL-SCNC: 21 MMOL/L (ref 20–32)
CODING SYSTEM: NORMAL
COLOR UR AUTO: ABNORMAL
CREAT SERPL-MCNC: 0.98 MG/DL (ref 0.52–1.25)
CROSSMATCH: NORMAL
DIASTOLIC BLOOD PRESSURE - MUSE: NORMAL MMHG
ERYTHROCYTE [DISTWIDTH] IN BLOOD BY AUTOMATED COUNT: 15.9 % (ref 10–15)
GFR SERPL CREATININE-BSD FRML MDRD: ABNORMAL ML/MIN/{1.73_M2}
GLUCOSE BLD-MCNC: 154 MG/DL (ref 70–99)
GLUCOSE BLDC GLUCOMTR-MCNC: 131 MG/DL (ref 70–99)
GLUCOSE BLDC GLUCOMTR-MCNC: 157 MG/DL (ref 70–99)
GLUCOSE UR STRIP-MCNC: NEGATIVE MG/DL
HCT VFR BLD AUTO: 19.8 % (ref 35–53)
HGB BLD-MCNC: 6.6 G/DL (ref 11.7–17.7)
HGB BLD-MCNC: 6.6 G/DL (ref 11.7–17.7)
HGB BLD-MCNC: 7.4 G/DL (ref 11.7–17.7)
HGB BLD-MCNC: 7.4 G/DL (ref 11.7–17.7)
HGB BLD-MCNC: 7.9 G/DL (ref 11.7–17.7)
HGB UR QL STRIP: NEGATIVE
INTERPRETATION ECG - MUSE: NORMAL
ISSUE DATE AND TIME: NORMAL
KETONES UR STRIP-MCNC: ABNORMAL MG/DL
LEUKOCYTE ESTERASE UR QL STRIP: NEGATIVE
MCH RBC QN AUTO: 29.9 PG (ref 26.5–33)
MCHC RBC AUTO-ENTMCNC: 33.3 G/DL (ref 31.5–36.5)
MCV RBC AUTO: 90 FL (ref 78–100)
NITRATE UR QL: NEGATIVE
P AXIS - MUSE: 68 DEGREES
PH UR STRIP: 6 [PH] (ref 5–7)
PLATELET # BLD AUTO: 117 10E3/UL (ref 150–450)
POTASSIUM BLD-SCNC: 4.4 MMOL/L (ref 3.4–5.3)
PR INTERVAL - MUSE: 152 MS
PROT SERPL-MCNC: 4.9 G/DL (ref 6.8–8.8)
QRS DURATION - MUSE: 90 MS
QT - MUSE: 308 MS
QTC - MUSE: 453 MS
R AXIS - MUSE: -85 DEGREES
RBC # BLD AUTO: 2.21 10E6/UL (ref 3.8–5.9)
RBC URINE: 0 /HPF
SODIUM SERPL-SCNC: 140 MMOL/L (ref 133–144)
SP GR UR STRIP: 1.03 (ref 1–1.03)
SYSTOLIC BLOOD PRESSURE - MUSE: NORMAL MMHG
T AXIS - MUSE: 62 DEGREES
UNIT ABO/RH: NORMAL
UNIT NUMBER: NORMAL
UNIT STATUS: NORMAL
UNIT TYPE ISBT: 6200
UROBILINOGEN UR STRIP-MCNC: NORMAL MG/DL
VENTRICULAR RATE- MUSE: 130 BPM
WBC # BLD AUTO: 12.8 10E3/UL (ref 4–11)
WBC URINE: <1 /HPF

## 2022-07-15 PROCEDURE — 99223 1ST HOSP IP/OBS HIGH 75: CPT | Performed by: REGISTERED NURSE

## 2022-07-15 PROCEDURE — P9016 RBC LEUKOCYTES REDUCED: HCPCS | Performed by: INTERNAL MEDICINE

## 2022-07-15 PROCEDURE — 36415 COLL VENOUS BLD VENIPUNCTURE: CPT | Performed by: INTERNAL MEDICINE

## 2022-07-15 PROCEDURE — 258N000003 HC RX IP 258 OP 636: Performed by: INTERNAL MEDICINE

## 2022-07-15 PROCEDURE — 120N000013 HC R&B IMCU

## 2022-07-15 PROCEDURE — 99233 SBSQ HOSP IP/OBS HIGH 50: CPT | Performed by: INTERNAL MEDICINE

## 2022-07-15 PROCEDURE — C9113 INJ PANTOPRAZOLE SODIUM, VIA: HCPCS | Performed by: PHYSICIAN ASSISTANT

## 2022-07-15 PROCEDURE — 250N000011 HC RX IP 250 OP 636: Performed by: INTERNAL MEDICINE

## 2022-07-15 PROCEDURE — 36415 COLL VENOUS BLD VENIPUNCTURE: CPT | Performed by: PHYSICIAN ASSISTANT

## 2022-07-15 PROCEDURE — 120N000001 HC R&B MED SURG/OB

## 2022-07-15 PROCEDURE — 85018 HEMOGLOBIN: CPT | Performed by: HOSPITALIST

## 2022-07-15 PROCEDURE — 82140 ASSAY OF AMMONIA: CPT | Performed by: PHYSICIAN ASSISTANT

## 2022-07-15 PROCEDURE — 80053 COMPREHEN METABOLIC PANEL: CPT | Performed by: INTERNAL MEDICINE

## 2022-07-15 PROCEDURE — 81001 URINALYSIS AUTO W/SCOPE: CPT | Performed by: EMERGENCY MEDICINE

## 2022-07-15 PROCEDURE — 84145 PROCALCITONIN (PCT): CPT | Performed by: HOSPITALIST

## 2022-07-15 PROCEDURE — 87086 URINE CULTURE/COLONY COUNT: CPT | Performed by: EMERGENCY MEDICINE

## 2022-07-15 PROCEDURE — 250N000013 HC RX MED GY IP 250 OP 250 PS 637: Performed by: INTERNAL MEDICINE

## 2022-07-15 PROCEDURE — 85014 HEMATOCRIT: CPT | Performed by: INTERNAL MEDICINE

## 2022-07-15 PROCEDURE — 250N000011 HC RX IP 250 OP 636: Performed by: PHYSICIAN ASSISTANT

## 2022-07-15 PROCEDURE — 36415 COLL VENOUS BLD VENIPUNCTURE: CPT | Performed by: HOSPITALIST

## 2022-07-15 RX ORDER — PROCHLORPERAZINE 25 MG
12.5 SUPPOSITORY, RECTAL RECTAL EVERY 12 HOURS PRN
Status: DISCONTINUED | OUTPATIENT
Start: 2022-07-15 | End: 2022-07-18 | Stop reason: HOSPADM

## 2022-07-15 RX ORDER — ONDANSETRON 2 MG/ML
4 INJECTION INTRAMUSCULAR; INTRAVENOUS EVERY 6 HOURS PRN
Status: DISCONTINUED | OUTPATIENT
Start: 2022-07-15 | End: 2022-07-18 | Stop reason: HOSPADM

## 2022-07-15 RX ORDER — NITROGLYCERIN 0.4 MG/1
0.4 TABLET SUBLINGUAL EVERY 5 MIN PRN
Status: DISCONTINUED | OUTPATIENT
Start: 2022-07-15 | End: 2022-07-18 | Stop reason: HOSPADM

## 2022-07-15 RX ORDER — LIDOCAINE 40 MG/G
CREAM TOPICAL
Status: DISCONTINUED | OUTPATIENT
Start: 2022-07-15 | End: 2022-07-18 | Stop reason: HOSPADM

## 2022-07-15 RX ORDER — PROCHLORPERAZINE MALEATE 5 MG
5 TABLET ORAL EVERY 6 HOURS PRN
Status: DISCONTINUED | OUTPATIENT
Start: 2022-07-15 | End: 2022-07-18 | Stop reason: HOSPADM

## 2022-07-15 RX ORDER — LIDOCAINE 40 MG/G
CREAM TOPICAL
Status: DISCONTINUED | OUTPATIENT
Start: 2022-07-15 | End: 2022-07-15

## 2022-07-15 RX ORDER — PIPERACILLIN SODIUM, TAZOBACTAM SODIUM 3; .375 G/15ML; G/15ML
3.38 INJECTION, POWDER, LYOPHILIZED, FOR SOLUTION INTRAVENOUS EVERY 6 HOURS
Status: DISCONTINUED | OUTPATIENT
Start: 2022-07-15 | End: 2022-07-16

## 2022-07-15 RX ORDER — ONDANSETRON 4 MG/1
4 TABLET, ORALLY DISINTEGRATING ORAL EVERY 6 HOURS PRN
Status: DISCONTINUED | OUTPATIENT
Start: 2022-07-15 | End: 2022-07-18 | Stop reason: HOSPADM

## 2022-07-15 RX ORDER — SODIUM CHLORIDE 9 MG/ML
INJECTION, SOLUTION INTRAVENOUS CONTINUOUS
Status: DISCONTINUED | OUTPATIENT
Start: 2022-07-15 | End: 2022-07-16

## 2022-07-15 RX ADMIN — PIPERACILLIN AND TAZOBACTAM 3.38 G: 3; .375 INJECTION, POWDER, FOR SOLUTION INTRAVENOUS at 02:18

## 2022-07-15 RX ADMIN — SODIUM CHLORIDE: 9 INJECTION, SOLUTION INTRAVENOUS at 12:41

## 2022-07-15 RX ADMIN — PIPERACILLIN AND TAZOBACTAM 3.38 G: 3; .375 INJECTION, POWDER, FOR SOLUTION INTRAVENOUS at 15:05

## 2022-07-15 RX ADMIN — PIPERACILLIN AND TAZOBACTAM 3.38 G: 3; .375 INJECTION, POWDER, FOR SOLUTION INTRAVENOUS at 21:22

## 2022-07-15 RX ADMIN — HYDROMORPHONE HYDROCHLORIDE 0.2 MG: 0.2 INJECTION, SOLUTION INTRAMUSCULAR; INTRAVENOUS; SUBCUTANEOUS at 05:10

## 2022-07-15 RX ADMIN — OCTREOTIDE ACETATE 50 MCG/HR: 200 INJECTION, SOLUTION INTRAVENOUS; SUBCUTANEOUS at 21:56

## 2022-07-15 RX ADMIN — SODIUM CHLORIDE: 9 INJECTION, SOLUTION INTRAVENOUS at 02:14

## 2022-07-15 RX ADMIN — SODIUM CHLORIDE: 9 INJECTION, SOLUTION INTRAVENOUS at 23:40

## 2022-07-15 RX ADMIN — PANTOPRAZOLE SODIUM 40 MG: 40 INJECTION, POWDER, FOR SOLUTION INTRAVENOUS at 21:22

## 2022-07-15 RX ADMIN — HYDROMORPHONE HYDROCHLORIDE 0.2 MG: 0.2 INJECTION, SOLUTION INTRAMUSCULAR; INTRAVENOUS; SUBCUTANEOUS at 21:55

## 2022-07-15 RX ADMIN — PIPERACILLIN AND TAZOBACTAM 3.38 G: 3; .375 INJECTION, POWDER, FOR SOLUTION INTRAVENOUS at 09:59

## 2022-07-15 RX ADMIN — PANTOPRAZOLE SODIUM 40 MG: 40 INJECTION, POWDER, FOR SOLUTION INTRAVENOUS at 10:00

## 2022-07-15 ASSESSMENT — ACTIVITIES OF DAILY LIVING (ADL)
ADLS_ACUITY_SCORE: 35
ADLS_ACUITY_SCORE: 38
ADLS_ACUITY_SCORE: 35
ADLS_ACUITY_SCORE: 39
ADLS_ACUITY_SCORE: 39
ADLS_ACUITY_SCORE: 35
ADLS_ACUITY_SCORE: 39
ADLS_ACUITY_SCORE: 35
ADLS_ACUITY_SCORE: 38
ADLS_ACUITY_SCORE: 35
ADLS_ACUITY_SCORE: 39
ADLS_ACUITY_SCORE: 37

## 2022-07-15 NOTE — PROVIDER NOTIFICATION
"Dr. Lara paged 7/15 0570 regarding \"New orders for enteric/virus stool sample, enteric precautions orders? also palliative asked for SW to be following, needs orders as well.\"  Orders for enteric precaution and SW added.  "

## 2022-07-15 NOTE — CONSULTS
[facility]  Palliative Care Consultation Note    Patient: Los Huang  Date of Admission:  7/14/2022    Requesting Clinician / Team: Medicine  Reason for consult: Goals of care    Recommendations:    Goals of Care (see in depth discussion below):     Continue current cares at this time as a plan is made for discharge.  Patient and family wish to return to [facility].  He was previously on hospice for 6 months but taken off in May 2022 due to his medical condition was not worsening.  Daughter would consider hospice with a different agency if they would accept Solo on service.  She understands he has a terminal diagnosis of metastatic colon cancer and now is having worsening medical status.    Writer was unable to have a discussion with Solo due to delusional and paranoid behaviors.    Code status: No CPR / No Intubation    Advanced Care Planning:    Patient has a completed Health Care Directive: Reportedly yes, but not available to us currently. Discussed with [employer] who identified health care agents as daughter Yuliet Huang as primary HCA and son Juventino Huang as first alternate HCA.    Patient case was reviewed with SW/CC, Dr Lara.    Teresa Kelly, [facility]  Contact information available via [facility] Paging/Directory      Thank you for the opportunity to participate in the care of this patient and family. Our team: will continue to follow.     During regular M-F work hours (2347-6607) -- if you are not sure who specifically to contact -- please contact us on [facility] Smart Web.     After regular work hours and on weekends/holidays, you can call our answering service at [phone].     Attestation:  Total time on the floor involved in the patient's care: 85 minutes  Total time spent in counseling/care coordination: >50% discussing patient condition, assessment of symptoms, reviewing current status with MD and RN,      Assessments:  Los Huang is a 66 year old adult with PMH significant for stage IV metastatic adenocarcinoma of colon with mets to liver and lungs; s/p right hemicolectomy, schizoaffective disorder, bipoloar disorder, ADHD, polysubstance abuse. Has been residing at the [facility] on hospice but taken off hospice service after 6 months as his condition did not seem to be worsening.. He experienced an unwitnessed fall while taking a shower and was found to have blood on his forehead and a black tarry stool/blood in the rectum. EMS was called and he was taken to ED for evaluation.  He is found to have an acute upper GI bleeding due to esophageal varices with melena with severe acute blood loss anemia and hemorrhagic shock.      Today, the patient was seen for:   Goals of care  Patient and family support    I introduced palliative care services and our multidisciplinary team. Explained that our service offers an extra layer of support for individuals who are experiencing serious, life threatening illnesses, which can include assistance with symptom management, emotional and spiritual support as well as complex medical decision making. Reviewed past medical history and patient/family understanding of the current medical situation.    I met with Solo in his room and he was delusional and paranoid unable to discuss his medical situation or social history.  Called [employer] to determine who was times current healthcare agent as it was unclear in the chart.  They informed me that daughter and was the primary healthcare agent and son Juventino was the first alternate at this time.    I called daughter and on the phone to introduce palliative care.  He has been involved in her father's life for many years which she states has been difficult due to his paranoia, delusions and severe mental health issues with often abusive behaviors towards an end her brother.  Patient has been living with his mother in  [place] for some years but had to move up to [place] due to a mental health crisis.  He has been in and out of the hospital psychiatric units for many years.  He was diagnosed with metastatic colon cancer and this seemed to worsen his mental illness due to the stressors this put him under.  She did not feel he had the capacity to understand his terminal diagnosis.  His mental health situation improved when he went to live at the [facility] where they assisted him with medication management.  He also went on hospice at the same time with [facility].  According to and, Solo did not like the hospice staff checking in with him frequently and found this invasive and he was not cooperative with the staff.  He would sometimes get agitated with the hospice staff and turned them away on most occasions.  Due to his overall condition did not seem to be worsening hospice decided to take him off their service.  And feels that hospice would have been more successful if they would have been less interactive with him as he did not seem to enjoy this.  And is wondering if hospice would be an option again if he returns to the [facility] but they would not want [facility] again.  Explained that social work will assist in making discharge plans and we can explore this option.    Prognosis, Goals, & Planning:        Prognosis, Goals, and/or Advance Care Planning were addressed today: Yes      Functional Status just prior to hospitalization: 1 (Restricted in physically strenuous activity but ambulatory and able to carry out work of a light or sedentary nature)          Patient has decision-making capacity today for complex decisions: No      Patient's decision making preferences: unable to assess          I have concerns about the patient/family's health literacy today: Yes  Due to paranoia and delusions patient is unable to understand or conduct conversations around his goals of care.        Coping, Meaning, & Spirituality:    Mood, coping, and/or meaning in the context of serious illness were addressed today: Yes  Summary/Comments: Daughter Yuliet states that her father's his mental illness worsened markedly after his diagnosis with cancer due to the extra stressors this placed on him.    Social:     Living situation: Lives at the [facility] since November 2021.  He started hospice with [facility] at this time but was taken off service May 2022 as patient condition had not worsened and he did not enjoy interaction with the staff.    Key family / caregivers: Daughter crissy Huang who is primary healthcare agent and son Juventino Huang    Occupational history: Was able to hold a job up until his 30s but then could not handle stress of working.    History of Present Illness:   History gathered today from: family/loved ones, medical chart  Adopted from H&P:  Los Huang is a 66 year old adult with PMH significant for stage IV metastatic adenocarcinoma of colon with mets to liver and lungs, schizoaffective disorder, bipoloar disorder, ADHD, polysubstance abuse. Has been residing at the [facility] on hospice but taken off hospice service after 6 months as his condition did not seem to be worsening.. He experienced an unwitnessed fall while taking a shower and was found to have blood on his forehead and a black tarry stool/blood in the rectum. EMS was called and he was taken to ED for evaluation.  He is found to have an acute upper GI bleeding due to esophageal varices with melena with severe acute blood loss anemia and hemorrhagic shock.  The patient had emergent EGD and there was noted to be one varix that was banded.  Had blood transfusions.  Also found to have elevated ammonia level.      Key Palliative Symptom Data:  We are not helping to manage these symptoms currently in this patient.    ROS:  Comprehensive ROS is reviewed and is negative except as here & per HPI: N/A     Past Medical History:  Past Medical History:    Diagnosis Date     Cancer (H)      Depressive disorder      Post-traumatic osteoarthritis of left shoulder 3/9/2020     Schizoaffective disorder (H)         Past Surgical History:  Past Surgical History:   Procedure Laterality Date     ABDOMEN SURGERY       BIOPSY       CHOLECYSTECTOMY       COLONOSCOPY       GENITOURINARY SURGERY  1997    Ureteroscopy gone awry     H NEEDLE POWER PORT Left 2014    PLACED IN [place]     IR SIRT (SELECTIVE INTERNAL RADIO THERAPY)  7/30/2020     IR VISCERAL ANGIOGRAM  7/30/2020     IR VISCERAL EMBOLIZATION  8/5/2020     ORTHOPEDIC SURGERY           Family History:  Family History   Problem Relation Age of Onset     Osteoporosis Sister      Thyroid Disease Sister         Thyroid killed     Anxiety Disorder Sister      Substance Abuse Sister      Thyroid Disease Sister         Thyroid removed     Anxiety Disorder Sister      Depression Son      Depression Other      Mental Illness Mother      Mental Illness Father          Allergies:  Allergies   Allergen Reactions     Animal Dander         Medications:  I have reviewed this patient's medication profile and medications from this hospitalization.     Noted scheduled meds are:    pantoprazole (PROTONIX) IV  40 mg Intravenous BID     piperacillin-tazobactam  3.375 g Intravenous Q6H     sodium chloride (PF)  3 mL Intracatheter Q8H       Noted PRN meds are:  sodium chloride 0.9%, sodium chloride 0.9%, flumazenil, HYDROmorphone, lidocaine 4%, lidocaine (buffered or not buffered), melatonin, naloxone **OR** naloxone **OR** naloxone **OR** naloxone, nitroGLYcerin, ondansetron **OR** ondansetron, prochlorperazine **OR** prochlorperazine **OR** prochlorperazine, sodium chloride (PF)    Current condition: Patient sitting up in bed alert.  Could not answer questions but would make comments that did not pertain to the conversation.  He was concerned as to why he always attracted homosexuals.  He spoke about how he did not like his son because his  son did not like him.  He felt his son was listening to the conversation through some mechanism and did not wish to continue conversation due to this intrusion.    Physical Exam:  Vital Signs: Temp: 98.1  F (36.7  C) Temp src: Oral BP: 109/57 Pulse: 98   Resp: 13 SpO2: 100 % O2 Device: None (Room air)    Weight: 177 lbs 0 oz    Physical Exam  GENERAL:  Alert, resting comfortably, guarded, distrustful.  HEAD: Bruise of upper left orbital region  SCLERA: Anicteric  EXTREMITIES: Warm; no edema  RESPIRATORY: Breathing nonlabored  ABDOMEN: Flat  NEUROLOGIC: Alert.  PSYCH: suspicious, paranoid, delusions.    Data reviewed:  Recent imaging reviewed.  Results for orders placed or performed during the hospital encounter of 07/14/22   CT Head w/o Contrast    Impression    IMPRESSION:  1.  No definite CT findings of acute intracranial process.  2.  Ovoid hyperdense nodule along the anterior aspect of the sella likely associated with the pituitary gland measuring 7-8 mm. In retrospect, there was probably a subtle lesion in this area on previous MRI brain of 12/17/2020. Because this lesion is   hyperdense, it could represent a hyperdense solid lesion or possibly a hemorrhagic or proteinaceous lesion. MRI of the brain without and with contrast (dynamic pituitary protocol) is recommended in follow-up for further characterization. Consider   correlation with pituitary labs to assess for the possibility of a functioning pituitary adenoma.  3.  Mild generalized brain parenchymal volume loss.  4.  Small left anterior frontal scalp hematoma without underlying calvarial fracture.   CT Cervical Spine w/o Contrast    Impression    IMPRESSION:  1.  No acute fracture or posttraumatic subluxation of the cervical spine.  2.  Multilevel degenerative changes, as described.  3.  Multiple thyroid nodules, largest on the right measuring over 15 mm. Recommend follow-up thyroid ultrasound.   CT Chest/Abdomen/Pelvis w Contrast    Impression     IMPRESSION:  1.  Postsurgical changes of right hemicolectomy with pancolitis in the residual colon, consider C. difficile. No evidence of active gastrointestinal bleeding on this single phase exam.  2.  Upper abdominal peritoneal and pulmonary metastases as above.  3.  No evidence of acute trauma in the chest, abdomen or pelvis.  4.  Small right pleural effusion.  5.  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 Consider attention on follow-up imaging.  6.  Prior right hepatectomy with cirrhotic morphology of the left hepatic lobe. Mild splenomegaly, could indicate portal hypertension.          Lab Results   Component Value Date    WBC 12.8 (H) 07/15/2022    WBC 17.0 (H) 07/14/2022    WBC 7.2 12/14/2020    HGB 6.6 (LL) 07/15/2022    HGB 6.6 (LL) 07/15/2022    HGB 7.4 (L) 07/15/2022    HCT 19.8 (L) 07/15/2022    HCT 22.9 (L) 07/14/2022    HCT 44.8 12/14/2020     (L) 07/15/2022     07/14/2022     12/14/2020     07/15/2022     07/14/2022     05/04/2022    POTASSIUM 4.4 07/15/2022    POTASSIUM 3.9 07/14/2022    POTASSIUM 3.7 05/04/2022    CHLORIDE 113 (H) 07/15/2022    CHLORIDE 107 07/14/2022    CHLORIDE 106 05/04/2022    CO2 21 07/15/2022    CO2 16 (L) 07/14/2022    CO2 24 05/04/2022    BUN 59 (H) 07/15/2022    BUN 63 (H) 07/14/2022    BUN 20 05/04/2022    CR 0.98 07/15/2022    CR 0.96 07/14/2022    CR 0.88 05/04/2022     (H) 07/15/2022     (H) 07/15/2022     (H) 07/14/2022    TROPI <0.015 12/18/2020    AST 39 07/15/2022    AST 42 07/14/2022    AST 35 05/04/2022    ALT 38 07/15/2022    ALT 44 07/14/2022    ALT 36 05/04/2022    ALKPHOS 129 07/15/2022    ALKPHOS 165 (H) 07/14/2022    ALKPHOS 232 (H) 05/04/2022    BILITOTAL 0.7 07/15/2022    BILITOTAL 0.9 07/14/2022    BILITOTAL 0.9 05/04/2022    [place] 97 (H) 07/15/2022    [place] 48 12/17/2020    [place] 39 07/25/2019    INR 1.36 (H) 07/14/2022    INR 1.07  12/18/2020    INR 1.02 07/30/2020      Lab Results   Component Value Date    ALBUMIN 2.5 07/15/2022    ALBUMIN 3.8 01/11/2021          Recent Labs   Lab 07/14/22 2107 07/14/22  1833   PH 7.41 7.38

## 2022-07-15 NOTE — OR NURSING
EGD done in ER with  Dr. Pedroza.  Medications and monitoring per ER staff.  Banding of varices done.  No specimens obtained.  Patient tolerated procedure well.

## 2022-07-15 NOTE — CONSULTS
[facility]  Gastroenterology Consultation         Los Huang  [address]   [place] [address]  66 year old adult    Admission Date/Time: 7/14/2022  Primary Care Provider: Babar Mckinney  Referring / Attending Physician:  Dr. Maurer    We were asked to see the patient in consultation by Dr. Maurer for evaluation of acute massive upper GI bleed.      CC: Acute significant upper GI bleed    HPI:  Los Huang is a 66 year old adult who presented to ER with multiple bouts of weakness hematemesis melena patient has history of metastatic colon cancer patient had history of partial colon resection fall schizoaffective disorder ADHD polysubstance abuse mood disorder.  Patient presented to ER via EMS EMS with significant hypotension with blood pressure in 60s to 70s heart rate in 130s to 140s patient was given aspirin and 2 nitroglycerin.  Patient's hemoglobin was 7.4 on arrival white count of 17,000.  Patient CT scan showed pancolitis patient was found to have multiple thyroid nodules CT of the head was unremarkable did not show any acute bony findings.  Patient was given 1 unit of blood already patient was started on octreotide to.  Patient is laying fairly comfortably patient appears to be somewhat stressed minimally communicative no other significant systemic complaint.    ROS: A comprehensive ten point review of systems was negative aside from those in mentioned in the HPI.      PAST MED HX:  I have reviewed this patient's medical history and updated it with pertinent information if needed.   Past Medical History:   Diagnosis Date     Cancer (H)      Depressive disorder      Post-traumatic osteoarthritis of left shoulder 3/9/2020     Schizoaffective disorder (H)        MEDICATIONS:   Prior to Admission Medications   Prescriptions Last Dose Informant Patient Reported? Taking?   atomoxetine (STRATTERA) 10 MG capsule   No No   Sig: Take 1 capsule (10 mg) by mouth daily    benztropine (COGENTIN) 1 MG tablet   No No   Sig: Take 1 tablet (1 mg) by mouth At Bedtime   calcium polycarbophil (FIBERCON) 625 MG tablet   Yes No   Sig: Take 1 tablet by mouth daily (with lunch)    cariprazine (VRAYLAR) 6 MG CAPS capsule   No No   Sig: Take 1 capsule (6 mg) by mouth daily   cholecalciferol (VITAMIN D3) 1000 units (25 mcg) capsule   No No   Sig: Take 1 capsule (1,000 Units) by mouth daily   clonazePAM (KLONOPIN) 0.5 MG tablet   No No   Sig: Take 1 tablet (0.5 mg) by mouth At Bedtime   divalproex sodium delayed-release (DEPAKOTE) 500 MG DR tablet   No No   Sig: Take 2 tablets (1,000 mg) by mouth At Bedtime   docusate sodium (COLACE) 100 MG capsule   No No   Sig: Take 1 capsule (100 mg) by mouth 2 times daily   hydrOXYzine (ATARAX) 25 MG tablet   No No   Sig: Take 1 tablet (25 mg) by mouth every 4 hours as needed for anxiety   multivitamin (CENTRUM SILVER) tablet   No No   Sig: Take 1 tablet by mouth daily   simethicone (MYLICON) 80 MG chewable tablet   No No   Sig: Take 1 tablet (80 mg) by mouth every 6 hours as needed for cramping   traZODone (DESYREL) 50 MG tablet   No No   Sig: Take 1 tablet (50 mg) by mouth nightly as needed for sleep   witch hazel-glycerin (TUCKS) pad   Yes No   Sig: Apply topically every hour as needed for hemorrhoids      Facility-Administered Medications: None       ALLERGIES:   Allergies   Allergen Reactions     Animal Dander        SOCIAL HISTORY:  Social History     Tobacco Use     Smoking status: Former Smoker     Packs/day: 1.00     Years: 34.00     Pack years: 34.00     Types: Cigarettes     Start date:      Quit date:      Years since quittin.5     Smokeless tobacco: Former User     Types: Snuff     Quit date:      Tobacco comment: Smoked sporadically in high school and during college until  then started back in    Substance Use Topics     Alcohol use: Not Currently     Comment: NONE IN 1.5 YRS--2020     Drug use: Not Currently     Types:  Marijuana, Amphetamines, Benzodiazepines, Hashish, LSD, Mescaline, Methaqualone, Methylphenidate, Nitrous oxide, PCP, Psilocybin     Comment: [id number]       FAMILY HISTORY:  Family History   Problem Relation Age of Onset     Osteoporosis Sister      Thyroid Disease Sister         Thyroid killed     Anxiety Disorder Sister      Substance Abuse Sister      Thyroid Disease Sister         Thyroid removed     Anxiety Disorder Sister      Depression Son      Depression Other      Mental Illness Mother      Mental Illness Father        PHYSICAL EXAM:   General awake alert responding appropriately  Vital Signs with Ranges  Temp: 97.9  F (36.6  C) Temp src: Oral BP: 90/53 Pulse: 120   Resp: 23 SpO2: 93 % O2 Device: None (Room air)    No intake/output data recorded.    Constitutional: healthy, alert and no distress   Cardiovascular: negative, PMI normal. No lifts, heaves, or thrills. RRR. No murmurs, clicks gallops or rub  Respiratory: negative, Percussion normal. Good diaphragmatic excursion. Lungs clear  Head: Normocephalic. No masses, lesions, tenderness or abnormalities  Neck: Neck supple. No adenopathy. Thyroid symmetric, normal size,, Carotids without bruits.  Abdomen: Abdomen soft, non-tender. BS normal. No masses, organomegaly            ADDITIONAL COMMENTS:   I reviewed the patient's new clinical lab test results.   Recent Labs   Lab Test 07/14/22 1837 12/18/20  1029 12/14/20  2133 07/30/20  0712   WBC 17.0*  --  7.2 6.0   HGB 7.4*  --  14.8 15.7   MCV 93  --  91 91     --  176 169   INR 1.36* 1.07  --  1.02     Recent Labs   Lab Test 07/14/22 1837 05/04/22  1350 01/05/21  0728   POTASSIUM 3.9 3.7 4.0   CHLORIDE 107 106 106   CO2 16* 24 29   BUN 63* 20 16   ANIONGAP 15* 11 4     Recent Labs   Lab Test 07/14/22 1837 05/04/22  1350 01/11/21  1122 12/16/20  0728 12/14/20  2133   ALBUMIN 2.8* 3.7 3.8   < > 3.7   BILITOTAL 0.9 0.9 0.5   < > 0.4   ALT 44 36 137*   < > 80*   AST 42 35 77*   < > 40   PROTEIN  --    --   --   --  Negative   LIPASE  --   --   --   --  214    < > = values in this interval not displayed.       I reviewed the patient's new imaging results.        CONSULTATION ASSESSMENT AND PLAN:    Active Problems:  Very pleasant 66-year-old gentleman with unfortunate history of metastatic colon cancer with multiple hepatic lesions who presented with episode of hypotension melena melena possible upper GI bleed with significantly low blood pressure patient findings are worrisome for significant upper GI bleed plan to continue on aggressive resuscitation type and cross blood transfusion IV Protonix IV octreotide plan to proceed with emergent upper GI endoscopy in the ER risk-benefit plan discussed in detail with the ER team.  Patient is aware of the current situation plan to proceed with emergent endoscopy.  Thank you very much for letting me participate in his care              Bossman Pedroza MD, FACP  [facility].  Office: [phone]  Cell : [phone]      [facility]  Ph: [phone] Fax: [phone]

## 2022-07-15 NOTE — PROGRESS NOTES
Surgery Update    Imaging all reviewed and no areas of concern from a trauma perspective. There were multiple incidental findings including pituitary mass, thyroid masses, intraabdominal masses. Note plan for hospice. If plan changes, pt can follow up with his PCP regarding incidental findings on imaging.  Trauma surgery will sign off. Consult note from 7/14 updated. Please do not hesitate to call with questions or concerns.     Nellie Almendarez MD  [facility]  [phone]

## 2022-07-15 NOTE — PROVIDER NOTIFICATION
Dr. Lara paged at [phone] due to patient having increased agitation, sweating, tachycardia and tremors. Per report from night RN, patient has been having x4 drinks each night prior to hospitalization. Wondering if MD wanted CIWA started.     Response: continue to monitor, no new orders at this time

## 2022-07-15 NOTE — PROGRESS NOTES
[facility]    Hospitalist Progress Note    Date of Service (when I saw the patient): 07/15/2022    Assessment & Plan   Los Huang is a 66-year-old gentleman who is in hospice, who is DNR/DNI with metastatic colon cancer, significant mental health issues including schizoaffective disorder as well as bipolar type 1 disorder, who was admitted with melena, hypotension and suspected bleeding varices that has been intervened on, who has got blood loss anemia with lactic acidosis, being admitted for further treatment.       1.  Acute upper GI bleeding due to esophageal varices with Melena with severe acute blood loss anemia and hemorrhagic shock:  The patient's hemoglobin 7.4 on admision and  His INR is 1.36.  The patient received1 unit packed red blood cells,     repeat hemoglobin this am 6.6 and receiving another unit.       hemoglobin q6 hours for now.    The patient will be made n.p.o., clears when ok with GI    He has been on octreotide drip and will receive IV fluids.      The patient will be followed by Gastroenterology and appreciate consultation.       IV Protonix q12    Repeat INR in am.    Addendum:  Ammonia noted to be 97.  Treatment as per GI    1.5 hepatic encephalopathy: Ammonia level checked and is now 97.  Consider lactulose as per GI given variceal bleeding and the colitis.    2.  Hemorrhagic shock with lactic acidosis with leukocytosis:  The patient does not have a fever.  The lactic acid may be from hypoperfusion.  White count elevation may be an acute phase reactant with blood loss, but given his liver metastasis and his altered mentation, the patient was  started on vancomycin and Zosyn in ED.      We will continue Zosyn for now, consider stopping in am if cultures negative and no evidence or infection..      Blood cultures have already been obtained.    3.  History of schizoaffective disorder and bipolar type 1 as well as attention deficit hyperactivity disorder:   Currently the patient is n.p.o., but hopefully once he is able to take oral, he can resume all of his psychiatric medications, which will be on hold.  He is on 1000 mg of Depakote, which will give through the IV 3 times a day at 300 mg.    4.  Metastatic colon cancer:  The patient does not want any chemotherapy, this was verified by the patient's daughter and son.  We will obtain a consultation from Palliative Care for further goals of care.  The family reconfirmed his DNR/DNI status.    5.  Fall with contusion left supraorbital ridgel:  The patient was found on the ground.  He has got left supraorbital hematoma.  CT showed no acute finding.  CT spine was also reassuring.  CT of the chest, abdomen and pelvis did not show any internal trauma or injury other than some inflammation of the colon.     6.  Pancolitis: Noted per CT.  Do not believe related to melena or the GI bleeding as would expect hematochezia.  We will continue to follow.  Is on Zosyn, if he does have more hematochezia would consider adding Flagyl.  Will order stool studies if not done.    DEEP VENOUS THROMBOSIS PROPHYLAXIS:  The patient will have compression boots.     CODE STATUS:  DNR/DNI.        Clinically Significant Risk Factors Present on Admission             # Hypoalbuminemia: Albumin = 2.5 g/dL (Ref range: 3.4 - 5.0 g/dL) on admission, will monitor as appropriate   # Coagulation Defect: INR = 1.36 (Ref range: 0.85 - 1.15) and/or PTT = 22 Seconds (Ref range: 22 - 38 Seconds) on admission, will monitor for bleeding  # Thrombocytopenia: Plts = 117 10e3/uL (Ref range: 150 - 450 10e3/uL) on admission, will monitor for bleeding             DVT Prophylaxis: Pneumatic Compression Devices  Code Status: No CPR- Do NOT Intubate      Disposition Plan   Expected discharge in 2 days to prior living arrangement once GIB has stopped and hemodynamically stable and tolerating diet. .     Entered: Taye Lara MD 07/15/2022, 10:52 AM     If stable this  "afternoon, blood pressure and pulse, may change status to med tele and stop IMC.      Taye Lara MD, MD  Hospitalist  Pager [phone]  Text Page    Interval History   Patient denies any shortness of breath, chest pain or pressure.  No fevers, sweats, chills no cough or dysuria or diarrhea.  He has no other particular complaints or concerns.  Although he is talking about the \"international long-term\" and it being in [facility]      -Data reviewed today: I reviewed all new labs and imaging results over the last 24 hours. I personally reviewed no images or EKG's today.    Medications reviewed.     Physical Exam   Temp: 98.5  F (36.9  C) Temp src: Oral BP: 102/53 Pulse: 96   Resp: 20 SpO2: 100 % O2 Device: None (Room air)    Vitals:    07/14/22 1902   Weight: 80.3 kg (177 lb)     Vital Signs with Ranges  Temp:  [97.6  F (36.4  C)-98.5  F (36.9  C)] 98.5  F (36.9  C)  Pulse:  [] 96  Resp:  [] 20  BP: ()/(39-72) 102/53  SpO2:  [86 %-100 %] 100 %  No intake/output data recorded.    Constitutional: Awake, alert, cooperative, no apparent distress     Respiratory: Clear to auscultation bilaterally, no crackles or wheezing   Cardiovascular: Regular rate and rhythm, normal S1 and S2, and no murmur noted   Abdomen: Normal bowel sounds, soft, non-distended, non-tender, no hepatosplenomegaly    Skin: No rashes, no cyanosis, dry to touch   Neuro: Alert and oriented , moving arms and legs did not test gait or balance.  No tremulousness noted.   Extremities: No edema, normal range of motion   Other(s):  Unsure of baseline mental status but appears a bit tangential at times.  Is aware of where he is and who his nurses are by name.       All other systems: Negative       Medications     octreotide (sandoSTATIN) infusion ADULT 50 mcg/hr (07/15/22 0143)     sodium chloride 100 mL/hr at 07/15/22 0214       pantoprazole (PROTONIX) IV  40 mg Intravenous BID     piperacillin-tazobactam  3.375 g Intravenous Q6H     sodium " chloride (PF)  3 mL Intracatheter Q8H       Data   Recent Labs   Lab 07/15/22  0535 07/15/22  0415 07/15/22  0212 07/14/22  2141 07/14/22  1837   WBC 12.8*  --   --   --  17.0*   HGB 6.6*  6.6* 7.4*  --  7.1* 7.4*   MCV 90  --   --   --  93   *  --   --   --  221   INR  --   --   --   --  1.36*     --   --   --  138   POTASSIUM 4.4  --   --   --  3.9   CHLORIDE 113*  --   --   --  107   CO2 21  --   --   --  16*   BUN 59*  --   --   --  63*   CR 0.98  --   --   --  0.96   ANIONGAP 6  --   --   --  15*   GEOVANNA 7.6*  --   --   --  8.2*   *  --  157*  --  207*   ALBUMIN 2.5*  --   --   --  2.8*   PROTTOTAL 4.9*  --   --   --  5.5*   BILITOTAL 0.7  --   --   --  0.9   ALKPHOS 129  --   --   --  165*   ALT 38  --   --   --  44   AST 39  --   --   --  42       Imaging:  Recent Results (from the past 24 hour(s))   CT Head w/o Contrast    Narrative    EXAM: CT HEAD W/O CONTRAST  LOCATION: [facility]  DATE/TIME: 7/14/2022 7:11 PM    INDICATION: Altered mental status.  COMPARISON: MRI brain 12/17/2020.  TECHNIQUE: Routine CT Head without IV contrast. Multiplanar reformats. Dose reduction techniques were used.    FINDINGS:  INTRACRANIAL CONTENTS: The ventricles are normal in size and configuration. Mild generalized brain parenchymal volume loss, as before. Otherwise unremarkable appearance of the cerebral hemispheres, brainstem, and cerebellum. There is a hyperdense nodule   involving the anterior aspect of the sella along the superior margin of the pituitary gland measuring up to approximately 7-8 mm (series 6 image 31). In retrospect, there was a subtle hypoenhancing lesion of the pituitary gland with thin peripheral   enhancement on MRI brain of 12/17/2020 in that location. However, the lesion appears possibly mildly increased in size compared to the previous MRI. The lesion slightly extends into the suprasellar cistern without obvious contact of the optic chiasm or   significant  mass effect on adjacent structures. No extra-axial fluid collection or mass effect/herniation.    VISUALIZED ORBITS/SINUSES/MASTOIDS: No intraorbital abnormality. No paranasal sinus mucosal disease. No middle ear or mastoid effusion.    BONES/SOFT TISSUES: Small left anterior frontal scalp hematoma without underlying calvarial fracture.      Impression    IMPRESSION:  1.  No definite CT findings of acute intracranial process.  2.  Ovoid hyperdense nodule along the anterior aspect of the sella likely associated with the pituitary gland measuring 7-8 mm. In retrospect, there was probably a subtle lesion in this area on previous MRI brain of 12/17/2020. Because this lesion is   hyperdense, it could represent a hyperdense solid lesion or possibly a hemorrhagic or proteinaceous lesion. MRI of the brain without and with contrast (dynamic pituitary protocol) is recommended in follow-up for further characterization. Consider   correlation with pituitary labs to assess for the possibility of a functioning pituitary adenoma.  3.  Mild generalized brain parenchymal volume loss.  4.  Small left anterior frontal scalp hematoma without underlying calvarial fracture.   CT Cervical Spine w/o Contrast    Narrative    EXAM: CT CERVICAL SPINE W/O CONTRAST  LOCATION: [facility]  DATE/TIME: 7/14/2022 7:11 PM    INDICATION: Trauma, unwitnessed fall.  COMPARISON: None.  TECHNIQUE: Routine CT Cervical Spine without IV contrast. Multiplanar reformats. Dose reduction techniques were used.    FINDINGS:  VERTEBRA: No acute cervical spine fracture. Normal vertebral body heights. Mild levoconvex curvature. Alignment otherwise normal. No posttraumatic subluxation seen.    CANAL/FORAMINA: Moderate disc height loss at C5-C6 with minimal disc height loss elsewhere. Small multilevel marginal endplate osteophytes. Minimal uncovertebral spurring in the lower cervical spine. Mild degenerative changes of the facet joints. Mild  to   moderate degenerative change at the median atlantoaxial joint. Mild/moderate multilevel spinal canal narrowing, most pronounced at C6-C7 where there is mild flattening of the normal spinal cord contour. No definite severe spinal canal narrowing. No   high-grade osseous neural foraminal stenosis.    PARASPINAL: Minimal presumed scarring in the lung apices. There is a right-sided thyroid nodule that measures over 15 mm, incompletely imaged/evaluated. Smaller hypodense lesions in the left thyroid lobe also noted. Visualized paraspinous soft tissues   otherwise appear unremarkable.      Impression    IMPRESSION:  1.  No acute fracture or posttraumatic subluxation of the cervical spine.  2.  Multilevel degenerative changes, as described.  3.  Multiple thyroid nodules, largest on the right measuring over 15 mm. Recommend follow-up thyroid ultrasound.   CT Chest/Abdomen/Pelvis w Contrast    Narrative    EXAM: CT CHEST/ABDOMEN/PELVIS W CONTRAST  LOCATION: [facility]  DATE/TIME: 7/14/2022 7:09 PM    INDICATION: Melena Acute GI hemorrhage, vs sepsis vs unwitnessed fall trauma.  COMPARISON: None.  TECHNIQUE: CT scan of the chest, abdomen, and pelvis was performed following injection of IV contrast. Multiplanar reformats were obtained. Dose reduction techniques were used.   CONTRAST: 95 mL Isovue 370    FINDINGS:   LUNGS AND PLEURA: Multiple metastases noted throughout both lungs with largest conglomerate in the left lower lobe measuring 3.5 x 2.2 cm (series 4 image 230). There is a small right pleural effusion.    MEDIASTINUM/AXILLAE: No suspicious lymphadenopathy. Calcified subcarinal and right hilar lymph nodes noted, compatible with prior granulomatous disease. There is a left chest port with tip in the superior vena cava. No evidence of central pulmonary   embolism. Bolus timing is suboptimal for evaluation of segmental and subsegmental emboli.    CORONARY ARTERY CALCIFICATION:  Mild.    HEPATOBILIARY: Postsurgical changes of right hepatectomy with extensive contour nodularity of the residual left hepatic lobe. There are likely capsular implants abutting both the right and left lobe, largest on the right measuring up to 5.8 cm (series 3   image 120). This could involve the diaphragm and partially explain the right pleural effusion.    PANCREAS: Trace fluid adjacent to the pancreatic tail. No ductal dilation or solid mass visualized.    SPLEEN: Mild splenomegaly with calcified granulomas.    ADRENAL GLANDS: Normal.    KIDNEYS/BLADDER: Bilateral renal cysts, no specific follow-up recommended. Heterogeneous mass in the right lower pole measuring 2.6 cm (series 3 image 205). Additional slightly heterogeneous, hyperdense lesion in the left upper pole measuring 2.0 cm   (series 3 image 164).     BOWEL: Prior right hemicolectomy with ileocolonic anastomosis in the right upper quadrant. There is diffuse submucosal edema throughout the colon with adjacent fat stranding. No evidence of bowel obstruction. No active gastrointestinal bleeding   visualized on this single phase exam. There is mild mesenteric edema throughout the abdomen.    LYMPH NODES: No suspicious lymphadenopathy.    VASCULATURE: Scattered calcified atherosclerosis.    PELVIC ORGANS: Vasectomy clips noted. Trace free fluid in the pelvis.    MUSCULOSKELETAL: No acute bony abnormality. Postsurgical changes of right lateral abdominal wall hernia repair with mesh.      Impression    IMPRESSION:  1.  Postsurgical changes of right hemicolectomy with pancolitis in the residual colon, consider C. difficile. No evidence of active gastrointestinal bleeding on this single phase exam.  2.  Upper abdominal peritoneal and pulmonary metastases as above.  3.  No evidence of acute trauma in the chest, abdomen or pelvis.  4.  Small right pleural effusion.  5.  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 Consider attention on follow-up imaging.  6.  Prior right hepatectomy with cirrhotic morphology of the left hepatic lobe. Mild splenomegaly, could indicate portal hypertension.

## 2022-07-15 NOTE — CONSULTS
[facility]   Trauma Surgical Consultation           Assessment and Plan:   Assessment:   66 year old adult with PMH s/f metastatic colon adenocarcinoma who presents after being found down at his care facility with forehead ecchymosis and hypotension. Full trauma activation due to hypotension. Upon my arrival pt confused but able to answer questions appropriately in a delayed fashion. Does not complain of pain. Forehead ecchymosis on left. No other signs of trauma externally. Melena noted and hemoglobin found to be 7.4. Plan for Head CT, CT CAP, CT cervical spine and GI consult for likely GI bleed given melena.         Plan:   Admit to Hospitalist service for GI bleed.   GI consultation  Trauma surgery will also follow up on imaging.     Addendum: all imaging reviewed and no traumatic injuries noted aside from soft tissue to left frontal scalp. No additional imaging needed. Trauma surgery will sign off. Please do not hesitate to call with questions or concerns. Note plan for hospice from  last note.                 Chief Complaint:   Fall     History is obtained from the patient and care providers in ER.         History of Present Illness:   This patient is a 66 year old male who presented to the ED after presumably falling at his assisted living facility as he was unresponsive initially with forehead ecchymosis. In the STAB bay pt was initially hypotensive and full trauma activation called. Upon my arrival SBP improved after fluid administration. Pt is alert and able to answer questions but answers in a very delayed manner. Denies any abdominal pain. Reports he has had a partial colectomy for colon cancer and per his daughter at bedside has had a partial hepatectomy. Most recent abdominal surgery was about 5 years ago. Pt does not recall falling. Denies pain currently                 Past Medical History:    has a past medical history of Cancer (H), Depressive disorder, Post-traumatic  osteoarthritis of left shoulder (3/9/2020), and Schizoaffective disorder (H).   Metastatic colon cancer          Past Surgical History:     Past Surgical History:   Procedure Laterality Date     ABDOMEN SURGERY       BIOPSY       CHOLECYSTECTOMY       COLONOSCOPY       GENITOURINARY SURGERY      Ureteroscopy gone awry     H NEEDLE POWER PORT Left 2014    PLACED IN [place]     IR SIRT (SELECTIVE INTERNAL RADIO THERAPY)  2020     IR VISCERAL ANGIOGRAM  2020     IR VISCERAL EMBOLIZATION  2020     ORTHOPEDIC SURGERY                 Social History:     Social History     Tobacco Use     Smoking status: Former Smoker     Packs/day: 1.00     Years: 34.00     Pack years: 34.00     Types: Cigarettes     Start date:      Quit date:      Years since quittin.5     Smokeless tobacco: Former User     Types: Snuff     Quit date:      Tobacco comment: Smoked sporadically in high school and during college until  then started back in    Substance Use Topics     Alcohol use: Not Currently     Comment: NONE IN 1.5 YRS--2020             Family History:   Reviewed       Allergies:     Allergies   Allergen Reactions     Animal Dander              Medications:   No current facility-administered medications on file prior to encounter.  atomoxetine (STRATTERA) 10 MG capsule, Take 1 capsule (10 mg) by mouth daily  benztropine (COGENTIN) 1 MG tablet, Take 1 tablet (1 mg) by mouth At Bedtime  calcium polycarbophil (FIBERCON) 625 MG tablet, Take 1 tablet by mouth daily (with lunch)   cariprazine (VRAYLAR) 6 MG CAPS capsule, Take 1 capsule (6 mg) by mouth daily  cholecalciferol (VITAMIN D3) 1000 units (25 mcg) capsule, Take 1 capsule (1,000 Units) by mouth daily  clonazePAM (KLONOPIN) 0.5 MG tablet, Take 1 tablet (0.5 mg) by mouth At Bedtime  divalproex sodium delayed-release (DEPAKOTE) 500 MG DR tablet, Take 2 tablets (1,000 mg) by mouth At Bedtime  docusate sodium (COLACE) 100 MG capsule, Take 1  "capsule (100 mg) by mouth 2 times daily  hydrOXYzine (ATARAX) 25 MG tablet, Take 1 tablet (25 mg) by mouth every 4 hours as needed for anxiety  multivitamin (CENTRUM SILVER) tablet, Take 1 tablet by mouth daily  simethicone (MYLICON) 80 MG chewable tablet, Take 1 tablet (80 mg) by mouth every 6 hours as needed for cramping  traZODone (DESYREL) 50 MG tablet, Take 1 tablet (50 mg) by mouth nightly as needed for sleep  witch hazel-glycerin (TUCKS) pad, Apply topically every hour as needed for hemorrhoids        midazolam         midazolam  4 mg Intravenous Once     pantoprazole (PROTONIX) IV  80 mg Intravenous Once     piperacillin-tazobactam  4.5 g Intravenous Once     sodium chloride (PF)  3 mL Intracatheter Q8H     tranexamic acid  1 g Intravenous Once     vancomycin  1,750 mg Intravenous Once            Review of Systems:   The 10 point review of systems is negative other than noted in the HPI.           Physical Exam:   BP (!) 86/58   Pulse (!) 123   Temp 97.9  F (36.6  C) (Oral)   Resp (!) 121   Ht 1.88 m (6' 2\")   Wt 80.3 kg (177 lb)   SpO2 99%   BMI 22.73 kg/m    General appearance: well-nourished, no apparent distress  HEENT: Pupils are equal and round.  Ecchymosis with surrounding edema on left forehead.  Neck is without thyromegaly, masses, swelling, ecchymosis.  Not tender to midline palpation of the cervical spine.   Chest:  Clear to auscultation bilaterally.  Heart with regular rate and rhythm, no murmurs.  No palpable swelling, masses, ecchymosis, no crepitus, no visible deformity.  Abdomen:  Nondistended, soft, nontender to palpation.  No masses.  Back: no palpable masses or visible deformity.  Not tender to midline palpation, melena noted.  Extremities: Strength grossly intact, moves all extremities.   Neurologic: nonfocal, grossly intact times four extremities, alert and oriented times three.  Cranial nerves II through XII intact grossly.  Psychiatric: mood and affect are appropriate.  Skin: " without jaundice, lesions, rashes.  Abrasions only to forehead as noted above.  Ecchymosis as noted above.           Data:   WBC -   WBC   Date Value Ref Range Status   12/14/2020 7.2 4.0 - 11.0 10e9/L Final     WBC Count   Date Value Ref Range Status   07/14/2022 17.0 (H) 4.0 - 11.0 10e3/uL Final   ], HgB -   Hemoglobin   Date Value Ref Range Status   07/14/2022 7.4 (L) 11.7 - 17.7 g/dL Final     Comment:     Reference Range:                                                     Female 11.7-15.7 g/dL                                      Male 13.3-17.7 g/dL   12/14/2020 14.8 13.3 - 17.7 g/dL Final   ]   Liver Function Studies -   Recent Labs   Lab Test 07/14/22  1837   PROTTOTAL 5.5*   ALBUMIN 2.8*   BILITOTAL 0.9   ALKPHOS 165*   AST 42   ALT 44         IMAGING:  Results for orders placed or performed during the hospital encounter of 12/15/20   MR Brain w/o & w Contrast    Narrative     MR BRAIN W/O & W CONTRAST 12/17/2020 6:38 PM    Provided History: History of metastatic colorectal cancer with mets to  the liver, and also concern for possible renal cell carcinoma  presenting with worsening mental state. Please evaluate for any  metastatic brain lesions..    Comparison: None.    Technique: Multiplanar T1-weighted, axial FLAIR, and susceptibility  images were obtained without intravenous contrast. Following  intravenous gadolinium-based contrast administration, axial  T2-weighted, diffusion, and T1-weighted images (in multiple planes)  were obtained.    Contrast: 8.5 mL Gadavist IV    Findings:  Images degraded by motion artifact.  There is no mass effect, midline shift, or evidence of intracranial  hemorrhage. The ventricles are proportionate to the cerebral sulci.  Normal major vascular intracranial flow-voids.    Postcontrast images demonstrate no abnormal intracranial enhancement.    No abnormality of the skull marrow signal. The visualized portions of  paranasal sinuses, and mastoid air cells are relatively  clear. The  orbits are grossly unremarkable.      Impression    Impression:  Unremarkable MRI of the brain without evidence of metastatic disease.    I have personally reviewed the examination and initial interpretation  and I agree with the findings.    MICHELLE ALBERTS MD   CT [facility]read    Narrative    EXAMINATION: CT [facility] OVERREAD, 12/16/2020 4:39 PM    TECHNIQUE: Outside films dated 12/6/2020 were submitted for  interpretation. CT images extending from the lung bases to pubic  symphysis were performed with IV contrast.    COMPARISON: CT chest abdomen pelvis 5/28/2020    PREVIOUS REPORT: The original interpretation was not  available for  review at the time of this dictation.     HISTORY: METASTATIC COLORECTAL CANCER    FINDINGS:   This report is designed to answer a focused clinical  question and should be interpreted in conjunction with the original  report.     Lung bases: No consolidation or pleural effusion. Calcified granuloma  in the peripheral right lower lobe.    Liver: Postsurgical changes of right hepatectomy. Multiple clips/coils  along the superior surface of the liver, streak metallic artifacts  obscure part of the liver. Portal veins appear patent. Normal  appearance of the remnant liver, likely treatment related.    There are at least 2 liver lesions noted, which demonstrate interval  increase (most interval change is noted in the lesion along the  resection margin). First lesion is noted along the resection margin on  image 18 series 2 which measures 4.2 x 3.6 cm on image 18 series 2  (previously measuring 5.1 x 5.6 cm when measured in similar fashion).  The second lesion is located in segment 3/4A measuring 2.7 x 2.5 cm on  image 20 series 2 (previously 3.0 x 2.6 cm when measured in similar  fashion). There is what appears to be cluster of dilated small bowel  biliary ducts in segment 2 on image 25 series 2, while noting a  discrete 9 mm lesion in this location on CT exam dated June  2019. This  area is essentially stable since most recent exam from May 2020. No  new hepatic lesion.    Gallbladder: Surgically absent.  Spleen: Punctate hyperdensities likely representing calcified  granulomas.  Pancreas: No suspicious pancreatic lesions. The pancreatic duct is not  dilated.  Adrenal glands: No adrenal nodules.   Kidneys: No hydronephrosis or obstructing renal stones. There is a  homogeneous lesion in the upper pole of left kidney (series 4, image  50), with Hounsfield units of 73. This is essentially stable since  June 2017 exam, where dedicated three-phase exam was performed and  demonstrated no enhancement in this lesion. Today it measures 1.8 x  1.9 cm, with previous measurement in 2017 being 1.7 x 1.7 cm.  Bladder / Pelvic organs: Enlarged prostate with calcifications, the  prostate measures 6.4 cm in maximum dimension.  Bowel: Postsurgical changes of right hemicolectomy. No abnormal  dilatation of the small or large bowel loops. Lymph nodes: No  retroperitoneal, mesenteric, or pelvic lymphadenopathy.  Peritoneum / Retroperitoneum: No free fluid or air within the abdomen.  Vessels: No infrarenal aortic aneurysm. Scattered atherosclerotic  calcification of the abdominal aorta and iliac arteries. Ectasia of  the right common iliac artery measuring 1.7 cm (series 4, image 49).    Bones and soft tissues: No aggressive osseous lesion.      Impression    IMPRESSION: In this patient with history of metastatic colorectal  cancer, the current scan compared to CT chest abdomen pelvis dated  5/26/2020 shows:  1. Interval mild decrease in size of at least 2 hepatic lesions in the  superior right lobe and superior left lobe cyst, significantly more so  in the lesion along the resection margin.   1a. Stable cluster of what appears to be tiny dilated biliary ducts in  segment 2 in the location of 9 mm lesion seen on June 2019 exam.  Attention on follow-up.  1b. No new focal liver lesion.  2. The 1.9 cm left  renal lesion is at the most minimally enlarged by 1  to 2 mm since 2017. Furthermore, demonstrated no internal enhancement  on 3 phase contrast exam from 2017. This is favored represent  hemorrhagic/proteinaceous cyst. Recommend attention on follow-up.  3. Postsurgical changes of right hepatectomy and right hemicolectomy.    I have personally reviewed the examination and initial interpretation  and I agree with the findings.    MARCI LANCASTER MD   US Abdomen Limited w Doppler Complete    Narrative    EXAMINATION: US ABDOMEN LIMITED WITH DOPPLER COMPLETE  12/19/2020  12:21 PM      CLINICAL HISTORY: elevated LFTs    COMPARISON: CT 12/6/2020        FINDINGS:  Status post right hepatectomy. The liver is mildly increased in  echogenicity. The known liver lesions are not well identified  sonographically. There is no intrahepatic or extrahepatic biliary  ductal dilatation. The common bile duct measures 4 mm. The gallbladder  is surgically absent.    Hepatic arterial, hepatic venous and portal venous waveforms are usual  in direction and amplitude as documented by both color and spectral  Doppler evaluation. The visualized upper abdominal aorta and inferior  vena cava are normal.    The spleen measures maximally 13.5 cm and is normal in appearance. The  visualized portions of the pancreas are normal in echogenicity.    The kidneys are normal in position and echogenicity. The right kidney  measures 13.8 cm and the left kidney measures 13.6 cm. Several renal  cysts visualized. There is no significant urinary tract dilation.      Impression    Impression:  1. The known liver lesions are not well identified sonographically.  2. Patent Doppler evaluation of the liver.  3. Borderline splenomegaly.    MOUNA LEWIS MD   XR Lumbar Spine 2/3 Views    Narrative    3 views lumbar spine radiographs 12/22/2020 12:12 PM    History: lumbosacral pain and bilateral peripheral neuropathy     Comparison: CT 12/6/2020    Findings:    Standing   AP, lateral including lumbosacral spot film lateral view  views of the lumbar spine were obtained.    5  lumbar type vertebral bodies are assumed for the purpose of this  dictation.    There is no acute osseous abnormality.      Lumbar disc spaces maintained. Lower lumbar predominant facet  arthropathy. Normal AP alignment is maintained.    The visualized bowel gas pattern is non-obstructive.    Scattered surgical clips project over the right abdomen. Vascular  calcifications.      Impression    Impression:  1.  No acute osseous abnormality.  2.  Lumbar disc spaces maintained. Lower lumbar facet hypertrophy.    MERCEDES LIAO MD (Joe)       This note was created using voice recognition software. Undetected word substitutions or other errors may have occurred.     Nellie Almendarez MD    Time spent with the patient, reviewing the EMR, reviewing laboratory and imaging studies, more than 50% of which was counseling and coordinating care:  30 minutes.

## 2022-07-15 NOTE — H&P
Admitted: 07/14/2022    CHIEF COMPLAINT:  Fall with melena, hypotension.    HISTORY OF PRESENT ILLNESS:  Los Huang is a 66-year-old gentleman with metastatic colon cancer with history of partial colon resection, who has got significant psychiatric history including history of schizoaffective disorder, bipolar 1, ADHD, polysubstance abuse, mood disorder, anxiety, who presents to [facility] with the above complaints.    The patient currently is not undergoing any chemotherapy.  He has stopped seeing his oncologist.  He has been on hospice now for a total of 6 months.  His disease has been stable.  The patient has been living at a long-term care center with hospice.    The staff found him today in the shower with tarry stools.  He sustained a hematoma over the left eyebrow.  The patient is not on any blood-thinner medications.  He was seen by EMS, and he was noted to be tachycardic with heart rates in the 130s, hypertensive blood pressure in the 70s.  He received aspirin, nitroglycerin.  The patient was brought into [facility] for further assessment.    In the Emergency Department, the patient was seen by Dr. Mirza Ortiz.  Due to the melena and hypotension, the patient's blood work revealed a white count elevated at 17,000, hemoglobin 7.4, platelets 221.  He was afebrile.  Lactic acid was 7.  BUN was elevated at 63.  Creatinine was normal.  Albumin was 2.8.  AST and ALT are normal.  Total bilirubin is normal.  He had a CT of the head, which showed no acute finding other than a dense, ovoid nodule noted in the anterior sella that was noted previously.  There is a small left anterior scalp hematoma.  He had a CT spine, which showed no traumatic malalignment.  The patient has multiple thyroid nodules, the largest greater than 15 mm.  He had a CT, which showed pancolitis in the post-hemicolectomy section, question of Clostridium difficile, otherwise no acute findings noted.  There is  no PE.    The patient had emergent EGD by Dr. Pedroza, and there was noted to be one varix that was banded.  The patient has been started on octreotide.  He did receive 80 mg IV Protonix push.  He is currently undergoing 1 unit.  He is going to be admitted to [facility] for monitoring overnight.  He is n.p.o.    PAST MEDICAL HISTORY:    1.  Metastatic colon cancer to the liver.  2.  Bipolar type 1 disorder.  3.  ADHD.  4.  Polysubstance abuse.  5.  Schizoaffective disorder.  6.  Posttraumatic osteoarthritis of the left shoulder.  7.  History of COVID-19.  8.  History of pulmonary nodules.    PAST SURGICAL HISTORY:    1.  Abdominal surgery.  2.  Cholecystectomy.  3.  Colonoscopy.  4.  ERCP with sphincterotomy.  5.  IR visceral angiogram with IR embolization.  6.  Orthopedic surgery.    FAMILY HISTORY:  Sister with osteoporosis, thyroid disease, anxiety and substance abuse.  Parents with mental illness.    SOCIAL HISTORY:  The patient is DNR/DNI.  His next of kin is his son and daughter, who are here.  He is DNR/DNI.  He is really unable to give much history and has very little insight with respect to his medical issues and is considered non-decisional.    ALLERGIES:  NO KNOWN DRUG ALLERGIES.  HE HAS ANIMAL DANDER SENSITIVITY.    CURRENT MEDICATION LIST:    1.  Cogentin.  2.  Simethicone.  3.  Trazodone.  4.  Tylenol.  5.  Artificial saliva.  6. FiberCon.  7.  Flexeril.  8.  Voltaren gel.  9.  Docusate.  10.  Lasix.  11.  Hydrocortisone.  12.  Invega injection every 28 days.  13.  Ativan.  14.  Melatonin.  15.  Zyprexa.,  16.  Ondansetron  17.  Oxycodone.  18.  MiraLax.    19.  Systane eyedrops.    20.  Senna.  21.  [place] nasal spray.    REVIEW OF SYSTEMS:  The patient unable to provide due to his altered mentation.    PHYSICAL EXAMINATION:    VITAL SIGNS:  Temperature is 97.9, heart rate in the 130s, coming down to the 120s, blood pressure initially 61/39.  Currently, his blood pressure improved to 105 systolically while he  is receiving blood as well as having received vancomycin and Zosyn.  GENERAL:  The patient has a flat affect.  He is frail, elderly.   HEENT:  He has got a left forehead hematoma.  Pupils are equal.  Mucous membranes are dry and parched.  NECK:  JVP is flat.  PULMONARY:  Lungs are clear to auscultation.  CARDIOVASCULAR:  S1, S2, tachycardic, regular.  GASTROINTESTINAL:  Abdomen is soft.  There is no guarding or rebound.  Bowel sounds are hypoactive.  MUSCULOSKELETAL:  Shows no edema.  Significant atrophy.    SKIN:  He has got numerous cherry angiomas.  Skin is warm, dry, well-perfused.  NEUROLOGIC:  He does move all 4 extremities.  Cranial nerves grossly intact.  PSYCHIATRIC:  Unable to assess due to his encephalopathy.    LABORATORY AND IMAGING STUDIES:  As dictated in History of Present Illness.    ASSESSMENT:  Los Huang is a 66-year-old gentleman who just got out of hospice, who is DNR/DNI with metastatic colon cancer, significant mental health issues including schizoaffective disorder as well as bipolar type 1 disorder, who was admitted with melena, hypotension and suspected bleeding varix that has been intervened on, who has got blood loss anemia with lactic acidosis, being admitted for further treatment.    PLAN:    1.  Melena with severe acute blood loss anemia:  The patient's hemoglobin is down to 7.4.  His INR is 1.36.  The patient is getting 1 unit packed red blood cells.  We will repeat hemoglobin after 1 unit.  He did have an EGD and had a varix banded.  The patient will be made n.p.o.  He has been on octreotide drip and will receive IV fluids.  The patient will be followed by Gastroenterology.  The patient did receive IV Protonix.  2.  Serial lactic acidosis with elevated white count:  The patient does not have a fever.  The lactic acid may be from hypoperfusion.  White count elevation may be an acute phase reactant with blood loss, but given his liver metastasis and his altered mentation, the  patient has been started on vancomycin and Zosyn.  We will continue Zosyn for now.  Blood cultures have already been obtained.  3.  History of schizoaffective disorder and bipolar type 1 as well as attention deficit hyperactivity disorder:  Currently the patient is n.p.o., but hopefully once he is able to take oral, he can resume all of his psychiatric medications, which will be on hold.  He is on 1000 mg of Depakote, which will give through the IV 3 times a day at 300 mg.  4.  Metastatic colon cancer:  The patient does not want any chemotherapy, verified by the patient's daughter and son.  We will obtain a consultation from Palliative Care for further goals of care.  The family reconfirmed his DNR/DNI status.  5.  Fall:  The patient was found on the ground.  He has got left supraorbital hematoma.  CT showed no acute finding.  CT spine was also reassuring.  CT of the chest, abdomen and pelvis did not show any internal trauma or injury other than some inflammation of the colon.    DEEP VENOUS THROMBOSIS PROPHYLAXIS:  The patient will have compression boots.    CODE STATUS:  DNR/DNI.    Taye Maurer MD        D: 2022   T: 2022   MT: JET    Name:     NEW DICKERSON  MRN:      [id number]        Account:     [id number]   :      1955           Admitted:    2022       Document: [id number]

## 2022-07-15 NOTE — PLAN OF CARE
Goal Outcome Evaluation:    Plan of Care Reviewed With: patient     Overall Patient Progress: no change    Outcome Evaluation: pt NPO, unclear GOC yet. will follow for diet advancement vs nutrition support (if appropriate pending GOC)    Galilea Limon RDN

## 2022-07-15 NOTE — PROGRESS NOTES
"CLINICAL NUTRITION SERVICES  -  ASSESSMENT NOTE      RECOMMENDATIONS FOR MD/PROVIDER TO ORDER:   - consider diet advancement vs nutrition support within next 3-4 days, if within GOC   Future/Additional Recommendations:   - monitor for GI notes: diet advancement vs nutrition support, pending GOC  - weight trends   Malnutrition:   % Weight Loss:  Unable to assess - pt reported 10# weight loss over unknown period of time  % Intake:  Decreased intake does not meet criteria for malnutrition - NPO x1 day  Subcutaneous Fat Loss:  Orbital region moderate depletion - visual  Muscle Loss:  Temporal region moderate depletion and Clavicle bone region mild depletion - visual  Fluid Retention:  Mild left and right ankles, feet    Malnutrition Diagnosis: suspect at least Moderate malnutrition  In Context of:  Chronic illness or disease        REASON FOR ASSESSMENT  Los Huang is a 66 year old adult seen by Registered Dietitian for Admission Nutrition Risk Screen for positive  - weight loss: unsure  - decreased appetite: yes       NUTRITION HISTORY  - Information obtained from chart review and pt  - PMH of metastatic colon cancer with history of partial colon resection, who has got significant psychiatric history including history of schizoaffective disorder, bipolar 1, ADHD, polysubstance abuse, mood disorder, anxiety  - per ED note, presents via EMS from assisted living with unwitnessed fall, shortness of breath, and melena. Staff found patient down in the shower today and sustained with a hematoma above his left eyebrow. Staff noted tarry stool and daughter confirmed it. Patient has a history of metastatic colon cancer liver and is not undergoing chemo or radiation.  - per SW note, patient is not currently enrolled in hospice. Patient was enrolled with [facility] but they discharged him at the end of May 2022 because he was not \"declining fast enough.\"   - admitted with Acute upper GI bleeding due to esophageal varices " "with Melena    - per discussion with pt, pt reported he was eating well PTA. He reported a UBW of 190# with weight loss so he now weighs 180# (5%) but unsure of time period it occurred.    CURRENT NUTRITION ORDERS  Diet Order:     NPO     - 7/15: per provider note, The patient will be made n.p.o., clears when ok with GI      Current Intake/Tolerance:  - NPO since admit x 1 day  - per discussion with pt, pt reported he is hungry now- not happy about NPO status.    - per provider note, will obtain a consultation from Palliative Care for further goals of care.  The family reconfirmed his DNR/DNI status.      NUTRITION FOCUSED PHYSICAL ASSESSMENT FOR DIAGNOSING MALNUTRITION)  Yes - visual            Observed:    Muscle wasting (refer to documentation in Malnutrition section) and Subcutaneous fat loss (refer to documentation in Malnutrition section)    Obtained from Chart/Interdisciplinary Team:  Edema mild    ANTHROPOMETRICS  Height: 6' 2\"  Weight: 177 lbs 0 oz  Body mass index is 22.73 kg/m .  Weight Status:  Normal BMI  IBW: 86.4 kg   % IBW: 93%  Weight History: weight trending down since 2020, difficult to assess recent weight hx without adequate recent weights   07/14/22 : 80.3 kg (177 lb)   11/04/21 : 83.1 kg (183 lb 4.8 oz) - per care everywhere  01/29/21 : 86.5 kg (190 lb 12.8 oz)   01/10/21 : 85 kg (187 lb 8 oz)   08/05/20 : 89.4 kg (197 lb)   07/30/20 : 89.4 kg (197 lb)   06/29/20 : 92.3 kg (203 lb 6.4 oz)   03/09/20 : 93 kg (205 lb)   01/16/20 : 94.9 kg (209 lb 3.2 oz)     LABS  Labs reviewed  - BUN 59 (H), -207    MEDICATIONS  Medications reviewed  - protonix, octreotide @ 10 mL/hr, IVF @ 100 mL/hr      ASSESSED NUTRITION NEEDS PER APPROVED PRACTICE GUIDELINES:  Dosing Weight: 80.3 kg (admit weight on 7/14)  Estimated Energy Needs: [id number] kcals (25-30-35 Kcal/Kg)  Justification: increased needs r/t CA dx  Estimated Protein Needs:  grams protein (1.2-1.5 g pro/Kg)  Justification: increased " needs r/t CA dx  Estimated Fluid Needs: (1 mL/Kcal)  Justification: maintenance and per provider pending fluid status    MALNUTRITION:  % Weight Loss:  Unable to assess - pt reported 10# weight loss over unknown period of time  % Intake:  Decreased intake does not meet criteria for malnutrition - NPO x1 day  Subcutaneous Fat Loss:  Orbital region moderate depletion - visual  Muscle Loss:  Temporal region moderate depletion and Clavicle bone region mild depletion - visual  Fluid Retention:  Mild left and right ankles, feet    Malnutrition Diagnosis: suspect at least Moderate malnutrition  In Context of:  Chronic illness or disease    NUTRITION DIAGNOSIS:  Inadequate oral intake related to NPO status, increased needs secondary to CA dx as evidenced by NPO x1 day      NUTRITION INTERVENTIONS  Recommendations / Nutrition Prescription  - none at this time      Implementation  Nutrition education: Not appropriate at this time due to patient condition      Nutrition Goals  Diet adv v nutrition support within 3-4 days if within GOC    MONITORING AND EVALUATION:  Progress towards goals will be monitored and evaluated per protocol and Practice Guidelines    Galilea Limon RDN

## 2022-07-15 NOTE — H&P
66 year old with metastatic colon cancer to liver on hospice recently discharged but also significant psychiatric hx including schizoaffective disorder, biploar I, ADHD presents with melena, hypotension and encephalopathy.  He had EGD with varix banded, blood transfusion, octreotide gtt.  Being admitted to [facility].      Dictation # [id number]      Taye Maurer MD

## 2022-07-15 NOTE — PROGRESS NOTES
[facility]  Gastroenterology Progress Note     Los Huang MRN# [id number]   YOB: 1955 Age: 66 year old          Assessment and Plan:   Los Huang is a 66 year old male with PMHx of metastatic colon adenocarcinoma who presents after being found down at his care facility with forehead ecchymosis and hypotension. Noted to have melana. Has h/o metastatic colon cancer to liver on hospice recently discharged but also significant psychiatric hx including schizoaffective disorder, biploar I, ADHD.    Gastrointestinal hemorrhage with melena  Grade IIII esophageal varices  Acute blood loss anemia  Liver cirrhosis  Hemoglobin on presentation 7.1->7.4->6.6 Baseline in 2020 around 14-15  EGD noted grade III esophageal varices  MELD 12 ( Creatinine 0.98, Sodium 140, Tbili 0.7, INR 1.36)  Albumin 2.5  SBP improved to  range  Cause of liver disease likely due to right hepatectomy and scar tissue     -- transfuse a unit of blood  -- Serial hemoglobin and continue to transfuse if hemoglobin less than 7  -- IV octreotide  -- Continue IV pantoprazole   -- NPO until stable hemoglobin  -- CHeck ammonia level  -- Daily CMP and INR    Metastasis from colon cancer (H)  CT of A/P notes changes of right hemicolectomy with pancolitis in the residual colon.   Bilateral renal neoplasms  Cirrhotic morphology of left hepatic lobe                  Interval History:   no new complaints, doing well, denies chest pain, denies shortness of breath, denies abdominal pain and doing well; no cp, sob, n/v/d, or abd pain.              Review of Systems:   C: NEGATIVE for fever, chills, change in weight  E/M: NEGATIVE for ear, mouth and throat problems  R: NEGATIVE for significant cough or SOB  CV: NEGATIVE for chest pain, palpitations or peripheral edema             Medications:   I have reviewed this patient's current medications    piperacillin-tazobactam  3.375 g Intravenous Q6H     sodium  chloride (PF)  3 mL Intracatheter Q8H                  Physical Exam:   Vitals were reviewed  Vital Signs with Ranges  Temp:  [97.9  F (36.6  C)-98.5  F (36.9  C)] 98.5  F (36.9  C)  Pulse:  [] 101  Resp:  [] 12  BP: ()/(39-72) 108/67  SpO2:  [86 %-100 %] 100 %  No intake/output data recorded.  Constitutional: healthy, alert x3 and no distress- however has nonsensical conversation.  Cardiovascular: negative, PMI normal. No lifts, heaves, or thrills. RRR. No murmurs, clicks gallops or rub  Respiratory: negative, Percussion normal. Good diaphragmatic excursion. Lungs clear  Abdomen: Abdomen soft, non-tender. BS normal. No masses, organomegaly           Data:   I reviewed the patient's new clinical lab test results.   Recent Labs   Lab Test 07/15/22  0535 07/15/22  0415 07/14/22  2141 07/14/22 1837 07/14/22 1837 12/18/20  1029 12/14/20  2133 07/30/20  0712   WBC 12.8*  --   --   --  17.0*  --  7.2 6.0   HGB 6.6*  6.6* 7.4* 7.1*   < > 7.4*  --  14.8 15.7   MCV 90  --   --   --  93  --  91 91   *  --   --   --  221  --  176 169   INR  --   --   --   --  1.36* 1.07  --  1.02    < > = values in this interval not displayed.     Recent Labs   Lab Test 07/15/22  0535 07/14/22  1837 05/04/22  1350   POTASSIUM 4.4 3.9 3.7   CHLORIDE 113* 107 106   CO2 21 16* 24   BUN 59* 63* 20   ANIONGAP 6 15* 11     Recent Labs   Lab Test 07/15/22  0535 07/14/22  1837 05/04/22  1350 12/16/20  0728 12/14/20  2133   ALBUMIN 2.5* 2.8* 3.7   < > 3.7   BILITOTAL 0.7 0.9 0.9   < > 0.4   ALT 38 44 36   < > 80*   AST 39 42 35   < > 40   PROTEIN  --   --   --   --  Negative   LIPASE  --   --   --   --  214    < > = values in this interval not displayed.       I reviewed the patient's new imaging results.    All laboratory data reviewed  All imaging studies reviewed by me.    Tammie Mcqueen PA-C,  7/15/2022  [facility]  Office : [phone]  Cell: [phone] (Dr. Pedroza)  Cell: [phone] (Tammie  Charisse WERNER)

## 2022-07-15 NOTE — CONSULTS
"Care Management Initial Consult    General Information  Assessment completed with: Patient, Solo  Type of CM/SW Visit: Initial Assessment    Primary Care Provider verified and updated as needed: Yes   Readmission within the last 30 days: no previous admission in last 30 days   Reason for Consult: discharge planning  Advance Care Planning: Advance Care Planning Reviewed: present on chart        Communication Assessment  Patient's communication style: spoken language (English or Bilingual)    Hearing Difficulty or Deaf: no   Wear Glasses or Blind: yes    Cognitive  Cognitive/Neuro/Behavioral: .WDL except (slow to respond)  Level of Consciousness: lethargic  Arousal Level: opens eyes spontaneously  Orientation: oriented x 4  Mood/Behavior: calm, cooperative  Best Language: 0 - No aphasia  Speech: slow, rambling    Living Environment:   People in home: alone     Current living Arrangements: independent living facility      Able to return to prior arrangements: other (see comments) (TBD)    Family/Social Support:  Care provided by: self, homecare agency  Provides care for:    Marital Status:   Other (specify) (\"Friends\")          Description of Support System: Other (see comments) (Unknown)         Current Resources:   Patient receiving home care services:  (Patient gets med's daily)     Community Resources:    Equipment currently used at home: none  Supplies currently used at home:  none    Employment/Financial:  Employment Status:        Financial Concerns:       Lifestyle & Psychosocial Needs:  Social Determinants of Health     Tobacco Use: Medium Risk     Smoking Tobacco Use: Former Smoker     Smokeless Tobacco Use: Former User   Alcohol Use: Not on file   Financial Resource Strain: Low Risk      Difficulty of Paying Living Expenses: Not hard at all   Food Insecurity: No Food Insecurity     Worried About Running Out of Food in the Last Year: Never true     Ran Out of Food in the Last Year: Never true " "  Transportation Needs: No Transportation Needs     Lack of Transportation (Medical): No     Lack of Transportation (Non-Medical): No   Physical Activity: Not on file   Stress: Not on file   Social Connections: Not on file   Intimate Partner Violence: Unknown     Fear of Current or Ex-Partner: Patient refused     Emotionally Abused: Patient refused     Physically Abused: Patient refused     Sexually Abused: Patient refused   Depression: Not at risk     PHQ-2 Score: 1   Housing Stability: Not on file     Functional Status:  Prior to admission patient needed assistance: Per patient he needed no assistance in daily tasks.    Mental Health Status:  Chemical Dependency Status:  Values/Beliefs:  Spiritual, Cultural Beliefs, Jainism Practices, Values that affect care:           Additional Information:    Consult for discharge planning. Patient admitted on 7/14 for fall and a tentative discharge date to be determined. LAURIE spoke with [facility] [phone] who stated that patient is not currently enrolled in hospice. Patient was enrolled with [facility] but they discharged him at the end of May 2022 because he was not \"declining fast enough.\" PHILOMENA stated patient lives alone in independent living and received medication management. [facility] stated they can accept patient back on or off hospice and if/when patient is back to his baseline.    PHILOMENA stated patient complained of tar like stool for 3 days prior to fall, 2 nurses went to room to collect stool to mail in for examination. When the nurses got to patients room he was lying in bed covered in stool. The nurses moved patient into the shower and attempted to clean him. When they made the decision to call an ambulance the nurses placed patient on the ground.    Writer met with patient at bedside and introduced self and role. Writer confirmed patient's primary doctor is Baabr Mckinney. Patient answered questions in a delayed manor and appeared lighthearted. " "Patient stated he lives alone and would like to go back to his apartment upon discharge. SW explained they're no discharge plans yet but SW will continue to follow. Patient stated if he had to go on hospice again he would like a different agency. SW explained that it was his choice if he wanted to re-enroll in hospice. Patient stated he felt writer was \"trying to figure me out,\" and he would like to figure writer out. When SW asked about friends / family patient stated his friends all live \"away.\" SW attempted to call patients daughter and left .     Addendum:   SW spoke to patient and patients daughter and they have decided to try hospice again with a different agency. SW sent hospice referral to [facility].     Agnes Simms, KIM, LGSW      "

## 2022-07-15 NOTE — PHARMACY-ADMISSION MEDICATION HISTORY
Pharmacy Medication History  Admission medication history interview status for the 7/14/2022  admission is complete. See EPIC admission navigator for prior to admission medications     Location of Interview: Patient room  Medication history sources: Med list from [facility]    Significant changes made to the medication list:  Added apap, cyclobenzaprine, ocean nasal spray, diclofenac, furosemide, hydrocortisone, invega, lorazepam, melatonin, art saliva, olanzapine, ondansetron, oxycodone, peg, senna s, systane  Modified docusate, fibercon  Removed strattera, vraylar, vit d, clonazepam, depakote, hydroxyzine, mvi, tucks    In the past week, patient estimated taking medication this percent of the time: greater than 90%    Additional medication history information:   No last dose info sent, but the patient said he had his morning meds    Medication reconciliation completed by provider prior to medication history? Yes    Time spent in this activity: 45 minutes    Prior to Admission medications    Medication Sig Last Dose Taking? Auth Provider Long Term End Date   Artificial Saliva (MOUTH KOTE) SOLN Take 1 mL by mouth 2 times daily as needed prn Yes Unknown, Entered By History     benztropine (COGENTIN) 1 MG tablet Take 1 tablet (1 mg) by mouth At Bedtime 7/13/2022 at Unknown time Yes Nigel Ash,  Yes    calcium polycarbophil (FIBERCON) 625 MG tablet Take 1 tablet by mouth daily as needed for constipation prn Yes Reported, Patient     cyclobenzaprine (FLEXERIL) 10 MG tablet Take 10 mg by mouth 2 times daily as needed prn Yes Unknown, Entered By History     diclofenac (VOLTAREN) 1 % topical gel Apply 2 g topically 2 times daily 7am and 7pm. To right shoulder and knee 7/14/2022 at am Yes Unknown, Entered By History     docusate sodium (COLACE) 100 MG capsule Take 100 mg by mouth 2 times daily as needed for constipation prn Yes Unknown, Entered By History     furosemide (LASIX) 40 MG tablet Take 40 mg by  mouth daily 7/14/2022 at Unknown time Yes Unknown, Entered By History Yes    hydrocortisone (CORTAID) 1 % external cream Apply topically 4 times daily as needed for hemorrhoids prn Yes Unknown, Entered By History     INVEGA SUSTENNA 234 MG/1.5ML JOE Inject 234 mg into the muscle every 28 days  Yes Unknown, Entered By History Yes    LORazepam (ATIVAN) 1 MG tablet Take 1 mg by mouth every 4 hours as needed for anxiety prn Yes Unknown, Entered By History     LORazepam (ATIVAN) 1 MG tablet Take 1 mg by mouth 2 times daily Given at 8am and 8pm. See prn doses also 7/14/2022 at am Yes Unknown, Entered By History     Melatonin 10 MG TABS tablet Take 10 mg by mouth At Bedtime At 8pm 7/13/2022 at Unknown time Yes Unknown, Entered By History     OLANZapine zydis (ZYPREXA) 5 MG ODT Take 5 mg by mouth every 6 hours as needed prn Yes Unknown, Entered By History     ondansetron (ZOFRAN) 4 MG tablet Take 4 mg by mouth every 8 hours as needed for nausea prn Yes Unknown, Entered By History     oxyCODONE (ROXICODONE) 5 MG tablet Take 5 mg by mouth every 6 hours Given at 2am, 8am, 2om, 8pm.  See prn also 7/14/2022 at Unknown time Yes Unknown, Entered By History     oxyCODONE (ROXICODONE) 5 MG tablet Take 5 mg by mouth every hour as needed for severe pain See scheduled doses also prn Yes Unknown, Entered By History     polyethylene glycol (MIRALAX) 17 GM/Dose powder 17 g by oral route daily as needed prn Yes Unknown, Entered By History     polyethylene glycol-propylene glycol (SYSTANE) 0.4-0.3 % SOLN ophthalmic solution Place 1-2 drops into both eyes every 2 hours as needed for dry eyes prn Yes Unknown, Entered By History     SENNA PLUS 8.6-50 MG tablet Take 2 tablets by mouth 2 times daily as needed for constipation prn Yes Unknown, Entered By History     simethicone (MYLICON) 80 MG chewable tablet Take 1 tablet (80 mg) by mouth every 6 hours as needed for cramping prn Yes Cassy Noriega MD     sodium chloride (OCEAN) 0.65 %  nasal spray Spray 2 sprays into both nostrils every 30 minutes as needed for congestion prn Yes Unknown, Entered By History     traZODone (DESYREL) 50 MG tablet Take 1 tablet (50 mg) by mouth nightly as needed for sleep prn Yes Nigel Ash, DO Yes        The information provided in this note is only as accurate as the sources available at the time of update(s)

## 2022-07-15 NOTE — ED NOTES
[facility]  ED Nurse Handoff Report    ED Chief complaint: Fall, Chest Pain, and Shortness of Breath      ED Diagnosis:   Final diagnoses:   Gastrointestinal hemorrhage with melena   Fall, initial encounter   Closed head injury, initial encounter   Traumatic hematoma of forehead, initial encounter   Metastasis from colon cancer (H)   Bleeding esophageal varices, unspecified esophageal varices type (H)   Pancolitis (H)       Code Status: DNR / DNI    Allergies:   Allergies   Allergen Reactions     Animal Dander        Patient Story: Pt presents by [facility] from care facility after having an unwitnessed fall in the shower. Staff reports pt has black and tarry stool and pt had texted his daughter earlier today stating he had blood in the rectum. Pt is slow to respond on arrival, pale, diaphoretic and tachycardic/hypotensive. Pt has hematoma present to L forehead. Pt has hx of colon CA with mets to the liver and lungs. Daughter at bedside.   Pt was given 324 ASA, 2 SL nitroglycerin and 8 mg zofran by EMS for CP and SOB     Focused Assessment:  Pt color returned is more alert and orient just somnolent. No active bleeding present in rectal area only dried blood that was cleaned. Family at bedside aware of the plan for admission. 1 unit of blood given 7.1 hemoglobin after unit q 6 hours hemoglobin order by IP hospitalist     Treatments and/or interventions provided: 1 unit blood imaging blood work   Patient's response to treatments and/or interventions: therapeutic     To be done/followed up on inpatient unit:  serila hemoglobins     Does this patient have any cognitive concerns?: none    Activity level - Baseline/Home:  Stand with assist x2  Activity Level - Current:   Total Care    Patient's Preferred language: English   Needed?: No    Isolation: None  Infection: Not Applicable  Patient tested for COVID 19 prior to admission: YES  Bariatric?: No    Vital Signs:   Vitals:    07/14/22 2000  07/14/22 2030 07/14/22 2045 07/14/22 2100   BP: 92/56 112/72 104/68 104/67   Pulse: 112 109 106 105   Resp: 14 16 16 13   Temp:       TempSrc:       SpO2: 100% 100% 100% 100%   Weight:       Height:           Cardiac Rhythm:     Was the PSS-3 completed:   Yes  What interventions are required if any?               Family Comments:   OBS brochure/video discussed/provided to patient/family: N/A              Name of person given brochure if not patient: \              Relationship to patient:     For the majority of the shift this patient's behavior was Green.   Behavioral interventions performed were none.    ED NURSE PHONE NUMBER: RN 2

## 2022-07-16 LAB
ALBUMIN SERPL-MCNC: 2.6 G/DL (ref 3.4–5)
ALP SERPL-CCNC: 159 U/L (ref 40–150)
ALT SERPL W P-5'-P-CCNC: 45 U/L (ref 0–70)
AMMONIA PLAS-SCNC: 51 UMOL/L (ref 10–50)
ANION GAP SERPL CALCULATED.3IONS-SCNC: 5 MMOL/L (ref 3–14)
AST SERPL W P-5'-P-CCNC: 51 U/L (ref 0–45)
BILIRUB SERPL-MCNC: 1.4 MG/DL (ref 0.2–1.3)
BLD PROD TYP BPU: NORMAL
BLOOD COMPONENT TYPE: NORMAL
BUN SERPL-MCNC: 35 MG/DL (ref 7–30)
CALCIUM SERPL-MCNC: 7.5 MG/DL (ref 8.5–10.1)
CHLORIDE BLD-SCNC: 120 MMOL/L (ref 94–109)
CO2 SERPL-SCNC: 22 MMOL/L (ref 20–32)
CODING SYSTEM: NORMAL
CREAT SERPL-MCNC: 0.87 MG/DL (ref 0.52–1.25)
CROSSMATCH: NORMAL
ERYTHROCYTE [DISTWIDTH] IN BLOOD BY AUTOMATED COUNT: 18.1 % (ref 10–15)
GFR SERPL CREATININE-BSD FRML MDRD: ABNORMAL ML/MIN/{1.73_M2}
GLUCOSE BLD-MCNC: 109 MG/DL (ref 70–99)
HCT VFR BLD AUTO: 22.1 % (ref 35–53)
HGB BLD-MCNC: 7.1 G/DL (ref 11.7–17.7)
HGB BLD-MCNC: 7.7 G/DL (ref 11.7–17.7)
INR PPP: 1.43 (ref 0.85–1.15)
ISSUE DATE AND TIME: NORMAL
MCH RBC QN AUTO: 30.1 PG (ref 26.5–33)
MCHC RBC AUTO-ENTMCNC: 32.1 G/DL (ref 31.5–36.5)
MCV RBC AUTO: 94 FL (ref 78–100)
PLATELET # BLD AUTO: 93 10E3/UL (ref 150–450)
POTASSIUM BLD-SCNC: 3.6 MMOL/L (ref 3.4–5.3)
PROCALCITONIN SERPL-MCNC: 0.21 NG/ML
PROCALCITONIN SERPL-MCNC: 0.56 NG/ML
PROT SERPL-MCNC: 4.9 G/DL (ref 6.8–8.8)
RBC # BLD AUTO: 2.36 10E6/UL (ref 3.8–5.9)
SODIUM SERPL-SCNC: 147 MMOL/L (ref 133–144)
UNIT ABO/RH: NORMAL
UNIT NUMBER: NORMAL
UNIT STATUS: NORMAL
UNIT TYPE ISBT: 6200
WBC # BLD AUTO: 5.7 10E3/UL (ref 4–11)

## 2022-07-16 PROCEDURE — 85027 COMPLETE CBC AUTOMATED: CPT | Performed by: HOSPITALIST

## 2022-07-16 PROCEDURE — P9016 RBC LEUKOCYTES REDUCED: HCPCS | Performed by: HOSPITALIST

## 2022-07-16 PROCEDURE — 85610 PROTHROMBIN TIME: CPT | Performed by: INTERNAL MEDICINE

## 2022-07-16 PROCEDURE — 120N000001 HC R&B MED SURG/OB

## 2022-07-16 PROCEDURE — 250N000011 HC RX IP 250 OP 636: Performed by: INTERNAL MEDICINE

## 2022-07-16 PROCEDURE — 36415 COLL VENOUS BLD VENIPUNCTURE: CPT | Performed by: INTERNAL MEDICINE

## 2022-07-16 PROCEDURE — 85018 HEMOGLOBIN: CPT | Performed by: HOSPITALIST

## 2022-07-16 PROCEDURE — 250N000011 HC RX IP 250 OP 636: Performed by: PHYSICIAN ASSISTANT

## 2022-07-16 PROCEDURE — C9113 INJ PANTOPRAZOLE SODIUM, VIA: HCPCS | Performed by: PHYSICIAN ASSISTANT

## 2022-07-16 PROCEDURE — 250N000013 HC RX MED GY IP 250 OP 250 PS 637: Performed by: INTERNAL MEDICINE

## 2022-07-16 PROCEDURE — 80053 COMPREHEN METABOLIC PANEL: CPT | Performed by: HOSPITALIST

## 2022-07-16 PROCEDURE — 82140 ASSAY OF AMMONIA: CPT | Performed by: HOSPITALIST

## 2022-07-16 PROCEDURE — 36415 COLL VENOUS BLD VENIPUNCTURE: CPT | Performed by: HOSPITALIST

## 2022-07-16 PROCEDURE — 250N000013 HC RX MED GY IP 250 OP 250 PS 637: Performed by: HOSPITALIST

## 2022-07-16 PROCEDURE — 120N000013 HC R&B IMCU

## 2022-07-16 PROCEDURE — 258N000003 HC RX IP 258 OP 636: Performed by: INTERNAL MEDICINE

## 2022-07-16 PROCEDURE — 84145 PROCALCITONIN (PCT): CPT | Performed by: HOSPITALIST

## 2022-07-16 PROCEDURE — 99233 SBSQ HOSP IP/OBS HIGH 50: CPT | Performed by: HOSPITALIST

## 2022-07-16 RX ORDER — OLANZAPINE 5 MG/1
5 TABLET, ORALLY DISINTEGRATING ORAL EVERY 6 HOURS PRN
Status: DISCONTINUED | OUTPATIENT
Start: 2022-07-16 | End: 2022-07-18 | Stop reason: HOSPADM

## 2022-07-16 RX ORDER — OXYCODONE HYDROCHLORIDE 5 MG/1
5 TABLET ORAL
Status: DISCONTINUED | OUTPATIENT
Start: 2022-07-16 | End: 2022-07-18 | Stop reason: HOSPADM

## 2022-07-16 RX ORDER — BENZTROPINE MESYLATE 1 MG/1
1 TABLET ORAL AT BEDTIME
Status: DISCONTINUED | OUTPATIENT
Start: 2022-07-16 | End: 2022-07-18 | Stop reason: HOSPADM

## 2022-07-16 RX ORDER — LORAZEPAM 1 MG/1
1 TABLET ORAL 2 TIMES DAILY
Status: DISCONTINUED | OUTPATIENT
Start: 2022-07-16 | End: 2022-07-18 | Stop reason: HOSPADM

## 2022-07-16 RX ORDER — LORAZEPAM 1 MG/1
1 TABLET ORAL EVERY 4 HOURS PRN
Status: DISCONTINUED | OUTPATIENT
Start: 2022-07-16 | End: 2022-07-18 | Stop reason: HOSPADM

## 2022-07-16 RX ORDER — TRAZODONE HYDROCHLORIDE 50 MG/1
50 TABLET, FILM COATED ORAL
Status: DISCONTINUED | OUTPATIENT
Start: 2022-07-16 | End: 2022-07-18 | Stop reason: HOSPADM

## 2022-07-16 RX ADMIN — PIPERACILLIN AND TAZOBACTAM 3.38 G: 3; .375 INJECTION, POWDER, FOR SOLUTION INTRAVENOUS at 03:29

## 2022-07-16 RX ADMIN — PIPERACILLIN AND TAZOBACTAM 3.38 G: 3; .375 INJECTION, POWDER, FOR SOLUTION INTRAVENOUS at 09:39

## 2022-07-16 RX ADMIN — LORAZEPAM 1 MG: 1 TABLET ORAL at 12:05

## 2022-07-16 RX ADMIN — SODIUM CHLORIDE: 9 INJECTION, SOLUTION INTRAVENOUS at 09:56

## 2022-07-16 RX ADMIN — OLANZAPINE 5 MG: 5 TABLET, ORALLY DISINTEGRATING ORAL at 03:28

## 2022-07-16 RX ADMIN — LORAZEPAM 1 MG: 1 TABLET ORAL at 20:28

## 2022-07-16 RX ADMIN — BENZTROPINE MESYLATE 1 MG: 1 TABLET ORAL at 21:33

## 2022-07-16 RX ADMIN — OCTREOTIDE ACETATE 50 MCG/HR: 200 INJECTION, SOLUTION INTRAVENOUS; SUBCUTANEOUS at 22:12

## 2022-07-16 RX ADMIN — PANTOPRAZOLE SODIUM 40 MG: 40 INJECTION, POWDER, FOR SOLUTION INTRAVENOUS at 09:40

## 2022-07-16 RX ADMIN — DICLOFENAC SODIUM 2 G: 10 GEL TOPICAL at 12:05

## 2022-07-16 RX ADMIN — PANTOPRAZOLE SODIUM 40 MG: 40 INJECTION, POWDER, FOR SOLUTION INTRAVENOUS at 20:28

## 2022-07-16 ASSESSMENT — ACTIVITIES OF DAILY LIVING (ADL)
ADLS_ACUITY_SCORE: 39
ADLS_ACUITY_SCORE: 38
ADLS_ACUITY_SCORE: 39

## 2022-07-16 NOTE — PROVIDER NOTIFICATION
Brief update:    Paged as patient requesting something to sleep    Patient has metastatic colon cancer as well as esophageal varices and GI hemorrhage with melena, hepatic cirrhosis.    Patient also has a history of schizoaffective disorder with bipolar type I.  His prior to admission psychiatric medications were held with the exception of Depakote, which was converted to IV given n.p.o. status with GI bleed    Note that patient historically has been on hospice.  Was seen by palliative care today and there was discussion regarding continuing current cares while family was deciding on hospice.  Subsequently seen by social work, and family has opted to reenroll in hospice, though through a different agency.  It is mentioned that he is able to be back at his facility, the [facility], on or off hospice when patient is back to baseline.    Patient was n.p.o. until hemoglobin had stabilized per gastroenterology.  I see that patient's hemoglobin responded appropriately and was stable versus improving at recheck at 1700.  Is overdue for his every 6 hour hemoglobin, though this may be a moot point given plan to reenroll in hospice.    Added back PTA ODT zyprexa.  I have also added back patient's trazodone and as needed Ativan.    As there appears to be a requirement for patient to be back at baseline prior to discharge, and I am uncertain as to what his baseline is, could also consider GIP hospice consult.  Given referral sent to [facility] hospice through social work already, I will defer this decision to rounding provider.    Dariusz Quispe MD  3:28 AM

## 2022-07-16 NOTE — PLAN OF CARE
"Goal Outcome Evaluation:  Plan of Care Reviewed With: patient   Overall Patient Progress: no change    /61   Pulse 102   Temp 97.6  F (36.4  C) (Oral)   Resp 15   Ht 1.88 m (6' 2\")   Wt 80.3 kg (177 lb)   SpO2 98%   BMI 22.73 kg/m   .    Assessment: Patient is alert and oriented x4, slow to respond and rambling speech. Frustrated at times otherwise calm and cooperative. Hematoma present above left eye from fall at living facility. Tele normal sinus, intermittent sinus tachy. Tolerating room air. Hemoglobin improving after x1 unit RBC given this shift. Bedpan provided but no bowel movement this shift.  Patient able to verbalize needs for voiding and BM. Used urinal with nurse for urine sample, male external catheter otherwise in place. Moderate edema present in bilateral ankles. Octreotide infusing at 10ml/hr.     Pain management: reposition    Interventions this shift: 1unit winler DEMARCO MD consulted palliative care and social work today.     Goals for next shift: reorient as needed, monitor hemoglobin         "

## 2022-07-16 NOTE — PROGRESS NOTES
[facility]    Medicine Progress Note - Hospitalist Service    Date of Admission:  7/14/2022    Assessment & Plan            Los Huang is a 66 year old adult admitted on 7/14/2022.  Past history of metastatic colon cancer (previously on hospice), significant mental health issues including schizoaffective disorder as well as bipolar type 1 disorder, who was admitted with melena, hypotension due to bleeding varices s/p banding.        Acute upper GI bleeding due to esophageal varices with severe acute blood loss anemia and hemorrhagic shock  The patient's hemoglobin 7.4 on admision with hypotension to 70's systolic and lactic acid elevated at 2.4.  * GI consulted, underwent EGD 7/14 with grade III varices with stigmata of recent bleeding found in the lower third of the esophagus, at the lower esophageal sphincter, at the gastroesophageal junction and in the cardia, 36 cm from the incisors. They were 8 mm in largest diameter.  Red francoise signs were present. Three bands were successfully placed with complete eradication  * s/p 2 units PRBC following admission  ? hgb this AM 7.1, will give 1U PRBC now as pressures are soft, order conditional transfusion for hgb <7; monitor q12h  ? stop IVF given LE edema  ? Management of octreotide drip per GI    ?  IV Protonix q12h  ? INR stable at 1.4 today, likely due to cirrhosis     Hepatic encephalopathy  Noted to be mildly confused following admission.  Ammonia level 97.  - repeat ammonia this morning improved, will hold on lactulose and repeat tomorrow     Hemorrhagic shock with lactic acidosis with leukocytosis:  The patient does not have a fever.  The lactic acid may be from hypoperfusion.  White count elevation may be an acute phase reactant with blood loss, but given his liver metastasis and his altered mentation, the patient was  started on vancomycin and Zosyn in ED.    ? Discontinue zosyn as denies infectious symptoms; no ascites reported on  CT abd   ? Blood cultures ngtd  ? CBC in AM     History of schizoaffective disorder and bipolar type 1 as well as attention deficit hyperactivity disorder  - continue PTA Cogentin, lorazepam     Metastatic colon cancer  The patient does not want any chemotherapy, this was verified by the patient's daughter and son.  Previously on hospice but disenrolled due to disease stability and not appreciating the close monitoring of the Hospice team.   - is open to re-enrollment with a different hospice agency at discharge  - wants to continue current cares in hospital     Fall with contusion left supraorbital ridge  The patient was found on the ground.  CT head with head hematoma, no other acute finding.  CT spine was also reassuring.  CT of the chest, abdomen and pelvis did not show any internal trauma or injury other than some inflammation of the colon.  - Trauma surg signed off     Pancolitis  Noted per CT.  Do not believe related to melena or the GI bleeding as would expect hematochezia.    - no further stools following admission, enteric panel pending collection  - stop zosyn and monitor for recurrence  - WBC trending down; will add on procal to yesterday's labs if possible       Diet: Advance Diet as Tolerated: Clear Liquid Diet    DVT Prophylaxis: Pneumatic Compression Devices  Chew Catheter: Not present  Central Lines: PRESENT     Cardiac Monitoring: None  Code Status: No CPR- Do NOT Intubate      Disposition Plan      Expected Discharge Date: 07/19/2022      Destination: assisted living  Discharge Comments: 7/15: 2 untis of blood given  7/16 May return to living faclilty when hgb stable, Needs to be back to baseline        The patient's care was discussed with the Bedside Nurse and Patient.    Taye Mena MD  Hospitalist Service  [facility]  Securely message with the Vocera Web Console (learn more here)  Text page via [email or web address] Paging/Directory         Clinically Significant Risk Factors  Present on Admission                 # Moderate Malnutrition: based on nutrition assessment     ______________________________________________________________________    Interval History   He reports he is doing well.  Has some mild abdominal cramping.  No lightheadedness, dyspnea or chest pain/pressure.  Denies any fever/chills or infectious symptoms other than his presenting diarrhea which has since improved.     Data reviewed today: I reviewed all medications, new labs and imaging results over the last 24 hours. I personally reviewed no images or EKG's today.    Physical Exam   Vital Signs: Temp: 97.9  F (36.6  C) Temp src: Oral BP: 104/56 Pulse: 74   Resp: 16 SpO2: 96 % O2 Device: None (Room air)    Weight: 177 lbs 0 oz  General Appearance: Thin male in NAD  Respiratory: lungs CTAB, no wheezes or crackles, no tachypnea   Cardiovascular: RRR, normal s1/s2 without murmur  GI: abdomen soft, normal bowel sounds, nontender, nondistended  Skin: 2+ pitting lower extremity edema   Other: Alert and appropriate, cranial nerves grossly intact     Data

## 2022-07-16 NOTE — PROVIDER NOTIFICATION
MD Notification    Notified Person: MD    Notified Person Name: Dr Quispe    Notification Date/Time: 07/16; 0304    Notification Interaction: [employer] web paging    Purpose of Notification: Wi.,T 335  Pt has not slept and is requesting something for sleep. Pt is NPO, no exceptions. Nurse gave him IV dilaudid earlier and he states it hasn't helped.     Randi RN  #[id number]    Orders Received: PRN Ativan 1mg PO, PRN ZyPREXA 5mg PO, Trazadone 50mg PO. Advance diet as tolerated.    Comments: Discussed with doc that Hgb have been stable enough, pt family is working on transitioning pt to hospice care and he has no been on psych meds and needs sleep.

## 2022-07-16 NOTE — PLAN OF CARE
"A&O. Would make complete sense all night but occasionally have out of pocket comments, likely rt psych hx. VSS on RA. Denies pain. Pt taken off IMC last night.     Pt continuously called and became agitated because he could not sleep. It seemed to be exacerbating his mental health issues. He commented to me, \"can you get sleep without sleeping\".   Doc was paged and since his Hgb have been increasing and he is likely transitioning to hospice & with his psych hx It was important he gets oral meds to help sleep. PRN Trazadone, Ativan and ZyPREXA were ordered. PRN ZyPREXA was given. Advance diet as tolerated, clear liquids now. Pt tolerated well.     (+1-2) BLE edema. (-) BM overnight, urinating adequately with ext cath. Assist +1, did not get pt out of bed. NS 100mL/hr. AM Hgb.     Overall Patient Progress: improving      "

## 2022-07-16 NOTE — PROVIDER NOTIFICATION
"Paged Dr. Trina Kothari \"FYI: pt has abnormal lab results, please review. his Hgb is also 7.1. let me know if he needs blood transfusion. Thanks\" MD responded stating \"Will give 1 unit RBC now, conditional orders for less than 7 entered. Regarding lytes, encourage oral fluids. LFT's ok and will repeat tomorrow.\"       "

## 2022-07-16 NOTE — PLAN OF CARE
Goal Outcome Evaluation:    Reason for Admission:  Acute upper GI bleeding due to esophageal varices with Melena with severe acute blood loss anemia and hemorrhagic shock    Code status: DNR/DNI  Seizure or isolation precaution: on enteric precaution for C. Diff, please send stool sample to lab whenever pt has bowel movement.   Cognitive/Mentation: A/Ox 4  Use of CPAP: No  VS: stable  Tele: SR. Discontinued currently   GI: BS active x4, passing flatus, continent.  : voiding w/o difficulty. Continent.  Use of wolf, external catheter, or urinal: pt has external catheter, urine output was normal and adequate   Pulmonary: LS clear   Pain: No pain   Critical Labs to Monitor: Hgb was 7.1, 1 unit of blood was transfused. Recheck of Hgb was 7.7.   Electrolyte Replacement Protocol: N/A   Use of oxygen: N/A   Heparin or other drips: N/A     BG checks: N/A   Tests, MRI, CT, JAZZMINE, Echo: stool sample needs to be collected to rule out C-Diff. So fair pt does not have BM.   Drains: saline locked   Skin: pt has abrasion on the left side of his forehead above his left eye  Edema: pt has trace edema on lower bilateral extremity   Activity: Assist x 1 with GBW. Pt is currently on bedrest w/ bedside commode   Diet: regular diet  with thin  liquids. Takes pills w  Tube Feeding: N/A   Discharge: pending.

## 2022-07-17 LAB
ALBUMIN SERPL-MCNC: 2.8 G/DL (ref 3.4–5)
ALP SERPL-CCNC: 178 U/L (ref 40–150)
ALT SERPL W P-5'-P-CCNC: 53 U/L (ref 0–70)
AMMONIA PLAS-SCNC: 54 UMOL/L (ref 10–50)
AST SERPL W P-5'-P-CCNC: 54 U/L (ref 0–45)
BACTERIA UR CULT: NO GROWTH
BILIRUB DIRECT SERPL-MCNC: 0.5 MG/DL (ref 0–0.2)
BILIRUB SERPL-MCNC: 1.6 MG/DL (ref 0.2–1.3)
C COLI+JEJUNI+LARI FUSA STL QL NAA+PROBE: NOT DETECTED
EC STX1 GENE STL QL NAA+PROBE: NOT DETECTED
EC STX2 GENE STL QL NAA+PROBE: NOT DETECTED
ERYTHROCYTE [DISTWIDTH] IN BLOOD BY AUTOMATED COUNT: 19.3 % (ref 10–15)
HCT VFR BLD AUTO: 26.6 % (ref 35–53)
HGB BLD-MCNC: 8.3 G/DL (ref 11.7–17.7)
MCH RBC QN AUTO: 28.6 PG (ref 26.5–33)
MCHC RBC AUTO-ENTMCNC: 31.2 G/DL (ref 31.5–36.5)
MCV RBC AUTO: 92 FL (ref 78–100)
NOROV GI+II ORF1-ORF2 JNC STL QL NAA+PR: NOT DETECTED
PLATELET # BLD AUTO: 108 10E3/UL (ref 150–450)
PROT SERPL-MCNC: 5.6 G/DL (ref 6.8–8.8)
RBC # BLD AUTO: 2.9 10E6/UL (ref 3.8–5.9)
RVA NSP5 STL QL NAA+PROBE: NOT DETECTED
SALMONELLA SP RPOD STL QL NAA+PROBE: NOT DETECTED
SHIGELLA SP+EIEC IPAH STL QL NAA+PROBE: NOT DETECTED
SODIUM SERPL-SCNC: 147 MMOL/L (ref 133–144)
V CHOL+PARA RFBL+TRKH+TNAA STL QL NAA+PR: NOT DETECTED
WBC # BLD AUTO: 6.1 10E3/UL (ref 4–11)
Y ENTERO RECN STL QL NAA+PROBE: NOT DETECTED

## 2022-07-17 PROCEDURE — 87506 IADNA-DNA/RNA PROBE TQ 6-11: CPT | Performed by: INTERNAL MEDICINE

## 2022-07-17 PROCEDURE — 250N000013 HC RX MED GY IP 250 OP 250 PS 637: Performed by: INTERNAL MEDICINE

## 2022-07-17 PROCEDURE — 85014 HEMATOCRIT: CPT | Performed by: HOSPITALIST

## 2022-07-17 PROCEDURE — 120N000013 HC R&B IMCU

## 2022-07-17 PROCEDURE — 250N000013 HC RX MED GY IP 250 OP 250 PS 637: Performed by: HOSPITALIST

## 2022-07-17 PROCEDURE — 36415 COLL VENOUS BLD VENIPUNCTURE: CPT | Performed by: HOSPITALIST

## 2022-07-17 PROCEDURE — 250N000011 HC RX IP 250 OP 636: Performed by: PHYSICIAN ASSISTANT

## 2022-07-17 PROCEDURE — 82040 ASSAY OF SERUM ALBUMIN: CPT | Performed by: HOSPITALIST

## 2022-07-17 PROCEDURE — 82140 ASSAY OF AMMONIA: CPT | Performed by: HOSPITALIST

## 2022-07-17 PROCEDURE — 99232 SBSQ HOSP IP/OBS MODERATE 35: CPT | Performed by: HOSPITALIST

## 2022-07-17 PROCEDURE — 84295 ASSAY OF SERUM SODIUM: CPT | Performed by: HOSPITALIST

## 2022-07-17 PROCEDURE — 120N000001 HC R&B MED SURG/OB

## 2022-07-17 PROCEDURE — C9113 INJ PANTOPRAZOLE SODIUM, VIA: HCPCS | Performed by: PHYSICIAN ASSISTANT

## 2022-07-17 RX ADMIN — PANTOPRAZOLE SODIUM 40 MG: 40 INJECTION, POWDER, FOR SOLUTION INTRAVENOUS at 21:06

## 2022-07-17 RX ADMIN — PANTOPRAZOLE SODIUM 40 MG: 40 INJECTION, POWDER, FOR SOLUTION INTRAVENOUS at 08:28

## 2022-07-17 RX ADMIN — LORAZEPAM 1 MG: 1 TABLET ORAL at 21:05

## 2022-07-17 RX ADMIN — OLANZAPINE 5 MG: 5 TABLET, ORALLY DISINTEGRATING ORAL at 02:22

## 2022-07-17 RX ADMIN — LORAZEPAM 1 MG: 1 TABLET ORAL at 08:28

## 2022-07-17 RX ADMIN — BENZTROPINE MESYLATE 1 MG: 1 TABLET ORAL at 21:06

## 2022-07-17 ASSESSMENT — ACTIVITIES OF DAILY LIVING (ADL)
ADLS_ACUITY_SCORE: 39

## 2022-07-17 NOTE — PLAN OF CARE
A&O. VSS on RA. BP slightly soft. Denies pain. PRN ZyPREXA given per pt request for sleep. Denies dizziness/lightheadedness.  L hematoma above eyebrow, ecchymotic. BLE edema, improving.   (+) BM overnight, black/maroon, urinating adequately w ext catheter.   Assist +1, did not get pt out of bed overnight. Clear liquid diet, advance as tolerated. Octreotide running at 10mL/hr.   Still need stool sample, I did not clean pt up & stool sample was never gotten. Hgb q12H.       Overall Patient Progress: improving

## 2022-07-17 NOTE — PROGRESS NOTES
[facility]    Medicine Progress Note - Hospitalist Service    Date of Admission:  7/14/2022    Assessment & Plan              Los Huang is a 66 year old adult admitted on 7/14/2022.  Past history of metastatic colon cancer (previously on hospice), significant mental health issues including schizoaffective disorder as well as bipolar type 1 disorder, who was admitted with melena, hypotension due to bleeding varices s/p banding.        Acute upper GI bleeding due to esophageal varices with severe acute blood loss anemia and hemorrhagic shock  The patient's hemoglobin 7.4 on admision with hypotension to 70's systolic and lactic acid elevated at 2.4.  * GI consulted, underwent EGD 7/14 with grade III varices with stigmata of recent bleeding found in the lower third of the esophagus, at the lower esophageal sphincter, at the gastroesophageal junction and in the cardia, 36 cm from the incisors. They were 8 mm in largest diameter.  Red francoise signs were present. Three bands were successfully placed with complete eradication  * s/p 3 units PRBC following admission; most recently 7/16  ? hgb 8.3 this AM, repeat tomorrow  ? Discontinue octreotide drip  ?  IV Protonix q12h     Hepatic encephalopathy, resolved  Noted to be mildly confused following admission.  Ammonia level 97, likely due to GIB, improved to 54 the following day.   - repeat ammonia stable in 50's today; no confusion, will hold on lactulose     Lactic acidosis with leukocytosis  Pancolitis  WBC of 17.0 with lactate 2.4 at admission.  Afebrile.  Suspect lactic acid from hypoperfusion, leukocytosis may be stress demargination.  CT chest/abd/pelvis showing pancolitis, no other signs of infection.  Empirically placed on zosyn at admission.   * Zosyn discontinued 7/16 as no clear infection and procal improved; no ascites on CT to suggest SBP  ? Blood cultures ngtd  ? enteric panel in process  ? WBC remains wnl today, monitor fever  curve off antibiotics  ? Stools are firming up per patient    History of schizoaffective disorder and bipolar type 1 as well as attention deficit hyperactivity disorder  - continue PTA Cogentin, lorazepam     Metastatic colon cancer  The patient does not want any chemotherapy, this was verified by the patient's daughter and son.  Previously on hospice but disenrolled due to disease stability and not appreciating the close monitoring of the Hospice team.   - following discussion with his family, he has decided he will re-enroll in hospice with his prior agency following discharge  - wants to continue current cares in hospital     Fall with contusion left supraorbital ridge  The patient was found on the ground.  CT head with head hematoma, no other acute finding.  CT spine was also reassuring.  CT of the chest, abdomen and pelvis did not show any internal trauma or injury other than some inflammation of the colon.  - Trauma surg signed off    Thrombocytopenia  Likely due to consumption with blood loss, bry of 93 on 7/16.   - improved to 108 today    Suspected moderate malnutrition  Reports 10 pound weight loss recently, has subcutaneous fat and muscle loss on exam as well as bilateral LE edema.  - nutrition consulted       Diet: Regular Diet Adult    DVT Prophylaxis: Pneumatic Compression Devices  Chew Catheter: Not present  Central Lines: PRESENT       Cardiac Monitoring: None  Code Status: No CPR- Do NOT Intubate      Disposition Plan      Expected Discharge Date: 07/18/2022, 12:00 PM    Destination: assisted living  Discharge Comments: 7/15: 2 untis of blood given  7/16 May return to living faclilty when hgb stable, Needs to be back to baseline        The patient's care was discussed with the Bedside Nurse and Patient.    Taye Mena MD  Hospitalist Service  [facility]  Securely message with the Vocera Web Console (learn more here)  Text page via [employer] Paging/Directory          Clinically Significant Risk Factors Present on Admission                 # Moderate Malnutrition: based on nutrition assessment     ______________________________________________________________________    Interval History   He reports stools are firming up.  No fever/chills, abdominal pain, nausea.  Edema stable.  No other complaints.  Wants to re-enroll with his prior hospice agency following discharge.     Data reviewed today: I reviewed all medications, new labs and imaging results over the last 24 hours. I personally reviewed no images or EKG's today.    Physical Exam   Vital Signs: Temp: 97.6  F (36.4  C) Temp src: Axillary BP: 103/60 Pulse: 72   Resp: 18 SpO2: 97 % O2 Device: None (Room air)    Weight: 177 lbs 0 oz     General Appearance: Thin male in NAD, sitting up in bed  Respiratory: lungs CTAB, no wheezes or crackles, no tachypnea   Cardiovascular: RRR, normal s1/s2 without murmur  GI: abdomen soft, normal bowel sounds, nontender, nondistended  Skin: 2+ pitting lower extremity edema stable  Other: Alert and appropriate, cranial nerves grossly intact     Data

## 2022-07-18 VITALS
OXYGEN SATURATION: 98 % | WEIGHT: 186.6 LBS | RESPIRATION RATE: 16 BRPM | SYSTOLIC BLOOD PRESSURE: 114 MMHG | HEART RATE: 79 BPM | HEIGHT: 74 IN | TEMPERATURE: 97.3 F | DIASTOLIC BLOOD PRESSURE: 57 MMHG | BODY MASS INDEX: 23.95 KG/M2

## 2022-07-18 LAB
ALBUMIN SERPL-MCNC: 2.8 G/DL (ref 3.4–5)
ALP SERPL-CCNC: 164 U/L (ref 40–150)
ALT SERPL W P-5'-P-CCNC: 51 U/L (ref 0–70)
ANION GAP SERPL CALCULATED.3IONS-SCNC: 9 MMOL/L (ref 3–14)
AST SERPL W P-5'-P-CCNC: 41 U/L (ref 0–45)
BILIRUB DIRECT SERPL-MCNC: 0.4 MG/DL (ref 0–0.2)
BILIRUB SERPL-MCNC: 1.4 MG/DL (ref 0.2–1.3)
BUN SERPL-MCNC: 28 MG/DL (ref 7–30)
CALCIUM SERPL-MCNC: 7.7 MG/DL (ref 8.5–10.1)
CHLORIDE BLD-SCNC: 116 MMOL/L (ref 94–109)
CO2 SERPL-SCNC: 17 MMOL/L (ref 20–32)
CREAT SERPL-MCNC: 0.73 MG/DL (ref 0.52–1.25)
ERYTHROCYTE [DISTWIDTH] IN BLOOD BY AUTOMATED COUNT: 19 % (ref 10–15)
GFR SERPL CREATININE-BSD FRML MDRD: ABNORMAL ML/MIN/{1.73_M2}
GLUCOSE BLD-MCNC: 128 MG/DL (ref 70–99)
HCT VFR BLD AUTO: 25.2 % (ref 35–53)
HGB BLD-MCNC: 8.2 G/DL (ref 11.7–17.7)
MCH RBC QN AUTO: 29.5 PG (ref 26.5–33)
MCHC RBC AUTO-ENTMCNC: 32.5 G/DL (ref 31.5–36.5)
MCV RBC AUTO: 91 FL (ref 78–100)
PLATELET # BLD AUTO: 104 10E3/UL (ref 150–450)
POTASSIUM BLD-SCNC: 3.3 MMOL/L (ref 3.4–5.3)
PROT SERPL-MCNC: 5.6 G/DL (ref 6.8–8.8)
RBC # BLD AUTO: 2.78 10E6/UL (ref 3.8–5.9)
SODIUM SERPL-SCNC: 142 MMOL/L (ref 133–144)
WBC # BLD AUTO: 6.3 10E3/UL (ref 4–11)

## 2022-07-18 PROCEDURE — 82248 BILIRUBIN DIRECT: CPT | Performed by: HOSPITALIST

## 2022-07-18 PROCEDURE — 36415 COLL VENOUS BLD VENIPUNCTURE: CPT | Performed by: HOSPITALIST

## 2022-07-18 PROCEDURE — C9113 INJ PANTOPRAZOLE SODIUM, VIA: HCPCS | Performed by: PHYSICIAN ASSISTANT

## 2022-07-18 PROCEDURE — 99239 HOSP IP/OBS DSCHRG MGMT >30: CPT | Performed by: HOSPITALIST

## 2022-07-18 PROCEDURE — 250N000013 HC RX MED GY IP 250 OP 250 PS 637: Performed by: HOSPITALIST

## 2022-07-18 PROCEDURE — 85027 COMPLETE CBC AUTOMATED: CPT | Performed by: HOSPITALIST

## 2022-07-18 PROCEDURE — 80053 COMPREHEN METABOLIC PANEL: CPT | Performed by: HOSPITALIST

## 2022-07-18 PROCEDURE — 250N000011 HC RX IP 250 OP 636: Performed by: PHYSICIAN ASSISTANT

## 2022-07-18 RX ORDER — POTASSIUM CHLORIDE 1500 MG/1
40 TABLET, EXTENDED RELEASE ORAL ONCE
Status: COMPLETED | OUTPATIENT
Start: 2022-07-18 | End: 2022-07-18

## 2022-07-18 RX ORDER — PANTOPRAZOLE SODIUM 40 MG/1
40 TABLET, DELAYED RELEASE ORAL 2 TIMES DAILY
Qty: 60 TABLET | Refills: 0 | Status: SHIPPED | OUTPATIENT
Start: 2022-07-18 | End: 2023-01-01

## 2022-07-18 RX ORDER — POTASSIUM CHLORIDE 750 MG/1
10 TABLET, EXTENDED RELEASE ORAL DAILY
Qty: 30 TABLET | Refills: 0 | Status: SHIPPED | OUTPATIENT
Start: 2022-07-18 | End: 2023-01-01

## 2022-07-18 RX ADMIN — LORAZEPAM 1 MG: 1 TABLET ORAL at 08:25

## 2022-07-18 RX ADMIN — DICLOFENAC SODIUM 2 G: 10 GEL TOPICAL at 08:26

## 2022-07-18 RX ADMIN — POTASSIUM CHLORIDE 40 MEQ: 1500 TABLET, EXTENDED RELEASE ORAL at 10:24

## 2022-07-18 RX ADMIN — PANTOPRAZOLE SODIUM 40 MG: 40 INJECTION, POWDER, FOR SOLUTION INTRAVENOUS at 08:25

## 2022-07-18 ASSESSMENT — ACTIVITIES OF DAILY LIVING (ADL)
ADLS_ACUITY_SCORE: 35
ADLS_ACUITY_SCORE: 39
ADLS_ACUITY_SCORE: 35
ADLS_ACUITY_SCORE: 39
ADLS_ACUITY_SCORE: 39
ADLS_ACUITY_SCORE: 35

## 2022-07-18 NOTE — PROGRESS NOTES
"Care Management Discharge Note    Discharge Date: 07/18/2022       Discharge Disposition: Assisted Living    Discharge Services: None    Discharge DME: None    Discharge Transportation:      Private pay costs discussed: Not applicable    PAS Confirmation Code:    Patient/family educated on Medicare website which has current facility and service quality ratings: no    Education Provided on the Discharge Plan:  Yes   Persons Notified of Discharge Plans: patient, patient's daughter Yuliet, [facility], [facility] (Marianela), AKILAH, Charge RN, bedside RN  Patient/Family in Agreement with the Plan: yes    Handoff Referral Completed:  Additional Information:  SW spoke with patient regarding the discharge plan and signing on to hospice, again, upon returning home. The patient is agreeable to resigning on to hospice. The patient was with [facility] previously, and was discharged due to no decline. The patient is agreeable to signing back on with [facility]. SW spoke to the patient's daughter Yuliet regarding the discharge plan and hospice. Yuliet is agreeable to the plan. Yuliet is available on this date 7/18 to transport the patient to his [facility], at 1430.  Yuliet is not available to assist the patient with the hospice admission on 7/19,but states her brother may be.   SW spoke to Mara at [facility] and they are able to do an admission on 7/19 in the morning. SW faxed the orders to [facility], and provided the patient's son's Juventino's phone number.   The SW called [facility] and spoke with Marianela regarding the patient returning home at 1430 and the patient's plan to signing onto [facility]. Marianela stated understanding and said it was okay.   SW updated the patient with the discharge information. The patient smiled and said \"okay\".   The SW updated the AKILAH, Charge RN and bed side RN.     SW will continue to follow.     DOV Cordova        "

## 2022-07-18 NOTE — DISCHARGE SUMMARY
[facility]  Hospitalist Discharge Summary      Date of Admission:  7/14/2022  Date of Discharge:  7/18/2022  Discharging Provider: Taye Mena MD  Discharge Service: Hospitalist Service    Discharge Diagnoses   Acute upper GIB due to esophageal varices  Acute blood loss anemia  Hemorrhagic shock  Sepsis ruled out - SIRS without identified infection  Hepatic encephalopathy  Lactic acidosis   Pancolitis   Schizoaffective disorder  Bipolar disorder type 1  ADHD  Metastatic colon cancer  Fall with contusion of left supraorbital ridge  Thrombocytopenia   Suspected moderate malnutrition     Follow-ups Needed After Discharge   Follow-up Appointments     Follow-up and recommended labs and tests       Follow up with primary care provider, Babar Mckinney, within 7 days for   hospital follow- up.  The following labs/tests are recommended: BMP (if   not re-enrolled in hospice).  Follow up with Hospice Care agency to discuss re-enrollment.             Unresulted Labs Ordered in the Past 30 Days of this Admission     Date and Time Order Name Status Description    7/14/2022  6:32 PM Prepare red blood cells (unit) Preliminary     7/14/2022  6:32 PM Prepare red blood cells (unit) Preliminary     7/14/2022  6:29 PM Blood Culture Arm, Left Preliminary     7/14/2022  6:29 PM Blood Culture Arm, Left Preliminary       These results will be followed up by: hospitalist group     Discharge Disposition   Discharged to assisted living  Condition at discharge: Stable    Hospital Course       Los Huang is a 66 year old adult admitted on 7/14/2022.  Past history of metastatic colon cancer (previously on hospice), significant mental health issues including schizoaffective disorder as well as bipolar type 1 disorder, who was admitted with melena, hypotension due to bleeding varices s/p banding.        Acute upper GI bleeding due to esophageal varices with severe acute blood loss anemia and hemorrhagic  shock  Sepsis ruled out - SIRS without identified infection  Admission hemoglobin 7.4 hypotension to 70's systolic and lactic acid elevated at 2.4.  GI consulted, underwent EGD 7/14 with grade III varices with stigmata of recent bleeding found in the lower third of the esophagus, at the lower esophageal sphincter, at the gastroesophageal junction and in the cardia, 36 cm from the incisors. They were 8 mm in largest diameter.  Red francoise signs were present. Three bands were successfully placed with complete eradication.  Received total of 3 units PRBC this admission with hgb stable 8-9 g/dL at discharge.     Hepatic encephalopathy, resolved  Noted to be mildly confused following admission.  Ammonia level 97, likely due to GIB, improved to 54 the following day where it remained stable.  Did not start lactulose though this could be considered in the future.      Lactic acidosis   Pancolitis  WBC of 17.0 with lactate 2.4 at admission.  Afebrile.  Suspect lactic acid from hypoperfusion, leukocytosis may be stress demargination.  CT chest/abd/pelvis showing pancolitis, no other signs of infection.  Enteric panel negative.  Empirically placed on zosyn at admission, discontinued after day 3 as no clear infection and procal improved; no ascites on CT to suggest SBP.  Remained afebrile with WBC stable wnl off antibiotics, denies abdominal pain with only mild loose stools.     History of schizoaffective disorder and bipolar type 1 as well as attention deficit hyperactivity disorder  Continue PTA Cogentin, lorazepam.      Metastatic colon cancer  The patient does not want any chemotherapy.  Previously on hospice but disenrolled due to disease stability and not appreciating the close monitoring of the Hospice team. Following discussion with his family, he has decided he will likely re-enroll in hospice with his prior agency following discharge.      Fall with contusion left supraorbital ridge  The patient was found on the ground.   CT head with head hematoma, no other acute finding.  CT spine was also reassuring.  CT of the chest, abdomen and pelvis did not show any internal trauma or injury other than some inflammation of the colon.    Thrombocytopenia  Likely due to consumption with blood loss, bry of 93 on 7/16.  Improved and stable in low 100's at discharge.     Suspected moderate malnutrition  Reports 10 pound weight loss recently, has subcutaneous fat and muscle loss on exam as well as bilateral LE edema.    Hypokalemia  Potassium 3.3 on discharge.  As will be resuming PTA lasix at discharge for lower extremity edema, will also add KCl 10 mEq daily.  BMP in 1 week with PCP if not enrolled in hospice.       Consultations This Hospital Stay   PHARMACY TO DOSE JULIAO  GASTROENTEROLOGY IP CONSULT  PALLIATIVE CARE ADULT IP CONSULT  SOCIAL WORK IP CONSULT  CARE MANAGEMENT / SOCIAL WORK IP CONSULT    Code Status   No CPR- Do NOT Intubate    Time Spent on this Encounter   I, Taye Mena MD, personally saw the patient today and spent greater than 30 minutes discharging this patient.       Taye Mena MD  [facility]  [address] [address]  Phone: [phone]  ______________________________________________________________________    Physical Exam   Vital Signs: Temp: 97.3  F (36.3  C) Temp src: Axillary BP: 114/57 Pulse: 79   Resp: 16 SpO2: 98 % O2 Device: None (Room air)    Weight: 186 lbs 9.6 oz  General Appearance: Thin male in NAD  Respiratory: lungs CTAB, no wheezes or crackles, no tachypnea  Cardiovascular: RRR, normal s1/s2 without murmur  GI: abdomen soft, normal bowel sounds, nontender, nondistended  Skin: no peripheral edema   Other: Alert and appropriate, cranial nerves grossly intact         Primary Care Physician   Babar Mckinney    Discharge Orders      Reason for your hospital stay    You were admitted for blood in the stool which is due to bleeding esophageal varices (abnormal  blood vessels in the esophagus) which were treated with banding.     Follow-up and recommended labs and tests     Follow up with primary care provider, Babar Mckinney, within 7 days for hospital follow- up.  The following labs/tests are recommended: BMP (if not re-enrolled in hospice).  Follow up with Hospice Care agency to discuss re-enrollment.     Activity    Your activity upon discharge: activity as tolerated     Diet    Follow this diet upon discharge:       Regular Diet Adult       Significant Results and Procedures   Most Recent 3 CBC's:Recent Labs   Lab Test 07/18/22  0554 07/17/22  0819 07/16/22  1821 07/16/22  1136   WBC 6.3 6.1  --  5.7   HGB 8.2* 8.3* 7.7* 7.1*   MCV 91 92  --  94   * 108*  --  93*     Most Recent 3 BMP's:Recent Labs   Lab Test 07/18/22  0554 07/17/22  0819 07/16/22  1136 07/15/22  2001 07/15/22  0535    147* 147*  --  140   POTASSIUM 3.3*  --  3.6  --  4.4   CHLORIDE 116*  --  120*  --  113*   CO2 17*  --  22  --  21   BUN 28  --  35*  --  59*   CR 0.73  --  0.87  --  0.98   ANIONGAP 9  --  5  --  6   GEOVANNA 7.7*  --  7.5*  --  7.6*   *  --  109* 131* 154*     Most Recent 2 LFT's:Recent Labs   Lab Test 07/18/22  0554 07/17/22  0819   AST 41 54*   ALT 51 53   ALKPHOS 164* 178*   BILITOTAL 1.4* 1.6*     Most Recent 3 Hemoglobins:Recent Labs   Lab Test 07/18/22  0554 07/17/22  0819 07/16/22  1821   HGB 8.2* 8.3* 7.7*   ,   Results for orders placed or performed during the hospital encounter of 07/14/22   CT Head w/o Contrast    Narrative    EXAM: CT HEAD W/O CONTRAST  LOCATION: [facility]  DATE/TIME: 7/14/2022 7:11 PM    INDICATION: Altered mental status.  COMPARISON: MRI brain 12/17/2020.  TECHNIQUE: Routine CT Head without IV contrast. Multiplanar reformats. Dose reduction techniques were used.    FINDINGS:  INTRACRANIAL CONTENTS: The ventricles are normal in size and configuration. Mild generalized brain parenchymal volume loss, as before.  Otherwise unremarkable appearance of the cerebral hemispheres, brainstem, and cerebellum. There is a hyperdense nodule   involving the anterior aspect of the sella along the superior margin of the pituitary gland measuring up to approximately 7-8 mm (series 6 image 31). In retrospect, there was a subtle hypoenhancing lesion of the pituitary gland with thin peripheral   enhancement on MRI brain of 12/17/2020 in that location. However, the lesion appears possibly mildly increased in size compared to the previous MRI. The lesion slightly extends into the suprasellar cistern without obvious contact of the optic chiasm or   significant mass effect on adjacent structures. No extra-axial fluid collection or mass effect/herniation.    VISUALIZED ORBITS/SINUSES/MASTOIDS: No intraorbital abnormality. No paranasal sinus mucosal disease. No middle ear or mastoid effusion.    BONES/SOFT TISSUES: Small left anterior frontal scalp hematoma without underlying calvarial fracture.      Impression    IMPRESSION:  1.  No definite CT findings of acute intracranial process.  2.  Ovoid hyperdense nodule along the anterior aspect of the sella likely associated with the pituitary gland measuring 7-8 mm. In retrospect, there was probably a subtle lesion in this area on previous MRI brain of 12/17/2020. Because this lesion is   hyperdense, it could represent a hyperdense solid lesion or possibly a hemorrhagic or proteinaceous lesion. MRI of the brain without and with contrast (dynamic pituitary protocol) is recommended in follow-up for further characterization. Consider   correlation with pituitary labs to assess for the possibility of a functioning pituitary adenoma.  3.  Mild generalized brain parenchymal volume loss.  4.  Small left anterior frontal scalp hematoma without underlying calvarial fracture.   CT Cervical Spine w/o Contrast    Narrative    EXAM: CT CERVICAL SPINE W/O CONTRAST  LOCATION: [facility]  DATE/TIME: 7/14/2022 7:11 PM    INDICATION: Trauma, unwitnessed fall.  COMPARISON: None.  TECHNIQUE: Routine CT Cervical Spine without IV contrast. Multiplanar reformats. Dose reduction techniques were used.    FINDINGS:  VERTEBRA: No acute cervical spine fracture. Normal vertebral body heights. Mild levoconvex curvature. Alignment otherwise normal. No posttraumatic subluxation seen.    CANAL/FORAMINA: Moderate disc height loss at C5-C6 with minimal disc height loss elsewhere. Small multilevel marginal endplate osteophytes. Minimal uncovertebral spurring in the lower cervical spine. Mild degenerative changes of the facet joints. Mild to   moderate degenerative change at the median atlantoaxial joint. Mild/moderate multilevel spinal canal narrowing, most pronounced at C6-C7 where there is mild flattening of the normal spinal cord contour. No definite severe spinal canal narrowing. No   high-grade osseous neural foraminal stenosis.    PARASPINAL: Minimal presumed scarring in the lung apices. There is a right-sided thyroid nodule that measures over 15 mm, incompletely imaged/evaluated. Smaller hypodense lesions in the left thyroid lobe also noted. Visualized paraspinous soft tissues   otherwise appear unremarkable.      Impression    IMPRESSION:  1.  No acute fracture or posttraumatic subluxation of the cervical spine.  2.  Multilevel degenerative changes, as described.  3.  Multiple thyroid nodules, largest on the right measuring over 15 mm. Recommend follow-up thyroid ultrasound.   CT Chest/Abdomen/Pelvis w Contrast    Narrative    EXAM: CT CHEST/ABDOMEN/PELVIS W CONTRAST  LOCATION: [facility]  DATE/TIME: 7/14/2022 7:09 PM    INDICATION: Melena Acute GI hemorrhage, vs sepsis vs unwitnessed fall trauma.  COMPARISON: None.  TECHNIQUE: CT scan of the chest, abdomen, and pelvis was performed following injection of IV contrast. Multiplanar reformats were obtained. Dose reduction  techniques were used.   CONTRAST: 95 mL Isovue 370    FINDINGS:   LUNGS AND PLEURA: Multiple metastases noted throughout both lungs with largest conglomerate in the left lower lobe measuring 3.5 x 2.2 cm (series 4 image 230). There is a small right pleural effusion.    MEDIASTINUM/AXILLAE: No suspicious lymphadenopathy. Calcified subcarinal and right hilar lymph nodes noted, compatible with prior granulomatous disease. There is a left chest port with tip in the superior vena cava. No evidence of central pulmonary   embolism. Bolus timing is suboptimal for evaluation of segmental and subsegmental emboli.    CORONARY ARTERY CALCIFICATION: Mild.    HEPATOBILIARY: Postsurgical changes of right hepatectomy with extensive contour nodularity of the residual left hepatic lobe. There are likely capsular implants abutting both the right and left lobe, largest on the right measuring up to 5.8 cm (series 3   image 120). This could involve the diaphragm and partially explain the right pleural effusion.    PANCREAS: Trace fluid adjacent to the pancreatic tail. No ductal dilation or solid mass visualized.    SPLEEN: Mild splenomegaly with calcified granulomas.    ADRENAL GLANDS: Normal.    KIDNEYS/BLADDER: Bilateral renal cysts, no specific follow-up recommended. Heterogeneous mass in the right lower pole measuring 2.6 cm (series 3 image 205). Additional slightly heterogeneous, hyperdense lesion in the left upper pole measuring 2.0 cm   (series 3 image 164).     BOWEL: Prior right hemicolectomy with ileocolonic anastomosis in the right upper quadrant. There is diffuse submucosal edema throughout the colon with adjacent fat stranding. No evidence of bowel obstruction. No active gastrointestinal bleeding   visualized on this single phase exam. There is mild mesenteric edema throughout the abdomen.    LYMPH NODES: No suspicious lymphadenopathy.    VASCULATURE: Scattered calcified atherosclerosis.    PELVIC ORGANS: Vasectomy clips  noted. Trace free fluid in the pelvis.    MUSCULOSKELETAL: No acute bony abnormality. Postsurgical changes of right lateral abdominal wall hernia repair with mesh.      Impression    IMPRESSION:  1.  Postsurgical changes of right hemicolectomy with pancolitis in the residual colon, consider C. difficile. No evidence of active gastrointestinal bleeding on this single phase exam.  2.  Upper abdominal peritoneal and pulmonary metastases as above.  3.  No evidence of acute trauma in the chest, abdomen or pelvis.  4.  Small right pleural effusion.  5.  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 Consider attention on follow-up imaging.  6.  Prior right hepatectomy with cirrhotic morphology of the left hepatic lobe. Mild splenomegaly, could indicate portal hypertension.       Discharge Medications   Current Discharge Medication List      START taking these medications    Details   pantoprazole (PROTONIX) 40 MG EC tablet Take 1 tablet (40 mg) by mouth 2 times daily  Qty: 60 tablet, Refills: 0    Comments: Future refills by PCP Dr. Babar Mckinney with phone number [phone].  Associated Diagnoses: Gastrointestinal hemorrhage with melena      potassium chloride ER (K-TAB/KLOR-CON) 10 MEQ CR tablet Take 1 tablet (10 mEq) by mouth daily  Qty: 30 tablet, Refills: 0    Comments: Future refills by PCP Dr. Babar Mckinney with phone number [phone].  Associated Diagnoses: Hypokalemia         CONTINUE these medications which have NOT CHANGED    Details   Acetaminophen 325 MG CAPS Take 650 mg by mouth every 6 hours as needed (pain and or fever)      Artificial Saliva (MOUTH KOTE) SOLN Take 1 mL by mouth 2 times daily as needed      benztropine (COGENTIN) 1 MG tablet Take 1 tablet (1 mg) by mouth At Bedtime  Qty: 30 tablet, Refills: 1    Comments: Please save to patient file and fill upon request  Associated Diagnoses: Bipolar 1 disorder (H)      calcium polycarbophil  (FIBERCON) 625 MG tablet Take 1 tablet by mouth daily as needed for constipation      cyclobenzaprine (FLEXERIL) 10 MG tablet Take 10 mg by mouth 2 times daily as needed      diclofenac (VOLTAREN) 1 % topical gel Apply 2 g topically 2 times daily 7am and 7pm. To right shoulder and knee      docusate sodium (COLACE) 100 MG capsule Take 100 mg by mouth 2 times daily as needed for constipation      furosemide (LASIX) 40 MG tablet Take 40 mg by mouth daily      hydrocortisone (CORTAID) 1 % external cream Apply topically 4 times daily as needed for hemorrhoids      INVEGA SUSTENNA 234 MG/1.5ML JOE Inject 234 mg into the muscle every 28 days      !! LORazepam (ATIVAN) 1 MG tablet Take 1 mg by mouth every 4 hours as needed for anxiety      !! LORazepam (ATIVAN) 1 MG tablet Take 1 mg by mouth 2 times daily Given at 8am and 8pm. See prn doses also      Melatonin 10 MG TABS tablet Take 10 mg by mouth At Bedtime At 8pm      OLANZapine zydis (ZYPREXA) 5 MG ODT Take 5 mg by mouth every 6 hours as needed      ondansetron (ZOFRAN) 4 MG tablet Take 4 mg by mouth every 8 hours as needed for nausea      oxyCODONE (ROXICODONE) 5 MG tablet Take 5 mg by mouth every hour as needed for severe pain See scheduled doses also      polyethylene glycol (MIRALAX) 17 GM/Dose powder Take 17 g by mouth daily as needed      polyethylene glycol-propylene glycol (SYSTANE) 0.4-0.3 % SOLN ophthalmic solution Place 1-2 drops into both eyes every 2 hours as needed for dry eyes      SENNA PLUS 8.6-50 MG tablet Take 2 tablets by mouth 2 times daily as needed for constipation      simethicone (MYLICON) 80 MG chewable tablet Take 1 tablet (80 mg) by mouth every 6 hours as needed for cramping  Qty: 15 tablet, Refills: 0    Associated Diagnoses: Metastatic colon cancer to liver (H)      sodium chloride (OCEAN) 0.65 % nasal spray Spray 2 sprays into both nostrils every 30 minutes as needed for congestion      traZODone (DESYREL) 50 MG tablet Take 1 tablet (50  mg) by mouth nightly as needed for sleep  Qty: 30 tablet, Refills: 1    Associated Diagnoses: Bipolar 1 disorder (H)       !! - Potential duplicate medications found. Please discuss with provider.        Allergies   Allergies   Allergen Reactions     Animal Dander

## 2022-07-18 NOTE — PLAN OF CARE
Goal Outcome Evaluation:    Plan of Care Reviewed With: patient     Overall Patient Progress: improving    Outcome Evaluation: Tolerating regular diet, hgb stable, decrease in amount of bloody stools    [id number]: Alert and oriented x4. Vital signs stable on room air ex soft pressures. SBA/ind. Tolerating regular diet. Lung sounds clear. Bowel sounds active, passing flatus, loose maroon colored stools, became more formed throughout shift, adequate urine output. Hematoma to L eye/forehead. Denies pain. Denies nausea. Octreotide gtt discontinued during shift, LUCINA MTAHIAS.

## 2022-07-18 NOTE — PLAN OF CARE
A&Ox4. VSS on RA. Up independently. Denies pain. Voiding. Tolerating regular diet, good appetite. Pt discharged to [facility] w daughter as transportation. Discharge paperwork reviewed w pt at bedside.

## 2022-07-18 NOTE — PLAN OF CARE
A&Ox4. VSS on RA. Up independently. Denies pain. Voiding. Tolerating regular diet, good appetite. Hemoglobin 8.2 this AM. Possibly discharge back to assisted living today.

## 2022-07-19 ENCOUNTER — PATIENT OUTREACH (OUTPATIENT)
Dept: CARE COORDINATION | Facility: CLINIC | Age: 67
End: 2022-07-19

## 2022-07-19 DIAGNOSIS — Z71.89 OTHER SPECIFIED COUNSELING: ICD-10-CM

## 2022-07-19 NOTE — PROGRESS NOTES
[facility]    Background: Care Coordination referral placed from [facility] discharge report for reason of patient meeting criteria for a TCM outreach call by [facility] team.    Assessment: Upon chart review, [facility] Team member will cancel/close the referral for TCM outreach due to reason below:    Patient has been discharged to Hospice Care.    Plan: Care Coordination referral for TCM outreach canceled.      MELINDA Jon  [phone]  [facility]    *Connected Care Resource Team does NOT follow patient ongoing. Referrals are identified based on internal discharge reports and the outreach is to ensure patient has an understanding of their discharge instructions.

## 2022-07-20 LAB
BACTERIA BLD CULT: NO GROWTH
BACTERIA BLD CULT: NO GROWTH

## 2023-01-01 ENCOUNTER — LAB REQUISITION (OUTPATIENT)
Dept: LAB | Facility: CLINIC | Age: 68
End: 2023-01-01
Payer: MEDICARE

## 2023-01-01 ENCOUNTER — APPOINTMENT (OUTPATIENT)
Dept: ULTRASOUND IMAGING | Facility: CLINIC | Age: 68
DRG: 871 | End: 2023-01-01
Attending: INTERNAL MEDICINE
Payer: MEDICARE

## 2023-01-01 ENCOUNTER — APPOINTMENT (OUTPATIENT)
Dept: PHYSICAL THERAPY | Facility: CLINIC | Age: 68
DRG: 871 | End: 2023-01-01
Attending: INTERNAL MEDICINE
Payer: MEDICARE

## 2023-01-01 ENCOUNTER — APPOINTMENT (OUTPATIENT)
Dept: CT IMAGING | Facility: CLINIC | Age: 68
DRG: 871 | End: 2023-01-01
Attending: INTERNAL MEDICINE
Payer: MEDICARE

## 2023-01-01 ENCOUNTER — APPOINTMENT (OUTPATIENT)
Dept: PHYSICAL THERAPY | Facility: CLINIC | Age: 68
DRG: 871 | End: 2023-01-01
Payer: MEDICARE

## 2023-01-01 ENCOUNTER — APPOINTMENT (OUTPATIENT)
Dept: ULTRASOUND IMAGING | Facility: CLINIC | Age: 68
DRG: 871 | End: 2023-01-01
Attending: HOSPITALIST
Payer: MEDICARE

## 2023-01-01 ENCOUNTER — LAB REQUISITION (OUTPATIENT)
Dept: LAB | Facility: CLINIC | Age: 68
End: 2023-01-01
Payer: OTHER MISCELLANEOUS

## 2023-01-01 ENCOUNTER — HOSPITAL ENCOUNTER (INPATIENT)
Facility: CLINIC | Age: 68
LOS: 28 days | Discharge: HOSPICE | DRG: 871 | End: 2023-05-31
Attending: EMERGENCY MEDICINE | Admitting: INTERNAL MEDICINE
Payer: MEDICARE

## 2023-01-01 ENCOUNTER — APPOINTMENT (OUTPATIENT)
Dept: GENERAL RADIOLOGY | Facility: CLINIC | Age: 68
DRG: 871 | End: 2023-01-01
Attending: NURSE PRACTITIONER
Payer: MEDICARE

## 2023-01-01 ENCOUNTER — APPOINTMENT (OUTPATIENT)
Dept: GENERAL RADIOLOGY | Facility: CLINIC | Age: 68
DRG: 871 | End: 2023-01-01
Attending: INTERNAL MEDICINE
Payer: MEDICARE

## 2023-01-01 ENCOUNTER — DOCUMENTATION ONLY (OUTPATIENT)
Dept: OTHER | Facility: CLINIC | Age: 68
End: 2023-01-01
Payer: MEDICARE

## 2023-01-01 ENCOUNTER — APPOINTMENT (OUTPATIENT)
Dept: SPEECH THERAPY | Facility: CLINIC | Age: 68
DRG: 871 | End: 2023-01-01
Attending: INTERNAL MEDICINE
Payer: MEDICARE

## 2023-01-01 VITALS
WEIGHT: 198 LBS | DIASTOLIC BLOOD PRESSURE: 67 MMHG | BODY MASS INDEX: 25.42 KG/M2 | OXYGEN SATURATION: 94 % | TEMPERATURE: 98.1 F | HEART RATE: 88 BPM | RESPIRATION RATE: 16 BRPM | SYSTOLIC BLOOD PRESSURE: 109 MMHG

## 2023-01-01 DIAGNOSIS — C18.9 METASTATIC COLON CANCER TO LIVER (H): Primary | ICD-10-CM

## 2023-01-01 DIAGNOSIS — F10.10 ALCOHOL ABUSE: ICD-10-CM

## 2023-01-01 DIAGNOSIS — C18.9 MALIGNANT NEOPLASM OF COLON, UNSPECIFIED (H): ICD-10-CM

## 2023-01-01 DIAGNOSIS — Z87.19 HISTORY OF ESOPHAGEAL VARICES: ICD-10-CM

## 2023-01-01 DIAGNOSIS — K92.2 GASTROINTESTINAL HEMORRHAGE, UNSPECIFIED GASTROINTESTINAL HEMORRHAGE TYPE: ICD-10-CM

## 2023-01-01 DIAGNOSIS — F31.9 BIPOLAR 1 DISORDER (H): ICD-10-CM

## 2023-01-01 DIAGNOSIS — R57.8 HEMORRHAGIC SHOCK (H): ICD-10-CM

## 2023-01-01 DIAGNOSIS — C78.7 METASTATIC COLON CANCER TO LIVER (H): Primary | ICD-10-CM

## 2023-01-01 LAB
% LINING CELLS, BODY FLUID: 2 %
% LINING CELLS, BODY FLUID: 3 %
ABO/RH(D): NORMAL
ABSOLUTE NEUTROPHILS, BODY FLUID: 177 /UL
ABSOLUTE NEUTROPHILS, BODY FLUID: 24.5 /UL
ABSOLUTE NEUTROPHILS, BODY FLUID: 8.4 /UL
ALBUMIN BODY FLUID SOURCE: NORMAL
ALBUMIN FLD-MCNC: 0.3 G/DL
ALBUMIN FLD-MCNC: <0.2 G/DL
ALBUMIN FLD-MCNC: <0.2 G/DL
ALBUMIN SERPL BCG-MCNC: 2.3 G/DL (ref 3.5–5.2)
ALBUMIN SERPL BCG-MCNC: 2.4 G/DL (ref 3.5–5.2)
ALBUMIN SERPL BCG-MCNC: 2.4 G/DL (ref 3.5–5.2)
ALBUMIN SERPL BCG-MCNC: 2.5 G/DL (ref 3.5–5.2)
ALBUMIN SERPL BCG-MCNC: 2.6 G/DL (ref 3.5–5.2)
ALBUMIN SERPL BCG-MCNC: 2.7 G/DL (ref 3.5–5.2)
ALBUMIN SERPL BCG-MCNC: 2.7 G/DL (ref 3.5–5.2)
ALBUMIN SERPL BCG-MCNC: 2.8 G/DL (ref 3.5–5.2)
ALBUMIN SERPL BCG-MCNC: 2.9 G/DL (ref 3.5–5.2)
ALBUMIN SERPL BCG-MCNC: 3 G/DL (ref 3.5–5.2)
ALBUMIN SERPL BCG-MCNC: 3 G/DL (ref 3.5–5.2)
ALBUMIN SERPL BCG-MCNC: 3.8 G/DL (ref 3.5–5.2)
ALBUMIN SERPL BCG-MCNC: 4.2 G/DL (ref 3.5–5.2)
ALBUMIN UR-MCNC: 20 MG/DL
ALBUMIN UR-MCNC: NEGATIVE MG/DL
ALP SERPL-CCNC: 339 U/L (ref 40–129)
ALP SERPL-CCNC: 366 U/L (ref 40–129)
ALP SERPL-CCNC: 397 U/L (ref 40–129)
ALP SERPL-CCNC: 402 U/L (ref 40–129)
ALP SERPL-CCNC: 413 U/L (ref 40–129)
ALP SERPL-CCNC: 467 U/L (ref 40–129)
ALP SERPL-CCNC: 505 U/L (ref 40–129)
ALP SERPL-CCNC: 617 U/L (ref 40–129)
ALP SERPL-CCNC: 687 U/L (ref 40–129)
ALP SERPL-CCNC: 737 U/L (ref 40–129)
ALP SERPL-CCNC: 770 U/L (ref 40–129)
ALP SERPL-CCNC: 787 U/L (ref 40–129)
ALP SERPL-CCNC: 849 U/L (ref 40–129)
ALP SERPL-CCNC: 878 U/L (ref 40–129)
ALP SERPL-CCNC: 915 U/L (ref 40–129)
ALP SERPL-CCNC: 936 U/L (ref 40–129)
ALT SERPL W P-5'-P-CCNC: 100 U/L (ref 10–50)
ALT SERPL W P-5'-P-CCNC: 113 U/L (ref 10–50)
ALT SERPL W P-5'-P-CCNC: 116 U/L (ref 10–50)
ALT SERPL W P-5'-P-CCNC: 117 U/L (ref 10–50)
ALT SERPL W P-5'-P-CCNC: 123 U/L (ref 10–50)
ALT SERPL W P-5'-P-CCNC: 126 U/L (ref 10–50)
ALT SERPL W P-5'-P-CCNC: 128 U/L (ref 10–50)
ALT SERPL W P-5'-P-CCNC: 133 U/L (ref 10–50)
ALT SERPL W P-5'-P-CCNC: 154 U/L (ref 10–50)
ALT SERPL W P-5'-P-CCNC: 156 U/L (ref 10–50)
ALT SERPL W P-5'-P-CCNC: 50 U/L (ref 10–50)
ALT SERPL W P-5'-P-CCNC: 57 U/L (ref 10–50)
ALT SERPL W P-5'-P-CCNC: 68 U/L (ref 10–50)
ALT SERPL W P-5'-P-CCNC: 76 U/L (ref 10–50)
ALT SERPL W P-5'-P-CCNC: 76 U/L (ref 10–50)
ALT SERPL W P-5'-P-CCNC: 92 U/L (ref 10–50)
AMMONIA PLAS-SCNC: 22 UMOL/L (ref 16–60)
AMMONIA PLAS-SCNC: 43 UMOL/L (ref 16–60)
AMMONIA PLAS-SCNC: 46 UMOL/L (ref 16–60)
ANION GAP SERPL CALCULATED.3IONS-SCNC: 10 MMOL/L (ref 7–15)
ANION GAP SERPL CALCULATED.3IONS-SCNC: 10 MMOL/L (ref 7–15)
ANION GAP SERPL CALCULATED.3IONS-SCNC: 11 MMOL/L (ref 7–15)
ANION GAP SERPL CALCULATED.3IONS-SCNC: 12 MMOL/L (ref 7–15)
ANION GAP SERPL CALCULATED.3IONS-SCNC: 12 MMOL/L (ref 7–15)
ANION GAP SERPL CALCULATED.3IONS-SCNC: 13 MMOL/L (ref 7–15)
ANION GAP SERPL CALCULATED.3IONS-SCNC: 14 MMOL/L (ref 7–15)
ANION GAP SERPL CALCULATED.3IONS-SCNC: 15 MMOL/L (ref 7–15)
ANION GAP SERPL CALCULATED.3IONS-SCNC: 15 MMOL/L (ref 7–15)
ANION GAP SERPL CALCULATED.3IONS-SCNC: 18 MMOL/L (ref 7–15)
ANION GAP SERPL CALCULATED.3IONS-SCNC: 9 MMOL/L (ref 7–15)
ANTIBODY SCREEN: NEGATIVE
APPEARANCE FLD: ABNORMAL
APPEARANCE UR: CLEAR
APPEARANCE UR: CLEAR
APTT PPP: 34 SECONDS (ref 22–38)
AST SERPL W P-5'-P-CCNC: 108 U/L (ref 10–50)
AST SERPL W P-5'-P-CCNC: 111 U/L (ref 10–50)
AST SERPL W P-5'-P-CCNC: 112 U/L (ref 10–50)
AST SERPL W P-5'-P-CCNC: 119 U/L (ref 10–50)
AST SERPL W P-5'-P-CCNC: 157 U/L (ref 10–50)
AST SERPL W P-5'-P-CCNC: 193 U/L (ref 10–50)
AST SERPL W P-5'-P-CCNC: 203 U/L (ref 10–50)
AST SERPL W P-5'-P-CCNC: 205 U/L (ref 10–50)
AST SERPL W P-5'-P-CCNC: 41 U/L (ref 10–50)
AST SERPL W P-5'-P-CCNC: 45 U/L (ref 10–50)
AST SERPL W P-5'-P-CCNC: 53 U/L (ref 10–50)
AST SERPL W P-5'-P-CCNC: 63 U/L (ref 10–50)
AST SERPL W P-5'-P-CCNC: 74 U/L (ref 10–50)
AST SERPL W P-5'-P-CCNC: 76 U/L (ref 10–50)
AST SERPL W P-5'-P-CCNC: 79 U/L (ref 10–50)
AST SERPL W P-5'-P-CCNC: 87 U/L (ref 10–50)
AST SERPL W P-5'-P-CCNC: 91 U/L (ref 10–50)
ATRIAL RATE - MUSE: 103 BPM
ATRIAL RATE - MUSE: 107 BPM
BACTERIA BLD CULT: ABNORMAL
BACTERIA BLD CULT: ABNORMAL
BACTERIA BLD CULT: NO GROWTH
BACTERIA BLD CULT: NO GROWTH
BACTERIA FLD CULT: NO GROWTH
BACTERIA UR CULT: NO GROWTH
BASE EXCESS BLDV CALC-SCNC: -1.4 MMOL/L (ref -7.7–1.9)
BASOPHILS # BLD AUTO: 0 10E3/UL (ref 0–0.2)
BASOPHILS # BLD AUTO: 0.1 10E3/UL (ref 0–0.2)
BASOPHILS # BLD AUTO: 0.1 10E3/UL (ref 0–0.2)
BASOPHILS NFR BLD AUTO: 0 %
BASOPHILS NFR BLD AUTO: 1 %
BILIRUB DIRECT SERPL-MCNC: 1.69 MG/DL (ref 0–0.3)
BILIRUB DIRECT SERPL-MCNC: 2.65 MG/DL (ref 0–0.3)
BILIRUB DIRECT SERPL-MCNC: 2.96 MG/DL (ref 0–0.3)
BILIRUB DIRECT SERPL-MCNC: 2.97 MG/DL (ref 0–0.3)
BILIRUB DIRECT SERPL-MCNC: 3.3 MG/DL (ref 0–0.3)
BILIRUB DIRECT SERPL-MCNC: 3.48 MG/DL (ref 0–0.3)
BILIRUB DIRECT SERPL-MCNC: 3.86 MG/DL (ref 0–0.3)
BILIRUB SERPL-MCNC: 0.7 MG/DL
BILIRUB SERPL-MCNC: 1.7 MG/DL
BILIRUB SERPL-MCNC: 1.9 MG/DL
BILIRUB SERPL-MCNC: 2.4 MG/DL
BILIRUB SERPL-MCNC: 2.4 MG/DL
BILIRUB SERPL-MCNC: 2.7 MG/DL
BILIRUB SERPL-MCNC: 3.6 MG/DL
BILIRUB SERPL-MCNC: 3.7 MG/DL
BILIRUB SERPL-MCNC: 3.8 MG/DL
BILIRUB SERPL-MCNC: 4 MG/DL
BILIRUB SERPL-MCNC: 4.1 MG/DL
BILIRUB SERPL-MCNC: 4.2 MG/DL
BILIRUB SERPL-MCNC: 4.2 MG/DL
BILIRUB SERPL-MCNC: 4.3 MG/DL
BILIRUB SERPL-MCNC: 4.6 MG/DL
BILIRUB SERPL-MCNC: 5.1 MG/DL
BILIRUB UR QL STRIP: ABNORMAL
BILIRUB UR QL STRIP: NEGATIVE
BLD PROD TYP BPU: NORMAL
BLOOD COMPONENT TYPE: NORMAL
BUN SERPL-MCNC: 13.3 MG/DL (ref 8–23)
BUN SERPL-MCNC: 16.2 MG/DL (ref 8–23)
BUN SERPL-MCNC: 16.9 MG/DL (ref 8–23)
BUN SERPL-MCNC: 16.9 MG/DL (ref 8–23)
BUN SERPL-MCNC: 17.3 MG/DL (ref 8–23)
BUN SERPL-MCNC: 18.4 MG/DL (ref 8–23)
BUN SERPL-MCNC: 20.3 MG/DL (ref 8–23)
BUN SERPL-MCNC: 21.5 MG/DL (ref 8–23)
BUN SERPL-MCNC: 21.9 MG/DL (ref 8–23)
BUN SERPL-MCNC: 23.9 MG/DL (ref 8–23)
BUN SERPL-MCNC: 24.6 MG/DL (ref 8–23)
BUN SERPL-MCNC: 26.6 MG/DL (ref 8–23)
BUN SERPL-MCNC: 29 MG/DL (ref 8–23)
BUN SERPL-MCNC: 30.9 MG/DL (ref 8–23)
BUN SERPL-MCNC: 46.5 MG/DL (ref 8–23)
CALCIUM SERPL-MCNC: 7.8 MG/DL (ref 8.8–10.2)
CALCIUM SERPL-MCNC: 7.9 MG/DL (ref 8.8–10.2)
CALCIUM SERPL-MCNC: 8 MG/DL (ref 8.8–10.2)
CALCIUM SERPL-MCNC: 8 MG/DL (ref 8.8–10.2)
CALCIUM SERPL-MCNC: 8.1 MG/DL (ref 8.8–10.2)
CALCIUM SERPL-MCNC: 8.1 MG/DL (ref 8.8–10.2)
CALCIUM SERPL-MCNC: 8.2 MG/DL (ref 8.8–10.2)
CALCIUM SERPL-MCNC: 8.2 MG/DL (ref 8.8–10.2)
CALCIUM SERPL-MCNC: 8.3 MG/DL (ref 8.8–10.2)
CALCIUM SERPL-MCNC: 8.3 MG/DL (ref 8.8–10.2)
CALCIUM SERPL-MCNC: 9 MG/DL (ref 8.8–10.2)
CALCIUM SERPL-MCNC: 9.3 MG/DL (ref 8.8–10.2)
CEA SERPL-MCNC: 1 NG/ML
CEA SERPL-MCNC: 2.8 NG/ML
CELL COUNT BODY FLUID SOURCE: ABNORMAL
CHLORIDE SERPL-SCNC: 101 MMOL/L (ref 98–107)
CHLORIDE SERPL-SCNC: 102 MMOL/L (ref 98–107)
CHLORIDE SERPL-SCNC: 103 MMOL/L (ref 98–107)
CHLORIDE SERPL-SCNC: 103 MMOL/L (ref 98–107)
CHLORIDE SERPL-SCNC: 104 MMOL/L (ref 98–107)
CHLORIDE SERPL-SCNC: 105 MMOL/L (ref 98–107)
CHLORIDE SERPL-SCNC: 106 MMOL/L (ref 98–107)
CHLORIDE SERPL-SCNC: 108 MMOL/L (ref 98–107)
CODING SYSTEM: NORMAL
COLOR FLD: YELLOW
COLOR UR AUTO: ABNORMAL
COLOR UR AUTO: YELLOW
CREAT SERPL-MCNC: 0.64 MG/DL (ref 0.67–1.17)
CREAT SERPL-MCNC: 0.64 MG/DL (ref 0.67–1.17)
CREAT SERPL-MCNC: 0.68 MG/DL (ref 0.67–1.17)
CREAT SERPL-MCNC: 0.68 MG/DL (ref 0.67–1.17)
CREAT SERPL-MCNC: 0.69 MG/DL (ref 0.67–1.17)
CREAT SERPL-MCNC: 0.71 MG/DL (ref 0.67–1.17)
CREAT SERPL-MCNC: 0.72 MG/DL (ref 0.67–1.17)
CREAT SERPL-MCNC: 0.74 MG/DL (ref 0.67–1.17)
CREAT SERPL-MCNC: 0.75 MG/DL (ref 0.67–1.17)
CREAT SERPL-MCNC: 0.75 MG/DL (ref 0.67–1.17)
CREAT SERPL-MCNC: 0.8 MG/DL (ref 0.67–1.17)
CREAT SERPL-MCNC: 0.84 MG/DL (ref 0.67–1.17)
CREAT SERPL-MCNC: 0.99 MG/DL (ref 0.67–1.17)
CROSSMATCH: NORMAL
DEPRECATED HCO3 PLAS-SCNC: 12 MMOL/L (ref 22–29)
DEPRECATED HCO3 PLAS-SCNC: 17 MMOL/L (ref 22–29)
DEPRECATED HCO3 PLAS-SCNC: 19 MMOL/L (ref 22–29)
DEPRECATED HCO3 PLAS-SCNC: 20 MMOL/L (ref 22–29)
DEPRECATED HCO3 PLAS-SCNC: 21 MMOL/L (ref 22–29)
DEPRECATED HCO3 PLAS-SCNC: 22 MMOL/L (ref 22–29)
DIASTOLIC BLOOD PRESSURE - MUSE: NORMAL MMHG
DIASTOLIC BLOOD PRESSURE - MUSE: NORMAL MMHG
ENTEROCOCCUS FAECALIS: NOT DETECTED
ENTEROCOCCUS FAECIUM: NOT DETECTED
EOSINOPHIL # BLD AUTO: 0 10E3/UL (ref 0–0.7)
EOSINOPHIL # BLD AUTO: 0.1 10E3/UL (ref 0–0.7)
EOSINOPHIL # BLD AUTO: 0.1 10E3/UL (ref 0–0.7)
EOSINOPHIL # BLD AUTO: 0.2 10E3/UL (ref 0–0.7)
EOSINOPHIL # BLD AUTO: 0.2 10E3/UL (ref 0–0.7)
EOSINOPHIL # BLD AUTO: 0.3 10E3/UL (ref 0–0.7)
EOSINOPHIL # BLD AUTO: 0.3 10E3/UL (ref 0–0.7)
EOSINOPHIL # BLD AUTO: 0.4 10E3/UL (ref 0–0.7)
EOSINOPHIL # BLD AUTO: 0.5 10E3/UL (ref 0–0.7)
EOSINOPHIL # BLD AUTO: 0.6 10E3/UL (ref 0–0.7)
EOSINOPHIL # BLD AUTO: 0.6 10E3/UL (ref 0–0.7)
EOSINOPHIL NFR BLD AUTO: 0 %
EOSINOPHIL NFR BLD AUTO: 1 %
EOSINOPHIL NFR BLD AUTO: 1 %
EOSINOPHIL NFR BLD AUTO: 2 %
EOSINOPHIL NFR BLD AUTO: 2 %
EOSINOPHIL NFR BLD AUTO: 3 %
EOSINOPHIL NFR BLD AUTO: 4 %
EOSINOPHIL NFR BLD AUTO: 4 %
EOSINOPHIL NFR BLD AUTO: 5 %
ERYTHROCYTE [DISTWIDTH] IN BLOOD BY AUTOMATED COUNT: 17 % (ref 10–15)
ERYTHROCYTE [DISTWIDTH] IN BLOOD BY AUTOMATED COUNT: 17.3 % (ref 10–15)
ERYTHROCYTE [DISTWIDTH] IN BLOOD BY AUTOMATED COUNT: 17.6 % (ref 10–15)
ERYTHROCYTE [DISTWIDTH] IN BLOOD BY AUTOMATED COUNT: 17.7 % (ref 10–15)
ERYTHROCYTE [DISTWIDTH] IN BLOOD BY AUTOMATED COUNT: 17.9 % (ref 10–15)
ERYTHROCYTE [DISTWIDTH] IN BLOOD BY AUTOMATED COUNT: 17.9 % (ref 10–15)
ERYTHROCYTE [DISTWIDTH] IN BLOOD BY AUTOMATED COUNT: 18 % (ref 10–15)
ERYTHROCYTE [DISTWIDTH] IN BLOOD BY AUTOMATED COUNT: 18.1 % (ref 10–15)
ERYTHROCYTE [DISTWIDTH] IN BLOOD BY AUTOMATED COUNT: 18.1 % (ref 10–15)
ERYTHROCYTE [DISTWIDTH] IN BLOOD BY AUTOMATED COUNT: 18.4 % (ref 10–15)
ERYTHROCYTE [DISTWIDTH] IN BLOOD BY AUTOMATED COUNT: 18.7 % (ref 10–15)
ERYTHROCYTE [DISTWIDTH] IN BLOOD BY AUTOMATED COUNT: 19.2 % (ref 10–15)
ERYTHROCYTE [DISTWIDTH] IN BLOOD BY AUTOMATED COUNT: 19.4 % (ref 10–15)
ERYTHROCYTE [DISTWIDTH] IN BLOOD BY AUTOMATED COUNT: 19.8 % (ref 10–15)
ERYTHROCYTE [DISTWIDTH] IN BLOOD BY AUTOMATED COUNT: 20.5 % (ref 10–15)
ETHANOL SERPL-MCNC: <0.01 G/DL
FERRITIN SERPL-MCNC: 304 NG/ML (ref 31–409)
FOLATE SERPL-MCNC: 16.3 NG/ML (ref 4.6–34.8)
GFR SERPL CREATININE-BSD FRML MDRD: 82 ML/MIN/1.73M2
GFR SERPL CREATININE-BSD FRML MDRD: >90 ML/MIN/1.73M2
GLUCOSE BLDC GLUCOMTR-MCNC: 126 MG/DL (ref 70–99)
GLUCOSE BLDC GLUCOMTR-MCNC: 127 MG/DL (ref 70–99)
GLUCOSE BLDC GLUCOMTR-MCNC: 129 MG/DL (ref 70–99)
GLUCOSE BLDC GLUCOMTR-MCNC: 133 MG/DL (ref 70–99)
GLUCOSE BLDC GLUCOMTR-MCNC: 133 MG/DL (ref 70–99)
GLUCOSE BLDC GLUCOMTR-MCNC: 156 MG/DL (ref 70–99)
GLUCOSE SERPL-MCNC: 101 MG/DL (ref 70–99)
GLUCOSE SERPL-MCNC: 109 MG/DL (ref 70–99)
GLUCOSE SERPL-MCNC: 113 MG/DL (ref 70–99)
GLUCOSE SERPL-MCNC: 126 MG/DL (ref 70–99)
GLUCOSE SERPL-MCNC: 139 MG/DL (ref 70–99)
GLUCOSE SERPL-MCNC: 153 MG/DL (ref 70–99)
GLUCOSE SERPL-MCNC: 154 MG/DL (ref 70–99)
GLUCOSE SERPL-MCNC: 155 MG/DL (ref 70–99)
GLUCOSE SERPL-MCNC: 166 MG/DL (ref 70–99)
GLUCOSE SERPL-MCNC: 171 MG/DL (ref 70–99)
GLUCOSE SERPL-MCNC: 175 MG/DL (ref 70–99)
GLUCOSE SERPL-MCNC: 179 MG/DL (ref 70–99)
GLUCOSE SERPL-MCNC: 186 MG/DL (ref 70–99)
GLUCOSE SERPL-MCNC: 81 MG/DL (ref 70–99)
GLUCOSE SERPL-MCNC: 82 MG/DL (ref 70–99)
GLUCOSE UR STRIP-MCNC: NEGATIVE MG/DL
GLUCOSE UR STRIP-MCNC: NEGATIVE MG/DL
GRAM STAIN RESULT: NORMAL
GRAM STAIN RESULT: NORMAL
HCO3 BLDV-SCNC: 12 MMOL/L (ref 21–28)
HCO3 BLDV-SCNC: 18 MMOL/L (ref 21–28)
HCO3 BLDV-SCNC: 23 MMOL/L (ref 21–28)
HCT VFR BLD AUTO: 17.7 % (ref 40–53)
HCT VFR BLD AUTO: 22.2 % (ref 40–53)
HCT VFR BLD AUTO: 22.3 % (ref 40–53)
HCT VFR BLD AUTO: 22.8 % (ref 40–53)
HCT VFR BLD AUTO: 22.9 % (ref 40–53)
HCT VFR BLD AUTO: 23.2 % (ref 40–53)
HCT VFR BLD AUTO: 24 % (ref 40–53)
HCT VFR BLD AUTO: 24.1 % (ref 40–53)
HCT VFR BLD AUTO: 24.2 % (ref 40–53)
HCT VFR BLD AUTO: 24.4 % (ref 40–53)
HCT VFR BLD AUTO: 24.7 % (ref 40–53)
HCT VFR BLD AUTO: 25.1 % (ref 40–53)
HCT VFR BLD AUTO: 26 % (ref 40–53)
HCT VFR BLD AUTO: 35.8 % (ref 40–53)
HCT VFR BLD AUTO: 37.1 % (ref 40–53)
HGB BLD-MCNC: 10.9 G/DL (ref 13.3–17.7)
HGB BLD-MCNC: 11.5 G/DL (ref 13.3–17.7)
HGB BLD-MCNC: 5.7 G/DL (ref 13.3–17.7)
HGB BLD-MCNC: 6.9 G/DL (ref 13.3–17.7)
HGB BLD-MCNC: 7.2 G/DL (ref 13.3–17.7)
HGB BLD-MCNC: 7.4 G/DL (ref 13.3–17.7)
HGB BLD-MCNC: 7.5 G/DL (ref 13.3–17.7)
HGB BLD-MCNC: 7.6 G/DL (ref 13.3–17.7)
HGB BLD-MCNC: 7.7 G/DL (ref 13.3–17.7)
HGB BLD-MCNC: 7.8 G/DL (ref 13.3–17.7)
HGB BLD-MCNC: 7.8 G/DL (ref 13.3–17.7)
HGB BLD-MCNC: 8 G/DL (ref 13.3–17.7)
HGB BLD-MCNC: 8.1 G/DL (ref 13.3–17.7)
HGB BLD-MCNC: 8.2 G/DL (ref 13.3–17.7)
HGB UR QL STRIP: ABNORMAL
HGB UR QL STRIP: NEGATIVE
HOLD SPECIMEN: NORMAL
HOLD SPECIMEN: NORMAL
IMM GRANULOCYTES # BLD: 0 10E3/UL
IMM GRANULOCYTES # BLD: 0.1 10E3/UL
IMM GRANULOCYTES NFR BLD: 0 %
IMM GRANULOCYTES NFR BLD: 1 %
INR PPP: 1.34 (ref 0.85–1.15)
INR PPP: 1.4 (ref 0.85–1.15)
INR PPP: 1.48 (ref 0.85–1.15)
INR PPP: 1.51 (ref 0.85–1.15)
INR PPP: 1.68 (ref 0.85–1.15)
INR PPP: 1.69 (ref 0.85–1.15)
INR PPP: 1.71 (ref 0.85–1.15)
INR PPP: 1.71 (ref 0.85–1.15)
INR PPP: 2.19 (ref 0.85–1.15)
INR PPP: 2.26 (ref 0.85–1.15)
INTERPRETATION ECG - MUSE: NORMAL
INTERPRETATION ECG - MUSE: NORMAL
IRON BINDING CAPACITY (ROCHE): 172 UG/DL (ref 240–430)
IRON SATN MFR SERPL: 9 % (ref 15–46)
IRON SERPL-MCNC: 16 UG/DL (ref 61–157)
ISSUE DATE AND TIME: NORMAL
KETONES UR STRIP-MCNC: NEGATIVE MG/DL
KETONES UR STRIP-MCNC: NEGATIVE MG/DL
LACTATE BLD-SCNC: 2.8 MMOL/L
LACTATE BLD-SCNC: 8.2 MMOL/L
LACTATE SERPL-SCNC: 1.5 MMOL/L (ref 0.7–2)
LACTATE SERPL-SCNC: 1.8 MMOL/L (ref 0.7–2)
LACTATE SERPL-SCNC: 2.2 MMOL/L (ref 0.7–2)
LD BODY BODY FLUID SOURCE: NORMAL
LDH FLD L TO P-CCNC: 29 U/L
LDH SERPL L TO P-CCNC: 280 U/L (ref 0–250)
LEUKOCYTE ESTERASE UR QL STRIP: NEGATIVE
LEUKOCYTE ESTERASE UR QL STRIP: NEGATIVE
LISTERIA SPECIES (DETECTED/NOT DETECTED): NOT DETECTED
LYMPHOCYTES # BLD AUTO: 0.6 10E3/UL (ref 0.8–5.3)
LYMPHOCYTES # BLD AUTO: 0.8 10E3/UL (ref 0.8–5.3)
LYMPHOCYTES # BLD AUTO: 0.9 10E3/UL (ref 0.8–5.3)
LYMPHOCYTES # BLD AUTO: 1.1 10E3/UL (ref 0.8–5.3)
LYMPHOCYTES # BLD AUTO: 1.4 10E3/UL (ref 0.8–5.3)
LYMPHOCYTES NFR BLD AUTO: 11 %
LYMPHOCYTES NFR BLD AUTO: 5 %
LYMPHOCYTES NFR BLD AUTO: 6 %
LYMPHOCYTES NFR BLD AUTO: 7 %
LYMPHOCYTES NFR BLD AUTO: 8 %
LYMPHOCYTES NFR BLD AUTO: 8 %
LYMPHOCYTES NFR FLD MANUAL: 14 %
LYMPHOCYTES NFR FLD MANUAL: 8 %
LYMPHOCYTES NFR FLD MANUAL: 8 %
MCH RBC QN AUTO: 26.3 PG (ref 26.5–33)
MCH RBC QN AUTO: 27.8 PG (ref 26.5–33)
MCH RBC QN AUTO: 28.2 PG (ref 26.5–33)
MCH RBC QN AUTO: 28.5 PG (ref 26.5–33)
MCH RBC QN AUTO: 28.7 PG (ref 26.5–33)
MCH RBC QN AUTO: 28.7 PG (ref 26.5–33)
MCH RBC QN AUTO: 28.8 PG (ref 26.5–33)
MCH RBC QN AUTO: 28.9 PG (ref 26.5–33)
MCH RBC QN AUTO: 29 PG (ref 26.5–33)
MCH RBC QN AUTO: 29.2 PG (ref 26.5–33)
MCH RBC QN AUTO: 29.4 PG (ref 26.5–33)
MCH RBC QN AUTO: 29.4 PG (ref 26.5–33)
MCH RBC QN AUTO: 29.5 PG (ref 26.5–33)
MCH RBC QN AUTO: 29.6 PG (ref 26.5–33)
MCH RBC QN AUTO: 29.8 PG (ref 26.5–33)
MCHC RBC AUTO-ENTMCNC: 30.4 G/DL (ref 31.5–36.5)
MCHC RBC AUTO-ENTMCNC: 31 G/DL (ref 31.5–36.5)
MCHC RBC AUTO-ENTMCNC: 31.1 G/DL (ref 31.5–36.5)
MCHC RBC AUTO-ENTMCNC: 31.3 G/DL (ref 31.5–36.5)
MCHC RBC AUTO-ENTMCNC: 31.4 G/DL (ref 31.5–36.5)
MCHC RBC AUTO-ENTMCNC: 31.5 G/DL (ref 31.5–36.5)
MCHC RBC AUTO-ENTMCNC: 31.5 G/DL (ref 31.5–36.5)
MCHC RBC AUTO-ENTMCNC: 31.9 G/DL (ref 31.5–36.5)
MCHC RBC AUTO-ENTMCNC: 32.2 G/DL (ref 31.5–36.5)
MCHC RBC AUTO-ENTMCNC: 32.3 G/DL (ref 31.5–36.5)
MCHC RBC AUTO-ENTMCNC: 32.4 G/DL (ref 31.5–36.5)
MCHC RBC AUTO-ENTMCNC: 32.5 G/DL (ref 31.5–36.5)
MCHC RBC AUTO-ENTMCNC: 32.8 G/DL (ref 31.5–36.5)
MCHC RBC AUTO-ENTMCNC: 32.8 G/DL (ref 31.5–36.5)
MCHC RBC AUTO-ENTMCNC: 34.1 G/DL (ref 31.5–36.5)
MCV RBC AUTO: 86 FL (ref 78–100)
MCV RBC AUTO: 88 FL (ref 78–100)
MCV RBC AUTO: 89 FL (ref 78–100)
MCV RBC AUTO: 90 FL (ref 78–100)
MCV RBC AUTO: 91 FL (ref 78–100)
MCV RBC AUTO: 92 FL (ref 78–100)
MCV RBC AUTO: 92 FL (ref 78–100)
MONOCYTES # BLD AUTO: 0.5 10E3/UL (ref 0–1.3)
MONOCYTES # BLD AUTO: 1.1 10E3/UL (ref 0–1.3)
MONOCYTES # BLD AUTO: 1.2 10E3/UL (ref 0–1.3)
MONOCYTES # BLD AUTO: 1.3 10E3/UL (ref 0–1.3)
MONOCYTES # BLD AUTO: 1.3 10E3/UL (ref 0–1.3)
MONOCYTES # BLD AUTO: 1.4 10E3/UL (ref 0–1.3)
MONOCYTES # BLD AUTO: 1.9 10E3/UL (ref 0–1.3)
MONOCYTES # BLD AUTO: 1.9 10E3/UL (ref 0–1.3)
MONOCYTES NFR BLD AUTO: 10 %
MONOCYTES NFR BLD AUTO: 12 %
MONOCYTES NFR BLD AUTO: 13 %
MONOCYTES NFR BLD AUTO: 15 %
MONOCYTES NFR BLD AUTO: 7 %
MONOCYTES NFR BLD AUTO: 7 %
MONOCYTES NFR BLD AUTO: 8 %
MONOCYTES NFR BLD AUTO: 8 %
MONOCYTES NFR BLD AUTO: 9 %
MONOS+MACROS NFR FLD MANUAL: 30 %
MONOS+MACROS NFR FLD MANUAL: 56 %
MONOS+MACROS NFR FLD MANUAL: 75 %
MUCOUS THREADS #/AREA URNS LPF: PRESENT /LPF
NEUTROPHILS # BLD AUTO: 10.4 10E3/UL (ref 1.6–8.3)
NEUTROPHILS # BLD AUTO: 11.1 10E3/UL (ref 1.6–8.3)
NEUTROPHILS # BLD AUTO: 12.5 10E3/UL (ref 1.6–8.3)
NEUTROPHILS # BLD AUTO: 12.6 10E3/UL (ref 1.6–8.3)
NEUTROPHILS # BLD AUTO: 13.1 10E3/UL (ref 1.6–8.3)
NEUTROPHILS # BLD AUTO: 15 10E3/UL (ref 1.6–8.3)
NEUTROPHILS # BLD AUTO: 3.8 10E3/UL (ref 1.6–8.3)
NEUTROPHILS # BLD AUTO: 9.1 10E3/UL (ref 1.6–8.3)
NEUTROPHILS # BLD AUTO: 9.2 10E3/UL (ref 1.6–8.3)
NEUTROPHILS # BLD AUTO: 9.7 10E3/UL (ref 1.6–8.3)
NEUTROPHILS # BLD AUTO: 9.7 10E3/UL (ref 1.6–8.3)
NEUTROPHILS NFR BLD AUTO: 74 %
NEUTROPHILS NFR BLD AUTO: 76 %
NEUTROPHILS NFR BLD AUTO: 78 %
NEUTROPHILS NFR BLD AUTO: 81 %
NEUTROPHILS NFR BLD AUTO: 81 %
NEUTROPHILS NFR BLD AUTO: 82 %
NEUTROPHILS NFR BLD AUTO: 83 %
NEUTROPHILS NFR BLD AUTO: 84 %
NEUTS BAND NFR FLD MANUAL: 25 %
NEUTS BAND NFR FLD MANUAL: 59 %
NEUTS BAND NFR FLD MANUAL: 7 %
NITRATE UR QL: NEGATIVE
NITRATE UR QL: NEGATIVE
NRBC # BLD AUTO: 0 10E3/UL
NRBC BLD AUTO-RTO: 0 /100
O2/TOTAL GAS SETTING VFR VENT: 21 %
OTHER CELLS FLD MANUAL: 5 %
OTHER CELLS FLD MANUAL: 8 %
P AXIS - MUSE: 31 DEGREES
P AXIS - MUSE: 33 DEGREES
PCO2 BLDV: 24 MM HG (ref 40–50)
PCO2 BLDV: 31 MM HG (ref 40–50)
PCO2 BLDV: 38 MM HG (ref 40–50)
PH BLDV: 7.32 [PH] (ref 7.32–7.43)
PH BLDV: 7.37 [PH] (ref 7.32–7.43)
PH BLDV: 7.39 [PH] (ref 7.32–7.43)
PH UR STRIP: 5 [PH] (ref 5–7)
PH UR STRIP: 6 [PH] (ref 5–7)
PLAT MORPH BLD: ABNORMAL
PLAT MORPH BLD: NORMAL
PLATELET # BLD AUTO: 103 10E3/UL (ref 150–450)
PLATELET # BLD AUTO: 112 10E3/UL (ref 150–450)
PLATELET # BLD AUTO: 123 10E3/UL (ref 150–450)
PLATELET # BLD AUTO: 131 10E3/UL (ref 150–450)
PLATELET # BLD AUTO: 136 10E3/UL (ref 150–450)
PLATELET # BLD AUTO: 194 10E3/UL (ref 150–450)
PLATELET # BLD AUTO: 203 10E3/UL (ref 150–450)
PLATELET # BLD AUTO: 221 10E3/UL (ref 150–450)
PLATELET # BLD AUTO: 221 10E3/UL (ref 150–450)
PLATELET # BLD AUTO: 223 10E3/UL (ref 150–450)
PLATELET # BLD AUTO: 235 10E3/UL (ref 150–450)
PLATELET # BLD AUTO: 238 10E3/UL (ref 150–450)
PLATELET # BLD AUTO: 240 10E3/UL (ref 150–450)
PLATELET # BLD AUTO: 95 10E3/UL (ref 150–450)
PLATELET # BLD AUTO: 99 10E3/UL (ref 150–450)
PO2 BLDV: 17 MM HG (ref 25–47)
PO2 BLDV: 23 MM HG (ref 25–47)
PO2 BLDV: 29 MM HG (ref 25–47)
POLYCHROMASIA BLD QL SMEAR: SLIGHT
POTASSIUM SERPL-SCNC: 3.3 MMOL/L (ref 3.4–5.3)
POTASSIUM SERPL-SCNC: 3.4 MMOL/L (ref 3.4–5.3)
POTASSIUM SERPL-SCNC: 3.5 MMOL/L (ref 3.4–5.3)
POTASSIUM SERPL-SCNC: 3.6 MMOL/L (ref 3.4–5.3)
POTASSIUM SERPL-SCNC: 3.7 MMOL/L (ref 3.4–5.3)
POTASSIUM SERPL-SCNC: 3.7 MMOL/L (ref 3.4–5.3)
POTASSIUM SERPL-SCNC: 3.8 MMOL/L (ref 3.4–5.3)
POTASSIUM SERPL-SCNC: 3.9 MMOL/L (ref 3.4–5.3)
POTASSIUM SERPL-SCNC: 4 MMOL/L (ref 3.4–5.3)
POTASSIUM SERPL-SCNC: 4 MMOL/L (ref 3.4–5.3)
POTASSIUM SERPL-SCNC: 4.3 MMOL/L (ref 3.4–5.3)
POTASSIUM SERPL-SCNC: 4.3 MMOL/L (ref 3.4–5.3)
POTASSIUM SERPL-SCNC: 4.4 MMOL/L (ref 3.4–5.3)
POTASSIUM SERPL-SCNC: 4.6 MMOL/L (ref 3.4–5.3)
PR INTERVAL - MUSE: 132 MS
PR INTERVAL - MUSE: 162 MS
PROT FLD-MCNC: 0.4 G/DL
PROT FLD-MCNC: 0.4 G/DL
PROT FLD-MCNC: 0.5 G/DL
PROT SERPL-MCNC: 4.7 G/DL (ref 6.4–8.3)
PROT SERPL-MCNC: 5.3 G/DL (ref 6.4–8.3)
PROT SERPL-MCNC: 5.4 G/DL (ref 6.4–8.3)
PROT SERPL-MCNC: 5.5 G/DL (ref 6.4–8.3)
PROT SERPL-MCNC: 5.7 G/DL (ref 6.4–8.3)
PROT SERPL-MCNC: 5.8 G/DL (ref 6.4–8.3)
PROT SERPL-MCNC: 5.9 G/DL (ref 6.4–8.3)
PROT SERPL-MCNC: 6.2 G/DL (ref 6.4–8.3)
PROT SERPL-MCNC: 7.1 G/DL (ref 6.4–8.3)
PROT SERPL-MCNC: 7.2 G/DL (ref 6.4–8.3)
PROTEIN BODY FLUID SOURCE: NORMAL
QRS DURATION - MUSE: 74 MS
QRS DURATION - MUSE: 88 MS
QT - MUSE: 370 MS
QT - MUSE: 380 MS
QTC - MUSE: 484 MS
QTC - MUSE: 507 MS
R AXIS - MUSE: -4 DEGREES
R AXIS - MUSE: -8 DEGREES
RBC # BLD AUTO: 1.97 10E6/UL (ref 4.4–5.9)
RBC # BLD AUTO: 2.43 10E6/UL (ref 4.4–5.9)
RBC # BLD AUTO: 2.52 10E6/UL (ref 4.4–5.9)
RBC # BLD AUTO: 2.55 10E6/UL (ref 4.4–5.9)
RBC # BLD AUTO: 2.55 10E6/UL (ref 4.4–5.9)
RBC # BLD AUTO: 2.57 10E6/UL (ref 4.4–5.9)
RBC # BLD AUTO: 2.6 10E6/UL (ref 4.4–5.9)
RBC # BLD AUTO: 2.65 10E6/UL (ref 4.4–5.9)
RBC # BLD AUTO: 2.65 10E6/UL (ref 4.4–5.9)
RBC # BLD AUTO: 2.67 10E6/UL (ref 4.4–5.9)
RBC # BLD AUTO: 2.75 10E6/UL (ref 4.4–5.9)
RBC # BLD AUTO: 2.76 10E6/UL (ref 4.4–5.9)
RBC # BLD AUTO: 2.91 10E6/UL (ref 4.4–5.9)
RBC # BLD AUTO: 4.13 10E6/UL (ref 4.4–5.9)
RBC # BLD AUTO: 4.15 10E6/UL (ref 4.4–5.9)
RBC MORPH BLD: ABNORMAL
RBC MORPH BLD: NORMAL
RBC URINE: 1 /HPF
RBC URINE: 2 /HPF
SAO2 % BLDV: 23 % (ref 94–100)
SAO2 % BLDV: 37 % (ref 94–100)
SARS-COV-2 RNA RESP QL NAA+PROBE: NEGATIVE
SARS-COV-2 RNA RESP QL NAA+PROBE: NEGATIVE
SODIUM SERPL-SCNC: 133 MMOL/L (ref 136–145)
SODIUM SERPL-SCNC: 133 MMOL/L (ref 136–145)
SODIUM SERPL-SCNC: 134 MMOL/L (ref 136–145)
SODIUM SERPL-SCNC: 134 MMOL/L (ref 136–145)
SODIUM SERPL-SCNC: 135 MMOL/L (ref 136–145)
SODIUM SERPL-SCNC: 136 MMOL/L (ref 136–145)
SODIUM SERPL-SCNC: 137 MMOL/L (ref 136–145)
SODIUM SERPL-SCNC: 138 MMOL/L (ref 136–145)
SODIUM SERPL-SCNC: 138 MMOL/L (ref 136–145)
SODIUM SERPL-SCNC: 141 MMOL/L (ref 136–145)
SODIUM SERPL-SCNC: 141 MMOL/L (ref 136–145)
SP GR UR STRIP: 1.03 (ref 1–1.03)
SP GR UR STRIP: 1.03 (ref 1–1.03)
SPECIMEN EXPIRATION DATE: NORMAL
SQUAMOUS EPITHELIAL: <1 /HPF
STAPHYLOCOCCUS AUREUS: NOT DETECTED
STAPHYLOCOCCUS EPIDERMIDIS: DETECTED
STAPHYLOCOCCUS LUGDUNENSIS: NOT DETECTED
STREPTOCOCCUS AGALACTIAE: NOT DETECTED
STREPTOCOCCUS ANGINOSUS GROUP: NOT DETECTED
STREPTOCOCCUS PNEUMONIAE: NOT DETECTED
STREPTOCOCCUS PYOGENES: NOT DETECTED
STREPTOCOCCUS SPECIES: NOT DETECTED
SYSTOLIC BLOOD PRESSURE - MUSE: NORMAL MMHG
SYSTOLIC BLOOD PRESSURE - MUSE: NORMAL MMHG
T AXIS - MUSE: 27 DEGREES
T AXIS - MUSE: 35 DEGREES
UNIT ABO/RH: NORMAL
UNIT NUMBER: NORMAL
UNIT STATUS: NORMAL
UNIT TYPE ISBT: 600
UNIT TYPE ISBT: 6200
UPPER GI ENDOSCOPY: NORMAL
UROBILINOGEN UR STRIP-MCNC: NORMAL MG/DL
UROBILINOGEN UR STRIP-MCNC: NORMAL MG/DL
VENTRICULAR RATE- MUSE: 103 BPM
VENTRICULAR RATE- MUSE: 107 BPM
VIT B12 SERPL-MCNC: 2978 PG/ML (ref 232–1245)
WBC # BLD AUTO: 11.1 10E3/UL (ref 4–11)
WBC # BLD AUTO: 11.2 10E3/UL (ref 4–11)
WBC # BLD AUTO: 11.9 10E3/UL (ref 4–11)
WBC # BLD AUTO: 12.6 10E3/UL (ref 4–11)
WBC # BLD AUTO: 12.8 10E3/UL (ref 4–11)
WBC # BLD AUTO: 14.3 10E3/UL (ref 4–11)
WBC # BLD AUTO: 15.1 10E3/UL (ref 4–11)
WBC # BLD AUTO: 15.5 10E3/UL (ref 4–11)
WBC # BLD AUTO: 15.9 10E3/UL (ref 4–11)
WBC # BLD AUTO: 17.9 10E3/UL (ref 4–11)
WBC # BLD AUTO: 18.1 10E3/UL (ref 4–11)
WBC # BLD AUTO: 5.2 10E3/UL (ref 4–11)
WBC # BLD AUTO: 6.5 10E3/UL (ref 4–11)
WBC # BLD AUTO: 6.6 10E3/UL (ref 4–11)
WBC # BLD AUTO: 9.5 10E3/UL (ref 4–11)
WBC # FLD AUTO: 120 /UL
WBC # FLD AUTO: 300 /UL
WBC # FLD AUTO: 98 /UL
WBC URINE: 1 /HPF
WBC URINE: 1 /HPF

## 2023-01-01 PROCEDURE — 87205 SMEAR GRAM STAIN: CPT | Performed by: INTERNAL MEDICINE

## 2023-01-01 PROCEDURE — 250N000013 HC RX MED GY IP 250 OP 250 PS 637: Performed by: NURSE PRACTITIONER

## 2023-01-01 PROCEDURE — 99231 SBSQ HOSP IP/OBS SF/LOW 25: CPT | Mod: GV | Performed by: HOSPITALIST

## 2023-01-01 PROCEDURE — 99232 SBSQ HOSP IP/OBS MODERATE 35: CPT | Mod: GV | Performed by: HOSPITALIST

## 2023-01-01 PROCEDURE — 85025 COMPLETE CBC W/AUTO DIFF WBC: CPT | Performed by: EMERGENCY MEDICINE

## 2023-01-01 PROCEDURE — 120N000001 HC R&B MED SURG/OB

## 2023-01-01 PROCEDURE — 250N000013 HC RX MED GY IP 250 OP 250 PS 637: Performed by: PHYSICIAN ASSISTANT

## 2023-01-01 PROCEDURE — 250N000013 HC RX MED GY IP 250 OP 250 PS 637: Performed by: INTERNAL MEDICINE

## 2023-01-01 PROCEDURE — 250N000011 HC RX IP 250 OP 636: Performed by: INTERNAL MEDICINE

## 2023-01-01 PROCEDURE — 36415 COLL VENOUS BLD VENIPUNCTURE: CPT | Performed by: INTERNAL MEDICINE

## 2023-01-01 PROCEDURE — 83605 ASSAY OF LACTIC ACID: CPT

## 2023-01-01 PROCEDURE — 250N000009 HC RX 250: Performed by: EMERGENCY MEDICINE

## 2023-01-01 PROCEDURE — 36415 COLL VENOUS BLD VENIPUNCTURE: CPT | Mod: ORL | Performed by: FAMILY MEDICINE

## 2023-01-01 PROCEDURE — 82248 BILIRUBIN DIRECT: CPT | Performed by: INTERNAL MEDICINE

## 2023-01-01 PROCEDURE — 84157 ASSAY OF PROTEIN OTHER: CPT | Performed by: INTERNAL MEDICINE

## 2023-01-01 PROCEDURE — 250N000011 HC RX IP 250 OP 636: Performed by: NURSE PRACTITIONER

## 2023-01-01 PROCEDURE — 85018 HEMOGLOBIN: CPT | Performed by: NURSE PRACTITIONER

## 2023-01-01 PROCEDURE — 85025 COMPLETE CBC W/AUTO DIFF WBC: CPT | Performed by: INTERNAL MEDICINE

## 2023-01-01 PROCEDURE — 93005 ELECTROCARDIOGRAM TRACING: CPT

## 2023-01-01 PROCEDURE — 85610 PROTHROMBIN TIME: CPT | Performed by: EMERGENCY MEDICINE

## 2023-01-01 PROCEDURE — 85610 PROTHROMBIN TIME: CPT | Performed by: INTERNAL MEDICINE

## 2023-01-01 PROCEDURE — 87015 SPECIMEN INFECT AGNT CONCNTJ: CPT | Performed by: INTERNAL MEDICINE

## 2023-01-01 PROCEDURE — 85004 AUTOMATED DIFF WBC COUNT: CPT | Performed by: INTERNAL MEDICINE

## 2023-01-01 PROCEDURE — 89051 BODY FLUID CELL COUNT: CPT | Performed by: HOSPITALIST

## 2023-01-01 PROCEDURE — P9604 ONE-WAY ALLOW PRORATED TRIP: HCPCS | Mod: ORL | Performed by: FAMILY MEDICINE

## 2023-01-01 PROCEDURE — 83605 ASSAY OF LACTIC ACID: CPT | Performed by: INTERNAL MEDICINE

## 2023-01-01 PROCEDURE — 36415 COLL VENOUS BLD VENIPUNCTURE: CPT | Performed by: HOSPITALIST

## 2023-01-01 PROCEDURE — 250N000013 HC RX MED GY IP 250 OP 250 PS 637: Performed by: HOSPITALIST

## 2023-01-01 PROCEDURE — 86901 BLOOD TYPING SEROLOGIC RH(D): CPT | Performed by: EMERGENCY MEDICINE

## 2023-01-01 PROCEDURE — 74174 CTA ABD&PLVS W/CONTRAST: CPT | Mod: MG

## 2023-01-01 PROCEDURE — 71045 X-RAY EXAM CHEST 1 VIEW: CPT

## 2023-01-01 PROCEDURE — 85027 COMPLETE CBC AUTOMATED: CPT | Mod: ORL | Performed by: FAMILY MEDICINE

## 2023-01-01 PROCEDURE — 82746 ASSAY OF FOLIC ACID SERUM: CPT | Performed by: INTERNAL MEDICINE

## 2023-01-01 PROCEDURE — 82803 BLOOD GASES ANY COMBINATION: CPT | Performed by: NURSE PRACTITIONER

## 2023-01-01 PROCEDURE — 36415 COLL VENOUS BLD VENIPUNCTURE: CPT | Performed by: EMERGENCY MEDICINE

## 2023-01-01 PROCEDURE — 99418 PROLNG IP/OBS E/M EA 15 MIN: CPT | Performed by: NURSE PRACTITIONER

## 2023-01-01 PROCEDURE — 84295 ASSAY OF SERUM SODIUM: CPT | Performed by: NURSE PRACTITIONER

## 2023-01-01 PROCEDURE — 84132 ASSAY OF SERUM POTASSIUM: CPT | Performed by: INTERNAL MEDICINE

## 2023-01-01 PROCEDURE — 85018 HEMOGLOBIN: CPT | Performed by: HOSPITALIST

## 2023-01-01 PROCEDURE — 99285 EMERGENCY DEPT VISIT HI MDM: CPT | Mod: 25

## 2023-01-01 PROCEDURE — 80053 COMPREHEN METABOLIC PANEL: CPT | Performed by: INTERNAL MEDICINE

## 2023-01-01 PROCEDURE — 258N000003 HC RX IP 258 OP 636: Performed by: EMERGENCY MEDICINE

## 2023-01-01 PROCEDURE — 200N000001 HC R&B ICU

## 2023-01-01 PROCEDURE — 99233 SBSQ HOSP IP/OBS HIGH 50: CPT | Performed by: INTERNAL MEDICINE

## 2023-01-01 PROCEDURE — 99223 1ST HOSP IP/OBS HIGH 75: CPT

## 2023-01-01 PROCEDURE — 96365 THER/PROPH/DIAG IV INF INIT: CPT | Mod: 59

## 2023-01-01 PROCEDURE — 250N000013 HC RX MED GY IP 250 OP 250 PS 637

## 2023-01-01 PROCEDURE — 99232 SBSQ HOSP IP/OBS MODERATE 35: CPT | Performed by: INTERNAL MEDICINE

## 2023-01-01 PROCEDURE — 99207 PR CDG-CUT & PASTE-POTENTIAL IMPACT ON LEVEL: CPT | Performed by: INTERNAL MEDICINE

## 2023-01-01 PROCEDURE — HZ2ZZZZ DETOXIFICATION SERVICES FOR SUBSTANCE ABUSE TREATMENT: ICD-10-PCS | Performed by: INTERNAL MEDICINE

## 2023-01-01 PROCEDURE — 0W9G3ZZ DRAINAGE OF PERITONEAL CAVITY, PERCUTANEOUS APPROACH: ICD-10-PCS | Performed by: RADIOLOGY

## 2023-01-01 PROCEDURE — 82607 VITAMIN B-12: CPT | Performed by: INTERNAL MEDICINE

## 2023-01-01 PROCEDURE — 99233 SBSQ HOSP IP/OBS HIGH 50: CPT | Mod: GV | Performed by: INTERNAL MEDICINE

## 2023-01-01 PROCEDURE — 84132 ASSAY OF SERUM POTASSIUM: CPT | Performed by: HOSPITALIST

## 2023-01-01 PROCEDURE — 82040 ASSAY OF SERUM ALBUMIN: CPT | Performed by: INTERNAL MEDICINE

## 2023-01-01 PROCEDURE — 83615 LACTATE (LD) (LDH) ENZYME: CPT | Performed by: INTERNAL MEDICINE

## 2023-01-01 PROCEDURE — 82042 OTHER SOURCE ALBUMIN QUAN EA: CPT | Performed by: HOSPITALIST

## 2023-01-01 PROCEDURE — 99223 1ST HOSP IP/OBS HIGH 75: CPT | Performed by: NURSE PRACTITIONER

## 2023-01-01 PROCEDURE — 97530 THERAPEUTIC ACTIVITIES: CPT | Mod: GP

## 2023-01-01 PROCEDURE — 87040 BLOOD CULTURE FOR BACTERIA: CPT | Performed by: INTERNAL MEDICINE

## 2023-01-01 PROCEDURE — 82042 OTHER SOURCE ALBUMIN QUAN EA: CPT | Performed by: INTERNAL MEDICINE

## 2023-01-01 PROCEDURE — 250N000011 HC RX IP 250 OP 636: Mod: JA | Performed by: EMERGENCY MEDICINE

## 2023-01-01 PROCEDURE — 999N000111 HC STATISTIC OT IP EVAL DEFER

## 2023-01-01 PROCEDURE — 99232 SBSQ HOSP IP/OBS MODERATE 35: CPT | Mod: GV | Performed by: INTERNAL MEDICINE

## 2023-01-01 PROCEDURE — 99233 SBSQ HOSP IP/OBS HIGH 50: CPT | Performed by: NURSE PRACTITIONER

## 2023-01-01 PROCEDURE — C9113 INJ PANTOPRAZOLE SODIUM, VIA: HCPCS | Performed by: EMERGENCY MEDICINE

## 2023-01-01 PROCEDURE — 272N000706 US PARACENTESIS WITHOUT ALBUMIN

## 2023-01-01 PROCEDURE — 85018 HEMOGLOBIN: CPT | Performed by: INTERNAL MEDICINE

## 2023-01-01 PROCEDURE — U0003 INFECTIOUS AGENT DETECTION BY NUCLEIC ACID (DNA OR RNA); SEVERE ACUTE RESPIRATORY SYNDROME CORONAVIRUS 2 (SARS-COV-2) (CORONAVIRUS DISEASE [COVID-19]), AMPLIFIED PROBE TECHNIQUE, MAKING USE OF HIGH THROUGHPUT TECHNOLOGIES AS DESCRIBED BY CMS-2020-01-R: HCPCS | Performed by: INTERNAL MEDICINE

## 2023-01-01 PROCEDURE — 99232 SBSQ HOSP IP/OBS MODERATE 35: CPT | Performed by: HOSPITALIST

## 2023-01-01 PROCEDURE — 82728 ASSAY OF FERRITIN: CPT | Performed by: INTERNAL MEDICINE

## 2023-01-01 PROCEDURE — 250N000011 HC RX IP 250 OP 636: Mod: JA | Performed by: INTERNAL MEDICINE

## 2023-01-01 PROCEDURE — 82310 ASSAY OF CALCIUM: CPT | Performed by: INTERNAL MEDICINE

## 2023-01-01 PROCEDURE — 250N000009 HC RX 250: Performed by: RADIOLOGY

## 2023-01-01 PROCEDURE — 80053 COMPREHEN METABOLIC PANEL: CPT | Performed by: EMERGENCY MEDICINE

## 2023-01-01 PROCEDURE — 96360 HYDRATION IV INFUSION INIT: CPT | Mod: 59

## 2023-01-01 PROCEDURE — 81001 URINALYSIS AUTO W/SCOPE: CPT | Performed by: INTERNAL MEDICINE

## 2023-01-01 PROCEDURE — 83550 IRON BINDING TEST: CPT | Performed by: INTERNAL MEDICINE

## 2023-01-01 PROCEDURE — P9016 RBC LEUKOCYTES REDUCED: HCPCS | Performed by: INTERNAL MEDICINE

## 2023-01-01 PROCEDURE — 250N000011 HC RX IP 250 OP 636

## 2023-01-01 PROCEDURE — 87635 SARS-COV-2 COVID-19 AMP PRB: CPT | Performed by: INTERNAL MEDICINE

## 2023-01-01 PROCEDURE — 86923 COMPATIBILITY TEST ELECTRIC: CPT | Performed by: EMERGENCY MEDICINE

## 2023-01-01 PROCEDURE — 86923 COMPATIBILITY TEST ELECTRIC: CPT | Performed by: INTERNAL MEDICINE

## 2023-01-01 PROCEDURE — 99223 1ST HOSP IP/OBS HIGH 75: CPT | Mod: A1 | Performed by: INTERNAL MEDICINE

## 2023-01-01 PROCEDURE — 80053 COMPREHEN METABOLIC PANEL: CPT | Performed by: HOSPITALIST

## 2023-01-01 PROCEDURE — 250N000009 HC RX 250: Performed by: INTERNAL MEDICINE

## 2023-01-01 PROCEDURE — 250N000011 HC RX IP 250 OP 636: Performed by: HOSPITALIST

## 2023-01-01 PROCEDURE — 99231 SBSQ HOSP IP/OBS SF/LOW 25: CPT | Mod: GV | Performed by: INTERNAL MEDICINE

## 2023-01-01 PROCEDURE — 84157 ASSAY OF PROTEIN OTHER: CPT | Performed by: HOSPITALIST

## 2023-01-01 PROCEDURE — C9113 INJ PANTOPRAZOLE SODIUM, VIA: HCPCS | Performed by: INTERNAL MEDICINE

## 2023-01-01 PROCEDURE — 87070 CULTURE OTHR SPECIMN AEROBIC: CPT | Performed by: INTERNAL MEDICINE

## 2023-01-01 PROCEDURE — 96376 TX/PRO/DX INJ SAME DRUG ADON: CPT

## 2023-01-01 PROCEDURE — 258N000003 HC RX IP 258 OP 636: Performed by: INTERNAL MEDICINE

## 2023-01-01 PROCEDURE — 82140 ASSAY OF AMMONIA: CPT | Performed by: EMERGENCY MEDICINE

## 2023-01-01 PROCEDURE — 272N000706 US PARACENTESIS WITH ALBUMIN

## 2023-01-01 PROCEDURE — 87086 URINE CULTURE/COLONY COUNT: CPT | Performed by: INTERNAL MEDICINE

## 2023-01-01 PROCEDURE — 85025 COMPLETE CBC W/AUTO DIFF WBC: CPT | Mod: ORL | Performed by: FAMILY MEDICINE

## 2023-01-01 PROCEDURE — G1010 CDSM STANSON: HCPCS

## 2023-01-01 PROCEDURE — 82803 BLOOD GASES ANY COMBINATION: CPT

## 2023-01-01 PROCEDURE — 82378 CARCINOEMBRYONIC ANTIGEN: CPT | Mod: ORL | Performed by: FAMILY MEDICINE

## 2023-01-01 PROCEDURE — 80053 COMPREHEN METABOLIC PANEL: CPT | Mod: ORL | Performed by: FAMILY MEDICINE

## 2023-01-01 PROCEDURE — 85027 COMPLETE CBC AUTOMATED: CPT | Performed by: INTERNAL MEDICINE

## 2023-01-01 PROCEDURE — 97161 PT EVAL LOW COMPLEX 20 MIN: CPT | Mod: GP

## 2023-01-01 PROCEDURE — 99233 SBSQ HOSP IP/OBS HIGH 50: CPT

## 2023-01-01 PROCEDURE — 36430 TRANSFUSION BLD/BLD COMPNT: CPT

## 2023-01-01 PROCEDURE — 99222 1ST HOSP IP/OBS MODERATE 55: CPT | Performed by: INTERNAL MEDICINE

## 2023-01-01 PROCEDURE — 99239 HOSP IP/OBS DSCHRG MGMT >30: CPT | Mod: GV | Performed by: INTERNAL MEDICINE

## 2023-01-01 PROCEDURE — 89050 BODY FLUID CELL COUNT: CPT | Performed by: INTERNAL MEDICINE

## 2023-01-01 PROCEDURE — P9016 RBC LEUKOCYTES REDUCED: HCPCS | Performed by: EMERGENCY MEDICINE

## 2023-01-01 PROCEDURE — 43235 EGD DIAGNOSTIC BRUSH WASH: CPT | Performed by: INTERNAL MEDICINE

## 2023-01-01 PROCEDURE — 89051 BODY FLUID CELL COUNT: CPT | Performed by: INTERNAL MEDICINE

## 2023-01-01 PROCEDURE — 82140 ASSAY OF AMMONIA: CPT | Performed by: INTERNAL MEDICINE

## 2023-01-01 PROCEDURE — 99207 PR NO BILLABLE SERVICE THIS VISIT: CPT | Mod: GV | Performed by: INTERNAL MEDICINE

## 2023-01-01 PROCEDURE — 92610 EVALUATE SWALLOWING FUNCTION: CPT | Mod: GN | Performed by: SPEECH-LANGUAGE PATHOLOGIST

## 2023-01-01 PROCEDURE — 99418 PROLNG IP/OBS E/M EA 15 MIN: CPT | Performed by: INTERNAL MEDICINE

## 2023-01-01 PROCEDURE — 93010 ELECTROCARDIOGRAM REPORT: CPT | Performed by: INTERNAL MEDICINE

## 2023-01-01 PROCEDURE — 87149 DNA/RNA DIRECT PROBE: CPT | Performed by: EMERGENCY MEDICINE

## 2023-01-01 PROCEDURE — 99231 SBSQ HOSP IP/OBS SF/LOW 25: CPT | Performed by: HOSPITALIST

## 2023-01-01 PROCEDURE — 87077 CULTURE AEROBIC IDENTIFY: CPT | Performed by: EMERGENCY MEDICINE

## 2023-01-01 PROCEDURE — P9059 PLASMA, FRZ BETWEEN 8-24HOUR: HCPCS | Performed by: EMERGENCY MEDICINE

## 2023-01-01 PROCEDURE — 85014 HEMATOCRIT: CPT | Performed by: INTERNAL MEDICINE

## 2023-01-01 PROCEDURE — 99207 PR APP CREDIT; MD BILLING SHARED VISIT: CPT | Performed by: NURSE PRACTITIONER

## 2023-01-01 PROCEDURE — G0500 MOD SEDAT ENDO SERVICE >5YRS: HCPCS | Performed by: INTERNAL MEDICINE

## 2023-01-01 PROCEDURE — 97116 GAIT TRAINING THERAPY: CPT | Mod: GP

## 2023-01-01 PROCEDURE — 36415 COLL VENOUS BLD VENIPUNCTURE: CPT | Performed by: NURSE PRACTITIONER

## 2023-01-01 PROCEDURE — 0DJ08ZZ INSPECTION OF UPPER INTESTINAL TRACT, VIA NATURAL OR ARTIFICIAL OPENING ENDOSCOPIC: ICD-10-PCS | Performed by: INTERNAL MEDICINE

## 2023-01-01 PROCEDURE — 84450 TRANSFERASE (AST) (SGOT): CPT | Mod: ORL | Performed by: FAMILY MEDICINE

## 2023-01-01 PROCEDURE — 250N000011 HC RX IP 250 OP 636: Performed by: EMERGENCY MEDICINE

## 2023-01-01 PROCEDURE — 96375 TX/PRO/DX INJ NEW DRUG ADDON: CPT

## 2023-01-01 PROCEDURE — 85730 THROMBOPLASTIN TIME PARTIAL: CPT | Performed by: EMERGENCY MEDICINE

## 2023-01-01 PROCEDURE — 82077 ASSAY SPEC XCP UR&BREATH IA: CPT | Performed by: EMERGENCY MEDICINE

## 2023-01-01 PROCEDURE — 83605 ASSAY OF LACTIC ACID: CPT | Performed by: NURSE PRACTITIONER

## 2023-01-01 RX ORDER — OCTREOTIDE ACETATE 50 UG/ML
50 INJECTION, SOLUTION INTRAVENOUS; SUBCUTANEOUS ONCE
Status: COMPLETED | OUTPATIENT
Start: 2023-01-01 | End: 2023-01-01

## 2023-01-01 RX ORDER — SIMETHICONE 40MG/0.6ML
133 SUSPENSION, DROPS(FINAL DOSAGE FORM)(ML) ORAL
Status: DISCONTINUED | OUTPATIENT
Start: 2023-01-01 | End: 2023-01-01

## 2023-01-01 RX ORDER — POTASSIUM CHLORIDE 1.5 G/1.58G
40 POWDER, FOR SOLUTION ORAL ONCE
Status: COMPLETED | OUTPATIENT
Start: 2023-01-01 | End: 2023-01-01

## 2023-01-01 RX ORDER — NALOXONE HYDROCHLORIDE 0.4 MG/ML
0.4 INJECTION, SOLUTION INTRAMUSCULAR; INTRAVENOUS; SUBCUTANEOUS
Status: DISCONTINUED | OUTPATIENT
Start: 2023-01-01 | End: 2023-01-01

## 2023-01-01 RX ORDER — POTASSIUM CHLORIDE 20MEQ/15ML
40 LIQUID (ML) ORAL ONCE
Status: COMPLETED | OUTPATIENT
Start: 2023-01-01 | End: 2023-01-01

## 2023-01-01 RX ORDER — IOPAMIDOL 755 MG/ML
123 INJECTION, SOLUTION INTRAVASCULAR ONCE
Status: COMPLETED | OUTPATIENT
Start: 2023-01-01 | End: 2023-01-01

## 2023-01-01 RX ORDER — NALOXONE HYDROCHLORIDE 0.4 MG/ML
0.1 INJECTION, SOLUTION INTRAMUSCULAR; INTRAVENOUS; SUBCUTANEOUS
Status: DISCONTINUED | OUTPATIENT
Start: 2023-01-01 | End: 2023-01-01 | Stop reason: HOSPADM

## 2023-01-01 RX ORDER — ONDANSETRON 2 MG/ML
4 INJECTION INTRAMUSCULAR; INTRAVENOUS EVERY 6 HOURS PRN
Status: DISCONTINUED | OUTPATIENT
Start: 2023-01-01 | End: 2023-01-01 | Stop reason: HOSPADM

## 2023-01-01 RX ORDER — OXYCODONE HYDROCHLORIDE 5 MG/1
5 TABLET ORAL EVERY 6 HOURS PRN
Status: DISCONTINUED | OUTPATIENT
Start: 2023-01-01 | End: 2023-01-01

## 2023-01-01 RX ORDER — PIPERACILLIN SODIUM, TAZOBACTAM SODIUM 4; .5 G/20ML; G/20ML
4.5 INJECTION, POWDER, LYOPHILIZED, FOR SOLUTION INTRAVENOUS ONCE
Status: COMPLETED | OUTPATIENT
Start: 2023-01-01 | End: 2023-01-01

## 2023-01-01 RX ORDER — DOCUSATE SODIUM 100 MG/1
100 CAPSULE, LIQUID FILLED ORAL 2 TIMES DAILY PRN
Status: DISCONTINUED | OUTPATIENT
Start: 2023-01-01 | End: 2023-01-01 | Stop reason: HOSPADM

## 2023-01-01 RX ORDER — CLONAZEPAM 0.5 MG/1
0.5 TABLET ORAL 3 TIMES DAILY PRN
Qty: 9 TABLET | Refills: 0 | Status: SHIPPED | OUTPATIENT
Start: 2023-01-01

## 2023-01-01 RX ORDER — PIPERACILLIN SODIUM, TAZOBACTAM SODIUM 4; .5 G/20ML; G/20ML
4.5 INJECTION, POWDER, LYOPHILIZED, FOR SOLUTION INTRAVENOUS EVERY 6 HOURS
Status: DISCONTINUED | OUTPATIENT
Start: 2023-01-01 | End: 2023-01-01

## 2023-01-01 RX ORDER — NALOXONE HYDROCHLORIDE 0.4 MG/ML
0.2 INJECTION, SOLUTION INTRAMUSCULAR; INTRAVENOUS; SUBCUTANEOUS
Status: DISCONTINUED | OUTPATIENT
Start: 2023-01-01 | End: 2023-01-01

## 2023-01-01 RX ORDER — TRAZODONE HYDROCHLORIDE 50 MG/1
50 TABLET, FILM COATED ORAL
Status: DISCONTINUED | OUTPATIENT
Start: 2023-01-01 | End: 2023-01-01 | Stop reason: HOSPADM

## 2023-01-01 RX ORDER — SODIUM CHLORIDE 9 MG/ML
INJECTION, SOLUTION INTRAVENOUS CONTINUOUS
Status: ACTIVE | OUTPATIENT
Start: 2023-01-01 | End: 2023-01-01

## 2023-01-01 RX ORDER — FUROSEMIDE 40 MG
20 TABLET ORAL DAILY
Start: 2023-01-01

## 2023-01-01 RX ORDER — FUROSEMIDE 10 MG/ML
40 INJECTION INTRAMUSCULAR; INTRAVENOUS ONCE
Status: COMPLETED | OUTPATIENT
Start: 2023-01-01 | End: 2023-01-01

## 2023-01-01 RX ORDER — LORAZEPAM 2 MG/ML
1-2 INJECTION INTRAMUSCULAR EVERY 30 MIN PRN
Status: DISCONTINUED | OUTPATIENT
Start: 2023-01-01 | End: 2023-01-01

## 2023-01-01 RX ORDER — BENZTROPINE MESYLATE 1 MG/1
TABLET ORAL
Qty: 10 TABLET | Refills: 0
Start: 2023-01-01

## 2023-01-01 RX ORDER — CLONAZEPAM 0.5 MG/1
0.5 TABLET ORAL DAILY PRN
Status: DISCONTINUED | OUTPATIENT
Start: 2023-01-01 | End: 2023-01-01

## 2023-01-01 RX ORDER — ALBUMIN (HUMAN) 12.5 G/50ML
25-50 SOLUTION INTRAVENOUS ONCE
Status: COMPLETED | OUTPATIENT
Start: 2023-01-01 | End: 2023-01-01

## 2023-01-01 RX ORDER — CLONAZEPAM 0.5 MG/1
0.5 TABLET ORAL ONCE
Status: DISCONTINUED | OUTPATIENT
Start: 2023-01-01 | End: 2023-01-01

## 2023-01-01 RX ORDER — POLYETHYLENE GLYCOL 3350 17 G/17G
17 POWDER, FOR SOLUTION ORAL DAILY
Status: DISCONTINUED | OUTPATIENT
Start: 2023-01-01 | End: 2023-01-01 | Stop reason: HOSPADM

## 2023-01-01 RX ORDER — HALOPERIDOL 0.5 MG/1
1 TABLET ORAL EVERY 6 HOURS PRN
Qty: 30 TABLET | Refills: 0 | Status: SHIPPED | OUTPATIENT
Start: 2023-01-01 | End: 2023-01-01

## 2023-01-01 RX ORDER — HYDROMORPHONE HYDROCHLORIDE 2 MG/1
2 TABLET ORAL
Qty: 30 TABLET | Refills: 0 | Status: SHIPPED | OUTPATIENT
Start: 2023-01-01

## 2023-01-01 RX ORDER — FOLIC ACID 1 MG/1
1 TABLET ORAL DAILY
Status: DISCONTINUED | OUTPATIENT
Start: 2023-01-01 | End: 2023-01-01

## 2023-01-01 RX ORDER — NITROGLYCERIN 0.4 MG/1
0.4 TABLET SUBLINGUAL EVERY 5 MIN PRN
Status: DISCONTINUED | OUTPATIENT
Start: 2023-01-01 | End: 2023-01-01 | Stop reason: HOSPADM

## 2023-01-01 RX ORDER — ONDANSETRON 4 MG/1
4 TABLET, FILM COATED ORAL EVERY 8 HOURS PRN
Status: CANCELLED | OUTPATIENT
Start: 2023-01-01

## 2023-01-01 RX ORDER — BISACODYL 10 MG
10 SUPPOSITORY, RECTAL RECTAL DAILY PRN
Qty: 2 SUPPOSITORY | Refills: 0 | Status: SHIPPED | OUTPATIENT
Start: 2023-01-01 | End: 2023-01-01

## 2023-01-01 RX ORDER — CLONAZEPAM 0.5 MG/1
0.5 TABLET ORAL 3 TIMES DAILY PRN
Status: DISCONTINUED | OUTPATIENT
Start: 2023-01-01 | End: 2023-01-01 | Stop reason: HOSPADM

## 2023-01-01 RX ORDER — IOPAMIDOL 755 MG/ML
119 INJECTION, SOLUTION INTRAVASCULAR ONCE
Status: COMPLETED | OUTPATIENT
Start: 2023-01-01 | End: 2023-01-01

## 2023-01-01 RX ORDER — LIDOCAINE HYDROCHLORIDE 10 MG/ML
10 INJECTION, SOLUTION EPIDURAL; INFILTRATION; INTRACAUDAL; PERINEURAL ONCE
Status: COMPLETED | OUTPATIENT
Start: 2023-01-01 | End: 2023-01-01

## 2023-01-01 RX ORDER — FENTANYL CITRATE 50 UG/ML
50-100 INJECTION, SOLUTION INTRAMUSCULAR; INTRAVENOUS EVERY 5 MIN PRN
Status: DISCONTINUED | OUTPATIENT
Start: 2023-01-01 | End: 2023-01-01

## 2023-01-01 RX ORDER — OLANZAPINE 5 MG/1
5 TABLET ORAL EVERY 6 HOURS PRN
COMMUNITY

## 2023-01-01 RX ORDER — POTASSIUM CHLORIDE 1500 MG/1
40 TABLET, EXTENDED RELEASE ORAL ONCE
Status: COMPLETED | OUTPATIENT
Start: 2023-01-01 | End: 2023-01-01

## 2023-01-01 RX ORDER — POTASSIUM CHLORIDE 1500 MG/1
40 TABLET, EXTENDED RELEASE ORAL 2 TIMES DAILY
Status: COMPLETED | OUTPATIENT
Start: 2023-01-01 | End: 2023-01-01

## 2023-01-01 RX ORDER — FENTANYL CITRATE 50 UG/ML
INJECTION, SOLUTION INTRAMUSCULAR; INTRAVENOUS
Status: COMPLETED
Start: 2023-01-01 | End: 2023-01-01

## 2023-01-01 RX ORDER — FOLIC ACID 1 MG/1
1 TABLET ORAL DAILY
Status: COMPLETED | OUTPATIENT
Start: 2023-01-01 | End: 2023-01-01

## 2023-01-01 RX ORDER — LACTULOSE 10 G/15ML
20 SOLUTION ORAL
Status: DISCONTINUED | OUTPATIENT
Start: 2023-01-01 | End: 2023-01-01

## 2023-01-01 RX ORDER — FLUORIDE TOOTHPASTE
10 TOOTHPASTE DENTAL 4 TIMES DAILY
Status: DISCONTINUED | OUTPATIENT
Start: 2023-01-01 | End: 2023-01-01 | Stop reason: HOSPADM

## 2023-01-01 RX ORDER — DIPHENHYDRAMINE HYDROCHLORIDE 50 MG/ML
25-50 INJECTION INTRAMUSCULAR; INTRAVENOUS
Status: DISCONTINUED | OUTPATIENT
Start: 2023-01-01 | End: 2023-01-01

## 2023-01-01 RX ORDER — ONDANSETRON 4 MG/1
TABLET, FILM COATED ORAL
Qty: 90 TABLET | Refills: 0 | OUTPATIENT
Start: 2023-01-01

## 2023-01-01 RX ORDER — OLANZAPINE 2.5 MG/1
5 TABLET, FILM COATED ORAL EVERY 6 HOURS PRN
Status: DISCONTINUED | OUTPATIENT
Start: 2023-01-01 | End: 2023-01-01 | Stop reason: HOSPADM

## 2023-01-01 RX ORDER — LIDOCAINE 40 MG/G
CREAM TOPICAL
Status: CANCELLED | OUTPATIENT
Start: 2023-01-01

## 2023-01-01 RX ORDER — HALOPERIDOL 0.5 MG/1
1 TABLET ORAL EVERY 6 HOURS PRN
Qty: 30 TABLET | Refills: 0 | Status: SHIPPED | OUTPATIENT
Start: 2023-01-01

## 2023-01-01 RX ORDER — SODIUM CHLORIDE 9 MG/ML
INJECTION, SOLUTION INTRAVENOUS CONTINUOUS
Status: DISCONTINUED | OUTPATIENT
Start: 2023-01-01 | End: 2023-01-01

## 2023-01-01 RX ORDER — OCTREOTIDE ACETATE 50 UG/ML
50 INJECTION, SOLUTION INTRAVENOUS; SUBCUTANEOUS ONCE
Status: DISCONTINUED | OUTPATIENT
Start: 2023-01-01 | End: 2023-01-01

## 2023-01-01 RX ORDER — ONDANSETRON 4 MG/1
4 TABLET, ORALLY DISINTEGRATING ORAL EVERY 6 HOURS PRN
Status: DISCONTINUED | OUTPATIENT
Start: 2023-01-01 | End: 2023-01-01 | Stop reason: HOSPADM

## 2023-01-01 RX ORDER — DEXTROSE MONOHYDRATE 25 G/50ML
25-50 INJECTION, SOLUTION INTRAVENOUS
Status: DISCONTINUED | OUTPATIENT
Start: 2023-01-01 | End: 2023-01-01

## 2023-01-01 RX ORDER — LIDOCAINE 40 MG/G
CREAM TOPICAL
Status: DISCONTINUED | OUTPATIENT
Start: 2023-01-01 | End: 2023-01-01 | Stop reason: HOSPADM

## 2023-01-01 RX ORDER — NALOXONE HYDROCHLORIDE 0.4 MG/ML
0.2 INJECTION, SOLUTION INTRAMUSCULAR; INTRAVENOUS; SUBCUTANEOUS
Status: DISCONTINUED | OUTPATIENT
Start: 2023-01-01 | End: 2023-01-01 | Stop reason: HOSPADM

## 2023-01-01 RX ORDER — SPIRONOLACTONE 25 MG/1
50 TABLET ORAL DAILY
Status: DISCONTINUED | OUTPATIENT
Start: 2023-01-01 | End: 2023-01-01 | Stop reason: HOSPADM

## 2023-01-01 RX ORDER — BENZONATATE 100 MG/1
100 CAPSULE ORAL 3 TIMES DAILY PRN
Status: DISCONTINUED | OUTPATIENT
Start: 2023-01-01 | End: 2023-01-01 | Stop reason: HOSPADM

## 2023-01-01 RX ORDER — LORAZEPAM 1 MG/1
1-2 TABLET ORAL EVERY 30 MIN PRN
Status: DISCONTINUED | OUTPATIENT
Start: 2023-01-01 | End: 2023-01-01

## 2023-01-01 RX ORDER — ATROPINE SULFATE 0.1 MG/ML
1 INJECTION INTRAVENOUS
Status: DISCONTINUED | OUTPATIENT
Start: 2023-01-01 | End: 2023-01-01

## 2023-01-01 RX ORDER — ATROPINE SULFATE 10 MG/ML
2 SOLUTION/ DROPS OPHTHALMIC EVERY 4 HOURS PRN
Status: DISCONTINUED | OUTPATIENT
Start: 2023-01-01 | End: 2023-01-01 | Stop reason: HOSPADM

## 2023-01-01 RX ORDER — PANTOPRAZOLE SODIUM 40 MG/1
40 TABLET, DELAYED RELEASE ORAL
Status: DISCONTINUED | OUTPATIENT
Start: 2023-01-01 | End: 2023-01-01 | Stop reason: HOSPADM

## 2023-01-01 RX ORDER — FLUMAZENIL 0.1 MG/ML
0.2 INJECTION, SOLUTION INTRAVENOUS
Status: CANCELLED | OUTPATIENT
Start: 2023-01-01 | End: 2023-01-01

## 2023-01-01 RX ORDER — LIDOCAINE HYDROCHLORIDE 10 MG/ML
10 INJECTION, SOLUTION EPIDURAL; INFILTRATION; INTRACAUDAL; PERINEURAL ONCE
Status: DISCONTINUED | OUTPATIENT
Start: 2023-01-01 | End: 2023-01-01

## 2023-01-01 RX ORDER — MULTIPLE VITAMINS W/ MINERALS TAB 9MG-400MCG
1 TAB ORAL DAILY
Status: DISCONTINUED | OUTPATIENT
Start: 2023-01-01 | End: 2023-01-01

## 2023-01-01 RX ORDER — FLUMAZENIL 0.1 MG/ML
0.2 INJECTION, SOLUTION INTRAVENOUS
Status: ACTIVE | OUTPATIENT
Start: 2023-01-01 | End: 2023-01-01

## 2023-01-01 RX ORDER — HYDROMORPHONE HYDROCHLORIDE 2 MG/1
2 TABLET ORAL
Status: DISCONTINUED | OUTPATIENT
Start: 2023-01-01 | End: 2023-01-01 | Stop reason: HOSPADM

## 2023-01-01 RX ORDER — BETAMETHASONE DIPROPIONATE 0.5 MG/G
CREAM TOPICAL 2 TIMES DAILY
Status: ON HOLD | COMMUNITY
End: 2023-01-01

## 2023-01-01 RX ORDER — BENZTROPINE MESYLATE 1 MG/1
1 TABLET ORAL
Status: DISCONTINUED | OUTPATIENT
Start: 2023-01-01 | End: 2023-01-01 | Stop reason: HOSPADM

## 2023-01-01 RX ORDER — HYDROCODONE BITARTRATE AND ACETAMINOPHEN 10; 325 MG/1; MG/1
1 TABLET ORAL EVERY 6 HOURS PRN
Status: DISCONTINUED | OUTPATIENT
Start: 2023-01-01 | End: 2023-01-01

## 2023-01-01 RX ORDER — FUROSEMIDE 20 MG
20 TABLET ORAL DAILY
Status: DISCONTINUED | OUTPATIENT
Start: 2023-01-01 | End: 2023-01-01 | Stop reason: HOSPADM

## 2023-01-01 RX ORDER — CYCLOBENZAPRINE HCL 5 MG
10 TABLET ORAL 2 TIMES DAILY PRN
Status: DISCONTINUED | OUTPATIENT
Start: 2023-01-01 | End: 2023-01-01

## 2023-01-01 RX ORDER — CEFTRIAXONE 2 G/1
2 INJECTION, POWDER, FOR SOLUTION INTRAMUSCULAR; INTRAVENOUS EVERY 24 HOURS
Status: DISCONTINUED | OUTPATIENT
Start: 2023-01-01 | End: 2023-01-01

## 2023-01-01 RX ORDER — FLUMAZENIL 0.1 MG/ML
0.2 INJECTION, SOLUTION INTRAVENOUS
Status: DISCONTINUED | OUTPATIENT
Start: 2023-01-01 | End: 2023-01-01 | Stop reason: HOSPADM

## 2023-01-01 RX ORDER — CLONAZEPAM 0.5 MG/1
0.5 TABLET ORAL DAILY
Status: ON HOLD | COMMUNITY
End: 2023-01-01

## 2023-01-01 RX ORDER — FLUORIDE TOOTHPASTE
10 TOOTHPASTE DENTAL 4 TIMES DAILY
Qty: 237 ML | Refills: 0 | Status: SHIPPED | OUTPATIENT
Start: 2023-01-01

## 2023-01-01 RX ORDER — AMOXICILLIN 250 MG
2 CAPSULE ORAL 2 TIMES DAILY
Status: DISCONTINUED | OUTPATIENT
Start: 2023-01-01 | End: 2023-01-01 | Stop reason: HOSPADM

## 2023-01-01 RX ORDER — ALBUMIN (HUMAN) 12.5 G/50ML
25-50 SOLUTION INTRAVENOUS ONCE
Status: DISCONTINUED | OUTPATIENT
Start: 2023-01-01 | End: 2023-01-01

## 2023-01-01 RX ORDER — FUROSEMIDE 40 MG
40 TABLET ORAL ONCE
Status: COMPLETED | OUTPATIENT
Start: 2023-01-01 | End: 2023-01-01

## 2023-01-01 RX ORDER — FLUORIDE TOOTHPASTE
10 TOOTHPASTE DENTAL 4 TIMES DAILY
Qty: 237 ML | Refills: 0 | Status: SHIPPED | OUTPATIENT
Start: 2023-01-01 | End: 2023-01-01

## 2023-01-01 RX ORDER — BISACODYL 10 MG
10 SUPPOSITORY, RECTAL RECTAL DAILY PRN
Qty: 2 SUPPOSITORY | Refills: 0 | Status: SHIPPED | OUTPATIENT
Start: 2023-01-01

## 2023-01-01 RX ORDER — AMOXICILLIN 250 MG
2 CAPSULE ORAL 2 TIMES DAILY PRN
Status: DISCONTINUED | OUTPATIENT
Start: 2023-01-01 | End: 2023-01-01 | Stop reason: HOSPADM

## 2023-01-01 RX ORDER — EPINEPHRINE 1 MG/ML
0.1 INJECTION, SOLUTION, CONCENTRATE INTRAVENOUS
Status: DISCONTINUED | OUTPATIENT
Start: 2023-01-01 | End: 2023-01-01

## 2023-01-01 RX ORDER — MULTIPLE VITAMINS W/ MINERALS TAB 9MG-400MCG
1 TAB ORAL DAILY
Status: COMPLETED | OUTPATIENT
Start: 2023-01-01 | End: 2023-01-01

## 2023-01-01 RX ORDER — CLONAZEPAM 0.5 MG/1
0.5 TABLET ORAL DAILY
Status: DISCONTINUED | OUTPATIENT
Start: 2023-01-01 | End: 2023-01-01

## 2023-01-01 RX ORDER — NICOTINE POLACRILEX 4 MG
15-30 LOZENGE BUCCAL
Status: DISCONTINUED | OUTPATIENT
Start: 2023-01-01 | End: 2023-01-01

## 2023-01-01 RX ADMIN — PIPERACILLIN AND TAZOBACTAM 4.5 G: 4; .5 INJECTION, POWDER, FOR SOLUTION INTRAVENOUS at 17:24

## 2023-01-01 RX ADMIN — CLONAZEPAM 0.5 MG: 0.5 TABLET ORAL at 18:14

## 2023-01-01 RX ADMIN — OXYCODONE HYDROCHLORIDE 5 MG: 5 TABLET ORAL at 00:35

## 2023-01-01 RX ADMIN — FOLIC ACID 1 MG: 1 TABLET ORAL at 10:29

## 2023-01-01 RX ADMIN — OXYCODONE HYDROCHLORIDE 5 MG: 5 TABLET ORAL at 16:28

## 2023-01-01 RX ADMIN — PANTOPRAZOLE SODIUM 40 MG: 40 TABLET, DELAYED RELEASE ORAL at 06:59

## 2023-01-01 RX ADMIN — PIPERACILLIN AND TAZOBACTAM 4.5 G: 4; .5 INJECTION, POWDER, FOR SOLUTION INTRAVENOUS at 00:02

## 2023-01-01 RX ADMIN — Medication 10 ML: at 20:15

## 2023-01-01 RX ADMIN — PANTOPRAZOLE SODIUM 40 MG: 40 TABLET, DELAYED RELEASE ORAL at 06:30

## 2023-01-01 RX ADMIN — BENZONATATE 100 MG: 100 CAPSULE, LIQUID FILLED ORAL at 08:27

## 2023-01-01 RX ADMIN — FOLIC ACID 1 MG: 1 TABLET ORAL at 08:42

## 2023-01-01 RX ADMIN — HYDROMORPHONE HYDROCHLORIDE 2 MG: 2 TABLET ORAL at 08:23

## 2023-01-01 RX ADMIN — FOLIC ACID 1 MG: 1 TABLET ORAL at 08:25

## 2023-01-01 RX ADMIN — PANTOPRAZOLE SODIUM 40 MG: 40 TABLET, DELAYED RELEASE ORAL at 06:23

## 2023-01-01 RX ADMIN — PANTOPRAZOLE SODIUM 40 MG: 40 TABLET, DELAYED RELEASE ORAL at 16:13

## 2023-01-01 RX ADMIN — HYDROMORPHONE HYDROCHLORIDE 2 MG: 2 TABLET ORAL at 15:16

## 2023-01-01 RX ADMIN — OXYCODONE HYDROCHLORIDE 5 MG: 5 TABLET ORAL at 09:28

## 2023-01-01 RX ADMIN — OXYCODONE HYDROCHLORIDE 5 MG: 5 TABLET ORAL at 10:41

## 2023-01-01 RX ADMIN — PANTOPRAZOLE SODIUM 40 MG: 40 TABLET, DELAYED RELEASE ORAL at 17:16

## 2023-01-01 RX ADMIN — HYDROMORPHONE HYDROCHLORIDE 2 MG: 2 TABLET ORAL at 09:37

## 2023-01-01 RX ADMIN — LIDOCAINE HYDROCHLORIDE 10 ML: 10 INJECTION, SOLUTION EPIDURAL; INFILTRATION; INTRACAUDAL; PERINEURAL at 10:38

## 2023-01-01 RX ADMIN — SPIRONOLACTONE 50 MG: 25 TABLET ORAL at 09:37

## 2023-01-01 RX ADMIN — FOLIC ACID 1 MG: 1 TABLET ORAL at 10:02

## 2023-01-01 RX ADMIN — DICLOFENAC SODIUM 2 G: 10 GEL TOPICAL at 09:10

## 2023-01-01 RX ADMIN — MULTIPLE VITAMINS W/ MINERALS TAB 1 TABLET: TAB at 08:10

## 2023-01-01 RX ADMIN — MULTIPLE VITAMINS W/ MINERALS TAB 1 TABLET: TAB at 09:13

## 2023-01-01 RX ADMIN — HYDROMORPHONE HYDROCHLORIDE 2 MG: 2 TABLET ORAL at 17:46

## 2023-01-01 RX ADMIN — Medication 10 ML: at 00:11

## 2023-01-01 RX ADMIN — PANTOPRAZOLE SODIUM 40 MG: 40 TABLET, DELAYED RELEASE ORAL at 06:55

## 2023-01-01 RX ADMIN — HYDROMORPHONE HYDROCHLORIDE 2 MG: 2 TABLET ORAL at 03:00

## 2023-01-01 RX ADMIN — HYDROMORPHONE HYDROCHLORIDE 2 MG: 2 TABLET ORAL at 14:38

## 2023-01-01 RX ADMIN — MULTIPLE VITAMINS W/ MINERALS TAB 1 TABLET: TAB at 09:10

## 2023-01-01 RX ADMIN — FOLIC ACID 1 MG: 1 TABLET ORAL at 09:49

## 2023-01-01 RX ADMIN — PIPERACILLIN AND TAZOBACTAM 4.5 G: 4; .5 INJECTION, POWDER, FOR SOLUTION INTRAVENOUS at 06:39

## 2023-01-01 RX ADMIN — PANTOPRAZOLE SODIUM 40 MG: 40 TABLET, DELAYED RELEASE ORAL at 06:32

## 2023-01-01 RX ADMIN — MULTIPLE VITAMINS W/ MINERALS TAB 1 TABLET: TAB at 09:56

## 2023-01-01 RX ADMIN — PIPERACILLIN AND TAZOBACTAM 4.5 G: 4; .5 INJECTION, POWDER, FOR SOLUTION INTRAVENOUS at 05:14

## 2023-01-01 RX ADMIN — FOLIC ACID 1 MG: 1 TABLET ORAL at 09:56

## 2023-01-01 RX ADMIN — FOLIC ACID 1 MG: 1 TABLET ORAL at 09:45

## 2023-01-01 RX ADMIN — FUROSEMIDE 20 MG: 20 TABLET ORAL at 08:55

## 2023-01-01 RX ADMIN — PANTOPRAZOLE SODIUM 40 MG: 40 TABLET, DELAYED RELEASE ORAL at 15:58

## 2023-01-01 RX ADMIN — FOLIC ACID 1 MG: 1 TABLET ORAL at 08:26

## 2023-01-01 RX ADMIN — BENZONATATE 100 MG: 100 CAPSULE, LIQUID FILLED ORAL at 20:47

## 2023-01-01 RX ADMIN — Medication 10 ML: at 18:14

## 2023-01-01 RX ADMIN — MULTIPLE VITAMINS W/ MINERALS TAB 1 TABLET: TAB at 08:31

## 2023-01-01 RX ADMIN — Medication 10 ML: at 16:46

## 2023-01-01 RX ADMIN — HYDROMORPHONE HYDROCHLORIDE 2 MG: 2 TABLET ORAL at 13:30

## 2023-01-01 RX ADMIN — BENZONATATE 100 MG: 100 CAPSULE, LIQUID FILLED ORAL at 09:15

## 2023-01-01 RX ADMIN — BENZONATATE 100 MG: 100 CAPSULE, LIQUID FILLED ORAL at 03:57

## 2023-01-01 RX ADMIN — PANTOPRAZOLE SODIUM 40 MG: 40 TABLET, DELAYED RELEASE ORAL at 15:56

## 2023-01-01 RX ADMIN — FUROSEMIDE 20 MG: 20 TABLET ORAL at 08:52

## 2023-01-01 RX ADMIN — POTASSIUM CHLORIDE 40 MEQ: 1500 TABLET, EXTENDED RELEASE ORAL at 22:00

## 2023-01-01 RX ADMIN — BENZONATATE 100 MG: 100 CAPSULE, LIQUID FILLED ORAL at 17:20

## 2023-01-01 RX ADMIN — PIPERACILLIN AND TAZOBACTAM 4.5 G: 4; .5 INJECTION, POWDER, FOR SOLUTION INTRAVENOUS at 17:38

## 2023-01-01 RX ADMIN — PANTOPRAZOLE SODIUM 40 MG: 40 INJECTION, POWDER, FOR SOLUTION INTRAVENOUS at 12:43

## 2023-01-01 RX ADMIN — PANTOPRAZOLE SODIUM 40 MG: 40 TABLET, DELAYED RELEASE ORAL at 07:46

## 2023-01-01 RX ADMIN — PANTOPRAZOLE SODIUM 40 MG: 40 TABLET, DELAYED RELEASE ORAL at 16:57

## 2023-01-01 RX ADMIN — OCTREOTIDE ACETATE 50 MCG/HR: 200 INJECTION, SOLUTION INTRAVENOUS; SUBCUTANEOUS at 12:25

## 2023-01-01 RX ADMIN — SODIUM CHLORIDE 1000 ML: 9 INJECTION, SOLUTION INTRAVENOUS at 12:17

## 2023-01-01 RX ADMIN — PIPERACILLIN AND TAZOBACTAM 4.5 G: 4; .5 INJECTION, POWDER, FOR SOLUTION INTRAVENOUS at 05:37

## 2023-01-01 RX ADMIN — MULTIPLE VITAMINS W/ MINERALS TAB 1 TABLET: TAB at 09:45

## 2023-01-01 RX ADMIN — SPIRONOLACTONE 50 MG: 25 TABLET ORAL at 10:44

## 2023-01-01 RX ADMIN — BENZONATATE 100 MG: 100 CAPSULE, LIQUID FILLED ORAL at 02:04

## 2023-01-01 RX ADMIN — OXYCODONE HYDROCHLORIDE 5 MG: 5 TABLET ORAL at 23:13

## 2023-01-01 RX ADMIN — FOLIC ACID 1 MG: 1 TABLET ORAL at 09:29

## 2023-01-01 RX ADMIN — HYDROMORPHONE HYDROCHLORIDE 2 MG: 2 TABLET ORAL at 16:49

## 2023-01-01 RX ADMIN — LIDOCAINE HYDROCHLORIDE 10 ML: 10 INJECTION, SOLUTION EPIDURAL; INFILTRATION; INTRACAUDAL; PERINEURAL at 13:49

## 2023-01-01 RX ADMIN — HYDROMORPHONE HYDROCHLORIDE 2 MG: 2 TABLET ORAL at 23:05

## 2023-01-01 RX ADMIN — DICLOFENAC SODIUM 2 G: 10 GEL TOPICAL at 08:26

## 2023-01-01 RX ADMIN — OCTREOTIDE ACETATE 50 MCG/HR: 200 INJECTION, SOLUTION INTRAVENOUS; SUBCUTANEOUS at 12:23

## 2023-01-01 RX ADMIN — PIPERACILLIN AND TAZOBACTAM 4.5 G: 4; .5 INJECTION, POWDER, FOR SOLUTION INTRAVENOUS at 12:38

## 2023-01-01 RX ADMIN — BENZONATATE 100 MG: 100 CAPSULE, LIQUID FILLED ORAL at 23:05

## 2023-01-01 RX ADMIN — IRON SUCROSE 300 MG: 20 INJECTION, SOLUTION INTRAVENOUS at 12:24

## 2023-01-01 RX ADMIN — MULTIPLE VITAMINS W/ MINERALS TAB 1 TABLET: TAB at 10:02

## 2023-01-01 RX ADMIN — HYDROMORPHONE HYDROCHLORIDE 2 MG: 2 TABLET ORAL at 15:55

## 2023-01-01 RX ADMIN — PIPERACILLIN AND TAZOBACTAM 4.5 G: 4; .5 INJECTION, POWDER, FOR SOLUTION INTRAVENOUS at 05:57

## 2023-01-01 RX ADMIN — BENZONATATE 100 MG: 100 CAPSULE, LIQUID FILLED ORAL at 20:12

## 2023-01-01 RX ADMIN — PANTOPRAZOLE SODIUM 40 MG: 40 TABLET, DELAYED RELEASE ORAL at 06:21

## 2023-01-01 RX ADMIN — PANTOPRAZOLE SODIUM 40 MG: 40 TABLET, DELAYED RELEASE ORAL at 07:04

## 2023-01-01 RX ADMIN — BENZONATATE 100 MG: 100 CAPSULE, LIQUID FILLED ORAL at 10:19

## 2023-01-01 RX ADMIN — MULTIPLE VITAMINS W/ MINERALS TAB 1 TABLET: TAB at 09:29

## 2023-01-01 RX ADMIN — FUROSEMIDE 20 MG: 20 TABLET ORAL at 08:38

## 2023-01-01 RX ADMIN — HYDROMORPHONE HYDROCHLORIDE 2 MG: 2 TABLET ORAL at 01:19

## 2023-01-01 RX ADMIN — SENNOSIDES AND DOCUSATE SODIUM 2 TABLET: 50; 8.6 TABLET ORAL at 20:29

## 2023-01-01 RX ADMIN — Medication 10 ML: at 17:46

## 2023-01-01 RX ADMIN — FUROSEMIDE 20 MG: 20 TABLET ORAL at 10:44

## 2023-01-01 RX ADMIN — Medication 10 ML: at 21:08

## 2023-01-01 RX ADMIN — IOPAMIDOL 123 ML: 755 INJECTION, SOLUTION INTRAVENOUS at 05:15

## 2023-01-01 RX ADMIN — PANTOPRAZOLE SODIUM 40 MG: 40 TABLET, DELAYED RELEASE ORAL at 06:36

## 2023-01-01 RX ADMIN — MULTIPLE VITAMINS W/ MINERALS TAB 1 TABLET: TAB at 18:45

## 2023-01-01 RX ADMIN — OXYCODONE HYDROCHLORIDE 5 MG: 5 TABLET ORAL at 20:47

## 2023-01-01 RX ADMIN — FUROSEMIDE 20 MG: 20 TABLET ORAL at 08:31

## 2023-01-01 RX ADMIN — PANTOPRAZOLE SODIUM 40 MG: 40 INJECTION, POWDER, FOR SOLUTION INTRAVENOUS at 12:03

## 2023-01-01 RX ADMIN — PIPERACILLIN AND TAZOBACTAM 4.5 G: 4; .5 INJECTION, POWDER, FOR SOLUTION INTRAVENOUS at 13:21

## 2023-01-01 RX ADMIN — THIAMINE HCL TAB 100 MG 100 MG: 100 TAB at 08:42

## 2023-01-01 RX ADMIN — ONDANSETRON 4 MG: 4 TABLET, ORALLY DISINTEGRATING ORAL at 06:33

## 2023-01-01 RX ADMIN — PANTOPRAZOLE SODIUM 40 MG: 40 TABLET, DELAYED RELEASE ORAL at 06:50

## 2023-01-01 RX ADMIN — PANTOPRAZOLE SODIUM 40 MG: 40 INJECTION, POWDER, FOR SOLUTION INTRAVENOUS at 23:13

## 2023-01-01 RX ADMIN — CYCLOBENZAPRINE 10 MG: 10 TABLET, FILM COATED ORAL at 04:02

## 2023-01-01 RX ADMIN — PIPERACILLIN AND TAZOBACTAM 4.5 G: 4; .5 INJECTION, POWDER, FOR SOLUTION INTRAVENOUS at 23:35

## 2023-01-01 RX ADMIN — THIAMINE HCL TAB 100 MG 100 MG: 100 TAB at 08:10

## 2023-01-01 RX ADMIN — HYDROMORPHONE HYDROCHLORIDE 2 MG: 2 TABLET ORAL at 21:55

## 2023-01-01 RX ADMIN — PANTOPRAZOLE SODIUM 40 MG: 40 TABLET, DELAYED RELEASE ORAL at 06:40

## 2023-01-01 RX ADMIN — HYDROMORPHONE HYDROCHLORIDE 2 MG: 2 TABLET ORAL at 06:01

## 2023-01-01 RX ADMIN — PALIPERIDONE PALMITATE 234 MG: 234 INJECTION INTRAMUSCULAR at 13:16

## 2023-01-01 RX ADMIN — HYDROMORPHONE HYDROCHLORIDE 2 MG: 2 TABLET ORAL at 05:32

## 2023-01-01 RX ADMIN — PIPERACILLIN AND TAZOBACTAM 4.5 G: 4; .5 INJECTION, POWDER, FOR SOLUTION INTRAVENOUS at 00:06

## 2023-01-01 RX ADMIN — SODIUM CHLORIDE 1000 ML: 9 INJECTION, SOLUTION INTRAVENOUS at 11:58

## 2023-01-01 RX ADMIN — SPIRONOLACTONE 50 MG: 25 TABLET ORAL at 08:55

## 2023-01-01 RX ADMIN — BENZONATATE 100 MG: 100 CAPSULE, LIQUID FILLED ORAL at 02:15

## 2023-01-01 RX ADMIN — FOLIC ACID 1 MG: 1 TABLET ORAL at 14:04

## 2023-01-01 RX ADMIN — FENTANYL CITRATE 100 MCG: 50 INJECTION, SOLUTION INTRAMUSCULAR; INTRAVENOUS at 17:30

## 2023-01-01 RX ADMIN — PANTOPRAZOLE SODIUM 40 MG: 40 TABLET, DELAYED RELEASE ORAL at 07:07

## 2023-01-01 RX ADMIN — BENZONATATE 100 MG: 100 CAPSULE, LIQUID FILLED ORAL at 19:20

## 2023-01-01 RX ADMIN — PANTOPRAZOLE SODIUM 40 MG: 40 TABLET, DELAYED RELEASE ORAL at 15:38

## 2023-01-01 RX ADMIN — PANTOPRAZOLE SODIUM 40 MG: 40 TABLET, DELAYED RELEASE ORAL at 09:11

## 2023-01-01 RX ADMIN — PANTOPRAZOLE SODIUM 40 MG: 40 TABLET, DELAYED RELEASE ORAL at 17:33

## 2023-01-01 RX ADMIN — FUROSEMIDE 20 MG: 20 TABLET ORAL at 09:37

## 2023-01-01 RX ADMIN — PANTOPRAZOLE SODIUM 40 MG: 40 TABLET, DELAYED RELEASE ORAL at 16:33

## 2023-01-01 RX ADMIN — HYDROMORPHONE HYDROCHLORIDE 2 MG: 2 TABLET ORAL at 10:44

## 2023-01-01 RX ADMIN — DICLOFENAC SODIUM 2 G: 10 GEL TOPICAL at 21:27

## 2023-01-01 RX ADMIN — PANTOPRAZOLE SODIUM 40 MG: 40 TABLET, DELAYED RELEASE ORAL at 08:25

## 2023-01-01 RX ADMIN — PANTOPRAZOLE SODIUM 40 MG: 40 INJECTION, POWDER, FOR SOLUTION INTRAVENOUS at 23:21

## 2023-01-01 RX ADMIN — MULTIPLE VITAMINS W/ MINERALS TAB 1 TABLET: TAB at 08:21

## 2023-01-01 RX ADMIN — HYDROMORPHONE HYDROCHLORIDE 2 MG: 2 TABLET ORAL at 08:52

## 2023-01-01 RX ADMIN — FUROSEMIDE 40 MG: 40 TABLET ORAL at 11:38

## 2023-01-01 RX ADMIN — PANTOPRAZOLE SODIUM 40 MG: 40 TABLET, DELAYED RELEASE ORAL at 05:22

## 2023-01-01 RX ADMIN — OXYCODONE HYDROCHLORIDE 5 MG: 5 TABLET ORAL at 12:09

## 2023-01-01 RX ADMIN — PANTOPRAZOLE SODIUM 40 MG: 40 TABLET, DELAYED RELEASE ORAL at 05:37

## 2023-01-01 RX ADMIN — SODIUM CHLORIDE: 9 INJECTION, SOLUTION INTRAVENOUS at 04:29

## 2023-01-01 RX ADMIN — DICLOFENAC SODIUM 2 G: 10 GEL TOPICAL at 10:03

## 2023-01-01 RX ADMIN — SPIRONOLACTONE 50 MG: 25 TABLET ORAL at 09:55

## 2023-01-01 RX ADMIN — DICLOFENAC SODIUM 2 G: 10 GEL TOPICAL at 09:56

## 2023-01-01 RX ADMIN — POLYETHYLENE GLYCOL 3350 17 G: 17 POWDER, FOR SOLUTION ORAL at 10:45

## 2023-01-01 RX ADMIN — FOLIC ACID 1 MG: 1 TABLET ORAL at 09:10

## 2023-01-01 RX ADMIN — BENZONATATE 100 MG: 100 CAPSULE, LIQUID FILLED ORAL at 05:14

## 2023-01-01 RX ADMIN — FUROSEMIDE 20 MG: 20 TABLET ORAL at 08:23

## 2023-01-01 RX ADMIN — OXYCODONE HYDROCHLORIDE 5 MG: 5 TABLET ORAL at 05:14

## 2023-01-01 RX ADMIN — PANTOPRAZOLE SODIUM 40 MG: 40 TABLET, DELAYED RELEASE ORAL at 16:01

## 2023-01-01 RX ADMIN — HYDROMORPHONE HYDROCHLORIDE 2 MG: 2 TABLET ORAL at 01:51

## 2023-01-01 RX ADMIN — PANTOPRAZOLE SODIUM 40 MG: 40 TABLET, DELAYED RELEASE ORAL at 17:25

## 2023-01-01 RX ADMIN — OXYCODONE HYDROCHLORIDE 5 MG: 5 TABLET ORAL at 08:03

## 2023-01-01 RX ADMIN — FOLIC ACID 1 MG: 1 TABLET ORAL at 08:21

## 2023-01-01 RX ADMIN — Medication 10 ML: at 10:47

## 2023-01-01 RX ADMIN — HYDROMORPHONE HYDROCHLORIDE 2 MG: 2 TABLET ORAL at 09:36

## 2023-01-01 RX ADMIN — HYDROMORPHONE HYDROCHLORIDE 2 MG: 2 TABLET ORAL at 07:07

## 2023-01-01 RX ADMIN — DICLOFENAC SODIUM 2 G: 10 GEL TOPICAL at 21:22

## 2023-01-01 RX ADMIN — Medication 10 ML: at 13:55

## 2023-01-01 RX ADMIN — HYDROMORPHONE HYDROCHLORIDE 2 MG: 2 TABLET ORAL at 18:45

## 2023-01-01 RX ADMIN — OCTREOTIDE ACETATE 50 MCG/HR: 200 INJECTION, SOLUTION INTRAVENOUS; SUBCUTANEOUS at 12:43

## 2023-01-01 RX ADMIN — PANTOPRAZOLE SODIUM 40 MG: 40 TABLET, DELAYED RELEASE ORAL at 07:00

## 2023-01-01 RX ADMIN — Medication 10 ML: at 16:49

## 2023-01-01 RX ADMIN — PIPERACILLIN AND TAZOBACTAM 4.5 G: 4; .5 INJECTION, POWDER, FOR SOLUTION INTRAVENOUS at 05:22

## 2023-01-01 RX ADMIN — LIDOCAINE HYDROCHLORIDE 10 ML: 10 INJECTION, SOLUTION EPIDURAL; INFILTRATION; INTRACAUDAL; PERINEURAL at 10:27

## 2023-01-01 RX ADMIN — HYDROMORPHONE HYDROCHLORIDE 2 MG: 2 TABLET ORAL at 14:59

## 2023-01-01 RX ADMIN — HYDROMORPHONE HYDROCHLORIDE 2 MG: 2 TABLET ORAL at 02:32

## 2023-01-01 RX ADMIN — SODIUM CHLORIDE 80 ML: 9 INJECTION, SOLUTION INTRAVENOUS at 14:03

## 2023-01-01 RX ADMIN — Medication 10 ML: at 20:31

## 2023-01-01 RX ADMIN — BENZONATATE 100 MG: 100 CAPSULE, LIQUID FILLED ORAL at 05:41

## 2023-01-01 RX ADMIN — PIPERACILLIN AND TAZOBACTAM 4.5 G: 4; .5 INJECTION, POWDER, FOR SOLUTION INTRAVENOUS at 13:50

## 2023-01-01 RX ADMIN — Medication 10 ML: at 12:55

## 2023-01-01 RX ADMIN — SPIRONOLACTONE 50 MG: 25 TABLET ORAL at 08:31

## 2023-01-01 RX ADMIN — PIPERACILLIN AND TAZOBACTAM 4.5 G: 4; .5 INJECTION, POWDER, FOR SOLUTION INTRAVENOUS at 00:25

## 2023-01-01 RX ADMIN — BENZONATATE 100 MG: 100 CAPSULE, LIQUID FILLED ORAL at 22:17

## 2023-01-01 RX ADMIN — BENZONATATE 100 MG: 100 CAPSULE, LIQUID FILLED ORAL at 10:29

## 2023-01-01 RX ADMIN — CYCLOBENZAPRINE 10 MG: 10 TABLET, FILM COATED ORAL at 10:44

## 2023-01-01 RX ADMIN — Medication 10 ML: at 10:49

## 2023-01-01 RX ADMIN — OXYCODONE HYDROCHLORIDE 5 MG: 5 TABLET ORAL at 20:12

## 2023-01-01 RX ADMIN — BENZONATATE 100 MG: 100 CAPSULE, LIQUID FILLED ORAL at 10:52

## 2023-01-01 RX ADMIN — POLYETHYLENE GLYCOL 3350 17 G: 17 POWDER, FOR SOLUTION ORAL at 14:38

## 2023-01-01 RX ADMIN — PIPERACILLIN AND TAZOBACTAM 4.5 G: 4; .5 INJECTION, POWDER, FOR SOLUTION INTRAVENOUS at 12:05

## 2023-01-01 RX ADMIN — FUROSEMIDE 20 MG: 20 TABLET ORAL at 09:55

## 2023-01-01 RX ADMIN — MULTIPLE VITAMINS W/ MINERALS TAB 1 TABLET: TAB at 08:03

## 2023-01-01 RX ADMIN — OXYCODONE HYDROCHLORIDE 5 MG: 5 TABLET ORAL at 17:20

## 2023-01-01 RX ADMIN — PANTOPRAZOLE SODIUM 40 MG: 40 TABLET, DELAYED RELEASE ORAL at 06:42

## 2023-01-01 RX ADMIN — BENZTROPINE MESYLATE 1 MG: 1 TABLET ORAL at 21:18

## 2023-01-01 RX ADMIN — THIAMINE HCL TAB 100 MG 100 MG: 100 TAB at 09:45

## 2023-01-01 RX ADMIN — MULTIPLE VITAMINS W/ MINERALS TAB 1 TABLET: TAB at 14:04

## 2023-01-01 RX ADMIN — BENZONATATE 100 MG: 100 CAPSULE, LIQUID FILLED ORAL at 09:37

## 2023-01-01 RX ADMIN — BENZONATATE 100 MG: 100 CAPSULE, LIQUID FILLED ORAL at 00:06

## 2023-01-01 RX ADMIN — SENNOSIDES AND DOCUSATE SODIUM 2 TABLET: 50; 8.6 TABLET ORAL at 20:15

## 2023-01-01 RX ADMIN — Medication 10 ML: at 20:52

## 2023-01-01 RX ADMIN — Medication 10 ML: at 17:00

## 2023-01-01 RX ADMIN — OXYCODONE HYDROCHLORIDE 5 MG: 5 TABLET ORAL at 16:51

## 2023-01-01 RX ADMIN — HYDROMORPHONE HYDROCHLORIDE 2 MG: 2 TABLET ORAL at 08:53

## 2023-01-01 RX ADMIN — PANTOPRAZOLE SODIUM 40 MG: 40 TABLET, DELAYED RELEASE ORAL at 16:09

## 2023-01-01 RX ADMIN — HYDROMORPHONE HYDROCHLORIDE 2 MG: 2 TABLET ORAL at 13:54

## 2023-01-01 RX ADMIN — POTASSIUM CHLORIDE 40 MEQ: 20 SOLUTION ORAL at 14:26

## 2023-01-01 RX ADMIN — OXYCODONE HYDROCHLORIDE 5 MG: 5 TABLET ORAL at 23:47

## 2023-01-01 RX ADMIN — HYDROMORPHONE HYDROCHLORIDE 2 MG: 2 TABLET ORAL at 20:30

## 2023-01-01 RX ADMIN — PANTOPRAZOLE SODIUM 40 MG: 40 TABLET, DELAYED RELEASE ORAL at 18:14

## 2023-01-01 RX ADMIN — SODIUM CHLORIDE 81 ML: 9 INJECTION, SOLUTION INTRAVENOUS at 05:16

## 2023-01-01 RX ADMIN — PIPERACILLIN AND TAZOBACTAM 4.5 G: 4; .5 INJECTION, POWDER, FOR SOLUTION INTRAVENOUS at 17:43

## 2023-01-01 RX ADMIN — PIPERACILLIN AND TAZOBACTAM 4.5 G: 4; .5 INJECTION, POWDER, FOR SOLUTION INTRAVENOUS at 17:20

## 2023-01-01 RX ADMIN — SPIRONOLACTONE 50 MG: 25 TABLET ORAL at 08:38

## 2023-01-01 RX ADMIN — BENZONATATE 100 MG: 100 CAPSULE, LIQUID FILLED ORAL at 08:52

## 2023-01-01 RX ADMIN — HYDROMORPHONE HYDROCHLORIDE 2 MG: 2 TABLET ORAL at 23:37

## 2023-01-01 RX ADMIN — SODIUM CHLORIDE 250 ML: 9 INJECTION, SOLUTION INTRAVENOUS at 16:33

## 2023-01-01 RX ADMIN — THIAMINE HCL TAB 100 MG 100 MG: 100 TAB at 10:29

## 2023-01-01 RX ADMIN — FOLIC ACID 1 MG: 1 TABLET ORAL at 09:11

## 2023-01-01 RX ADMIN — TRAZODONE HYDROCHLORIDE 50 MG: 50 TABLET ORAL at 21:18

## 2023-01-01 RX ADMIN — SPIRONOLACTONE 50 MG: 25 TABLET ORAL at 14:25

## 2023-01-01 RX ADMIN — HYDROMORPHONE HYDROCHLORIDE 2 MG: 2 TABLET ORAL at 05:27

## 2023-01-01 RX ADMIN — SENNOSIDES AND DOCUSATE SODIUM 2 TABLET: 50; 8.6 TABLET ORAL at 06:47

## 2023-01-01 RX ADMIN — FOLIC ACID 1 MG: 1 TABLET ORAL at 08:10

## 2023-01-01 RX ADMIN — PANTOPRAZOLE SODIUM 40 MG: 40 TABLET, DELAYED RELEASE ORAL at 06:39

## 2023-01-01 RX ADMIN — BENZONATATE 100 MG: 100 CAPSULE, LIQUID FILLED ORAL at 19:46

## 2023-01-01 RX ADMIN — IOPAMIDOL 119 ML: 755 INJECTION, SOLUTION INTRAVENOUS at 14:03

## 2023-01-01 RX ADMIN — FOLIC ACID 1 MG: 1 TABLET ORAL at 08:31

## 2023-01-01 RX ADMIN — Medication 10 ML: at 13:45

## 2023-01-01 RX ADMIN — THIAMINE HCL TAB 100 MG 100 MG: 100 TAB at 10:02

## 2023-01-01 RX ADMIN — HYDROMORPHONE HYDROCHLORIDE 2 MG: 2 TABLET ORAL at 16:46

## 2023-01-01 RX ADMIN — Medication 10 ML: at 20:32

## 2023-01-01 RX ADMIN — PANTOPRAZOLE SODIUM 40 MG: 40 TABLET, DELAYED RELEASE ORAL at 06:43

## 2023-01-01 RX ADMIN — MULTIPLE VITAMINS W/ MINERALS TAB 1 TABLET: TAB at 08:42

## 2023-01-01 RX ADMIN — PIPERACILLIN AND TAZOBACTAM 4.5 G: 4; .5 INJECTION, POWDER, FOR SOLUTION INTRAVENOUS at 11:30

## 2023-01-01 RX ADMIN — FUROSEMIDE 40 MG: 10 INJECTION, SOLUTION INTRAVENOUS at 14:04

## 2023-01-01 RX ADMIN — PANTOPRAZOLE SODIUM 40 MG: 40 TABLET, DELAYED RELEASE ORAL at 16:59

## 2023-01-01 RX ADMIN — Medication 10 ML: at 11:15

## 2023-01-01 RX ADMIN — Medication 10 ML: at 10:53

## 2023-01-01 RX ADMIN — OCTREOTIDE ACETATE 50 MCG: 50 INJECTION, SOLUTION INTRAVENOUS; SUBCUTANEOUS at 12:33

## 2023-01-01 RX ADMIN — FOLIC ACID 1 MG: 1 TABLET ORAL at 18:45

## 2023-01-01 RX ADMIN — CYCLOBENZAPRINE 10 MG: 10 TABLET, FILM COATED ORAL at 16:54

## 2023-01-01 RX ADMIN — PANTOPRAZOLE SODIUM 40 MG: 40 TABLET, DELAYED RELEASE ORAL at 16:46

## 2023-01-01 RX ADMIN — PIPERACILLIN AND TAZOBACTAM 4.5 G: 4; .5 INJECTION, POWDER, FOR SOLUTION INTRAVENOUS at 00:07

## 2023-01-01 RX ADMIN — HYDROMORPHONE HYDROCHLORIDE 2 MG: 2 TABLET ORAL at 06:08

## 2023-01-01 RX ADMIN — PANTOPRAZOLE SODIUM 40 MG: 40 TABLET, DELAYED RELEASE ORAL at 17:15

## 2023-01-01 RX ADMIN — HYDROMORPHONE HYDROCHLORIDE 2 MG: 2 TABLET ORAL at 00:15

## 2023-01-01 RX ADMIN — FUROSEMIDE 20 MG: 20 TABLET ORAL at 16:09

## 2023-01-01 RX ADMIN — HYDROMORPHONE HYDROCHLORIDE 2 MG: 2 TABLET ORAL at 11:13

## 2023-01-01 RX ADMIN — FOLIC ACID 1 MG: 1 TABLET ORAL at 09:13

## 2023-01-01 RX ADMIN — PIPERACILLIN AND TAZOBACTAM 4.5 G: 4; .5 INJECTION, POWDER, FOR SOLUTION INTRAVENOUS at 12:16

## 2023-01-01 RX ADMIN — HYDROMORPHONE HYDROCHLORIDE 2 MG: 2 TABLET ORAL at 23:20

## 2023-01-01 RX ADMIN — Medication 10 ML: at 21:56

## 2023-01-01 RX ADMIN — HYDROMORPHONE HYDROCHLORIDE 2 MG: 2 TABLET ORAL at 16:53

## 2023-01-01 RX ADMIN — MULTIPLE VITAMINS W/ MINERALS TAB 1 TABLET: TAB at 08:26

## 2023-01-01 RX ADMIN — CLONAZEPAM 0.5 MG: 0.5 TABLET ORAL at 09:56

## 2023-01-01 RX ADMIN — MELATONIN 5 MG TABLET 5 MG: at 22:04

## 2023-01-01 RX ADMIN — PANTOPRAZOLE SODIUM 40 MG: 40 TABLET, DELAYED RELEASE ORAL at 15:57

## 2023-01-01 RX ADMIN — FOLIC ACID 1 MG: 1 TABLET ORAL at 08:03

## 2023-01-01 RX ADMIN — OXYCODONE HYDROCHLORIDE 5 MG: 5 TABLET ORAL at 00:33

## 2023-01-01 RX ADMIN — OXYCODONE HYDROCHLORIDE 5 MG: 5 TABLET ORAL at 12:26

## 2023-01-01 RX ADMIN — MULTIPLE VITAMINS W/ MINERALS TAB 1 TABLET: TAB at 09:11

## 2023-01-01 RX ADMIN — PANTOPRAZOLE SODIUM 40 MG: 40 TABLET, DELAYED RELEASE ORAL at 06:53

## 2023-01-01 RX ADMIN — MULTIPLE VITAMINS W/ MINERALS TAB 1 TABLET: TAB at 10:29

## 2023-01-01 RX ADMIN — Medication 10 ML: at 09:12

## 2023-01-01 RX ADMIN — PANTOPRAZOLE SODIUM 40 MG: 40 TABLET, DELAYED RELEASE ORAL at 17:03

## 2023-01-01 RX ADMIN — HYDROMORPHONE HYDROCHLORIDE 2 MG: 2 TABLET ORAL at 02:07

## 2023-01-01 RX ADMIN — MULTIPLE VITAMINS W/ MINERALS TAB 1 TABLET: TAB at 08:25

## 2023-01-01 RX ADMIN — PIPERACILLIN AND TAZOBACTAM 4.5 G: 4; .5 INJECTION, POWDER, FOR SOLUTION INTRAVENOUS at 23:56

## 2023-01-01 RX ADMIN — IRON SUCROSE 300 MG: 20 INJECTION, SOLUTION INTRAVENOUS at 12:40

## 2023-01-01 RX ADMIN — HYDROMORPHONE HYDROCHLORIDE 2 MG: 2 TABLET ORAL at 03:27

## 2023-01-01 RX ADMIN — HYDROMORPHONE HYDROCHLORIDE 2 MG: 2 TABLET ORAL at 12:55

## 2023-01-01 RX ADMIN — PIPERACILLIN AND TAZOBACTAM 4.5 G: 4; .5 INJECTION, POWDER, FOR SOLUTION INTRAVENOUS at 13:02

## 2023-01-01 RX ADMIN — PANTOPRAZOLE SODIUM 40 MG: 40 TABLET, DELAYED RELEASE ORAL at 16:28

## 2023-01-01 RX ADMIN — THIAMINE HCL TAB 100 MG 100 MG: 100 TAB at 14:04

## 2023-01-01 RX ADMIN — PANTOPRAZOLE SODIUM 40 MG: 40 TABLET, DELAYED RELEASE ORAL at 06:33

## 2023-01-01 RX ADMIN — Medication 10 ML: at 16:47

## 2023-01-01 RX ADMIN — PANTOPRAZOLE SODIUM 40 MG: 40 TABLET, DELAYED RELEASE ORAL at 16:06

## 2023-01-01 RX ADMIN — POTASSIUM CHLORIDE 40 MEQ: 1500 TABLET, EXTENDED RELEASE ORAL at 10:29

## 2023-01-01 RX ADMIN — MULTIPLE VITAMINS W/ MINERALS TAB 1 TABLET: TAB at 09:49

## 2023-01-01 RX ADMIN — OXYCODONE HYDROCHLORIDE 5 MG: 5 TABLET ORAL at 12:20

## 2023-01-01 RX ADMIN — SPIRONOLACTONE 50 MG: 25 TABLET ORAL at 08:23

## 2023-01-01 RX ADMIN — PANTOPRAZOLE SODIUM 40 MG: 40 TABLET, DELAYED RELEASE ORAL at 17:29

## 2023-01-01 RX ADMIN — SPIRONOLACTONE 50 MG: 25 TABLET ORAL at 08:52

## 2023-01-01 RX ADMIN — SPIRONOLACTONE 50 MG: 25 TABLET ORAL at 16:09

## 2023-01-01 RX ADMIN — POTASSIUM CHLORIDE 40 MEQ: 1.5 POWDER, FOR SOLUTION ORAL at 13:25

## 2023-01-01 RX ADMIN — Medication 10 ML: at 16:12

## 2023-01-01 RX ADMIN — Medication 10 ML: at 12:39

## 2023-01-01 RX ADMIN — FUROSEMIDE 20 MG: 20 TABLET ORAL at 14:25

## 2023-01-01 RX ADMIN — HYDROMORPHONE HYDROCHLORIDE 2 MG: 2 TABLET ORAL at 18:21

## 2023-01-01 RX ADMIN — POLYETHYLENE GLYCOL 3350 17 G: 17 POWDER, FOR SOLUTION ORAL at 08:52

## 2023-01-01 RX ADMIN — PANTOPRAZOLE SODIUM 40 MG: 40 TABLET, DELAYED RELEASE ORAL at 17:46

## 2023-01-01 RX ADMIN — HYDROMORPHONE HYDROCHLORIDE 2 MG: 2 TABLET ORAL at 18:52

## 2023-01-01 RX ADMIN — OXYCODONE HYDROCHLORIDE 5 MG: 5 TABLET ORAL at 04:47

## 2023-01-01 RX ADMIN — Medication 10 ML: at 09:47

## 2023-01-01 RX ADMIN — HYDROMORPHONE HYDROCHLORIDE 2 MG: 2 TABLET ORAL at 21:08

## 2023-01-01 RX ADMIN — PANTOPRAZOLE SODIUM 40 MG: 40 TABLET, DELAYED RELEASE ORAL at 15:55

## 2023-01-01 RX ADMIN — Medication 10 ML: at 04:26

## 2023-01-01 RX ADMIN — THIAMINE HCL TAB 100 MG 100 MG: 100 TAB at 18:45

## 2023-01-01 RX ADMIN — PIPERACILLIN AND TAZOBACTAM 4.5 G: 4; .5 INJECTION, POWDER, FOR SOLUTION INTRAVENOUS at 05:55

## 2023-01-01 RX ADMIN — PANTOPRAZOLE SODIUM 40 MG: 40 TABLET, DELAYED RELEASE ORAL at 16:49

## 2023-01-01 RX ADMIN — OCTREOTIDE ACETATE 50 MCG/HR: 200 INJECTION, SOLUTION INTRAVENOUS; SUBCUTANEOUS at 12:26

## 2023-01-01 RX ADMIN — CEFTRIAXONE SODIUM 2 G: 2 INJECTION, POWDER, FOR SOLUTION INTRAMUSCULAR; INTRAVENOUS at 12:43

## 2023-01-01 RX ADMIN — MIDAZOLAM 2 MG: 1 INJECTION INTRAMUSCULAR; INTRAVENOUS at 17:30

## 2023-01-01 RX ADMIN — HYDROMORPHONE HYDROCHLORIDE 2 MG: 2 TABLET ORAL at 05:19

## 2023-01-01 ASSESSMENT — ACTIVITIES OF DAILY LIVING (ADL)
ADLS_ACUITY_SCORE: 39
ADLS_ACUITY_SCORE: 54
ADLS_ACUITY_SCORE: 53
ADLS_ACUITY_SCORE: 40
ADLS_ACUITY_SCORE: 45
ADLS_ACUITY_SCORE: 53
ADLS_ACUITY_SCORE: 45
ADLS_ACUITY_SCORE: 49
ADLS_ACUITY_SCORE: 53
ADLS_ACUITY_SCORE: 41
ADLS_ACUITY_SCORE: 45
ADLS_ACUITY_SCORE: 41
ADLS_ACUITY_SCORE: 44
CHANGE_IN_FUNCTIONAL_STATUS_SINCE_ONSET_OF_CURRENT_ILLNESS/INJURY: NO
ADLS_ACUITY_SCORE: 47
ADLS_ACUITY_SCORE: 35
ADLS_ACUITY_SCORE: 47
ADLS_ACUITY_SCORE: 43
ADLS_ACUITY_SCORE: 44
ADLS_ACUITY_SCORE: 54
ADLS_ACUITY_SCORE: 53
ADLS_ACUITY_SCORE: 39
ADLS_ACUITY_SCORE: 41
ADLS_ACUITY_SCORE: 45
ADLS_ACUITY_SCORE: 37
ADLS_ACUITY_SCORE: 47
ADLS_ACUITY_SCORE: 35
ADLS_ACUITY_SCORE: 41
ADLS_ACUITY_SCORE: 47
ADLS_ACUITY_SCORE: 53
ADLS_ACUITY_SCORE: 54
ADLS_ACUITY_SCORE: 39
ADLS_ACUITY_SCORE: 44
ADLS_ACUITY_SCORE: 50
ADLS_ACUITY_SCORE: 48
ADLS_ACUITY_SCORE: 47
ADLS_ACUITY_SCORE: 54
ADLS_ACUITY_SCORE: 52
ADLS_ACUITY_SCORE: 47
ADLS_ACUITY_SCORE: 43
ADLS_ACUITY_SCORE: 39
ADLS_ACUITY_SCORE: 40
ADLS_ACUITY_SCORE: 41
ADLS_ACUITY_SCORE: 53
ADLS_ACUITY_SCORE: 44
ADLS_ACUITY_SCORE: 49
ADLS_ACUITY_SCORE: 54
ADLS_ACUITY_SCORE: 54
CONCENTRATING,_REMEMBERING_OR_MAKING_DECISIONS_DIFFICULTY: YES
ADLS_ACUITY_SCORE: 43
ADLS_ACUITY_SCORE: 45
ADLS_ACUITY_SCORE: 41
ADLS_ACUITY_SCORE: 54
ADLS_ACUITY_SCORE: 48
ADLS_ACUITY_SCORE: 43
ADLS_ACUITY_SCORE: 51
ADLS_ACUITY_SCORE: 43
ADLS_ACUITY_SCORE: 49
ADLS_ACUITY_SCORE: 41
ADLS_ACUITY_SCORE: 45
ADLS_ACUITY_SCORE: 43
ADLS_ACUITY_SCORE: 54
ADLS_ACUITY_SCORE: 39
ADLS_ACUITY_SCORE: 45
WALKING_OR_CLIMBING_STAIRS: STAIR CLIMBING DIFFICULTY, ASSISTANCE 1 PERSON
ADLS_ACUITY_SCORE: 54
ADLS_ACUITY_SCORE: 43
ADLS_ACUITY_SCORE: 45
ADLS_ACUITY_SCORE: 40
ADLS_ACUITY_SCORE: 49
ADLS_ACUITY_SCORE: 54
ADLS_ACUITY_SCORE: 41
ADLS_ACUITY_SCORE: 45
ADLS_ACUITY_SCORE: 51
ADLS_ACUITY_SCORE: 54
ADLS_ACUITY_SCORE: 45
ADLS_ACUITY_SCORE: 40
ADLS_ACUITY_SCORE: 46
ADLS_ACUITY_SCORE: 53
ADLS_ACUITY_SCORE: 43
ADLS_ACUITY_SCORE: 49
ADLS_ACUITY_SCORE: 54
ADLS_ACUITY_SCORE: 39
ADLS_ACUITY_SCORE: 54
ADLS_ACUITY_SCORE: 39
ADLS_ACUITY_SCORE: 54
ADLS_ACUITY_SCORE: 49
ADLS_ACUITY_SCORE: 40
ADLS_ACUITY_SCORE: 43
VISION_MANAGEMENT: GLASSES
ADLS_ACUITY_SCORE: 50
ADLS_ACUITY_SCORE: 41
ADLS_ACUITY_SCORE: 41
ADLS_ACUITY_SCORE: 43
ADLS_ACUITY_SCORE: 45
ADLS_ACUITY_SCORE: 47
ADLS_ACUITY_SCORE: 45
ADLS_ACUITY_SCORE: 40
ADLS_ACUITY_SCORE: 58
WEAR_GLASSES_OR_BLIND: YES
FALL_HISTORY_WITHIN_LAST_SIX_MONTHS: YES
ADLS_ACUITY_SCORE: 51
ADLS_ACUITY_SCORE: 41
ADLS_ACUITY_SCORE: 49
ADLS_ACUITY_SCORE: 54
ADLS_ACUITY_SCORE: 43
ADLS_ACUITY_SCORE: 50
ADLS_ACUITY_SCORE: 43
DRESSING/BATHING_DIFFICULTY: YES
ADLS_ACUITY_SCORE: 41
ADLS_ACUITY_SCORE: 54
ADLS_ACUITY_SCORE: 43
ADLS_ACUITY_SCORE: 49
ADLS_ACUITY_SCORE: 54
ADLS_ACUITY_SCORE: 41
ADLS_ACUITY_SCORE: 52
ADLS_ACUITY_SCORE: 52
ADLS_ACUITY_SCORE: 54
ADLS_ACUITY_SCORE: 53
ADLS_ACUITY_SCORE: 44
ADLS_ACUITY_SCORE: 40
ADLS_ACUITY_SCORE: 45
ADLS_ACUITY_SCORE: 52
ADLS_ACUITY_SCORE: 54
ADLS_ACUITY_SCORE: 43
ADLS_ACUITY_SCORE: 39
ADLS_ACUITY_SCORE: 43
ADLS_ACUITY_SCORE: 43
ADLS_ACUITY_SCORE: 49
ADLS_ACUITY_SCORE: 54
ADLS_ACUITY_SCORE: 45
ADLS_ACUITY_SCORE: 54
ADLS_ACUITY_SCORE: 53
ADLS_ACUITY_SCORE: 54
ADLS_ACUITY_SCORE: 40
ADLS_ACUITY_SCORE: 39
ADLS_ACUITY_SCORE: 41
ADLS_ACUITY_SCORE: 45
ADLS_ACUITY_SCORE: 50
ADLS_ACUITY_SCORE: 39
ADLS_ACUITY_SCORE: 52
ADLS_ACUITY_SCORE: 52
ADLS_ACUITY_SCORE: 55
ADLS_ACUITY_SCORE: 54
ADLS_ACUITY_SCORE: 54
ADLS_ACUITY_SCORE: 50
ADLS_ACUITY_SCORE: 52
ADLS_ACUITY_SCORE: 43
ADLS_ACUITY_SCORE: 50
ADLS_ACUITY_SCORE: 43
DIFFICULTY_COMMUNICATING: NO
ADLS_ACUITY_SCORE: 54
ADLS_ACUITY_SCORE: 46
ADLS_ACUITY_SCORE: 43
ADLS_ACUITY_SCORE: 54
ADLS_ACUITY_SCORE: 43
ADLS_ACUITY_SCORE: 53
ADLS_ACUITY_SCORE: 43
ADLS_ACUITY_SCORE: 50
ADLS_ACUITY_SCORE: 47
DIFFICULTY_EATING/SWALLOWING: NO
ADLS_ACUITY_SCORE: 39
ADLS_ACUITY_SCORE: 39
ADLS_ACUITY_SCORE: 37
ADLS_ACUITY_SCORE: 44
DOING_ERRANDS_INDEPENDENTLY_DIFFICULTY: YES
ADLS_ACUITY_SCORE: 39
ADLS_ACUITY_SCORE: 41
ADLS_ACUITY_SCORE: 49
ADLS_ACUITY_SCORE: 40
HEARING_DIFFICULTY_OR_DEAF: NO
ADLS_ACUITY_SCORE: 44
ADLS_ACUITY_SCORE: 39
ADLS_ACUITY_SCORE: 43
ADLS_ACUITY_SCORE: 47
ADLS_ACUITY_SCORE: 39
ADLS_ACUITY_SCORE: 43
ADLS_ACUITY_SCORE: 41
VISION_MANAGEMENT: GLASSES
ADLS_ACUITY_SCORE: 43
ADLS_ACUITY_SCORE: 41
TOILETING_ASSISTANCE: TOILETING DIFFICULTY, ASSISTANCE 1 PERSON
ADLS_ACUITY_SCORE: 49
ADLS_ACUITY_SCORE: 55
ADLS_ACUITY_SCORE: 52
ADLS_ACUITY_SCORE: 53
ADLS_ACUITY_SCORE: 54
ADLS_ACUITY_SCORE: 47
ADLS_ACUITY_SCORE: 51
ADLS_ACUITY_SCORE: 45
ADLS_ACUITY_SCORE: 47
ADLS_ACUITY_SCORE: 39
WALKING_OR_CLIMBING_STAIRS_DIFFICULTY: YES
ADLS_ACUITY_SCORE: 41
ADLS_ACUITY_SCORE: 40
ADLS_ACUITY_SCORE: 54
ADLS_ACUITY_SCORE: 54
ADLS_ACUITY_SCORE: 53
ADLS_ACUITY_SCORE: 50
ADLS_ACUITY_SCORE: 44
ADLS_ACUITY_SCORE: 37
ADLS_ACUITY_SCORE: 47
ADLS_ACUITY_SCORE: 41
ADLS_ACUITY_SCORE: 43
ADLS_ACUITY_SCORE: 53
ADLS_ACUITY_SCORE: 44
ADLS_ACUITY_SCORE: 43
ADLS_ACUITY_SCORE: 45
ADLS_ACUITY_SCORE: 54
ADLS_ACUITY_SCORE: 44
ADLS_ACUITY_SCORE: 49
TOILETING_ISSUES: YES
ADLS_ACUITY_SCORE: 54
ADLS_ACUITY_SCORE: 43
ADLS_ACUITY_SCORE: 49
ADLS_ACUITY_SCORE: 49
ADLS_ACUITY_SCORE: 43
ADLS_ACUITY_SCORE: 53
ADLS_ACUITY_SCORE: 52
ADLS_ACUITY_SCORE: 47
ADLS_ACUITY_SCORE: 52
ADLS_ACUITY_SCORE: 53
ADLS_ACUITY_SCORE: 45
ADLS_ACUITY_SCORE: 53
ADLS_ACUITY_SCORE: 52
ADLS_ACUITY_SCORE: 39
ADLS_ACUITY_SCORE: 54
ADLS_ACUITY_SCORE: 42
ADLS_ACUITY_SCORE: 44
ADLS_ACUITY_SCORE: 50
ADLS_ACUITY_SCORE: 54
ADLS_ACUITY_SCORE: 45
ADLS_ACUITY_SCORE: 39
ADLS_ACUITY_SCORE: 45
ADLS_ACUITY_SCORE: 39
ADLS_ACUITY_SCORE: 49
ADLS_ACUITY_SCORE: 39
ADLS_ACUITY_SCORE: 48
ADLS_ACUITY_SCORE: 54
ADLS_ACUITY_SCORE: 50
ADLS_ACUITY_SCORE: 54
ADLS_ACUITY_SCORE: 53
ADLS_ACUITY_SCORE: 49
ADLS_ACUITY_SCORE: 50
ADLS_ACUITY_SCORE: 43
DRESSING/BATHING: DRESSING DIFFICULTY, ASSISTANCE 1 PERSON
ADLS_ACUITY_SCORE: 53
ADLS_ACUITY_SCORE: 44
ADLS_ACUITY_SCORE: 45
ADLS_ACUITY_SCORE: 40
ADLS_ACUITY_SCORE: 48
ADLS_ACUITY_SCORE: 41
ADLS_ACUITY_SCORE: 54
ADLS_ACUITY_SCORE: 54
ADLS_ACUITY_SCORE: 44
ADLS_ACUITY_SCORE: 41
ADLS_ACUITY_SCORE: 53
ADLS_ACUITY_SCORE: 39
ADLS_ACUITY_SCORE: 45
ADLS_ACUITY_SCORE: 41
ADLS_ACUITY_SCORE: 43
ADLS_ACUITY_SCORE: 41
ADLS_ACUITY_SCORE: 40
ADLS_ACUITY_SCORE: 45
ADLS_ACUITY_SCORE: 45
ADLS_ACUITY_SCORE: 53
ADLS_ACUITY_SCORE: 45
ADLS_ACUITY_SCORE: 44
ADLS_ACUITY_SCORE: 39
ADLS_ACUITY_SCORE: 40
ADLS_ACUITY_SCORE: 43
ADLS_ACUITY_SCORE: 53
ADLS_ACUITY_SCORE: 54
ADLS_ACUITY_SCORE: 39
ADLS_ACUITY_SCORE: 54
ADLS_ACUITY_SCORE: 47
ADLS_ACUITY_SCORE: 53
ADLS_ACUITY_SCORE: 54
ADLS_ACUITY_SCORE: 41
ADLS_ACUITY_SCORE: 39
ADLS_ACUITY_SCORE: 45
ADLS_ACUITY_SCORE: 41
ADLS_ACUITY_SCORE: 53
ADLS_ACUITY_SCORE: 41
ADLS_ACUITY_SCORE: 54
ADLS_ACUITY_SCORE: 44
ADLS_ACUITY_SCORE: 43
ADLS_ACUITY_SCORE: 54
ADLS_ACUITY_SCORE: 49
ADLS_ACUITY_SCORE: 44
ADLS_ACUITY_SCORE: 47
ADLS_ACUITY_SCORE: 39
ADLS_ACUITY_SCORE: 44
ADLS_ACUITY_SCORE: 43
ADLS_ACUITY_SCORE: 43
ADLS_ACUITY_SCORE: 41
ADLS_ACUITY_SCORE: 38
ADLS_ACUITY_SCORE: 39
ADLS_ACUITY_SCORE: 54
ADLS_ACUITY_SCORE: 54
ADLS_ACUITY_SCORE: 41
ADLS_ACUITY_SCORE: 41
ADLS_ACUITY_SCORE: 43
CONCENTRATING,_REMEMBERING_OR_MAKING_DECISIONS_DIFFICULTY: YES
ADLS_ACUITY_SCORE: 50
ADLS_ACUITY_SCORE: 39
ADLS_ACUITY_SCORE: 53
ADLS_ACUITY_SCORE: 58
ADLS_ACUITY_SCORE: 53
ADLS_ACUITY_SCORE: 49
ADLS_ACUITY_SCORE: 39
ADLS_ACUITY_SCORE: 47
ADLS_ACUITY_SCORE: 45
ADLS_ACUITY_SCORE: 55
ADLS_ACUITY_SCORE: 41
ADLS_ACUITY_SCORE: 47
WEAR_GLASSES_OR_BLIND: YES
ADLS_ACUITY_SCORE: 53
ADLS_ACUITY_SCORE: 41
ADLS_ACUITY_SCORE: 45
ADLS_ACUITY_SCORE: 52
ADLS_ACUITY_SCORE: 42
ADLS_ACUITY_SCORE: 47
ADLS_ACUITY_SCORE: 39
ADLS_ACUITY_SCORE: 44
ADLS_ACUITY_SCORE: 47
ADLS_ACUITY_SCORE: 53
ADLS_ACUITY_SCORE: 47
ADLS_ACUITY_SCORE: 45
ADLS_ACUITY_SCORE: 43
ADLS_ACUITY_SCORE: 40
ADLS_ACUITY_SCORE: 45
EQUIPMENT_CURRENTLY_USED_AT_HOME: WALKER, STANDARD
ADLS_ACUITY_SCORE: 39
ADLS_ACUITY_SCORE: 52
ADLS_ACUITY_SCORE: 54
ADLS_ACUITY_SCORE: 50
ADLS_ACUITY_SCORE: 37
ADLS_ACUITY_SCORE: 43
ADLS_ACUITY_SCORE: 54
ADLS_ACUITY_SCORE: 43
ADLS_ACUITY_SCORE: 45
ADLS_ACUITY_SCORE: 44
ADLS_ACUITY_SCORE: 45
ADLS_ACUITY_SCORE: 47
ADLS_ACUITY_SCORE: 39
ADLS_ACUITY_SCORE: 39
ADLS_ACUITY_SCORE: 53
ADLS_ACUITY_SCORE: 49
ADLS_ACUITY_SCORE: 39
ADLS_ACUITY_SCORE: 41
ADLS_ACUITY_SCORE: 40
ADLS_ACUITY_SCORE: 49
ADLS_ACUITY_SCORE: 45
ADLS_ACUITY_SCORE: 53
ADLS_ACUITY_SCORE: 45
ADLS_ACUITY_SCORE: 40

## 2023-01-01 ASSESSMENT — ENCOUNTER SYMPTOMS
DIARRHEA: 1
BLOOD IN STOOL: 1
RECTAL PAIN: 0
CONFUSION: 1

## 2023-05-03 PROBLEM — Z87.19 HISTORY OF ESOPHAGEAL VARICES: Status: ACTIVE | Noted: 2023-01-01

## 2023-05-03 PROBLEM — F10.10 ALCOHOL ABUSE: Status: ACTIVE | Noted: 2023-01-01

## 2023-05-03 PROBLEM — K92.2 GASTROINTESTINAL HEMORRHAGE, UNSPECIFIED GASTROINTESTINAL HEMORRHAGE TYPE: Status: ACTIVE | Noted: 2023-01-01

## 2023-05-03 PROBLEM — R57.8 HEMORRHAGIC SHOCK (H): Status: ACTIVE | Noted: 2023-01-01

## 2023-05-03 NOTE — PLAN OF CARE
Neuro: A&Ox4 but confused. All other neuros intact. CLEMENT to command. PERRLA.   CV: SR 90s. MAP >65  Resp: stable on RA. Clear/diminished lung sounds  GI: No BM on shift. Smear on admission. Maroon color. Clear liquid diet  : voiding. Adequate output  Infusion: Octreotide @ 10mL/hr. NS @ 75mL/hr   Skin: L knee and L forearm abrasion. Blanchable redness on coccyx    EGD completed at bedside by Dr. Pedroza. Transfer order placed.

## 2023-05-03 NOTE — H&P
Critical Care  Note      05/03/2023    Name: Los Huang MRN#: [id number]   Age: 67 year old YOB: 1955     [facility]    MV DAY # 1              Problem List:   Principal Problem:    Alcohol abuse  Active Problems:    Hemorrhagic shock (H)    History of esophageal varices    Gastrointestinal hemorrhage, unspecified gastrointestinal hemorrhage type           Summary/Hospital Course:     Los Huang is a 67 year old man admitted for hypotension and lactic acidosis in the setting of GI bleed. He has a history of colorectal carcinoma, alcohol use disorder, PTSD who presented to the ED on 5/3/2023 for diarrhea and hematochezia. He was hypotensive on arrival to the ED. He was admitted to the ICU on 5/3/2023 due to blood loss anemia with a hemoglobin of 5.7, hypotension, lactic acidosis, and urgent need for procedure. He is now s/p transfusion with 2 units PRBC.    Gastroenterology was consulted for possible endoscopy. CT abdomen/pelvis on admission with no evidence of active GI bleed, concerning for shock bowel with marked edema. It also demonstrated progression of colon cancer - lung metastases, new expansile tumor thrombus in L portal vein. Given worsening of metastasis on imaging, hemodynamic stability, and no signs of active bleeding endoscopy was not recommended.    ICU Staff:    A/P: The patient was found not to require endoscopy at this lime and considered a candidate for transfer out of the ICU; I agree with this assessment.      Abelardo Gaytan MD, PhD      Assessment and plan :     Los Huang is a 67 year old man admitted on 5/3/2023 with hematochezia and blood loss anemia. He has a history of metastatic colorectal cancer and alcohol use disorder.  I have personally reviewed the daily labs, imaging studies, cultures and discussed the case with referring physician and consulting physicians.   My assessment and plan by system for this patient is as follows:    ICU  Staff:    A/P: The patient was found not to require endoscopy at this lime and considered a candidate for transfer out of the ICU; I agree with this assessment.      Abelardo Gaytan MD, PhD    Neurology/Psychiatry:   1. Risk of EtOH withdrawal   2. Toxic metabolic, hepatic encephalopathy, resolved  Plan  - monitor on telemetry  - seizure and fall precautions   - [facility] alcohol withdrawal protocol, neuro checks q4h  - hold rifaximin/lactulose in setting of diarrhea    Cardiovascular:   1.Hypotension 2/2 blood loss, received transfusion with 2 unit PRBC  Plan   - transfuse for Hb <7, INR >1.8 and active bleeding    GI and Nutrition :   1. Hematochezia  2. End stage liver disease likely 2/2 alcoholic liver disease   3. Metastatic colorectal cancer s/p ascending colectomy and R hepatectomy  Plan  - gastroenterology consulted - no plans for endoscopy due to advanced metastasis on CT abdomen/pelvis with no evidence of active bleed  - NPO  - IV fluids  - Protonix, octreotide gtt     Renal/Fluids/Electrolytes:   1. Lactic acidosis (8.2 on admission)  Plan  - monitor function and electrolytes as needed with replacement per ICU protocols. - generally avoid nephrotoxic agents such as NSAID, IV contrast unless specifically required  - monitor lactic acid  - adjust medications as needed for renal clearance  - follow I/O's as appropriate    Infectious Disease:   - Empiric ceftriaxone  - Enteric panel pending    Endocrine:   1. Stress induced hyperglycemia  Plan  - ICU insulin protocol, goal sugar <150    Hematology/Oncology:   1. Blood loss anemia  2. Leukocytosis, likely stress related  Plan  - s/p transfusion with 2 unit PRBC   - repeat CBC  - transfuse for hgb <7 g/dL  - transfuse for INR > 1.8 or active bleeding     IV/Access:   1. Venous access - PIV  2. Port a cath single L chest wall    ICU Prophylaxis:   1. DVT: Mechanical with PCDs  2. VAP: HOB 30 degrees, chlorhexidine rinse  3. Stress Ulcer: PI/H 2 blocker  4. Family  updated at bedside    Key goals for next 24 hours:   1. Monitor for hemodynamic instability  2. Awaiting plan per gastroenterology          Medical History:     Past Medical History:   Diagnosis Date     Cancer (H)      Depressive disorder      Post-traumatic osteoarthritis of left shoulder 3/9/2020     Schizoaffective disorder (H)      Past Surgical History:   Procedure Laterality Date     ABDOMEN SURGERY       BIOPSY       CHOLECYSTECTOMY       COLONOSCOPY       GENITOURINARY SURGERY      Ureteroscopy gone awry     H NEEDLE POWER PORT Left 2014    PLACED IN [place]     IR SIRT (SELECTIVE INTERNAL RADIO THERAPY)  2020     IR VISCERAL ANGIOGRAM  2020     IR VISCERAL EMBOLIZATION  2020     ORTHOPEDIC SURGERY       Social History     Socioeconomic History     Marital status:      Spouse name: Not on file     Number of children: Not on file     Years of education: Not on file     Highest education level: Not on file   Occupational History     Not on file   Tobacco Use     Smoking status: Former     Packs/day: 1.00     Years: 34.00     Pack years: 34.00     Types: Cigarettes     Start date:      Quit date:      Years since quittin.3     Smokeless tobacco: Former     Types: Snuff     Quit date:      Tobacco comments:     Smoked sporadically in high school and during college until  then started back in    Vaping Use     Vaping status: Not on file   Substance and Sexual Activity     Alcohol use: Not Currently     Comment: NONE IN 1.5 YRS--2020     Drug use: Not Currently     Types: Marijuana, Amphetamines, Benzodiazepines, Hashish, LSD, Mescaline, Methaqualone, Methylphenidate, Nitrous oxide, PCP, Psilocybin     Comment: [id number]     Sexual activity: Not Currently     Birth control/protection: Male Surgical   Other Topics Concern     Parent/sibling w/ CABG, MI or angioplasty before 65F 55M? No   Social History Narrative     Not on file     Social Determinants of Health      Financial Resource Strain: Low Risk  (1/20/2021)    Overall Financial Resource Strain (CARDIA)      Difficulty of Paying Living Expenses: Not hard at all   Food Insecurity: No Food Insecurity (1/20/2021)    Hunger Vital Sign      Worried About Running Out of Food in the Last Year: Never true      Ran Out of Food in the Last Year: Never true   Transportation Needs: No Transportation Needs (1/20/2021)    PRAPARE - Transportation      Lack of Transportation (Medical): No      Lack of Transportation (Non-Medical): No   Physical Activity: Unknown (1/20/2021)    Exercise Vital Sign      Days of Exercise per Week: Patient refused      Minutes of Exercise per Session: Patient refused   Stress: Not on file   Social Connections: Unknown (1/20/2021)    Social Connection and Isolation Panel [NHANES]      Frequency of Communication with Friends and Family: Patient refused      Frequency of Social Gatherings with Friends and Family: Patient refused      Attends [employer]ist Services: Patient refused      Active Member of Clubs or Organizations: Patient refused      Attends Club or Organization Meetings: Patient refused      Marital Status: Patient refused   Intimate Partner Violence: Unknown (1/20/2021)    Humiliation, Afraid, Rape, and Kick questionnaire      Fear of Current or Ex-Partner: Patient refused      Emotionally Abused: Patient refused      Physically Abused: Patient refused      Sexually Abused: Patient refused   Housing Stability: Not on file        Allergies   Allergen Reactions     Animal Dander               [place] Medications:       octreotide (sandoSTATIN) infusion ADULT 50 mcg/hr (05/03/23 1226)        Home Med's  No current facility-administered medications on file prior to encounter.  Acetaminophen 325 MG CAPS, Take 650 mg by mouth every 6 hours as needed (pain and or fever)  benztropine (COGENTIN) 1 MG tablet, Take 1 tablet (1 mg) by mouth At Bedtime (Patient taking differently: Take 1 mg by mouth nightly as  needed Medication instructions: Take 1 tablet by mouth daily at bedtime, hold until resident asks for it.)  betamethasone dipropionate (DIPROSONE) 0.05 % external cream, Apply topically 2 times daily Abdominal wall rash; keep in room  clonazePAM (KLONOPIN) 0.5 MG tablet, Take 0.5 mg by mouth daily  cyclobenzaprine (FLEXERIL) 10 MG tablet, Take 10 mg by mouth 2 times daily as needed  diclofenac (VOLTAREN) 1 % topical gel, Apply 2 g topically 2 times daily To right shoulder and knee  docusate sodium (COLACE) 100 MG capsule, Take 100 mg by mouth 2 times daily as needed for constipation  furosemide (LASIX) 40 MG tablet, Take 40 mg by mouth as needed (edema)  hydrocortisone (CORTAID) 1 % external cream, Apply topically 4 times daily as needed for hemorrhoids  INVEGA SUSTENNA 234 MG/1.5ML JOE, Inject 234 mg into the muscle every 28 days  melatonin 3 MG tablet, Take 6 mg by mouth nightly as needed for sleep  OLANZapine (ZYPREXA) 5 MG tablet, Take 5 mg by mouth every 6 hours as needed  ondansetron (ZOFRAN) 4 MG tablet, Take 4 mg by mouth every 8 hours as needed for nausea  polyethylene glycol-propylene glycol (SYSTANE) 0.4-0.3 % SOLN ophthalmic solution, Place 1-2 drops into both eyes every 2 hours as needed for dry eyes  Pramoxine HCl (SARNA SENSITIVE EX), Externally apply topically 3 times daily as needed (itching)  SENNA PLUS 8.6-50 MG tablet, Take 2 tablets by mouth 2 times daily as needed for constipation  simethicone (MYLICON) 80 MG chewable tablet, Take 1 tablet (80 mg) by mouth every 6 hours as needed for cramping  traZODone (DESYREL) 50 MG tablet, Take 1 tablet (50 mg) by mouth nightly as needed for sleep               Physical Examination:   Temp:  [97.7  F (36.5  C)-99.1  F (37.3  C)] 98.9  F (37.2  C)  Pulse:  [] 95  Resp:  [12-24] 20  BP: ()/(47-61) 113/53  SpO2:  [92 %-99 %] 96 %    Intake/Output Summary (Last 24 hours) at 5/3/2023 1623  Last data filed at 5/3/2023 1555  Gross per 24 hour   Intake  1408.67 ml   Output 225 ml   Net 1183.67 ml     Wt Readings from Last 4 Encounters:   05/03/23 88.9 kg (195 lb 14.1 oz)   07/18/22 84.6 kg (186 lb 9.6 oz)   01/29/21 86.5 kg (190 lb 12.8 oz)   01/10/21 85 kg (187 lb 8 oz)     BP - Mean:  [70-81] 78  Resp: 20    Recent Labs   Lab 05/03/23  1157   PH 7.32       GEN: no acute distress   HEENT: head ncat, sclera anicteric, OP patent, trachea midline   PULM: unlabored, clear anteriorly    CV/COR: RRR S1S2. no gallop,  No obvious rub, no murmur  ABD: soft nontender, bowel sounds all 4 quadrants  NEURO: grossly intact  SKIN: pale, no obvious rash  LINES: clean, dry intact         Data:   All data and imaging reviewed     ROUTINE ICU LABS (Last four results)  CMP  Recent Labs   Lab 05/03/23  1619 05/03/23  1154   NA  --  136   POTASSIUM  --  4.6   CHLORIDE  --  106   CO2  --  12*   ANIONGAP  --  18*   * 179*   BUN  --  46.5*   CR  --  0.99   GFRESTIMATED  --  82   GEOVANNA  --  7.9*   PROTTOTAL  --  4.7*   ALBUMIN  --  2.6*   BILITOTAL  --  1.9*   ALKPHOS  --  339*   AST  --  193*   ALT  --  116*     CBC  Recent Labs   Lab 05/03/23  1154   WBC 17.9*   RBC 1.97*   HGB 5.7*   HCT 17.7*   MCV 90   MCH 28.9   MCHC 32.2   RDW 18.7*        INR  Recent Labs   Lab 05/03/23  1154   INR 2.26*     Arterial Blood Gas  Recent Labs   Lab 05/03/23  1157   PH 7.32       All cultures:  No results for input(s): CULT in the last 168 hours.  Recent Results (from the past 24 hour(s))   CTA Abdomen Pelvis with Contrast    Narrative    CTA ABDOMEN PELVIS WITH CONTRAST 5/3/2023 2:16 PM    CLINICAL HISTORY: GI bleed,    TECHNIQUE: CT angiogram of the abdomen and pelvis was performed  following injection of IV contrast. Multiplanar reformats were  obtained. Dose reduction techniques were used.  CONTRAST: 119mL Isovue-370    COMPARISON: 7/14/2022    FINDINGS:   CT ABDOMINAL AORTIC ANGIOGRAM: The celiac artery, superior mesenteric  artery, bilateral renal arteries, inferior mesenteric  artery,  bilateral common, external and internal iliac arteries and common  femoral arteries are patent without significant stenosis. Stable  aneurysmal dilatation of the distal common iliac artery measuring 1.7  cm. No evidence for active extravasation into the bowel.    LOWER CHEST: Multiple pulmonary nodules and masses in the visualized  lung bases with enlargement of several nodules. For example, a 4.6 cm  mass in the posterior left lower lobe previously measured 3.4 cm  (series 6, image 9) and a 2.8 cm mass in the right lower lobe measured  1.1 cm (series 6, image 24). Partly visualized small right pleural  effusion.    HEPATOBILIARY: Right hepatectomy. New expansile hypodensity in the  left portal vein with enlargement of multiple nodules in the left  hepatic lobe, concerning for tumor thrombus and worsening metastatic  disease. Confluent masses in the central left lobe measure 7.0 x 4.9  cm, previously measuring 3.7 x 2.1 cm (series 8, image 32). Cirrhotic  morphology of the liver. Increased size of subcapsular implants along  the right hepatic lobe. For example, 11.5 x 7.0 cm implant along the  right hepatectomy margin previously measured about 7.0 x 4.0 cm  (series 8, image 17).    PANCREAS: Normal.    SPLEEN: Stable splenomegaly and splenic granuloma.    ADRENAL GLANDS: Normal.    KIDNEYS/BLADDER: Stable 2.6 cm solid enhancing mass of the lower pole  of the right kidney and indeterminate 2.1 cm mass of the upper pole of  the left kidney. No hydronephrosis or perinephric stranding  bilaterally.    BOWEL: Right hemicolectomy. No small bowel or colonic obstruction.  Marked circumferential wall thickening with enhancement and edema of  the entire small bowel and colon. The appearance is most suggestive of  shock bowel. No colonic diverticuli. No evidence of active GI bleed.    PELVIC ORGANS: No pelvic masses.    ADDITIONAL FINDINGS: New moderate ascites. No lymphadenopathy in the  abdomen and  pelvis.    MUSCULOSKELETAL: No destructive lesions of the bones. Right upper  abdominal hernia mesh.      Impression    IMPRESSION:   1.  Findings concerning for shock bowel with marked edema involving  the entire small bowel and residual colon (prior right hemicolectomy).  No evidence for active GI bleed.  2.  Disease progression of colon cancer with enlargement of visualized  lung metastases. New small right pleural effusion.  3.  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 Enlargement of subcapsular  metastatic deposits along the right lobe of the liver.  4.  Right hepatectomy. Cirrhosis and splenomegaly.  5.  New moderate ascites either due to portal hypertension and/or  peritoneal carcinomatosis.  6.  Stable bilateral renal masses concerning for solid renal  neoplasms.  7.  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    HARI GARCIA MD         SYSTEM ID:  [id number]     Jina Black, MS4    Billing: This patient is critically ill: No Total critical care time today none.            ICU Staff:    A/P: The patient was found not to require endoscopy at this lime and considered a candidate for transfer out of the ICU; I agree with this assessment.      Abelardo Gaytan MD, PhD

## 2023-05-03 NOTE — PHARMACY-ADMISSION MEDICATION HISTORY
Pharmacist Admission Medication History    Admission medication history is complete. The information provided in this note is only as accurate as the sources available at the time of the update.    Medication reconciliation/reorder completed by provider prior to medication history? No    Information Source(s): Hospital records, Facility (U/NH/) medication list/MAR and CareEverywhere/SureScripts via in-person    Pertinent Information: Invega Sustenna due 5/5    Changes made to PTA medication list:    Added: Betamethasone cream, clonazepam    Deleted: Fibercon tabs, oxycodone, pantoprazole, Preparation H, Potassium, Ocean Spray, Miralax, Lorazepam, Artificial Saliva    Changed: Docusate, Melatonin, furosemide    Medication Affordability:  Not including over the counter (OTC) medications, was there a time in the past 12 months when you did not take your medications as prescribed because of cost?: Unable to Assess    Allergies reviewed with patient and updates made in EHR: yes    Medication History Completed By: Bennie Andrews RPH 5/3/2023 3:48 PM    Prior to Admission medications    Medication Sig Last Dose Taking? Auth Provider Long Term End Date   Acetaminophen 325 MG CAPS Take 650 mg by mouth every 6 hours as needed (pain and or fever) 5/2/2023 at 1159 Yes Unknown, Entered By History     benztropine (COGENTIN) 1 MG tablet Take 1 tablet (1 mg) by mouth At Bedtime  Patient taking differently: Take 1 mg by mouth nightly as needed Medication instructions: Take 1 tablet by mouth daily at bedtime, hold until resident asks for it. 1/30/2023 Yes Nigel Ash DO Yes    betamethasone dipropionate (DIPROSONE) 0.05 % external cream Apply topically 2 times daily Abdominal wall rash; keep in room Unknown at prn Yes Unknown, Entered By History     clonazePAM (KLONOPIN) 0.5 MG tablet Take 0.5 mg by mouth daily 5/3/2023 at 0843 Yes Unknown, Entered By History No    cyclobenzaprine (FLEXERIL) 10 MG tablet Take 10 mg  by mouth 2 times daily as needed 5/3/2023 at 0155 Yes Unknown, Entered By History     diclofenac (VOLTAREN) 1 % topical gel Apply 2 g topically 2 times daily To right shoulder and knee 2/11/2023 at 2105 Yes Unknown, Entered By History     docusate sodium (COLACE) 100 MG capsule Take 100 mg by mouth 2 times daily as needed for constipation prn Yes Unknown, Entered By History     furosemide (LASIX) 40 MG tablet Take 40 mg by mouth as needed (edema) prn Yes Unknown, Entered By History Yes    hydrocortisone (CORTAID) 1 % external cream Apply topically 4 times daily as needed for hemorrhoids prn Yes Unknown, Entered By History     INVEGA SUSTENNA 234 MG/1.5ML JOE Inject 234 mg into the muscle every 28 days 4/7/2023 Yes Unknown, Entered By History Yes    melatonin 3 MG tablet Take 6 mg by mouth nightly as needed for sleep prn Yes Unknown, Entered By History     OLANZapine (ZYPREXA) 5 MG tablet Take 5 mg by mouth every 6 hours as needed 5/1/2023 Yes Unknown, Entered By History     ondansetron (ZOFRAN) 4 MG tablet Take 4 mg by mouth every 8 hours as needed for nausea prn Yes Unknown, Entered By History     polyethylene glycol-propylene glycol (SYSTANE) 0.4-0.3 % SOLN ophthalmic solution Place 1-2 drops into both eyes every 2 hours as needed for dry eyes prn Yes Unknown, Entered By History     Pramoxine HCl (SARNA SENSITIVE EX) Externally apply topically 3 times daily as needed (itching) prn Yes Unknown, Entered By History     SENNA PLUS 8.6-50 MG tablet Take 2 tablets by mouth 2 times daily as needed for constipation prn Yes Unknown, Entered By History     simethicone (MYLICON) 80 MG chewable tablet Take 1 tablet (80 mg) by mouth every 6 hours as needed for cramping prn Yes Cassy Noriega MD     traZODone (DESYREL) 50 MG tablet Take 1 tablet (50 mg) by mouth nightly as needed for sleep prn Yes Nigel Ash,  Yes

## 2023-05-03 NOTE — ED PROVIDER NOTES
"  History     Chief Complaint:  Rectal Bleeding       HPI   Los Huang is a 67 year old male with a history of colorectal cancer and alcohol use disorder who presents via EMS with diarrhea and blood in stool. He mentions feeling confused for the past several days, and has been having 5-6 episodes a day. Today, he mentions he fell, and additionally his assisted living facility noticed dark blood in his diarrhea. EMS confirms this on arrival, and notes that he appeared \"ghost pale.\" He initially had a BP of 70/40 which has been improving after 300 ml of fluids. Here, he denies rectal pain or history of bleeding hemorrhoids.    Of note, he has colorectal cancer, but has not had problems with blood in stool in the past.    History is limited secondary to patient's altered mental status    Independent Historian:   EMS and patient    Review of External Notes: I reviewed his ED admission note on July 14 2022 along with his extensive stay    ROS:  Review of Systems   Gastrointestinal: Positive for blood in stool and diarrhea. Negative for rectal pain.   Skin: Positive for pallor.   Psychiatric/Behavioral: Positive for confusion.   All other systems reviewed and are negative.      Allergies:  Animal Dander     Medications:    Cogentin  Flexeril  Fibercon  Lasix  Ativan  Zyprexa  Zofran  Oxycodone  Protonix  Desyrel    Past Medical History:     Metastatic colon cancer to liver  Bipolar 1 disorder  ADHD  Post-traumatic osteoarthritis of left shoulder  Polysubstance dependence  Mood disorder with psychosis  Anxiety  Schizoaffective disorder  Delusion  Paranoia   Jaundice  COVID 19  Alcohol use disorder  Pulmonary nodules     Past Surgical History:    Abdomen surgery  Cholecystectomy  Colonoscopy  ERCP w/ sphicterotomy  IR SIRT  IR visceral angiogram  IR embolization  Ortho surgery     Family History:    family history includes Anxiety Disorder in his sister and sister; Depression in his son and another family member; " Mental Illness in his father and mother; Osteoporosis in his sister; Substance Abuse in his sister; Thyroid Disease in his sister and sister.    Social History:   reports that he quit smoking about 20 years ago. His smoking use included cigarettes. He started smoking about 54 years ago. He has a 34.00 pack-year smoking history. He quit smokeless tobacco use about 12 years ago.  His smokeless tobacco use included snuff. He reports that he does not currently use alcohol. He reports that he does not currently use drugs after having used the following drugs: Marijuana, Amphetamines, Benzodiazepines, Hashish, LSD, Mescaline, Methaqualone, Methylphenidate, Nitrous oxide, PCP, and Psilocybin.  PCP: Babar Mckinney     Physical Exam     Patient Vitals for the past 24 hrs:   BP Temp Temp src Pulse Resp SpO2 Weight   05/03/23 1315 101/54 98.2  F (36.8  C) -- 86 12 97 % --   05/03/23 1305 99/56 97.7  F (36.5  C) -- 96 14 96 % --   05/03/23 1300 97/54 97.9  F (36.6  C) -- 93 19 95 % --   05/03/23 1255 (!) 88/56 98.5  F (36.9  C) -- -- 21 96 % --   05/03/23 1249 95/57 98.3  F (36.8  C) -- 96 21 -- --   05/03/23 1157 -- 97.8  F (36.6  C) Temporal -- -- -- 88.9 kg (195 lb 14.1 oz)   05/03/23 1156 95/50 -- -- 101 24 92 % --        Physical Exam     General: Pale, diaphoretic, ill-appearing elderly gentleman.    Eye:  Pupils are equal, round, and reactive.  Extraocular movements intact.    ENT:  No rhinorrhea.  Moist mucus membranes.  Normal tongue and tonsil.    Cardiac:  Tachycardic but regular rate and rhythm.  No murmurs, gallops, or rubs.    Pulmonary:  Clear to auscultation bilaterally.  No wheezes, rales, or rhonchi.    Abdomen:  Positive bowel sounds.  Abdomen is soft and non-distended, without focal tenderness.    Musculoskeletal:  Normal movement of all extremities without evidence for deficit.    Skin:  Warm and dry without rashes. Pale in appearance.    Neurologic:  Non-focal exam without asymmetric weakness or numbness.      Psychiatric:  Flat affect. Poor historian, though likely at baseline when compared to prior visit notes.      Emergency Department Course     Laboratory:  Labs Ordered and Resulted from Time of ED Arrival to Time of ED Departure   COMPREHENSIVE METABOLIC PANEL - Abnormal       Result Value    Sodium 136      Potassium 4.6      Chloride 106      Carbon Dioxide (CO2) 12 (*)     Anion Gap 18 (*)     Urea Nitrogen 46.5 (*)     Creatinine 0.99      Calcium 7.9 (*)     Glucose 179 (*)     Alkaline Phosphatase 339 (*)      (*)      (*)     Protein Total 4.7 (*)     Albumin 2.6 (*)     Bilirubin Total 1.9 (*)     GFR Estimate 82     CBC WITH PLATELETS AND DIFFERENTIAL - Abnormal    WBC Count 17.9 (*)     RBC Count 1.97 (*)     Hemoglobin 5.7 (*)     Hematocrit 17.7 (*)     MCV 90      MCH 28.9      MCHC 32.2      RDW 18.7 (*)     Platelet Count 203      % Neutrophils 84      % Lymphocytes 8      % Monocytes 7      % Eosinophils 0      % Basophils 0      % Immature Granulocytes 1      NRBCs per 100 WBC 0      Absolute Neutrophils 15.0 (*)     Absolute Lymphocytes 1.4      Absolute Monocytes 1.3      Absolute Eosinophils 0.0      Absolute Basophils 0.0      Absolute Immature Granulocytes 0.1      Absolute NRBCs 0.0     INR - Abnormal    INR 2.26 (*)    ISTAT GASES LACTATE VENOUS POCT - Abnormal    Lactic Acid POCT 8.2 (*)     Bicarbonate Venous POCT 12 (*)     O2 Sat, Venous POCT 37 (*)     pCO2V Venous POCT 24 (*)     pH Venous POCT 7.32      pO2 Venous POCT 23 (*)    AMMONIA - Normal    Ammonia 43     PARTIAL THROMBOPLASTIN TIME - Normal    aPTT 34     ETHYL ALCOHOL LEVEL - Normal    Alcohol ethyl <0.01     AMMONIA   TYPE AND SCREEN, ADULT    ABO/RH(D) A POS      Antibody Screen Negative      SPECIMEN EXPIRATION DATE [id number]     PREPARE RED BLOOD CELLS (UNIT)    Blood Component Type Red Blood Cells      Product Code Q5655B77      Unit Status Not used      Unit Number [id number]      UNIT ABO/RH  A-      CODING SYSTEM [id number]      UNIT TYPE ISBT 0600     PREPARE RED BLOOD CELLS (UNIT)    Blood Component Type Red Blood Cells      Product Code K2535F35      Unit Status Transfused      Unit Number [id number]      UNIT ABO/RH A+      CODING SYSTEM [id number]      UNIT TYPE ISBT 6200      CROSSMATCH Compatible      ISSUE DATE AND TIME [id number]     PREPARE RED BLOOD CELLS (UNIT)    Blood Component Type Red Blood Cells      Product Code J8317C61      Unit Status Ready for issue      Unit Number [id number]      CROSSMATCH Compatible      CODING SYSTEM PRND219     PREPARE RED BLOOD CELLS (UNIT)   BLOOD CULTURE   BLOOD CULTURE   TRANSFUSE RED BLOOD CELLS (UNIT)   ABO/RH TYPE AND SCREEN        Emergency Department Course & Assessments:    Interventions:  Medications   octreotide (sandoSTATIN) 1,250 mcg in D5W 250 mL (50 mcg/hr Intravenous $New Bag 5/3/23 1226)   0.9% sodium chloride BOLUS (0 mLs Intravenous Stopped 5/3/23 1234)   pantoprazole (PROTONIX) IV push injection 40 mg (40 mg Intravenous $Given 5/3/23 1203)   0.9% sodium chloride BOLUS (1,000 mLs Intravenous $New Bag 5/3/23 1217)   piperacillin-tazobactam (ZOSYN) 4.5 g vial to attach to  mL bag (4.5 g Intravenous $New Bag 5/3/23 1216)   octreotide (sandoSTATIN) injection 50 mcg (50 mcg Intravenous $Given 5/3/23 1233)        Assessments:  1147 I obtained history and examined the patient, as noted above.  1233 I rechecked and updated the patient.  1238 I spoke to the patient's son over the phone and updated him on the findings here in the ED.    Independent Interpretation (X-rays, CTs, rhythm strip):  None    Consultations/Discussion of Management or Tests:  1212 I spoke with Dr. Pedroza from the hospitalist service regarding the patient's presentation, findings here in the ED, and plan of care.   1324 I spoke with Dr. Love from the hospitalist service regarding the patient's presentation, findings here in the ED, and plan of care.     Social  Determinants of Health affecting care:   Alcohol use disorder    Disposition:  The patient was admitted to the hospital under the care of Dr. Love    Impression & Plan      Medical Decision Making:  This critically ill 67-year-old presents with hemorrhagic shock.  History from EMS describes 3 days of diarrhea that just became bloody today.  I have to wonder if he has been passing blood sooner than this considering his degree of anemia.  It is unclear what his baseline hemoglobin is.  However, he is returning less than 6 today.  He had a syncopal spell without serious trauma.  He presents diaphoretic and very pale with hypotension and tachycardia.    I met the patient in the stabilization room and obtained history from paramedics.  Patient seems somewhat confused, though on review of his chart, he has schizoaffective disorder and some challenges with communication.  Review of his chart shows admission approximately 10 months ago when he had bleeding varices, requiring banding.  With this history along with his appearance, I immediately had him typed and crossed for 2 units of blood.  His lactate returned significantly elevated.  While I feel this is less likely to be related to infection and more so due to poor perfusion from hemorrhagic shock, he was covered with antibiotics.  I also immediately initiated him on octreotide and Protonix.  With his INR also returning greater than 2, I had not typed and crossed for fresh frozen plasma.  He was given 2 L of crystalloid, and with this combination of blood products and fluids, his blood pressure came up and his heart rate returned to normal.  His lactate also improved.    I consulted with Dr. Pedroza of gastroenterology early on in his course.  We discussed the probability of this representing a repeat variceal bleed, though the patient was too unstable to undergo endoscopy emergently.  The plan was for aggressive resuscitation and admission to the ICU where he would  likely undergo endoscopy later in the day when he was more stable.  I spoke with Dr. Love of the hospitalist service who agrees to admit the patient to the ICU.    With the patient now much more stable, with critical actions achieved to cover for his acute bleeding, likely from variceal sources in the setting of a cancer patient with alcoholic liver disease, I feel that critical actions have been achieved and the patient is now stable for transfer to the ICU.  He does not require an airway for pressor support at this time.    Critical Care Time:   Upon my evaluation, this patient had a critical illness with high probability of imminent or life-threatening deterioration due to hemorrhagic shock, which required my direct attention, intervention, and personal management.     I have personally provided 90 minutes of critical care time exclusive of time spent on separately billable procedures. Time includes review of laboratory data, radiology results, discussion with consultants, reassessment of the patient and monitoring for potential decompensation. Interventions were performed as documented above.        Diagnosis:    ICD-10-CM    1. Hemorrhagic shock (H)  R57.8       2. Gastrointestinal hemorrhage, unspecified gastrointestinal hemorrhage type  K92.2       3. History of esophageal varices  Z87.19       4. Alcohol abuse  F10.10              Scribe Disclosure:  I, Aman Sherwood, am serving as a scribe at 12:06 PM on 5/3/2023 to document services personally performed by Trierweiler, Chad A, MD based on my observations and the provider's statements to me.      Trierweiler, Chad A, MD  05/04/23 0812

## 2023-05-03 NOTE — CONSULTS
[facility]  Gastroenterology Consultation         Los Huang  [address]   [place] [address]  67 year old male    Admission Date/Time: 5/3/2023  Primary Care Provider: Darryl Chavez  Referring / Attending Physician:  Dr. Love    We were asked to see the patient in consultation by Dr. Love for evaluation of symptomatic anemia and rectal bleeding.      CC: Rectal bleeding hematochezia    HPI:  Los Huang is a 67 year old male who presented to ER with symptoms of rectal bleeding hemoglobin of 5.7 patient was hypotensive on arrival patient has known history of alcoholic cirrhosis portal hypertension grade 3 varices requiring upper GI endoscopy and banding in July 2022 patient also lactic acidosis patient has history of altered mental status along with alcohol withdrawal.  Patient has history of metastatic colon cancer patient is s/p right hepatectomy patient refused to have chemotherapy patient was on hospice but decided to revoke now patient presented with the above-mentioned symptoms patient is admitted to ICU patient's imaging studies showed extensive intestinal edema along with portal vein thrombosis possible extension into superior mesenteric vein thrombosis with extensive metastatic disease.  CTA was otherwise unremarkable and negative for active bleeding.  Patient is laying comfortably patient is sleepy however responding to verbal command appropriately and answering questions appropriately.    ROS: A comprehensive ten point review of systems was negative aside from those in mentioned in the HPI.      PAST MED HX:  I have reviewed this patient's medical history and updated it with pertinent information if needed.   Past Medical History:   Diagnosis Date     Cancer (H)      Depressive disorder      Post-traumatic osteoarthritis of left shoulder 3/9/2020     Schizoaffective disorder (H)        MEDICATIONS:   Prior to Admission Medications   Prescriptions Last  Dose Informant Patient Reported? Taking?   Acetaminophen 325 MG CAPS 5/2/2023 at 1159  Yes Yes   Sig: Take 650 mg by mouth every 6 hours as needed (pain and or fever)   INVEGA SUSTENNA 234 MG/1.5ML JOE 4/7/2023  Yes Yes   Sig: Inject 234 mg into the muscle every 28 days   OLANZapine (ZYPREXA) 5 MG tablet 5/1/2023  Yes Yes   Sig: Take 5 mg by mouth every 6 hours as needed   Pramoxine HCl (SARNA SENSITIVE EX) prn  Yes Yes   Sig: Externally apply topically 3 times daily as needed (itching)   SENNA PLUS 8.6-50 MG tablet prn  Yes Yes   Sig: Take 2 tablets by mouth 2 times daily as needed for constipation   benztropine (COGENTIN) 1 MG tablet 1/30/2023  No Yes   Sig: Take 1 tablet (1 mg) by mouth At Bedtime   Patient taking differently: Take 1 mg by mouth nightly as needed Medication instructions: Take 1 tablet by mouth daily at bedtime, hold until resident asks for it.   betamethasone dipropionate (DIPROSONE) 0.05 % external cream Unknown at prn  Yes Yes   Sig: Apply topically 2 times daily Abdominal wall rash; keep in room   clonazePAM (KLONOPIN) 0.5 MG tablet 5/3/2023 at 0843  Yes Yes   Sig: Take 0.5 mg by mouth daily   cyclobenzaprine (FLEXERIL) 10 MG tablet 5/3/2023 at 0155  Yes Yes   Sig: Take 10 mg by mouth 2 times daily as needed   diclofenac (VOLTAREN) 1 % topical gel 2/11/2023 at 2105  Yes Yes   Sig: Apply 2 g topically 2 times daily To right shoulder and knee   docusate sodium (COLACE) 100 MG capsule prn  Yes Yes   Sig: Take 100 mg by mouth 2 times daily as needed for constipation   furosemide (LASIX) 40 MG tablet prn  Yes Yes   Sig: Take 40 mg by mouth as needed (edema)   hydrocortisone (CORTAID) 1 % external cream prn  Yes Yes   Sig: Apply topically 4 times daily as needed for hemorrhoids   melatonin 3 MG tablet prn  Yes Yes   Sig: Take 6 mg by mouth nightly as needed for sleep   ondansetron (ZOFRAN) 4 MG tablet prn  Yes Yes   Sig: Take 4 mg by mouth every 8 hours as needed for nausea   polyethylene  glycol-propylene glycol (SYSTANE) 0.4-0.3 % SOLN ophthalmic solution prn  Yes Yes   Sig: Place 1-2 drops into both eyes every 2 hours as needed for dry eyes   simethicone (MYLICON) 80 MG chewable tablet prn  No Yes   Sig: Take 1 tablet (80 mg) by mouth every 6 hours as needed for cramping   traZODone (DESYREL) 50 MG tablet prn  No Yes   Sig: Take 1 tablet (50 mg) by mouth nightly as needed for sleep      Facility-Administered Medications: None       ALLERGIES:   Allergies   Allergen Reactions     Animal Dander        SOCIAL HISTORY:  Social History     Tobacco Use     Smoking status: Former     Packs/day: 1.00     Years: 34.00     Pack years: 34.00     Types: Cigarettes     Start date:      Quit date:      Years since quittin.3     Smokeless tobacco: Former     Types: Snuff     Quit date:      Tobacco comments:     Smoked sporadically in high school and during college until  then started back in    Substance Use Topics     Alcohol use: Not Currently     Comment: NONE IN 1.5 YRS--2020     Drug use: Not Currently     Types: Marijuana, Amphetamines, Benzodiazepines, Hashish, LSD, Mescaline, Methaqualone, Methylphenidate, Nitrous oxide, PCP, Psilocybin     Comment: [phone]       FAMILY HISTORY:  Family History   Problem Relation Age of Onset     Osteoporosis Sister      Thyroid Disease Sister         Thyroid killed     Anxiety Disorder Sister      Substance Abuse Sister      Thyroid Disease Sister         Thyroid removed     Anxiety Disorder Sister      Depression Son      Depression Other      Mental Illness Mother      Mental Illness Father        PHYSICAL EXAM:   General awake alert responding appropriately  Vital Signs with Ranges  Temp: 98.2  F (36.8  C) Temp src: Oral BP: (!) 143/61 Pulse: 103   Resp: 18 SpO2: 95 % O2 Device: None (Room air)    I/O last 3 completed shifts:  In: 4012.09 [P.O.:200; I.V.:1703.42]  Out: 1500 [Urine:1500]    Cardiovascular: negative, PMI normal. No lifts,  heaves, or thrills. RRR. No murmurs, clicks gallops or rub  Respiratory: negative, Percussion normal. Good diaphragmatic excursion. Lungs clear  Head: Normocephalic. No masses, lesions, tenderness or abnormalities  Abdomen: Abdomen soft, non-tender. BS normal. No masses, organomegaly  SKIN: no suspicious lesions or rashes          ADDITIONAL COMMENTS:   I reviewed the patient's new clinical lab test results.   Recent Labs   Lab Test 05/04/23  1116 05/04/23  0549 05/03/23  2354 05/03/23  1755 05/03/23  1154 03/30/23  1109 07/16/22  1136 07/16/22  0611   WBC  --  14.3*  --   --  17.9* 6.5   < >  --    HGB 7.7* 7.6* 7.8*   < > 5.7* 11.5*   < >  --    MCV  --  88  --   --  90 90   < >  --    PLT  --  112*  --   --  203 103*   < >  --    INR  --  1.71*  --   --  2.26*  --   --  1.43*    < > = values in this interval not displayed.     Recent Labs   Lab Test 05/03/23  1154 03/30/23  1109 01/13/23  1515   POTASSIUM 4.6 3.5 4.3   CHLORIDE 106 106 108*   CO2 12* 22 20*   BUN 46.5* 13.3 17.3   ANIONGAP 18* 13 13     Recent Labs   Lab Test 05/03/23 2002 05/03/23  1154 03/30/23  1109 01/13/23  1515 07/16/22  1136 07/15/22  1305 12/16/20  0728 12/14/20  2133   ALBUMIN  --  2.6* 3.8 4.2   < >  --    < > 3.7   BILITOTAL  --  1.9* 1.7* 0.7   < >  --    < > 0.4   ALT  --  116* 76* 76*   < >  --    < > 80*   AST  --  193* 87* 76*   < >  --    < > 40   PROTEIN Negative  --   --   --   --  Negative  --  Negative   LIPASE  --   --   --   --   --   --   --  214    < > = values in this interval not displayed.       I reviewed the patient's new imaging results.        CONSULTATION ASSESSMENT AND PLAN:    Principal Problem:  66-year-old gentleman with multiple chronic health problem with active alcohol use metastatic colon cancer alcoholic cirrhosis portal hypertension history of GI bleed requiring endoscopic treatment for variceal bleed now presented with elevated liver function tests coagulopathy confusion hypotension finding consistent  "with hemorrhagic shock hemoglobin down to 5.7 with active hematochezia.  Patient is currently in ICU patient is hemodynamically stable however patient blood pressure is still in 90s to 100.  Agree with the current plan to continue on aggressive management further evaluation with urgent upper GI endoscopy for possible variceal bleed risk-benefit plan discussed in detail with patient and family.  GI will continue to follow along closely.  Patient's imaging studies are quite concerning for possible venous congestion and portal hypertension in the setting of cirrhosis and also clot involving portal venous system.            Bossman Pedroza MD, FACP  [facility].  Office: [phone]  Cell : [phone]      [facility]  Ph: [phone] Fax: [phone]                      67 year old male with a history of colorectal cancer and alcohol use disorder who presents via EMS with diarrhea and blood in stool. He mentions feeling confused for the past several days, and has been having 5-6 episodes a day. Today, he mentions he fell, and additionally his assisted living facility noticed dark blood in his diarrhea. EMS confirms this on arrival, and notes that he appeared \"ghost pale.\" He initially had a BP of 70/40 which has been improving after 300 ml of fluids. Here, he denies rectal pain or history of bleeding hemorrhoids    Chart reviewed Labs reviewed patient has significantly elevated INR, elevated liver function tests lactic acid of 8.2 hemoglobin of 5.7.  Findings are quite worrisome for alcoholic liver injury giving patient with history of portal hypertension possibilities can include significant GI bleed.  I will recommend further evaluation with CT scan admit to ICU fluid resuscitation stabilization plan for possible EGD later today.    Bossman Pedroza MD FACP  [facility]  "

## 2023-05-03 NOTE — ED NOTES
[facility] CG4 not uploading   Results:  pH 7.321  PCO2 24  PO2 23  HCO3 12.4  TCO2 13  sO2% 37  Lactic acid 8.22    MD aware of critical results

## 2023-05-03 NOTE — H&P
[facility]    History and Physical - Hospitalist Service       Date of Admission:  5/3/2023    Assessment & Plan      Los Huang is a 67 year old male with h/o metastatic colorectral Ca, depression, PTSD and alcohol use disorder admitted on 5/3/2023 with hematochezia     Admit to ICU due to significant bleed, Hb 5.7 , low BP, lactic acidosis of 8 and urgent need for procedure     # Hematochezia : Massive upper GI bleed with h/o portal HTn vs lower GIB  # End Stage Liver Disease likely secondary to alcoholic liver disease  -Last admission in 07/2022 for upper GI bleed with grade 3 esophageal varices status post band  #Lactic acidosis  - ensure 2 large bore IV Access  - type and screen and transfuse to ensure Hb>7 , platelet > 50 and INR <2   Admission hb 5.7- will transfuse 2 U PRBC.    - NPO, IV fluids. Will give 10 mg IV with K   - Protonix, Octreotide gtt   - empiric Abx (ceftriaxone due to GI bleed.  If enteric panel returns positive, may need to review  - GI consult appreciated: Plan for EGD after clinical stability  - already has diarrhea: HOLD any  Rifaximin/ lactulose regimen (titrate for 3 loose stools/ day)   -Consider nonspecific beta-blockers once hemodynamically stable  -We will also get stool studies (enteric panel) considering significant diarrhea with blood and previous history of pancolitis.    #Altered mental status: Likely toxic metabolic, hepatic encephalopathy: Resolved  -We will hemodynamically stabilize patient  -Already having significant diarrhea so hold off on further lactulose.    # ETOH withdrawal:  - monitor on telemetry, seizure and fall precautions  - [facility] alcohol withdrawal protocol  - Chemical dependency consult   - Banana bag with MVT, Thiamine, Folate.     #Depression, PTSD   - Hold PTA meds while NPO   - A/W Pharm D review of PTA meds      # Metastatic Colon Cancer (metastatic colorectal cancer in 2014 with oligometastatic disease to liver at the  "time of diagnosis. He was treated with ascending colectomy 6/14 and R hepatectomy in 8/14)   -As per previous documentation, he did not want chemotherapy.  Previously was on hospice but decided to revoke    #Leukocytosis  -Likely stress related.  Monitor     Diet:    NPO   DVT Prophylaxis: Pneumatic Compression Devices  Chew Catheter: Not present  Lines: PRESENT             Cardiac Monitoring: None  Code Status:    Full     Clinically Significant Risk Factors Present on Admission              # Hypoalbuminemia: Lowest albumin = 2.6 g/dL at 5/3/2023 11:54 AM, will monitor as appropriate  # Coagulation Defect: INR = 2.26 (Ref range: 0.85 - 1.15) and/or PTT = 34 Seconds (Ref range: 22 - 38 Seconds), will monitor for bleeding                 Disposition Plan    2-3 days       Candido Love MD  Hospitalist Service  [facility]  Securely message with [employer] (more info)  Text page via [employer] Paging/Directory     \"This dictation was performed with voice recognition software and may contain errors,  omissions and inadvertent word substitution.\"   ______________________________________________________________________    Chief Complaint    GI bleed    History is obtained from the patient  Discussion with ED MD, Dr. Pedroza    History of Present Illness   Los Huang is a 67 year old male with h/o metastatic colorectral Ca, depression, PTSD and alcohol use disorder admitted on 5/3/2023 with hematochezia   As per him he started having fresh red blood through rectum on last Thursday which was intermittent and he thought it is related to his hemorrhoids.  Over the last week it has been associated with significant amount of diarrhea and fresh red change into dark black.  He was noted to have mild confusion over the last several days with significant diarrhea.  Brought in by EMS.  In the ED he was found to have initial BP of 70/40 with hemoglobin in 5's and was fluid resuscitated and started on blood " transfusion.  He denies any chest pain/palpitation/shortness of breath  Denies any recent fever/chills/rigors.  Denies any specific abdominal pain  Continues to have 6-7 episodes of diarrhea every day  Denies any dysuria  Denies any cough/cold      Past Medical History    Past Medical History:   Diagnosis Date     Cancer (H)      Depressive disorder      Post-traumatic osteoarthritis of left shoulder 3/9/2020     Schizoaffective disorder (H)        Past Surgical History   Past Surgical History:   Procedure Laterality Date     ABDOMEN SURGERY       BIOPSY       CHOLECYSTECTOMY       COLONOSCOPY       GENITOURINARY SURGERY  1997    Ureteroscopy gone awry     H NEEDLE POWER PORT Left 2014    PLACED IN [place]     IR SIRT (SELECTIVE INTERNAL RADIO THERAPY)  7/30/2020     IR VISCERAL ANGIOGRAM  7/30/2020     IR VISCERAL EMBOLIZATION  8/5/2020     ORTHOPEDIC SURGERY         Prior to Admission Medications   Prior to Admission Medications   Prescriptions Last Dose Informant Patient Reported? Taking?   Acetaminophen 325 MG CAPS   Yes No   Sig: Take 650 mg by mouth every 6 hours as needed (pain and or fever)   Artificial Saliva (MOUTH KOTE) SOLN   Yes No   Sig: Take 1 mL by mouth 2 times daily as needed   INVEGA SUSTENNA 234 MG/1.5ML JOE   Yes No   Sig: Inject 234 mg into the muscle every 28 days   LORazepam (ATIVAN) 1 MG tablet   Yes No   Sig: Take 1 mg by mouth every 4 hours as needed for anxiety   LORazepam (ATIVAN) 1 MG tablet   Yes No   Sig: Take 1 mg by mouth 2 times daily Given at 8am and 8pm. See prn doses also   Melatonin 10 MG TABS tablet   Yes No   Sig: Take 10 mg by mouth At Bedtime At 8pm   OLANZapine zydis (ZYPREXA) 5 MG ODT   Yes No   Sig: Take 5 mg by mouth every 6 hours as needed   SENNA PLUS 8.6-50 MG tablet   Yes No   Sig: Take 2 tablets by mouth 2 times daily as needed for constipation   benztropine (COGENTIN) 1 MG tablet   No No   Sig: Take 1 tablet (1 mg) by mouth At Bedtime   calcium polycarbophil  (FIBERCON) 625 MG tablet   Yes No   Sig: Take 1 tablet by mouth daily as needed for constipation   cyclobenzaprine (FLEXERIL) 10 MG tablet   Yes No   Sig: Take 10 mg by mouth 2 times daily as needed   diclofenac (VOLTAREN) 1 % topical gel   Yes No   Sig: Apply 2 g topically 2 times daily 7am and 7pm. To right shoulder and knee   docusate sodium (COLACE) 100 MG capsule   Yes No   Sig: Take 100 mg by mouth 2 times daily as needed for constipation   furosemide (LASIX) 40 MG tablet   Yes No   Sig: Take 40 mg by mouth daily   hydrocortisone (CORTAID) 1 % external cream   Yes No   Sig: Apply topically 4 times daily as needed for hemorrhoids   ondansetron (ZOFRAN) 4 MG tablet   Yes No   Sig: Take 4 mg by mouth every 8 hours as needed for nausea   oxyCODONE (ROXICODONE) 5 MG tablet   Yes No   Sig: Take 5 mg by mouth every hour as needed for severe pain See scheduled doses also   pantoprazole (PROTONIX) 40 MG EC tablet   No No   Sig: Take 1 tablet (40 mg) by mouth 2 times daily   phenylephrine-shark liver oil-mineral oil-petrolatum (PREPARATION H) 0.25-3-14-71.9 % rectal ointment   No No   Sig: Place rectally 2 times daily as needed for hemorrhoids   polyethylene glycol (MIRALAX) 17 GM/Dose powder   Yes No   Sig: Take 17 g by mouth daily as needed   polyethylene glycol-propylene glycol (SYSTANE) 0.4-0.3 % SOLN ophthalmic solution   Yes No   Sig: Place 1-2 drops into both eyes every 2 hours as needed for dry eyes   potassium chloride ER (K-TAB/KLOR-CON) 10 MEQ CR tablet   No No   Sig: Take 1 tablet (10 mEq) by mouth daily   simethicone (MYLICON) 80 MG chewable tablet   No No   Sig: Take 1 tablet (80 mg) by mouth every 6 hours as needed for cramping   sodium chloride (OCEAN) 0.65 % nasal spray   Yes No   Sig: Spray 2 sprays into both nostrils every 30 minutes as needed for congestion   traZODone (DESYREL) 50 MG tablet   No No   Sig: Take 1 tablet (50 mg) by mouth nightly as needed for sleep      Facility-Administered  Medications: None           Physical Exam   /53   Pulse 91   Temp 98.9  F (37.2  C) (Temporal)   Resp 15   Wt 88.9 kg (195 lb 14.1 oz)   SpO2 97%   BMI 25.15 kg/m    Gen- pleasant lying in bed  HEENT- DON  Neck- supple, no JVD elevation  CVS- I+II+ no m/r/g  RS- CTAB anteriorly  Abdo- soft, no tenderness . No g/r/r, distention++ .  Old healed scar right lower quadrant  Ext- edema   CNS-oriented to person, place, time.  No gross focal deficit      Medical Decision Making       80 MINUTES SPENT BY ME on the date of service doing chart review, history, exam, documentation & further activities per the note.      Data   ------------------------- PAST 24 HR DATA REVIEWED -----------------------------------------------    I have personally reviewed the following data over the past 24 hrs:    17.9 (H)  \   5.7 (LL)   / 203     136 106 46.5 (H) /  179 (H)   4.6 12 (L) 0.99 \       ALT: 116 (H) AST: 193 (H) AP: 339 (H) TBILI: 1.9 (H)   ALB: 2.6 (L) TOT PROTEIN: 4.7 (L) LIPASE: N/A       Procal: N/A CRP: N/A Lactic Acid: 8.2 (HH)       INR:  2.26 (H) PTT:  34   D-dimer:  N/A Fibrinogen:  N/A       Imaging results reviewed over the past 24 hrs:   Recent Results (from the past 24 hour(s))   CTA Abdomen Pelvis with Contrast    Narrative    CTA ABDOMEN PELVIS WITH CONTRAST 5/3/2023 2:16 PM    CLINICAL HISTORY: GI bleed,    TECHNIQUE: CT angiogram of the abdomen and pelvis was performed  following injection of IV contrast. Multiplanar reformats were  obtained. Dose reduction techniques were used.  CONTRAST: 119mL Isovue-370    COMPARISON: 7/14/2022    FINDINGS:   CT ABDOMINAL AORTIC ANGIOGRAM: The celiac artery, superior mesenteric  artery, bilateral renal arteries, inferior mesenteric artery,  bilateral common, external and internal iliac arteries and common  femoral arteries are patent without significant stenosis. Stable  aneurysmal dilatation of the distal common iliac artery measuring 1.7  cm. No evidence for  active extravasation into the bowel.    LOWER CHEST: Multiple pulmonary nodules and masses in the visualized  lung bases with enlargement of several nodules. For example, a 4.6 cm  mass in the posterior left lower lobe previously measured 3.4 cm  (series 6, image 9) and a 2.8 cm mass in the right lower lobe measured  1.1 cm (series 6, image 24). Partly visualized small right pleural  effusion.    HEPATOBILIARY: Right hepatectomy. New expansile hypodensity in the  left portal vein with enlargement of multiple nodules in the left  hepatic lobe, concerning for tumor thrombus and worsening metastatic  disease. Confluent masses in the central left lobe measure 7.0 x 4.9  cm, previously measuring 3.7 x 2.1 cm (series 8, image 32). Cirrhotic  morphology of the liver. Increased size of subcapsular implants along  the right hepatic lobe. For example, 11.5 x 7.0 cm implant along the  right hepatectomy margin previously measured about 7.0 x 4.0 cm  (series 8, image 17).    PANCREAS: Normal.    SPLEEN: Stable splenomegaly and splenic granuloma.    ADRENAL GLANDS: Normal.    KIDNEYS/BLADDER: Stable 2.6 cm solid enhancing mass of the lower pole  of the right kidney and indeterminate 2.1 cm mass of the upper pole of  the left kidney. No hydronephrosis or perinephric stranding  bilaterally.    BOWEL: Right hemicolectomy. No small bowel or colonic obstruction.  Marked circumferential wall thickening with enhancement and edema of  the entire small bowel and colon. The appearance is most suggestive of  shock bowel. No colonic diverticuli. No evidence of active GI bleed.    PELVIC ORGANS: No pelvic masses.    ADDITIONAL FINDINGS: New moderate ascites. No lymphadenopathy in the  abdomen and pelvis.    MUSCULOSKELETAL: No destructive lesions of the bones. Right upper  abdominal hernia mesh.      Impression    IMPRESSION:   1.  Findings concerning for shock bowel with marked edema involving  the entire small bowel and residual colon  (prior right hemicolectomy).  No evidence for active GI bleed.  2.  Disease progression of colon cancer with enlargement of visualized  lung metastases. New small right pleural effusion.  3.  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 Enlargement of subcapsular  metastatic deposits along the right lobe of the liver.  4.  Right hepatectomy. Cirrhosis and splenomegaly.  5.  New moderate ascites either due to portal hypertension and/or  peritoneal carcinomatosis.  6.  Stable bilateral renal masses concerning for solid renal  neoplasms.  7.  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    HARI GARCIA MD         SYSTEM ID:  [id number]

## 2023-05-03 NOTE — ED TRIAGE NOTES
Pt BIBA from assisted living. Pt states that he has had diarrhea with 5-8 episodes of stool a day. Today he noticed dark red blood in stool. Pt hypotensive, tachycardic upon EMS arrival. Pt very pale on arrival. Pt received 300 mL on en route

## 2023-05-04 NOTE — PROGRESS NOTES
[facility]    Medicine Progress Note - Hospitalist Service       Date of Admission:  5/3/2023    Assessment & Plan   Los Huang is a 67 year old male with hx of alcoholic cirrhosis with known esophageal varices, metastatic colon cancer s/p partial colectomy and right hepatectomy previously on hospice, PTSD/schioaffective/bipolar disorder who was admitted on 5/3/2023 for hemorrhagic shock due to acute GI bleed    Hemorrhagic shock due to acute GI bleed, Improved  Colitis  Lactic acidosis, Improved  Alcoholic cirrhosis with esophageal varices, ascites, and hepatic encephalopathy  * Presented with confusion, falls, and 1 week history of intermittent rectal bleeding which patient felt was related to hemorrhoids. Hypotensive to 70s on arrival with initial lactate >8; improved with IV fluids. Initial Hgb 5.7. Ammonia level normal.  * In the ED, he was initiated on an octreotide infusion, IV PPI BID, and IV ceftriaxone for SBP ppx. Also transfused total 3 units pRBCs upon admission  * [facility] GI was consulted on admission. Underwent EGD which showed variceal banding scar that appeared healthy, as well as grade I and II esophageal varices. Colonoscopy deferred due to evidence of colitis on imaging.   * CTA abd/pelvis upon admission showed marked wall thickening and edema of the entire bowel with concern for shock bowel/ischemic bowel and disease progression of colon cancer   - Now back to baseline mental status. Hgb currently stable mid 7-range, will monitor  - Octreotide gtt d/c'd today per GI  - Continues on IV PPI BID  - Continues on ceftriaxone for SBP ppx  - On clear liquids, advance as tolerated  - Had diarrhea likely related to pan-colitis seen on CT. Otherwise hemodynamically stable without significant abdominal pain, improved lactate, and improved WBC. PTA stool softeners on hold. C.diff PCR and enteric panel ordered.  - PT/OT consulted  - GI has now signed off   Addendum: Discussed  with bedside RN. Pt has not had a bowel movement since admission. C.diff and enteric panel d/c'd. Resume PTA stool softeners given underlying cirrhosis    Goals of care  Metastatic Colon Cancer   Suspected renal cancer  * Diagnosed with colorectal cancer in 2014 with oligometastatic disease to liver at the time of diagnosis. He was treated with ascending colectomy 6/14 and R hepatectomy in 8/14. He declined chemotherapy and enrolled in [facility], but eventually unenrolled from hospice due to overall stability and hospice visits felt intrusive  * CTA abd/pelvis this admission showed disease progression of colon cancer with lung mets, new tumor thrombus in the left portal vein, new/enlarging liver mets, new ascites, and stable bilateral renal masses concerning for solid renal neoplasms  * 5/4: goals of care initiated with patient and family. Cancer progression discussed. Explained that he currently has 2 terminal conditions: advanced colon cancer and cirrhosis. Patient does not want treatment for his cancer, but his current hospitalization is due to a complication of his cirrhosis, and he does want treatment for that. His goal is to recover from acute GI bleed and work with PT. He will consider hospice down the road as he declines.   - DNR/DNI. Goals otherwise restorative. PT/OT ordered. Hesitant, but willing to consider TCU  - Has intermittent abdominal pain and non-productive cough. Pain largely controlled with oxycodone prn. Will trial Tessalon pearls for cough.      Thrombocytopenia  * Platelets wnl on admission though sample was likely hemoconcentrated  -  Platelets 112k, likely due to cirrhosis; will follow    Positive blood culture, suspect contaminant  * 1 out of 2 blood cultures on 5/3 growing Staph epi. Suspect contaminant  - Continue to monitor cultures     PTSD  Hx of schizoaffective disorder  Hx of bipolar disorder type I  * PTA regimen: clonazepam 0.5 mg daily, olanzapine 5 mg q6h prn  - Clonazepam  "and olanzapine ordered prn this admission     Diet: Clear Liquid Diet    DVT Prophylaxis: Pneumatic Compression Devices  Chew Catheter: Not present  Code Status: No CPR- Do NOT Intubate      Disposition: Expected discharge once Hgb stable >24 hours and he has been cleared by PT, over the weekend    The patient's care was discussed with the Bedside Nurse, Patient and Patient's Family.    Jacqueline Huang MD  Hospitalist Service  [facility]  Contact information available via [facility] Paging/Directory    ______________________________________________________________________    Interval History   Admitted last night. Hemodynamics improved. Hgb stable. Feeling pressured by multiple providers to pursue \"palliative care.\" Goals of care discussed at length. He knows he has cancer and that it has progressed, but that he has also been living with cancer for 9 years and was doing reasonably well prior to this hospitalization. Willing to consider hospice, but goals for now are restorative. He reports intermittent abdominal pain and cough, but pain has largely been controlled with prn oxycodone.     Medical Decision Making   80 MINUTES SPENT BY ME on the date of service doing chart review, history, exam, documentation & further activities per the note.      Data reviewed today: I reviewed all medications, new labs and imaging results over the last 24 hours. I personally reviewed the EGD image(s) showing grade I and II esophageal varices.    Physical Exam   Vital Signs: Temp: 98.7  F (37.1  C) Temp src: Oral BP: 112/64 Pulse: 98   Resp: 18 SpO2: 93 % O2 Device: None (Room air)    Weight: 199 lbs 4.73 oz  Constitutional: Chronically ill appearing, NAD  HEENT: Sclera white, MMM  Respiratory: Breathing non-labored. Lungs CTAB - no wheezes, crackles, or rhonchi  Cardiovascular: Heart RRR, no m/r/g. No pedal edema  GI: +BS, abd distended, but soft/NT  Skin/Integument: No rash  Musculoskeletal: " +cachexia  Neuro: Alert and appropriate, CLEMENT  Psych: Calm and cooperative    Data   Recent Labs   Lab 05/04/23  0816 05/04/23  0549 05/04/23  0446 05/03/23  2354 05/03/23  2327 05/03/23  1953/23  1755 05/03/23  1619 05/03/23  1154   WBC  --  14.3*  --   --   --   --   --   --  17.9*   HGB  --  7.6*  --  7.8*  --   --  6.9*  --  5.7*   MCV  --  88  --   --   --   --   --   --  90   PLT  --  112*  --   --   --   --   --   --  203   INR  --  1.71*  --   --   --   --   --   --  2.26*   NA  --  137  --   --   --   --   --   --  136   POTASSIUM  --   --   --   --   --   --   --   --  4.6   CHLORIDE  --   --   --   --   --   --   --   --  106   CO2  --   --   --   --   --   --   --   --  12*   BUN  --   --   --   --   --   --   --   --  46.5*   CR  --   --   --   --   --   --   --   --  0.99   ANIONGAP  --   --   --   --   --   --   --   --  18*   GEOVANNA  --   --   --   --   --   --   --   --  7.9*   *  --  127*  --  133*   < >  --    < > 179*   ALBUMIN  --   --   --   --   --   --   --   --  2.6*   PROTTOTAL  --   --   --   --   --   --   --   --  4.7*   BILITOTAL  --   --   --   --   --   --   --   --  1.9*   ALKPHOS  --   --   --   --   --   --   --   --  339*   ALT  --   --   --   --   --   --   --   --  116*   AST  --   --   --   --   --   --   --   --  193*    < > = values in this interval not displayed.         Recent Results (from the past 24 hour(s))   CTA Abdomen Pelvis with Contrast    Narrative    CTA ABDOMEN PELVIS WITH CONTRAST 5/3/2023 2:16 PM    CLINICAL HISTORY: GI bleed,    TECHNIQUE: CT angiogram of the abdomen and pelvis was performed  following injection of IV contrast. Multiplanar reformats were  obtained. Dose reduction techniques were used.  CONTRAST: 119mL Isovue-370    COMPARISON: 7/14/2022    FINDINGS:   CT ABDOMINAL AORTIC ANGIOGRAM: The celiac artery, superior mesenteric  artery, bilateral renal arteries, inferior mesenteric artery,  bilateral common, external and internal iliac  arteries and common  femoral arteries are patent without significant stenosis. Stable  aneurysmal dilatation of the distal common iliac artery measuring 1.7  cm. No evidence for active extravasation into the bowel.    LOWER CHEST: Multiple pulmonary nodules and masses in the visualized  lung bases with enlargement of several nodules. For example, a 4.6 cm  mass in the posterior left lower lobe previously measured 3.4 cm  (series 6, image 9) and a 2.8 cm mass in the right lower lobe measured  1.1 cm (series 6, image 24). Partly visualized small right pleural  effusion.    HEPATOBILIARY: Right hepatectomy. New expansile hypodensity in the  left portal vein with enlargement of multiple nodules in the left  hepatic lobe, concerning for tumor thrombus and worsening metastatic  disease. Confluent masses in the central left lobe measure 7.0 x 4.9  cm, previously measuring 3.7 x 2.1 cm (series 8, image 32). Cirrhotic  morphology of the liver. Increased size of subcapsular implants along  the right hepatic lobe. For example, 11.5 x 7.0 cm implant along the  right hepatectomy margin previously measured about 7.0 x 4.0 cm  (series 8, image 17).    PANCREAS: Normal.    SPLEEN: Stable splenomegaly and splenic granuloma.    ADRENAL GLANDS: Normal.    KIDNEYS/BLADDER: Stable 2.6 cm solid enhancing mass of the lower pole  of the right kidney and indeterminate 2.1 cm mass of the upper pole of  the left kidney. No hydronephrosis or perinephric stranding  bilaterally.    BOWEL: Right hemicolectomy. No small bowel or colonic obstruction.  Marked circumferential wall thickening with enhancement and edema of  the entire small bowel and colon. The appearance is most suggestive of  shock bowel. No colonic diverticuli. No evidence of active GI bleed.    PELVIC ORGANS: No pelvic masses.    ADDITIONAL FINDINGS: New moderate ascites. No lymphadenopathy in the  abdomen and pelvis.    MUSCULOSKELETAL: No destructive lesions of the bones. Right  upper  abdominal hernia mesh.      Impression    IMPRESSION:   1.  Findings concerning for shock bowel with marked edema involving  the entire small bowel and residual colon (prior right hemicolectomy).  No evidence for active GI bleed.  2.  Disease progression of colon cancer with enlargement of visualized  lung metastases. New small right pleural effusion.  3.  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 Enlargement of subcapsular  metastatic deposits along the right lobe of the liver.  4.  Right hepatectomy. Cirrhosis and splenomegaly.  5.  New moderate ascites either due to portal hypertension and/or  peritoneal carcinomatosis.  6.  Stable bilateral renal masses concerning for solid renal  neoplasms.  7.  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    HARI GARCIA MD         SYSTEM ID:  [id number]       Medications     - MEDICATION INSTRUCTIONS -       octreotide (sandoSTATIN) infusion ADULT 50 mcg/hr (05/04/23 0800)     sodium chloride 75 mL/hr at 05/04/23 1050       cefTRIAXone  2 g Intravenous Q24H     diclofenac  2 g Topical BID     pantoprazole  40 mg Intravenous Q12H     sodium chloride (PF)  3 mL Intracatheter Q8H

## 2023-05-04 NOTE — PROGRESS NOTES
[facility]  Gastroenterology Progress Note     Los Huang MRN# [id number]   YOB: 1955 Age: 67 year old          Assessment and Plan:   Los Huang is a 67 year old male with h/o metastatic colorectral Ca, depression, PTSD and alcohol use disorder admitted on 5/3/2023 with hematochezia and admitted to ICU with hypotension and lactic acidosis    Alcohol abuse  Hemorrhagic shock (H)  History of esophageal varices  Gastrointestinal hemorrhage, unspecified gastrointestinal hemorrhage type  Metastatic colorectal cancer  Hemoglobin of 5.7 on presentation- transfused 2 units PRBC- last draw 7.6  Hypotensive with SBP   Lactic acid 8.2->2.8  INR 1.71    CT of A/P revealed:  1.  Findings concerning for shock bowel with marked edema involving  the entire small bowel and residual colon (prior right hemicolectomy).  No evidence for active GI bleed.  2.  Disease progression of colon cancer with enlargement of visualized  lung metastases. New small right pleural effusion.  3.  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 Enlargement of subcapsular  metastatic deposits along the right lobe of the liver.  4.  Right hepatectomy. Cirrhosis and splenomegaly.  5.  New moderate ascites either due to portal hypertension and/or  peritoneal carcinomatosis.  6.  Stable bilateral renal masses concerning for solid renal  neoplasms.  7.  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    CT findings reveal significant disease process and any further evaluation with a EGD/colonoscopy to treat a GI bleed would not be recommended given concern for shocked bowel/ischemic bowel and risk of perforation.    -- recommend hospice/palliative consult/care  -- supportive cares- fluids, antibiotics and pain control  -- PPi and octreotide  -- GI will sign off                Interval History:   Lying comfortable              Review  of Systems:   C: NEGATIVE for fever, chills, change in weight  E/M: NEGATIVE for ear, mouth and throat problems  R: NEGATIVE for significant cough or SOB  CV: NEGATIVE for chest pain, palpitations or peripheral edema             Medications:   I have reviewed this patient's current medications    cefTRIAXone  2 g Intravenous Q24H     diclofenac  2 g Topical BID     folic acid  1 mg Oral Daily     multivitamin w/minerals  1 tablet Oral Daily     pantoprazole  40 mg Intravenous Q12H     sodium chloride (PF)  3 mL Intracatheter Q8H     thiamine  100 mg Oral Daily                  Physical Exam:   Vitals were reviewed  Vital Signs with Ranges  Temp:  [97.7  F (36.5  C)-99.1  F (37.3  C)] 98.3  F (36.8  C)  Pulse:  [] 94  Resp:  [11-34] 11  BP: ()/(45-88) 111/55  SpO2:  [92 %-100 %] 94 %  I/O last 3 completed shifts:  In: 3132.09 [I.V.:1023.42]  Out: 1325 [Urine:1325]  Constitutional: alert, mild distress, cooperative and pale   Cardiovascular: negative, PMI normal. No lifts, heaves, or thrills. RRR. No murmurs, clicks gallops or rub  Respiratory: negative, Percussion normal. Good diaphragmatic excursion. Lungs clear  Abdomen: Abdomen soft, non-tender. BS normal. No masses, organomegaly, positive findings: distended, tenderness moderate generalized, hypoactive bowel sounds           Data:   I reviewed the patient's new clinical lab test results.   Recent Labs   Lab Test 05/04/23  0549 05/03/23  2354 05/03/23  1755 05/03/23  1154 03/30/23  1109 07/16/22  1136 07/16/22  0611   WBC 14.3*  --   --  17.9* 6.5   < >  --    HGB 7.6* 7.8* 6.9* 5.7* 11.5*   < >  --    MCV 88  --   --  90 90   < >  --    *  --   --  203 103*   < >  --    INR 1.71*  --   --  2.26*  --   --  1.43*    < > = values in this interval not displayed.     Recent Labs   Lab Test 05/03/23  1154 03/30/23  1109 01/13/23  1515   POTASSIUM 4.6 3.5 4.3   CHLORIDE 106 106 108*   CO2 12* 22 20*   BUN 46.5* 13.3 17.3   ANIONGAP 18* 13 13      Recent Labs   Lab Test 05/03/23 2002 05/03/23  1154 03/30/23  1109 01/13/23  1515 07/16/22  1136 07/15/22  1305 12/16/20  0728 12/14/20  2133   ALBUMIN  --  2.6* 3.8 4.2   < >  --    < > 3.7   BILITOTAL  --  1.9* 1.7* 0.7   < >  --    < > 0.4   ALT  --  116* 76* 76*   < >  --    < > 80*   AST  --  193* 87* 76*   < >  --    < > 40   PROTEIN Negative  --   --   --   --  Negative  --  Negative   LIPASE  --   --   --   --   --   --   --  214    < > = values in this interval not displayed.       I reviewed the patient's new imaging results.    All laboratory data reviewed  All imaging studies reviewed by me.    Tammie Mcqueen PA-C,  5/4/2023  [facility]  Office : [phone]  Cell: [phone] (Dr. Pedroza)  Cell: [phone] (Tammie Mcqueen PA-C)

## 2023-05-04 NOTE — PLAN OF CARE
Goal Outcome Evaluation:           Neuro: A&Ox4 but confused. All other neuros intact. CLEMENT to command. PERRLA.     CV: SR 90s.VSS  MAP >65    Resp: stable on RA. Clear/diminished lung sounds    GI: No BM on shift. Smear on admission. Maroon color. Clear liquid diet    : voiding. Adequate output    Infusion: Octreotide @ 10mL/hr. NS @ 75mL/hr     Skin: L knee and L forearm abrasion. Blanchable redness on coccyx    Other: Reports back pain, ordered PRN Oxycodone 5 mg Q6h    Transfer orders pending

## 2023-05-04 NOTE — CONSULTS
Care Management Initial Consult    General Information  Assessment completed with: VM-chart review, Care Team Member, Lew OLIVA at [facility] [phone]  Fax number [phone]  Type of CM/SW Visit: Initial Assessment    Primary Care Provider verified and updated as needed: Yes   Readmission within the last 30 days: no previous admission in last 30 days      Reason for Consult: discharge planning, other (see comments) (Elevated risk of readmission)  Advance Care Planning:            Communication Assessment  Patient's communication style: spoken language (English or Bilingual)    Hearing Difficulty or Deaf: no   Wear Glasses or Blind: yes    Cognitive  Cognitive/Neuro/Behavioral: WDL  Level of Consciousness: alert     Orientation: oriented x 4  Mood/Behavior: calm, cooperative  Best Language: 0 - No aphasia  Speech: clear, spontaneous, logical    Living Environment:   People in home: other (see comments) (alone in apt at [facility])     Current living Arrangements: assisted living  Name of Facility: [facility]   Able to return to prior arrangements: yes (pending progress)       Family/Social Support:  Care provided by: self  Provides care for: no one  Marital Status:   Children, Facility resident(s)/Staff          Description of Support System: Supportive, Involved         Current Resources:   Patient receiving home care services: No     Community Resources: None  Equipment currently used at home:    Supplies currently used at home: None    Employment/Financial:  Employment Status: retired        Financial Concerns:          Lifestyle & Psychosocial Needs:  Social Determinants of Health     Tobacco Use: Medium Risk (9/16/2021)    Patient History      Smoking Tobacco Use: Former      Smokeless Tobacco Use: Former      Passive Exposure: Not on file   Alcohol Use: Unknown (2/5/2019)    AUDIT-C      Frequency of Alcohol Consumption: Monthly or less      Average Number of Drinks: Not  on file      Frequency of Binge Drinking: Not on file   Financial Resource Strain: Low Risk  (1/20/2021)    Overall Financial Resource Strain (CARDIA)      Difficulty of Paying Living Expenses: Not hard at all   Food Insecurity: No Food Insecurity (1/20/2021)    Hunger Vital Sign      Worried About Running Out of Food in the Last Year: Never true      Ran Out of Food in the Last Year: Never true   Transportation Needs: No Transportation Needs (1/20/2021)    PRAPARE - Transportation      Lack of Transportation (Medical): No      Lack of Transportation (Non-Medical): No   Physical Activity: Unknown (1/20/2021)    Exercise Vital Sign      Days of Exercise per Week: Patient refused      Minutes of Exercise per Session: Patient refused   Stress: Not on file   Social Connections: Unknown (1/20/2021)    Social Connection and Isolation Panel [NHANES]      Frequency of Communication with Friends and Family: Patient refused      Frequency of Social Gatherings with Friends and Family: Patient refused      Attends Islam Services: Patient refused      Active Member of Clubs or Organizations: Patient refused      Attends Club or Organization Meetings: Patient refused      Marital Status: Patient refused   Intimate Partner Violence: Unknown (1/20/2021)    Humiliation, Afraid, Rape, and Kick questionnaire      Fear of Current or Ex-Partner: Patient refused      Emotionally Abused: Patient refused      Physically Abused: Patient refused      Sexually Abused: Patient refused   Depression: Not at risk (3/9/2020)    PHQ-2      PHQ-2 Score: 0   Housing Stability: Not on file       Functional Status:  Prior to admission patient needed assistance:              Mental Health Status:          Chemical Dependency Status:                Values/Beliefs:  Spiritual, Cultural Beliefs, Islam Practices, Values that affect care: no               Additional Information:  Per consult for elevated risk of readmission and chart review patient  resides at [facility], called [facility] [phone] and spoke to Tia RN Case Manager at [facility].  Per Tia, prior to admission, pt had been up to a few days before he went into the hospital, he was completely independent with all his cares and ADLs. Tia shared patient had become more weak over past few days.  Per Tia, pt had medication management and meals delivered to his room.  According to Tia, pt rarely left his apt.  Tia shared pt had signed on to [facility] in July of 2022 but had discharge from hospice services due to overall stability-she wasn;t sure of the date of disenrollment.  Discussed with Tia that GI had recommended palliative/hospice but PT/OT will eval patient for discharge needs.  Per Tia if pt were to discharge back to [facility], requested discharge orders be faxed to [phone] and any new medications be filled at [facility].  Await PT/OT eval.  Per Dr. Huang, pt declines hospice at this time.  Inpatient Care Coordinator will continue to follow for discharge planning.  PJ Álvarez RN, BSN, OCN   Inpatient Care Coordination [address]  Office: [phone]

## 2023-05-04 NOTE — PROGRESS NOTES
"   05/04/23 1133   Appointment Info   Signing Clinician's Name / Credentials (SLP) Lyric Ngo MS CCC-SLP   General Information   Onset of Illness/Injury or Date of Surgery 05/03/23   Referring Physician Dr. Huang   Patient/Family Therapy Goal Statement (SLP) Pt requested a z-pac for dry cough several times despite education on SLP scope.   Pertinent History of Current Problem \"Los Huang is a 67 year old male with h/o metastatic colorectral Ca, depression, PTSD and alcohol use disorder admitted on 5/3/2023 with hematochezia and admitted to ICU with hypotension and lactic acidosis\"   General Observations Pt alert and upright; agreable to evaluation   Type of Evaluation   Type of Evaluation Swallow Evaluation   Oral Motor   Oral Musculature generally intact   Vocal Quality/Secretion Management (Oral Motor)   Vocal Quality (Oral Motor) hoarse   Secretion Management (Oral Motor) WNL   Comment, Vocal Quality/Secretion Management (Oral Motor) pt reported slight discomfort from EGD, but no pain reported   General Swallowing Observations   Past History of Dysphagia No documented or reported hx   Respiratory Support (General Swallowing Observations) nasal cannula   Current Diet/Method of Nutritional Intake (General Swallowing Observations, NIS) clear liquid diet   Swallowing Evaluation Clinical swallow evaluation   Esophageal Phase of Swallow   Patient reports or presents with symptoms of esophageal dysphagia No   Swallowing Recommendations   Diet Consistency Recommendations regular diet;thin liquids (level 0)  (Paged MD and GI for clarification)   Supervision Level for Intake patient independent   Mode of Delivery Recommendations bolus size, small;slow rate of intake   Swallowing Maneuver Recommendations alternate food and liquid intake   Monitoring/Assistance Required (Eating/Swallowing) monitor for cough or change in vocal quality with intake   Recommended Feeding/Eating Techniques (Swallow Eval) maintain " upright sitting position for eating;maintain upright posture during/after eating for 30 minutes   Medication Administration Recommendations, Swallowing (SLP) per pt preference   Clinical Impression   Criteria for Skilled Therapeutic Interventions Met (SLP Eval) No problems identified which require skilled intervention   SLP Diagnosis No appreciable dysphagia   Risks & Benefits of therapy have been explained evaluation/treatment results reviewed;care plan/treatment goals reviewed;risks/benefits reviewed;current/potential barriers reviewed;participants voiced agreement with care plan;participants included;patient   Clinical Impression Comments Pt presents with a functional swallow on clinical swallow evaluation. Thin liquids, puree solids and regular solids trialed with no oral deficits, no overt s/sx of aspiration and no residue after. Pt denied any dysphagia symptoms at this time. Okay for a regular diet per SLP perspective. Paged GI and Hospitalist to clarify softer vs regular diet.   SLP Total Evaluation Time   Eval: oral/pharyngeal swallow function, clinical swallow Minutes (02406) 15   SLP Discharge Planning   SLP Plan DC   SLP Discharge Recommendation home   SLP Rationale for DC Rec SLP evaluation only   SLP Brief overview of current status  Regular diet and thin liquids with reflux strategies per SLP perspective   Total Session Time   Total Session Time (sum of timed and untimed services) 15

## 2023-05-04 NOTE — PLAN OF CARE
Goal Outcome Evaluation:      Plan of Care Reviewed With: patient    Overall Patient Progress: improvingOverall Patient Progress: improving         Summary:  Acute GI bleed  DATE & TIME: 5/4/2023   Cognitive Concerns/ Orientation : A&Ox4, forgetful, lacks insight.   BEHAVIOR & AGGRESSION TOOL COLOR: Green   ABNL VS/O2: VSS on RA  MOBILITY: Assist of 1 with GB/walker, able to turn ind in bed, boosts self with encouragement.   PAIN MANAGMENT: PRN oxycodone available.  DIET: Soft, needs encouragement. Room service with assist.   BOWEL/BLADDER: Continent. Using urinal. BSC available.   ABNL LAB: Hgb 7.7. WBC 14.3. Plt 112. INR 1.71.  DRAIN/DEVICES: Avila PIV. Port a cath not accessed at this time.   SKIN: Pale, ashen, jaundiced, dry. Mepilex on elbow and knee abrasions from PTA fall.   TESTS/PROCEDURES: None  D/C DAY/GOALS/PLACE: TCU pend progress.    OTHER IMPORTANT INFO: IV protonix BID, octreotide drip, rocephin, GI following.

## 2023-05-04 NOTE — PLAN OF CARE
A/O x 4 forgetful. Able to move all extremities and help with repositioning in bed. Offered to transferred to the recliner this AM and provided education on the benefits of early mobility but pt continue to refuse mobility interventions. Medicated for pain with oxycodone PO x 1. LS clear 2LNC. SR on monitor Using the urinal, no BM this shift. Report given to MS OLIVA

## 2023-05-04 NOTE — PROGRESS NOTES
MD Notification    Notified Person: Jacqueline Huang MD    Notification Date/Time: 5/4/23 1528     Purpose of Notification: Critical lab, Peripheral blood culture positive for staphylococcus epidermidis    Angela Guardado RN

## 2023-05-05 NOTE — PLAN OF CARE
DATE & TIME: 5/4/23 3081-4033    Cognitive Concerns/ Orientation : Pt A/Ox4, forgetful. Lethargic.   BEHAVIOR & AGGRESSION TOOL COLOR: Green   ABNL VS/O2: VSS on RA except tachy low 100s. Temp max overnight 100.8.  MOBILITY: Assist x1 with gait belt and walker, fall risk  PAIN MANAGMENT: Denies  DIET: Mechanical soft  BOWEL/BLADDER: Continent. Urinal at bedside.  ABNL LAB/BG: Albumin 3.0/Alkaline phos 397///Bilirubin 2.4/Lactic acid 2.2/WBC 18.1  DRAIN/DEVICES: IV SL  TELEMETRY RHYTHM: Sinus tach  SKIN: Pale, ashen, jaundiced. Mepilex to knees and elbow from fall prior to admission  D/C DATE: Discharge to TCU pending progress  OTHER IMPORTANT INFO: Lung sounds clear except MONIK diminished. Infrequent, dry cough, Tessalon given x1. Antibiotics changed to Zosyn. Pt complained of hand cramps overnight, Flexeril given x1.

## 2023-05-05 NOTE — PROVIDER NOTIFICATION
"MD Notification    Notified Person: MD    Notified Person Name: Sal     Notification Date/Time: 5/5/2023, 1240     Notification Interaction: [employer] messaging     Purpose of Notification: Hi, just wondering if you still want albumin given. per report, 2.1 L removed which appears to not meet the parameters that are currently ordered for the albumin infusion. Suggestions? Thanks!      Orders Received:  \"No that's fine\"     Comments:    "

## 2023-05-05 NOTE — PROGRESS NOTES
[facility]    Medicine Progress Note - Hospitalist Service       Date of Admission:  5/3/2023    Assessment & Plan   Los Huang is a 67 year old male with hx of alcoholic cirrhosis with known esophageal varices, metastatic colon cancer s/p partial colectomy and right hepatectomy previously on hospice, PTSD/schioaffective/bipolar disorder who was admitted on 5/3/2023 for hemorrhagic shock due to acute GI bleed. Hospital course complicated by sepsis of unclear source    Goals of care  Metastatic Colon Cancer   Suspected renal cancer  * Diagnosed with colorectal cancer in 2014 with oligometastatic disease to liver at the time of diagnosis. He was treated with ascending colectomy 6/14 and R hepatectomy in 8/14. He declined chemotherapy and enrolled in [facility], but eventually unenrolled from hospice due to overall stability and hospice visits felt intrusive  * CTA abd/pelvis this admission showed disease progression of colon cancer with lung mets, new tumor thrombus in the left portal vein, new/enlarging liver mets, new ascites, and stable bilateral renal masses concerning for solid renal neoplasms  * 5/4: goals of care initiated with patient and family. Cancer progression discussed. Explained that he currently has 2 terminal conditions: advanced colon cancer and cirrhosis. Patient does not want treatment for his cancer, but his current hospitalization is due to a complication of his cirrhosis, and he does want treatment for that. He was actually doing reasonably well, living independently prior to this hospitalization. His goal is to recover from his acute illness, work with PT, and eventually return to his apartment at [facility]. He will consider hospice down the road as he declines.   - DNR/DNI. Goals otherwise restorative. PT/OT ordered. Willing to consider TCU  - Has intermittent abdominal pain and non-productive cough. Pain largely controlled with oxycodone 5  mg prn. Tessalon pearls ordered prn for cough.     Hemorrhagic shock due to acute GI bleed, Improved  Colitis  Lactic acidosis, Improved  Alcoholic cirrhosis with esophageal varices, ascites, and hepatic encephalopathy  * Presented with confusion, falls, and 1 week history of intermittent rectal bleeding which patient felt was related to hemorrhoids. Hypotensive to 70s on arrival with initial lactate >8; improved with IV fluids. Initial Hgb 5.7. Ammonia level normal.  * In the ED, he was initiated on an octreotide infusion, IV PPI BID, and IV ceftriaxone for SBP ppx. Also transfused total 3 units pRBCs upon admission  * [facility] GI was consulted on admission. Underwent EGD which showed variceal banding scar that appeared healthy, as well as grade I and II esophageal varices. Colonoscopy deferred due to evidence of colitis on imaging.   * CTA abd/pelvis upon admission showed marked wall thickening and edema of the entire bowel with concern for shock bowel/ischemic bowel and disease progression of colon cancer; however patient has been relatively asymptomatic with improved lactate and WBC  * Octreotide d/c'd on 5/4.   - Now back to baseline mental status. Hgb 7.2 today, will follow  - Change IV PPI BID to PO today, 5/5  - Continues on PTA stool softeners for goal 3-4 loose stools per day  - On soft diet  - PT/OT consulted  - GI has now signed off     Sepsis due to aspiration pneumonia vs SBP  Positive blood culture, suspect contaminant  * Spiked to fever to 100.8 overnight 5/4-5/5 along with mild tachycardia. WBC 18.1k. Lactate 2.2. Reported increased shortness of breath. CXR showed known lung mets, bilateral infiltrates de to pulmonary edema vs pneumonia. He had been on ceftriaxone for SBP prophylaxis; antibiotics were broadened to IV pip-tazo with improvement. COVID-19 PCR negative. Unfortunately, blood cultures were not drawn.   * 1 out of 2 blood cultures on admission (5/3) grew Staph epi. Suspect contaminant  -  Tmax 99.1, WBC improved  - Diagnostic/thereapeutic paracentesis ordered  - Continues on IV pip-tazo   - He has been cleared by SLP for regular diet with thin liquids    Acute respiratory distress due to aspiration pneumonia vs pulmonary edema  * Reported increased shortness of breath overnight 5/4-5/5. Placed on supplemental O2 for comfort  - On IV pip-tazo as noted above  - Trial Lasix 40 mg IV x1     Thrombocytopenia  * Likely due to cirrhosis. Platelets wnl on admission though sample was likely hemoconcentrated  -  Platelets 95K today, will follow    Alcohol use disorder  * Longstanding history of alcohol dependence. Currently consuming 1 oz of gin 5 times daily. Last drink: 5/3  - He has had no s/sx of withdrawal  - Monitor on CIWA protocol with diazepam prn  - Thiamine/folate/MVI ordered    PTSD  Hx of schizoaffective disorder  Hx of bipolar disorder type I  * PTA regimen: clonazepam 0.5 mg daily, olanzapine 5 mg q6h prn, Invega 234 IM q28 days  - Clonazepam and olanzapine ordered prn this admission  - Invega on hold. Due on 5/5 though not formulary. Has 7-day window to resume. If patient remains hospitalized over the next few days, will need to resume on 5/12.       Diet: Room Service  Mechanical/Dental Soft Diet    DVT Prophylaxis: Pneumatic Compression Devices  Chew Catheter: Not present  Code Status: No CPR- Do NOT Intubate      Disposition: Expected discharge to TCU pending ongoing improvement in sepsis and stable Hgb, ?Mon-Tues    The patient's care was discussed with the Bedside Nurse, Patient and Patient's Family.    Jacqueline Huang MD  Hospitalist Service  [facility]  Contact information available via [facility] Paging/Directory    ______________________________________________________________________    Interval History   RRT activated overnight for evolving sepsis. Antibiotics broadened to IV pip-tazo with improvement. Reports feeling somewhat short of breath today and  feels generally unwell probably from lack of sleep, but offers no complaints. No withdrawal symptoms. Repeat COVID test negative. Willing to consider TCU.   - Trial of Lasix 40 mg IV x1  - Paracentesis ordered  - CIWA protocol and vitamins ordered  - PT/OT  - Spoke with patient's daughter over the phone    Data reviewed today: I reviewed all medications, new labs and imaging results over the last 24 hours. I personally reviewed the chest x-ray image(s) showing pulmonary edema, lung mets.    Physical Exam   Vital Signs: Temp: 98.9  F (37.2  C) Temp src: Oral BP: 109/60 Pulse: 100   Resp: 20 SpO2: 97 % O2 Device: Nasal cannula Oxygen Delivery: 2 LPM  Weight: 199 lbs 4.73 oz  Constitutional: Chronically ill appearing, NAD  HEENT: Sclera white, MMM  Respiratory: Breathing non-labored. Lungs CTAB - no wheezes, crackles, or rhonchi  Cardiovascular: Heart RRR, no m/r/g. No pedal edema  GI: +BS, abd distended/firm, NT  Skin/Integument: No rash  Musculoskeletal: +cachexia  Neuro: Alert and appropriate, CLEMENT  Psych: Calm and cooperative    Data   Recent Labs   Lab 05/05/23  1120 05/05/23  0922 05/04/23  2212 05/04/23  1615 05/04/23  1116 05/04/23  0816 05/04/23  0549 05/03/23  1619 05/03/23  1154   WBC 12.6*  --  18.1*  --   --   --  14.3*  --  17.9*   HGB 7.2*  --  8.1*  --  7.7*  --  7.6*   < > 5.7*   MCV 91  --  90  --   --   --  88  --  90   PLT  --   --  136*  --   --   --  112*  --  203   INR  --  1.68*  --   --   --   --  1.71*  --  2.26*   NA  --  138 136  --   --   --  137  --  136   POTASSIUM  --  4.4 4.3  --   --   --   --   --  4.6   CHLORIDE  --  106 106  --   --   --   --   --  106   CO2  --  17* 21*  --   --   --   --   --  12*   BUN  --  29.0* 30.9*  --   --   --   --   --  46.5*   CR  --  0.84 0.74  --   --   --   --   --  0.99   ANIONGAP  --  15 9  --   --   --   --   --  18*   GEOVANNA  --  7.8* 8.0*  --   --   --   --   --  7.9*   GLC  --  186* 126* 156*  --    < >  --    < > 179*   ALBUMIN  --  2.9* 3.0*  3.0*   --   --   --   --   --  2.6*   PROTTOTAL  --  5.3* 5.7*  5.7*  --   --   --   --   --  4.7*   BILITOTAL  --  2.7* 2.4*  2.4*  --   --   --   --   --  1.9*   ALKPHOS  --  413* 402*  397*  --   --   --   --   --  339*   ALT  --  133* 156*  154*  --   --   --   --   --  116*   AST  --  157* 205*  203*  --   --   --   --   --  193*    < > = values in this interval not displayed.         Recent Results (from the past 24 hour(s))   XR Chest Port 1 View    Narrative    EXAM: XR CHEST PORT 1 VIEW  LOCATION: [facility]  DATE/TIME: 5/4/2023 10:55 PM CDT    INDICATION: Coughing, leukocytosis  COMPARISON: 05/10/2021      Impression    IMPRESSION: Multiple bilateral lung nodules compatible with known metastatic disease. Pulmonary edema. Concomitant infiltrates cannot be excluded. Small right pleural effusion. Heart size normal. Left chest port catheter tip in SVC.   US Paracentesis without Albumin    Narrative    US PARACENTESIS WITHOUT ALBUMIN 5/5/2023 10:40 AM    CLINICAL HISTORY: sepsis    PROCEDURE: Informed consent obtained. Time out performed. The abdomen  was prepped and draped in a sterile fashion. 10 mL of 1% lidocaine was  infused into local soft tissues. A 5 [place] catheter system was  introduced into the abdominal ascites under ultrasound guidance.    2.1 liters of clear fluid were removed and sent to lab if requested.    Patient tolerated procedure well.    Ultrasound imaging was obtained and placed in the patient's permanent  medical record.      Impression    IMPRESSION:  1.  Status post ultrasound-guided paracentesis.    JACK LANDRY MD         SYSTEM ID:  [id number]       Medications     - MEDICATION INSTRUCTIONS -         albumin human  25-50 g Intravenous Once     diclofenac  2 g Topical BID     pantoprazole  40 mg Intravenous Q12H     piperacillin-tazobactam  4.5 g Intravenous Q6H     sodium chloride (PF)  3 mL Intracatheter Q8H

## 2023-05-05 NOTE — PROGRESS NOTES
[facility]  Gastroenterology Progress Note     Los Huang MRN# [id number]   YOB: 1955 Age: 67 year old          Assessment and Plan:   Los Huang is a 67 year old male with h/o metastatic colorectral Ca, depression, PTSD and alcohol use disorder admitted on 5/3/2023 with hematochezia and admitted to ICU with hypotension and lactic acidosis    Alcohol abuse  Hemorrhagic shock (H)  History of esophageal varices  Gastrointestinal hemorrhage, unspecified gastrointestinal hemorrhage type  Metastatic colorectal cancer  Hemoglobin of 5.7 on presentation- transfused 2 units PRBC- last draw 7.6  Hypotensive with SBP   Lactic acid 8.2->2.8  INR 1.71  5/3/23 EGd revaled no active bleeding and only grade I-II esophageal varices    CT of A/P revealed:  1.  Findings concerning for shock bowel with marked edema involving  the entire small bowel and residual colon (prior right hemicolectomy).  No evidence for active GI bleed.  2.  Disease progression of colon cancer with enlargement of visualized  lung metastases. New small right pleural effusion.  3.  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 Enlargement of subcapsular  metastatic deposits along the right lobe of the liver.  4.  Right hepatectomy. Cirrhosis and splenomegaly.  5.  New moderate ascites either due to portal hypertension and/or  peritoneal carcinomatosis.  6.  Stable bilateral renal masses concerning for solid renal  neoplasms.  7.  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    CT findings reveal significant disease process and any further evaluation with an EGD/colonoscopy to treat a GI bleed would not be recommended given concern for shocked bowel/ischemic bowel and risk of perforation.    -- recommend hospice/palliative consult/care  -- supportive cares- fluids, antibiotics and pain control  -- PPi and octreotide  -- GI will  sign off                Interval History:   Lying comfortable              Review of Systems:   C: NEGATIVE for fever, chills, change in weight  E/M: NEGATIVE for ear, mouth and throat problems  R: NEGATIVE for significant cough or SOB  CV: NEGATIVE for chest pain, palpitations or peripheral edema             Medications:   I have reviewed this patient's current medications    albumin human  25-50 g Intravenous Once     diclofenac  2 g Topical BID     pantoprazole  40 mg Intravenous Q12H     piperacillin-tazobactam  4.5 g Intravenous Q6H     sodium chloride (PF)  3 mL Intracatheter Q8H                  Physical Exam:   Vitals were reviewed  Vital Signs with Ranges  Temp:  [98  F (36.7  C)-100.8  F (38.2  C)] 98.9  F (37.2  C)  Pulse:  [] 100  Resp:  [14-32] 20  BP: (101-143)/(58-75) 109/60  SpO2:  [93 %-97 %] 97 %  I/O last 3 completed shifts:  In: 1240 [P.O.:560; I.V.:680]  Out: 750 [Urine:750]  Constitutional: alert, mild distress, cooperative and pale   Cardiovascular: negative, PMI normal. No lifts, heaves, or thrills. RRR. No murmurs, clicks gallops or rub  Respiratory: negative, Percussion normal. Good diaphragmatic excursion. Lungs clear  Abdomen: Abdomen soft, non-tender. BS normal. No masses, organomegaly, positive findings: distended, tenderness moderate generalized, hypoactive bowel sounds           Data:   I reviewed the patient's new clinical lab test results.   Recent Labs   Lab Test 05/04/23 2212 05/04/23  1116 05/04/23  0549 05/03/23  1755 05/03/23  1154 07/16/22  1136 07/16/22  0611   WBC 18.1*  --  14.3*  --  17.9*   < >  --    HGB 8.1* 7.7* 7.6*   < > 5.7*   < >  --    MCV 90  --  88  --  90   < >  --    *  --  112*  --  203   < >  --    INR  --   --  1.71*  --  2.26*  --  1.43*    < > = values in this interval not displayed.     Recent Labs   Lab Test 05/04/23 2212 05/03/23  1154 03/30/23  1109   POTASSIUM 4.3 4.6 3.5   CHLORIDE 106 106 106   CO2 21* 12* 22   BUN 30.9* 46.5* 13.3    ANIONGAP 9 18* 13     Recent Labs   Lab Test 05/04/23  2212 05/03/23 2002 05/03/23  1154 03/30/23  1109 07/16/22  1136 07/15/22  1305 12/16/20  0728 12/14/20  2133   ALBUMIN 3.0*  3.0*  --  2.6* 3.8   < >  --    < > 3.7   BILITOTAL 2.4*  2.4*  --  1.9* 1.7*   < >  --    < > 0.4   *  154*  --  116* 76*   < >  --    < > 80*   *  203*  --  193* 87*   < >  --    < > 40   PROTEIN  --  Negative  --   --   --  Negative  --  Negative   LIPASE  --   --   --   --   --   --   --  214    < > = values in this interval not displayed.       I reviewed the patient's new imaging results.    All laboratory data reviewed  All imaging studies reviewed by me.    Tammie Mcqueen PA-C,  5/4/2023  [facility]  Office : [phone]  Cell: [phone] (Dr. Pedroza)  Cell: [phone] (Tammie Mcqueen PA-C)

## 2023-05-05 NOTE — PLAN OF CARE
Goal Outcome Evaluation:    Summary:  Acute GI bleed  DATE & TIME: 5/4/2023 0289-4215  Cognitive Concerns/ Orientation : A&Ox4, forgetful, lacks insight.   BEHAVIOR & AGGRESSION TOOL COLOR: Green   ABNL VS/O2: Tachy, respirations elevated, other vitals stable on room air.  MOBILITY: Assist of 1 with GB/walker, able to turn ind in bed, boosts self with encouragement.   PAIN MANAGMENT: PRN oxycodone given x1  DIET: Soft, needs encouragement. Room service with assist.   BOWEL/BLADDER: Continent. Using urinal. BSC available.   ABNL LAB: Hgb 8.1. WBC 18.1,INR 1.71.  DRAIN/DEVICES: [place] PIV. Port a cath not accessed at this time.   SKIN: Pale, ashen, jaundiced, dry. Mepilex on elbow and knee abrasions from PTA fall.   TESTS/PROCEDURES: EKG, chest x-ray  D/C DAY/GOALS/PLACE: TCU pend progress.    OTHER IMPORTANT INFO: IV protonix BID, rocephin, GI following.

## 2023-05-05 NOTE — PLAN OF CARE
Goal Outcome Evaluation:      Plan of Care Reviewed With: patient    Summary: Sepsis unclear source. Has metastatic colon cancer with suspected renal cancer   DATE & TIME: 5/5/23, 1175-2960   Cognitive Concerns/ Orientation : Pt A/Ox3, (disorientated to situation) forgetful. Can make needs known. Hx. Of schizoaffective disorder and bipolar  BEHAVIOR & AGGRESSION TOOL COLOR: Green  CIWA= 0    ABNL VS/O2: VSS on RA except tachy low 100s. Low grade temp   MOBILITY: not OOB today due to weakness. Yesterday was x1 GB/W. PT to see patient today and re-assess.   PAIN MANAGMENT: Denies. Reporting mild cramping to L. Hand-flexeril available   DIET: Mechanical soft, fair appetite  BOWEL/BLADDER: Continent. Urinal at bedside. Passing flatus. No BM today, no signs of bleeding.   ABNL LAB/BG: Covid (-), hgb=7.2, calcium= 7.8, albumin= 2.9, alkaline phos=413, ALT= 133, NZY=808, bilirubin=2.7, INR= 1.68, lactic fired- 1.8 (WDL)   DRAIN/DEVICES: PIV/SL  TELEMETRY RHYTHM: Sinus tach  SKIN: Pale, ashen, jaundiced. Mepilex to knees and elbow from fall prior to admission. Pitting edema LE-PCDs applied/elevation  TESTS/PROCEDURES: Paracentesis completed today, 2.1 L removed  D/C DATE: Discharge to TCU pending progress  OTHER IMPORTANT INFO: Lung sounds clear but diminished. Infrequent, dry cough. Antibiotics changed to Zosyn. IV lasix given x1.

## 2023-05-05 NOTE — PROGRESS NOTES
"   05/05/23 1400   Appointment Info   Signing Clinician's Name / Credentials (PT) Brook Bautista DPT   Living Environment   People in Home alone   Current Living Arrangements assisted living   Home Accessibility other (see comments)  (pt reports elevatora)   Self-Care   Usual Activity Tolerance moderate   Current Activity Tolerance fair   Equipment Currently Used at Home walker, standard;grab bar, toilet;grab bar, tub/shower   Fall history within last six months yes   Number of times patient has fallen within last six months 1   Activity/Exercise/Self-Care Comment Pt questionable historian. Per chart, \"pt had been up to a few days before he went into the hospital, he was completely independent with all his cares and ADLs. Tia shared patient had become more weak over past few days.  Per Tia, pt had medication management and meals delivered to his room\"   General Information   Onset of Illness/Injury or Date of Surgery 05/03/23   Referring Physician Jacqueline Huang MD   Pertinent History of Current Problem (include personal factors and/or comorbidities that impact the POC) \"Los Huang is a 67 year old male with hx of alcoholic cirrhosis with known esophageal varices, metastatic colon cancer s/p partial colectomy and right hepatectomy previously on hospice, PTSD/schioaffective/bipolar disorder who was admitted on 5/3/2023 for hemorrhagic shock due to acute GI bleed. Hospital course complicated by sepsis of unclear source\"   Existing Precautions/Restrictions fall;oxygen therapy device and L/min  (2 L via NC)   Cognition   Affect/Mental Status (Cognition) low arousal/lethargic   Orientation Status (Cognition) oriented x 3   Follows Commands (Cognition) WFL   Pain Assessment   Patient Currently in Pain Yes, see Vital Sign flowsheet  (does not rate pain on VAS)   Integumentary/Edema   Integumentary/Edema Comments defer to nursing for full skin assessment   Posture    Posture Forward head " position;Protracted shoulders   Range of Motion (ROM)   Range of Motion ROM is WFL   Strength (Manual Muscle Testing)   Strength (Manual Muscle Testing) Able to perform R SLR;Able to perform L SLR;Deficits observed during functional mobility   Strength Comments decreased functional strength and endurance   Bed Mobility   Bed Mobility supine-sit   Comment, (Bed Mobility) Serena   Transfers   Transfers sit-stand transfer   Comment, (Transfers) CGA at FWW   Gait/Stairs (Locomotion)   Comment, (Gait/Stairs) Amb 5 ft with FWW, CGA. Slow pace, fatigues quickly   Balance   Balance Comments SBA for sitting balance EOB, requires FWW for safe upright mobility   Sensory Examination   Sensory Perception WFL   Sensory Perception Comments BLE light touch intact   Coordination   Coordination no deficits were identified   Muscle Tone   Muscle Tone no deficits were identified   Clinical Impression   Criteria for Skilled Therapeutic Intervention Yes, treatment indicated   PT Diagnosis (PT) impaired IND with functional mobility   Influenced by the following impairments impaired activity tolerance, functional strength, balance   Functional limitations due to impairments impaired bed mobility, tx, ambulation   Clinical Presentation (PT Evaluation Complexity) Stable/Uncomplicated   Clinical Presentation Rationale Based on current presentation, PMH, social support   Clinical Decision Making (Complexity) low complexity   Planned Therapy Interventions (PT) balance training;bed mobility training;gait training;home exercise program;patient/family education;strengthening;transfer training;progressive activity/exercise;home program guidelines   Risk & Benefits of therapy have been explained evaluation/treatment results reviewed;care plan/treatment goals reviewed;risks/benefits reviewed;current/potential barriers reviewed;participants voiced agreement with care plan;patient;participants included   PT Total Evaluation Time   PT Eval, Low Complexity  "Minutes (20676) 10   Physical Therapy Goals   PT Frequency 5x/week   PT Predicted Duration/Target Date for Goal Attainment 05/12/23   PT Goals Bed Mobility;Transfers;Gait;Aerobic Activity   PT: Bed Mobility Supervision/stand-by assist;Supine to/from sit   PT: Transfers Supervision/stand-by assist;Sit to/from stand;Assistive device   PT: Gait Supervision/stand-by assist;Assistive device;Rolling walker;Greater than 200 feet   PT: Perform aerobic activity with stable cardiovascular response 10 minutes;intermittent activity   Interventions   Interventions Quick Adds Therapeutic Activity;Gait Training   Therapeutic Activity   Therapeutic Activities: dynamic activities to improve functional performance Minutes (72849) 15   Symptoms Noted During/After Treatment Fatigue;Shortness of breath   Treatment Detail/Skilled Intervention Pt requires increased encouragement to participate in eval and session. Cued for sup>sit, Serena to complete. STS x4 throughout session, CGA from elevated EOB. Pt cued for safe hand placement. Pt needing to use urinal, able to manage urinal with CGA for balance. Pt completes standing march for pre-gait with CGA. See eval above for gait distance. Pt fatigued following, reports feels \"like he is dying\". O2 sats 98% on 2L, . Declines further mobility and wishing to rest. Sit>sup with modA. Able to scoot up in bed with SBA and cues for bridging. Pt edu on benefits of OOB, encouraged to get up to chair with nursing staff. Remained supine with alarm armed, needs in reach, RN updated.   PT Discharge Planning   PT Plan progress gait, LE strength, promote OOB   PT Discharge Recommendation (DC Rec) Transitional Care Facility   PT Rationale for DC Rec Pt below baseline for mobility, requiring Ax1 with FWW. Limited by decreased activity tolerance, balance deficits, functional weakness. Will benefit from further skilled PT at TCU to maximize safety and IND with mobility prior to returning to [facility].   PT Brief " overview of current status Ax1 FWW   Total Session Time   Timed Code Treatment Minutes 15   Total Session Time (sum of timed and untimed services) 25

## 2023-05-05 NOTE — PLAN OF CARE
Goal Outcome Evaluation:      Plan of Care Reviewed With: patient    Summary: Sepsis unclear source. Has metastatic colon cancer with suspected renal cancer   DATE & TIME: 5/5/23, 5380-0083   Cognitive Concerns/ Orientation : Pt A/Ox3, (disorientated to situation) forgetful. Can make needs known. Hx. Of schizoaffective disorder and bipolar  BEHAVIOR & AGGRESSION TOOL COLOR: Green  CIWA= 0    ABNL VS/O2: VSS on 2L except tachy low 100s. Low grade temp   MOBILITY: not OOB today due to weakness. Yesterday was x1 GB/W. PT to see patient today and re-assess.   PAIN MANAGMENT: Denies. Reporting mild cramping to L. Hand-flexeril available   DIET: Mechanical soft, fair appetite  BOWEL/BLADDER: Continent. Urinal at bedside. Passing flatus. No BM today, no signs of bleeding.   ABNL LAB/BG: Covid (-), hgb=7.2, calcium= 7.8, albumin= 2.9, alkaline phos=413, ALT= 133, JPK=607, bilirubin=2.7, INR= 1.68, lactic fired- 1.8 (WDL)   DRAIN/DEVICES: PIV/SL  TELEMETRY RHYTHM: Sinus tach  SKIN: Pale, ashen, jaundiced. Mepilex to knees and elbow from fall prior to admission. Pitting edema LE-PCDs applied/elevation  TESTS/PROCEDURES: Paracentesis completed today, 2.1 L removed  D/C DATE: Discharge to TCU pending progress  OTHER IMPORTANT INFO: Lung sounds clear but diminished. Infrequent, dry cough. Antibiotics changed to Zosyn. IV lasix given x1.

## 2023-05-05 NOTE — PROGRESS NOTES
"SPIRITUAL HEALTH SERVICES Progress Note    [facility] 64    Saw pt Los (\"Solo\") DAVIDA Huang per unit referral.      Patient/Family Understanding of Illness and Goals of Care - Sool engaged in conversation today, but sometimes would veer off topic and towards unrelated concerns. When asked about what brought him to [facility], he noted that the clinical team is \"treating me for alcohol withdraw.\" He stated that in the past he regularly \"drank gin.\" He reflected on how he felt fooled because he believed it would be fine as \"sailors have been doing it for centuries.\" I offered support at the bedside today and shared in a moment of reflective conversation with Solo.        Distress and Loss - Solo noted some distress over the overall cause of his admission. He named multiple outward concerns and fears unrelated to this admission.        Strengths, Coping, and Resources - Solo did not name a large support network, but did note that his son has regularly been supporting him during this admission.        Meaning, Beliefs, and Spirituality - None were named during this visit. I oriented Solo to  services while admitted and that he could reach out to us through the bedside RN.       Plan of Care - I continue to follow Solo while admitted. Please consult if any additional needs arise.     ------  Dago Nieves M.Div.  Resident   Pager: [phone]   "

## 2023-05-05 NOTE — PLAN OF CARE
OT: Order received, chart reviewed and discussed with care team. Per discussion with PT who saw patient, patient's main deficits are with mobility at this time. Anticipate pt will need TCU following admission and may not tolerate two different therapy disciplines. Defer discharge recommendations to PT and next level of care. Will complete OT orders. Please reorder if additional needs arise.

## 2023-05-05 NOTE — PROGRESS NOTES
"House [facility] brief note:    Sinus tachycardia suspect multifactorial 2/2 tachypnea, shock bowel, deconditioned state/critical illness, anemia.  Tachypnea suspect multifactorial 2/2 multiple lung nodules in setting of metastatic disease, pulmonary edema, small R pleural effusion.  Worsening leukocytosis possibly 2/2 shock bowel vs CAP vs malignancy.  Worsening transaminases suspect 2/2 worsening hepatic metastatic disease, cirrhosis.    Contacted by nursing at 2127 via [employer] [facility]ley requesting bedside evaluation in setting of acute onset of tachcyardia, tachypnea, distended/firm abdomen.  Upon arrival, pt lying in bed, awake, alert, in no overt distress, nontoxic appearing.  Pt reports generalized pain \"from my toes to the top of my head\" but reports no severe abdominal pain.  Pt's abdomen round, distended, nontender to palpation, active bowel sounds.  Pt mildly tachypneic but able to converse in full sentences, lung sounds clear throughout, no obvious crackles or wheezes noted; however, pt with frequent, dry cough.  Pt's VS noting HR 100s-120s, SBP 130s, RR 20s, O2 sats > 92% on RA.  Pt talking about Taye Garcia and that he enjoyed playing guitar but developed a tremor in his 4th digit on his R hand limiting his ability to play guitar.        Interventions:  - Stat lactic acid, CBC, CMP, VBG  - Stat EKG  - Stat CXR in setting of frequent cough, leukocytosis  - Had considered stat CT A/P W contrast in setting of CT from 5/3 noted concern for shock bowel and pt now tachypneic tachycardic; however, reports no overt abdominal pain, lactic acid minimally elevated and remains hemodynamically stable at this time  - Pt notes he would not want abdominal surgery if deemed necessary (I.e. worsening ischemia of bowel)  - In setting of worsening leukocytosis and CXR notes possible infiltrate, after discussion with Dr. Nichole, cross covering hospitalist, will broaden pt's antibiotic therapy to zosyn and discontinue ceftriaxone for " SBP prophylaxis    Recent Labs   Lab 05/04/23 2212 05/03/23  1500 05/03/23  1157   PHV 7.39  --   --    PO2V 29  --   --    PCO2V 38*  --   --    HCO3V 23 18* 12*     Recent Labs   Lab 05/04/23 2212 05/04/23 1116 05/04/23  0549 05/03/23  1755 05/03/23  1154   WBC 18.1*  --  14.3*  --  17.9*   HGB 8.1* 7.7* 7.6*   < > 5.7*   HCT 24.7*  --  22.3*  --  17.7*   MCV 90  --  88  --  90   *  --  112*  --  203    < > = values in this interval not displayed.     Recent Labs   Lab 05/04/23 2212 05/04/23 1615 05/04/23  0816 05/04/23 0549 05/03/23  1619 05/03/23  1154     --   --  137  --  136   POTASSIUM 4.3  --   --   --   --  4.6   CHLORIDE 106  --   --   --   --  106   CO2 21*  --   --   --   --  12*   ANIONGAP 9  --   --   --   --  18*   * 156* 126*  --    < > 179*   BUN 30.9*  --   --   --   --  46.5*   CR 0.74  --   --   --   --  0.99   GFRESTIMATED >90  --   --   --   --  82   GEOVANNA 8.0*  --   --   --   --  7.9*    < > = values in this interval not displayed.     Recent Labs   Lab 05/04/23 2212 05/04/23 1116 05/03/23  1500   LACT 2.2* 1.5 2.8*     Recent Labs   Lab 05/04/23 2212 05/03/23 1755 05/03/23  1223 05/03/23  1154   *  203*  --   --  193*   *  154*  --   --  116*   ALKPHOS 402*  397*  --   --  339*   BILITOTAL 2.4*  2.4*  --   --  1.9*   [place]  --  22 43  --      IMAGING: (X-ray/CT/MRI)   Recent Results (from the past 24 hour(s))   XR Chest Port 1 View    Narrative    EXAM: XR CHEST PORT 1 VIEW  LOCATION: [facility]  DATE/TIME: 5/4/2023 10:55 PM CDT    INDICATION: Coughing, leukocytosis  COMPARISON: 05/10/2021      Impression    IMPRESSION: Multiple bilateral lung nodules compatible with known metastatic disease. Pulmonary edema. Concomitant infiltrates cannot be excluded. Small right pleural effusion. Heart size normal. Left chest port catheter tip in SVC.     JAM Cruz, CNP  Hospitalist-House [facility]  Hospitalist Service  Securely  message with [facility] (more info)  Text page via [facility] Paging/Directory     I spent 30 min at pt's bedside and on unit managing and coordinating pt's overall plan of care.

## 2023-05-05 NOTE — PROVIDER NOTIFICATION
MD Notification    Notified Person: MD    Notified Person Name: Ignacio Shore SUZANNE    Notification Date/Time: 5/4/23 0999    Notification Interaction: [employer] messaging    Purpose of Notification: Lactic acid 2.2    Orders Received: Antibiotics adjusted    Comments:

## 2023-05-06 NOTE — CONSULTS
CLINICAL NUTRITION SERVICES  -  ASSESSMENT NOTE      Recommendations Ordered by Registered Dietitian (RD): Magic Cup daily at 2pm    Malnutrition: % Weight Loss:  Weight loss does not meet criteria for malnutrition (weight up but likely d/t fluids)  % Intake:  </= 75% for >/= 1 month (severe malnutrition)  Subcutaneous Fat Loss:  Orbital region severe depletion, Upper arm region severe depletion and Thoracic region severe depletion  Muscle Loss:  Temporal region severe depletion, Clavicle bone region severe depletion, Acromion bone region severe depletion, Dorsal hand region severe depletion, Patellar region severe depletion, Anterior thigh region severe depletion and Posterior calf region severe depletion  Fluid Retention:  Mild as above     Malnutrition Diagnosis: Severe malnutrition  In Context of:  Acute illness or injury  Chronic illness or disease  Environmental or social circumstance        REASON FOR ASSESSMENT  Los Huang is a 67 year old male seen by Registered Dietitian for Provider Order - Malnutrition       NUTRITION HISTORY  - Information obtained from patient although somewhat abbreviated (slow to respond).  He notes that has been having fair intake at home and will drink Boost on occasion (dislikes Ensure).    - Patient admitted with hemorrhagic shock/GI hemorrhage, metastatic colon CA, suspected renal CA, shock bowel, and ETOH abuse.       CURRENT NUTRITION ORDERS  Diet Order:     Mechanical soft      Current Intake/Tolerance:  Patient states that he ate most of his breakfast --> Eggs, muffin, OJ, coffee, Cream of Wheat, and oranges (but notes he didn't like the oranges).      Flowsheets indicate that intake has been 50-75% of meals (ordering good sized meals).      NUTRITION FOCUSED PHYSICAL ASSESSMENT FOR DIAGNOSING MALNUTRITION)  Yes              Observed:    Muscle wasting (refer to documentation in Malnutrition section) and Subcutaneous fat loss (refer to documentation in Malnutrition  "section)    Obtained from Chart/Interdisciplinary Team:  Edema 1-2+ in extremities  Abdominal ascites     ANTHROPOMETRICS  Height: 6'2\"  Weight: 88.9 kg (196#)(5/3)  Body mass index is 25.2 kg/m   Weight Status:  Overweight BMI 25-29.9  IBW: 86.4 kg   % IBW: 103%  Weight History:   Wt Readings from Last 10 Encounters:   05/04/23 90.4 kg (199 lb 4.7 oz)   07/18/22 84.6 kg (186 lb 9.6 oz)   01/29/21 86.5 kg (190 lb 12.8 oz)   01/10/21 85 kg (187 lb 8 oz)   08/05/20 89.4 kg (197 lb)   07/30/20 89.4 kg (197 lb)   06/29/20 92.3 kg (203 lb 6.4 oz)   03/09/20 93 kg (205 lb)   01/16/20 94.9 kg (209 lb 3.2 oz)   11/14/19 93 kg (205 lb)     Weight up but suspecting this is fluid related (abdominal ascites)    LABS  Labs reviewed    MEDICATIONS  MVI/Thiamine/Folic Acid per ETOH Protocol       ASSESSED NUTRITION NEEDS PER APPROVED PRACTICE GUIDELINES:    Dosing Weight 89 kg   Estimated Energy Needs: 8749-8485 kcals (25-30 Kcal/Kg)  Justification: maintenance  Estimated Protein Needs: 105-135 grams protein (1.2-1.5 g pro/Kg)  Justification: hypercatabolism with acute illness  Estimated Fluid Needs: 3907-7821 mL (1 mL/Kcal)  Justification: maintenance    MALNUTRITION:  % Weight Loss:  Weight loss does not meet criteria for malnutrition (weight up but likely d/t fluids)  % Intake:  </= 75% for >/= 1 month (severe malnutrition)  Subcutaneous Fat Loss:  Orbital region severe depletion, Upper arm region severe depletion and Thoracic region severe depletion  Muscle Loss:  Temporal region severe depletion, Clavicle bone region severe depletion, Acromion bone region severe depletion, Dorsal hand region severe depletion, Patellar region severe depletion, Anterior thigh region severe depletion and Posterior calf region severe depletion  Fluid Retention:  Mild as above     Malnutrition Diagnosis: Severe malnutrition  In Context of:  Acute illness or injury  Chronic illness or disease  Environmental or social circumstances    NUTRITION " DIAGNOSIS:  Malnutrition related to severe fat and muscle wasting in patient with ETOH abuse and cancer as evidenced by coding for severe malnutrition       NUTRITION INTERVENTIONS  Recommendations / Nutrition Prescription  Mechanical soft diet as tolerated  Magic Cup daily at 2pm     Implementation  Nutrition education: Not appropriate at this time due to patient condition  Medical Food Supplement:  Ordered as above     Nutrition Goals  Patient will consistently consume at least 75% meals and supplement     MONITORING AND EVALUATION:  Progress towards goals will be monitored and evaluated per protocol and Practice Guidelines    Deneen Spencer RD, LD, CNSC   Clinical Dietitian - [facility]

## 2023-05-06 NOTE — PLAN OF CARE
Goal Outcome Evaluation:      Plan of Care Reviewed With: patient          Outcome Evaluation: Sending Magic Cup daily in afternoon.  See RD assessment dated 5/6/23 for details.    Deneen Spencer RD, LD, CNSC   Clinical Dietitian - [facility]

## 2023-05-06 NOTE — PROGRESS NOTES
[facility]    Medicine Progress Note - Hospitalist Service       Date of Admission:  5/3/2023    Assessment & Plan   Los Huang is a 67 year old male with hx of alcoholic cirrhosis with known esophageal varices, metastatic colon cancer s/p partial colectomy and right hepatectomy previously on hospice, PTSD/schioaffective/bipolar disorder who was admitted on 5/3/2023 for hemorrhagic shock due to acute GI bleed. Hospital course complicated by sepsis of unclear source    Goals of care  Metastatic Colon Cancer   Suspected renal cancer  * Diagnosed with colorectal cancer in 2014 with oligometastatic disease to liver at the time of diagnosis. He was treated with ascending colectomy 6/14 and R hepatectomy in 8/14. He declined chemotherapy and enrolled in [facility], but eventually unenrolled from hospice due to overall stability and hospice visits felt intrusive  * CTA abd/pelvis this admission showed disease progression of colon cancer with lung mets, new tumor thrombus in the left portal vein, new/enlarging liver mets, new ascites, and stable bilateral renal masses concerning for solid renal neoplasms  * 5/4: goals of care initiated with patient and family. Cancer progression discussed. Explained that he currently has 2 terminal conditions: advanced colon cancer and cirrhosis. Patient does not want treatment for his cancer, but his current hospitalization is due to a complication of his cirrhosis, and he does want treatment for that. He was actually doing reasonably well, living independently prior to this hospitalization. His goal is to recover from his acute illness, work with PT, and eventually return to his apartment at [facility]. He will consider hospice down the road as he declines.   - DNR/DNI. Goals otherwise restorative.   - PT recommending TCU. Pt agreeable.  - Has intermittent abdominal pain and non-productive cough. Pain largely controlled with oxycodone 5  mg prn. Tessalon pearls ordered prn for cough.     Hemorrhagic shock due to acute GI bleed, Improved  Lactic acidosis, Resolved  Colitis likely due to colon cancer  Alcoholic cirrhosis with esophageal varices, ascites, and hepatic encephalopathy  * Presented with confusion, falls, and 1 week history of intermittent rectal bleeding which patient felt was related to hemorrhoids. Hypotensive to 70s on arrival with initial lactate >8; improved with IV fluids. Initial Hgb 5.7. Ammonia level normal.  * In the ED, he was initiated on an octreotide infusion, IV PPI BID, and IV ceftriaxone for SBP ppx. Also transfused total 3 units pRBCs upon admission  * [facility] on [facility]. Underwent EGD which showed variceal banding scar that appeared healthy, as well as grade I and II esophageal varices. Colonoscopy deferred due to evidence of colitis on imaging.   * CTA abd/pelvis upon admission showed marked wall thickening and edema of the entire bowel with concern for shock bowel/ischemic bowel and disease progression of colon cancer; however patient has been relatively asymptomatic with improved lactate and WBC  * Octreotide d/c'd on 5/4.   - Now back to baseline mental status. Hgb stable low-7 range  - Continues on pantoprazole 40 mg BID  - Continues on PTA stool softeners   - On soft diet  - GI has now signed off     Sepsis due to suspected aspiration pneumonia   Positive blood culture, suspect contaminant  * Spiked to fever to 100.8 overnight 5/4-5/5 along with mild tachycardia. WBC 18.1k. Lactate 2.2. Reported increased shortness of breath. CXR showed known lung mets, bilateral infiltrates due to pulmonary edema vs pneumonia. He had been on ceftriaxone for SBP prophylaxis; antibiotics were broadened to IV pip-tazo with improvement. COVID-19 PCR negative. Unfortunately, blood cultures were not drawn.   * Underwent paracentesis (5/5) with removal of 2L fluid. 120 PMNs, no evidence of SBP  * 1 out of 2 blood  cultures on admission (5/3) grew Staph epi. Suspect contaminant  - Has since been afebrile, WBC trending down  - Continues on IV pip-tazo for now  - He has been cleared by SLP for regular diet with thin liquids    Acute respiratory distress due to aspiration pneumonia vs pulmonary edema  * Reported increased shortness of breath overnight 5/4-5/5. CXR showed pulmonary edema vs pneumonia. spO2 mid 90s on room air, but was placed on supplemental O2 for comfort.Treated with IV pip-tazo and trial of IV Lasix  - Repeat CXR ordered  - On IV pip-tazo as noted above     Thrombocytopenia  * Likely due to cirrhosis. Platelets wnl on admission though sample was likely hemoconcentrated  -  Platelets stable, 90K range    Alcohol use disorder  * Longstanding history of alcohol dependence. Currently consuming 1 oz of gin 5 times daily. Last drink: 5/3  - He has had no s/sx of withdrawal   - Monitor on CIWA protocol for today  - On thiamine/folate/MVI while hospitalized    PTSD  Hx of schizoaffective disorder  Hx of bipolar disorder type I  * PTA regimen: clonazepam 0.5 mg daily, olanzapine 5 mg q6h prn, Invega 234 IM q28 days  - Resume PTA Invega today, 5/6  - Clonazepam and olanzapine ordered prn this admission    Cachexia  Severe malnutrition in context of acute on chronic illness  - Daily magic cup ordered per Nutrition     Diet: Room Service  Mechanical/Dental Soft Diet    DVT Prophylaxis: Pneumatic Compression Devices  Chew Catheter: Not present  Code Status: No CPR- Do NOT Intubate      Disposition: Expected discharge to TCU pending ongoing improvement in sepsis and stable Hgb, ?Mon-Tues     The patient's care was discussed with the Bedside Nurse, Patient and Patient's Family.    Jacqueline Huang MD  Hospitalist Service  [facility]  Contact information available via [facility] Paging/Directory    ______________________________________________________________________    Interval History   * No acute  events overnight. Pt was due for Invega yesterday, but held per discussion with PharmD. Mood somewhat labile today. Mental status somewhat delayed. Invega resumed  * Still feels somewhat short of breath. On O2 mainly for comfort. Repeat CXR ordered. TTE discussed, but patient declined. Hgb stable. Will monitor for now. Will consider transfusion tomorrow if he continues to feel short of breath  * Plan of care, goals of care reviewed with patient and family at bedside. Plan to discharge to TCU early next week if hemoglobin remains stable and sepsis continues to improve    Medical Decision Making   55 MINUTES SPENT BY ME on the date of service doing chart review, history, exam, documentation & further activities per the note.      Data reviewed today: I reviewed all medications, new labs and imaging results over the last 24 hours. I personally reviewed no images or EKG's today.    Physical Exam   Vital Signs: Temp: 98.3  F (36.8  C) Temp src: Oral BP: 113/62 Pulse: 100   Resp: 18 SpO2: 97 % O2 Device: Nasal cannula Oxygen Delivery: 2 LPM  Weight: 199 lbs 4.73 oz  Constitutional: Chronically ill appearing, NAD  HEENT: Sclera white, MMM  Respiratory: Breathing non-labored. Diminished breath sounds at bilateral bases  Cardiovascular: Heart RRR, no m/r/g. No pedal edema  GI: +BS, abd distended, NT  Skin/Integument: +jaundice  Musculoskeletal: +cachexia  Neuro: Alert and appropriate, somewhat delayed. CLEMENT  Psych: Calm and cooperative, mood labile    Data   Recent Labs   Lab 05/05/23  1120 05/05/23  0922 05/04/23  2212 05/04/23  1615 05/04/23  1116 05/04/23  0816 05/04/23  0549 05/03/23  1619 05/03/23  1154   WBC 12.6*  --  18.1*  --   --   --  14.3*  --  17.9*   HGB 7.2*  --  8.1*  --  7.7*  --  7.6*   < > 5.7*   MCV 91  --  90  --   --   --  88  --  90   PLT 95*  --  136*  --   --   --  112*  --  203   INR  --  1.68*  --   --   --   --  1.71*  --  2.26*   NA  --  138 136  --   --   --  137  --  136   POTASSIUM  --  4.4 4.3   --   --   --   --   --  4.6   CHLORIDE  --  106 106  --   --   --   --   --  106   CO2  --  17* 21*  --   --   --   --   --  12*   BUN  --  29.0* 30.9*  --   --   --   --   --  46.5*   CR  --  0.84 0.74  --   --   --   --   --  0.99   ANIONGAP  --  15 9  --   --   --   --   --  18*   GEOVANNA  --  7.8* 8.0*  --   --   --   --   --  7.9*   GLC  --  186* 126* 156*  --    < >  --    < > 179*   ALBUMIN  --  2.9* 3.0*  3.0*  --   --   --   --   --  2.6*   PROTTOTAL  --  5.3* 5.7*  5.7*  --   --   --   --   --  4.7*   BILITOTAL  --  2.7* 2.4*  2.4*  --   --   --   --   --  1.9*   ALKPHOS  --  413* 402*  397*  --   --   --   --   --  339*   ALT  --  133* 156*  154*  --   --   --   --   --  116*   AST  --  157* 205*  203*  --   --   --   --   --  193*    < > = values in this interval not displayed.         Recent Results (from the past 24 hour(s))   US Paracentesis without Albumin    Narrative    US PARACENTESIS WITHOUT ALBUMIN 5/5/2023 10:40 AM    CLINICAL HISTORY: sepsis    PROCEDURE: Informed consent obtained. Time out performed. The abdomen  was prepped and draped in a sterile fashion. 10 mL of 1% lidocaine was  infused into local soft tissues. A 5 Hebrew catheter system was  introduced into the abdominal ascites under ultrasound guidance.    2.1 liters of clear fluid were removed and sent to lab if requested.    Patient tolerated procedure well.    Ultrasound imaging was obtained and placed in the patient's permanent  medical record.      Impression    IMPRESSION:  1.  Status post ultrasound-guided paracentesis.    JACK LANDRY MD         SYSTEM ID:  [id number]       Medications     - MEDICATION INSTRUCTIONS -         diclofenac  2 g Topical BID     folic acid  1 mg Oral Daily     multivitamin w/minerals  1 tablet Oral Daily     pantoprazole  40 mg Oral BID AC     piperacillin-tazobactam  4.5 g Intravenous Q6H     sodium chloride (PF)  3 mL Intracatheter Q8H     thiamine  100 mg Oral Daily

## 2023-05-06 NOTE — PLAN OF CARE
Goal Outcome Evaluation:  Summary: Sepsis unclear source. Has metastatic colon cancer with suspected renal cancer   DATE & TIME: 5/5/23, 6847-8301   Cognitive Concerns/ Orientation : Pt A/Ox3, (disorientated to situation) forgetful. Can make needs known. Hx. Of schizoaffective disorder and bipolar  BEHAVIOR & AGGRESSION TOOL COLOR: Green  CIWA= 0    ABNL VS/O2: VSS on 2L except tachy low 100s. Low grade temp   MOBILITY: not OOB today due to weakness. Yesterday was x1 GB/W.   PAIN MANAGMENT: Denies. Reporting mild cramping to L. Hand-flexeril available   DIET: Mechanical soft, fair appetite  BOWEL/BLADDER: Continent. Urinal at bedside. Passing flatus. No BM today, no signs of bleeding.   ABNL LAB/BG: Covid (-), hgb=7.2, calcium= 7.8, albumin= 2.9, alkaline phos=413, ALT= 133, SDB=826, bilirubin=2.7, INR= 1.68, lactic fired- 1.8 (WDL)   DRAIN/DEVICES: PIV/SL  TELEMETRY RHYTHM: Sinus tach  SKIN: Pale, ashen, jaundiced. Mepilex to knees and elbow from fall prior to admission. Pitting edema LE-PCDs applied/elevation  TESTS/PROCEDURES: Paracentesis completed today, 2.1 L removed  D/C DATE: Discharge to TCU pending progress  OTHER IMPORTANT INFO: Lung sounds clear but diminished. Infrequent, dry cough. Antibiotics changed to Zosyn. IV lasix given x1.

## 2023-05-06 NOTE — PROVIDER NOTIFICATION
MD Notification    Notified Person: MD    Notified Person Name: Sal     Notification Date/Time: 5/6/2023, 1510     Notification Interaction: vocera messages     Purpose of Notification: Hi, patient has not had urine output since 0500 this morning. Bladder scan volume was 339. No problems with this yesterday. Suggestions?     Orders Received:  It could be because he's not getting up. I'm going to order a small fluid bolus. If he still cant pee, we may have to put in a Chew       Comments:

## 2023-05-06 NOTE — PLAN OF CARE
Goal Outcome Evaluation:      Plan of Care Reviewed With: patient    Summary: Sepsis unclear source. hemorrhagic shock due to acute GI bleed, Has metastatic colon cancer with suspected renal cancer   DATE & TIME: 5/6/23,9814-7033  Cognitive Concerns/ Orientation : Pt A/Ox3-4, forgetful. More irritable/paranoid today, improved this afternoon-MD updated. Hx. Of schizoaffective disorder and bipolar  BEHAVIOR & AGGRESSION TOOL COLOR: Green  CIWA: 0 ,0   ABNL VS/O2: VSS on 2L (for comfort/SOB) except HR tachy in low 100's.   MOBILITY: not OOB on this shift, T/R encouraged while in bed-refuses frequently, otherwise up with 1 GBW per previous shift. Encouraged patient to get into chair today, but refused. PT/OT consulted  PAIN MANAGMENT: Denies.   DIET: Mechanical soft, tolerating. Nutrition consulted  BOWEL/BLADDER: Continent. Urinal at bedside/good UOP-need to collect UA. Passing flatus. No BM   ABNL LAB/BG: hgb 7.5, WBC=11.1, platelets= 99, INR= 2.19   DRAIN/DEVICES: PIV x3 SL  TELEMETRY RHYTHM: Sinus tach  SKIN: Pale, jaundiced. Mepilex to knees and elbow from fall prior to admission. +2 Pitting edema to LE, elevated on pillows/PCDs  TESTS/PROCEDURES: Paracentesis completed 5/5, 2.1 L removed. LLQ dressing CDI. XR chest completed today  D/C DATE: Discharge to TCU pending progress  OTHER IMPORTANT INFO: On IV to Zosyn. No active bleeding noted today. IM Invega given per order in L. Deltoid. Family updated on plan of care this afternoon.

## 2023-05-06 NOTE — PLAN OF CARE
Goal Outcome Evaluation:       DATE & TIME: 5/5/23-5/6/23 7268-0511  Cognitive Concerns/ Orientation : Pt A/Ox4, forgetful.   BEHAVIOR & AGGRESSION TOOL COLOR: Green  CIWA: 0 ,0   ABNL VS/O2: VSS on 2L except HR tachy in low 100's.   MOBILITY: not OOB on this shift, T/R, while in bed, otherwise up with 1 GBW per previous shift.   PAIN MANAGMENT: Denies.   DIET: Mechanical soft  BOWEL/BLADDER: Continent. Urinal at bedside. Passing flatus. No BM   ABNL LAB/BG: hgb 7.2, albumin 2.9, alkaline phos 413, , , bilirubin 2.7, INR 1.68  DRAIN/DEVICES: PIV x3 SL  TELEMETRY RHYTHM: SR. HR tachy at times  SKIN: Pale, jaundiced. Mepilex to knees and elbow from fall prior to admission. +2 Pitting edema to LE, elevated on pillows.  TESTS/PROCEDURES: Paracentesis completed 5/5, 2.1 L removed, Chest x-ray and Echo ordered  D/C DATE: Discharge to TCU pending progress  OTHER IMPORTANT INFO: On IV to Zosyn. No active bleeding noted

## 2023-05-06 NOTE — PROVIDER NOTIFICATION
While on cross cover this evening I was paged by RN regarding patient's Invega Claudia. Per RN, pharmacy reports they have his dose in stock. I have placed relevant pharmacy consult order.    Antonio Camara MD, MPH  Internal Medicine

## 2023-05-07 NOTE — PROGRESS NOTES
[facility]    Medicine Progress Note - Hospitalist Service       Date of Admission:  5/3/2023    Assessment & Plan   Los Huang is a 67 year old male with hx of alcoholic cirrhosis with known esophageal varices, metastatic colon cancer s/p partial colectomy and right hepatectomy previously on hospice, PTSD/schioaffective/bipolar disorder who was admitted on 5/3/2023 for hemorrhagic shock due to acute GI bleed. Hospital course complicated by sepsis of unclear source    Goals of care  Metastatic Colon Cancer   Suspected renal cancer  * Diagnosed with colorectal cancer in 2014 with oligometastatic disease to liver at the time of diagnosis. He was treated with ascending colectomy 6/14 and R hepatectomy in 8/14. He declined chemotherapy and enrolled in [facility], but eventually unenrolled from hospice due to overall stability and hospice visits felt intrusive  * CTA abd/pelvis this admission showed disease progression of colon cancer with lung mets, new tumor thrombus in the left portal vein, new/enlarging liver mets, new ascites, and stable bilateral renal masses concerning for solid renal neoplasms  * 5/4: goals of care initiated with patient and family. Cancer progression discussed. Explained that he currently has 2 terminal conditions: advanced colon cancer and cirrhosis. Patient does not want treatment for his cancer, but his current hospitalization is due to a complication of his cirrhosis, and he does want treatment for that. He was actually doing reasonably well, living independently prior to this hospitalization. His goal is to recover from his acute illness, work with PT, and eventually return to his apartment at [facility]. He will consider hospice down the road as he declines.   - DNR/DNI. Goals otherwise restorative.   - PT recommending TCU. Pt agreeable.    Hemorrhagic shock due to acute GI bleed, Improved  Lactic acidosis, Resolved  Colitis likely due to  colon cancer  Alcoholic cirrhosis with esophageal varices, ascites, and hepatic encephalopathy  * Presented with confusion, falls, and 1 week history of intermittent rectal bleeding which patient felt was related to hemorrhoids. Hypotensive to 70s on arrival with initial lactate >8; improved with IV fluids. Initial Hgb 5.7. Ammonia level normal.  * In the ED, he was initiated on an octreotide infusion, IV PPI BID, and IV ceftriaxone for SBP ppx. Also transfused total 3 units pRBCs upon admission  * [facility] GI was consulted on admission. Underwent EGD which showed variceal banding scar that appeared healthy, as well as grade I and II esophageal varices. Colonoscopy deferred due to evidence of colitis on imaging.   * CTA abd/pelvis upon admission showed marked wall thickening and edema of the entire bowel with concern for shock bowel/ischemic bowel and disease progression of colon cancer; however patient has been relatively asymptomatic with improved lactate and WBC  * Octreotide d/c'd on 5/4.   - Now back to baseline mental status. Hgb stable low-7 range  - IV iron ordered x2. Can consider blood transfusion prior to discharge for symptomatic anemia  - Continues on pantoprazole 40 mg BID  - Continues on PTA stool softeners   - On soft diet  - GI has now signed off     Sepsis due to suspected aspiration pneumonia, Resolving  Positive blood culture, suspect contaminant  * Spiked to fever to 100.8 overnight 5/4-5/5 along with mild tachycardia. WBC 18.1k. Lactate 2.2. Reported increased shortness of breath. CXR showed known lung mets, bilateral infiltrates due to pulmonary edema vs pneumonia. He had been on ceftriaxone for SBP prophylaxis; antibiotics were broadened to IV pip-tazo with improvement. COVID-19 PCR negative. Unfortunately, blood cultures were not drawn.   * Underwent paracentesis (5/5) with removal of 2L fluid. 120 PMNs, no evidence of SBP  * 1 out of 2 blood cultures on admission (5/3) grew Staph epi. Suspect  contaminant  - Has since been afebrile, WBC trending down  - Continues on IV pip-tazo for now, transition to PO abx at discharge  - He has been cleared by SLP for regular diet with thin liquids    Acute respiratory distress due to aspiration pneumonia vs pulmonary edema, Resolving  * Reported increased shortness of breath overnight 5/4-5/5. CXR showed pulmonary edema vs pneumonia. spO2 mid 90s on room air, but was placed on supplemental O2 for comfort.Treated with IV pip-tazo with improvement.   * Repeat CXR (5/6) improved  - Shortness of breath improving  - On IV pip-tazo as noted above     Thrombocytopenia, Improved  * Likely due to cirrhosis and ongoing alcohol use. Platelets wnl on admission though sample was likely hemoconcentrated. Platelet bry 90K range.  -  Platelets 131K    Alcohol use disorder  * Longstanding history of alcohol dependence. Currently consuming 1 oz of gin 5 times daily. Last drink: 5/3. No s/sx of withdrawal noted this admission  - d/c CIWA today, 5/7  - On thiamine/folate/MVI while hospitalized    Hypokalemia  - KCl 40 meq BID x2 doses ordered, 5/7    PTSD  Hx of schizoaffective disorder  Hx of bipolar disorder type I  * PTA regimen: clonazepam 0.5 mg daily, olanzapine 5 mg q6h prn, Invega 234 IM q28 days  * Last Invega dose: 5/6  - Clonazepam and olanzapine ordered prn this admission    Cachexia  Severe malnutrition in context of acute on chronic illness  - Daily magic cup ordered per Nutrition     Diet: Room Service  Mechanical/Dental Soft Diet  Snacks/Supplements Adult: Magic Cup; Between Meals    DVT Prophylaxis: Pneumatic Compression Devices  Chew Catheter: Not present  Code Status: No CPR- Do NOT Intubate      Disposition: Expected discharge to TCU on Tues if Hgb remains stable, transition to PO antibiotics at discharge    The patient's care was discussed with the Bedside Nurse, Patient and Patient's Family.    Jacqueline Huang MD  Hospitalist Service  [facility]  [facility]  Contact information available via [facility] Paging/Directory    ______________________________________________________________________    Interval History   No acute events overnight. Offers no complaints - shortness of breath improving with incentive spirometry. Hgb stable. Potassium replaced. IV iron ordered. Son, Juventino updated over the phone    Data reviewed today: I reviewed all medications, new labs and imaging results over the last 24 hours. I personally reviewed no images or EKG's today.    Physical Exam   Vital Signs: Temp: 98.5  F (36.9  C) Temp src: Oral BP: 107/59 Pulse: 99   Resp: 18 SpO2: 97 % O2 Device: Nasal cannula Oxygen Delivery: 2 LPM  Weight: 199 lbs 4.73 oz  Constitutional: Chronically ill appearing, NAD  HEENT: Sclera white, MMM  Respiratory: Breathing non-labored. Diminished breath sounds at bilateral bases  Cardiovascular: Heart RRR, no m/r/g. No pedal edema  GI: +BS, abd distended, NT  Skin/Integument: +jaundice  Musculoskeletal: +cachexia  Neuro: Alert and appropriate. CLEMENT  Psych: Calm and cooperative    Data   Recent Labs   Lab 05/07/23  0841 05/06/23  1117 05/05/23  1120 05/05/23  0922   WBC 11.2* 11.1* 12.6*  --    HGB 7.5* 7.5* 7.2*  --    MCV 90 90 91  --    * 99* 95*  --    INR 1.71* 2.19*  --  1.68*    137  --  138   POTASSIUM 3.3* 3.4  --  4.4   CHLORIDE 102 104  --  106   CO2 22 21*  --  17*   BUN 23.9* 26.6*  --  29.0*   CR 0.75 0.74  --  0.84   ANIONGAP 13 12  --  15   GEOVANNA 8.0* 7.9*  --  7.8*   * 171*  --  186*   ALBUMIN 2.7* 2.6*  --  2.9*   PROTTOTAL 5.8* 5.4*  --  5.3*   BILITOTAL 4.3* 3.6*  --  2.7*   ALKPHOS 617* 505*  --  413*   * 117*  --  133*   * 112*  --  157*         No results found for this or any previous visit (from the past 24 hour(s)).    Medications     - MEDICATION INSTRUCTIONS -         diclofenac  2 g Topical BID     folic acid  1 mg Oral Daily     iron sucrose  300 mg Intravenous Daily     multivitamin  w/minerals  1 tablet Oral Daily     paliperidone  234 mg Intramuscular Q28 Days     pantoprazole  40 mg Oral BID AC     piperacillin-tazobactam  4.5 g Intravenous Q6H     potassium chloride  40 mEq Oral BID     sodium chloride (PF)  3 mL Intracatheter Q8H     thiamine  100 mg Oral Daily

## 2023-05-07 NOTE — PROGRESS NOTES
Care Management Follow Up    Length of Stay (days): 4    Expected Discharge Date: 05/10/2023     Concerns to be Addressed:       Patient plan of care discussed at interdisciplinary rounds: Yes    Anticipated Discharge Disposition: Assisted Living     Anticipated Discharge Services:    Anticipated Discharge DME:      Patient/family educated on Medicare website which has current facility and service quality ratings: no  Education Provided on the Discharge Plan:    Patient/Family in Agreement with the Plan: unable to assess    Referrals Placed by CM/SW: Internal Clinic Care Coordination  Private pay costs discussed: Not applicable    Additional Information:  Spoke with pt to receive TCU choices. Pt requested SW speak with son Juventino to determine location and where to send referrals. Spoke with Juventino, informed of recommendation for TCU. Explained purpose of TCU and average length of time. Juventino lives in [facility], pt is in [place], would prefer something central to both. Discussed [facility], this would be an ideal location, referral made. Will need to get more choices from Juventino if denial. SW will continue to follow.     DOV James  Social Work  [facility]

## 2023-05-07 NOTE — PLAN OF CARE
Goal Outcome Evaluation:      Plan of Care Reviewed With: patient    Overall Patient Progress: no changeOverall Patient Progress: no change     DATE & TIME: 5/6-05/07 9262-2087  Cognitive Concerns/ Orientation : Pt A&O x3- disorientated to situation, forgetful. Flat affect, slow to respond to questions. Visual hallucinations- seeing people in his room. Spontaneous, illogical speech. Hx. Of schizoaffective disorder and bipolar.   BEHAVIOR & AGGRESSION TOOL COLOR: Green  CIWA: 4, 2  ABNL VS/O2: VSS on 2L (for comfort/SOB) except HR tachy in low 100's.   MOBILITY: not OOB on this shift, T/R encouraged while in bed, Ast 2 w/ GB/W. PT/OT consulted  PAIN MANAGMENT: Denies.   DIET: Mechanical soft,  Nutrition consulted  BOWEL/BLADDER: Continent. Urinal at bedside/good UOP, Passing flatus. No BM   ABNL LAB/BG: hgb 7.5, WBC 11.1, platelets 99, INR 2.19, alkaline phosphate 505, LFTs elevated  DRAIN/DEVICES: PIV x3 SL  TELEMETRY RHYTHM: ST- discontinued this AM.   SKIN: Pale, jaundiced. Mepilex to knees and elbow from fall prior to admission. +2 Pitting edema to LE, elevated on pillows/PCDs- refused. Mepilex on coccyx.   TESTS/PROCEDURES: Paracentesis completed 5/5, 2.1 L removed. LLQ dressing CDI. XR chest completed yesterday  D/C DATE: Discharge to TCU pending progress  OTHER IMPORTANT INFO: On IV to Zosyn. No active bleeding noted today. IM Invega given per order in L. Deltoid in AM. Encourage IS Q2 while awake- 750ml was the highest patient got. Refused protonix this AM.

## 2023-05-07 NOTE — PLAN OF CARE
Summary: Sepsis unclear source. hemorrhagic shock due to acute GI bleed, Has metastatic colon cancer with suspected renal cancer   DATE & TIME: 5/6/23,8647-5873  Cognitive Concerns/ Orientation : Pt A/Ox3-4, forgetful. irritable/paranoid this evening,-MD updated. Hx. Of schizoaffective disorder and bipolar, having mild visual hallucinations  BEHAVIOR & AGGRESSION TOOL COLOR: Green  CIWA: 2, 2  ABNL VS/O2: VSS on 2L (for comfort/SOB) except HR tachy in low 100's.   MOBILITY: not OOB on this shift, T/R encouraged while in bed, Ast 2 w/ GB/W . Encouraged patient to get into chair today, but refused. PT/OT consulted  PAIN MANAGMENT: Denies.   DIET: Mechanical soft, tolerating. Nutrition consulted  BOWEL/BLADDER: Continent. Urinal at bedside/good UOP, Passing flatus. No BM   ABNL LAB/BG: hgb 7.5, WBC 11.1, platelets 99, INR 2.19, alkaline phosphate 505, LFTs elevated  DRAIN/DEVICES: PIV x3 SL  TELEMETRY RHYTHM: Sinus tach  SKIN: Pale, jaundiced. Mepilex to knees and elbow from fall prior to admission. +2 Pitting edema to LE, elevated on pillows/PCDs  TESTS/PROCEDURES: Paracentesis completed 5/5, 2.1 L removed. LLQ dressing CDI. XR chest completed today  D/C DATE: Discharge to TCU pending progress  OTHER IMPORTANT INFO: On IV to Zosyn. No active bleeding noted today. IM Invega given per order in L. Deltoid in AM.

## 2023-05-07 NOTE — PLAN OF CARE
Goal Outcome Evaluation:      Plan of Care Reviewed With: patient    Summary: Sepsis unclear source. hemorrhagic shock due to acute GI bleed, Has metastatic colon cancer with suspected renal cancer   DATE & TIME: 5/6/2023, 5696-9501   Cognitive Concerns/ Orientation : Pt A&O x3- disorientated to situation, forgetful. Flat affect, slow to respond to questions. Spontaneous, illogical speech. Hx. Of schizoaffective disorder and bipolar.   BEHAVIOR & AGGRESSION TOOL COLOR: Green  CIWA: 1 (mild sweating)  ABNL VS/O2: VSS on 2L (for comfort/SOB) except HR tachy in low 100's.   MOBILITY: not OOB on this shift, T/R encouraged while in bed, Ast 2 w/ GB/W. PT/OT consulted  PAIN MANAGMENT: Denies.   DIET: Mechanical soft,  Nutrition consulted  BOWEL/BLADDER: Continent. Urinal at bedside/good UOP, Passing flatus. No BM, no active signs of bleeding at this time    ABNL LAB/BG: hgb 7.5, WBC 11.1, platelets 131, INR 1.71, LFTs elevated, bilirubin=4.3, K + 3.3 (no protocol, scheduled doses only per MD)  DRAIN/DEVICES: PIV x3 SL  TELEMETRY RHYTHM:  discontinued   SKIN: Pale, jaundiced. Mepilex to knees and elbow from fall prior to admission. +2 Pitting edema to LE, elevated on pillows/PCDs- refused. Mepilex on coccyx.   TESTS/PROCEDURES: Paracentesis completed 5/5, 2.1 L removed. LLQ dressing CDI. XR chest completed yesterday  D/C DATE: Discharge to TCU pending progress  OTHER IMPORTANT INFO: On IV to Zosyn. IV iron given this afternoon.

## 2023-05-07 NOTE — PROVIDER NOTIFICATION
MD Notification    Notified Person: MD    Notified Person Name: Sal     Notification Date/Time: 5/7/2023, 0992     Notification Interaction: [employer] messaging     Purpose of Notification: K today was 3.3. Would you like this replaced? Just need order if wanting to do so. Thanks!     Orders Received: MD will order protocol    Comments:

## 2023-05-08 NOTE — PROVIDER NOTIFICATION
Paged Dr Cronin regarding medium maroon formed stool. MD wants labs draw, lab to come draw blood. Continue to monitor.  Labs & VS completed, stable.

## 2023-05-08 NOTE — PLAN OF CARE
Goal Outcome Evaluation:  Cognitive Concerns/ Orientation : A&Ox4- pleasant. Very forgetful. Flat affect   Hx. Of schizoaffective disorder and bipolar.   BEHAVIOR & AGGRESSION TOOL COLOR: Green  ABNL VS/O2: VSS on room air  MOBILITY: ambulated in the newman with an assist of 1 gbw-steady. Up in the chair this am  PAIN MANAGMENT: no pain  DIET: Mechanical soft-no appetite  BOWEL/BLADDER: Continent. Formed maroon stool x1, had 2 formed soft yellow stool after the maroon stool.  MD aware.  ABNL LAB/BG: hgb 8.0. ALT 23 and . WBC 15.5  DRAIN/DEVICES: PIV x3 SL  TELEMETRY RHYTHM:  discontinued   SKIN: Pale, jaundiced, karissa Mepilex to L knee -abrasion-healing. +2 edema LE, elevated on pillows. Mepilex on coccyx.   TESTS/PROCEDURES: abd CT ordered  D/C DATE: Discharge to TCU pending progress-referrals sent  OTHER IMPORTANT INFO: On IV to Zosyn. Diminished lung sounds, occasional nonproductive cough, using IS with reminders, GOLDEN.

## 2023-05-08 NOTE — PROVIDER NOTIFICATION
".MD Notification    Notified Person: MD    Notified Person Name: Ivis Velasco    Notification Date/Time: 5/7/23 @ 5624    Notification Interaction: [employer]    Purpose of Notification: \"Hello, pt had a large loose tarry stool with red streaks. Pt's last hgb was 7.5 and stable, and given IV iron sucrose in AM. Is there anything we should do in the moment? thanks\"    Orders Received: stat hgb check ordered    Comments:  "

## 2023-05-08 NOTE — PLAN OF CARE
Summary: Sepsis unclear source. hemorrhagic shock due to acute GI bleed, Has metastatic colon cancer with suspected renal cancer   DATE & TIME: 5/7/2023, 1858-3538  Cognitive Concerns/ Orientation : Pt A&O x4- minimally confused this shift, forgetful. Flat affect, slow to respond to questions. Spontaneous. Hx. Of schizoaffective disorder and bipolar.   BEHAVIOR & AGGRESSION TOOL COLOR: Green  CIWA: d/c  ABNL VS/O2: VSS on 2L (for comfort/SOB) except HR tachy in low 100's.   MOBILITY: not OOB on this shift, T/R encouraged while in bed, Ast 2 w/ GB/W. PT/OT consulted  PAIN MANAGMENT: Denies.   DIET: Mechanical soft,  Nutrition consulted  BOWEL/BLADDER: Continent. Urinal at bedside/good UOP, Passing flatus. Large loose tarry stools with red streaks noted- on-call hospitalist paged  ABNL LAB/BG: hgb 7.5, WBC 11.1, platelets 131, INR 1.71, LFTs elevated, bilirubin 4.3, K + 3.3 (no protocol, scheduled doses only per MD)  DRAIN/DEVICES: PIV x3 SL  TELEMETRY RHYTHM:  discontinued   SKIN: Pale, jaundiced. Mepilex to knees and elbow from fall prior to admission. +2 Pitting edema to LE, elevated on pillows/PCDs- refused. Mepilex on coccyx.   TESTS/PROCEDURES: Paracentesis completed 5/5, 2.1 L removed. LLQ dressing CDI. XR chest completed yesterday  D/C DATE: Discharge to TCU pending progress-referrals sent  OTHER IMPORTANT INFO: On IV to Zosyn. IV iron given in AM. Paged oncall hospitalist for tarry stool

## 2023-05-08 NOTE — PROGRESS NOTES
[facility]    Medicine Progress Note - Hospitalist Service    Date of Admission:  5/3/2023    Assessment & Plan     Los Huang is a 67 year old male with hx of alcoholic cirrhosis with known esophageal varices, metastatic colon cancer s/p partial colectomy and right hepatectomy previously on hospice, PTSD/schioaffective/bipolar disorder who was admitted on 5/3/2023 for hemorrhagic shock due to acute GI bleed. Hospital course complicated by sepsis of unclear source     Goals of care  Metastatic Colon Cancer   Suspected renal cancer  - Diagnosed with colorectal cancer in 2014 with oligometastatic disease to liver at the time of diagnosis. He was treated with ascending colectomy 6/14 and R hepatectomy in 8/14. He declined chemotherapy and enrolled in [facility], but eventually unenrolled from hospice due to overall stability and hospice visits felt intrusive  - CTA abd/pelvis this admission showed disease progression of colon cancer with lung mets, new tumor thrombus in the left portal vein, new/enlarging liver mets, new ascites, and stable bilateral renal masses concerning for solid renal neoplasms  -Previous hospitalist discussed on 5/4: goals of care initiated with patient and family. Cancer progression discussed. Explained that he currently has 2 terminal conditions: advanced colon cancer and cirrhosis. Patient does not want treatment for his cancer, but his current hospitalization is due to a complication of his cirrhosis, and he does want treatment for that. He was actually doing reasonably well, living independently prior to this hospitalization. His goal is to recover from his acute illness, work with PT, and eventually return to his apartment at [facility]. He will consider hospice down the road as he declines.   - DNR/DNI. Goals otherwise restorative.   - PT recommending TCU. Pt agreeable.  -Again discussed with patient's about goals of care and he does  think that he will get better but did mention to me that he was not aware of cirrhosis diagnosis     Hemorrhagic shock due to acute GI bleed, Improved  Lactic acidosis, Resolved  Colitis likely due to colon cancer  Alcoholic cirrhosis with esophageal varices, ascites, and hepatic encephalopathy  - Presented with confusion, falls, and 1 week history of intermittent rectal bleeding which patient felt was related to hemorrhoids. Hypotensive to 70s on arrival with initial lactate >8; improved with IV fluids. Initial Hgb 5.7. Ammonia level normal.  - In the ED, he was initiated on an octreotide infusion, IV PPI BID, and IV ceftriaxone for SBP ppx. Also transfused total 3 units pRBCs upon admission  - [facility] GI was consulted on admission. Underwent EGD which showed variceal banding scar that appeared healthy, as well as grade I and II esophageal varices. Colonoscopy deferred due to evidence of colitis on imaging.   - CTA abd/pelvis upon admission showed marked wall thickening and edema of the entire bowel with concern for shock bowel/ischemic bowel and disease progression of colon cancer; however patient has been relatively asymptomatic with improved lactate and WBC  - Octreotide d/c'd on 5/4.   -He had an episode of maroon-colored bloody stool this morning and was hemodynamically stable and repeat hemoglobin was stable at 8 and I did discuss with the GI team again today and source of his blood loss today is due to metastatic disease and further evaluation with colonoscopy is not possible with disease progression as tumors/neoplastic tissue is friable and treatment with APC would not be helpful and treatment is blood products  -We will continue to check his H&H every 12 hours, continue with PPI, soft diet     Sepsis due to suspected aspiration pneumonia, Resolving  Positive blood culture, suspect contaminant  - Spiked to fever to 100.8 overnight 5/4-5/5 along with mild tachycardia. WBC 18.1k. Lactate 2.2. Reported  increased shortness of breath. CXR showed known lung mets, bilateral infiltrates due to pulmonary edema vs pneumonia. He had been on ceftriaxone for SBP prophylaxis; antibiotics were broadened to IV pip-tazo with improvement. COVID-19 PCR negative. Unfortunately, blood cultures were not drawn.   - Underwent paracentesis (5/5) with removal of 2L fluid. 120 PMNs, no evidence of SBP  - 1 out of 2 blood cultures on admission (5/3) grew Staph epi. Suspect contaminant  - Has since been afebrile and had mild increase in WBC count of 15.5  - Continues on IV pip-tazo for now, transition to PO abx at discharge  - He has been cleared by SLP for regular diet with thin liquids     Acute respiratory distress due to aspiration pneumonia vs pulmonary edema, Resolving  - Reported increased shortness of breath overnight 5/4-5/5.   -CXR showed pulmonary edema vs pneumonia. spO2 mid 90s on room air, but was placed on supplemental O2 for comfort.Treated with IV pip-tazo with improvement.   - Repeat CXR (5/6) improved  - Exam patient is on room air and does get short of breath when he walks and will continue with IV Zosyn for now     Thrombocytopenia, -Resolved  - Likely due to cirrhosis and ongoing alcohol use. Platelets wnl on admission though sample was likely hemoconcentrated.   -Platelet count this morning is stable at 194     Alcohol use disorder  - Longstanding history of alcohol dependence. Currently consuming 1 oz of gin 5 times daily. Last drink: 5/3. No s/sx of withdrawal noted this admission  - d/c CIWA today, 5/7  - On thiamine/folate/MVI while hospitalized     Hypokalemia-Resolved       PTSD  Hx of schizoaffective disorder  Hx of bipolar disorder type I  - PTA regimen: clonazepam 0.5 mg daily, olanzapine 5 mg q6h prn, Invega 234 IM q28 days  - Last Invega dose: 5/6  - Clonazepam and olanzapine ordered prn this admission     Cachexia  Severe malnutrition in context of acute on chronic illness  - Daily magic cup ordered per  Nutrition       Diet: Room Service  Mechanical/Dental Soft Diet  Snacks/Supplements Adult: Magic Cup; Between Meals    DVT Prophylaxis: Pneumatic Compression Devices  Chew Catheter: Not present  Lines: PRESENT             Cardiac Monitoring: None  Code Status: No CPR- Do NOT Intubate      Clinically Significant Risk Factors        # Hypokalemia: Lowest K = 3.3 mmol/L in last 2 days, will replace as needed       # Hypoalbuminemia: Lowest albumin = 2.6 g/dL at 5/6/2023 11:17 AM, will monitor as appropriate            # Severe Malnutrition: based on nutrition assessment        Disposition Plan      Expected Discharge Date: 05/10/2023        Discharge Comments: Pt is from [facility], however needs TCU now. Declines Hospice at this time. Per MD note can discharge once hemoglobin stable and no signs of sepsis.          Sameera Cronin MD  Hospitalist Service  [facility]  Securely message with [employer] (more info)  Text page via [employer] Paging/Directory   ______________________________________________________________________    Interval History     Saw the patient this morning and he was laying in the bed and mentioned to me that he gets short of breath when walking.  Has mild cough.  He had episode of maroon-colored stools.  Patient denied any lightheadedness or dizziness, nausea or vomiting.  He denied any epistaxis.  He denied any hematemesis or hemoptysis.  He denies any abdominal pain.    I did discuss plan of care with patient's nurse and also with Tammie from GI team and appreciate input    Physical Exam   Vital Signs: Temp: 97.3  F (36.3  C) Temp src: Oral BP: 100/61 Pulse: 98   Resp: 18 SpO2: 95 % O2 Device: None (Room air) Oxygen Delivery: 2 LPM  Weight: 199 lbs 4.73 oz        General: Patient appears comfortable and in no acute distress.  HEENT: Head is atraumatic, normocephalic.  Pupils are equal, round and reactive to light. + scleral icterus. Oral mucosa is moist   Neck: Neck is  supple  Respiratory: Lungs are clear to auscultation bilaterally with no wheeze or crackles   Cardiovascular: Regular rate , S1 and S2 normal with no murmer or rubs or gallops  Abdomen:   soft , non tender , non distended and bowel sound present   Skin: No skin rashes or lesions to inspection or palpation.  Neurologic: Higher functions are within normal limits. No obvious defects in speech, language and memory. No facial droop  Musculoskeletal: Normal Range of motion over upper and lower extremities bilaterally   Psychiatric: cooperative     Medical Decision Making             Data     I have personally reviewed the following data over the past 24 hrs:    15.5 (H)  \   8.0 (L)   / 194     138 105 24.6 (H) /  153 (H)   3.8 19 (L) 0.74 \       ALT: 123 (H) AST: 111 (H) AP: 770 (H) TBILI: 4.0 (H)   ALB: 2.7 (L) TOT PROTEIN: 5.9 (L) LIPASE: N/A       INR:  1.69 (H) PTT:  N/A   D-dimer:  N/A Fibrinogen:  N/A       Imaging results reviewed over the past 24 hrs:   No results found for this or any previous visit (from the past 24 hour(s)).

## 2023-05-08 NOTE — PROGRESS NOTES
Patient having melenotic stools.   Has metastatic colon CA s/p colectomy and right hepatectomy previously on hospice admitted with hemorrhagic shock  Underwent EGD this admission on 5/3 that revealed grade II esophageal varices without any evidence of bleeding.   CTA of abdomen/pelivs notes disease progression with new tumor thrombus in the left portal vein, new/enlarging liver mets, new ascites, and stable bilateral renal masses concerning for solid renal neoplasms.    Source of blood loss likely due to metastatic disease and further evaluation with colonoscopy is not possible with disease progression as tumor/neoplastic tissue is friable and treatment with APC would not be helpful.     Only treatment options for GI bleed are to transfuse with blood products    Will explain to patient further when able to stop in room.    [facility]  [phone]

## 2023-05-08 NOTE — PLAN OF CARE
Goal Outcome Evaluation:      DATE & TIME: 5/7/2023, [id number]  Cognitive Concerns/ Orientation : Pt A&O x4- pleasant. forgetful. Flat affect, slow to respond to questions but GOLDEN.    Hx. Of schizoaffective disorder and bipolar.   BEHAVIOR & AGGRESSION TOOL COLOR: Green  CIWA: discontinued   ABNL VS/O2: VSS on RA (O2 prn for comfort/SOB) tachy, 104  MOBILITY: not OOB on this shift, T/R encouraged while in bed, A2GB/W   PT/OT consulted  PAIN MANAGMENT: Oxycodone for R abdominal pain when side lying R.   DIET: Mechanical soft,  Nutrition consulted  BOWEL/BLADDER: Continent. Urinal at bedside/good UOP, Passing flatus. Large loose tarry stools with red streaks on- pm- and this a.m.; Hgb 7.6. a.m. labs pending. MD aware.  ABNL LAB/BG: hgb 7.6, WBC 11.1, platelets 131, INR 1.71, LFTs elevated, bilirubin 4.3, K + 3.3 (no protocol, scheduled doses only per MD- )  DRAIN/DEVICES: PIV x3 SL  TELEMETRY RHYTHM:  discontinued   SKIN: Pale, jaundiced, karissa Mepilex to L knee and L elbow from fall prior to admission. +2 edema LE, elevated on pillows/PCDs- refused. LE skin karissa, +2 pulses. Mepilex on coccyx.   TESTS/PROCEDURES: Paracentesis completed 5/5, 2.1 L removed. LLQ dressing CDI. XR chest completed yesterday  D/C DATE: Discharge to TCU pending progress-referrals sent  OTHER IMPORTANT INFO: On IV to Zosyn. IV iron given in AM.

## 2023-05-09 NOTE — PROGRESS NOTES
[facility]    Medicine Progress Note - Hospitalist Service    Date of Admission:  5/3/2023    Assessment & Plan     Los Huang is a 67 year old male with hx of alcoholic cirrhosis with known esophageal varices, metastatic colon cancer s/p partial colectomy and right hepatectomy previously on hospice, PTSD/schioaffective/bipolar disorder who was admitted on 5/3/2023 for hemorrhagic shock due to acute GI bleed. Hospital course complicated by sepsis of unclear source     Goals of care  Metastatic Colon Cancer   Suspected renal cancer  - Diagnosed with colorectal cancer in 2014 with oligometastatic disease to liver at the time of diagnosis. He was treated with ascending colectomy 6/14 and R hepatectomy in 8/14. He declined chemotherapy and enrolled in [facility], but eventually unenrolled from hospice due to overall stability and hospice visits felt intrusive  - CTA abd/pelvis this admission showed disease progression of colon cancer with lung mets, new tumor thrombus in the left portal vein, new/enlarging liver mets, new ascites, and stable bilateral renal masses concerning for solid renal neoplasms  -Previous hospitalist discussed on 5/4: goals of care initiated with patient and family. Cancer progression discussed. Explained that he currently has 2 terminal conditions: advanced colon cancer and cirrhosis. Patient does not want treatment for his cancer, but his current hospitalization is due to a complication of his cirrhosis, and he does want treatment for that. He was actually doing reasonably well, living independently prior to this hospitalization. His goal is to recover from his acute illness, work with PT, and eventually return to his apartment at [facility]. He will consider hospice down the road as he declines.   - DNR/DNI. Goals otherwise restorative.   - PT recommending TCU. Pt agreeable.  - 5/8-Again discussed with patient's about goals of care and he does  think that he will get better but did mention to me that he was not aware of cirrhosis diagnosis     Hemorrhagic shock due to acute GI bleed, Improved  Lactic acidosis, Resolved  Colitis likely due to colon cancer  Alcoholic cirrhosis with esophageal varices, ascites, and hepatic encephalopathy  - Presented with confusion, falls, and 1 week history of intermittent rectal bleeding which patient felt was related to hemorrhoids. Hypotensive to 70s on arrival with initial lactate >8; improved with IV fluids. Initial Hgb 5.7. Ammonia level normal.  - In the ED, he was initiated on an octreotide infusion, IV PPI BID, and IV ceftriaxone for SBP ppx. Also transfused total 3 units pRBCs upon admission  - [facility] GI was consulted on admission. Underwent EGD which showed variceal banding scar that appeared healthy, as well as grade I and II esophageal varices. Colonoscopy deferred due to evidence of colitis on imaging.   - CTA abd/pelvis upon admission showed marked wall thickening and edema of the entire bowel with concern for shock bowel/ischemic bowel and disease progression of colon cancer; however patient has been relatively asymptomatic with improved lactate and WBC  - Octreotide d/c'd on 5/4.   - 5/8 He had an episode of maroon-colored bloody stool this morning and was hemodynamically stable and repeat hemoglobin was stable at 8 and I did discuss with the GI team again today and source of his blood loss today is due to metastatic disease and further evaluation with colonoscopy is not possible with disease progression as tumors/neoplastic tissue is friable and treatment with APC would not be helpful and treatment is blood products  -He had repeat CT abdomen done overnight and did not show any evidence of bleeding  -HemoGlobin this morning is stable at 7.6  -We will continue to check his H&H every 12 hours, continue with PPI, soft diet     Sepsis due to suspected aspiration pneumonia, Resolving  Positive blood culture,  suspect contaminant  - Spiked to fever to 100.8 overnight 5/4-5/5 along with mild tachycardia. WBC 18.1k. Lactate 2.2. Reported increased shortness of breath. CXR showed known lung mets, bilateral infiltrates due to pulmonary edema vs pneumonia. He had been on ceftriaxone for SBP prophylaxis; antibiotics were broadened to IV pip-tazo with improvement. COVID-19 PCR negative. Unfortunately, blood cultures were not drawn.   - Underwent paracentesis (5/5) with removal of 2L fluid. 120 PMNs, no evidence of SBP  - 1 out of 2 blood cultures on admission (5/3) grew Staph epi. Suspect contaminant  - Has since been afebrile and had mild increase in WBC count of 15.5  - Continues on IV pip-tazo for now, transition to PO abx at discharge  - He has been cleared by SLP for regular diet with thin liquids     Acute respiratory distress due to aspiration pneumonia vs pulmonary edema, Resolving  - Reported increased shortness of breath overnight 5/4-5/5.   -CXR showed pulmonary edema vs pneumonia. spO2 mid 90s on room air, but was placed on supplemental O2 for comfort.Treated with IV pip-tazo with improvement.   - Repeat CXR (5/6) improved  - On exam patient is on room air and we will switch him to oral antibiotics to finish the course on discharge  -He has been having persistent white count during this hospital stay but he has been afebrile     Thrombocytopenia, -Resolved  - Likely due to cirrhosis and ongoing alcohol use. Platelets wnl on admission though sample was likely hemoconcentrated.   -Platelet count this morning is stable at 223     Alcohol use disorder  - Longstanding history of alcohol dependence. Currently consuming 1 oz of gin 5 times daily. Last drink: 5/3. No s/sx of withdrawal noted this admission  - d/c CIWA today, 5/7  - On thiamine/folate/MVI while hospitalized     Hypokalemia  -He does have potassium of 3.3 and we will start him on replacement      PTSD  Hx of schizoaffective disorder  Hx of bipolar disorder  type I  - PTA regimen: clonazepam 0.5 mg daily, olanzapine 5 mg q6h prn, Invega 234 IM q28 days  - Last Invega dose: 5/6  - Clonazepam and olanzapine ordered prn this admission     Cachexia  Severe malnutrition in context of acute on chronic illness  - Daily magic cup ordered per Nutrition       Diet: Room Service  Mechanical/Dental Soft Diet  Snacks/Supplements Adult: Magic Cup; Between Meals    DVT Prophylaxis: Pneumatic Compression Devices  Chew Catheter: Not present  Lines: PRESENT             Cardiac Monitoring: None  Code Status: No CPR- Do NOT Intubate      Clinically Significant Risk Factors        # Hypokalemia: Lowest K = 3.3 mmol/L in last 2 days, will replace as needed       # Hypoalbuminemia: Lowest albumin = 2.6 g/dL at 5/6/2023 11:17 AM, will monitor as appropriate            # Severe Malnutrition: based on nutrition assessment        Disposition Plan      Expected Discharge Date: 05/10/2023        Discharge Comments: Pt is from [facility], however needs TCU now. Declines Hospice at this time. Per MD note can discharge once hemoglobin stable and no signs of sepsis.          Sameera Cronin MD  Hospitalist Service  [facility]  Securely message with [employer] (more info)  Text page via [employer] Paging/Directory   ______________________________________________________________________    Interval History     Saw the patient today and he was walking in the hallway without any shortness of breath and has not been needing any oxygen and does not have any significant cough.  He did had bowel movement yesterday which was yellow in color and no further bloody bowel movements.  He denied any lightheadedness or dizziness.    I did discussed with the patient that if he is clinically stable by tomorrow then he can go and he understands.    Also discussed the plan of care with the patient's nurse and social work team    Physical Exam   Vital Signs: Temp: 97.4  F (36.3  C) Temp src: Oral  BP: 105/67 Pulse: 93   Resp: 18 SpO2: 93 % O2 Device: None (Room air)    Weight: 199 lbs 4.73 oz        General: aox3 and frail looking   HEENT: Head is atraumatic, normocephalic.    Neck: Neck is supple  Respiratory:   Cardiovascular: Regular rate , S1 and S2 normal with no murmer or rubs or gallops  Abdomen:   soft , non tender ,   Skin: No skin rashes or lesions to inspection or palpation.  Neurologic:  No facial droop  Musculoskeletal: Normal Range of motion over upper and lower extremities bilaterally   Psychiatric: cooperative     Medical Decision Making             Data     I have personally reviewed the following data over the past 24 hrs:    15.9 (H)  \   7.6 (L)   / 223     136 102 21.9 /  154 (H)   3.3 (L) 21 (L) 0.75 \       ALT: 126 (H) AST: 108 (H) AP: 915 (H) TBILI: 4.1 (H)   ALB: 2.8 (L) TOT PROTEIN: 6.2 (L) LIPASE: N/A       INR:  1.48 (H) PTT:  N/A   D-dimer:  N/A Fibrinogen:  N/A       Imaging results reviewed over the past 24 hrs:   Recent Results (from the past 24 hour(s))   CTA Abdomen Pelvis with Contrast    Narrative    EXAM: CTA ABDOMEN PELVIS WITH CONTRAST  LOCATION: [facility]  DATE/TIME: 5/9/2023 5:33 AM CDT    INDICATION: GI bleed  COMPARISON: 5/3/2023 and 7/14/2022  TECHNIQUE: CT angiogram abdomen pelvis during arterial phase of injection of IV contrast. 2D and 3D MIP reconstructions were performed by the CT technologist. Dose reduction techniques were used.  CONTRAST: 123mL Isovue 370    FINDINGS:  ANGIOGRAM ABDOMEN/PELVIS: Normal caliber abdominal aorta. Celiac, superior mesenteric, renal and inferior mesenteric arteries are patent. 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 Site of GI bleed not identified.    LOWER CHEST: Pulmonary metastases are again seen. Ossified granulomas right lung base. Small right pleural effusion.    HEPATOBILIARY: Right hepatectomy. Lobular contour of the residual liver. Confluent masses in the left central lobe  unchanged. Expansile hypodensity within the left portal vein similar.    PANCREAS: Normal.    SPLEEN: Calcified granulomas. Splenomegaly.    ADRENAL GLANDS: Thickening left adrenal.    KIDNEYS/BLADDER: 2.4 cm mass lower pole right kidney and 1.7 cm lesion upper pole left kidney similar. Subcentimeter renal hypodensities are small for characterization.    BOWEL: Right hemicolectomy. Underdistention the stomach. Improved wall thickening of small bowel. Persistent but improved colonic wall thickening. Moderate amount of ascites.    LYMPH NODES: Stable.    PELVIC ORGANS: Prominent prostate prostate calcification.    MUSCULOSKELETAL: Degenerative change osseous structures. Soft tissue edema.      Impression    IMPRESSION:  1.  Site of GI bleed not identified.  2.  Interval improvement in wall thickening of small bowel and colon.  3.  Pulmonary metastases. Small right pleural effusion.  4.  Tumor thrombus left portal vein and hepatic metastases again seen. Subcapsular implants along the liver again noted.  5.  Bilateral renal lesions again seen.  6.  Moderate amount of ascites.

## 2023-05-09 NOTE — PROGRESS NOTES
Antimicrobial Stewardship Team Note    Antimicrobial Stewardship Program - A joint venture between [facility] and [facility] to optimize antibiotic management.     Patient: Los Huang  MRN: [id number]  Allergies: Animal dander    Brief Summary: Los Huang is a 67 year old male admitted on 5/3/23 with hypotension and lactic acidosis in the setting of GI bleed. PMH significant for colorectal carcinoma, alcohol use disorder, and PTSD. Patient reported diarrhea and hematochezia. CT a/p on admission with no evidence of active GI bleed, concerning for shock bowel with marked edema. 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 Patient was given 1 dose of Zosyn in ED and then started on ceftriaxone for possible CAP and SBP prophylaxis. Ceftriaxone was broadened back to Zosyn on 5/4 after patient had acute tachycardia and distended/firm abdomen with fever to 100.8F.    CXR 5/5 with known lung metastases, bilateral infiltrates due to pulmonary edema vs pneumonia. CXR 5/6 with multiple pulmonary nodules bilaterally compatible with metastatic disease. Trace bilateral effusions with bibasilar atelectasis. Blood cultures from admission grew MRSE in 1/2 bottles, suspected contaminant. Repeat blood cultures negative. Paracentesis done 5/5 with 2.1L of clear fluid removed, 120 nucleated cells 7% neutrophils.    WBC count 17.9 on admission, remains persistently elevated at 15.5 on 5/8. Afebrile since 5/5 and stable on room air.         Active Anti-infective Medications   (From admission, onward)                 Start     Stop    05/04/23 2330  piperacillin-tazobactam  4.5 g,   Intravenous,   EVERY 6 HOURS        Community Acquired Pneumonia    SBP prophylaxis        --                  Assessment: Possible CAP, SBP prophylaxis  Patient presented with GI bleed found to have shock bowel and progression of colon cancer. He was  given a dose of ceftriaxone for SBP prophylaxis which was broadened to Zosyn on 5/5 given fever and concern for CAP. Patient is currently on day 6 of antibiotics. Paracentesis done 5/5, results not consistent with SBP and ongoing broad spectrum coverage not indicated. Recommend stopping Zosyn after 7 day course which adequately covers CAP and SBP.    Recommendations:  Complete 7 day course of Zosyn on 5/10.    Discussed with ID Staff MD Miladis Roman, PharmD, BCIDP    Vital Signs/Clinical Features:  Vitals         05/07 0700  05/08 0659 05/08 0700 05/09 0659 05/09 0700  05/09 1430   Most Recent      Temp ( F) 97.7 -  98.5    97.3 -  98.3      97.4     97.4 (36.3) 05/09 0729    Pulse 99 -  104    98 -  103      93     93 05/09 0729    Resp 18 -  20    18 -  20      18     18 05/09 0729    /59 -  113/63    100/61 -  120/71      105/67     105/67 05/09 0729    SpO2 (%) 94 -  97    93 -  96      93     93 05/09 0729            Labs  Estimated Creatinine Clearance: 118.9 mL/min (based on SCr of 0.75 mg/dL).  Recent Labs   Lab Test 05/04/23 2212 05/05/23  0922 05/06/23  1117 05/07/23  0841 05/08/23  1011 05/09/23  1054   CR 0.74 0.84 0.74 0.75 0.74 0.75       Recent Labs   Lab Test 02/14/19  1816 04/17/19  0528 06/07/19  1609 07/25/19  1649 12/13/19  1558 05/26/20  1404 07/30/20  0712 12/14/20  2133 07/14/22  1837 05/04/23  2212 05/05/23  1120 05/06/23  1117 05/07/23  0841 05/07/23  2234 05/08/23  1011 05/08/23  2013 05/09/23  1054   WBC 6.4 5.7   < > 6.0 6.3 4.9   < > 7.2   < > 18.1* 12.6* 11.1* 11.2*  --  15.5*  --  15.9*   ANEU 4.1 2.9  --  3.1 3.5 2.6  --  5.3  --   --   --   --   --   --   --   --   --    ALYM 1.3 1.7  --  1.9 1.7 1.5  --  1.1  --   --   --   --   --   --   --   --   --    RIDDHI 0.7 0.9  --  0.8 0.7 0.5  --  0.6  --   --   --   --   --   --   --   --   --    AEOS 0.3 0.3  --  0.2 0.3 0.2  --  0.2  --   --   --   --   --   --   --   --   --    HGB 15.3 14.2   < > 14.7 15.2  14.5   < > 14.8   < > 8.1* 7.2* 7.5* 7.5* 7.6* 8.0* 7.5* 7.6*   HCT 46.8 42.4   < > 42.5 45.1 42.6   < > 44.8   < > 24.7* 22.2* 22.9* 23.2*  --  25.1*  --  24.4*   MCV 90 90   < > 89 89 88   < > 91   < > 90 91 90 90  --  91  --  92    164   < > 162 167 174   < > 176   < > 136* 95* 99* 131*  --  194  --  223    < > = values in this interval not displayed.       Recent Labs   Lab Test 05/04/23 2212 05/05/23  0922 05/06/23  1117 05/07/23  0841 05/08/23  1011 05/09/23  1054   BILITOTAL 2.4*  2.4* 2.7* 3.6* 4.3* 4.0* 4.1*   ALKPHOS 402*  397* 413* 505* 617* 770* 915*   ALBUMIN 3.0*  3.0* 2.9* 2.6* 2.7* 2.7* 2.8*   *  203* 157* 112* 119* 111* 108*   *  154* 133* 117* 128* 123* 126*       Recent Labs   Lab Test 07/14/22  2107 07/15/22  0535 07/16/22  1136 05/03/23  1157 05/03/23  1500 05/04/23  1116 05/04/23 2212 05/05/23  0922   PCAL  --  0.56* 0.21*  --   --   --   --   --    LACT 2.4*  --   --  8.2* 2.8* 1.5 2.2* 1.8             Culture Results:  7-Day Micro Results       Procedure Component Value Units Date/Time    C. difficile Toxin B PCR with reflex to C. difficile Antigen and Toxins A/B EIA     Order Status: Canceled Lab Status: No result     Specimen: Stool from Large Intestine, Colon     Urine Culture Aerobic Bacterial [[id number]] Collected: 05/03/23 2002    Order Status: Completed Lab Status: Final result Updated: 05/05/23 0639    Specimen: Urine, Midstream      Culture No Growth    Blood Culture Arm, Left [[id number]]  (Normal) Collected: 05/03/23 1755    Order Status: Completed Lab Status: Final result Updated: 05/08/23 2201    Specimen: Blood from Arm, Left      Culture No Growth    Enteric Bacteria and Virus Panel by ASHLEIGH Stool     Order Status: Canceled Lab Status: No result     Specimen: Stool from Per Rectum     Blood Culture Peripheral Blood [[id number]]  (Abnormal)  (Susceptibility) Collected: 05/03/23 1211    Order Status: Completed Lab Status: Final result Updated:  "05/06/23 0743    Specimen: Peripheral Blood      Culture Positive on the 1st day of incubation      Staphylococcus epidermidis     Comment: 1 of 2 bottles       Susceptibility       Staphylococcus epidermidis (1)       Antibiotic Interpretation Sensitivity   Method Status    Oxacillin Susceptible <=0.25 ug/mL MIGUEL Final     Oxacillin susceptible isolates are susceptible to cephalosporins (example: cefazolin and cephalexin) and beta lactam combination agents. Oxacillin resistant isolates are resistant to these agents.       Gentamicin Susceptible <=0.5 ug/mL MIGUEL Final    Ciprofloxacin Susceptible <=0.5 ug/mL MIGUEL Final    Levofloxacin Susceptible <=0.12 ug/mL MIGUEL Final    Inducible macrolide resistance test  [*]  Negative Negative ug/mL MIGUEL Final    Erythromycin Susceptible <=0.25 ug/mL MIGUEL Final    Clindamycin Susceptible <=0.25 ug/mL MIGUEL Final    Quinupristin/Dalfopristin  [*]  Susceptible <=0.25 ug/mL MIGUEL Final    Linezolid  [*]  Susceptible 1 ug/mL MIGUEL Final    Vancomycin Susceptible 1 ug/mL MIGUEL Final    Tetracycline Susceptible <=1 ug/mL MIGUEL Final    Tigecycline  [*]  No interpretation available <=0.12 ug/mL MIGUEL Final    Nitrofurantoin  [*]  Susceptible <=16 ug/mL MIGUEL Final    Rifampin  [*]  Susceptible <=0.5 ug/mL MIGUEL Final      Susceptibility Comments       Antibiotics listed as \"No Interpretation\" have no regulatory guidelines for susceptibility/resistance available.                       [*]  Suppressed Antibiotic                   Blood Culture Peripheral Blood [[id number]]  (Normal) Collected: 05/03/23 1211    Order Status: Completed Lab Status: Final result Updated: 05/08/23 1501    Specimen: Peripheral Blood      Culture No Growth    Verigene GP Panel [[id number]]  (Abnormal) Collected: 05/03/23 1211    Order Status: Completed Lab Status: Final result Updated: 05/04/23 1509    Specimen: Peripheral Blood      Staphylococcus aureus Not Detected     Staphylococcus epidermidis Detected     Comment: " Positive for Staphylococcus epidermidis and negative for the mecA gene (not resistant to methicillin) by Verigene multiplex nucleic acid test. Final identification and antimicrobial susceptibility testing will be verified by standard methods.        Staphylococcus lugdunensis Not Detected     Enterococcus faecalis Not Detected     Enterococcus faecium Not Detected     Streptococcus species Not Detected     Streptococcus agalactiae Not Detected     Streptococcus anginosus group Not Detected     Streptococcus pneumoniae Not Detected     Streptococcus pyogenes Not Detected     Listeria species Not Detected    Narrative:      Specimen tested with Verigene multiplex, gram-positive blood culture nucleic acid test for the following targets: Staphylococcus aureus, Staphylococcus epidermidis, Staphylococcus lugdunensis, other Staphylococcus species, Enterococcus faecalis, Enterococcus faecium, Streptococcus species, Streptococcus agalactiae, Streptococcus anginosus group, Streptococcus pneumoniae, Streptococcus pyogenes, Listeria species, mecA (methicillin resistance), and Elliott/vanB (vancomycin resistance).            Recent Labs   Lab Test 12/14/20  2133 07/15/22  1305 05/03/23 2002 05/06/23 2042   URINEPH 5.0 6.0 5.0 6.0   NITRITE Negative Negative Negative Negative   LEUKEST Negative Negative Negative Negative   WBCU <1 <1 1 1                         Imaging: CTA Abdomen Pelvis with Contrast    Result Date: 5/9/2023  EXAM: CTA ABDOMEN PELVIS WITH CONTRAST LOCATION: [facility] DATE/TIME: 5/9/2023 5:33 AM CDT INDICATION: GI bleed COMPARISON: 5/3/2023 and 7/14/2022 TECHNIQUE: CT angiogram abdomen pelvis during arterial phase of injection of IV contrast. 2D and 3D MIP reconstructions were performed by the CT technologist. Dose reduction techniques were used. CONTRAST: 123mL Isovue 370 FINDINGS: ANGIOGRAM ABDOMEN/PELVIS: Normal caliber abdominal aorta. Celiac, superior mesenteric, renal and inferior  mesenteric arteries are patent. 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 Site of GI bleed not identified. LOWER CHEST: Pulmonary metastases are again seen. Ossified granulomas right lung base. Small right pleural effusion. HEPATOBILIARY: Right hepatectomy. Lobular contour of the residual liver. Confluent masses in the left central lobe unchanged. Expansile hypodensity within the left portal vein similar. PANCREAS: Normal. SPLEEN: Calcified granulomas. Splenomegaly. ADRENAL GLANDS: Thickening left adrenal. KIDNEYS/BLADDER: 2.4 cm mass lower pole right kidney and 1.7 cm lesion upper pole left kidney similar. Subcentimeter renal hypodensities are small for characterization. BOWEL: Right hemicolectomy. Underdistention the stomach. Improved wall thickening of small bowel. Persistent but improved colonic wall thickening. Moderate amount of ascites. LYMPH NODES: Stable. PELVIC ORGANS: Prominent prostate prostate calcification. MUSCULOSKELETAL: Degenerative change osseous structures. Soft tissue edema.     IMPRESSION: 1.  Site of GI bleed not identified. 2.  Interval improvement in wall thickening of small bowel and colon. 3.  Pulmonary metastases. Small right pleural effusion. 4.  Tumor thrombus left portal vein and hepatic metastases again seen. Subcapsular implants along the liver again noted. 5.  Bilateral renal lesions again seen. 6.  Moderate amount of ascites.    XR Chest Port 1 View    Result Date: 5/6/2023  EXAM: XR CHEST PORT 1 VIEW LOCATION: [facility] DATE/TIME: 5/6/2023 8:49 AM CDT INDICATION: f u pulmonary edema COMPARISON: 05/04/2023     IMPRESSION: Heart is normal in size. Multiple pulmonary nodules are noted bilaterally compatible with metastatic disease. Trace bilateral effusions with bibasilar atelectasis. Large mass is noted within the medial right lung base. Left Mediport, unchanged. No evidence of pulmonary edema.     US Paracentesis without Albumin    Result Date:  5/5/2023  US PARACENTESIS WITHOUT ALBUMIN 5/5/2023 10:40 AM CLINICAL HISTORY: sepsis PROCEDURE: Informed consent obtained. Time out performed. The abdomen was prepped and draped in a sterile fashion. 10 mL of 1% lidocaine was infused into local soft tissues. A 5 Urdu catheter system was introduced into the abdominal ascites under ultrasound guidance. 2.1 liters of clear fluid were removed and sent to lab if requested. Patient tolerated procedure well. Ultrasound imaging was obtained and placed in the patient's permanent medical record.     IMPRESSION: 1.  Status post ultrasound-guided paracentesis. JACK LANDRY MD   SYSTEM ID:  [id number]    XR Chest Port 1 View    Result Date: 5/4/2023  EXAM: XR CHEST PORT 1 VIEW LOCATION: [facility] DATE/TIME: 5/4/2023 10:55 PM CDT INDICATION: Coughing, leukocytosis COMPARISON: 05/10/2021     IMPRESSION: Multiple bilateral lung nodules compatible with known metastatic disease. Pulmonary edema. Concomitant infiltrates cannot be excluded. Small right pleural effusion. Heart size normal. Left chest port catheter tip in SVC.    CTA Abdomen Pelvis with Contrast    Result Date: 5/3/2023  CTA ABDOMEN PELVIS WITH CONTRAST 5/3/2023 2:16 PM CLINICAL HISTORY: GI bleed, TECHNIQUE: CT angiogram of the abdomen and pelvis was performed following injection of IV contrast. Multiplanar reformats were obtained. Dose reduction techniques were used. CONTRAST: 119mL Isovue-370 COMPARISON: 7/14/2022 FINDINGS: CT ABDOMINAL AORTIC ANGIOGRAM: The celiac artery, superior mesenteric artery, bilateral renal arteries, inferior mesenteric artery, bilateral common, external and internal iliac arteries and common femoral arteries are patent without significant stenosis. Stable aneurysmal dilatation of the distal common iliac artery measuring 1.7 cm. No evidence for active extravasation into the bowel. LOWER CHEST: Multiple pulmonary nodules and masses in the visualized lung bases with  enlargement of several nodules. For example, a 4.6 cm mass in the posterior left lower lobe previously measured 3.4 cm (series 6, image 9) and a 2.8 cm mass in the right lower lobe measured 1.1 cm (series 6, image 24). Partly visualized small right pleural effusion. HEPATOBILIARY: Right hepatectomy. New expansile hypodensity in the left portal vein with enlargement of multiple nodules in the left hepatic lobe, concerning for tumor thrombus and worsening metastatic disease. Confluent masses in the central left lobe measure 7.0 x 4.9 cm, previously measuring 3.7 x 2.1 cm (series 8, image 32). Cirrhotic morphology of the liver. Increased size of subcapsular implants along the right hepatic lobe. For example, 11.5 x 7.0 cm implant along the right hepatectomy margin previously measured about 7.0 x 4.0 cm (series 8, image 17). PANCREAS: Normal. SPLEEN: Stable splenomegaly and splenic granuloma. ADRENAL GLANDS: Normal. KIDNEYS/BLADDER: Stable 2.6 cm solid enhancing mass of the lower pole of the right kidney and indeterminate 2.1 cm mass of the upper pole of the left kidney. No hydronephrosis or perinephric stranding bilaterally. BOWEL: Right hemicolectomy. No small bowel or colonic obstruction. Marked circumferential wall thickening with enhancement and edema of the entire small bowel and colon. The appearance is most suggestive of shock bowel. No colonic diverticuli. No evidence of active GI bleed. PELVIC ORGANS: No pelvic masses. ADDITIONAL FINDINGS: New moderate ascites. No lymphadenopathy in the abdomen and pelvis. MUSCULOSKELETAL: No destructive lesions of the bones. Right upper abdominal hernia mesh.     IMPRESSION: 1.  Findings concerning for shock bowel with marked edema involving the entire small bowel and residual colon (prior right hemicolectomy). No evidence for active GI bleed. 2.  Disease progression of colon cancer with enlargement of visualized lung metastases. New small right pleural effusion. 3.  New  expansile tumor thrombus in the left portal vein and new/enlarging hepatic metastases. Enlargement of subcapsular metastatic deposits along the right lobe of the liver. 4.  Right hepatectomy. Cirrhosis and splenomegaly. 5.  New moderate ascites either due to portal hypertension and/or peritoneal carcinomatosis. 6.  Stable bilateral renal masses concerning for solid renal neoplasms. 7.  Stable mildly aneurysmal distal common iliac artery measuring 1.7 cm. HARI GARCIA MD   SYSTEM ID:  [id number]

## 2023-05-09 NOTE — PROGRESS NOTES
[facility]  Gastroenterology Progress Note     Los Huang MRN# [id number]   YOB: 1955 Age: 67 year old          Assessment and Plan:   Los Huang is a 67 year old male with hx of alcoholic cirrhosis with known esophageal varices, metastatic colon cancer s/p partial colectomy and right hepatectomy previously on hospice, PTSD/schioaffective/bipolar disorder who was admitted on 5/3/2023 for hemorrhagic shock due to acute GI bleed. Hospital course complicated by sepsis of unclear source.    Hemorrhagic shock (H)  History of esophageal varices  Gastrointestinal hemorrhage, unspecified gastrointestinal hemorrhage type  Hemoglobin has been stable in mid 7 range. Has had melenotic stools  EGD 5/3 was negative for source of active bleed  5/9 CTA notes no source of GI bleed. Interval improvement in wall thickening of small bowel and colon. Pulmonary metastases. Bladder mass. Mass in left lobe of liver.   Patient likely has bleed from small bowel mets given normal EGD  Could consider colonoscopy but would need to tolerate colon prep and less likely to find source of bleed.     -- daily hemoglobin  -- BID PPI  -- soft diet  -- Gi will follow along       Alcohol abuse      Interval History:   no new complaints and doing well; no cp, sob, n/v/d, or abd pain.              Review of Systems:   C: NEGATIVE for fever, chills, change in weight  E/M: NEGATIVE for ear, mouth and throat problems  R: NEGATIVE for significant cough or SOB  CV: NEGATIVE for chest pain, palpitations or peripheral edema             Medications:   I have reviewed this patient's current medications    diclofenac  2 g Topical BID     folic acid  1 mg Oral Daily     multivitamin w/minerals  1 tablet Oral Daily     paliperidone  234 mg Intramuscular Q28 Days     pantoprazole  40 mg Oral BID AC     piperacillin-tazobactam  4.5 g Intravenous Q6H     sodium chloride (PF)  3 mL Intracatheter Q8H                   Physical Exam:   Vitals were reviewed  Vital Signs with Ranges  Temp:  [97.4  F (36.3  C)-98.3  F (36.8  C)] 97.4  F (36.3  C)  Pulse:  [] 93  Resp:  [18] 18  BP: (105-120)/(63-71) 105/67  SpO2:  [93 %-96 %] 93 %  I/O last 3 completed shifts:  In: 360 [P.O.:360]  Out: 250 [Urine:250]  Constitutional: healthy, alert and no distress   Abdomen: Abdomen soft, non-tender. BS normal. No masses, organomegaly           Data:   I reviewed the patient's new clinical lab test results.   Recent Labs   Lab Test 05/09/23  1054 05/08/23 2013 05/08/23  1011 05/07/23 2234 05/07/23  0841   WBC 15.9*  --  15.5*  --  11.2*   HGB 7.6* 7.5* 8.0*   < > 7.5*   MCV 92  --  91  --  90     --  194  --  131*   INR 1.48*  --  1.69*  --  1.71*    < > = values in this interval not displayed.     Recent Labs   Lab Test 05/09/23  1054 05/08/23  1011 05/07/23  0841   POTASSIUM 3.3* 3.8 3.3*   CHLORIDE 102 105 102   CO2 21* 19* 22   BUN 21.9 24.6* 23.9*   ANIONGAP 13 14 13     Recent Labs   Lab Test 05/09/23  1054 05/08/23  1011 05/07/23  0841 05/06/23 2042 05/04/23  2212 05/03/23 2002 07/16/22  1136 07/15/22  1305 12/16/20  0728 12/14/20  2133   ALBUMIN 2.8* 2.7* 2.7*  --    < >  --    < >  --    < > 3.7   BILITOTAL 4.1* 4.0* 4.3*  --    < >  --    < >  --    < > 0.4   * 123* 128*  --    < >  --    < >  --    < > 80*   * 111* 119*  --    < >  --    < >  --    < > 40   PROTEIN  --   --   --  20*  --  Negative  --  Negative  --  Negative   LIPASE  --   --   --   --   --   --   --   --   --  214    < > = values in this interval not displayed.       I reviewed the patient's new imaging results.    All laboratory data reviewed  All imaging studies reviewed by me.    Tammie Mcqueen PA-C,  5/9/2023  [facility]  Office : [phone]  Cell: [phone] (Dr. Pedroza)  Cell: [phone] (Tammie Mcqueen PA-C)

## 2023-05-09 NOTE — PROGRESS NOTES
Care Management Follow Up    Length of Stay (days): 6    Expected Discharge Date: 05/10/2023     Concerns to be Addressed:       Patient plan of care discussed at interdisciplinary rounds: Yes    Anticipated Discharge Disposition:TCU     Anticipated Discharge Services:    Anticipated Discharge DME:      Patient/family educated on Medicare website which has current facility and service quality ratings: no  Education Provided on the Discharge Plan:    Patient/Family in Agreement with the Plan: unable to assess    Referrals Placed by CM/SW: Internal Clinic Care Coordination  Private pay costs discussed:     Additional Information:  Patient clinically accepted by [facility] into a private room.   They can admit patient today. GI note from 5/8 noted.  Will update patient regarding MOC vacancy and await MD input regarding anticipated  discharge         DANAE AlbertSW

## 2023-05-09 NOTE — PLAN OF CARE
Summary: Sepsis unclear source. hemorrhagic shock due to acute GI bleed, Has metastatic colon cancer with suspected renal/lung cancer w mets. History of depression, PTSD, alcohol use disorder, schizoaffective disorder and bipolar.    DATE & TIME: 5/8/2023 night shift from [id number]  Cognitive Concerns/ Orientation : A&Ox4- pleasant. Forgetful at times  BEHAVIOR & AGGRESSION TOOL COLOR: Green  ABNL VS/O2: VSS on RA  MOBILITY: Asisst X1 with GB and walker,  Up in the chair this shift X1  PAIN MANAGMENT: Pain managed with Oxy PRN x1  DIET: Mechanical soft diet with snack. Supplements between meals  BOWEL/BLADDER: Continent B&B, Up to BR- BMX1 - hard/brown. Used urinal this shift.   ABNL LAB/BG: hgb 7.5,  ALT 23 and . WBC 15.5  DRAIN/DEVICES: PIV x3 saline locked   TELEMETRY RHYTHM: DNR/DNI, discontinued   SKIN: Pale, jaundiced skin, Mepilex to L knee -abrasion-healing. +2 edema BLE, elevated on pillows. Mepilex on coccyx.   TESTS/PROCEDURES: had CT Abd at 0500 this morning.   D/C DATE: Discharge to TCU pending progress-referrals sent  OTHER IMPORTANT INFO: On IV antibiotics, diminished lung sounds all fields except anterior, posterior upper  lungs- clear equal bilateral, occasional nonproductive cough- given Tessalon x2 PRN for cough this shift, pt refused to put PCD on- provided education

## 2023-05-10 NOTE — PLAN OF CARE
Goal Outcome Evaluation:  Summary: Sepsis unclear source. hemorrhagic shock due to acute GI bleed, Has metastatic colon cancer with suspected renal/lung cancer w mets. History of depression, PTSD, alcohol use disorder, schizoaffective disorder and bipolar.    DATE & TIME: 5/10 5642-5643  Cognitive Concerns/ Orientation : A&Ox4 Forgetful at times. Flat affect. take longer to answer questions/ slower speech  BEHAVIOR & AGGRESSION TOOL COLOR: Green  ABNL VS/O2: VSS on room air  MOBILITY: Asisst X1 with GB and walker, up in chair part of shift, ambulates to bathroom.   PAIN MANAGMENT: Right rib/chest pain from coughing- managed with Oxy x1, tessalon pearls available prn, encouraged to do  IS.   DIET: Mechanical soft diet with snack. Supplements between meals  BOWEL/BLADDER: Continent B&B. Used urinal this shift. BM X1-  ABNL LAB/BG: hgb 7.6,on K protocol: 3.4.,  wbc 15.9  DRAIN/DEVICES: PIV x3 saline locked   TELEMETRY RHYTHM: NA  SKIN: Pale, jaundiced skin, Left  knee abrasion, +2 edema BLE,    +3 for ankles- elevated on pillows. Mepilex on coccyx.   TESTS/PROCEDURES:   D/C DATE: possible Discharge to TCU to [place]/ [facility]  OTHER IMPORTANT INFO: Started on PO antibiotics today. diminished lung sounds. Nonproductive cough. GOLDEN. Patient wants to have paracentesis before he discharges

## 2023-05-10 NOTE — PLAN OF CARE
Summary: Sepsis unclear source. hemorrhagic shock due to acute GI bleed, Has metastatic colon cancer with suspected renal/lung cancer w mets. History of depression, PTSD, alcohol use disorder, schizoaffective disorder and bipolar.    DATE & TIME: 5/9/2023 night shift [id number]  Cognitive Concerns/ Orientation : A&Ox4 Forgetful at times. Flat affect. take longer to answer questions/ slower speech  BEHAVIOR & AGGRESSION TOOL COLOR: Green  ABNL VS/O2: VSS on room air  MOBILITY: Asisst X1 with GB and walker  PAIN MANAGMENT: Right rib/chest pain from coughing- managed with Oxy x1, tessalon given x1 for coughing. Followed up with IS. Pt did good.    DIET: Mechanical soft diet with snack. Supplements between meals  BOWEL/BLADDER: Continent B&B. Used urinal this shift. BM X1- formed/yellow/no blood.  ABNL LAB/BG: hgb 7.6,on K protocol: 3.8,  wbc 15.9  DRAIN/DEVICES: PIV x3 saline locked   TELEMETRY RHYTHM: NA  SKIN: Pale, jaundiced skin, Mepilex to L knee -abrasion-healing. +2 edema BLE,    +3 for ankles- elevated on pillows. Mepilex on coccyx.   TESTS/PROCEDURES:   D/C DATE: possible Discharge to TCU today to [facility]  OTHER IMPORTANT INFO: On IV antibiotics, diminished lung sounds. Nonproductive cough. GOLDEN. Will switch with oral ABX on discharge

## 2023-05-10 NOTE — PROGRESS NOTES
[facility]    Medicine Progress Note - Hospitalist Service    Date of Admission:  5/3/2023    Assessment & Plan     Los Huang is a 67 year old male with hx of alcoholic cirrhosis with known esophageal varices, metastatic colon cancer s/p partial colectomy and right hepatectomy previously on hospice, PTSD/schioaffective/bipolar disorder who was admitted on 5/3/2023 for hemorrhagic shock due to acute GI bleed. Hospital course complicated by sepsis of unclear source     Goals of care  Metastatic Colon Cancer   Suspected renal cancer  - Diagnosed with colorectal cancer in 2014 with oligometastatic disease to liver at the time of diagnosis. He was treated with ascending colectomy 6/14 and R hepatectomy in 8/14. He declined chemotherapy and enrolled in [facility], but eventually unenrolled from hospice due to overall stability and hospice visits felt intrusive  - CTA abd/pelvis this admission showed disease progression of colon cancer with lung mets, new tumor thrombus in the left portal vein, new/enlarging liver mets, new ascites, and stable bilateral renal masses concerning for solid renal neoplasms  -Previous hospitalist discussed on 5/4: goals of care initiated with patient and family. Cancer progression discussed. Explained that he currently has 2 terminal conditions: advanced colon cancer and cirrhosis. Patient does not want treatment for his cancer, but his current hospitalization is due to a complication of his cirrhosis, and he does want treatment for that. He was actually doing reasonably well, living independently prior to this hospitalization. His goal is to recover from his acute illness, work with PT, and eventually return to his apartment at [facility]. He will consider hospice down the road as he declines.   - DNR/DNI. Goals otherwise restorative.   - PT recommending TCU. Pt agreeable.  - 5/8-Again discussed with patient's about goals of care and he does  think that he will get better but did mention to me that he was not aware of cirrhosis diagnosis     Hemorrhagic shock due to acute GI bleed, Improved  Lactic acidosis, Resolved  Colitis likely due to colon cancer  Alcoholic cirrhosis with esophageal varices, ascites, and hepatic encephalopathy  - Presented with confusion, falls, and 1 week history of intermittent rectal bleeding which patient felt was related to hemorrhoids. Hypotensive to 70s on arrival with initial lactate >8; improved with IV fluids. Initial Hgb 5.7. Ammonia level normal.  - In the ED, he was initiated on an octreotide infusion, IV PPI BID, and IV ceftriaxone for SBP ppx. Also transfused total 3 units pRBCs upon admission  - [facility] GI was consulted on admission. Underwent EGD which showed variceal banding scar that appeared healthy, as well as grade I and II esophageal varices. Colonoscopy deferred due to evidence of colitis on imaging.   - CTA abd/pelvis upon admission showed marked wall thickening and edema of the entire bowel with concern for shock bowel/ischemic bowel and disease progression of colon cancer; however patient has been relatively asymptomatic with improved lactate and WBC  - Octreotide d/c'd on 5/4.   - 5/8 He had an episode of maroon-colored bloody stool this morning and was hemodynamically stable and repeat hemoglobin was stable at 8 and I did discuss with the GI team again today and source of his blood loss today is due to metastatic disease and further evaluation with colonoscopy is not possible with disease progression as tumors/neoplastic tissue is friable and treatment with APC would not be helpful and treatment is blood products  -He had repeat CT abdomen done overnight and did not show any evidence of bleeding  -HemoGlobin this morning is stable at 7.6 and has been stable for the past 2 days  -We will continue to check his H&H every 12 hours, continue with PPI, soft diet     Sepsis due to suspected aspiration  pneumonia, Resolving  Positive blood culture, suspect contaminant  - Spiked to fever to 100.8 overnight 5/4-5/5 along with mild tachycardia. WBC 18.1k. Lactate 2.2. Reported increased shortness of breath. CXR showed known lung mets, bilateral infiltrates due to pulmonary edema vs pneumonia. He had been on ceftriaxone for SBP prophylaxis; antibiotics were broadened to IV pip-tazo with improvement. COVID-19 PCR negative. Unfortunately, blood cultures were not drawn.   - Underwent paracentesis (5/5) with removal of 2L fluid. 120 PMNs, no evidence of SBP  - 1 out of 2 blood cultures on admission (5/3) grew Staph epi. Suspect contaminant  - Has since been afebrile and had mild increase in WBC count of 15.5  - Continues on IV pip-tazo for now, transition to PO abx at discharge  - He has been cleared by SLP for regular diet with thin liquids     Acute respiratory distress due to aspiration pneumonia vs pulmonary edema, Resolving  Leukocytosis  - Reported increased shortness of breath overnight 5/4-5/5.   -CXR showed pulmonary edema vs pneumonia. spO2 mid 90s on room air, but was placed on supplemental O2 for comfort.Treated with IV pip-tazo with improvement.   - Repeat CXR (5/6) improved  -He has been having persistent white count during this hospital stay but he has been afebrile  - of Note patient has been having persistent leukocytosis and did go up to 15.9 yesterday and is down to 15.1 we will continue to monitor and repeat labs in the morning and he will finish the course of antibiotics today     Thrombocytopenia, -Resolved  - Likely due to cirrhosis and ongoing alcohol use. Platelets wnl on admission though sample was likely hemoconcentrated.   -Platelet count this morning is stable at 223     Alcohol use disorder  - Longstanding history of alcohol dependence. Currently consuming 1 oz of gin 5 times daily. Last drink: 5/3. No s/sx of withdrawal noted this admission  - d/c CIWA today, 5/7  - On thiamine/folate/MVI  while hospitalized     Hypokalemia  -He does have potassium of 3.3 and we will start him on replacement      PTSD  Hx of schizoaffective disorder  Hx of bipolar disorder type I  - PTA regimen: clonazepam 0.5 mg daily, olanzapine 5 mg q6h prn, Invega 234 IM q28 days  - Last Invega dose: 5/6  - Clonazepam and olanzapine ordered prn this admission     Cachexia  Severe malnutrition in context of acute on chronic illness  - Daily magic cup ordered per Nutrition       Diet: Room Service  Mechanical/Dental Soft Diet  Snacks/Supplements Adult: Magic Cup; Between Meals    DVT Prophylaxis: Pneumatic Compression Devices  Chew Catheter: Not present  Lines: PRESENT             Cardiac Monitoring: None  Code Status: No CPR- Do NOT Intubate      Clinically Significant Risk Factors        # Hypokalemia: Lowest K = 3.3 mmol/L in last 2 days, will replace as needed       # Hypoalbuminemia: Lowest albumin = 2.6 g/dL at 5/10/2023 10:05 AM, will monitor as appropriate            # Severe Malnutrition: based on nutrition assessment        Disposition Plan      Expected Discharge Date: 05/11/2023        Discharge Comments: Pt is from [facility], however needs TCU now. Declines Hospice at this time. Per MD note can discharge once hemoglobin stable and no signs of sepsis.          Sameera Cronin MD  Hospitalist Service  [facility]  Securely message with [employer] (more info)  Text page via [employer] Paging/Directory   ______________________________________________________________________    Interval History     I saw the patient today and he was laying in the bed and denies any fever, chills, nausea or vomiting.  He does not have any abdominal pain and no further rectal bleeding.  He denies any abdominal pain or shortness of breath    D/w the plan with patient's nurse and also    Physical Exam   Vital Signs: Temp: 97.8  F (36.6  C) Temp src: Oral BP: 117/63 Pulse: 93   Resp: 18 SpO2: 93 % O2 Device: None (Room  air)    Weight: 199 lbs 4.73 oz        General: aox3 and frail looking   HEENT: Head is atraumatic, normocephalic.    Neck: Neck is supple  Respiratory:   Cardiovascular: Regular rate , S1 and S2 normal with no murmer or rubs or gallops  Abdomen:   soft , non tender ,   Skin: No skin rashes or lesions to inspection or palpation.  Neurologic:  No facial droop  Musculoskeletal: Normal Range of motion over upper and lower extremities bilaterally   Psychiatric: cooperative     Medical Decision Making             Data     I have personally reviewed the following data over the past 24 hrs:    15.1 (H)  \   7.6 (L)   / 238     136 102 21.5 /  175 (H)   3.4 19 (L) 0.80 \       ALT: 113 (H) AST: 91 (H) AP: 936 (H) TBILI: 3.8 (H)   ALB: 2.6 (L) TOT PROTEIN: 5.9 (L) LIPASE: N/A       INR:  1.51 (H) PTT:  N/A   D-dimer:  N/A Fibrinogen:  N/A       Imaging results reviewed over the past 24 hrs:   No results found for this or any previous visit (from the past 24 hour(s)).

## 2023-05-10 NOTE — PROVIDER NOTIFICATION
MD Notification    Notified Person: MD    Notified Person Name: Dr Cronin     Notification Date/Time: 5/10/23 1601    Notification Interaction: Aiming messaging    Purpose of Notification: Pt stating he wants to have a paracentesis before he is discharged    Orders Received: not at this time    Comments:

## 2023-05-10 NOTE — PROGRESS NOTES
Care Management Follow Up    Length of Stay (days): 7    Expected Discharge Date: 05/10/2023     Concerns to be Addressed:       Patient plan of care discussed at interdisciplinary rounds: Yes    Anticipated Discharge Disposition: Assisted Living     Anticipated Discharge Services:    Anticipated Discharge DME:      Patient/family educated on Medicare website which has current facility and service quality ratings: no  Education Provided on the Discharge Plan:    Patient/Family in Agreement with the Plan: unable to assess    Referrals Placed by CM/SW: Internal Clinic Care Coordination  Private pay costs discussed: Not applicable    Additional Information:    Per chart review referral out to [facility].  DOD indicates pt was declined, however, notes indicate pt has been clinically accepted into a private room.  [facility] called [facility] to confirm clinical acceptance and begin coordination of discharge; left vm and waiting on return call back.    Update:  [facility] confirmed with [facility] that pt was initially accepted but b/c pt's discharge was delayed they were unable to hold the bed.  At this time, [facility] does not anticipate having any open beds for a week or more.  Moon attempted to call son, Juventino, to retrieve additional TCU choices.  Juventino was not available at any of the 3 numbers listed; Sw left vm.  Moon attempted to call daughter, Yuliet, and she was also not available.  Sw was unable to connect with pt today, either.  Sw to continue to follow.  Sw to retrieve additional TCU choices.      ARGELIA Burkett

## 2023-05-11 NOTE — PLAN OF CARE
"Goal Outcome Evaluation:    Summary: Sepsis unclear source. hemorrhagic shock due to acute GI bleed, Has metastatic colon cancer with suspected renal/lung cancer w mets. History of depression, PTSD, alcohol use disorder, schizoaffective disorder and bipolar.      DATE & TIME: 5/11/23 0700-1930 0700-1930  Cognitive Concerns/ Orientation : A&Ox4 Forgetful at times. Flat affect. take longer to answer questions/ slower speech.   BEHAVIOR & AGGRESSION TOOL COLOR: Yellow to Green.  Pt made insulting comments to writer such as \"You are a witch\" and made other negative comments. Writer and charge spoke to pt about talking to staff politely. Pt behavior improved afterwards.  ABNL VS/O2: VSS on room air  MOBILITY: Asisst X1 with GB and walker, ambulates to bathroom.   PAIN MANAGMENT: Received prn PO Oxycodone x1 for pain  DIET: Mechanical soft diet with snack. Supplements between meals  BOWEL/BLADDER: Continent B&B. Voiding in bathroom.  Had small BMx1 (tan colored)  ABNL LAB/BG: hgb 7.5, wbc-12.8  DRAIN/DEVICES: PIV x3 saline locked   TELEMETRY RHYTHM: NA  SKIN: Pale, jaundiced skin, Left  knee abrasion, +2 edema BLE,    +3 for ankles- elevated on pillows. Mepilex on coccyx. Bamdaid to Paracentesis site-CDI  TESTS/PROCEDURES: Had Paracentesis today-5/11  D/C DATE: Possible Discharge to TCU to [place]    OTHER IMPORTANT INFO: ABX course complete, diminished lung sounds. Nonproductive cough. GOLDEN. Received PO Tessalon jose x1 prn cough.                "

## 2023-05-11 NOTE — PROGRESS NOTES
[facility]    Medicine Progress Note - Hospitalist Service    Date of Admission:  5/3/2023    Assessment & Plan     Los Huang is a 67 year old male with hx of alcoholic cirrhosis with known esophageal varices, metastatic colon cancer s/p partial colectomy and right hepatectomy previously on hospice, PTSD/schioaffective/bipolar disorder who was admitted on 5/3/2023 for hemorrhagic shock due to acute GI bleed. Hospital course complicated by sepsis of unclear source     Goals of care  Metastatic Colon Cancer   Suspected renal cancer  - Diagnosed with colorectal cancer in 2014 with oligometastatic disease to liver at the time of diagnosis. He was treated with ascending colectomy 6/14 and R hepatectomy in 8/14. He declined chemotherapy and enrolled in [facility], but eventually unenrolled from hospice due to overall stability and hospice visits felt intrusive  - CTA abd/pelvis this admission showed disease progression of colon cancer with lung mets, new tumor thrombus in the left portal vein, new/enlarging liver mets, new ascites, and stable bilateral renal masses concerning for solid renal neoplasms  -Previous hospitalist discussed on 5/4: goals of care initiated with patient and family. Cancer progression discussed. Explained that he currently has 2 terminal conditions: advanced colon cancer and cirrhosis. Patient does not want treatment for his cancer, but his current hospitalization is due to a complication of his cirrhosis, and he does want treatment for that. He was actually doing reasonably well, living independently prior to this hospitalization. His goal is to recover from his acute illness, work with PT, and eventually return to his apartment at [facility]. He will consider hospice down the road as he declines.   - DNR/DNI. Goals otherwise restorative.   - PT recommending TCU. Pt agreeable.  - 5/8-Again discussed with patient's about goals of care and he does  think that he will get better but did mention to me that he was not aware of cirrhosis diagnosis     Hemorrhagic shock due to acute GI bleed, Improved  Lactic acidosis, Resolved  Colitis likely due to colon cancer  Alcoholic cirrhosis with esophageal varices, ascites, and hepatic encephalopathy  - Presented with confusion, falls, and 1 week history of intermittent rectal bleeding which patient felt was related to hemorrhoids. Hypotensive to 70s on arrival with initial lactate >8; improved with IV fluids. Initial Hgb 5.7. Ammonia level normal.  - In the ED, he was initiated on an octreotide infusion, IV PPI BID, and IV ceftriaxone for SBP ppx. Also transfused total 3 units pRBCs upon admission  - [facility] GI was consulted on admission. Underwent EGD which showed variceal banding scar that appeared healthy, as well as grade I and II esophageal varices. Colonoscopy deferred due to evidence of colitis on imaging.   - CTA abd/pelvis upon admission showed marked wall thickening and edema of the entire bowel with concern for shock bowel/ischemic bowel and disease progression of colon cancer; however patient has been relatively asymptomatic with improved lactate and WBC  - Octreotide d/c'd on 5/4.   - 5/8 He had an episode of maroon-colored bloody stool this morning and was hemodynamically stable and repeat hemoglobin was stable at 8 and I did discuss with the GI team again today and source of his blood loss today is due to metastatic disease and further evaluation with colonoscopy is not possible with disease progression as tumors/neoplastic tissue is friable and treatment with APC would not be helpful and treatment is blood products  -He had repeat CT abdomen done overnight and did not show any evidence of bleeding  -HemoGlobin this morning is stable at 7.5 and has been stable for the past 2 days  -continue with PPI, soft diet  - will order paracentesis as his abdomen is tense and patient agrees     Sepsis due to suspected  aspiration pneumonia, Resolving  Positive blood culture, suspect contaminant  - Spiked to fever to 100.8 overnight 5/4-5/5 along with mild tachycardia. WBC 18.1k. Lactate 2.2. Reported increased shortness of breath. CXR showed known lung mets, bilateral infiltrates due to pulmonary edema vs pneumonia. He had been on ceftriaxone for SBP prophylaxis; antibiotics were broadened to IV pip-tazo with improvement. COVID-19 PCR negative. Unfortunately, blood cultures were not drawn.   - Underwent paracentesis (5/5) with removal of 2L fluid. 120 PMNs, no evidence of SBP  - 1 out of 2 blood cultures on admission (5/3) grew Staph epi. Suspect contaminant  - wbc count has been down trending and he has been afebrile  -completed antibiotic course        Acute respiratory distress due to aspiration pneumonia vs pulmonary edema, Resolving  Leukocytosis-Improved  - Reported increased shortness of breath overnight 5/4-5/5.   -CXR showed pulmonary edema vs pneumonia. spO2 mid 90s on room air, but was placed on supplemental O2 for comfort.Treated with IV pip-tazo with improvement.   - Repeat CXR (5/6) improved  -He has been having persistent white count during this hospital stay but he has been afebrile  - wbc count is improving   - we will send ascitic fluid for analysis     Thrombocytopenia, -Resolved  - Likely due to cirrhosis and ongoing alcohol use. Platelets wnl on admission though sample was likely hemoconcentrated.   -Platelet count has been stable     Alcohol use disorder  - Longstanding history of alcohol dependence. Currently consuming 1 oz of gin 5 times daily. Last drink: 5/3. No s/sx of withdrawal noted this admission  - d/c NICHOLAS today, 5/7  - On thiamine/folate/MVI while hospitalized     Hypokalemia-resolved  -potassium stable at 3.4      PTSD  Hx of schizoaffective disorder  Hx of bipolar disorder type I  - PTA regimen: clonazepam 0.5 mg daily, olanzapine 5 mg q6h prn, Invega 234 IM q28 days  - Last Invega dose: 5/6  -  Clonazepam and olanzapine ordered prn this admission     Cachexia  Severe malnutrition in context of acute on chronic illness  - Daily magic cup ordered per Nutrition       Diet: Room Service  Mechanical/Dental Soft Diet  Snacks/Supplements Adult: Magic Cup; Between Meals  Snacks/Supplements Adult: Ensure Max Protein (bariatric); Between Meals    DVT Prophylaxis: Pneumatic Compression Devices  Chew Catheter: Not present  Lines: PRESENT             Cardiac Monitoring: None  Code Status: No CPR- Do NOT Intubate      Clinically Significant Risk Factors              # Hypoalbuminemia: Lowest albumin = 2.5 g/dL at 5/11/2023  8:54 AM, will monitor as appropriate            # Severe Malnutrition: based on nutrition assessment        Disposition Plan      Expected Discharge Date: 05/12/2023        Discharge Comments: Pt is from [facility], however needs TCU now. Declines Hospice at this time. Per MD note can discharge once hemoglobin stable and no signs of sepsis.          Sameera Cronin MD  Hospitalist Service  [facility]  Securely message with [employer] (more info)  Text page via [employer] Paging/Directory   ______________________________________________________________________    Interval History     Seen today and yesterday wanted paracentesis and it was ordered   Alert and awake and knows hospital , month and name of president   No fever or chills    D/w the plan with patient's nurse     Physical Exam   Vital Signs: Temp: 97.8  F (36.6  C) Temp src: Oral BP: 109/64 Pulse: 87   Resp: 18 SpO2: 96 % O2 Device: None (Room air)    Weight: 198 lbs 0 oz        General: aox3 and frail looking   HEENT: Head is atraumatic, normocephalic.    Neck: Neck is supple  Respiratory:   Cardiovascular: Regular rate , S1 and S2 normal with no murmer or rubs or gallops  Abdomen:   soft , non tender , distended   Skin: No skin rashes or lesions to inspection or palpation.  Neurologic:  No facial  droop  Musculoskeletal: Normal Range of motion over upper and lower extremities bilaterally   Psychiatric: cooperative     Medical Decision Making             Data     I have personally reviewed the following data over the past 24 hrs:    12.8 (H)  \   7.5 (L)   / 235     135 (L) 102 18.4 /  113 (H)   3.4 20 (L) 0.68 \       ALT: 100 (H) AST: 79 (H) AP: 878 (H) TBILI: 4.6 (H)   ALB: 2.5 (L); 2.5 (L) TOT PROTEIN: 5.8 (L) LIPASE: N/A       INR:  1.40 (H) PTT:  N/A   D-dimer:  N/A Fibrinogen:  N/A       Imaging results reviewed over the past 24 hrs:   Recent Results (from the past 24 hour(s))   US Paracentesis without Albumin    Narrative    ULTRASOUND PARACENTESIS WITHOUT ALBUMIN May 11, 2023 10:59 AM     HISTORY: Cirrhosis.    FINDINGS: Ultrasound was used to evaluate for the presence and best  approach for paracentesis. Written and oral informed consent was  obtained. A pause for the cause procedure to verify the correct  patient and correct procedure. The skin overlying the right lower  quadrant was prepped and draped in the usual sterile fashion. The  subcutaneous tissues were anesthetized with 10 mL 1% lidocaine. A  catheter was advanced into the peritoneal space and 3.4 L of  straw  colored fluid was drained. There were no immediate complications.  Ultrasound images were permanently stored. Patient left the ultrasound  suite in satisfactory condition.      Impression    IMPRESSION: Technically successful paracentesis without immediate  complications.    LINDA MONTELONGO MD         SYSTEM ID:  [id number]

## 2023-05-11 NOTE — PLAN OF CARE
Goal Outcome Evaluation:      Plan of Care Reviewed With: patient    Overall Patient Progress: improving    Outcome Evaluation: Pt eating adequate kcals but low in protein. Pt agreed to get Ensure Max Protein at night before bed.

## 2023-05-11 NOTE — PROGRESS NOTES
CLINICAL NUTRITION SERVICES - REASSESSMENT NOTE     Nutrition Prescription    RECOMMENDATIONS FOR MDs/PROVIDERS TO ORDER:  None at this time    Malnutrition Status:    Severe malnutrition in the context of acute on chronic illness or injury    Recommendations already ordered by Registered Dietitian (RD):  - ONS: Ensure Max Protein daily at 8pm  - New weights    Future/Additional Recommendations:  Monitor labs, new weights, po and ONS intake/tolerance, BMs     EVALUATION OF THE PROGRESS TOWARD GOALS   Diet: Mechanical/Dental Soft   Snacks and Supplements: Magic Cup daily  Intake: Pt ordering 3 meals/day and consuming 100%, meeting 88% of estimated energy needs and 54% PRO needs per 3-day avg via flow sheets and TeachbaseTouch.     NEW FINDINGS   Met with pt in room. Offered pt Ensure Max Protein to round out his nutrition, which he agreed to. Pt had declined offer of Ensure Enlive previously, and the Protein Max seemed more appropriate given his low protein intake vs kcal.     Labs:  Glu: 153-175 past 3 days    Meds:  Folic acid  Thera-Vit-M tablet    GI:  BMs:  4x on 5/10  1x on 5/9  5x on 5/8    Weight: <1% wt loss in 1 week  05/11/23 1128 89.8 kg (198 lb)   05/04/23 0615 90.4 kg (199 lb 4.7 oz)   05/03/23 1157 88.9 kg (195 lb 14.1 oz)     MALNUTRITION  % Intake: Decreased intake does not meet criteria  % Weight Loss: Weight loss does not meet criteria  Subcutaneous Fat Loss:  Orbital region severe depletion, Upper arm region severe depletion and Thoracic region severe depletion  Muscle Loss:  Temporal region severe depletion, Clavicle bone region severe depletion, Acromion bone region severe depletion, Dorsal hand region severe depletion, Patellar region severe depletion, Anterior thigh region severe depletion and Posterior calf region severe depletion  Fluid Accumulation/Edema: Mild dependent, both legs, both ankles, both feet  Malnutrition Diagnosis: Severe malnutrition in the context of acute on chronic illness or  injury    Previous Goals   Patient will consistently consume at least 75% meals and supplement   Evaluation: Met    Previous Nutrition Diagnosis  Malnutrition related to severe fat and muscle wasting in patient with ETOH abuse and cancer as evidenced by coding for severe malnutrition   Evaluation: No change    CURRENT NUTRITION DIAGNOSIS  Malnutrition related to severe fat and muscle wasting in patient with ETOH abuse and cancer as evidenced by coding for severe malnutrition       INTERVENTIONS  Implementation  Medical food supplement therapy - Ensure Max Protein    Goals  Patient to consume % of nutritionally adequate meal trays TID, or the equivalent with supplements/snacks.    Monitoring/Evaluation  Progress toward goals will be monitored and evaluated per protocol.    Scott Daley - Dietetic Intern

## 2023-05-11 NOTE — PLAN OF CARE
Goal Outcome Evaluation:    Summary: Sepsis unclear source. hemorrhagic shock due to acute GI bleed, Has metastatic colon cancer with suspected renal/lung cancer w mets. History of depression, PTSD, alcohol use disorder, schizoaffective disorder and bipolar.    DATE & TIME: 5/10-5/11 2399-3935  Cognitive Concerns/ Orientation : A&Ox4 Forgetful at times. Flat affect. take longer to answer questions/ slower speech  BEHAVIOR & AGGRESSION TOOL COLOR: Green  ABNL VS/O2: VSS on room air  MOBILITY: Asisst X1 with GB and walker, ambulates to bathroom.   PAIN MANAGMENT: Denied, encouraged to do IS.   DIET: Mechanical soft diet with snack. Supplements between meals  BOWEL/BLADDER: Continent B&B.   ABNL LAB/BG: hgb 7.6,on K protocol: 3.4-recheck in AM  DRAIN/DEVICES: PIV x3 saline locked   TELEMETRY RHYTHM: NA  SKIN: Pale, jaundiced skin, Left  knee abrasion, +2 edema BLE,    +3 for ankles- elevated on pillows. Mepilex on coccyx.   TESTS/PROCEDURES:   D/C DATE: possible Discharge to [facility]CU to [facility]  OTHER IMPORTANT INFO: Started on PO antibiotics-refused, diminished lung sounds. Nonproductive cough. GOLDEN. Patient wants to have paracentesis before he discharges

## 2023-05-11 NOTE — PROGRESS NOTES
Care Management Follow Up    Length of Stay (days): 8    Expected Discharge Date: 05/12/2023     Concerns to be Addressed:       Patient plan of care discussed at interdisciplinary rounds: Yes    Anticipated Discharge Disposition: Assisted Living     Anticipated Discharge Services:    Anticipated Discharge DME:      Patient/family educated on Medicare website which has current facility and service quality ratings: no  Education Provided on the Discharge Plan:    Patient/Family in Agreement with the Plan: unable to assess    Referrals Placed by CM/SW: Internal Clinic Care Coordination  Private pay costs discussed: Not applicable    Additional Information:  SW spoke with patient's son Juventino regarding getting more TCU choices. He stated he was comfortable with more referrals going out for facilities in the [facility] area. SW sent three more referrals.       DOV Sheldon

## 2023-05-12 NOTE — PROGRESS NOTES
Care Management Follow Up    Length of Stay (days): 9    Expected Discharge Date: 05/13/2023     Concerns to be Addressed:       Patient plan of care discussed at interdisciplinary rounds: Yes    Anticipated Discharge Disposition: Transitional Care  As of 5/12 PT continues to recommend TCU as patient is below baseline. At baseline he is independent in his [facility] apartment.      Anticipated Discharge Services:    Anticipated Discharge DME:      Patient/family educated on Medicare website which has current facility and service quality ratings: no  Education Provided on the Discharge Plan:    Patient/Family in Agreement with the Plan: unable to assess    Referrals Placed by CM/SW: Internal Clinic Care Coordination  Private pay costs discussed:     Additional Information:  Referrals made to  [facility] which had clinically accepted patient however patient was not stable for discharge when they had a vacancy.  [facility] - no beds  [facility] can not meet his needs.  Additional referrals made to   [facility] (formerly [facility])  [facility]  [facility]

## 2023-05-12 NOTE — PLAN OF CARE
Goal Outcome Evaluation:       Summary: Sepsis unclear source. hemorrhagic shock due to acute GI bleed, Has metastatic colon cancer with suspected renal/lung cancer w mets. History of depression, PTSD, alcohol use disorder, schizoaffective disorder and bipolar.    DATE & TIME: 5/11/23 1900-0730  Cognitive Concerns/ Orientation : A&Ox4 Forgetful at times. Flat affect. Takes longer to answer questions/ slower speech. Pt uncooperative with questions and cares this evening.   BEHAVIOR & AGGRESSION TOOL COLOR: Green  ABNL VS/O2: VSS on RA  MOBILITY: AX1 GBW, ambulates to bathroom.   PAIN MANAGMENT: c/o abdominal pain- gave PRN oxy x1   DIET: Mechanical soft diet with snack. Supplements between meals  BOWEL/BLADDER: Continent B&B.   ABNL LAB/BG: hgb 7.5, WBC 12.8  DRAIN/DEVICES: R PIV x2 SL & L PIV SL   TELEMETRY RHYTHM: NA  SKIN: Pale/flushed, jaundiced skin, +2 edema BLE. Mepilex on coccyx.   TESTS/PROCEDURES: 5/11 paracentesis- 3.4L removed  D/C DATE: possible Discharge to TCU to [place] pending hemoglobin stabilizing and no signs of sepsis.  OTHER IMPORTANT INFO: Started on PO antibiotics, diminished lung sounds. Nonproductive cough. GOLDEN.

## 2023-05-12 NOTE — PROGRESS NOTES
[facility]    Medicine Progress Note - Hospitalist Service    Date of Admission:  5/3/2023    Assessment & Plan     Los Huang is a 67 year old male with hx of alcoholic cirrhosis with known esophageal varices, metastatic colon cancer s/p partial colectomy and right hepatectomy previously on hospice, PTSD/schioaffective/bipolar disorder who was admitted on 5/3/2023 for hemorrhagic shock due to acute GI bleed. Hospital course complicated by sepsis of unclear source     Goals of care  Metastatic Colon Cancer   Suspected renal cancer  - Diagnosed with colorectal cancer in 2014 with oligometastatic disease to liver at the time of diagnosis. He was treated with ascending colectomy 6/14 and R hepatectomy in 8/14. He declined chemotherapy and enrolled in [facility], but eventually unenrolled from hospice due to overall stability and hospice visits felt intrusive  - CTA abd/pelvis this admission showed disease progression of colon cancer with lung mets, new tumor thrombus in the left portal vein, new/enlarging liver mets, new ascites, and stable bilateral renal masses concerning for solid renal neoplasms  -Previous hospitalist discussed on 5/4: goals of care initiated with patient and family. Cancer progression discussed. Explained that he currently has 2 terminal conditions: advanced colon cancer and cirrhosis. Patient does not want treatment for his cancer, but his current hospitalization is due to a complication of his cirrhosis, and he does want treatment for that. He was actually doing reasonably well, living independently prior to this hospitalization. His goal is to recover from his acute illness, work with PT, and eventually return to his apartment at [facility]. He will consider hospice down the road as he declines.   - DNR/DNI. Goals otherwise restorative.   - PT recommending TCU. Pt agreeable.  - 5/8-Again discussed with patient's about goals of care and he does  think that he will get better but did mention to me that he was not aware of cirrhosis diagnosis     Hemorrhagic shock due to acute GI bleed, Improved  Lactic acidosis, Resolved  Colitis likely due to colon cancer  Alcoholic cirrhosis with esophageal varices, ascites, and hepatic encephalopathy  - Presented with confusion, falls, and 1 week history of intermittent rectal bleeding which patient felt was related to hemorrhoids. Hypotensive to 70s on arrival with initial lactate >8; improved with IV fluids. Initial Hgb 5.7. Ammonia level normal.  - In the ED, he was initiated on an octreotide infusion, IV PPI BID, and IV ceftriaxone for SBP ppx. Also transfused total 3 units pRBCs upon admission  - [facility] was consulted on admission. Underwent EGD which showed variceal banding scar that appeared healthy, as well as grade I and II esophageal varices. Colonoscopy deferred due to evidence of colitis on imaging.   - CTA abd/pelvis upon admission showed marked wall thickening and edema of the entire bowel with concern for shock bowel/ischemic bowel and disease progression of colon cancer; however patient has been relatively asymptomatic with improved lactate and WBC  - Octreotide d/c'd on 5/4.   - 5/8 He had an episode of maroon-colored bloody stool this morning and was hemodynamically stable and repeat hemoglobin was stable at 8 and I did discuss with the GI team again today and source of his blood loss today is due to metastatic disease and further evaluation with colonoscopy is not possible with disease progression as tumors/neoplastic tissue is friable and treatment with APC would not be helpful and treatment is blood products  -He had repeat CT abdomen done overnight and did not show any evidence of bleeding  -HemoGlobin this morning is stable at 7.4 and has been stable for the past 2 days  -continue with PPI, soft diet  - s/p paracentesis again on 5/11/2023 and 3.4 L of fluid was removed and the fluid analysis does  not show any evidence of SBP     Sepsis due to suspected aspiration pneumonia, Resolved  Positive blood culture, suspect contaminant  - Spiked to fever to 100.8 overnight 5/4-5/5 along with mild tachycardia. WBC 18.1k. Lactate 2.2. Reported increased shortness of breath. CXR showed known lung mets, bilateral infiltrates due to pulmonary edema vs pneumonia. He had been on ceftriaxone for SBP prophylaxis; antibiotics were broadened to IV pip-tazo with improvement. COVID-19 PCR negative. Unfortunately, blood cultures were not drawn.   - Underwent paracentesis (5/5) with removal of 2L fluid. 120 PMNs, no evidence of SBP  - 1 out of 2 blood cultures on admission (5/3) grew Staph epi. Suspect contaminant  - wbc count has been down trending and he has been afebrile  -completed antibiotic course        Acute respiratory distress due to aspiration pneumonia vs pulmonary edema, Resolving  Leukocytosis-Improved  - Reported increased shortness of breath overnight 5/4-5/5.   -CXR showed pulmonary edema vs pneumonia. spO2 mid 90s on room air, but was placed on supplemental O2 for comfort.Treated with IV pip-tazo with improvement.   - Repeat CXR (5/6) improved  -He has been having persistent white count during this hospital stay but he has been afebrile  - wbc count is improving and repeat acetic fluid analysis on 5/11 does not support any evidence of SBP     Thrombocytopenia, -Resolved  - Likely due to cirrhosis and ongoing alcohol use. Platelets wnl on admission though sample was likely hemoconcentrated.   -Platelet count has been stable     Alcohol use disorder  - Longstanding history of alcohol dependence. Currently consuming 1 oz of gin 5 times daily. Last drink: 5/3. No s/sx of withdrawal noted this admission  - d/c CIWA today, 5/7  - On thiamine/folate/MVI while hospitalized     Hypokalemia  -Potassium has been intermittently low and is 3.3 and we will start him on replacement protocol again      PTSD  Hx of schizoaffective  disorder  Hx of bipolar disorder type I  - PTA regimen: clonazepam 0.5 mg daily, olanzapine 5 mg q6h prn, Invega 234 IM q28 days  - Last Invega dose: 5/6  - Clonazepam and olanzapine ordered prn this admission     Cachexia  Severe malnutrition in context of acute on chronic illness  - Daily magic cup ordered per Nutrition       Diet: Room Service  Mechanical/Dental Soft Diet  Snacks/Supplements Adult: Magic Cup; Between Meals  Snacks/Supplements Adult: Ensure Max Protein (bariatric); Between Meals    DVT Prophylaxis: Pneumatic Compression Devices  Chew Catheter: Not present  Lines: PRESENT             Cardiac Monitoring: None  Code Status: No CPR- Do NOT Intubate      Clinically Significant Risk Factors        # Hypokalemia: Lowest K = 3.3 mmol/L in last 2 days, will replace as needed       # Hypoalbuminemia: Lowest albumin = 2.5 g/dL at 5/12/2023 10:56 AM, will monitor as appropriate            # Severe Malnutrition: based on nutrition assessment        Disposition Plan      Expected Discharge Date: 05/13/2023        Discharge Comments: Pt is from [facility], however needs TCU now. Declines Hospice at this time. Per MD note can discharge once hemoglobin stable and no signs of sepsis.          Sameera Cronin MD  Hospitalist Service  [facility]  Securely message with [employer] (more info)  Text page via [employer] Paging/Directory   ______________________________________________________________________    Interval History     The patient today and he does not have any fever, chills, nausea or vomiting.  He is tolerating diet.  I did discuss with the patient that his potassium does run low and we need to replace and he understands.  Also discussed the results of fluid analysis with the patient    Offered to call son but said no need     D/w the plan with patient's nurse case management and we are looking for a bed    Physical Exam   Vital Signs: Temp: 97.7  F (36.5  C) Temp src: Oral BP:  111/60 Pulse: 89   Resp: 20 SpO2: 95 % O2 Device: None (Room air)    Weight: 198 lbs 0 oz        General: aox3 and frail looking   HEENT: Head is atraumatic, normocephalic.    Neck: Neck is supple  Respiratory:   Cardiovascular: Regular rate , S1 and S2 normal with no murmer or rubs or gallops  Abdomen:   soft , non tender , distended   Skin: No skin rashes or lesions to inspection or palpation.  Neurologic:  No facial droop  Musculoskeletal: Normal Range of motion over upper and lower extremities bilaterally   Psychiatric: cooperative     Medical Decision Making             Data     I have personally reviewed the following data over the past 24 hrs:    11.9 (H)  \   7.4 (L)   / 221     134 (L) 101 20.3 /  139 (H)   3.3 (L) 21 (L) 0.71 \       ALT: 92 (H) AST: 74 (H) AP: 849 (H) TBILI: 4.2 (H)   ALB: 2.5 (L) TOT PROTEIN: 5.7 (L) LIPASE: N/A       INR:  1.34 (H) PTT:  N/A   D-dimer:  N/A Fibrinogen:  N/A       Imaging results reviewed over the past 24 hrs:   No results found for this or any previous visit (from the past 24 hour(s)).

## 2023-05-13 NOTE — PLAN OF CARE
Goal Outcome Evaluation:       Summary: Sepsis unclear source. hemorrhagic shock due to acute GI bleed, Has metastatic colon cancer with suspected renal/lung cancer w mets. History of depression, PTSD, alcohol use disorder, schizoaffective disorder and bipolar.   DATE & TIME: 5/12/23 1900-0730  Cognitive Concerns/ Orientation : A&Ox4 Forgetful at times. Flat affect. Takes longer to answer questions/ slower speech. Pt uncooperative with questions and cares this evening.   BEHAVIOR & AGGRESSION TOOL COLOR: Green  ABNL VS/O2: VSS on RA  MOBILITY: AX1 GBW, ambulates to bathroom.   PAIN MANAGMENT: c/o abdominal pain- gave PRN oxy x1   DIET: Mechanical soft diet with snack. Supplements between meals  BOWEL/BLADDER: Continent B&B.   ABNL LAB/BG: hgb 7.5, WBC 12.8  DRAIN/DEVICES: R PIV x2 SL & L PIV SL   TELEMETRY RHYTHM: NA  SKIN: Pale/flushed, jaundiced skin, +2 edema BLE. Mepilex on coccyx.   TESTS/PROCEDURES: 5/11 paracentesis- 3.4L removed  D/C DATE: possible Discharge to TCU to [place] pending hemoglobin stabilizing and no signs of sepsis.  OTHER IMPORTANT INFO: Started on PO antibiotics, diminished lung sounds. Nonproductive cough. GOLDEN.

## 2023-05-13 NOTE — PROGRESS NOTES
Care Management Follow Up    Length of Stay (days): 10    Expected Discharge Date: 05/13/2023     Concerns to be Addressed:       Patient plan of care discussed at interdisciplinary rounds: Yes    Anticipated Discharge Disposition: Transitional Care     Anticipated Discharge Services:    Anticipated Discharge DME:      Patient/family educated on Medicare website which has current facility and service quality ratings: no  Education Provided on the Discharge Plan:    Patient/Family in Agreement with the Plan: unable to assess    Referrals Placed by CM/SW: Internal Clinic Care Coordination  Private pay costs discussed: Not applicable    Additional Information:  SW followed up on pending referrals.   [facility]: [facility]: LONG [facility]: DOV Cole    [facility]

## 2023-05-13 NOTE — PLAN OF CARE
Date & Time: 5900-0283 5/13  Orientation: A&Ox4, forgetful, flat affect   Activity Level: Assist of 1 GB/W, up in hallway x1  Fall Risk: Yes  Behavior & Aggression: Green  Pain Management: Decreased with oxy x1   ABNL VS/O2: VSS on RA ex BP soft   ABNL Lab/BG: HG 7.5  Diet: Mech soft, room service  Bowel/Bladder: Continent, up to bathroom  Drains/Devices: x3 PIV SL  Skin: edema in lower extremities   Anticipated  DC Date: Pending

## 2023-05-13 NOTE — PROGRESS NOTES
[facility]    Medicine Progress Note - Hospitalist Service    Date of Admission:  5/3/2023    Assessment & Plan     Los Huang is a 67 year old male with hx of alcoholic cirrhosis with known esophageal varices, metastatic colon cancer s/p partial colectomy and right hepatectomy previously on hospice, PTSD/schioaffective/bipolar disorder who was admitted on 5/3/2023 for hemorrhagic shock due to acute GI bleed. Hospital course complicated by sepsis of unclear source     Goals of care  Metastatic Colon Cancer   Suspected renal cancer  - Diagnosed with colorectal cancer in 2014 with oligometastatic disease to liver at the time of diagnosis. He was treated with ascending colectomy 6/14 and R hepatectomy in 8/14. He declined chemotherapy and enrolled in [facility], but eventually unenrolled from hospice due to overall stability and hospice visits felt intrusive  - CTA abd/pelvis this admission showed disease progression of colon cancer with lung mets, new tumor thrombus in the left portal vein, new/enlarging liver mets, new ascites, and stable bilateral renal masses concerning for solid renal neoplasms  -Previous hospitalist discussed on 5/4: goals of care initiated with patient and family. Cancer progression discussed. Explained that he currently has 2 terminal conditions: advanced colon cancer and cirrhosis. Patient does not want treatment for his cancer, but his current hospitalization is due to a complication of his cirrhosis, and he does want treatment for that. He was actually doing reasonably well, living independently prior to this hospitalization. His goal is to recover from his acute illness, work with PT, and eventually return to his apartment at [facility]. He will consider hospice down the road as he declines.   - DNR/DNI. Goals otherwise restorative.   - PT recommending TCU  - 5/8-Again discussed with patient's about goals of care and he does think that he  will get better but did mention to me that he was not aware of cirrhosis diagnosis  - agree for tcu and we are looking     Hemorrhagic shock due to acute GI bleed, Improved  Lactic acidosis, Resolved  Colitis likely due to colon cancer  Alcoholic cirrhosis with esophageal varices, ascites, and hepatic encephalopathy  - Presented with confusion, falls, and 1 week history of intermittent rectal bleeding which patient felt was related to hemorrhoids. Hypotensive to 70s on arrival with initial lactate >8; improved with IV fluids. Initial Hgb 5.7. Ammonia level normal.  - In the ED, he was initiated on an octreotide infusion, IV PPI BID, and IV ceftriaxone for SBP ppx. Also transfused total 3 units pRBCs upon admission  - [facility] GI was consulted on admission. Underwent EGD which showed variceal banding scar that appeared healthy, as well as grade I and II esophageal varices. Colonoscopy deferred due to evidence of colitis on imaging.   - CTA abd/pelvis upon admission showed marked wall thickening and edema of the entire bowel with concern for shock bowel/ischemic bowel and disease progression of colon cancer; however patient has been relatively asymptomatic with improved lactate and WBC  - Octreotide d/c'd on 5/4.   - 5/8 He had an episode of maroon-colored bloody stool this morning and was hemodynamically stable and repeat hemoglobin was stable at 8 and I did discuss with the GI team again today and source of his blood loss today is due to metastatic disease and further evaluation with colonoscopy is not possible with disease progression as tumors/neoplastic tissue is friable and treatment with APC would not be helpful and treatment is blood products  -He had repeat CT abdomen done overnight and did not show any evidence of bleeding  -continue with PPI, soft diet  - s/p paracentesis again on 5/11/2023 and 3.4 L of fluid was removed and the fluid analysis does not show any evidence of SBP  -hb stable at 7.5     Sepsis  due to suspected aspiration pneumonia, Resolved  Positive blood culture, suspect contaminant  - Spiked to fever to 100.8 overnight 5/4-5/5 along with mild tachycardia. WBC 18.1k. Lactate 2.2. Reported increased shortness of breath. CXR showed known lung mets, bilateral infiltrates due to pulmonary edema vs pneumonia. He had been on ceftriaxone for SBP prophylaxis; antibiotics were broadened to IV pip-tazo with improvement. COVID-19 PCR negative. Unfortunately, blood cultures were not drawn.   - Underwent paracentesis (5/5) with removal of 2L fluid. 120 PMNs, no evidence of SBP  - 1 out of 2 blood cultures on admission (5/3) grew Staph epi. Suspect contaminant  - wbc count has been down trending and he has been afebrile  -completed antibiotic course        Acute respiratory distress due to aspiration pneumonia vs pulmonary edema, Resolved  Leukocytosis-Improved  - Reported increased shortness of breath overnight 5/4-5/5.   -CXR showed pulmonary edema vs pneumonia. spO2 mid 90s on room air, but was placed on supplemental O2 for comfort.Treated with IV pip-tazo with improvement.   - Repeat CXR (5/6) improved  -He has been having persistent white count during this hospital stay but he has been afebrile  - wbc count is improving and repeat acetic fluid analysis on 5/11 does not support any evidence of SBP     Thrombocytopenia, -Resolved  - Likely due to cirrhosis and ongoing alcohol use. Platelets wnl on admission though sample was likely hemoconcentrated.   -Platelet count has been stable     Alcohol use disorder  - Longstanding history of alcohol dependence. Currently consuming 1 oz of gin 5 times daily. Last drink: 5/3. No s/sx of withdrawal noted this admission  - d/c CIWA today, 5/7  - On thiamine/folate/MVI while hospitalized     Hypokalemia-resolved  -Potassium has been intermittently low and is normal today  3.9 a  -Monitor     PTSD  Hx of schizoaffective disorder  Hx of bipolar disorder type I  - PTA regimen:  clonazepam 0.5 mg daily, olanzapine 5 mg q6h prn, Invega 234 IM q28 days  - Last Invega dose: 5/6  - Clonazepam and olanzapine ordered prn this admission     Cachexia  Severe malnutrition in context of acute on chronic illness  - Daily magic cup ordered per Nutrition       Diet: Room Service  Mechanical/Dental Soft Diet  Snacks/Supplements Adult: Magic Cup; Between Meals  Snacks/Supplements Adult: Ensure Max Protein (bariatric); Between Meals    DVT Prophylaxis: Pneumatic Compression Devices  Chew Catheter: Not present  Lines: None     Cardiac Monitoring: None  Code Status: No CPR- Do NOT Intubate      Clinically Significant Risk Factors        # Hypokalemia: Lowest K = 3.3 mmol/L in last 2 days, will replace as needed       # Hypoalbuminemia: Lowest albumin = 2.5 g/dL at 5/12/2023 10:56 AM, will monitor as appropriate            # Severe Malnutrition: based on nutrition assessment        Disposition Plan      Expected Discharge Date: 05/13/2023, 12:00 PM  Discharge Delays: *Medically Ready for Discharge  Placement - TCU    Discharge Comments: Pt is from [facility], however needs TCU now. Declines Hospice at this time. Per MD note can discharge once hemoglobin stable and no signs of sepsis.          Sameera Cronin MD  Hospitalist Service  [facility]  Securely message with [employer] (more info)  Text page via [employer] Paging/Directory   ______________________________________________________________________    Interval History     No chest pain or sob   No further blood in stool  Tolerating diet  Aware that we are looking for tcu  No questions for me  Offered to call family and he declined    D/w RN     Physical Exam   Vital Signs: Temp: 97.5  F (36.4  C) Temp src: Oral BP: 104/61 Pulse: 89   Resp: 20 SpO2: 96 % O2 Device: None (Room air)    Weight: 198 lbs 0 oz        General: aox3 and frail looking   Respiratory: on room air and no respiratory distress and lungs clear   Cardiovascular:  Regular rate , S1 and S2 normal  Abdomen:   soft , non tender , less distended   Neurologic:  No facial droop  Musculoskeletal: Normal Range of motion over upper and lower extremities bilaterally   Psychiatric: cooperative     Medical Decision Making             Data     I have personally reviewed the following data over the past 24 hrs:    11.9 (H)  \   7.5 (L)   / 221     134 (L) 101 20.3 /  139 (H)   3.9 21 (L) 0.71 \       ALT: 92 (H) AST: 74 (H) AP: 849 (H) TBILI: 4.2 (H)   ALB: 2.5 (L) TOT PROTEIN: 5.7 (L) LIPASE: N/A       INR:  1.34 (H) PTT:  N/A   D-dimer:  N/A Fibrinogen:  N/A       Imaging results reviewed over the past 24 hrs:   No results found for this or any previous visit (from the past 24 hour(s)).

## 2023-05-14 NOTE — PLAN OF CARE
Goal Outcome Evaluation:    Summary: Sepsis unclear source. hemorrhagic shock due to acute GI bleed, Has metastatic colon cancer with suspected renal/lung cancer w mets. History of depression, PTSD, alcohol use disorder, schizoaffective disorder and bipolar.     DATE & TIME: 5/13-5/14 4290-1971  Cognitive Concerns/ Orientation : A&Ox4 Forgetful at times. Flat affect. Takes longer to answer questions/ slower speech. BEHAVIOR & AGGRESSION TOOL COLOR: Green  ABNL VS/O2: VSS on RA  MOBILITY: AX1 GBW, ambulates to bathroom.   PAIN MANAGMENT: Denies  DIET: Mechanical soft diet with snack. Supplements between meals  BOWEL/BLADDER: Continent B&B.   ABNL LAB/BG: hgb 7.5, on K+ protocol   DRAIN/DEVICES: R PIV x2 SL & L PIV SL   TELEMETRY RHYTHM: NA  SKIN: Pale/flushed, jaundiced skin, +2 edema BLE, scrotal edema. Mepilex on coccyx.   TESTS/PROCEDURES:   D/C DATE: possible discharge to TCU to [place] pending hemoglobin stabilizing and no signs of sepsis.  OTHER IMPORTANT INFO: Started on PO antibiotics, diminished lung sounds. Nonproductive cough. GOLDEN.

## 2023-05-14 NOTE — PLAN OF CARE
Date & Time: 3320-1001 5/13  Orientation: A&Ox3-4 sometimes not oriented situation, forgetful, flat affect   Activity Level: Assist of 1 GB/W  Fall Risk: Yes  Behavior & Aggression: Green  Pain Management: Decreased with heat pack  ABNL VS/O2: VSS on RA ex BP soft   ABNL Lab/BG: K+ 3.7, HG 7.8  Diet: Mech soft, room service  Bowel/Bladder: Continent, up to bathroom  Drains/Devices: x3 PIV SL  Skin: edema in lower extremities, and scrotum edema +4 (Oral Lasix), new mepi on coccyx   Anticipated  DC Date: Pending

## 2023-05-14 NOTE — PROGRESS NOTES
Care Management Follow Up    Length of Stay (days): 11    Expected Discharge Date: 05/14/2023     Concerns to be Addressed:       Patient plan of care discussed at interdisciplinary rounds: Yes    Anticipated Discharge Disposition: Transitional Care     Anticipated Discharge Services:    Anticipated Discharge DME:      Patient/family educated on Medicare website which has current facility and service quality ratings: no  Education Provided on the Discharge Plan:    Patient/Family in Agreement with the Plan: unable to assess    Referrals Placed by CM/SW: Internal Clinic Care Coordination  Private pay costs discussed:     Additional Information:  As of 5/12, PT continues to recommend TCU.  Contacted [facility] w/e liaison and she reports [facility] can offer TCU admission on Monday.  Called several other facilities in which referrals had been made last Friday; [facility] and left message to ask if they can offer patient admission. It appears their admission offices are closed on the w/e.  Contacted [facility] and spoke with Caro in admissions. Caro is waiting to review patient's assessment with the DON and she will call writer back tomorrow morning.   Left [facility] [place] a message asking if they have any vacancies for Monday as this was son's first choice.   Contacted son and updated him.   He said he is okay with [facility] location but asked about their medicare rating. Writer suggested he can go to the medicare website and review medicare ratings.  Gave him directions in using the site.   Plan:  Anticipated discharge tomorrow to one of the TCU's which accept patient.  Son will review Medicare website later today and give his preferences.       DANAE AlbertSW

## 2023-05-14 NOTE — PLAN OF CARE
Goal Outcome Evaluation:    Pt here with alcohol abuse. A&Ox4. Flat affect, particular at times. Has BLE edema as well as scrotal edema. VSS on RA.  Mech soft diet, thin liquids. Takes pills whole. Up with Ax1 GB/W. Denies pain. Plan TCU once HGB stable.

## 2023-05-14 NOTE — PROGRESS NOTES
[facility]    Medicine Progress Note - Hospitalist Service    Date of Admission:  5/3/2023    Assessment & Plan     Los Huang is a 67 year old male with hx of alcoholic cirrhosis with known esophageal varices, metastatic colon cancer s/p partial colectomy and right hepatectomy previously on hospice, PTSD/schioaffective/bipolar disorder who was admitted on 5/3/2023 for hemorrhagic shock due to acute GI bleed. Hospital course complicated by sepsis of unclear source     Goals of care  Metastatic Colon Cancer   Suspected renal cancer  - Diagnosed with colorectal cancer in 2014 with oligometastatic disease to liver at the time of diagnosis. He was treated with ascending colectomy 6/14 and R hepatectomy in 8/14. He declined chemotherapy and enrolled in [facility], but eventually unenrolled from hospice due to overall stability and hospice visits felt intrusive  - CTA abd/pelvis this admission showed disease progression of colon cancer with lung mets, new tumor thrombus in the left portal vein, new/enlarging liver mets, new ascites, and stable bilateral renal masses concerning for solid renal neoplasms  -Previous hospitalist discussed on 5/4: goals of care initiated with patient and family. Cancer progression discussed. Explained that he currently has 2 terminal conditions: advanced colon cancer and cirrhosis. Patient does not want treatment for his cancer, but his current hospitalization is due to a complication of his cirrhosis, and he does want treatment for that. He was actually doing reasonably well, living independently prior to this hospitalization. His goal is to recover from his acute illness, work with PT, and eventually return to his apartment [facility]. He will consider hospice down the road as he declines.   - DNR/DNI. Goals otherwise restorative.   - PT recommending TCU  - 5/8-Again discussed with patient's about goals of care and he does think that he  will get better but did mention to me that he was not aware of cirrhosis diagnosis  - agree for tcu and we are looking     Hemorrhagic shock due to acute GI bleed, Improved  Lactic acidosis, Resolved  Colitis likely due to colon cancer  Alcoholic cirrhosis with esophageal varices, ascites, and hepatic encephalopathy  Mild scrotal edema-improving  - Presented with confusion, falls, and 1 week history of intermittent rectal bleeding which patient felt was related to hemorrhoids. Hypotensive to 70s on arrival with initial lactate >8; improved with IV fluids. Initial Hgb 5.7. Ammonia level normal.  - In the ED, he was initiated on an octreotide infusion, IV PPI BID, and IV ceftriaxone for SBP ppx. Also transfused total 3 units pRBCs upon admission  - [facility] GI was consulted on admission. Underwent EGD which showed variceal banding scar that appeared healthy, as well as grade I and II esophageal varices. Colonoscopy deferred due to evidence of colitis on imaging.   - CTA abd/pelvis upon admission showed marked wall thickening and edema of the entire bowel with concern for shock bowel/ischemic bowel and disease progression of colon cancer; however patient has been relatively asymptomatic with improved lactate and WBC  - Octreotide d/c'd on 5/4.   - 5/8 He had an episode of maroon-colored bloody stool this morning and was hemodynamically stable and repeat hemoglobin was stable at 8 and I did discuss with the GI team again today and source of his blood loss today is due to metastatic disease and further evaluation with colonoscopy is not possible with disease progression as tumors/neoplastic tissue is friable and treatment with APC would not be helpful and treatment is blood products and repeat CT abdomen done on 5/9/2023 did not show any evidence of bleeding  -continue with PPI, soft diet  - s/p paracentesis again on 5/11/2023 and 3.4 L of fluid was removed and the fluid analysis does not show any evidence of SBP  -hb  stable at 7.5  -Did give him a small dose of Lasix 40 mg and his edema seems to be improving     Sepsis due to suspected aspiration pneumonia, Resolved  Positive blood culture, suspect contaminant  - Spiked to fever to 100.8 overnight 5/4-5/5 along with mild tachycardia. WBC 18.1k. Lactate 2.2. Reported increased shortness of breath. CXR showed known lung mets, bilateral infiltrates due to pulmonary edema vs pneumonia. He had been on ceftriaxone for SBP prophylaxis; antibiotics were broadened to IV pip-tazo with improvement. COVID-19 PCR negative. Unfortunately, blood cultures were not drawn.   - Underwent paracentesis (5/5) with removal of 2L fluid. 120 PMNs, no evidence of SBP  - 1 out of 2 blood cultures on admission (5/3) grew Staph epi. Suspect contaminant  - wbc count has been down trending and he has been afebrile  -completed antibiotic course        Acute respiratory distress due to aspiration pneumonia vs pulmonary edema, Resolved  Leukocytosis-Improved  - Reported increased shortness of breath overnight 5/4-5/5.   -CXR showed pulmonary edema vs pneumonia. spO2 mid 90s on room air, but was placed on supplemental O2 for comfort.Treated with IV pip-tazo with improvement.   - Repeat CXR (5/6) improved  -He has been having persistent white count during this hospital stay but he has been afebrile  - wbc count is improving and repeat acetic fluid analysis on 5/11 does not support any evidence of SBP     Thrombocytopenia, -Resolved  - Likely due to cirrhosis and ongoing alcohol use. Platelets wnl on admission though sample was likely hemoconcentrated.   -Platelet count has been stable     Alcohol use disorder  - Longstanding history of alcohol dependence. Currently consuming 1 oz of gin five times daily. Last drink: 5/3. No s/sx of withdrawal noted this admission  - d/c CIWA today, 5/7  - On thiamine/folate/MVI while hospitalized     Hypokalemia-resolved  -Potassium has been intermittently low and is normal today   3.9 a  -Monitor     PTSD  Hx of schizoaffective disorder  Hx of bipolar disorder type I  - PTA regimen: clonazepam 0.5 mg daily, olanzapine 5 mg q6h prn, Invega 234 IM q28 days  - Last Invega dose: 5/6  - Clonazepam and olanzapine ordered prn this admission     Cachexia  Severe malnutrition in context of acute on chronic illness  - Daily magic cup ordered per Nutrition       Diet: Room Service  Mechanical/Dental Soft Diet  Snacks/Supplements Adult: Magic Cup; Between Meals  Snacks/Supplements Adult: Ensure Max Protein (bariatric); Between Meals    DVT Prophylaxis: Pneumatic Compression Devices  Chew Catheter: Not present  Lines: None     Cardiac Monitoring: None  Code Status: No CPR- Do NOT Intubate      Clinically Significant Risk Factors              # Hypoalbuminemia: Lowest albumin = 2.5 g/dL at 5/12/2023 10:56 AM, will monitor as appropriate            # Severe Malnutrition: based on nutrition assessment        Disposition Plan      Expected Discharge Date: 05/15/2023, 12:00 PM  Discharge Delays: *Medically Ready for Discharge  Placement - TCU    Discharge Comments: Pt is from [facility], however needs TCU now.          Sameera Cronin MD  Hospitalist Service  [facility]  Securely message with [employer] (more info)  Text page via [employer] Paging/Directory   _  I am off service from tomorrow morning and his care will be taken over by hospital medicine team  _____________________________________________________________________    Interval History     The patient today and nurse saw the patient and mentioned that he was having scrotal edema and he was given 40 mg of oral Lasix and when I saw the patient later in the day his edema had improved significantly as per nurse.  He denies any nausea vomiting.  He is aware that we are still looking for a place for him    D/w RN     Physical Exam   Vital Signs: Temp: 97.8  F (36.6  C) Temp src: Oral BP: 103/64 Pulse: 93   Resp: 18 SpO2: 95 % O2  Device: None (Room air)    Weight: 198 lbs 0 oz        General: aox3 and frail looking   Respiratory: on room air and no respiratory distress and lungs clear   Cardiovascular: Regular rate , S1 and S2 normal  Abdomen:   soft , non tender , less distended   Neurologic:  No facial droop  Musculoskeletal: Normal Range of motion over upper and lower extremities bilaterally   Psychiatric: cooperative     Medical Decision Making             Data     I have personally reviewed the following data over the past 24 hrs:    N/A  \   7.8 (L)   / N/A     N/A N/A N/A /  N/A   3.7 N/A N/A \       Imaging results reviewed over the past 24 hrs:   No results found for this or any previous visit (from the past 24 hour(s)).

## 2023-05-15 NOTE — PLAN OF CARE
Summary: Sepsis unclear source. hemorrhagic shock due to acute GI bleed, Has metastatic colon cancer with suspected renal/lung cancer w mets. History of depression, PTSD, alcohol use disorder, schizoaffective disorder and bipolar.     DATE & TIME: 5/14 9674-7750  Cognitive Concerns/ Orientation: A&Ox4, forgetful at times. Flat affect. Takes longer to answer questions/slower speech.   BEHAVIOR & AGGRESSION TOOL COLOR: Green  ABNL VS/O2: VSS on RA  MOBILITY: AX1 GBW, ambulates to bathroom. Needs frequent reminders to call before getting up.  PAIN MANAGMENT: Denies  DIET: Mechanical soft   BOWEL/BLADDER: Continent B&B.   ABNL LAB/BG: hgb 7.8. On K+ protocol, no replacement needed.  DRAIN/DEVICES: R PIV x2 SL & L PIV SL   SKIN: Pale/flushed, jaundiced skin, +2 edema BLE, scrotal edema. Mepilex on coccyx.   TESTS/PROCEDURES: n/a this shift  D/C DATE: possible discharge to TCU to [place] pending hemoglobin stabilizing and no signs of sepsis.

## 2023-05-15 NOTE — PROGRESS NOTES
Care Management Follow Up    Length of Stay (days): 12    Expected Discharge Date: 05/15/2023     Concerns to be Addressed:       Patient plan of care discussed at interdisciplinary rounds: Yes    Anticipated Discharge Disposition: Transitional Care     Anticipated Discharge Services:    Anticipated Discharge DME:      Patient/family educated on Medicare website which has current facility and service quality ratings: no  Education Provided on the Discharge Plan:    Patient/Family in Agreement with the Plan: unable to assess    Referrals Placed by CM/SW: Internal Clinic Care Coordination  Private pay costs discussed: transportation costs    Additional Information:  [facility] clinically [facility] patient for today.  [facility], [facility] and [facility] declined.  [facility] is at capacity.  Reviewed with patient's son Juventino.  He and his sister prefer [facility].  PAS completed and triggered a Level 2 screen due to his psychiatry admission 10/12/21 at [facility]. The need for this level 2 will delay patient's discharge.  The level 2 will need to be completed by [facility] professional before [facility] can admit patient and the wait time is usually  2-3 days before the screen is completed.   [facility] [employer] will submit the Level 2 screen request to [facility] today.      Update:  Son and daughter have changed preference for TCU and would [facility].  Writer spoke with [facility] admissions and they expect to have a vacancy after level 2 is completed. Writer contacted [facility] liaison and released the bed.    PA approved.  Effective date: 5/15/23  PA reference #: [id number]  Pt. notified:  son      Meg [facility]

## 2023-05-15 NOTE — PLAN OF CARE
Goal Outcome Evaluation:         Summary: Sepsis unclear source. hemorrhagic shock due to acute GI bleed, Has metastatic colon cancer with suspected renal/lung cancer w mets. History of depression, PTSD, alcohol use disorder, schizoaffective disorder and bipolar.     DATE & TIME: 5/14/23-5/15/23 2393-1116  Cognitive Concerns/ Orientation: A&Ox4, forgetful at times. Flat affect. Takes longer to answer questions/slower speech.   BEHAVIOR & AGGRESSION TOOL COLOR: Green  ABNL VS/O2: VSS on RA  MOBILITY: AX1 GBW, ambulates to bathroom. Needs frequent reminders to call before getting up.  PAIN MANAGMENT: Complains of pain on his right side-ice given   DIET: Mechanical soft   BOWEL/BLADDER: Continent B&B.   ABNL LAB/BG: hgb 7.8. On K+ protocol K 3.7  DRAIN/DEVICES: R PIV x2 SL & L PIV SL   SKIN: Pale/flushed, jaundiced skin, +2 edema BLE, scrotal edema. Mepilex on coccyx.   TESTS/PROCEDURES: n/a this shift  D/C DATE: possible discharge to TCU to [place] pending hemoglobin stabilizing and no signs of sepsis.  OTHER IMPORTANT INFO:

## 2023-05-15 NOTE — PROGRESS NOTES
Care Management Follow Up    Length of Stay (days): 12    Expected Discharge Date: 05/15/2023     Concerns to be Addressed:       Patient plan of care discussed at interdisciplinary rounds: Yes    Anticipated Discharge Disposition: Transitional Care     Anticipated Discharge Services:    Anticipated Discharge DME:      Patient/family educated on Medicare website which has current facility and service quality ratings: no  Education Provided on the Discharge Plan:    Patient/Family in Agreement with the Plan: unable to assess    Referrals Placed by CM/SW: Internal Clinic Care Coordination  Private pay costs discussed:     Additional Information:  Patient's son updates writer that he and his sister prefer [facility].  [facility] is assessing patient for admission today and is aware patient is ready for discharge today.    Update [facility] can offer admission today.  Writer will update MD and will arrange transportation.        ARGELIA Albert

## 2023-05-15 NOTE — PROGRESS NOTES
[facility]    Hospitalist Progress Note    Assessment & Plan   Date of Admission:  5/3/2023        Assessment & Plan  Los Huang is a 67 year old male with hx of alcoholic cirrhosis with known esophageal varices, metastatic colon cancer s/p partial colectomy and right hepatectomy previously on hospice, PTSD/schioaffective/bipolar disorder who was admitted on 5/3/2023 for hemorrhagic shock due to acute GI bleed. Hospital course complicated by sepsis of unclear source     Goals of care  Metastatic Colon Cancer   Suspected renal cancer  - Diagnosed with colorectal cancer in 2014 with oligometastatic disease to liver at the time of diagnosis. He was treated with ascending colectomy 6/14 and R hepatectomy in 8/14. He declined chemotherapy and enrolled in [facility], but eventually unenrolled from hospice due to overall stability and hospice visits felt intrusive  - CTA abd/pelvis this admission showed disease progression of colon cancer with lung mets, new tumor thrombus in the left portal vein, new/enlarging liver mets, new ascites, and stable bilateral renal masses concerning for solid renal neoplasms  -Previous hospitalist discussed on 5/4: goals of care initiated with patient and family. Cancer progression discussed. Explained that he currently has 2 terminal conditions: advanced colon cancer and cirrhosis. Patient does not want treatment for his cancer, but his current hospitalization is due to a complication of his cirrhosis, and he does want treatment for that. He was actually doing reasonably well, living independently prior to this hospitalization. His goal is to recover from his acute illness, work with PT, and eventually return to his apartment at [facility]. He will consider hospice down the road as he declines.   - DNR/DNI. Goals otherwise restorative.   - PT recommending TCU  - 5/8-Again discussed with patient's about goals of care and he does think that  he will get better but did mention to me that he was not aware of cirrhosis diagnosis  - agree for tcu and we are looking     Hemorrhagic shock due to acute GI bleed, Improved  Lactic acidosis, Resolved  Colitis likely due to colon cancer  Alcoholic cirrhosis with esophageal varices, ascites, and hepatic encephalopathy  Mild scrotal edema-improving  - Presented with confusion, falls, and 1 week history of intermittent rectal bleeding which patient felt was related to hemorrhoids. Hypotensive to 70s on arrival with initial lactate >8; improved with IV fluids. Initial Hgb 5.7. Ammonia level normal.  - In the ED, he was initiated on an octreotide infusion, IV PPI BID, and IV ceftriaxone for SBP ppx. Also transfused total 3 units pRBCs upon admission  - [facility] GI was consulted on admission. Underwent EGD which showed variceal banding scar that appeared healthy, as well as grade I and II esophageal varices. Colonoscopy deferred due to evidence of colitis on imaging.   - CTA abd/pelvis upon admission showed marked wall thickening and edema of the entire bowel with concern for shock bowel/ischemic bowel and disease progression of colon cancer; however patient has been relatively asymptomatic with improved lactate and WBC  - Octreotide d/c'd on 5/4.   - 5/8 He had an episode of maroon-colored bloody stool this morning and was hemodynamically stable and repeat hemoglobin was stable at 8 and I did discuss with the GI team again today and source of his blood loss today is due to metastatic disease and further evaluation with colonoscopy is not possible with disease progression as tumors/neoplastic tissue is friable and treatment with APC would not be helpful and treatment is blood products and repeat CT abdomen done on 5/9/2023 did not show any evidence of bleeding  -continue with PPI, soft diet  - s/p paracentesis again on 5/11/2023 and 3.4 L of fluid was removed and the fluid analysis does not show any evidence of SBP  -hb  stable in 7 range  -Did give him a small dose of Lasix 40 mg 5/14 and his edema seems to be improving  - am bmp  - am LFTs, benign abd exam today 5/15     Sepsis due to suspected aspiration pneumonia, Resolved  Positive blood culture, suspect contaminant  - Spiked to fever to 100.8 overnight 5/4-5/5 along with mild tachycardia. WBC 18.1k. Lactate 2.2. Reported increased shortness of breath. CXR showed known lung mets, bilateral infiltrates due to pulmonary edema vs pneumonia. He had been on ceftriaxone for SBP prophylaxis; antibiotics were broadened to IV pip-tazo with improvement. COVID-19 PCR negative. Unfortunately, blood cultures were not drawn.   - Underwent paracentesis (5/5) with removal of 2L fluid. 120 PMNs, no evidence of SBP  - 1 out of 2 blood cultures on admission (5/3) grew Staph epi. Suspect contaminant  - wbc count has been down trending and he has been afebrile  -completed antibiotic course   -nl sats, lungs clear. No pulmonary complaints.         Acute respiratory distress due to aspiration pneumonia vs pulmonary edema, Resolved  Leukocytosis-Improved  - Reported increased shortness of breath overnight 5/4-5/5.   -CXR showed pulmonary edema vs pneumonia. spO2 mid 90s on room air, but was placed on supplemental O2 for comfort.Treated with IV pip-tazo with improvement.   - Repeat CXR (5/6) improved  -He has been having persistent white count during this hospital stay but he has been afebrile  - wbc count is improving and repeat acetic fluid analysis on 5/11 does not support any evidence of SBP     Thrombocytopenia, -Resolved  - Likely due to cirrhosis and ongoing alcohol use. Platelets wnl on admission though sample was likely hemoconcentrated.   -Platelet count improved. normalized     Alcohol use disorder  - Longstanding history of alcohol dependence. Currently consuming 1 oz of gin five times daily. Last drink: 5/3. No s/sx of withdrawal noted this admission  - d/c NICHOLAS today, 5/7  - On  thiamine/folate/MVI while hospitalized     Hypokalemia-resolved  -Potassium has been intermittently low and is normal today 3 range today  -Monitor     PTSD  Hx of schizoaffective disorder  Hx of bipolar disorder type I  - PTA regimen: clonazepam 0.5 mg daily, olanzapine 5 mg q6h prn, Invega 234 IM q28 days  - Last Invega dose: 5/6  - Clonazepam and olanzapine ordered prn this admission     Cachexia  Severe malnutrition in context of acute on chronic illness  - Daily magic cup ordered per Nutrition           Diet: Room Service  Mechanical/Dental Soft Diet  Snacks/Supplements Adult: Magic Cup; Between Meals  Snacks/Supplements Adult: Ensure Max Protein (bariatric); Between Meals    DVT Prophylaxis: Pneumatic Compression Devices  Chew Catheter: Not present  Lines: None     Cardiac Monitoring: None  Code Status: No CPR- Do NOT Intubate          Clinically Significant Risk Factors              # Hypoalbuminemia: Lowest albumin = 2.5 g/dL at 5/12/2023 10:56 AM, will monitor as appropriate            # Severe Malnutrition: based on nutrition assessment               Disposition Plan     Expected Discharge Date: 05/15/2023, 12:00 PM  Discharge Delays: *Medically Ready for Discharge  Placement - TCU    Discharge Comments: Pt is from [facility], however needs TCU now.          Ziggy Marley MD, MD  Text Page  (7am to 6pm)  Interval History   Nl K  Some hallucinations but no agitation  Taking po well. No focal complaints.   Scrotal swelling not bothering him and better    -Data reviewed today: I reviewed all new labs and imaging results over the last 24 hours. I personally reviewed labs from last 24 hours      Physical Exam   Temp: 97.6  F (36.4  C) Temp src: Oral BP: 107/63 Pulse: 93   Resp: 18 SpO2: 96 % O2 Device: None (Room air)    Vitals:    05/03/23 1157 05/04/23 0615 05/11/23 1128   Weight: 88.9 kg (195 lb 14.1 oz) 90.4 kg (199 lb 4.7 oz) 89.8 kg (198 lb)     Vital Signs with Ranges  Temp:  [97.6  F  (36.4  C)-98.1  F (36.7  C)] 97.6  F (36.4  C)  Pulse:  [88-93] 93  Resp:  [18] 18  BP: (106-112)/(59-64) 107/63  SpO2:  [94 %-96 %] 96 %  I/O last 3 completed shifts:  In: 236 [P.O.:236]  Out: 800 [Urine:800]    Constitutional: Chronically ill appearing, nad, up in bed,   HEENT: some scleral icterus present  Respiratory: CTAB  Cardiovascular: RRR no r/g/m  GI: soft, nt, nd  Skin/Integumen: trace bLE edema  ; moderate scrotal edema  Neuro: awake, alert, fully oriented,   Grossly nonfocal  Psych: calm, answers questions readily          Medications     - MEDICATION INSTRUCTIONS -         diclofenac  2 g Topical BID     folic acid  1 mg Oral Daily     multivitamin w/minerals  1 tablet Oral Daily     paliperidone  234 mg Intramuscular Q28 Days     pantoprazole  40 mg Oral BID AC     sodium chloride (PF)  3 mL Intracatheter Q8H       Data   Recent Labs   Lab 05/15/23  1012 05/14/23  1027 05/13/23  0837 05/12/23  1910 05/12/23  1056 05/11/23  1758 05/11/23  0854 05/10/23  1005   WBC  --   --   --   --  11.9*  --  12.8* 15.1*   HGB  --  7.8* 7.5*  --  7.4*  --  7.5* 7.6*   MCV  --   --   --   --  91  --  92 91   PLT  --   --   --   --  221  --  235 238   INR  --   --   --   --  1.34*  --  1.40* 1.51*   NA  --   --   --   --  134*  --  135* 136   POTASSIUM 3.7 3.7 3.9   < > 3.3*   < > 3.4 3.4   CHLORIDE  --   --   --   --  101  --  102 102   CO2  --   --   --   --  21*  --  20* 19*   BUN  --   --   --   --  20.3  --  18.4 21.5   CR  --   --   --   --  0.71  --  0.68 0.80   ANIONGAP  --   --   --   --  12  --  13 15   GEOVANNA  --   --   --   --  8.2*  --  8.2* 8.1*   GLC  --   --   --   --  139*  --  113* 175*   ALBUMIN  --   --   --   --  2.5*  --  2.5*  2.5* 2.6*   PROTTOTAL  --   --   --   --  5.7*  --  5.8* 5.9*   BILITOTAL  --   --   --   --  4.2*  --  4.6* 3.8*   ALKPHOS  --   --   --   --  849*  --  878* 936*   ALT  --   --   --   --  92*  --  100* 113*   AST  --   --   --   --  74*  --  79* 91*    < > = values in  this interval not displayed.       Imaging:   No results found for this or any previous visit (from the past 24 hour(s)).

## 2023-05-16 NOTE — PLAN OF CARE
Goal Outcome Evaluation:      Plan of Care Reviewed With: patient    Overall Patient Progress: no changeOverall Patient Progress: no change     Summary: Sepsis unclear source. hemorrhagic shock due to acute GI bleed, Has metastatic colon cancer with suspected renal/lung cancer w mets. History of depression, PTSD, alcohol use disorder, schizoaffective disorder and bipolar. Medically stable for discharge     DATE & TIME: 05/15-05/16 [id number]  Cognitive Concerns/ Orientation: A&Ox4, forgetful at times. Flat affect. Takes longer to answer questions/slower speech.   BEHAVIOR & AGGRESSION TOOL COLOR: Green  ABNL VS/O2: VSS on RA  MOBILITY: AX1 GBW, ambulates to bathroom. Impulsive at times  PAIN MANAGMENT: Denies   DIET: Mechanical soft   BOWEL/BLADDER: Continent B&B, can be incontinent at times. BMx1   ABNL LAB/BG: hgb 7.8. yesterday MD aware  K 3.7  DRAIN/DEVICES: R PIV x2 SL & L PIV SL   SKIN: Pale, jaundiced skin, +2 edema BLE, 2/3 penile/scrotal edema. Mepilex on coccyx.   TESTS/PROCEDURES:   D/C DATE: accepted to TCU pending evaluation from [facility] health professional.   OTHER IMPORTANT INFO: Pt c/o nausea this AM, zofran given, refusing AM Protonix.

## 2023-05-16 NOTE — PROGRESS NOTES
[facility]    Hospitalist Progress Note    Assessment & Plan   Date of Admission:  5/3/2023        Assessment & Plan  Los Huang is a 67 year old male with hx of alcoholic cirrhosis with known esophageal varices, metastatic colon cancer s/p partial colectomy and right hepatectomy previously on hospice, PTSD/schioaffective/bipolar disorder who was admitted on 5/3/2023 for hemorrhagic shock due to acute GI bleed. Hospital course complicated by sepsis of unclear source     Goals of care  Metastatic Colon Cancer   Suspected renal cancer  - Diagnosed with colorectal cancer in 2014 with oligometastatic disease to liver at the time of diagnosis. He was treated with ascending colectomy 6/14 and R hepatectomy in 8/14. He declined chemotherapy and enrolled in [facility], but eventually unenrolled from hospice due to overall stability and hospice visits felt intrusive  - CTA abd/pelvis this admission showed disease progression of colon cancer with lung mets, new tumor thrombus in the left portal vein, new/enlarging liver mets, new ascites, and stable bilateral renal masses concerning for solid renal neoplasms  -Previous hospitalist discussed on 5/4: goals of care initiated with patient and family. Cancer progression discussed. Explained that he currently has 2 terminal conditions: advanced colon cancer and cirrhosis. Patient does not want treatment for his cancer, but his current hospitalization is due to a complication of his cirrhosis, and he does want treatment for that. He was actually doing reasonably well, living independently prior to this hospitalization. His goal is to recover from his acute illness, work with PT, and eventually return to his apartment at [facility]. He will consider hospice down the road as he declines.   - DNR/DNI. Goals otherwise restorative.   - PT recommending TCU  - 5/8-Again discussed with patient's about goals of care and he does think that  he will get better but did mention to me that he was not aware of cirrhosis diagnosis  - agree for tcu and we are looking  -pt appears comfortable. No focal complaints.      Hemorrhagic shock due to acute GI bleed, Improved  Lactic acidosis, Resolved  Colitis likely due to colon cancer  Alcoholic cirrhosis with esophageal varices, ascites, and hepatic encephalopathy  Mild scrotal edema-improving  - Presented with confusion, falls, and 1 week history of intermittent rectal bleeding which patient felt was related to hemorrhoids. Hypotensive to 70s on arrival with initial lactate >8; improved with IV fluids. Initial Hgb 5.7. Ammonia level normal.  - In the ED, he was initiated on an octreotide infusion, IV PPI BID, and IV ceftriaxone for SBP ppx. Also transfused total 3 units pRBCs upon admission  - [facility] GI was consulted on admission. Underwent EGD which showed variceal banding scar that appeared healthy, as well as grade I and II esophageal varices. Colonoscopy deferred due to evidence of colitis on imaging.   - CTA abd/pelvis upon admission showed marked wall thickening and edema of the entire bowel with concern for shock bowel/ischemic bowel and disease progression of colon cancer; however patient has been relatively asymptomatic with improved lactate and WBC  - Octreotide d/c'd on 5/4.   - 5/8 He had an episode of maroon-colored bloody stool this morning and was hemodynamically stable and repeat hemoglobin was stable at 8 and I did discuss with the GI team again today and source of his blood loss today is due to metastatic disease and further evaluation with colonoscopy is not possible with disease progression as tumors/neoplastic tissue is friable and treatment with APC would not be helpful and treatment is blood products and repeat CT abdomen done on 5/9/2023 did not show any evidence of bleeding  -continue with PPI, soft diet  - s/p paracentesis again on 5/11/2023 and 3.4 L of fluid was removed and the fluid  analysis does not show any evidence of SBP  -hb stable in 7 range  -Did give him a small dose of Lasix 40 mg 5/14 and his edema seems to be improving  -bmp stable. Nl lytes, LfTS show improvement in transaminase levels and alk phos, Bili is up slightly    benign abd exam , denies abd pain  Stable mentation. Fully oriented     Sepsis due to suspected aspiration pneumonia, Resolved  Positive blood culture, suspect contaminant  - Spiked to fever to 100.8 overnight 5/4-5/5 along with mild tachycardia. WBC 18.1k. Lactate 2.2. Reported increased shortness of breath. CXR showed known lung mets, bilateral infiltrates due to pulmonary edema vs pneumonia. He had been on ceftriaxone for SBP prophylaxis; antibiotics were broadened to IV pip-tazo with improvement. COVID-19 PCR negative. Unfortunately, blood cultures were not drawn.   - Underwent paracentesis (5/5) with removal of 2L fluid. 120 PMNs, no evidence of SBP  - 1 out of 2 blood cultures on admission (5/3) grew Staph epi. Suspect contaminant  - wbc count has been down trending and he has been afebrile  -completed antibiotic course   -nl sats, lungs clear. No pulmonary complaints.  Afebrile.   -am cbc        Acute respiratory distress due to aspiration pneumonia vs pulmonary edema, Resolved  Leukocytosis-Improved  - Reported increased shortness of breath overnight 5/4-5/5.   -CXR showed pulmonary edema vs pneumonia. spO2 mid 90s on room air, but was placed on supplemental O2 for comfort.Treated with IV pip-tazo with improvement.   - Repeat CXR (5/6) improved  -He has been having persistent white count during this hospital stay but he has been afebrile  - wbc count is improving and repeat acetic fluid analysis on 5/11 does not support any evidence of SBP     Thrombocytopenia, -Resolved  - Likely due to cirrhosis and ongoing alcohol use. Platelets wnl on admission though sample was likely hemoconcentrated.   -Platelet count improved. Normalized  - am cbc with platelet  count     Alcohol use disorder  - Longstanding history of alcohol dependence. Currently consuming 1 oz of gin five times daily. Last drink: 5/3. No s/sx of withdrawal noted this admission  - d/c CIWA today, 5/7  - On thiamine/folate/MVI while hospitalized     Hypokalemia-resolved  -Potassium has been intermittently low and is normal today 3 range today  -Monitor     PTSD  Hx of schizoaffective disorder  Hx of bipolar disorder type I  - PTA regimen: clonazepam 0.5 mg daily, olanzapine 5 mg q6h prn, Invega 234 IM q28 days  - Last Invega dose: 5/6  - Clonazepam and olanzapine ordered prn this admission  -stable mood     Cachexia  Severe malnutrition in context of acute on chronic illness  - Daily magic cup ordered per Nutrition           Diet: Room Service  Mechanical/Dental Soft Diet  Snacks/Supplements Adult: Magic Cup; Between Meals  Snacks/Supplements Adult: Ensure Max Protein (bariatric); Between Meals    DVT Prophylaxis: Pneumatic Compression Devices  Chew Catheter: Not present  Lines: None     Cardiac Monitoring: None  Code Status: No CPR- Do NOT Intubate          Clinically Significant Risk Factors              # Hypoalbuminemia: Lowest albumin = 2.5 g/dL at 5/12/2023 10:56 AM, will monitor as appropriate            # Severe Malnutrition: based on nutrition assessment               Disposition Plan     Expected Discharge Date: 05/15/2023, 12:00 PM  Discharge Delays: *Medically Ready for Discharge  Placement - TCU    Discharge Comments: Pt is from [facility], however needs TCU now.          Ziggy Marley MD, MD  Text Page  (7am to 6pm)  Interval History   No abd pain  No sob or cp  No RN concerns  Not mobile, declines walking    -Data reviewed today: I reviewed all new labs and imaging results over the last 24 hours. I personally reviewed labs from last 24 hours      Physical Exam   Temp: 98  F (36.7  C) Temp src: Oral BP: 103/59 Pulse: 94   Resp: 18 SpO2: 95 % O2 Device: None (Room air)     Vitals:    05/03/23 1157 05/04/23 0615 05/11/23 1128   Weight: 88.9 kg (195 lb 14.1 oz) 90.4 kg (199 lb 4.7 oz) 89.8 kg (198 lb)     Vital Signs with Ranges  Temp:  [97.6  F (36.4  C)-98  F (36.7  C)] 98  F (36.7  C)  Pulse:  [90-94] 94  Resp:  [18] 18  BP: (103-107)/(58-63) 103/59  SpO2:  [95 %-96 %] 95 %  I/O last 3 completed shifts:  In: 180 [P.O.:180]  Out: 675 [Urine:675]    Constitutional: Chronically ill appearing, nad, up in bed,   HEENT: some scleral icterus present  Respiratory: CTAB  Cardiovascular: RRR no r/g/m  GI: soft, nt, nd, distended, NT, not tympanitic  Skin/Integumen: trace bLE edema  ; moderate scrotal edema  Neuro: awake, alert, fully oriented,   Grossly nonfocal  Psych: slightly irritable today, answers questions readily          Medications     - MEDICATION INSTRUCTIONS -         diclofenac  2 g Topical BID     folic acid  1 mg Oral Daily     multivitamin w/minerals  1 tablet Oral Daily     paliperidone  234 mg Intramuscular Q28 Days     pantoprazole  40 mg Oral BID AC     sodium chloride (PF)  3 mL Intracatheter Q8H       Data   Recent Labs   Lab 05/15/23  1012 05/14/23  1027 05/13/23  0837 05/12/23  1910 05/12/23  1056 05/11/23  1758 05/11/23  0854 05/10/23  1005   WBC  --   --   --   --  11.9*  --  12.8* 15.1*   HGB  --  7.8* 7.5*  --  7.4*  --  7.5* 7.6*   MCV  --   --   --   --  91  --  92 91   PLT  --   --   --   --  221  --  235 238   INR  --   --   --   --  1.34*  --  1.40* 1.51*   NA  --   --   --   --  134*  --  135* 136   POTASSIUM 3.7 3.7 3.9   < > 3.3*   < > 3.4 3.4   CHLORIDE  --   --   --   --  101  --  102 102   CO2  --   --   --   --  21*  --  20* 19*   BUN  --   --   --   --  20.3  --  18.4 21.5   CR  --   --   --   --  0.71  --  0.68 0.80   ANIONGAP  --   --   --   --  12  --  13 15   GEOVANNA  --   --   --   --  8.2*  --  8.2* 8.1*   GLC  --   --   --   --  139*  --  113* 175*   ALBUMIN  --   --   --   --  2.5*  --  2.5*  2.5* 2.6*   PROTTOTAL  --   --   --   --  5.7*  --   5.8* 5.9*   BILITOTAL  --   --   --   --  4.2*  --  4.6* 3.8*   ALKPHOS  --   --   --   --  849*  --  878* 936*   ALT  --   --   --   --  92*  --  100* 113*   AST  --   --   --   --  74*  --  79* 91*    < > = values in this interval not displayed.       Imaging:   No results found for this or any previous visit (from the past 24 hour(s)).

## 2023-05-16 NOTE — PROGRESS NOTES
Care Management Follow Up    Length of Stay (days): 13    Expected Discharge Date: 05/17/2023     Concerns to be Addressed:       Patient plan of care discussed at interdisciplinary rounds: Yes    Anticipated Discharge Disposition: [facility]     Anticipated Discharge Services:    Anticipated Discharge DME:      Patient/family educated on Medicare website which has current facility and service quality ratings: no  Education Provided on the Discharge Plan:    Patient/Family in Agreement with the Plan: unable to assess    Referrals Placed by CM/SW: Internal Clinic Care Coordination  Private pay costs discussed: transportation costs    Additional Information:  Patient accepted at [facility], formerly [facility].  Discharge is on hold until patient has received a PAS Level 2 MH screen thru [facility].  Son is aware. [facility], at this time anticipates having a room available for patient once screen is completed.  Writer called [facility] PAS supervisor today at [phone] and left a message asking if they have received the Level 2 request from [facility] Line.         DANAE AlbertSW

## 2023-05-16 NOTE — PLAN OF CARE
Summary: Sepsis unclear source. hemorrhagic shock due to acute GI bleed, Has metastatic colon cancer with suspected renal/lung cancer w mets. History of depression, PTSD, alcohol use disorder, schizoaffective disorder and bipolar. Medically stable for discharge     DATE & TIME: 05/16/23 Day shift  Cognitive Concerns/ Orientation: A&Ox4, forgetful at times. Flat affect. Takes longer to answer questions/slower speech; schizophrenic, has hallucinations at time- baseline  BEHAVIOR & AGGRESSION TOOL COLOR: Green  ABNL VS/O2: VSS on RA  MOBILITY: AX1 GBW, ambulates to bathroom. Impulsive at times; highly encouraged to get up and walk, take shower, sit in chair- but adamantly refused  PAIN MANAGMENT: Denies   DIET: Mechanical soft- great appetite  BOWEL/BLADDER: Continent B&B, can be incontinent at times  ABNL LAB/BG: hgb 7.8. yesterday MD aware; Bili total 5.8  DRAIN/DEVICES: R PIV x2 SL & L PIV SL   SKIN: Pale, jaundiced skin, +2 edema BLE R > L, 2/3 penile/scrotal edema. Mepilex on coccyx.   TESTS/PROCEDURES:   D/C DATE: accepted to TCU pending evaluation from [facility] mental health professional.   OTHER IMPORTANT INFO: Slept in between cares

## 2023-05-16 NOTE — PLAN OF CARE
Goal Outcome Evaluation:    Summary: Sepsis unclear source. hemorrhagic shock due to acute GI bleed, Has metastatic colon cancer with suspected renal/lung cancer w mets. History of depression, PTSD, alcohol use disorder, schizoaffective disorder and bipolar.     DATE & TIME: 7564-2256   Cognitive Concerns/ Orientation: A&Ox4, forgetful at times. Flat affect. Takes longer to answer questions/slower speech.   BEHAVIOR & AGGRESSION TOOL COLOR: Green  ABNL VS/O2: VSS on RA  MOBILITY: AX1 GBW, ambulates to bathroom. Impulsive at times  PAIN MANAGMENT: Denies   DIET: Mechanical soft   BOWEL/BLADDER: Continent B&B.   ABNL LAB/BG: hgb 7.8. yesterday MD aware  K 3.7  DRAIN/DEVICES: R PIV x2 SL & L PIV SL   SKIN: Pale, jaundiced skin, +2 edema BLE, 2/3 penile/scrotal edema. Mepilex on coccyx.   TESTS/PROCEDURES:   D/C DATE: accepted to TCU pending evaluation from [facility] mental health professional.   OTHER IMPORTANT INFO:

## 2023-05-17 NOTE — PLAN OF CARE
Goal Outcome Evaluation:       Summary: Sepsis unclear source. hemorrhagic shock due to acute GI bleed, Has metastatic colon cancer with suspected renal/lung cancer w mets. History of depression, PTSD, alcohol use disorder, schizoaffective disorder and bipolar. Medically stable for discharge     DATE & TIME: 05/16/23 9998-3564  Cognitive Concerns/ Orientation: A&Ox4, forgetful at times. Flat affect. Takes longer to answer questions/slower speech; schizophrenic, has hallucinations at time- baseline  BEHAVIOR & AGGRESSION TOOL COLOR: Green  ABNL VS/O2: VSS on RA  MOBILITY: AX1 GBW, ambulates to bathroom.   PAIN MANAGMENT: Oxy given x1.  DIET: Mechanical soft  BOWEL/BLADDER: Continent B&B, can be incontinent at times  ABNL LAB/BG: hgb 7.8. 5/14 MD aware; Bili total 5.8  DRAIN/DEVICES: PIVx2 SL   SKIN: Pale, jaundiced skin, +2 edema BLE R > L, 2/3 penile/scrotal edema. Mepilex on coccyx.   TESTS/PROCEDURES:   D/C DATE: accepted to TCU pending evaluation from [facility] mental health professional.   OTHER IMPORTANT INFO: Flat affect this shift.

## 2023-05-17 NOTE — CONSULTS
"      Initial Psychiatric Consult   Consult date: May 17, 2023         Reason for Consult, requesting source:    Schizoaffective d/o, decisional cap  Requesting source: Ziggy Marley    Labs and imaging reviewed. Discussed with nursing        HPI:   Los Huang is a 67 year old male with hx of alcoholic cirrhosis with known esophageal varices, metastatic colon cancer s/p partial colectomy and right hepatectomy previously on hospice, schizoaffective bipolar type, and PTSD who was admitted on 5/3/2023 for hemorrhagic shock due to acute GI bleed. Hospital course complicated by sepsis of unclear source. There is concern for his ability to make health care decisions as his behaviors are not consistent with his expressed choices. Has been more irritable. Psychiatry consulted for assistance.       Solo was seen laying in bed, awake. Notable delay in speech. Difficult to engage in conversation, selectively mute at times. Provides non-answers to most questions, telling me I will have to ask various people include doctors here as well as \"Amarjit Harper\". States he is here \"because the hospital wanted me here\"; attempted to work through step by step of who brought him in and why, he was able to state that the people at the [facility] called paramedics, but when asked why they called he irritably stated \"I already told you\" and refused to respond to repeat question. Makes various insults toward his family, myself, and the hospital in general. He does not respond to questions about mood. Tells me he has a PhD in mathematics, psychology, and engineering. Denies hallucinations. Does not respond to questions about suicidal or homicidal ideation. Rolls onto his side away from here and from then on did not respond to any questions.        Past Psychiatric History:   Schizoaffective d/o bipolar type vs schizophrenia, PTSD, stimulant use disorder per chart review        Substance Use and History:   Stimulant use in past per chart " review, unable to assess at this time        Past Medical History:   PAST MEDICAL HISTORY:   Past Medical History:   Diagnosis Date     Cancer (H)      Depressive disorder      Post-traumatic osteoarthritis of left shoulder 3/9/2020     Schizoaffective disorder (H)        PAST SURGICAL HISTORY:   Past Surgical History:   Procedure Laterality Date     ABDOMEN SURGERY       BIOPSY       CHOLECYSTECTOMY       COLONOSCOPY       ESOPHAGOSCOPY, GASTROSCOPY, DUODENOSCOPY (EGD), COMBINED N/A 5/3/2023    Procedure: Esophagoscopy, gastroscopy, duodenoscopy (EGD), combined;  Surgeon: Bossman Pedroza MD;  Location:  GI     GENITOURINARY SURGERY      Ureteroscopy gone awry     H NEEDLE POWER PORT Left     PLACED IN [place]     IR LUNG BIOPSY MEDIASTINUM RIGHT  2021     IR SIRT (SELECTIVE INTERNAL RADIO THERAPY)  2020     IR VISCERAL ANGIOGRAM  2020     IR VISCERAL EMBOLIZATION  2020     ORTHOPEDIC SURGERY               Family History:   FAMILY HISTORY:   Family History   Problem Relation Age of Onset     Osteoporosis Sister      Thyroid Disease Sister         Thyroid killed     Anxiety Disorder Sister      Substance Abuse Sister      Thyroid Disease Sister         Thyroid removed     Anxiety Disorder Sister      Depression Son      Depression Other      Mental Illness Mother      Mental Illness Father            Social History:   SOCIAL HISTORY:   Social History     Tobacco Use     Smoking status: Former     Packs/day: 1.00     Years: 34.00     Pack years: 34.00     Types: Cigarettes     Start date:      Quit date:      Years since quittin.3     Smokeless tobacco: Former     Types: Snuff     Quit date:      Tobacco comments:     Smoked sporadically in high school and during college until  then started back in    Vaping Use     Vaping status: Not on file   Substance Use Topics     Alcohol use: Not Currently     Comment: NONE IN 1.5 YRS--2020     He tells me he is from  [place] and moved here because of his son and daughter- details of when/why unclear. Irritable and difficult to engage in further social history.          Physical ROS:   The 10 point Review of Systems is negative other than noted in the HPI or here.    Review Of Systems  Patient refuses to participate in ROS         Medications:       diclofenac  2 g Topical BID     folic acid  1 mg Oral Daily     multivitamin w/minerals  1 tablet Oral Daily     paliperidone  234 mg Intramuscular Q28 Days     pantoprazole  40 mg Oral BID AC     sodium chloride (PF)  3 mL Intracatheter Q8H              Allergies:     Allergies   Allergen Reactions     Animal Dander           Labs:     Recent Results (from the past 48 hour(s))   Basic metabolic panel    Collection Time: 05/16/23 10:41 AM   Result Value Ref Range    Sodium 133 (L) 136 - 145 mmol/L    Potassium 3.8 3.4 - 5.3 mmol/L    Chloride 103 98 - 107 mmol/L    Carbon Dioxide (CO2) 19 (L) 22 - 29 mmol/L    Anion Gap 11 7 - 15 mmol/L    Urea Nitrogen 16.2 8.0 - 23.0 mg/dL    Creatinine 0.64 (L) 0.67 - 1.17 mg/dL    Calcium 8.1 (L) 8.8 - 10.2 mg/dL    Glucose 155 (H) 70 - 99 mg/dL    GFR Estimate >90 >60 mL/min/1.73m2   Hepatic panel    Collection Time: 05/16/23 10:41 AM   Result Value Ref Range    Protein Total 5.8 (L) 6.4 - 8.3 g/dL    Albumin 2.4 (L) 3.5 - 5.2 g/dL    Bilirubin Total 5.1 (H) <=1.2 mg/dL    Alkaline Phosphatase 787 (H) 40 - 129 U/L    AST 53 (H) 10 - 50 U/L    ALT 68 (H) 10 - 50 U/L    Bilirubin Direct 3.86 (H) 0.00 - 0.30 mg/dL   CBC with platelets    Collection Time: 05/17/23  8:21 AM   Result Value Ref Range    WBC Count 9.5 4.0 - 11.0 10e3/uL    RBC Count 2.67 (L) 4.40 - 5.90 10e6/uL    Hemoglobin 7.6 (L) 13.3 - 17.7 g/dL    Hematocrit 24.2 (L) 40.0 - 53.0 %    MCV 91 78 - 100 fL    MCH 28.5 26.5 - 33.0 pg    MCHC 31.4 (L) 31.5 - 36.5 g/dL    RDW 18.1 (H) 10.0 - 15.0 %    Platelet Count 221 150 - 450 10e3/uL   Ammonia    Collection Time: 05/17/23  8:21 AM    Result Value Ref Range    Ammonia 46 16 - 60 umol/L   Hepatic panel    Collection Time: 05/17/23  8:21 AM   Result Value Ref Range    Protein Total 5.5 (L) 6.4 - 8.3 g/dL    Albumin 2.3 (L) 3.5 - 5.2 g/dL    Bilirubin Total 4.2 (H) <=1.2 mg/dL    Alkaline Phosphatase 687 (H) 40 - 129 U/L    AST 41 10 - 50 U/L    ALT 57 (H) 10 - 50 U/L    Bilirubin Direct 3.30 (H) 0.00 - 0.30 mg/dL   Basic metabolic panel    Collection Time: 05/17/23  8:21 AM   Result Value Ref Range    Sodium 133 (L) 136 - 145 mmol/L    Potassium 4.0 3.4 - 5.3 mmol/L    Chloride 103 98 - 107 mmol/L    Carbon Dioxide (CO2) 20 (L) 22 - 29 mmol/L    Anion Gap 10 7 - 15 mmol/L    Urea Nitrogen 16.9 8.0 - 23.0 mg/dL    Creatinine 0.68 0.67 - 1.17 mg/dL    Calcium 7.9 (L) 8.8 - 10.2 mg/dL    Glucose 101 (H) 70 - 99 mg/dL    GFR Estimate >90 >60 mL/min/1.73m2          Physical and Psychiatric Examination:     /58 (BP Location: Left arm, Patient Position: Semi-Mcgee's, Cuff Size: Adult Regular)   Pulse 92   Temp 97.8  F (36.6  C) (Oral)   Resp 20   Wt 89.8 kg (198 lb)   SpO2 94%   BMI 25.42 kg/m    Weight is 198 lbs 0 oz  Body mass index is 25.42 kg/m .    Physical Exam:  I have reviewed the physical exam as documented by by the medical team and agree with findings and assessment and have no additional findings to add at this time.    Mental Status Exam:    Appearance: awake, alert and thin appearing male with slighly disheveled appearance  Attitude:  evasive, guarded and uncooperative  Eye Contact:  intense at times, but often closes eyes or looks away  Mood:  DANTE, refuses to answer  Affect:  flat  Speech:  notable delay in speech, speech is monotone  Psychomotor Behavior:  no evidence of tardive dyskinesia, dystonia, or tics and physical retardation  Thought Process:  illogical, tangential at times  Associations:  no loose associations  Thought Content:  no evidence of suicidal ideation or homicidal ideation and no evidence of psychotic  thought  Insight:  partial  Judgement:  limited  Oriented to:  Oriented to self, place. Unknown orientation to time or situation- refuses to answer  Attention Span and Concentration:  DANTE  Recent and Remote Memory:  DANTE               DSM-5 Diagnosis:   Schizoaffective disorder bipolar type by history          Assessment:   He does present as irritable and difficult to engage, so assessment is limited but does not appear to have decompensated psychiatrically. He does not present as overtly psychotic. No agitation or behavioral issues but refusing assessments sporadically. I am unable to elicit any paranoia. He has been compliant with paliperidone.     I do note that he has been on clonazepam 0.5mg daily PRN for anxiety for past several years at least, and is not currently ordered. Unclear why it was not resumed, but anxiety may be contributing to current presentation. Will resume- monitor/hold for sedation.    Discussed with Dr. Marley. Expecting oncology to meet with him today to provide guidance on possible treatment options. As this has yet to be discussed with him, would be appropriate to wait until after he is given this information before assessing for decisional capacity regarding treatment. I will plan to see him again tomorrow for more focused decisional capacity assessment.          Summary of Recommendations:   1.  Resumed clonazepam 0.5mg daily PRN for anxiety    2.  Will follow up tomorrow for decisional capacity assessment after he meets with oncology; consult order left open.      JAM [facility]    Consult/Liaison Psychiatry   [facility]    Contact information available via [facility] Paging/Directory  If I am not available, then [facility] line ([phone]) should know who is covering our consult service.

## 2023-05-17 NOTE — CONSULTS
Consult Date: 05/17/2023    This consult has been requested by Dr. Ziggy Marley for colon cancer.    The patient was confused.  No meaningful history could be obtained from him.  History obtained from review of chart.    Mr. Huang is a 67-year-old gentleman with metastatic colo cancer.  The patient previously has seen Dr. Patrice Srivastava in Oncology Clinic.  He was diagnosed with metastatic colon cancer with oligometastatic disease to the liver in 2014.  He had ascending colectomy and right hepatectomy in 2014.  The patient received adjuvant chemotherapy.  He had recurrence of hepatic metastasis in 2016. He got cetuximab.  He also had liver-directed therapy including Y-90 and TACE. He also had an right upper quadrant abdominal wall met that was resected in Dec 2016.    Last time the patient saw oncologist was in 07/2021.    The patient presented to the emergency room on 05/03/2023 because of rectal bleeding and some confusion.  Vitals in the ER revealed hypotension.  Multiple investigations done.  -Hemoglobin of 5.7.  Normal MCV.  -CMP revealed an elevated LFT.  -CT angiogram of abdomen and pelvis revealed progression of colon cancer  with enlargement of visualized lung metastases.  There is also tumor thrombus in left portal vein. There is ascites present. Stable bilateral renal masses concerning for solid renal  neoplasms.  -EGD revealed grade I and grade II esophageal varices.    The patient admitted to the hospital for further management.  He has received blood transfusion.  CBC today reveals hemoglobin of 7.6.    I met with the patient.  He was confused.  The patient says that he lives at [facility].  When I was seeing him, he said that he was expecting a call from the [facility].      On directly asking, he denies any problem.  He even denies having cancer.  Denies pain.  Denies abdominal pain, nausea or vomiting.  Denies bleeding from any site.    ALLERGIES:  REVIEWED.    MEDICATIONS:   Reviewed.    PAST MEDICAL HISTORY:    1.  Metastatic colon cancer.  2.  Alcohol abuse.  3.  Depression.  4.  Schizoaffective disorder.  5.  Left shoulder osteoarthritis.  6.  Cholecystectomy.    PHYSICAL EXAMINATION:    The patient was alert.  Not in pain or respiratory distress.  He looked weak.    Rest of the systems not examined.    LABORATORY DATA:  Reviewed.    ASSESSMENT:    1.  A 67-year-old gentleman with metastatic colon cancer, which is progressing.  2.  Gastrointestinal bleed.  3.  Alcoholic liver cirrhosis with esophageal varices.  4.  Normocytic anemia.  5.  Solid 2.6 cm right lower kidney and indeterminate 2.1 cm left upper pole kidney mass.  Likely renal cell carcinoma (RCC).  6.  Schizoaffective disorder.    RECOMMENDATIONS:    1.  The patient has metastatic colon cancer. The patient has not been following up with oncology.  Last oncology followup was in 07/2021.    CT scan reveals progression of disease.    For treatment of metastatic colon cancer, he can be given chemotherapy or targeted agents.  It will help to control disease, prolong life and palliate symptoms.  It is not curative.    The patient has schizoaffective disorder. He is confused.  At this time, he is not competent to make a decision.    Case discussed with Dr. Marley.  He is going to involve palliative care and psychiatry.  We will wait for their evaluation and recommendation.  After that, we will make a decision whether he will be a candidate for palliative chemotherapy or not. No urgency for any inpatient chemotherapy.    2.  Patient has bilateral renal lesions.  This is likely renal cell carcinoma.  Patient needs to be evaluated by urologist.  That can be decided after psychiatric/palliative care evaluation.    3.  The patient has normocytic anemia.  This is from GI bleed, anemia of chronic disease and liver cirrhosis.  We will get some labs including iron studies.    4.  Hematology/oncology will continue to follow. Case  discussed with Dr. Marley.    Thanks for the consult.    TOTAL TIME SPENT:  Sixty minutes.  Time was spent in today's visit on review of chart/investigations, communicating with other providers and documentation today.    Alvin Yanez MD        D: 2023   T: 2023   MT: betito    Name:     NEW DICKERSONSowmya  MRN:      [id number]        Account:      [id number]   :      1955           Consult Date: 2023     Document: [id number]

## 2023-05-17 NOTE — PLAN OF CARE
Summary: Sepsis unclear source. hemorrhagic shock due to acute GI bleed, Has metastatic colon cancer with suspected renal/lung cancer w mets. History of depression, PTSD, alcohol use disorder, schizoaffective disorder and bipolar.     DATE & TIME: 05/17/2023 [id number]  Cognitive Concerns/ Orientation: alert and oriented x3, forgetful and disoriented to situation. Flat affect.   BEHAVIOR & AGGRESSION TOOL COLOR: Green  ABNL VS/O2: VS stable on room air  MOBILITY: AX1 GBW, ambulates to bathroom. Refused to sit in chair for meals  PAIN MANAGMENT: Denies   DIET: Mechanical soft-fair appetite. On room service   BOWEL/BLADDER: Continent B&B. Had soft stool x1  ABNL LAB/BG: Hgb 7.6,  LFT improving. Iron study lab low.  DRAIN/DEVICES: R PIV x2 SL & L PIV SL   SKIN: Pale, jaundiced skin, BLE karissa with +2 edema. Mepilex to coccyx changed for blanchable redness. +3 to +4 scrotal edema, elevated with towel in bed.   TESTS/PROCEDURES: None  D/C DATE: accepted to TCU pending evaluation from [facility] mental health professional.   OTHER IMPORTANT INFO: Agitated with cares at times. Turn and repo done q2hrs in bed. Seen by Psych, Oncology, and palliative this shift.

## 2023-05-17 NOTE — CONSULTS
"[facility]  Palliative Care Consultation Note    Patient: Los Huang  Date of Admission:  5/3/2023    Requesting Clinician / Team: Dr. Marley/Hospitalist   Reason for consult: Goals of care    Recommendations:    Solo was alert upon my visit, slow to respond, flat affect, unpleasant. He was unwilling to engage in conversation regarding his health or plan of care. He stated multiple times that the hospital got him into this mess and they are thus responsible for figuring out what to do next     I attempted to call his daughter Yuliet several times with no response. I have left a VM     Agree with Psych consult to explore decision making capacity. Based on my assessment of pt's unwillingness to participate in a GOC discussion, I cannot safely say he has decision making capacity at present. Pt has a notable mental health history and at times historically, was unable to make complex medical decisions for himself     Anticipate a possible care conference in the coming days once Psych and Onc can weigh in     This case and these recommendations have been extensively discussed with Dr. Marley.    Itzel POLK, CNP  Palliative Medicine   [facility]  Securely message with [employer]   *If you are having trouble locating my name, please ensure \"global\" is checked under contacts tab, within the sites button      Thank you for the opportunity to participate in the care of this patient and family. Our team: will continue to follow.     During regular M-F work hours ([id number]) -- please contact me on [employer]     In my absence, securely message our team via [employer] \"[facility]\" or go to On Call tab in [facility], search for \"[facility]\"     After regular work hours and on weekends/holidays, to reach our on call physician, securely message via [employer] \"[facility]\" or go to On Call tab in [facility], search for \"[facility]\" "     Assessments:  Los Huang is a 67 year old male with PMH significant for alcoholic cirrhosis with known esophageal varices, current alcohol misuse, metastatic colon cancer s/p partial colectomy and right hepatectomy previously on hospice but was able to graduate due to stable disease, PTSD/schioaffective/bipolar disorder who presents with hematochezia, confusion and falls. He was noted to be in hemorrhagic shock with lactate >8, requiring ICU cares. S/p EGD which showed variceal banding scar that appeared healthy, as well as grade I and II esophageal varices. Colonoscopy deferred due to evidence of colitis on imaging. CTA abd/pelvis upon admission showed marked wall thickening and edema of the entire bowel with concern for shock bowel/ischemic bowel and disease progression of colon cancer. His Hgb remains stable in the 7s. He is requiring intermittent therapeutic paracentesis for his known ascites. He has been treated for sepsis 2/2 aspiration PNA. He has severe malnutrition. There is concern for new renal lesions, question new malignancy vs metastatic.     Visited Solo this AM. Introduced myself and our services; assistance in pain and symptom management, emotional and spiritual support, and complex medical decision making.  Solo was alert upon my visit, slow to respond, flat affect. Unpleasant. He was unwilling to engage in conversation regarding his health or plan of care. He stated multiple times that the hospital got him into this mess and they are thus responsible for figuring out what to do next.  I attempted to call his daughter Yuliet several times today. LONG left. No response.     Management Discussed with the following over the past 24 hours: Dr. Marley,  left for dtr Yuliet.       Notes/Medical records reviewed over the past 24hrs: ICU notes, H&P, palliative note from 7/2022, nursing notes, nutrition notes, hospitalist notes.       Tests reviewed in the last 24 hours: See lab/imaging results  included in the data section of this note.        Supplemental History, in addition to the patient's history, over the past 24 hours obtained from: Dr. Marley.       Medical complexity over the past 24 hours: Anticipate GOC discussion, care conference.       Functional Status just prior to hospitalization: 3 (Capable of only limited self-care; needs help with ADLs; in bed/chair >50% of waking hours)      Prognosis, Goals, and/or Advance Care Planning were addressed today: Yes, as noted above.       Patient's decision making preferences: unable to assess            I have concerns about the patient/family's health literacy today: Unable to assess today           Patient has a completed Health Care Directive: Yes, and on file.      Code status: No CPR / No Intubation    Coping, Meaning, & Spirituality:   Mood, coping, and/or meaning in the context of serious illness were addressed today: No    Social:     Key family / caregivers: Dtr Yuliet     Substance use history: Current alcohol misuse     History of Present Illness:  History gathered today from: patient, medical chart, medical team members, unit team members, health care directive/s    Los DAVIDA Huang is a 67 year old male with PMH significant for alcoholic cirrhosis with known esophageal varices, current alcohol misuse, metastatic colon cancer s/p partial colectomy and right hepatectomy previously on hospice but was able to graduate due to stable disease, PTSD/schioaffective/bipolar disorder who presents with hematochezia, confusion and falls. He was noted to be in hemorrhagic shock with lactate >8, requiring ICU cares. S/p EGD which showed variceal banding scar that appeared healthy, as well as grade I and II esophageal varices. Colonoscopy deferred due to evidence of colitis on imaging. CTA abd/pelvis upon admission showed marked wall thickening and edema of the entire bowel with concern for shock bowel/ischemic bowel and disease progression of colon cancer.  His Hgb remains stable in the 7s. He is requiring intermittent therapeutic paracentesis for his known ascites. He has been treated for sepsis 2/2 aspiration PNA. He has severe malnutrition. There is concern for new renal lesions, question new malignancy vs metastatic.     Key Palliative Symptom Data:  We are not helping to manage these symptoms currently in this patient.    Patient is on opioids: bowels not assessed today.    ROS:  Comprehensive ROS is reviewed and is negative except as here & per HPI: N/A     Past Medical History:  Past Medical History:   Diagnosis Date     Cancer (H)      Depressive disorder      Post-traumatic osteoarthritis of left shoulder 3/9/2020     Schizoaffective disorder (H)         Past Surgical History:  Past Surgical History:   Procedure Laterality Date     ABDOMEN SURGERY       BIOPSY       CHOLECYSTECTOMY       COLONOSCOPY       ESOPHAGOSCOPY, GASTROSCOPY, DUODENOSCOPY (EGD), COMBINED N/A 5/3/2023    Procedure: Esophagoscopy, gastroscopy, duodenoscopy (EGD), combined;  Surgeon: Bossman Pedroza MD;  Location:  GI     GENITOURINARY SURGERY  1997    Ureteroscopy gone awry     H NEEDLE POWER PORT Left 2014    PLACED IN [place]     IR LUNG BIOPSY MEDIASTINUM RIGHT  8/6/2021     IR SIRT (SELECTIVE INTERNAL RADIO THERAPY)  7/30/2020     IR VISCERAL ANGIOGRAM  7/30/2020     IR VISCERAL EMBOLIZATION  8/5/2020     ORTHOPEDIC SURGERY           Family History:  Family History   Problem Relation Age of Onset     Osteoporosis Sister      Thyroid Disease Sister         Thyroid killed     Anxiety Disorder Sister      Substance Abuse Sister      Thyroid Disease Sister         Thyroid removed     Anxiety Disorder Sister      Depression Son      Depression Other      Mental Illness Mother      Mental Illness Father          Allergies:  Allergies   Allergen Reactions     Animal Dander         Medications:  I have reviewed this patient's medication profile and medications from this  hospitalization.   Noted scheduled meds are:  Voltaren gel topical BID   Folic acid   MVI  Paliperidone injection monthly   PPI BID     Noted PRN meds are:  Cogentin 1mg PO at bedtime PRN movement disorder   Flexeril 10mg PO BID PRN muscle  Spasms   Lactulose 20g PO Q2hrs PRN confusion   Zyprexa 5mg PO Q6hrs PRN agitation   Oxycodone 5mg PO Q6hrs PRN pain   Trazodone 50mg PO at bedtime PRN sleep     Physical Exam:  Vital Signs: Temp: 97.8  F (36.6  C) Temp src: Oral BP: 100/58 Pulse: 92   Resp: 20 SpO2: 94 % O2 Device: None (Room air)    Weight: 198 lbs 0 oz  CONSTITUTIONAL: Chronically ill cachectic and disheveled man seen resting in bed in NAD, alert, flat affect, slow responses. Calm, uncooperative. Unpleasant.   RESPIRATORY: NL respiratory effort on RA  GASTROINTESTINAL: Distended   SKIN: Jaundiced     Data reviewed:  Recent imaging independently reviewed, my comments on pertinents:   5/6 CXR with pulmonary nodules     Results for orders placed or performed during the hospital encounter of 05/03/23   CTA Abdomen Pelvis with Contrast    Impression    IMPRESSION:   1.  Findings concerning for shock bowel with marked edema involving  the entire small bowel and residual colon (prior right hemicolectomy).  No evidence for active GI bleed.  2.  Disease progression of colon cancer with enlargement of visualized  lung metastases. New small right pleural effusion.  3.  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 Enlargement of subcapsular  metastatic deposits along the right lobe of the liver.  4.  Right hepatectomy. Cirrhosis and splenomegaly.  5.  New moderate ascites either due to portal hypertension and/or  peritoneal carcinomatosis.  6.  Stable bilateral renal masses concerning for solid renal  neoplasms.  7.  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    HARI GARCIA MD         SYSTEM ID:  [id number]   XR Chest Port 1 View    Impression     IMPRESSION: Multiple bilateral lung nodules compatible with known metastatic disease. Pulmonary edema. Concomitant infiltrates cannot be excluded. Small right pleural effusion. Heart size normal. Left chest port catheter tip in SVC.   US Paracentesis without Albumin    Impression    IMPRESSION:  1.  Status post ultrasound-guided paracentesis.    JACK LANDRY MD         SYSTEM ID:  [id number]   XR Chest Port 1 View    Impression    IMPRESSION: Heart is normal in size. Multiple pulmonary nodules are noted bilaterally compatible with metastatic disease. Trace bilateral effusions with bibasilar atelectasis. Large mass is noted within the medial right lung base. Left Mediport,   unchanged. No evidence of pulmonary edema.    CTA Abdomen Pelvis with Contrast    Impression    IMPRESSION:  1.  Site of GI bleed not identified.  2.  Interval improvement in wall thickening of small bowel and colon.  3.  Pulmonary metastases. Small right pleural effusion.  4.  Tumor thrombus left portal vein and hepatic metastases again seen. Subcapsular implants along the liver again noted.  5.  Bilateral renal lesions again seen.  6.  Moderate amount of ascites.   US Paracentesis without Albumin    Impression    IMPRESSION: Technically successful paracentesis without immediate  complications.    LINDA MONTELONGO MD         SYSTEM ID:  [id number]       Recent lab data independently reviewed, my comments on pertinents:   Na 133  K 4  Creat 0.68  Albumin 2.3  Alk phos 687  ALT 57  AST 41  Ammonia 46  Bili 4.2  WBC 9.5  Hgb 7.6  Plt 221  INR 1.34

## 2023-05-17 NOTE — PROGRESS NOTES
[facility]    Hospitalist Progress Note    Assessment & Plan   Date of Admission:  5/3/2023        Assessment & Plan  Los Huang is a 67 year old male with hx of alcoholic cirrhosis with known esophageal varices, metastatic colon cancer s/p partial colectomy and right hepatectomy previously on hospice, PTSD/schioaffective/bipolar disorder who was admitted on 5/3/2023 for hemorrhagic shock due to acute GI bleed. Hospital course complicated by sepsis of unclear source     Goals of care  Metastatic Colon Cancer   Suspected renal cancer  - Diagnosed with colorectal cancer in 2014 with oligometastatic disease to liver at the time of diagnosis. He was treated with ascending colectomy 6/14 and R hepatectomy in 8/14. He declined chemotherapy and enrolled in [facility], but eventually unenrolled from hospice due to overall stability and hospice visits felt intrusive  - CTA abd/pelvis this admission showed disease progression of colon cancer with lung mets, new tumor thrombus in the left portal vein, new/enlarging liver mets, new ascites, and stable bilateral renal masses concerning for solid renal neoplasms  -Previous hospitalist discussed on 5/4: goals of care initiated with patient and family. Cancer progression discussed. Explained that he currently has 2 terminal conditions: advanced colon cancer and cirrhosis. Patient does not want treatment for his cancer, but his current hospitalization is due to a complication of his cirrhosis, and he does want treatment for that. He was actually doing reasonably well, living independently prior to this hospitalization. His goal is to recover from his acute illness, work with PT, and eventually return to his apartment at [facility]. He will consider hospice down the road as he declines.   - DNR/DNI. Goals otherwise restorative.   - PT recommending TCU  - 5/8-Again discussed with patient's about goals of care and he does think that  he will get better but did mention to me that he was not aware of cirrhosis diagnosis  -Patient has agreed to a TCU and social work involved.  Close to having a bed available this week.    Today-  -pt appears comfortable. No focal complaints.  Patient declining exam today  -[facility] oncology consult as patient has not seen oncology recently possibly has not seen oncology in the last year or 2 on review of the chart.  -We will attempt to revisit goals of care discussion with palliative care patient and patient's children this week prior to patient discharge.  I think it will be helpful for the patient and the family to have oncology input as to his current state of his metastatic colon cancer likely renal cell carcinoma whether he is a candidate for any treatment his prognosis and appropriateness of hospice care.  -Psychiatry consult to assess capacity make medical decisions.  -She has a history of being on hospice 1 or 2 years ago but went off hospice.  -Prior discussions with hospitalist team indicated patient wished to have restorative cares but is not participating with care plan specifically not getting out of bed or sitting in the chair or ambulating and therefore work with therapy going forward.  -Think it is prudent to revisit his goals of care prior to discharge as patient has high risk of rehospitalization  -Patient met with oncology.  See oncology note.  Patient would be a candidate for chemotherapy or targeted agents but would require frequent follow-up in clinic for chemotherapy PET scan and clinic visits.  CT shows progression of disease.  Hospice is appropriate for the patient however chemotherapy may potentially prolong his life for another 1 to 2 years.  However it would require ongoing treatment and commitment to the treatment process.  He is certainly at risk for recurrent hospitalization and decompensation given his underlying cirrhosis.  Discussed with oncology    -Discussed situation with  "patient's son.  Patient's son thinks he is near his psychiatric baseline though he tends to be less amenable to conversation, more guarded confrontational and disorganized when he is in the hospital.  In describing my interaction with the patient patient's son thinks that he is not too far from his baseline.  Patient's son thinks patient likely does not have a capacity to make medical decisions for himself.  Patient's son also feels patient is not likely to be interested in committing to a oncology treatment process which would include follow-up clinic visits to be chemotherapy visits and follow-up imaging.  Patient's son also notes that patient tends to be suspicious of the medical system.  When son met with the patient this past weekend he was mentally a little slower disorganized in his thinking he states it was a challenging visit he was \"volatile\" at times.  He has a power of  for any financial issues he thinks his sister may have power of  for medical issues though he will confirm.  He thinks a care conference on Friday would work best for his schedule.    Spoke with patient's daughter.  She also agrees that patient does have some increased irritability more disorganized thinking and is more guarded and confrontational when in a medical setting.  In general she notes he is suspicious of the medical system and has paranoia around others knowing his medical issues.  This has been a chronic issue for him.  She states that he did have difficulties with following through on treatment program when he was treated for his cancer in the past.  During his last hospitalization he rebounded fairly quickly and was able to discharge home.  Had been doing relatively well up until this hospitalization.  He was transferred to assisted living because he was having significant psychiatric decompensation calling 911 daily.  He has done better in the assisted living.  She notes that since he has had his Invega " injection relatively recently he is probably at his best psychiatrically at this point.  He is therefore not too far from his psychiatric baseline.  She states that he is very scared of the idea of dying and contributes to him not wanting to engage around discussions of goals of care and hospice care.  At the same time she thinks it will be very challenging for him to follow through with any treatment/chemotherapy/oncology treatment program in the future.  As with her brother she states that he is probably not very interested in having treatment or pursuing ongoing engagement with medical system.  She was also able to meet Friday for a care conference.    We will restart his Klonopin 0.5 mg daily as this may help with some of the anxiety he may be experiencing in the hospital.     Hemorrhagic shock due to acute GI bleed, Improved  Lactic acidosis, Resolved  Colitis likely due to colon cancer  Alcoholic cirrhosis with esophageal varices, ascites, and hepatic encephalopathy  Mild scrotal edema-improving  - Presented with confusion, falls, and 1 week history of intermittent rectal bleeding which patient felt was related to hemorrhoids. Hypotensive to 70s on arrival with initial lactate >8; improved with IV fluids. Initial Hgb 5.7. Ammonia level normal.  - In the ED, he was initiated on an octreotide infusion, IV PPI BID, and IV ceftriaxone for SBP ppx. Also transfused total 3 units pRBCs upon admission  - [facility] was consulted on admission. Underwent EGD which showed variceal banding scar that appeared healthy, as well as grade I and II esophageal varices. Colonoscopy deferred due to evidence of colitis on imaging.   - CTA abd/pelvis upon admission showed marked wall thickening and edema of the entire bowel with concern for shock bowel/ischemic bowel and disease progression of colon cancer; however patient has been relatively asymptomatic with improved lactate and WBC  - Octreotide d/c'd on 5/4.   - 5/8 He had an  episode of maroon-colored bloody stool this morning and was hemodynamically stable and repeat hemoglobin was stable at 8 and I did discuss with the GI team again today and source of his blood loss today is due to metastatic disease and further evaluation with colonoscopy is not possible with disease progression as tumors/neoplastic tissue is friable and treatment with APC would not be helpful and treatment is blood products and repeat CT abdomen done on 5/9/2023 did not show any evidence of bleeding  -continue with PPI, soft diet  - s/p paracentesis again on 5/11/2023 and 3.4 L of fluid was removed and the fluid analysis does not show any evidence of SBP  Patient did complete 7 total days of IV antibiotics (Rocephin and zosyn) while in the hospital which should be adequate for SBP prophylaxis given his GI bleed and cirrhosis/ascites  -hb stable in 7 range  -Did give him a small dose of Lasix 40 mg 5/14 and his edema seems to be improving  -bmp stable. Nl lytes, LfTS show steady improvement in transaminase levels and alk phos, Bili is up slightly improving   benign abd exam , denies abd pain  Stable mentation. Fully oriented  -Today patient declines exam.  He is accumulated ascitic fluid it appears over the course of the last week and a half.  Patient denies any abdominal pain his belly has been benign on exam last several days.  He declines exam today.  He declines therapeutic paracentesis  -Globin stable in the 7 range.  -Palliative care consult will attempt to have a goals of care discussion with the patient's children the patient and palliative care this coming week.     Sepsis due to suspected aspiration pneumonia, Resolved  Positive blood culture, suspect contaminant  - Spiked to fever to 100.8 overnight 5/4-5/5 along with mild tachycardia. WBC 18.1k. Lactate 2.2. Reported increased shortness of breath. CXR showed known lung mets, bilateral infiltrates due to pulmonary edema vs pneumonia. He had been on  ceftriaxone for SBP prophylaxis; antibiotics were broadened to IV pip-tazo with improvement. COVID-19 PCR negative. Unfortunately, blood cultures were not drawn.   - Underwent paracentesis (5/5) with removal of 2L fluid. 120 PMNs, no evidence of SBP  - 1 out of 2 blood cultures on admission (5/3) grew Staph epi. Suspect contaminant  - wbc count has been down trending and he has been afebrile  -completed antibiotic course   -nl sats, lungs clear. No pulmonary complaints.  Afebrile.  Normal white blood cell count          Acute respiratory distress due to aspiration pneumonia vs pulmonary edema, Resolved  Leukocytosis-Improved  - Reported increased shortness of breath overnight 5/4-5/5.   -CXR showed pulmonary edema vs pneumonia. spO2 mid 90s on room air, but was placed on supplemental O2 for comfort.Treated with IV pip-tazo with improvement.   - Repeat CXR (5/6) improved  -He has been having persistent white count during this hospital stay but he has been afebrile  - wbc count is improving and repeat acetic fluid analysis on 5/11 does not support any evidence of SBP  -No pulmonary complaints.  Lungs been clear.  Breathing easily off oxygen     Thrombocytopenia, -Resolved  - Likely due to cirrhosis and ongoing alcohol use. Platelets wnl on admission though sample was likely hemoconcentrated.   -Platelet count improved. Normalized  -Platelet count within normal limits.     Alcohol use disorder  - Longstanding history of alcohol dependence. Currently consuming 1 oz of gin five times daily. Last drink: 5/3. No s/sx of withdrawal noted this admission  - d/c CIWA today, 5/7  - On thiamine/folate/MVI while hospitalized     Hypokalemia-resolved  -Potassium has been intermittently low and is normal today 3 range today  -Potassium normal.  -Monitor     PTSD  Hx of schizoaffective disorder  Hx of bipolar disorder type I  - PTA regimen: clonazepam 0.5 mg daily, olanzapine 5 mg q6h prn, Invega 234 IM q28 days  - Last Invega dose:  5/6  - Clonazepam and olanzapine ordered prn this admission  -stable mood  -Patient has been more irritable over the last 48 hours the patient is new to me in the last 72 hours.  He is not getting up walking in the halls or sitting in the chair for the last several days if not longer.  He has been fully oriented alert but slightly guarded and restricted in his answers to questions.  -He is not participating fully in plan of care  -We will have psychiatry see the patient to evaluate his capacity to make complex medical decisions for himself including goals of care such as continued restorative care versus comfort care.  They can also assess whether he is having decompensated bipolar disorder or schizoaffective disorder.  -We will be attempting to proceed with goals of care discussion with palliative care family and the patient in the next 1 to 2 days and therefore will be helpful to have psychiatric input as to his capacity for medical decisions at this time.  -He has not been recently evaluated by psychiatry.  There has been concern regarding his capacity to make medical decisions in the past on review of the chart.     Cachexia  Severe malnutrition in context of acute on chronic illness  - Daily magic cup ordered per Nutrition           Diet: Room Service  Mechanical/Dental Soft Diet  Snacks/Supplements Adult: Magic Cup; Between Meals  Snacks/Supplements Adult: Ensure Max Protein (bariatric); Between Meals    DVT Prophylaxis: Pneumatic Compression Devices  Chew Catheter: Not present  Lines: None     Cardiac Monitoring: None  Code Status: No CPR- Do NOT Intubate          Clinically Significant Risk Factors              # Hypoalbuminemia: Lowest albumin = 2.5 g/dL at 5/12/2023 10:56 AM, will monitor as appropriate            # Severe Malnutrition: based on nutrition assessment               Disposition Plan     Expected Discharge Date: 05/15/2023, 12:00 PM  Discharge Delays: *Medically Ready for Discharge  Placement  - TCU    Discharge Comments: Pt is from [facility], however needs TCU now.        Discussed care plan with patient, RN, palliative care.  We will attempt to reach the patient's 2 children today unable to reach them yesterday.  Primary contact would be the patient's daughter.      Ziggy Marley MD, MD  Text Page  (7am to 6pm)  Interval History   Up to walking to the bathroom.  Denies any dizziness.  8 a small amount for breakfast.  Has not wanted to get out of bed according to the nurse no focal complaints per nursing    Denies any abdominal discomfort and declines paracentesis today.        -Data reviewed today: I reviewed all new labs and imaging results over the last 24 hours. I personally reviewed labs from last 24 hours      Physical Exam   Temp: 97.8  F (36.6  C) Temp src: Oral BP: 100/58 Pulse: 92   Resp: 20 SpO2: 94 % O2 Device: None (Room air)    Vitals:    05/03/23 1157 05/04/23 0615 05/11/23 1128   Weight: 88.9 kg (195 lb 14.1 oz) 90.4 kg (199 lb 4.7 oz) 89.8 kg (198 lb)     Vital Signs with Ranges  Temp:  [97.8  F (36.6  C)-98.2  F (36.8  C)] 97.8  F (36.6  C)  Pulse:  [90-92] 92  Resp:  [18-20] 20  BP: ()/(58) 100/58  SpO2:  [92 %-94 %] 94 %  I/O last 3 completed shifts:  In: 240 [P.O.:240]  Out: 451 [Urine:450; Stool:1]  Patient declined physical exam today.  Constitutional: Chronically ill appearing, nad, up in bed, alert conversant, appears comfortable, nontoxic appearing  HEENT: scleral icterus present  Respiratory: Being easily, no coughing  Cardiovascular: Patient declined exam  GI: Abdomen appears distended as before suspect ascites.  Patient declined exam  Skin/Integumen: trace bLE edema  ; patient declined exam  Neuro: awake, alert, engages with examiner attends to the examiner when speaking  Grossly nonfocal  Psych: Patient states that he did not want me to examine him.  As before there can be long pauses in his responses to my questions.  As before there can be bizarre  "responses to the questions today when asked how he was doing he talked about a \"mast head\" other times his responses are appropriate.  No agitation but irritable.  Did not assess orientation.  Fluid clear speech.  He will often not respond to my questions include keep his eyes closed at other times he will respond in a simple manner.  At 1 point he gestured by holding his finger up at me pointing.  Twice during the visit he performed an exaggerated smile when asked how he was doing or after looking at me for prolonged period of time            Medications     - MEDICATION INSTRUCTIONS -         diclofenac  2 g Topical BID     folic acid  1 mg Oral Daily     multivitamin w/minerals  1 tablet Oral Daily     paliperidone  234 mg Intramuscular Q28 Days     pantoprazole  40 mg Oral BID AC     sodium chloride (PF)  3 mL Intracatheter Q8H       Data   Recent Labs   Lab 05/17/23  0821 05/16/23  1041 05/15/23  1012 05/14/23  1027 05/13/23  0837 05/12/23  1910 05/12/23  1056 05/11/23  1758 05/11/23  0854   WBC 9.5  --   --   --   --   --  11.9*  --  12.8*   HGB 7.6*  --   --  7.8* 7.5*  --  7.4*  --  7.5*   MCV 91  --   --   --   --   --  91  --  92     --   --   --   --   --  221  --  235   INR  --   --   --   --   --   --  1.34*  --  1.40*   * 133*  --   --   --   --  134*  --  135*   POTASSIUM 4.0 3.8 3.7 3.7 3.9   < > 3.3*   < > 3.4   CHLORIDE 103 103  --   --   --   --  101  --  102   CO2 20* 19*  --   --   --   --  21*  --  20*   BUN 16.9 16.2  --   --   --   --  20.3  --  18.4   CR 0.68 0.64*  --   --   --   --  0.71  --  0.68   ANIONGAP 10 11  --   --   --   --  12  --  13   GEOVANNA 7.9* 8.1*  --   --   --   --  8.2*  --  8.2*   * 155*  --   --   --   --  139*  --  113*   ALBUMIN 2.3* 2.4*  --   --   --   --  2.5*  --  2.5*  2.5*   PROTTOTAL 5.5* 5.8*  --   --   --   --  5.7*  --  5.8*   BILITOTAL 4.2* 5.1*  --   --   --   --  4.2*  --  4.6*   ALKPHOS 687* 787*  --   --   --   --  849*  --  878*   ALT " 57* 68*  --   --   --   --  92*  --  100*   AST 41 53*  --   --   --   --  74*  --  79*    < > = values in this interval not displayed.       Imaging:   No results found for this or any previous visit (from the past 24 hour(s)).

## 2023-05-17 NOTE — PLAN OF CARE
"  Problem: Plan of Care - These are the overarching goals to be used throughout the patient stay.    Goal: Plan of Care Review  Description: The Plan of Care Review/Shift note should be completed every shift.  The Outcome Evaluation is a brief statement about your assessment that the patient is improving, declining, or no change.  This information will be displayed automatically on your shift note.  Outcome: Not Progressing  Flowsheets (Taken 5/16/2023 1907)  Plan of Care Reviewed With: patient   Goal Outcome Evaluation:      Plan of Care Reviewed With: patient    Orientations: alert to self; no complaints of pain. Answering assessment questions repeatedly with, \"I am drunk\"  Vitals/Pain: BP 99/58 (BP Location: Left arm, Cuff Size: Adult Regular)   Pulse 92   Temp 98.2  F (36.8  C) (Oral)   Resp 18   Wt 89.8 kg (198 lb)   SpO2 92%   BMI 25.42 kg/m    Tele: regular heart rate   Resp: clear and equal bilaterally   Lines/Drains: Two PIV patent     Skin/Wounds: jaundice; trace edema BLE; scrotal edema   GI/: continent of bowel and bladder   Labs: Abnormal/Trends, Electrolyte Replacement- none  Ambulation/Assist:SBA, walker and gait belt   Plan: Placement to TCU    Miriam Fam RN          "

## 2023-05-18 NOTE — PLAN OF CARE
"Goal Outcome Evaluation:      Plan of Care Reviewed With: patient    Overall Patient Progress: improving    Outcome Evaluation: Pt eating very well and much more \"with it\" today. Wants to continue getting ONS.      "

## 2023-05-18 NOTE — PLAN OF CARE
Goal Outcome Evaluation:       DATE & TIME: 05/16/23 [id number]  Cognitive Concerns/ Orientation: A&Ox3, disoriented to situation, forgetful. Flat affect.   BEHAVIOR & AGGRESSION TOOL COLOR: Green  ABNL VS/O2: VSS on RA  MOBILITY: AX1 GBW, ambulates to bathroom.   PAIN MANAGMENT: denies  DIET: Mechanical soft  BOWEL/BLADDER: Continent B&B, had 1 large BM  ABNL LAB/BG: hgb 7.6. Bili total 4.2, Iron 16, Bili Direct 3.30, ALT 57, Na 133  DRAIN/DEVICES: PIVx2 SL  SKIN: Pale, jaundiced skin, +2 edema BLE L > R, 3-4 penile/scrotal edema. TESTS/PROCEDURES: none  D/C DATE: accepted to TCU pending evaluation from [facility] mental health professional.   OTHER IMPORTANT INFO: hem/Onc, Psych, Palliative following.   Psych will F/U today for decisional capacity assessment

## 2023-05-18 NOTE — CONSULTS
"      Psychiatry Consultation; Follow up              Reason for Consult, requesting source:    Decisional capacity  Requesting source: Ziggy Marley    Labs and imaging reviewed, discussed with nursing.               Interim history:    From initial note 5/17: \"Los Huang is a 67 year old male with hx of alcoholic cirrhosis with known esophageal varices, metastatic colon cancer s/p partial colectomy and right hepatectomy previously on hospice, schizoaffective bipolar type, and PTSD who was admitted on 5/3/2023 for hemorrhagic shock due to acute GI bleed. Hospital course complicated by sepsis of unclear source. There is concern for his ability to make health care decisions as his behaviors are not consistent with his expressed choices. Has been more irritable. Psychiatry consulted for assistance.    Solo was seen laying in bed, awake. Notable delay in speech. Difficult to engage in conversation, selectively mute at times. Provides non-answers to most questions, telling me I will have to ask various people include doctors here as well as \"Amarjit Harper\". States he is here \"because the hospital wanted me here\"; attempted to work through step by step of who brought him in and why, he was able to state that the people at the [facility] called paramedics, but when asked why they called he irritably stated \"I already told you\" and refused to respond to repeat question. Makes various insults toward his family, myself, and the hospital in general. He does not respond to questions about mood. Tells me he has a PhD in mathematics, psychology, and engineering. Denies hallucinations. Does not respond to questions about suicidal or homicidal ideation. Rolls onto his side away from here and from then on did not respond to any questions.\"    Follow up 5/18: Solo is more willing to engage in conversation today, making better eye contact. He tells me that he is feeling a little better physically, but still not great overall. " "Getting out of bed today. He does not recall talking to the oncologist- stated he planned to see them today. When I reminded him that they saw him yesterday, he stated \"oh, maybe that's the doctor I told I didn't have cancer\"- and then he laughed. He is able to tell me that he was diagnosed with cancer in 2014 and told he had 6 months to live, so he is not sure what to expect regarding this diagnosis. He states he hasn't talked to anyone about specific treatment options but he anticipates that it will either be hospice or \"treatment\"- he is not sure which he would want without knowing more at this point. He does admit to getting very anxious when having these conversations and sometimes doesn't actually participate due to anxiety.         Current Medications:       diclofenac  2 g Topical BID     folic acid  1 mg Oral Daily     multivitamin w/minerals  1 tablet Oral Daily     paliperidone  234 mg Intramuscular Q28 Days     pantoprazole  40 mg Oral BID AC     sodium chloride (PF)  3 mL Intracatheter Q8H              MSE:   Appearance: awake, alert and slightly unkempt  Attitude:  cooperative  Eye Contact:  fair, better  Mood:  anxious  Affect:  mood congruent  Speech:  clear, coherent  Psychomotor Behavior:  no evidence of tardive dyskinesia, dystonia, or tics  Thought Process:  linear, more organized today  Associations:  no loose associations  Thought Content:  no evidence of suicidal ideation or homicidal ideation and no evidence of psychotic thought  Insight:  fair  Judgement:  fair  Oriented to:  time, person, and place  Attention Span and Concentration:  fair  Recent and Remote Memory:  intact      Vital signs:  Temp: 97.6  F (36.4  C) Temp src: Oral BP: 100/56 Pulse: 88   Resp: 18 SpO2: 94 % O2 Device: None (Room air) Oxygen Delivery: 2 LPM   Weight: 89.8 kg (198 lb)  Estimated body mass index is 25.42 kg/m  as calculated from the following:    Height as of 7/14/22: 1.88 m (6' 2\").    Weight as of this " encounter: 89.8 kg (198 lb).              DSM-5 Diagnosis:   Schizoaffective disorder bipolar type by history          Assessment:   Discussed with Dr. Marley. Sounds like Solo didn't really talk with oncologist yesterday, as he instead made variety of bizarre statements and appeared confused. I have some suspicion that anxiety was at least playing a part in his presentation yesterday and he appears more willing to engage today after he receiving Klonopin earlier today. Notable change in his presentation: speech less delayed, better eye contact, affect less restricted, and he appears to be more organized, not making bizarre statements. He still has yet to be given explanation of his treatment options, so we are still unable to evaluate decision making capacity but at least today he seems able to accurately understand his illness and is willing to speak with oncologist today. Per Dr. Marley they have a care conference scheduled for tomorrow to discuss with him and his children the possible options/goals of care- request that we follow up tomorrow afternoon to assess decisional capacity after this conversation takes place. I do recommend giving PRN Klonopin tomorrow prior to this meeting to help reduce anxiety and better allow him to participate. Will leave consult open and provide update to the psychiatry provider who will be here tomorrow.           Summary of Recommendations:   1.  Recommend giving PRN clonazepam tomorrow prior to discussing with him treatment options/goals of care.    2.  Changed clonazepam to daily PRN as this is how he reports taking it PTA.    3.  Formal decisional capacity consult to follow care conference tomorrow- will reassess in the afternoon.    JAM Alarcon [facility]  Consult/Liaison Psychiatry   [facility]    Contact information available via [facility] Paging/Directory  If I am not available, then [facility] CL line ([phone]) should know who is covering our consult service.  "  \"Much or all of the text in this note was generated through the use of Dragon Dictate voice to text software. Errors in spelling or words which appear to be out of contact are unintentional, may be present due having escaped editing\"           "

## 2023-05-18 NOTE — PLAN OF CARE
Summary: Sepsis d/t suspected aspiration pneumonia, hemorrhagic shock due to acute GI bleed  DATE & TIME: 05/18/23 Day shift  Cognitive Concerns/ Orientation: A&Ox2, disoriented to situation, forgetful. Flat affect. Can be illogical, with hallucinations (baseline); was made not decisional today by Psych  BEHAVIOR & AGGRESSION TOOL COLOR: Green  ABNL VS/O2: VSS on RA  MOBILITY: AX1 GBW, ambulates to bathroom.   PAIN MANAGMENT: denies  DIET: Mechanical soft- great appetite today  BOWEL/BLADDER: Continent B&B, no BM this shift  ABNL LAB/BG: Hgb 7.6; Bili total 4.2; Na 133; COVID Neg; Vot B12 2978  DRAIN/DEVICES: PIVx1 SL on R arm  SKIN: Pale, jaundiced skin, +2 edema BLE;  4+ penile/scrotal edema.   TESTS/PROCEDURES: Had paracentesis today and had 2.3L taken out  D/C DATE: Accepted to pending evaluation from [facility] health professional; emilee deciding where is appropriate to discharge  OTHER IMPORTANT INFO: Hem/Onc, Psych, Palliative following.   Psych made patient NOT decisional

## 2023-05-18 NOTE — PROGRESS NOTES
Care Management Follow Up    Length of Stay (days): 15    Expected Discharge Date: 05/19/2023     Concerns to be Addressed:       Patient plan of care discussed at interdisciplinary rounds: Yes    Anticipated Discharge Disposition: Transitional Care, [facility]     Anticipated Discharge Services:    Anticipated Discharge DME:      Patient/family educated on Medicare website which has current facility and service quality ratings: no  Education Provided on the Discharge Plan:    Patient/Family in Agreement with the Plan: unable to assess    Referrals Placed by CM/SW: Internal Clinic Care Coordination  Private pay costs discussed:     Additional Information:  Psychiatry note reviewed today and it is noted there is a family conference tomorrow with pt and family goals of care.  The current plan has been TCU at [facility]).  We have been waiting for a Level 2 to be completed by [facility] before patient can discharge.  Writer, yesterday, left a message for the Level 2 the Screener at [phone] asking when she can complete the screen.   Plan:  Will wait to hear from [facility] screener.  Will follow for results of family conference in relation to discharge disposition.    Update Spoke with Brittnee, the Level 2 screener.  She has not received a referral yet to complete the screen. She sent her co-worker a message asking if she did.  Writer did confirm with the [facility] that that they sent the request to [facility] on 5/16.    Writer also explained goals of care conference to be held tomorrow which may change the discharge disposition. Also updated Katherin at [facility] (formerly [facility]).    Both the Level 2 screener and Katherin need to be updated regarding results of care conference and final discharge plan.     Meg Steve, Maine[facility] Medical CenterSW

## 2023-05-18 NOTE — PROGRESS NOTES
CLINICAL NUTRITION SERVICES - REASSESSMENT NOTE     Nutrition Prescription    RECOMMENDATIONS FOR MDs/PROVIDERS TO ORDER:  None at this time    Malnutrition Status:    Severe malnutrition  In Context of:  Acute illness or injury  Chronic illness or disease  Environmental or social circumstance    Recommendations already ordered by Registered Dietitian (RD):  ONS: Ensure Max Protein and Magic Cup daily    Future/Additional Recommendations:  Monitor labs, new weights, po and ONS intake/tolerance, BMs     EVALUATION OF THE PROGRESS TOWARD GOALS   Diet: Dental soft diet  Supplements: Magic Cup and Ensure Max Protein daily    Intake: Pt ordering meals TID with supplements, and consuming % most days. Po intake not a concern at this time.     NEW FINDINGS   Pt confirmed he's eating well with a good appetite. Pt reports doing leg lifts in bed for extra exercise, and is slightly concerned with how much he's eating compared to how little physical activity he's doing. Writer explained wt gain will be beneficial for him to increase muscle mass.    MALNUTRITION  % Intake: No decreased intake noted, meeting >100% needs  % Weight Loss: Unable to assess 2/2 no new weights  Subcutaneous Fat Loss:  Orbital region severe depletion, Upper arm region severe depletion and Thoracic region severe depletion  Muscle Loss:  Temporal region severe depletion, Clavicle bone region severe depletion, Acromion bone region severe depletion, Dorsal hand region severe depletion, Patellar region severe depletion, Anterior thigh region severe depletion and Posterior calf region severe depletion  Fluid Retention:  Mild in both feet and ankles, severe in scrotum, trace dependent, both knees and legs  Malnutrition Diagnosis: Severe malnutrition  In Context of:  Acute illness or injury  Chronic illness or disease  Environmental or social circumstance    Previous Goals   Patient to consume % of nutritionally adequate meal trays TID, or the  equivalent with supplements/snacks.  Evaluation: Met    Previous Nutrition Diagnosis  Malnutrition related to severe fat and muscle wasting in patient with ETOH abuse and cancer as evidenced by coding for severe malnutrition     Evaluation: Improving but still malnourished    CURRENT NUTRITION DIAGNOSIS  Malnutrition related to severe fat and muscle wasting in patient with ETOH abuse and cancer as evidenced by coding for severe malnutrition      INTERVENTIONS  Implementation  Nutrition education for nutrition relationship to health/disease  Encourage po intake    Goals  Patient to consume % of nutritionally adequate meal trays TID, or the equivalent with supplements/snacks.    Monitoring/Evaluation  Progress toward goals will be monitored and evaluated per protocol.    Scott Daley - Dietetic Intern

## 2023-05-18 NOTE — PROGRESS NOTES
[facility]  Palliative Care Daily Progress Note       Recommendations & Counseling       Plan for family care conference with Solo, son Vince, daughter Yuliet, Dr. Marley, Dr. Yanez, and myself tomorrow, Friday 5/19, at 11AM     Updated Solo on plan for care conference. He is aware that the meeting is intended to review his current john issues and potential treatment options/pathways    Solo is in agreement with participating in the meeting. He prefers the door closed for tomorrow's meeting     Ongoing assessment of Solo's willingness and ability to engage in complex medical decision making. This has vacillated throughout the years related to his mental health and history of delusions, mistrust for the medical system. In conversation with his daughter Yuliet today, she has reservations about making complex medical decisions for Solo out of worry for Solo being angered, or having a myriad of responses. Reportedly, Solo's knowledge of his medical issues/illnesses is typically intact, yet it gets convoluted by his mental health, medical mistrust, and general discomfort in exploring goals of care/end of life cares. Did ultimately express to daughter Yuliet that developing a care plan for Solo is likely to be very complex in the setting of his multiple comorbidities, atypical journey with metastatic cancer, and his extensive mental health issues. We will all do our best to honor Solo and support him however he sees fit. Also expressed to Yuliet that allowing Solo's body to guide the way and let us know what he needs is typically the best course of action     Solo and family will likely benefit from additional coping support. Will hopefully involve palliative LICSW, Sophie Henson, for tomorrow's meeting, pending her availability      This case and these recommendations have been extensively discussed with Dr. Marley and Dr. Yanez.    Itzel POLK, [facility]  Palliative Medicine   [facility]  "Hospital  Securely message with Vocera  *If you are having trouble locating my name, please ensure \"global\" is checked under contacts tab, within the sites button      Thank you for the opportunity to participate in the care of this patient and family. Our team: will continue to follow.     During regular M-F work hours ([phone]) -- please contact me on Vocera     In my absence, securely message our team via Vocera \"[facility]\" or go to On Call tab in [facility], search for \"[facility]\"     After regular work hours and on weekends/holidays, to reach our on call physician, securely message via Vocera \"[facility]\" or go to On Call tab in [employer], search for \"[facility]\"     Attestation:  Total time involved in the patient's care: 65 minutes.     Assessments          Los Huang is a 67 year old male with PMH significant for alcoholic cirrhosis with known esophageal varices, current alcohol misuse, metastatic colon cancer s/p partial colectomy and right hepatectomy previously on hospice but was able to graduate due to stable disease, extensive PTSD/schizoaffective/bipolar disorder who presents with hematochezia, confusion and falls. He was noted to be in hemorrhagic shock with lactate >8, requiring ICU cares. S/p EGD which showed variceal banding scar that appeared healthy, as well as grade I and II esophageal varices. Colonoscopy deferred due to evidence of colitis on imaging. CTA abd/pelvis upon admission showed marked wall thickening and edema of the entire bowel with concern for shock bowel/ischemic bowel and disease progression of colon cancer. His Hgb remains stable in the 7s. He is requiring intermittent therapeutic paracentesis for his known ascites. He has been treated for sepsis 2/2 aspiration PNA. He has severe malnutrition. There is concern for new renal lesions, question new malignancy (RCC).     Was able to connect with daughter Yuliet via phone " today. Introduced myself and our services; assistance in pain and symptom management, emotional and spiritual support, and complex medical decision making. They had previously met my colleague, Teresaramona Kelly, last summer.     We talk about a role for a care conference. Family is available at 11AM tomorrow, along with the medical team.    Yuliet and I reflect on the complexity of Solo's surmounting comorbidities and concern for progressive malignancy, in addition to his complex mental health issues. Solo has very strained relationships with his children, and his been alienated from other family members/friends over the years.     We acknowledge that Solo struggles to allow the support of hospice. He has discomfort exploring goals of care and end of life cares. He has disbelief for the medical team, as he has now lived with his cancer for 9 years. It has been a roller coaster for all of them, with difficulty maintaining boundaries/emotions as his health has vacillated. Empathetic listening and support provided.     We note the need for an ongoing assessment of Solo's willingness and ability to engage in complex medical decision making. This has vacillated throughout the years related to his mental health and history of delusions, mistrust for the medical system. Yuliet has reservations about making complex medical decisions for Solo out of worry for Solo being angered, or having a myriad of responses. Reportedly, Solo's knowledge of his medical issues/illnesses is typically intact, yet it gets convoluted by his mental health, medical mistrust, and general discomfort in exploring goals of care/end of life cares. Did ultimately express to Yuliet that developing a care plan for Solo is likely to be very complex in the setting of his multiple comorbidities, atypical journey with metastatic cancer, and his extensive mental health issues. We will all do our best to honor Solo and support him however he sees fit. Also expressed to Yuliet  "that allowing Solo's body to guide the way and let us know what he needs is typically the best course of action.      I then visited Solo. He is alert and more conversant today. Affect remains flat. More agreeable and pleasant, no insults today. He states no pain, but is planning a paracentesis today. Breathing is ok. We talk about a care conference with his medical team and children tomorrow. He initially states he doesn't want \"palliative\" because he plans to learn about his cancer options. I express to him that my role is to help with decision making and that I do not represent \"hospice.\" He is agreeable to tomorrow's meeting and prefers the door to be closed.       Management Discussed with the following over the past 24 hours: Dr. Marley, patient, dtr Yuliet.       Notes/Medical records reviewed over the past 24hrs: Hospitalist note, Psych note, Oncology note, nursing notes.       Tests reviewed in the last 24 hours: See lab/imaging results included in the data section of this note.        Supplemental History, in addition to the patient's history, over the past 24 hours obtained from: Dr. Marley, rom Horvath.       Medical complexity over the past 24 hours: Care conference planned for tomorrow, Friday 5/19, at 11AM.           Interval History:     Chart review/discussion with unit or clinical team members:   No acute events overnight.     Per patient or family/caregivers today:  Care conference tomorrow. Having paracentesis today.     Key Palliative Symptoms:  # Pain severity the last 12 hours: low  # Dyspnea severity the last 12 hours: none    Patient is on opioids: bowels not assessed today.           Review of Systems:     Besides above, an additional N/A system ROS was reviewed and is unremarkable          Medications:     I have reviewed this patient's medication profile and medications during this hospitalization.    Noted meds:    Clonazepam 0.5mg PO x1  Voltaren gel topical BID   Folic acid "   MVI  Paliperidone injection monthly   PPI BID   Cogentin 1mg PO at bedtime PRN movement disorder   Clonazepam 0.5mg PO daily PRN anxiety   Flexeril 10mg PO BID PRN muscle  Spasms   Lactulose 20g PO Q2hrs PRN confusion   Zyprexa 5mg PO Q6hrs PRN agitation   Trazodone 50mg PO at bedtime PRN sleep            Physical Exam:     Temp: 97.6  F (36.4  C) Temp src: Oral BP: 100/56 Pulse: 88   Resp: 18 SpO2: 94 % O2 Device: None (Room air)    CONSTITUTIONAL: Chronically ill cachectic and disheveled man seen resting in bed in NAD, alert, flat affect, slow responses. Calm, more cooperative and pleasant  HEENT: scleral icterus    RESPIRATORY: NL respiratory effort on RA  GASTROINTESTINAL: Distended   SKIN: Jaundiced            Data Reviewed:     Recent imaging independently reviewed, my comments on pertinents:   Results for orders placed or performed during the hospital encounter of 05/03/23   CTA Abdomen Pelvis with Contrast    Impression    IMPRESSION:   1.  Findings concerning for shock bowel with marked edema involving  the entire small bowel and residual colon (prior right hemicolectomy).  No evidence for active GI bleed.  2.  Disease progression of colon cancer with enlargement of visualized  lung metastases. New small right pleural effusion.  3.  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 Enlargement of subcapsular  metastatic deposits along the right lobe of the liver.  4.  Right hepatectomy. Cirrhosis and splenomegaly.  5.  New moderate ascites either due to portal hypertension and/or  peritoneal carcinomatosis.  6.  Stable bilateral renal masses concerning for solid renal  neoplasms.  7.  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    HARI GARCIA MD         SYSTEM ID:  [id number]   XR Chest Port 1 View    Impression    IMPRESSION: Multiple bilateral lung nodules compatible with known metastatic disease. Pulmonary edema. Concomitant  infiltrates cannot be excluded. Small right pleural effusion. Heart size normal. Left chest port catheter tip in SVC.   US Paracentesis without Albumin    Impression    IMPRESSION:  1.  Status post ultrasound-guided paracentesis.    JACK LANDRY MD         SYSTEM ID:  [id number]   XR Chest Port 1 View    Impression    IMPRESSION: Heart is normal in size. Multiple pulmonary nodules are noted bilaterally compatible with metastatic disease. Trace bilateral effusions with bibasilar atelectasis. Large mass is noted within the medial right lung base. Left Mediport,   unchanged. No evidence of pulmonary edema.    CTA Abdomen Pelvis with Contrast    Impression    IMPRESSION:  1.  Site of GI bleed not identified.  2.  Interval improvement in wall thickening of small bowel and colon.  3.  Pulmonary metastases. Small right pleural effusion.  4.  Tumor thrombus left portal vein and hepatic metastases again seen. Subcapsular implants along the liver again noted.  5.  Bilateral renal lesions again seen.  6.  Moderate amount of ascites.   US Paracentesis without Albumin    Impression    IMPRESSION: Technically successful paracentesis without immediate  complications.    LINDA MONTELONGO MD         SYSTEM ID:  [id number]         Recent lab data independently reviewed, my comments on pertinents:   Na 133  K 4  Creat 0.68  Albumin 2.3  Alk phos 687  ALT 57  AST 41  Ammonia 46  Bili 4.2  WBC 9.5  Hgb 7.6  Plt 221  INR 1.34

## 2023-05-18 NOTE — PROGRESS NOTES
[facility]    Hospitalist Progress Note    Assessment & Plan   Date of Admission:  5/3/2023        Assessment & Plan  Los Huang is a 67 year old male with hx of alcoholic cirrhosis with known esophageal varices, metastatic colon cancer s/p partial colectomy and right hepatectomy previously on hospice, PTSD/schioaffective/bipolar disorder who was admitted on 5/3/2023 for hemorrhagic shock due to acute GI bleed. Hospital course complicated by sepsis of unclear source     Goals of care  Metastatic Colon Cancer   Suspected renal cancer  - Diagnosed with colorectal cancer in 2014 with oligometastatic disease to liver at the time of diagnosis. He was treated with ascending colectomy 6/14 and R hepatectomy in 8/14. He declined chemotherapy and enrolled in [facility], but eventually unenrolled from hospice due to overall stability and hospice visits felt intrusive  - CTA abd/pelvis this admission showed disease progression of colon cancer with lung mets, new tumor thrombus in the left portal vein, new/enlarging liver mets, new ascites, and stable bilateral renal masses concerning for solid renal neoplasms  -Previous hospitalist discussed on 5/4: goals of care initiated with patient and family. Cancer progression discussed. Explained that he currently has 2 terminal conditions: advanced colon cancer and cirrhosis. Patient does not want treatment for his cancer, but his current hospitalization is due to a complication of his cirrhosis, and he does want treatment for that. He was actually doing reasonably well, living independently prior to this hospitalization. His goal is to recover from his acute illness, work with PT, and eventually return to his apartment at [facility]. He will consider hospice down the road as he declines.   - DNR/DNI. Goals otherwise restorative.   - PT recommending TCU  - 5/8-Again discussed with patient's about goals of care and he does think that  he will get better but did mention to me that he was not aware of cirrhosis diagnosis  -Patient has agreed to a TCU and social work involved.  Close to having a bed available this week.    5/17  -pt appears comfortable. No focal complaints.  Patient declining exam today  -[facility] oncology consult as patient has not seen oncology recently possibly has not seen oncology in the last year or 2 on review of the chart.  -We will attempt to revisit goals of care discussion with palliative care patient and patient's children this week prior to patient discharge.  I think it will be helpful for the patient and the family to have oncology input as to his current state of his metastatic colon cancer likely renal cell carcinoma whether he is a candidate for any treatment his prognosis and appropriateness of hospice care.  -Psychiatry consult to assess capacity make medical decisions.  -She has a history of being on hospice 1 or 2 years ago but went off hospice.  -Prior discussions with hospitalist team indicated patient wished to have restorative cares but is not participating with care plan specifically not getting out of bed or sitting in the chair or ambulating and therefore work with therapy going forward.  -Think it is prudent to revisit his goals of care prior to discharge as patient has high risk of rehospitalization  -Patient met with oncology.  See oncology note.  Patient would be a candidate for chemotherapy or targeted agents but would require frequent follow-up in clinic for chemotherapy PET scan and clinic visits.  CT shows progression of disease.  Hospice is appropriate for the patient however chemotherapy may potentially prolong his life for another 1 to 2 years.  However it would require ongoing treatment and commitment to the treatment process.  He is certainly at risk for recurrent hospitalization and decompensation given his underlying cirrhosis.  Discussed with oncology    -Discussed situation with  "patient's son.  Patient's son thinks he is near his psychiatric baseline though he tends to be less amenable to conversation, more guarded confrontational and disorganized when he is in the hospital.  In describing my interaction with the patient patient's son thinks that he is not too far from his baseline.  Patient's son thinks patient likely does not have a capacity to make medical decisions for himself.  Patient's son also feels patient is not likely to be interested in committing to a oncology treatment process which would include follow-up clinic visits to be chemotherapy visits and follow-up imaging.  Patient's son also notes that patient tends to be suspicious of the medical system.  When son met with the patient this past weekend he was mentally a little slower disorganized in his thinking he states it was a challenging visit he was \"volatile\" at times.  He has a power of  for any financial issues he thinks his sister may have power of  for medical issues though he will confirm.  He thinks a care conference on Friday would work best for his schedule.    Spoke with patient's daughter.  She also agrees that patient does have some increased irritability more disorganized thinking and is more guarded and confrontational when in a medical setting.  In general she notes he is suspicious of the medical system and has paranoia around others knowing his medical issues.  This has been a chronic issue for him.  She states that he did have difficulties with following through on treatment program when he was treated for his cancer in the past.  During his last hospitalization he rebounded fairly quickly and was able to discharge home.  Had been doing relatively well up until this hospitalization.  He was transferred to assisted living because he was having significant psychiatric decompensation calling 911 daily.  He has done better in the assisted living.  She notes that since he has had his Invega " injection relatively recently he is probably at his best psychiatrically at this point.  He is therefore not too far from his psychiatric baseline.  She states that he is very scared of the idea of dying and contributes to him not wanting to engage around discussions of goals of care and hospice care.  At the same time she thinks it will be very challenging for him to follow through with any treatment/chemotherapy/oncology treatment program in the future.  As with her brother she states that he is probably not very interested in having treatment or pursuing ongoing engagement with medical system.  She was also able to meet Friday for a care conference.    We will restart his Klonopin 0.5 mg daily as this may help with some of the anxiety he may be experiencing in the hospital.    5/18. Pt more amenable to conversation today.  He states he feels less anxious.  He did have Klonopin last evening and this morning and he feels more relaxed.  He did see psychiatry earlier.  Is more amenable to talking about his medical history with them as well.  Patient states that he is uncertain whether he can follow-up with oncology as this has been difficult in the past.  He thinks he wants continued restorative cares but is vague and noncommittal regarding whether he would want treatment for his cancer.  He was able to discuss paracentesis today.  Patient became more irritable towards the end of our conversation and I stated that we would follow-up with further discussion later today and then tomorrow.  I made him aware of the consultants involved and that I had talked with his family and that we were hoping to have a group discussion tomorrow.  He seemed open to that idea.     Hemorrhagic shock due to acute GI bleed, Improved  Lactic acidosis, Resolved  Colitis likely due to colon cancer  Alcoholic cirrhosis with esophageal varices, ascites, and hepatic encephalopathy  Mild scrotal edema-improving  - Presented with confusion,  falls, and 1 week history of intermittent rectal bleeding which patient felt was related to hemorrhoids. Hypotensive to 70s on arrival with initial lactate >8; improved with IV fluids. Initial Hgb 5.7. Ammonia level normal.  - In the ED, he was initiated on an octreotide infusion, IV PPI BID, and IV ceftriaxone for SBP ppx. Also transfused total 3 units pRBCs upon admission  - [facility] was consulted on admission. Underwent EGD which showed variceal banding scar that appeared healthy, as well as grade I and II esophageal varices. Colonoscopy deferred due to evidence of colitis on imaging.   - CTA abd/pelvis upon admission showed marked wall thickening and edema of the entire bowel with concern for shock bowel/ischemic bowel and disease progression of colon cancer; however patient has been relatively asymptomatic with improved lactate and WBC  - Octreotide d/c'd on 5/4.   - 5/8 He had an episode of maroon-colored bloody stool this morning and was hemodynamically stable and repeat hemoglobin was stable at 8 and I did discuss with the GI team again today and source of his blood loss today is due to metastatic disease and further evaluation with colonoscopy is not possible with disease progression as tumors/neoplastic tissue is friable and treatment with APC would not be helpful and treatment is blood products and repeat CT abdomen done on 5/9/2023 did not show any evidence of bleeding  -continue with PPI, soft diet  - s/p paracentesis again on 5/11/2023 and 3.4 L of fluid was removed and the fluid analysis does not show any evidence of SBP  Patient did complete 7 total days of IV antibiotics (Rocephin and zosyn) while in the hospital which should be adequate for SBP prophylaxis given his GI bleed and cirrhosis/ascites  -hb stable in 7 range  -Did give him a small dose of Lasix 40 mg 5/14 and his edema seems to be improving  -bmp stable. Nl lytes, LfTS show steady improvement in transaminase levels and alk phos, Bili is  up slightly improving   benign abd exam , denies abd pain  Stable mentation. Fully oriented  -Today patient declines exam.  He is accumulated ascitic fluid it appears over the course of the last week and a half.  Patient denies any abdominal pain his belly has been benign on exam last several days.  He declines exam today.  He declines therapeutic paracentesis  -Hb stable in the 7 range.  -Palliative care consult to facilitate goals of care discussion.  Care conference tomorrow  -No significant distention.  No abdominal pain.  He is amenable to paracentesis.  We will do paracentesis therapeutic and diagnostic.  We will check cell count to rule out SBP.  Albumin if indicated per amount removed.  A.m. BMP      Addendum;   2.3L removed on paracentesis. Cell ct not c/w SBP (very low clinical suspicion). Preformed primary for comfort. Pt states he feels better.     Discussed ascites management with Dr. Pedroza. Will check bmp in am and if stable will start lasix 40mg every day and aldactone 100mg every day. Yovany PROCTOR will follow up pt in 1-2 days while in hospital.      Sepsis due to suspected aspiration pneumonia, Resolved  Positive blood culture, suspect contaminant  - Spiked to fever to 100.8 overnight 5/4-5/5 along with mild tachycardia. WBC 18.1k. Lactate 2.2. Reported increased shortness of breath. CXR showed known lung mets, bilateral infiltrates due to pulmonary edema vs pneumonia. He had been on ceftriaxone for SBP prophylaxis; antibiotics were broadened to IV pip-tazo with improvement. COVID-19 PCR negative. Unfortunately, blood cultures were not drawn.   - Underwent paracentesis (5/5) with removal of 2L fluid. 120 PMNs, no evidence of SBP  - 1 out of 2 blood cultures on admission (5/3) grew Staph epi. Suspect contaminant  - wbc count has been down trending and he has been afebrile  -completed antibiotic course   -nl sats, lungs clear. No pulmonary complaints.  Afebrile.  Normal white blood cell  count          Acute respiratory distress due to aspiration pneumonia vs pulmonary edema, Resolved  Leukocytosis-Improved  - Reported increased shortness of breath overnight 5/4-5/5.   -CXR showed pulmonary edema vs pneumonia. spO2 mid 90s on room air, but was placed on supplemental O2 for comfort.Treated with IV pip-tazo with improvement.   - Repeat CXR (5/6) improved  -He has been having persistent white count during this hospital stay but he has been afebrile  - wbc count is improving and repeat acetic fluid analysis on 5/11 does not support any evidence of SBP  -No pulmonary complaints.  Lungs been clear.  Breathing easily off oxygen     Thrombocytopenia, -Resolved  - Likely due to cirrhosis and ongoing alcohol use. Platelets wnl on admission though sample was likely hemoconcentrated.   -Platelet count improved. Normalized  -Platelet count within normal limits.     Alcohol use disorder  - Longstanding history of alcohol dependence. Currently consuming 1 oz of gin five times daily. Last drink: 5/3. No s/sx of withdrawal noted this admission  - d/c CIWA today, 5/7  - On thiamine/folate/MVI while hospitalized     Hypokalemia-resolved  -Potassium has been intermittently low and is normal today 3 range today  -Potassium normal.  -Monitor     PTSD  Hx of schizoaffective disorder  Hx of bipolar disorder type I  - PTA regimen: clonazepam 0.5 mg daily, olanzapine 5 mg q6h prn, Invega 234 IM q28 days  - Last Invega dose: 5/6  - Clonazepam and olanzapine ordered prn this admission  -stable mood  -Patient has been more irritable over the last 48 hours the patient is new to me in the last 72 hours.  He is not getting up walking in the halls or sitting in the chair for the last several days if not longer.  He has been fully oriented alert but slightly guarded and restricted in his answers to questions.  -He is not participating fully in plan of care  -We will have psychiatry see the patient to evaluate his capacity to make  complex medical decisions for himself including goals of care such as continued restorative care versus comfort care.  They can also assess whether he is having decompensated bipolar disorder or schizoaffective disorder.  -We will be attempting to proceed with goals of care discussion with palliative care family and the patient in the next 1 to 2 days and therefore will be helpful to have psychiatric input as to his capacity for medical decisions at this time.  -He has not been recently evaluated by psychiatry.  There has been concern regarding his capacity to make medical decisions in the past on review of the chart.  -Psychiatry reconsulted on 5/17 to assess whether he has had decompensated psychiatric disease and whether he has capacity to make medical decisions..  Patient was not very open to discussing his care with them on that day.  He was more open to discussion on 5/18.  He does seem to have relief and is more relaxed and open to discussing his cares with the Klonopin.  We will ensure that he has a dose of Klonopin prior to care conference tomorrow.  Psychiatry will follow-up patient's capacity to make medical decisions following the care conference tomorrow.  They will see him tomorrow afternoon.         Cachexia  Severe malnutrition in context of acute on chronic illness  - Daily magic cup ordered per Nutrition           Diet: Room Service  Mechanical/Dental Soft Diet  Snacks/Supplements Adult: Magic Cup; Between Meals  Snacks/Supplements Adult: Ensure Max Protein (bariatric); Between Meals    DVT Prophylaxis: Pneumatic Compression Devices  Chew Catheter: Not present  Lines: None     Cardiac Monitoring: None  Code Status: No CPR- Do NOT Intubate          Clinically Significant Risk Factors              # Hypoalbuminemia: Lowest albumin = 2.5 g/dL at 5/12/2023 10:56 AM, will monitor as appropriate            # Severe Malnutrition: based on nutrition assessment               Disposition Plan     Expected  Discharge Date: 05/15/2023, 12:00 PM  Discharge Delays: *Medically Ready for Discharge  Placement - TCU    Discharge Comments: Pt is from [facility], however needs TCU now.        Discussed care plan with patient, RN, palliative care.      Ziggy Marley MD, MD  Text Page  (7am to 6pm)  Interval History   Feels more relaxed  Having cough but denies new  Feels abd very full and now agrees to proceed with paracentesis  Up in chair today for first time in 3 days.           -Data reviewed today: I reviewed all new labs and imaging results over the last 24 hours. I personally reviewed labs from last 24 hours      Physical Exam   Temp: 97.6  F (36.4  C) Temp src: Oral BP: 100/56 Pulse: 88   Resp: 18 SpO2: 94 % O2 Device: None (Room air)    Vitals:    05/03/23 1157 05/04/23 0615 05/11/23 1128   Weight: 88.9 kg (195 lb 14.1 oz) 90.4 kg (199 lb 4.7 oz) 89.8 kg (198 lb)     Vital Signs with Ranges  Temp:  [97.5  F (36.4  C)-97.7  F (36.5  C)] 97.6  F (36.4  C)  Pulse:  [88-89] 88  Resp:  [16-18] 18  BP: (100-101)/(56-62) 100/56  SpO2:  [94 %-96 %] 94 %  I/O last 3 completed shifts:  In: 800 [P.O.:800]  Out: 1200 [Urine:1200]    Constitutional: Chronically ill appearing, nad, up in bed, alert conversant, appears comfortable, nontoxic appearing  HEENT: scleral icterus present  Respiratory: Being easily, no coughing  Cardiovascular: Patient declined exam  GI: Abdomen appears distended as before suspect ascites.  Patient declined exam  Skin/Integumen: trace bLE edema  ; patient declined exam  Neuro: Alert and fully oriented.  More more conversant today.    Psych: Patient more amenable to discussing his care plan and goals of care today.  He was less tangential today and more coherent responses today to questions.  Towards the end of our discussion he was slightly irritable.          Medications     - MEDICATION INSTRUCTIONS -         diclofenac  2 g Topical BID     folic acid  1 mg Oral Daily     multivitamin  w/minerals  1 tablet Oral Daily     paliperidone  234 mg Intramuscular Q28 Days     pantoprazole  40 mg Oral BID AC     sodium chloride (PF)  3 mL Intracatheter Q8H       Data   Recent Labs   Lab 05/17/23  0821 05/16/23  1041 05/15/23  1012 05/14/23  1027 05/13/23  0837 05/12/23  1910 05/12/23  1056   WBC 9.5  --   --   --   --   --  11.9*   HGB 7.6*  --   --  7.8* 7.5*  --  7.4*   MCV 91  --   --   --   --   --  91     --   --   --   --   --  221   INR  --   --   --   --   --   --  1.34*   * 133*  --   --   --   --  134*   POTASSIUM 4.0 3.8 3.7 3.7 3.9   < > 3.3*   CHLORIDE 103 103  --   --   --   --  101   CO2 20* 19*  --   --   --   --  21*   BUN 16.9 16.2  --   --   --   --  20.3   CR 0.68 0.64*  --   --   --   --  0.71   ANIONGAP 10 11  --   --   --   --  12   GEOVANNA 7.9* 8.1*  --   --   --   --  8.2*   * 155*  --   --   --   --  139*   ALBUMIN 2.3* 2.4*  2.4*  --   --   --   --  2.5*   PROTTOTAL 5.5* 5.8*  --   --   --   --  5.7*   BILITOTAL 4.2* 5.1*  --   --   --   --  4.2*   ALKPHOS 687* 787*  --   --   --   --  849*   ALT 57* 68*  --   --   --   --  92*   AST 41 53*  --   --   --   --  74*    < > = values in this interval not displayed.       Imaging:   No results found for this or any previous visit (from the past 24 hour(s)).

## 2023-05-19 NOTE — PLAN OF CARE
Goal Outcome Evaluation:      Summary: Sepsis d/t suspected aspiration pneumonia, hemorrhagic shock due to acute GI bleed  DATE & TIME: 05/19/23 9605-0732  Cognitive Concerns/Orientation: A&Ox2-3, disoriented to situation/time -Flat affect, lethargic. Auditory/visual hallucinations (baseline), no reports of hallucinations this shift.  BEHAVIOR & AGGRESSION TOOL COLOR: Green  ABNL VS/O2: VSS on RA (pt likes VS to be checked even though on comfort cares)  MOBILITY: SBA GBW, ambulates steadily  PAIN MANAGMENT: denies  DIET: Mechanical soft  BOWEL/BLADDER: Continent B&B, 1 loose BM with bright red blood this shift. MD aware of this happening. Continue to monitor.   ABNL LAB/BG: Hgb 7.6; Na 133; albumin 2.5  DRAIN/DEVICES: R PIV SL   SKIN: Pale, +2 edema BLE;  3+ scrotal edema.   TESTS/PROCEDURES:n/a  D/C DATE: Will discharge to [facility] [place], operated by [facility] on comfort cares  OTHER IMPORTANT INFO: Hem/Onc, Psych, Palliative following. Do not use the terms hospice/comfort care/palliative when talking to patient (triggers him).

## 2023-05-19 NOTE — PROGRESS NOTES
[facility]    Hospitalist Progress Note    Assessment & Plan   Date of Admission:  5/3/2023        Assessment & Plan  Los Huang is a 67 year old male with hx of alcoholic cirrhosis with known esophageal varices, metastatic colon cancer s/p partial colectomy and right hepatectomy previously on hospice, PTSD/schioaffective/bipolar disorder who was admitted on 5/3/2023 for hemorrhagic shock due to acute GI bleed. Hospital course complicated by sepsis of unclear source     Goals of care  Metastatic Colon Cancer   Suspected renal cancer  - Diagnosed with colorectal cancer in 2014 with oligometastatic disease to liver at the time of diagnosis. He was treated with ascending colectomy 6/14 and R hepatectomy in 8/14. He declined chemotherapy and enrolled in [facility], but eventually unenrolled from hospice due to overall stability and hospice visits felt intrusive  - CTA abd/pelvis this admission showed disease progression of colon cancer with lung mets, new tumor thrombus in the left portal vein, new/enlarging liver mets, new ascites, and stable bilateral renal masses concerning for solid renal neoplasms  -Previous hospitalist discussed on 5/4: goals of care initiated with patient and family. Cancer progression discussed. Explained that he currently has 2 terminal conditions: advanced colon cancer and cirrhosis. Patient does not want treatment for his cancer, but his current hospitalization is due to a complication of his cirrhosis, and he does want treatment for that. He was actually doing reasonably well, living independently prior to this hospitalization. His goal is to recover from his acute illness, work with PT, and eventually return to his apartment at [facility]. He will consider hospice down the road as he declines.   - DNR/DNI. Goals otherwise restorative.   - PT recommending TCU  - 5/8-Again discussed with patient's about goals of care and he does think that  he will get better but did mention to me that he was not aware of cirrhosis diagnosis  -Patient has agreed to a TCU and social work involved.  Close to having a bed available this week.    5/17  -pt appears comfortable. No focal complaints.  Patient declining exam today  -[facility] oncology consult as patient has not seen oncology recently possibly has not seen oncology in the last year or 2 on review of the chart.  -We will attempt to revisit goals of care discussion with palliative care patient and patient's children this week prior to patient discharge.  I think it will be helpful for the patient and the family to have oncology input as to his current state of his metastatic colon cancer likely renal cell carcinoma whether he is a candidate for any treatment his prognosis and appropriateness of hospice care.  -Psychiatry consult to assess capacity make medical decisions.  -he has a history of being on hospice 1 or 2 years ago but went off hospice, apparently he graduated given his medical stability but also the son thinks patient's perceived intrusiveness of the hospice care team and their routine attending to his needs..  -Prior discussions with hospitalist team indicated patient wished to have restorative cares but he is not participating with care plan specifically not getting out of bed or sitting in the chair or ambulating and therefore work with therapy going forward.   - prudent to revisit his goals of care prior to discharge as patient has high risk of rehospitalization  -Patient met with oncology.  See oncology note.  Patient would be a candidate for chemotherapy or targeted agents but would require frequent follow-up in clinic for chemotherapy PET scan and clinic visits.  CT shows progression of disease.  Hospice is appropriate for the patient however chemotherapy may potentially prolong his life for another 1 to 2 years.  However it would require ongoing treatment and commitment to the treatment  "process.  He is certainly at risk for recurrent hospitalization and decompensation given his underlying cirrhosis.  Discussed with oncology    -5/17,Discussed situation with patient's son.  Patient's son thinks he is near his psychiatric baseline though he tends to be less amenable to conversation, more guarded confrontational and disorganized when he is in the hospital.  In describing my interaction with the patient patient's son thinks that he is not too far from his baseline.  Patient's son thinks patient likely does not have a capacity to make medical decisions for himself.  Patient's son also feels patient is not likely to be interested in committing to a oncology treatment process which would include follow-up clinic visits to be chemotherapy visits and follow-up imaging.  He also notes that patient tends to be suspicious of the medical system.  When son met with the patient this past weekend he was mentally a little slower disorganized in his thinking he states it was a challenging visit he was \"volatile\" at times.  He has a power of  for any financial issues he thinks his sister may have power of  for medical issues though he will confirm.    Spoke with patient's daughter.  She also agrees that patient does have some increased irritability more disorganized thinking and is more guarded and confrontational when in a medical setting.  In general she notes he is suspicious of the medical system and has paranoia around others knowing his medical issues.  This has been a chronic issue for him.  She states that he did have difficulties with following through on treatment program when he was treated for his cancer in the past.  During his last hospitalization he rebounded fairly quickly and was able to discharge home.  Had been doing relatively well up until this hospitalization.  He was transferred to assisted living because he was having significant psychiatric decompensation calling 911 daily.  " He has done better in the assisted living.  She notes that since he has had his Invega injection relatively recently he is probably at his best psychiatrically at this point.  He is therefore not too far from his psychiatric baseline.  She states that he is very scared of the idea of dying and contributes to him not wanting to engage around discussions of goals of care and hospice care.  At the same time she thinks it will be very challenging for him to follow through with any treatment/chemotherapy/oncology treatment program in the future.  As with her brother she states that he is probably not very interested in having treatment or pursuing ongoing engagement with medical system.  She was also able to meet Friday for a care conference.     restarted his Klonopin 0.5 mg daily as this may help with some of the anxiety he may be experiencing in the hospital.  Discussed with psychiatry.    5/18. Pt more amenable to conversation.  He states he feels less anxious.  He did have Klonopin last evening and this morning and he feels more relaxed.  He did see psychiatry earlier.  Is more amenable to talking about his medical history with them as well.  Patient states that he is uncertain whether he can follow-up with oncology as this has been difficult in the past.  He thinks he wants continued restorative cares but is vague and noncommittal regarding whether he would want treatment for his cancer.  He would also not elaborate on what this specifically means by restorative care and if he fully understands what this entails.  He would not allow for more involved conversation.  He was able to discuss paracentesis.  Patient became more irritable towards the end of our conversation and I stated that we would follow-up with further discussion later today and then tomorrow.  Patient made aware of a care conference on Friday and he seemed open to that idea.  5/19 -please see Annamarie Meyer's excellent comprehensive detailed note  regarding care conference today.  Palliative care team the patient's daughter and son Dr. Yanez and I met to discuss patient's acute and chronic medical and psychiatric disease.  Palliative care met with the patient initially and he preferred that we all speak as a group independent of him at this time.  We met as a group and then several members of the team including patient's children met with the patient again to discuss his care plan going forward and then subsequently met thereafter to confirm that we would proceed with a hospice care.  Seems to understand that he has underlying malignancy though he is erratic in his statements and sometimes states to certain healthcare professionals that he does not have cancer but on the in the same day will tell other care team members his cancer history.  Patient was very clear today that he understands that he is dying and that he wants to be kept comfortable.  He does not want to engage further in the healthcare system.  Patient was also very clear that he would not want chemotherapy which would be his only oncologic treatment option at this time.  He has shown no interest in follow-up with the healthcare system.  Patient's son and daughter both strongly believe that he would not have the capacity to follow through on any oncology treatment plan nor would he be interested in pursuing this.  His complex psychiatric disease ongoing heavy alcohol use/dependence further complicate his clinical picture and his ability to follow through on any treatment plan and to adequately communicate his needs in a consistent manner.  Both son and daughter strongly believe that the patient is essentially asking for a comfort care approach though he is specifically does not seem to like the word hospice or comfort care.  They strongly believe and the treatment team is in agreement that a comfort care approach on hospice is most appropriate for this patient will allow him to have a care plan  in place that can be easily adjusted to the patient's needs and being less intrusive.  Patient will do best with mainly engaging with a hospice RN and not other support staff..  It is unclear whether patient has the capacity to make medical decisions for himself I suspect he does not have the capacity from a psychiatric standpoint but is difficult to interpret given his very behaviors.  Psychiatry has been following and see their note.  Patient's children and treatment team have formulated plan to proceed with comfort care here in the hospital.  We can provide vital signs if patient wishes.   has been contacted.  Patient will discharge back to the [facility] living.  It appears they will be able to manage his level of care.  Hospice will meet with the patient's children to discuss care plan.  Patient would not want [facility] given prior experience with that agency.  Social work will find alternative hospice service.  Anticipate discharge on Monday.  Going forward we will avoid over emphasizing towards comfort cares or hospice as this may be upsetting for the patient.  Patient be informed that we are focusing on his comfort and if he asked if he is on hospice care this should be acknowledged.  Greatly appreciate input from oncology and palliative care team.  This appears to be the best, most appropriate plan for the patient at this time and seems in line with his wishes and consistent with his history of how he engages with the medical system.  It certainly possible he may elect to leave hospice care if he proves stable or he is unable to cope, manage regular contact with the hospice team.       Hemorrhagic shock due to acute GI bleed, Improved  Lactic acidosis, Resolved  Colitis likely due to colon cancer  Alcoholic cirrhosis with esophageal varices, ascites, and hepatic encephalopathy  Mild scrotal edema-improving  - Presented with confusion, falls, and 1 week history of intermittent rectal  bleeding which patient felt was related to hemorrhoids. Hypotensive to 70s on arrival with initial lactate >8; improved with IV fluids. Initial Hgb 5.7. Ammonia level normal.  - In the ED, he was initiated on an octreotide infusion, IV PPI BID, and IV ceftriaxone for SBP ppx. Also transfused total 3 units pRBCs upon admission  - [facility] GI was consulted on admission. Underwent EGD which showed variceal banding scar that appeared healthy, as well as grade I and II esophageal varices. Colonoscopy deferred due to evidence of colitis on imaging.   - CTA abd/pelvis upon admission showed marked wall thickening and edema of the entire bowel with concern for shock bowel/ischemic bowel and disease progression of colon cancer; however patient has been relatively asymptomatic with improved lactate and WBC  - Octreotide d/c'd on 5/4.   - 5/8 He had an episode of maroon-colored bloody stool this morning and was hemodynamically stable and repeat hemoglobin was stable at 8 and I did discuss with the GI team again today and source of his blood loss today is due to metastatic disease and further evaluation with colonoscopy is not possible with disease progression as tumors/neoplastic tissue is friable and treatment with APC would not be helpful and treatment is blood products and repeat CT abdomen done on 5/9/2023 did not show any evidence of bleeding  -continue with PPI, soft diet  - s/p paracentesis again on 5/11/2023 and 3.4 L of fluid was removed and the fluid analysis does not show any evidence of SBP  Patient did complete 7 total days of IV antibiotics (Rocephin and zosyn) while in the hospital which should be adequate for SBP prophylaxis given his GI bleed and cirrhosis/ascites  -hb stable in 7 range  -Did give him a small dose of Lasix 40 mg 5/14 and his edema seems to be improving  -bmp stable. Nl lytes, LfTS show steady improvement in transaminase levels and alk phos, Bili is up slightly improving   benign abd exam , denies  abd pain  Stable mentation. Fully oriented  -5/17 patient declines exam.  He is accumulated ascitic fluid it appears over the course of the last week and a half.  Patient denies any abdominal pain his belly has been benign on exam last several days.  He declines exam today.  He declines therapeutic paracentesis  -Hb stable in the 7 range.  -5/18,significant abdominal distention distention without pain pain.  He is amenable to paracentesis.  Completed paracentesis.  2.3 L removed.  Ascitic fluid not consistent with SBP.  Negative Gram stain.  Culture no growth today.  Patient feels more comfortable  -Discussed management of ascites with [facility] gastroenterology.  They recommend Lasix 40 mg daily and Aldactone 100 mg daily.  However patient is now transitioning to comfort cares and will hold off on diuretic therapy at this time.  -5/19 patient had a episode of small amount of bright red blood per rectum this morning.  Perirectal area appeared normal except for a small small spot of blood on his underwear.  External hemorrhoids noted but no signs of inflammation.  Discussed with gastroenterology.  Plan is to just monitor clinically.  Patient is not hypotensive or tachycardic.  Hemoglobin stable today.  Likely related to cancer metastases in the small bowel possibly internal hemorrhoids he previously had melena and now with bright red blood.  This recurred later in the morning following the care conference.  Again my vital signs stable.  Patient without pain.  No further evaluation at this time.  As discussed below patient has transition to comfort care approach.  -Overall poor prognosis and high risk for other decompensated cirrhosis.  Discussed this with the patient's children during care conference and on the phone earlier in the week.  Given patient's psychiatric disease highly unlikely that he would follow through on outpatient follow-up required for adequate management of this terminal condition.  -Patient has been  drinking alcohol heavily in the last year per family  -Patient seen by gastroenterology today.  Initiate on Lasix 20 mg daily and Aldactone 50 mg daily.  I think this is reasonable at this time as this may reduce his ascites and scrotal edema allowing for improved comfort.  This can be continued depending on patient's clinical status going forward          Sepsis due to suspected aspiration pneumonia, Resolved  Positive blood culture, suspect contaminant  - Spiked to fever to 100.8 overnight 5/4-5/5 along with mild tachycardia. WBC 18.1k. Lactate 2.2. Reported increased shortness of breath. CXR showed known lung mets, bilateral infiltrates due to pulmonary edema vs pneumonia. He had been on ceftriaxone for SBP prophylaxis; antibiotics were broadened to IV pip-tazo with improvement. COVID-19 PCR negative. Unfortunately, blood cultures were not drawn.   - Underwent paracentesis (5/5) with removal of 2L fluid. 120 PMNs, no evidence of SBP  - 1 out of 2 blood cultures on admission (5/3) grew Staph epi. Suspect contaminant  - wbc count has been down trending and he has been afebrile  -completed antibiotic course   -nl sats, lungs clear. No pulmonary complaints.  Afebrile.  Normal white blood cell count          Acute respiratory distress due to aspiration pneumonia vs pulmonary edema, Resolved  Leukocytosis-Improved  - Reported increased shortness of breath overnight 5/4-5/5.   -CXR showed pulmonary edema vs pneumonia. spO2 mid 90s on room air, but was placed on supplemental O2 for comfort.Treated with IV pip-tazo with improvement.   - Repeat CXR (5/6) improved  -He has been having persistent white count during this hospital stay but he has been afebrile  - wbc count is improving and repeat acetic fluid analysis on 5/11 does not support any evidence of SBP  -No pulmonary complaints.  Lungs been clear.  Breathing easily off oxygen     Thrombocytopenia, -Resolved  - Likely due to cirrhosis and ongoing alcohol use.  Platelets wnl on admission though sample was likely hemoconcentrated.   -Platelet count improved. Normalized  -Platelet count within normal limits.     Alcohol use disorder  - Longstanding history of alcohol dependence. Currently consuming 1 oz of gin five times daily. Last drink: 5/3. No s/sx of withdrawal noted this admission  - d/c CIWA today, 5/7  - On thiamine/folate/MVI while hospitalized  -Patient's children state he has been drinking alcohol heavily in the last year and has not been interested in any treatment program and would complicate any quired follow-up for treatment of his cirrhosis or cancer.     Hypokalemia-resolved  -Potassium has been intermittently low and is normal today 3 range today  -Potassium normal.  -Monitor     PTSD  Hx of schizoaffective disorder  Hx of bipolar disorder type I  - PTA regimen: clonazepam 0.5 mg daily, olanzapine 5 mg q6h prn, Invega 234 IM q28 days  - Last Invega dose: 5/6  - Clonazepam and olanzapine ordered prn this admission  -stable mood  -Patient has been more irritable over the last 48 hours the patient is new to me in the last 72 hours.  He is not getting up walking in the halls or sitting in the chair for the last several days if not longer.  He has been fully oriented alert but slightly guarded and restricted in his answers to questions.  -He is not participating fully in plan of care  -We will have psychiatry see the patient to evaluate his capacity to make complex medical decisions for himself including goals of care such as continued restorative care versus comfort care.  They can also assess whether he is having decompensated bipolar disorder or schizoaffective disorder.  -We will be attempting to proceed with goals of care discussion with palliative care family and the patient in the next 1 to 2 days and therefore will be helpful to have psychiatric input as to his capacity for medical decisions at this time.  -He has not been recently evaluated by  psychiatry.  There has been concern regarding his capacity to make medical decisions in the past on review of the chart.  -Psychiatry reconsulted on 5/17 to assess whether he has had decompensated psychiatric disease and whether he has capacity to make medical decisions..  Patient was not very open to discussing his care with them on that day.  He was more open to discussion on 5/18.  He does seem to have relief and is more relaxed and open to discussing his cares with the Klonopin.  -Patient clearly seems more relaxed and amenable to conversation with Klonopin.  Patient declined Klonopin this morning and had limited interaction with the care team today during the care conference however he was clear in his wishes of wanting to be comfortable not wanting to have ongoing treatment, not wanting chemotherapy and stating that he understands that he is dying.  -Is it extremely difficult to assess patient's capacity to make medical decisions for himself.  His capacity to make decisions seems to rest primarily on his psychiatric disease.  No clear cognitive dysfunction.  There appears to be no metabolic encephalopathy.  Ammonia level is normal.  Patient has been alert.  Is also able to provide some detail regarding his cancer history.  -Per psychiatry 519 they will defer further evaluation of capacity make medical decisions to primary treatment team given comprehensive evaluation and coordination of care during care meeting today with family and patient. reconsult psychiatry as needed.  They are recommending increasing clonazepam frequency 2.5 mg 3 times daily as needed anxiety.      Cachexia  Severe malnutrition in context of acute on chronic illness  - Daily magic cup ordered per Nutrition           Diet: Room Service  Mechanical/Dental Soft Diet  Snacks/Supplements Adult: Magic Cup; Between Meals  Snacks/Supplements Adult: Ensure Max Protein (bariatric); Between Meals    DVT Prophylaxis: Pneumatic Compression  Devices  Chew Catheter: Not present  Lines: None     Cardiac Monitoring: None  Code Status: No CPR- Do NOT Intubate          Clinically Significant Risk Factors              # Hypoalbuminemia: Lowest albumin = 2.5 g/dL at 5/12/2023 10:56 AM, will monitor as appropriate            # Severe Malnutrition: based on nutrition assessment               Disposition Plan     Expected Discharge Date: 05/15/2023, 12:00 PM  Discharge Delays: *Medically Ready for Discharge  Placement - TCU    Discharge Comments: Pt is from [facility], however needs TCU now.        Discussed care plan with patient, RN, palliative care, patient's 2 children, patient, gastroenterology, oncology  Care conference for 1.5 hours today coordination of care regarding goals of care and care plan going forward.    Ziggy Marley MD, MD  Text Page  (7am to 6pm)  Interval History   Patient denies any abdominal pain rectal pain.  He had an episode of bright red blood per rectum.  He had a tan stool with some blood alongside the stool.  No melena.  No nausea vomiting.  He is otherwise feeling well.  He is aware of the care conference later this morning.      -Data reviewed today: I reviewed all new labs and imaging results over the last 24 hours. I personally reviewed labs from last 24 hours      Physical Exam   Temp: 98  F (36.7  C) Temp src: Oral BP: 102/61 Pulse: 95   Resp: 16 SpO2: 94 % O2 Device: None (Room air)    Vitals:    05/03/23 1157 05/04/23 0615 05/11/23 1128   Weight: 88.9 kg (195 lb 14.1 oz) 90.4 kg (199 lb 4.7 oz) 89.8 kg (198 lb)     Vital Signs with Ranges  Temp:  [97.1  F (36.2  C)-98  F (36.7  C)] 98  F (36.7  C)  Pulse:  [90-98] 95  Resp:  [16-18] 16  BP: ()/(52-69) 102/61  SpO2:  [92 %-94 %] 94 %  I/O last 3 completed shifts:  In: 200 [P.O.:200]  Out: -     Constitutional: Chronically ill appearing, nad, up in bed, alert conversant, appears comfortable, nontoxic appearing  HEENT: scleral icterus  present  Respiratory: Being easily, no coughing  Cardiovascular: Patient declined exam  GI: Abdomen appears distended as before suspect ascites.  Patient declined exam  Skin/Integumen: trace bLE edema  ; patient declined exam  Neuro: Alert and fully oriented.  Conversant.  Psych: Patient open to discussing his symptoms and bleeding.  As are per discussion proceeded he became less open to answering questions and became more irritable.  No agitation.          Medications     - MEDICATION INSTRUCTIONS -         diclofenac  2 g Topical BID     folic acid  1 mg Oral Daily     multivitamin w/minerals  1 tablet Oral Daily     paliperidone  234 mg Intramuscular Q28 Days     pantoprazole  40 mg Oral BID AC     sodium chloride (PF)  3 mL Intracatheter Q8H       Data   Recent Labs   Lab 05/19/23  0732 05/18/23  1528 05/17/23  0821 05/16/23  1041 05/15/23  1012 05/14/23  1027   WBC 6.6  --  9.5  --   --   --    HGB 8.2*  --  7.6*  --   --  7.8*   MCV 89  --  91  --   --   --      --  221  --   --   --    *  --  133* 133*  --   --    POTASSIUM 3.6  --  4.0 3.8   < > 3.7   CHLORIDE 102  --  103 103  --   --    CO2 22  --  20* 19*  --   --    BUN 16.9  --  16.9 16.2  --   --    CR 0.64*  --  0.68 0.64*  --   --    ANIONGAP 10  --  10 11  --   --    GEOVANNA 7.9*  --  7.9* 8.1*  --   --    *  --  101* 155*  --   --    ALBUMIN 2.6* 2.5* 2.3* 2.4*  2.4*   < >  --    PROTTOTAL 5.9*  --  5.5* 5.8*   < >  --    BILITOTAL 3.7*  --  4.2* 5.1*   < >  --    ALKPHOS 737*  --  687* 787*   < >  --    ALT 50  --  57* 68*   < >  --    AST 45  --  41 53*   < >  --     < > = values in this interval not displayed.       Imaging:   Recent Results (from the past 24 hour(s))   US Paracentesis with Albumin    Narrative    US PARACENTESIS WITH ALBUMIN 5/18/2023 2:10 PM    CLINICAL HISTORY: colon cancer, cirrhosis. recent GI bleed, will rule  out SBP,    PROCEDURE: Informed consent obtained. Time out performed. The abdomen  was prepped  and draped in a sterile fashion. 10 mL of 1% lidocaine was  infused into local soft tissues. A 5 English catheter system was  introduced into the abdominal ascites under ultrasound guidance.    2.3 liters of clear fluid were removed and sent to lab if requested.    Patient tolerated procedure well.    Ultrasound imaging was obtained and placed in the patient's permanent  medical record.      Impression    IMPRESSION:  1.  Status post ultrasound-guided paracentesis.    JACK LANDRY MD         SYSTEM ID:  [id number]

## 2023-05-19 NOTE — CONSULTS
"Care Management Follow Up    Length of Stay (days): 16    Expected Discharge Date: 05/20/2023     Concerns to be Addressed: Discharge planning with hospice services.       Patient plan of care discussed at interdisciplinary rounds: Yes    Anticipated Discharge Disposition: [facility]     Anticipated Discharge Services:  [facility]  Anticipated Discharge DME:  Hospice to provide necessary DME.     Patient/family educated on Medicare website which has current facility and service quality ratings: no  Education Provided on the Discharge Plan:    Patient/Family in Agreement with the Plan: unable to assess    Referrals Placed by CM/SW: [facility]  Private pay costs discussed: private room/amenity fees    Additional Information:  Per patient's physician, patient/family has expressed a desire to transition to comfort cares and SW was consulted for hospice discussion. SW spoke with patient's son Juventino to discuss discharge planning and obtain hospice referrals. Juventino voiced wanting patient to discharge back to [facility] with hospice service. SW provided a list of hospice agencies to patient/family and patient/family stated that they would be in agreement with using [facility].  SW sent referral to [facility]. Patient only wants nursing services at this time. Please avoid overemphasizing the words \"comfort cares\" and \"hospice.\"    Family will plan to provide transportation for patient back to facility around 2:00 on Monday 5/22. Updated [facility] and sent referral via [facility]. Hospice to call family to arrange enrollment/paperwork.  left  at [facility] to discuss patient's return back to the facility.     ARGELIA Sutherland        "

## 2023-05-19 NOTE — PROGRESS NOTES
"Service Date: 05/19/2023    Care conference was held today.  It was attended by myself, Itzel Meyer, ELAINA GIL, Sophie Henson, , and Dr. Marley.  The patient's son, Juventino, and daughter, Caro, were present.    The patient wanted us to have discussion outside the room.  I explained to the family that patient has metastatic colon cancer which is progressing.  The patient has not followed up in oncology clinic for almost 2 years.  Family mentioned that the patient had made a decision not to take any further chemotherapy.    The patient has other ongoing problems including mental disorder, liver cirrhosis, alcohol abuse and GI bleed.  The patient's overall prognosis is poor because of these conditions.    We discussed regarding comfort care with hospice.  We also discussed regarding restorative care.  After discussion, decision is for comfort care with hospice.    I met with the patient along with Itzel Betsy and daughter Caro.  I explained to the patient that we had discussion regarding his further care.  I explained to him that we all feel that best would be for him to enroll into hospice.    The patient mentioned that he does not have cancer. The patient mentioned that he would like to go home and die there.  The patient mentioned that he does not want to be admitted to hospital or brought to the ER. The patient wants to be kept comfortable.    The patient is okay with getting all the care for comfort but he does not like the term \"hospice\" to be used.    ASSESSMENT:    1.  A 67-year-old gentleman with metastatic colon cancer which is progressing.  2.  Alcoholic liver cirrhosis.  3.  Gastrointestinal bleed.  4.  Alcohol use.  5.  Bilateral renal lesions.  6.  Schizoaffective disorder.    PLAN:    1.  After discussion with the family and the patient, plan is for comfort care.  The patient will be going back to [facility] assisted living.  Hospice will be involved.  He does not like the term \"hospice\" being " used.      Family is supportive of comfort care.  I am hoping patient has peaceful last few months of life.    2.  Oncology will sign off.  Please call us with any questions.    TOTAL TIME SPENT:  60 minutes.  Time spent in today's visit including care conference.    Alvin Yanez MD        D: 2023   T: 2023   MT: md/bryan    Name:     NEW DICKERSONSowmya  MRN:      [id number]        Account:      [id number]   :      1955           Service Date: 2023       Document: [id number]

## 2023-05-19 NOTE — PROGRESS NOTES
Hemoglobin stable- although has blood in stools would not recommend additional workup. No flex sigmoidoscopy.    Has signifiant ascites  Cr 0.64 and expect will tolerate diuretics. Will start with furosemide 20 mg daily and spironolactone 50 mg daily. Can increase if Cr remains near/at baseline.    [facility]  [phone]

## 2023-05-19 NOTE — PROGRESS NOTES
[facility]  Gastroenterology Progress Note     Los Huang MRN# [id number]   YOB: 1955 Age: 67 year old          Assessment and Plan:       Alcohol abuse    Hemorrhagic shock (H)    History of esophageal varices    Gastrointestinal hemorrhage, unspecified gastrointestinal hemorrhage type  Portal hypertension cirrhosis ascites partial colectomy metastatic colon cancer who was admitted with significant mental status changes GI bleed.  Patient continues to have abdominal ascites patient has slow GI blood loss during his last imaging studies patient was found to have significantly congested small bowel which has resolved patient has history of portal vein thrombosis.  Patient finding discussed with the hospitalist team plan to continue on supportive care patient has required multiple paracentesis I will recommend to start him on diuretics including Lasix and Aldactone.  Continue to monitor closely GI will continue to follow along closely  67-year-old gentleman with known history of complicated multiple chronic health problem with history of          Hemorrhagic shock (H)  Gastrointestinal hemorrhage, unspecified gastrointestinal hemorrhage type  History of esophageal varices  Alcohol abuse      Interval History:     doing well; no cp, sob, n/v/d, or abd pain.              Review of Systems:     C: NEGATIVE for fever, chills, change in weight  E/M: NEGATIVE for ear, mouth and throat problems  R: NEGATIVE for significant cough or SOB  CV: NEGATIVE for chest pain, palpitations or peripheral edema             Medications:   I have reviewed this patient's current medications    clonazePAM  0.5 mg Oral Once     diclofenac  2 g Topical BID     folic acid  1 mg Oral Daily     multivitamin w/minerals  1 tablet Oral Daily     paliperidone  234 mg Intramuscular Q28 Days     pantoprazole  40 mg Oral BID AC     sodium chloride (PF)  3 mL Intracatheter Q8H                  Physical Exam:    Vitals were reviewed  Vital Signs with Ranges  Temp:  [97.6  F (36.4  C)-97.9  F (36.6  C)] 97.9  F (36.6  C)  Pulse:  [88-95] 95  Resp:  [18] 18  BP: (100-110)/(56-69) 110/69  SpO2:  [94 %-96 %] 94 %  I/O last 3 completed shifts:  In: 660 [P.O.:660]  Out: 1200 [Urine:1200]             Data:   I reviewed the patient's new clinical lab test results.   Recent Labs   Lab Test 05/17/23  0821 05/14/23  1027 05/13/23  0837 05/12/23  1056 05/11/23  0854 05/10/23  1005   WBC 9.5  --   --  11.9* 12.8* 15.1*   HGB 7.6* 7.8* 7.5* 7.4* 7.5* 7.6*   MCV 91  --   --  91 92 91     --   --  221 235 238   INR  --   --   --  1.34* 1.40* 1.51*     Recent Labs   Lab Test 05/17/23  0821 05/16/23  1041 05/15/23  1012 05/12/23  1910 05/12/23  1056   POTASSIUM 4.0 3.8 3.7   < > 3.3*   CHLORIDE 103 103  --   --  101   CO2 20* 19*  --   --  21*   BUN 16.9 16.2  --   --  20.3   ANIONGAP 10 11  --   --  12    < > = values in this interval not displayed.     Recent Labs   Lab Test 05/18/23  1528 05/17/23  0821 05/16/23  1041 05/12/23  1056 05/07/23  0841 05/06/23  2042 05/04/23  2212 05/03/23 2002 07/16/22  1136 07/15/22  1305 12/16/20  0728 12/14/20  2133   ALBUMIN 2.5* 2.3* 2.4*  2.4* 2.5*   < >  --    < >  --    < >  --    < > 3.7   BILITOTAL  --  4.2* 5.1* 4.2*   < >  --    < >  --    < >  --    < > 0.4   ALT  --  57* 68* 92*   < >  --    < >  --    < >  --    < > 80*   AST  --  41 53* 74*   < >  --    < >  --    < >  --    < > 40   PROTEIN  --   --   --   --   --  20*  --  Negative  --  Negative  --  Negative   LIPASE  --   --   --   --   --   --   --   --   --   --   --  214    < > = values in this interval not displayed.       I reviewed the patient's new imaging results.    All laboratory data reviewed  All imaging studies reviewed by me.    Bossman Pedroza MD,  5/18/2023  [facility]  Office : [phone]  Cell: [phone]      [facility]  Ph: [phone] Fax: [phone]

## 2023-05-19 NOTE — PLAN OF CARE
Goal Outcome Evaluation:       Summary: Sepsis d/t suspected aspiration pneumonia, hemorrhagic shock due to acute GI bleed  DATE & TIME: 05/18/23 5023-5776  Cognitive Concerns/Orientation: A&Ox2, disoriented to situation/time -Flat affect with with bouts of intermittent slow and illogical conversation - auditory/visual hallucinations (baseline), no reports of hallucinations this shift.  BEHAVIOR & AGGRESSION TOOL COLOR: Green  ABNL VS/O2: VSS on RA, soft BP  MOBILITY: SBA GBW, ambulates steadily  PAIN MANAGMENT: denies  DIET: Mechanical soft  BOWEL/BLADDER: Continent B&B, 2 BMs this shift. BM in the morning had increased bright red liquid- MD aware. Continue to monitor. Placed a BSC in room for safety  ABNL LAB/BG: Hgb 7.6; Na 133; albumin 2.5  DRAIN/DEVICES: PIVx1 SL on R arm  SKIN: Pale, jaundiced skin, +2 edema BLE;  3+ scrotal edema- refused intervention.   TESTS/PROCEDURES: paracentesis 5/18 - 2.3L taken out  D/C DATE: Accepted to [facility] pending level 2 screening and family care conference 5/19 at 11 AM  OTHER IMPORTANT INFO: Hem/Onc, Psych, Palliative following.   -Not decisional per psych - family conference 5/19- gave PRN klonopin x1 right before 0700 to prepare for care conference.

## 2023-05-19 NOTE — PLAN OF CARE
Goal Outcome Evaluation:  5/19/23, 7 a to 3 p  5/3/23 admit, Sepsis d/t suspected aspiration pneumonia, hemorrhagic shock due to acute GI bleed    Orientation: Alert to self, family, Place, waxes and wanes, can make his needs known.     Vitals/Tele: BP soft, On RA, denies pain    IV Access/drains: LUIS access SL, flushed    Diet: Mech soft, tolerating    Mobility: A1 GB and W    GI/: Continent, ambulates to the BR, had 1 episode of bloody stool this shift, approximately 20 to 30 ml bright blood    Wound/Skin: Pale    Consults: Palliative following    Discharge Plan: Will discharge to facility on comfort care    Avoid using the word palliative care or comfort care to the patient ( triggers him) as per MD and Palliative care RN      See Flow sheets for assessment

## 2023-05-19 NOTE — PROGRESS NOTES
[facility]  Palliative Care Daily Progress Note       Recommendations & Counseling       Solo was not interested in participating in the planned family/medical care conference this AM and preferred that we meet in private. Thus met with pt's adult children, Yuliet and Juventino, along with Dr. Yanez, Dr. Kendall, and palliative LICSW, Sophie Henson. We acknowledge Solo's multiple chronic terminal medical conditions (metastatic colon cancer that is slowly progressing and causing bleeding complications, alcohol misuse, cirrhosis, and mental illness). Although his cancer has appeared to be slower growing over the past decade, it is possible that its progression may be picking up more speed and/or causing more complications. His alcohol misuse, mental illness, and cirrhosis seemingly more imminent health concerns. Solo has expressed over the years no desire to actively treat his cancer with chemotherapy. He has stated that this hospitalization, as well. We acknowledge that serious complications may continue to present, including catastrophic bleeding, infection, etc. This will likely tether him to the medical system with need for frequent clinic visits and possible health setbacks with hospitalizations. His actions and words suggest that he doesn't want to engage with the medical system at that level. Although a restorative pathway could possibly prolong time to live, it will be chaotic and bumpy. Alternative is to explore hospice (which he has been on 2x in the past, struggled with intrusion of hospice team members and stabilized to graduate from hospice). Hospice allows us to simplify his care plan and allow Solo to have more control over how his time is spent, where he lives, and what treatments he wants to engage in. We ultimately all agree that hospice is likely in Solo's best interest    Visited with Solo, along with Dr. Yanez and daughter Yuliet. Solo does not believe he has cancer. He is frustrated by  "the medical team today and the hospital. He wants to go home. He wants to be comfortable. He doesn't want to be in the hospital or come back here. He acknowledges he is dying. He doesn't want hospice    We reconvened in a group to review that conversation with Solo. His decision making capacity is so complicated and convoluted by his mental health issues and intermittent lack of interest/willingness to engage. We cannot safely say he does not have the ability to make medical decisions, but given the complexity, it is safe to say that he cannot make medical decisions independently. Thus, we all agree that moving forward with hospice is most appropriate     Will initiate comfort measures in the hospital. If Solo wants or prefers VS to be checked, that is fine. We don't want him to be suspicious of changes in his plan of care     Would be very prudent to avoid overemphasizing the words \"comfort cares\" and \"hospice\" with Solo, as these are very triggering words for him     Psych to weigh in on comfort medications for his anxiety; anticipate he can have clonazepam more frequently? Will await their orders     I did order dilaudid 2mg PO Q2hrs PRN pain, SOB    SW consult for hospice. Hospice planning should go through Solo's children, Yuliet and Juventino. Hospice agency TBD, but of note, [facility] is what Solo had in the past and that will not be a good fit this time. Did reinforce with unit SW that avoiding additional support services through hospice is not helpful for Solo. Hospice RN is the most critical team member to involve for his comfort     Emphasized with the medical team, dtr Yuliet and son Juventino that Solo's psychosocial situation is very complicated with many layers. This is the best plan we can arrange for this moment in time. It is possible and likely that the plan will evolve based on how his health trajectory unfolds (he has surprised us in the past and lived through cancer for 9 years) and his mental " "health/willingness to engage with hospice. His body and needs will ultimately guide the way, and the medical team will do the best they can to support Solo       This case and these recommendations have been extensively discussed with Dr. Marley, Dr. Yanez, bedside RN Case, and unit SW Ford.    Itzel POLK, CNP  Palliative Medicine   [facility]  Securely message with Vocera  *If you are having trouble locating my name, please ensure \"global\" is checked under contacts tab, within the sites button      Thank you for the opportunity to participate in the care of this patient and family. Our team: will continue to follow.     During regular M-F work hours ([phone]) -- please contact me on Vocera     In my absence, securely message our team via Vocera \"[facility]\" or go to On Call tab in [email or web address], search for \"[facility]\"     After regular work hours and on weekends/holidays, to reach our on call physician, securely message via Vocera \"[facility]\" or go to On Call tab in [employer], search for \"[facility]\"     Attestation:  Total time involved in the patient's care: 133 minutes.     Assessments          Los Huang is a 67 year old male with PMH significant for alcoholic cirrhosis with known esophageal varices, current alcohol misuse, metastatic colon cancer s/p partial colectomy and right hepatectomy previously on hospice but was able to graduate due to stable disease, extensive PTSD/schizoaffective/bipolar disorder who presents with hematochezia, confusion and falls. He was noted to be in hemorrhagic shock with lactate >8, requiring ICU cares. S/p EGD which showed variceal banding scar that appeared healthy, as well as grade I and II esophageal varices. Colonoscopy deferred due to evidence of colitis on imaging. CTA abd/pelvis upon admission showed marked wall thickening and edema of the entire bowel with concern for shock " bowel/ischemic bowel and disease progression of colon cancer. His Hgb remains stable in the 7s. He is requiring intermittent therapeutic paracentesis for his known ascites. He has been treated for sepsis 2/2 aspiration PNA. He has severe malnutrition. There is concern for new renal lesions, question new malignancy (RCC).     Solo was not interested in participating in the planned family/medical care conference this AM and preferred that we meet in private.     Thus met with pt's adult children, Yuliet and Juventino, along with Dr. Yanez, Dr. Kendall, and palliative LICSW, Sophie Henson. We acknowledge Solo's multiple chronic terminal medical conditions (metastatic colon cancer that is slowly progressing and causing bleeding complications, alcohol misuse, cirrhosis, and mental illness). Although his cancer has appeared to be slower growing over the past decade, it is possible that its progression may be picking up more speed and/or causing more complications. His alcohol misuse, mental illness, and cirrhosis seemingly more imminent health concerns. Solo has expressed over the years no desire to actively treat his cancer with chemotherapy. He has stated that this hospitalization, as well. We acknowledge that serious complications may continue to present, including catastrophic bleeding, infection, etc. This will likely tether him to the medical system with need for frequent clinic visits and possible health setbacks with hospitalizations. His actions and words suggest that he doesn't want to engage with the medical system at that level. Although a restorative pathway could possibly prolong time to live, it will be chaotic and bumpy. Alternative is to explore hospice (which he has been on 2x in the past, struggled with intrusion of hospice team members and stabilized to graduate from hospice). Hospice allows us to simplify his care plan and allow Solo to have more control over how his time is spent, where he lives, and  "what treatments he wants to engage in. We ultimately all agree that hospice is likely in Solo's best interest.     Visited with Solo, along with Dr. Yanez and daughter Yuliet. Solo does not believe he has cancer. He is frustrated by the medical team today and the hospital. He wants to go home. He wants to be comfortable. He doesn't want to be in the hospital or come back here. He acknowledges he is dying. He doesn't want hospice.     We reconvened in a group to review that conversation with Solo. His decision making capacity is so complicated and convoluted by his mental health issues and intermittent lack of interest/willingness to engage. We cannot safely say he does not have the ability to make medical decisions, but given the complexity, it is safe to say that he cannot make medical decisions independently. Thus, we all agree that moving forward with hospice is most appropriate.     Will initiate comfort measures in the hospital. If Solo wants or prefers VS to be checked, that is fine. We don't want him to be suspicious of changes in his plan of care.     Would be very prudent to avoid overemphasizing the words \"comfort cares\" and \"hospice\" with Solo, as these are very triggering words for him.      Psych to weigh in on comfort medications for his anxiety; anticipate he can have clonazepam more frequently? Will await their orders.     SW consult for hospice. Hospice planning should go through Solo's children, Yuliet and Juventino. Hospice agency TBD, but of note, [facility] is what Solo had in the past and that will not be a good fit this time. Did reinforce with unit SW that avoiding additional support services through hospice is not helpful for Solo. Hospice RN is the most critical team member to involve for his comfort.     Emphasized with the medical team, dtr Yuliet and son Juventino that Solo's psychosocial situation is very complicated with many layers. This is the best plan we can arrange for this moment in time. It is " possible and likely that the plan will evolve based on how his health trajectory unfolds (he has surprised us in the past and lived through cancer for 9 years) and his mental health/willingness to engage with hospice. His body and needs will ultimately guide the way, and the medical team will do the best they can to support Solo.           Interval History:     Chart review/discussion with unit or clinical team members:   No acute events overnight. Didn't take clonazepam this AM.     Per patient or family/caregivers today:  Didn't want to participate in today's meeting. When we reconvened, he was frustrated and mad at the medical team, the hospital. States he wants to be comfortable and to go home.     Key Palliative Symptoms:  We are not helping to manage these symptoms currently in this patient.    Patient is on opioids: bowels not assessed today.           Review of Systems:     Besides above, an additional N/A system ROS was reviewed and is unremarkable          Medications:     I have reviewed this patient's medication profile and medications during this hospitalization.    Noted meds:    Voltaren gel topical BID   Folic acid   MVI  Paliperidone injection monthly   PPI BID  Atropine gtt PRN secretions   Cogentin 1mg PO at bedtime PRN movement disorder   Clonazepam 0.5mg PO daily PRN anxiety   Dilaudid 2mg PO Q2hrs PRN pain, SOB  Flexeril 10mg PO BID PRN muscle  Spasms   Lactulose 20g PO Q2hrs PRN confusion   Zyprexa 5mg PO Q6hrs PRN agitation   Trazodone 50mg PO at bedtime PRN sleep            Physical Exam:     Temp: 98  F (36.7  C) Temp src: Oral BP: 102/61 Pulse: 95   Resp: 16 SpO2: 94 % O2 Device: None (Room air)    CONSTITUTIONAL: Chronically ill cachectic and disheveled man seen resting in bed in NAD, alert, flat affect, slow responses, frustrated, uncooperative, unpleasant   HEENT: scleral icterus    RESPIRATORY: NL respiratory effort on RA  GASTROINTESTINAL: Distended   SKIN: Jaundiced            Data  Reviewed:     Recent imaging independently reviewed, my comments on pertinents:   Results for orders placed or performed during the hospital encounter of 05/03/23   CTA Abdomen Pelvis with Contrast    Impression    IMPRESSION:   1.  Findings concerning for shock bowel with marked edema involving  the entire small bowel and residual colon (prior right hemicolectomy).  No evidence for active GI bleed.  2.  Disease progression of colon cancer with enlargement of visualized  lung metastases. New small right pleural effusion.  3.  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 Enlargement of subcapsular  metastatic deposits along the right lobe of the liver.  4.  Right hepatectomy. Cirrhosis and splenomegaly.  5.  New moderate ascites either due to portal hypertension and/or  peritoneal carcinomatosis.  6.  Stable bilateral renal masses concerning for solid renal  neoplasms.  7.  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    HARI GARCIA MD         SYSTEM ID:  [id number]   XR Chest Port 1 View    Impression    IMPRESSION: Multiple bilateral lung nodules compatible with known metastatic disease. Pulmonary edema. Concomitant infiltrates cannot be excluded. Small right pleural effusion. Heart size normal. Left chest port catheter tip in SVC.   US Paracentesis without Albumin    Impression    IMPRESSION:  1.  Status post ultrasound-guided paracentesis.    JACK LANDRY MD         SYSTEM ID:  [id number]   XR Chest Port 1 View    Impression    IMPRESSION: Heart is normal in size. Multiple pulmonary nodules are noted bilaterally compatible with metastatic disease. Trace bilateral effusions with bibasilar atelectasis. Large mass is noted within the medial right lung base. Left Mediport,   unchanged. No evidence of pulmonary edema.    CTA Abdomen Pelvis with Contrast    Impression    IMPRESSION:  1.  Site of GI bleed not identified.  2.  Interval improvement  in wall thickening of small bowel and colon.  3.  Pulmonary metastases. Small right pleural effusion.  4.  Tumor thrombus left portal vein and hepatic metastases again seen. Subcapsular implants along the liver again noted.  5.  Bilateral renal lesions again seen.  6.  Moderate amount of ascites.   US Paracentesis without Albumin    Impression    IMPRESSION: Technically successful paracentesis without immediate  complications.    LINDA MONTELONGO MD         SYSTEM ID:  [id number]         Recent lab data independently reviewed, my comments on pertinents:   Na 134  K 3.6  Creat 0.64  Albumin 2.6  Alk phos 737  ALT 50  AST 45  Bili 3.7  WBC 6.6  Hgb 8.2  Plt 240  INR 1.34

## 2023-05-19 NOTE — PLAN OF CARE
Goal Outcome Evaluation:    Summary: Sepsis d/t suspected aspiration pneumonia, hemorrhagic shock due to acute GI bleed  DATE & TIME: 05/18/23 1814-3001  Cognitive Concerns/ Orientation: A&Ox2, disoriented to situation/time -Flat affect with with bouts of intermittent slow and illogical conversation - auditory/visual hallucinations (baseline)  BEHAVIOR & AGGRESSION TOOL COLOR: Green  ABNL VS/O2: VSS on RA  MOBILITY: SBA, ambulates steadily - up in chair this shift   PAIN MANAGMENT: denies  DIET: Mechanical soft- poor appetite this evening  BOWEL/BLADDER: Continent B&B, no BM this shift  ABNL LAB/BG: Hgb 7.6; Bili total 4.2; Na 133; albumin 2.5,   DRAIN/DEVICES: PIVx1 SL on R arm  SKIN: Pale, jaundiced skin, +2 edema BLE;  3+ penile/scrotal edema.   TESTS/PROCEDURES: paracentesis today - 2.3L taken out  D/C DATE: Accepted to pending evaluation from [facility] mental health professional; emilee deciding where is appropriate to discharge  OTHER IMPORTANT INFO: Hem/Onc, Psych, Palliative following.   -Not decisional per psych - family conference 5/19, level 2 to be completed by [facility] prior  to discharge (referral pending to complete the screen) - daughter visited at bedside

## 2023-05-19 NOTE — PROVIDER NOTIFICATION
MD Notification    Notified Person: MD    Notified Person Name: Sowmya Celeste    Notification Date/Time: 5/19/23 0600    Notification Interaction: [employer]    Purpose of Notification:  Pt had loose radha colored stool w/ scant amount of blood. Now just had a BM w/ small formed tan stool & a large amount of bright red liquid with it. VSS are stable. Please advise if action needed.    Orders Received: Continue to monitor. Page if BP drops or vitals become unstable.    Comments:

## 2023-05-19 NOTE — PROGRESS NOTES
"Palliative Care Initial Social Work Note  Location: [facility]    Patient Info:  Los Huang is a 67 year old male with history of alcoholic cirrhosis, esophogeal varices, metastatic colon cancer, PTSD, schizoaffective disorder, bipolar disorder admitted on 5/3/23 for hemorrhagic shock due to acute HI bleed.    Brief summary of visit: Care conference thsi morning with Solo's family.  Visited with Solo prior to the care conference and he decline dto participate today.  Met with his daughter, Caro and son, Juventino outside of the room with Itzel Meyer NP, Dr. Marley, and Dr. Yanez.  Reviewed that Solo has multiple chronic terminal conditions, including metastatic cancer that he has lived with for almost a decade. Though his cancer has been slow growing for sometime now, it appears that it might be \"picking up speed\"  In addition, in the past year Solo has started drinking which has caused significantly more complications. Solo expresses that he is frustrated with being in the hospital. He is unpredictable in his willingness to engage with providers and it seems he is generally mistrusting of the medical system.  It also seems that his health is deteriorating and time is likely measured in months, but family pointed out Solo has enrolled and dis enrolled from hospice twice in the pat 10 years.   He has a significant psych history as well.    Family in agreement that, for now, it makes sense to proceed with a plan that focuses on his comfort with understanding that there are a number medical, psychiatric, and emotional factors that could lead to this plan changing at any given time.       Date of Admission: 5/3/2023    Reason for consult: Patient and family support    Sources of information: Patient  Family member Children Juliana    Recommendations & Plan:  Will continue to follow   For now, continue with plan to focus on Solo's comfort  Consult psychiatry for medication recommendations        Symptoms & Concerns " Addressed Today:  End of life Solo is likely nearing end of life, tbu this has been an ongoing theme for him since his cancer diagnosis 9 years ago.  Family  Mental health long history of SPMI.      Strengths Identified:    Children willing to participate in goals of care discussions and serve as decision makers as needed    Relationships & Support:  Aspects of relationships and support assessed today:    Identified family members: children    Professional supports: none    Family coping:     Bereavement Risk concerns:     Coping, Mental Health & Adjustment to Illness:   Adjustment to Illness/Hospitalization  Depression  PTSD  Substance Use  Other schizoaffective disorder    Goals, Decision Making & Advance Care Planning:   Prognosis, Goals, and/or Advance Care Planning were assessed today: Yes  If yes, brief summary of discussion: see above  Preferred language: English  Patient's decision making preferences: with input from medical clinicians and loved ones  I have concerns about the patient/family's health literacy today: Unable to assess today  Patient has a completed Health Care Directive: Yes, and on file.  Code status per chart review: No CPR / No Intubation      Clinical Social Work Interventions:   Assessment of palliative specific issues    Introduction of Palliative clinical social work interventions  Attended/participated in care conference  Facilitation of processing of thoughts/feelings  Family communication facilitated  Grief counseling  Goals of care discussion/facilitation      KIM Garland, [facility]   Palliative Care Clinical   Ph: [phone]

## 2023-05-19 NOTE — SIGNIFICANT EVENT
Significant Event Note    Time of event: 6:08 AM May 19, 2023    Description of event:  Paged for an episode of bright red hematochezia after a BM. Case reviewed.    Blood pressure 103/64, pulse 95, temperature 97.1  F (36.2  C), temperature source Oral, resp. rate 18, weight 89.8 kg (198 lb), SpO2 92 %.      Plan:  It seems hematochezia is an issue known to his inpatient team and there is a care conference in place today to discuss further steps. Monitor closely for ongoing or worsening bleeding or hypotension in the meantime.    Discussed with: bedside nurse    Case Celeste MD

## 2023-05-19 NOTE — CONSULTS
"Brief Psychiatry Update    - We were consulted to evaluate for decision-making capacity following care conference this morning    - I will defer this to palliative care for now, given Ms. Meyer's very excellent and comprehensive note detailing Solo's current status and the family's decision to proceed with hospice:      \"We reconvened in a group to review that conversation with Solo. His decision making capacity is so complicated and convoluted by his mental health issues and intermittent lack of interest/willingness to engage. We cannot safely say he does not have the ability to make medical decisions, but given the complexity, it is safe to say that he cannot make medical decisions independently. Thus, we all agree that moving forward with hospice is most appropriate     Will initiate comfort measures in the hospital. If Solo wants or prefers VS to be checked, that is fine. We don't want him to be suspicious of changes in his plan of care     Would be very prudent to avoid overemphasizing the words \"comfort cares\" and \"hospice\" with Solo, as these are very triggering words for him     Psych to weigh in on comfort medications for his anxiety; anticipate he can have clonazepam more frequently? Will await their orders\"    - Will increase clonazepam frequency to 0.5 mg TID PRN for anxiety    - Reconsult psychiatry if we can be of further assistance      Vamsi Ramsey, JAYCEE-BC, APRN, CNP  Consult/Liaison Psychiatry  [facility]  Provider can be paged via [facility] Paging/Directory  If I am unavailable, please contact [facility] at [phone] to reach the on-call provider.       "

## 2023-05-20 NOTE — PROGRESS NOTES
[facility]    Hospitalist Progress Note    Assessment & Plan   Date of Admission:  5/3/2023        Assessment & Plan  Los Huang is a 67 year old male with hx of alcoholic cirrhosis with known esophageal varices, metastatic colon cancer s/p partial colectomy and right hepatectomy previously on hospice, PTSD/schioaffective/bipolar disorder who was admitted on 5/3/2023 for hemorrhagic shock due to acute GI bleed. Hospital course complicated by sepsis of unclear source     Goals of care  Metastatic Colon Cancer   Suspected renal cancer  - Diagnosed with colorectal cancer in 2014 with oligometastatic disease to liver at the time of diagnosis. He was treated with ascending colectomy 6/14 and R hepatectomy in 8/14. He declined chemotherapy and enrolled in [facility], but eventually unenrolled from hospice due to overall stability and hospice visits felt intrusive  - CTA abd/pelvis this admission showed disease progression of colon cancer with lung mets, new tumor thrombus in the left portal vein, new/enlarging liver mets, new ascites, and stable bilateral renal masses concerning for solid renal neoplasms  -Extensive goals of care conversations had between hospitalist, palliative care team, oncology, patient and family on multiple occasions.  Patient's behavioral health issues, personality issues, inconsistent participation/engagement in these types of conversations and care plan made it quite challenging to decide on next steps.  Ultimately after care conference on 5/19 with entire care team, family and patient, it was decided that patient will be placed on comfort cares.  Patient does not react well to the terms comfort cares/hospice being used.  Plan is to discharge to [facility] on hospice cares on 5/22/2023.  Appreciate SW assistance.    Rest of hospital course as below     Hemorrhagic shock due to acute GI bleed, Improved  Lactic acidosis, Resolved  Colitis likely due to colon  cancer  Alcoholic cirrhosis with esophageal varices, ascites, and hepatic encephalopathy  Mild scrotal edema-improving  - Presented with confusion, falls, and 1 week history of intermittent rectal bleeding which patient felt was related to hemorrhoids. Hypotensive to 70s on arrival with initial lactate >8; improved with IV fluids. Initial Hgb 5.7. Ammonia level normal.  - In the ED, he was initiated on an octreotide infusion, IV PPI BID, and IV ceftriaxone for SBP ppx. Also transfused total 3 units pRBCs upon admission  - [facility] was consulted on admission. Underwent EGD which showed variceal banding scar that appeared healthy, as well as grade I and II esophageal varices. Colonoscopy deferred due to evidence of colitis on imaging.   - CTA abd/pelvis upon admission showed marked wall thickening and edema of the entire bowel with concern for shock bowel/ischemic bowel and disease progression of colon cancer; however patient has been relatively asymptomatic with improved lactate and WBC  - Octreotide d/c'd on 5/4.   - 5/8 He had an episode of maroon-colored bloody stool this morning and was hemodynamically stable and repeat hemoglobin was stable at 8 and I did discuss with the GI team again today and source of his blood loss today is due to metastatic disease and further evaluation with colonoscopy is not possible with disease progression as tumors/neoplastic tissue is friable and treatment with APC would not be helpful and treatment is blood products and repeat CT abdomen done on 5/9/2023 did not show any evidence of bleeding  -continued with PPI, soft diet  - s/p paracentesis again on 5/11/2023 and 3.4 L of fluid was removed and the fluid analysis does not show any evidence of SBP  Patient did complete 7 total days of IV antibiotics (Rocephin and zosyn) while in the hospital which should be adequate for SBP prophylaxis given his GI bleed and cirrhosis/ascites  -Did give him a small dose of Lasix 40 mg 5/14 and his  edema seems to be improving  -5/18,significant abdominal distention distention without  pain.  Completed paracentesis.  2.3 L removed.  Ascitic fluid not consistent with SBP.  Negative Gram stain.  Culture no growth today.  Patient feels more comfortable  -Discussed management of ascites with [facility] gastroenterology.  They recommend Lasix 40 mg daily and Aldactone 100 mg daily.  However patient is now transitioning to comfort cares and will hold off on diuretic therapy at this time.  -5/19 patient had a episode of small amount of bright red blood per rectum this morning.  Perirectal area appeared normal except for a small small spot of blood on his underwear.  External hemorrhoids noted but no signs of inflammation.  Discussed with gastroenterology.  Plan is to just monitor clinically.  Patient was initiated on Lasix 20 mg daily and Aldactone 50 mg daily, reasonable at this time as this may reduce his ascites and scrotal edema allowing for improved comfort.  This can be continued depending on patient's clinical status going forward.    Sepsis due to suspected aspiration pneumonia, Resolved  Positive blood culture, suspect contaminant  - Spiked fever to 100.8 overnight 5/4-5/5 along with mild tachycardia. WBC 18.1k. Lactate 2.2. Reported increased shortness of breath. CXR showed known lung mets, bilateral infiltrates due to pulmonary edema vs pneumonia. He had been on ceftriaxone for SBP prophylaxis; antibiotics were broadened to IV pip-tazo with improvement. COVID-19 PCR negative. Unfortunately, blood cultures were not drawn.   - Underwent paracentesis (5/5) with removal of 2L fluid. 120 PMNs, no evidence of SBP  - 1 out of 2 blood cultures on admission (5/3) grew Staph epi. Suspect contaminant  - wbc count has been down trending and he has been afebrile  -completed antibiotic course         Acute respiratory distress due to aspiration pneumonia vs pulmonary edema, Resolved  Leukocytosis-Improved  - Reported  increased shortness of breath overnight 5/4-5/5.   -CXR showed pulmonary edema vs pneumonia. spO2 mid 90s on room air, but was placed on supplemental O2 for comfort.Treated with IV pip-tazo with improvement.   - Repeat CXR (5/6) improved  -He has been having persistent white count during this hospital stay but he has been afebrile  - wbc count is improving and repeat ascitic fluid analysis on 5/11 does not support any evidence of SBP       Thrombocytopenia, -Resolved  - Likely due to cirrhosis and ongoing alcohol use. Platelets wnl on admission though sample was likely hemoconcentrated.   -Platelet count improved.      Alcohol use disorder  - Longstanding history of alcohol dependence. Currently consuming 1 oz of gin five times daily. Last drink: 5/3. No s/sx of withdrawal noted this admission  - On thiamine/folate/MVI while hospitalized.  Discontinued with comfort cares.  -Patient's children state he has been drinking alcohol heavily in the last year and has not been interested in any treatment program .     Hypokalemia-resolved  -Potassium has been intermittently low, replaced as indicated.  Discontinued with comfort cares.    PTSD  Hx of schizoaffective disorder  Hx of bipolar disorder type I  - PTA regimen: clonazepam 0.5 mg daily, olanzapine 5 mg q6h prn, Invega 234 IM q28 days  - Last Invega dose: 5/6  - Clonazepam and olanzapine ordered prn this admission   -It is extremely difficult to assess patient's capacity to make medical decisions for himself.  His capacity to make decisions seems to rest primarily on his psychiatric disease.  No clear cognitive dysfunction.  There appears to be no metabolic encephalopathy.  Ammonia level is normal.  Patient has been alert.  Is also able to provide some detail regarding his cancer history.  -Agree with palliative care assessment: His decision making capacity is so complicated and convoluted by his mental health issues and intermittent lack of interest/willingness to  engage. We cannot safely say he does not have the ability to make medical decisions, but given the complexity, it is safe to say that he cannot make medical decisions independently. Thus, we all agree that moving forward with hospice is most appropriate   -Clonazepam dosing increased per psychiatry.    Cachexia  Severe malnutrition in context of acute on chronic illness  - Daily magic cup ordered per Nutrition           Diet: Room Service  Mechanical/Dental Soft Diet  Snacks/Supplements Adult: Magic Cup; Between Meals  Snacks/Supplements Adult: Ensure Max Protein (bariatric); Between Meals    DVT Prophylaxis: Pneumatic Compression Devices  Chew Catheter: Not present  Lines: None     Cardiac Monitoring: None  Code Status: No CPR- Do NOT Intubate          Clinically Significant Risk Factors              # Hypoalbuminemia: Lowest albumin = 2.5 g/dL at 5/12/2023 10:56 AM, will monitor as appropriate            # Severe Malnutrition: based on nutrition assessment               Disposition Plan     Expected Discharge Date: 05/15/2023, 12:00 PM  Discharge Delays: *Medically Ready for Discharge  Placement - TCU    [facility]: [facility] on 5/22/2023        Kaylah Bonilla MD, MD  Text Page  (7am to 6pm)  Interval History   Patient was seen resting comfortably in bed.  He was asleep.  Did not awaken easily when trying to wake him up during the encounter.  Discussed with RN.  Patient is on comfort cares.  RN did not bring up any acute issues.      -Data reviewed today: I reviewed all new labs and imaging results over the last 24 hours. I personally reviewed labs from last 24 hours      Physical Exam   Temp: 97.4  F (36.3  C) Temp src: Oral BP: 103/64 Pulse: 88   Resp: 16 SpO2: 95 % O2 Device: None (Room air)    Vitals:    05/03/23 1157 05/04/23 0615 05/11/23 1128   Weight: 88.9 kg (195 lb 14.1 oz) 90.4 kg (199 lb 4.7 oz) 89.8 kg (198 lb)     Vital Signs with  Ranges  Temp:  [97.4  F (36.3  C)-98  F (36.7  C)] 97.4  F (36.3  C)  Pulse:  [88-89] 88  Resp:  [16] 16  BP: (103-104)/(64-66) 103/64  SpO2:  [95 %] 95 %  I/O last 3 completed shifts:  In: 480 [P.O.:480]  Out: -     Constitutional: Chronically ill appearing, resting comfortably in bed  Respiratory: Nonlabored breathing  Rest of exam deferred as patient was sleeping and is currently on comfort cares        Medications     - MEDICATION INSTRUCTIONS -         artificial saliva  10 mL Swish & Spit 4x Daily     diclofenac  2 g Topical BID     furosemide  20 mg Oral Daily     paliperidone  234 mg Intramuscular Q28 Days     pantoprazole  40 mg Oral BID AC     sodium chloride (PF)  3 mL Intracatheter Q8H     spironolactone  50 mg Oral Daily       Data   Recent Labs   Lab 05/19/23  0732 05/18/23  1528 05/17/23  0821 05/16/23  1041 05/15/23  1012 05/14/23  1027   WBC 6.6  --  9.5  --   --   --    HGB 8.2*  --  7.6*  --   --  7.8*   MCV 89  --  91  --   --   --      --  221  --   --   --    *  --  133* 133*  --   --    POTASSIUM 3.6  --  4.0 3.8   < > 3.7   CHLORIDE 102  --  103 103  --   --    CO2 22  --  20* 19*  --   --    BUN 16.9  --  16.9 16.2  --   --    CR 0.64*  --  0.68 0.64*  --   --    ANIONGAP 10  --  10 11  --   --    GEOVANNA 7.9*  --  7.9* 8.1*  --   --    *  --  101* 155*  --   --    ALBUMIN 2.6* 2.5* 2.3* 2.4*  2.4*   < >  --    PROTTOTAL 5.9*  --  5.5* 5.8*   < >  --    BILITOTAL 3.7*  --  4.2* 5.1*   < >  --    ALKPHOS 737*  --  687* 787*   < >  --    ALT 50  --  57* 68*   < >  --    AST 45  --  41 53*   < >  --     < > = values in this interval not displayed.       Imaging:   No results found for this or any previous visit (from the past 24 hour(s)).

## 2023-05-20 NOTE — PLAN OF CARE
Goal Outcome Evaluation:  Orientation/Cognitive: A&Ox2, disoriented to situation/time -Flat affect  Mobility Level/Assist Equipment: Ax1/SBA with GB/Walker   Fall Risk (Y/N): Yes   Behavior Concerns: None   Pain Management: Denies pain   Tele/VS/O2: VSS on RA (Comfort cares)  ABNL Lab/BG: None this shift   Diet: Mechanical soft  Bowel/Bladder: Continent B&B, 1 BM this shift   Skin Concerns: +2 edema BLE;  3+ scrotal edema.   Drains/Devices: R PIV SL   Tests/Procedures for next shift: SW following   Anticipated DC date & active delays: TBD

## 2023-05-20 NOTE — PROGRESS NOTES
Care Management Follow Up    Length of Stay (days): 17    Expected Discharge Date: 05/22/2023     Concerns to be Addressed:       Patient plan of care discussed at interdisciplinary rounds: Yes    Anticipated Discharge Disposition:  Return to [facility] with [facility]     Anticipated Discharge Services:    Anticipated Discharge DME:      Patient/family educated on Medicare website which has current facility and service quality ratings: no  Education Provided on the Discharge Plan:    Patient/Family in Agreement with the Plan: unable to assess    Referrals Placed by CM/SW: Internal Clinic Care Coordination  Private pay costs discussed:     Additional Information:  Writer spoke with [facility] nursing staff this weekend.  The nurse explains discharge arrangements need to occur with patient's RN jarred who is not in till Monday morning.  Prior to this hospitalization, patient resided in an AL apartment  and received minimal services.  Patient was independent with self care.  He only received meals delivered to his room and medication management.  He does not have a hospital bed or any DME in his existing apartment.  The weekend nurse explained, his RN CM, Tarah, will need to be contacted Monday morning and expects Tarah will want to make an on site assessment to determine if patient care needs can be met in his AL apartment or rather if they will want to move patient into their Enhanced Care unit.  [facility] will need to arrange for a hospital bed and any other DME needed.  Writer has left a message with son Baldomero explaining further arrangements need to be made before patient can return to [facility].     Plan Writer will contact [facility] tomorrow to ascertain if they have ordered any DME.   On Monday will contact Kev at [phone] to update her regarding family request to bring patient back to [facility] with hospice care.         ARGELIA Albert

## 2023-05-20 NOTE — PLAN OF CARE
"Orientation/Cognitive: A/Ox2-3, D/O to situation/time & place at times. Flat affect  Mobility Level/Assist Equipment: A1 GBW. T/R in bed.    Pain Management: Denies pain   Tele/VS/O2: Comfort Cares, no vitals  Lab/BG: No labs  Diet: Mechanical soft, good appetite  Bowel/Bladder: Inc of B&B. No BM  Skin Concerns: +2 edema BLE;  3+ scrotal edema.   Drains/Devices: R PIV SL   Other: PRN Tessalon per pt request  Tests/Procedures for next shift: SW following, per note:  \"Patient only wants nursing services at this time. Please avoid overemphasizing the words 'comfort cares' and 'hospice.'\"  Anticipated DC date & active delays: Plan to discharge to [facility] on 5/22 around 1400 via family transportation.     "

## 2023-05-20 NOTE — PLAN OF CARE
"Goal Outcome Evaluation:      /19/2023 [id number]    Orientation/Cognitive: A&Ox2, disoriented to situation/time -Flat affect  Mobility Level/Assist Equipment: Ax1/SBA GB/Walker   Fall Risk (Y/N): Yes   Behavior Concerns: None   Pain Management: Denies pain   Tele/VS/O2: Comfort Cares, no vitals  Lab/BG:  no labs  Diet: Mechanical soft  Bowel/Bladder: incont urine. 1 BM this shift   Skin Concerns: +2 edema BLE;  3+ scrotal edema.   Drains/Devices: R PIV SL   Tests/Procedures for next shift: SW following, per note:  \"Patient only wants nursing services at this time. Please avoid overemphasizing the words 'comfort cares' and 'hospice.'\"  Anticipated DC date & active delays: LTC                 "

## 2023-05-21 NOTE — PROGRESS NOTES
[facility]    Hospitalist Progress Note    Assessment & Plan   Date of Admission:  5/3/2023        Assessment & Plan  Los Huang is a 67 year old male with hx of alcoholic cirrhosis with known esophageal varices, metastatic colon cancer s/p partial colectomy and right hepatectomy previously on hospice, PTSD/schioaffective/bipolar disorder who was admitted on 5/3/2023 for hemorrhagic shock due to acute GI bleed. Hospital course complicated by sepsis of unclear source     Goals of care  Metastatic Colon Cancer   Suspected renal cancer  - Diagnosed with colorectal cancer in 2014 with oligometastatic disease to liver at the time of diagnosis. He was treated with ascending colectomy 6/14 and R hepatectomy in 8/14. He declined chemotherapy and enrolled in [facility], but eventually unenrolled from hospice due to overall stability and hospice visits felt intrusive  - CTA abd/pelvis this admission showed disease progression of colon cancer with lung mets, new tumor thrombus in the left portal vein, new/enlarging liver mets, new ascites, and stable bilateral renal masses concerning for solid renal neoplasms  -Extensive goals of care conversations had between hospitalist, palliative care team, oncology, patient and family on multiple occasions.  Patient's behavioral health issues, personality issues, inconsistent participation/engagement in these types of conversations and care plan made it quite challenging to decide on next steps.  Ultimately after care conference on 5/19 with entire care team, family and patient, it was decided that patient will be placed on comfort cares.  Patient does not react well to the terms comfort cares/hospice being used.  Plan is to discharge to [facility] on hospice cares as early as 5/22/2023 if all arrangements including hospice arrangements can be made.  Appreciate SW assistance.    Rest of hospital course as below     Hemorrhagic shock due to acute GI bleed,  Improved  Lactic acidosis, Resolved  Colitis likely due to colon cancer  Alcoholic cirrhosis with esophageal varices, ascites, and hepatic encephalopathy  Mild scrotal edema-improving  - Presented with confusion, falls, and 1 week history of intermittent rectal bleeding which patient felt was related to hemorrhoids. Hypotensive to 70s on arrival with initial lactate >8; improved with IV fluids. Initial Hgb 5.7. Ammonia level normal.  - In the ED, he was initiated on an octreotide infusion, IV PPI BID, and IV ceftriaxone for SBP ppx. Also transfused total 3 units pRBCs upon admission  - [facility] was consulted on admission. Underwent EGD which showed variceal banding scar that appeared healthy, as well as grade I and II esophageal varices. Colonoscopy deferred due to evidence of colitis on imaging.   - CTA abd/pelvis upon admission showed marked wall thickening and edema of the entire bowel with concern for shock bowel/ischemic bowel and disease progression of colon cancer; however patient has been relatively asymptomatic with improved lactate and WBC  - Octreotide d/c'd on 5/4.   - 5/8 He had an episode of maroon-colored bloody stool this morning and was hemodynamically stable and repeat hemoglobin was stable at 8 and I did discuss with the GI team again today and source of his blood loss today is due to metastatic disease and further evaluation with colonoscopy is not possible with disease progression as tumors/neoplastic tissue is friable and treatment with APC would not be helpful and treatment is blood products and repeat CT abdomen done on 5/9/2023 did not show any evidence of bleeding  -continued with PPI, soft diet  - s/p paracentesis again on 5/11/2023 and 3.4 L of fluid was removed and the fluid analysis does not show any evidence of SBP  Patient did complete 7 total days of IV antibiotics (Rocephin and zosyn) while in the hospital which should be adequate for SBP prophylaxis given his GI bleed and  cirrhosis/ascites  -Did give him a small dose of Lasix 40 mg 5/14 and his edema seems to be improving  -5/18,significant abdominal distention distention without  pain.  Completed paracentesis.  2.3 L removed.  Ascitic fluid not consistent with SBP.  Negative Gram stain.  Culture no growth today.  Patient feels more comfortable  -Discussed management of ascites with [facility] gastroenterology.  They recommend Lasix 40 mg daily and Aldactone 100 mg daily.  However patient is now transitioning to comfort cares and will hold off on diuretic therapy at this time.  -5/19 patient had a episode of small amount of bright red blood per rectum this morning.  Perirectal area appeared normal except for a small small spot of blood on his underwear.  External hemorrhoids noted but no signs of inflammation.  Discussed with gastroenterology.  Plan is to just monitor clinically.  Patient was initiated on Lasix 20 mg daily and Aldactone 50 mg daily, reasonable at this time as this may reduce his ascites and scrotal edema allowing for improved comfort.  This can be continued depending on patient's clinical status going forward.    Sepsis due to suspected aspiration pneumonia, Resolved  Positive blood culture, suspect contaminant  - Spiked fever to 100.8 overnight 5/4-5/5 along with mild tachycardia. WBC 18.1k. Lactate 2.2. Reported increased shortness of breath. CXR showed known lung mets, bilateral infiltrates due to pulmonary edema vs pneumonia. He had been on ceftriaxone for SBP prophylaxis; antibiotics were broadened to IV pip-tazo with improvement. COVID-19 PCR negative. Unfortunately, blood cultures were not drawn.   - Underwent paracentesis (5/5) with removal of 2L fluid. 120 PMNs, no evidence of SBP  - 1 out of 2 blood cultures on admission (5/3) grew Staph epi. Suspect contaminant  - wbc count has been down trending and he has been afebrile  -completed antibiotic course         Acute respiratory distress due to aspiration  pneumonia vs pulmonary edema, Resolved  Leukocytosis-Improved  - Reported increased shortness of breath overnight 5/4-5/5.   -CXR showed pulmonary edema vs pneumonia. spO2 mid 90s on room air, but was placed on supplemental O2 for comfort.Treated with IV pip-tazo with improvement.   - Repeat CXR (5/6) improved  -He has been having persistent white count during this hospital stay but he has been afebrile  - wbc count is improving and repeat ascitic fluid analysis on 5/11 does not support any evidence of SBP       Thrombocytopenia, -Resolved  - Likely due to cirrhosis and ongoing alcohol use. Platelets wnl on admission though sample was likely hemoconcentrated.   -Platelet count improved.      Alcohol use disorder  - Longstanding history of alcohol dependence. Currently consuming 1 oz of gin five times daily. Last drink: 5/3. No s/sx of withdrawal noted this admission  - On thiamine/folate/MVI while hospitalized.  Discontinued with comfort cares.  -Patient's children state he has been drinking alcohol heavily in the last year and has not been interested in any treatment program .     Hypokalemia-resolved  -Potassium has been intermittently low, replaced as indicated.  Discontinued with comfort cares.    PTSD  Hx of schizoaffective disorder  Hx of bipolar disorder type I  - PTA regimen: clonazepam 0.5 mg daily, olanzapine 5 mg q6h prn, Invega 234 IM q28 days  - Last Invega dose: 5/6  - Clonazepam and olanzapine ordered prn this admission   -It is extremely difficult to assess patient's capacity to make medical decisions for himself.  His capacity to make decisions seems to rest primarily on his psychiatric disease.  No clear cognitive dysfunction.  There appears to be no metabolic encephalopathy.  Ammonia level is normal.  Patient has been alert.  Is also able to provide some detail regarding his cancer history.  -Agree with palliative care assessment: His decision making capacity is so complicated and convoluted by  his mental health issues and intermittent lack of interest/willingness to engage. We cannot safely say he does not have the ability to make medical decisions, but given the complexity, it is safe to say that he cannot make medical decisions independently. Thus, we all agree that moving forward with hospice is most appropriate   -Clonazepam dosing increased per psychiatry.    Cachexia  Severe malnutrition in context of acute on chronic illness  - Daily magic cup ordered per Nutrition           Diet: Room Service  Mechanical/Dental Soft Diet  Snacks/Supplements Adult: Magic Cup; Between Meals  Snacks/Supplements Adult: Ensure Max Protein (bariatric); Between Meals    DVT Prophylaxis: Pneumatic Compression Devices  Chew Catheter: Not present  Lines: None     Cardiac Monitoring: None  Code Status: No CPR- Do NOT Intubate          Clinically Significant Risk Factors              # Hypoalbuminemia: Lowest albumin = 2.5 g/dL at 5/12/2023 10:56 AM, will monitor as appropriate            # Severe Malnutrition: based on nutrition assessment               Disposition Plan     Expected Discharge Date: 05/15/2023, 12:00 PM  Discharge Delays: *Medically Ready for Discharge   Discharge Comments: Pt is from [facility], anticipate discharge with hospice on 5/22/2023 if all hospice arrangements made.        Kaylah Bonilla MD, MD  Text Page  (7am to 6pm)  Interval History   Patient was seen resting comfortably in bed.  He awoke easily.  Denied any specific concerns and states that he was doing okay.  Tells me to take care of him.  We discussed plans to return to the [facility] in the next day or 2 pending arrangements.  Patient was agreeable.      -Data reviewed today: I reviewed all new labs and imaging results over the last 24 hours. I personally reviewed labs from last 24 hours      Physical Exam   Temp: 97.4  F (36.3  C) Temp src: Oral BP: 103/64 Pulse: 88   Resp: 16 SpO2: 95 % O2 Device: None (Room air)    Vitals:     05/03/23 1157 05/04/23 0615 05/11/23 1128   Weight: 88.9 kg (195 lb 14.1 oz) 90.4 kg (199 lb 4.7 oz) 89.8 kg (198 lb)     Vital Signs with Ranges  Temp:  [97.4  F (36.3  C)] 97.4  F (36.3  C)  Pulse:  [88] 88  Resp:  [16] 16  BP: (103)/(64) 103/64  SpO2:  [95 %] 95 %  I/O last 3 completed shifts:  In: 120 [P.O.:120]  Out: 1000 [Urine:1000]    Constitutional: Chronically ill appearing, resting comfortably in bed, answering simple questions  Respiratory: Nonlabored breathing  Rest of exam deferred due to comfort cares        Medications       artificial saliva  10 mL Swish & Spit 4x Daily     furosemide  20 mg Oral Daily     paliperidone  234 mg Intramuscular Q28 Days     pantoprazole  40 mg Oral BID AC     sodium chloride (PF)  3 mL Intracatheter Q8H     spironolactone  50 mg Oral Daily       Data   Recent Labs   Lab 05/19/23  0732 05/18/23  1528 05/17/23  0821 05/16/23  1041   WBC 6.6  --  9.5  --    HGB 8.2*  --  7.6*  --    MCV 89  --  91  --      --  221  --    *  --  133* 133*   POTASSIUM 3.6  --  4.0 3.8   CHLORIDE 102  --  103 103   CO2 22  --  20* 19*   BUN 16.9  --  16.9 16.2   CR 0.64*  --  0.68 0.64*   ANIONGAP 10  --  10 11   GEOVANNA 7.9*  --  7.9* 8.1*   *  --  101* 155*   ALBUMIN 2.6* 2.5* 2.3* 2.4*  2.4*   PROTTOTAL 5.9*  --  5.5* 5.8*   BILITOTAL 3.7*  --  4.2* 5.1*   ALKPHOS 737*  --  687* 787*   ALT 50  --  57* 68*   AST 45  --  41 53*       Imaging:   No results found for this or any previous visit (from the past 24 hour(s)).

## 2023-05-21 NOTE — PLAN OF CARE
Orientation/Cognitive: A/Ox3-4, disoriented to situation at times. Flat affect  Mobility Level/Assist Equipment: A1 GBW. T/R in bed.  Pain Management: Denies pain   Tele/VS/O2: Comfort Cares, no vitals  Lab/BG: No labs  Diet: Mechanical soft  Bowel/Bladder: Inc of urine. Had BM.   Skin Concerns: +2 edema BLE;  3+ scrotal edema.   Drains/Devices: R PIV SL   Other: PRN Tessalon available per pt request  Tests/Procedures for next shift: None  Anticipated DC date & active delays: Plan to discharge to [facility]. TBD.

## 2023-05-21 NOTE — PLAN OF CARE
DATE & TIME: 5/21/23 1668-5584  Orientation/Cognitive: A/Ox3-4, disoriented to situation at times. Flat affect  Mobility Level/Assist Equipment: A1 GBW. T/R in bed  Pain Management: Denies pain   Tele/VS/O2: Comfort Cares, no vitals  Lab/BG: No labs  Diet: Mechanical soft  Bowel/Bladder: Inc of B&B. No BM this shift, using male external catheter this shift.  Skin Concerns: +2 edema BLE;  3+ scrotal edema.   Drains/Devices: R PIV SL   Other: PRN Tessalon available per pt request  Tests/Procedures for next shift: None  Anticipated DC date & active delays: Plan to discharge to [facility] on 5/22 around 1400 via family transportation. LAURIE following

## 2023-05-21 NOTE — PLAN OF CARE
"DATE 7 TIME: 5/20/23 3760-2102  Orientation/Cognitive: A/Ox3-4, D/O to situation at times. Flat affect  Mobility Level/Assist Equipment: A1 GBW. T/R in bed.    Pain Management: Denies pain   Tele/VS/O2: Comfort Cares, no vitals  Lab/BG: No labs  Diet: Mechanical soft, good appetite  Bowel/Bladder: Inc of B&B. No BM this shift, using male external catheter  Skin Concerns: +2 edema BLE;  3+ scrotal edema.   Drains/Devices: R PIV SL   Other: PRN Tessalon available per pt request  Tests/Procedures for next shift: SW following, per note:  \"Patient only wants nursing services at this time. Please avoid overemphasizing the words 'comfort cares' and 'hospice.'\"  Anticipated DC date & active delays: Plan to discharge to [facility] on 5/22 around 1400 via family transportation.     "

## 2023-05-22 NOTE — PLAN OF CARE
Goal Outcome Evaluation: reviewed with patient  DATE & TIME: 5/22/2023 days     Cognitive Concerns/ Orientation :  alert and oriented x 3 disoriented to situation  flat affect   BEHAVIOR & AGGRESSION TOOL COLOR:  green   CIWA SCORE:  na    ABNL VS/O2:  no vitals comfort care   MOBILITY:  pt declined to get out of bed. Turned as patient would allow with assistance of 2  PAIN MANAGMENT:  denies   DIET:  Mech soft ate fair   BOWEL/BLADDER:  external cath in place until patient asked for it to be removed, states he wants to use the urinal.   ABNL LAB/BG:    DRAIN/DEVICES:  SL x 1  TELEMETRY RHYTHM:  na   SKIN:  dry skin scattered bruises   TESTS/PROCEDURES:  none   D/C DATE:  possibly tomorrow.   Discharge Barriers:  pt needs to be assessed by care facility to know if he can return to his room or go to an enhanced room.   OTHER IMPORTANT INFO:  Pt declined to allow RN to do an assessment this morning. Pt flat affect, irritable with staff. Don't use the phrase comfort care or hospice with patient, this is upsetting to him. No vitals done due to comfort care. Pt has been turned as he will allow staff to turn him. At times he will refuse cares and turns.

## 2023-05-22 NOTE — PROGRESS NOTES
Care Management Follow Up    Length of Stay (days): 19    Expected Discharge Date: 05/22/2023     Concerns to be Addressed:       Patient plan of care discussed at interdisciplinary rounds: Yes    Anticipated Discharge Disposition:  (return to [facility] with [facility])     Anticipated Discharge Services:    Anticipated Discharge DME:      Patient/family educated on Medicare website which has current facility and service quality ratings: no  Education Provided on the Discharge Plan:    Patient/Family in Agreement with the Plan:  (adult children in agreement, unsure if patient understands)    Referrals Placed by CM/SW: Internal Clinic Care Coordination  Private pay costs discussed:     Additional Information:  Writer placed another call to  Kev OLIVA at [facility] today.  ([phone])  She acknowledges she received the message left for her on Friday afternoon and forwarded the message to their DONZoila.  She explains Zoila is currently in a meeting.  Writer requested a call back as soon as possible as patient is stable for discharge.  Writer spoke with [facility] liaison at [phone] and explained it is not realistic for patient to leave today because Zoila will need to assess if patient can return to his existing apartment or if they want to move him into an   Enhanced Care room.  Explained to Misha that patient sleeps in a regular bed and Zoila may want a hospital bed or other DME.  Misha asks for a list of requested DME after writer speaks with Zoila.    Update at 6627.  Writer spoke with PHILOMENA Cummings, at [facility]. She will be out tomorrow morning at 0900 to assess patient and determine which unit they will accept patient back into - his apartment or the Enhanced Care Area.  Clinical information faxed to Zoila at [phone].  Updated Misha from [facility] and son.      Meg Steve, [facility] Hospital[facility]

## 2023-05-22 NOTE — PLAN OF CARE
Date & Time: 5/21/23 0830-7252  Orientation/Cognitive: A/Ox3-4, disoriented to situation at times. Flat affect  Mobility Level/Assist Equipment: A1 GBW. T/R in bed.  Pain Management: Denies pain   Tele/VS/O2: Comfort Cares, no vitals  Lab/BG: No labs  Diet: Mechanical soft  Bowel/Bladder: Inc of urine. Had BM.   Skin Concerns: +2 edema BLE;  3+ scrotal edema.   Drains/Devices: R PIV SL   Other: PRN Tessalon available per pt request  Tests/Procedures for next shift: None  Anticipated DC date & active delays: Plan to discharge to [facility]. TBD. Daughter visited and updated

## 2023-05-22 NOTE — PLAN OF CARE
Date & Time: 5/22/23 3355-3649  Orientation/Cognitive: A/Ox3-4, disoriented to situation at times. Flat affect  Mobility Level/Assist Equipment: A1 GBW. T/R in bed.  Pain Management: Denies pain   Tele/VS/O2: Comfort Cares, no vitals  Lab/BG: No labs  Diet: Mechanical soft  Bowel/Bladder: Inc of urine. External catheter in place, no BM this shift.    Skin Concerns: +2 edema BLE;  3+ scrotal edema.   Drains/Devices: R PIV SL   Other:   Tests/Procedures for next shift: None  Anticipated DC date & active delays: Plan to discharge to [facility] today

## 2023-05-22 NOTE — PROGRESS NOTES
[facility]  Hospitalist Progress Note    Admit Date:  5/3/2023  Date of Service (when I saw the patient): 05/22/2023   Provider:  Shahnaz Saucedo, DO    Assessment & Plan      Los DAVIDA Huang is a 67 year old male with hx of alcoholic cirrhosis with known esophageal varices, metastatic colon cancer s/p partial colectomy and right hepatectomy previously on hospice, PTSD/schioaffective/bipolar disorder who was admitted on 5/3/2023 for hemorrhagic shock due to acute GI bleed. Hospital course complicated by sepsis of unclear source.       Pt has been transitioned to comfort cares and will be discharging back to assisted living facility with hospice once successfully coordinated with SW.     Problem List:    1. Goals of care  Metastatic Colon Cancer   Suspected renal cancer  - Diagnosed with colorectal cancer in 2014 with oligometastatic disease to liver at the time of diagnosis. He was treated with ascending colectomy 6/14 and R hepatectomy in 8/14. He declined chemotherapy and enrolled in [facility], but eventually unenrolled from hospice due to overall stability and hospice visits felt intrusive  - CTA abd/pelvis this admission showed disease progression of colon cancer with lung mets, new tumor thrombus in the left portal vein, new/enlarging liver mets, new ascites, and stable bilateral renal masses concerning for solid renal neoplasms  -Extensive goals of care conversations had between hospitalist, palliative care team, oncology, patient and family on multiple occasions.  Patient's behavioral health issues, personality issues, inconsistent participation/engagement in these types of conversations and care plan made it quite challenging to decide on next steps.  Ultimately after care conference on 5/19 with entire care team, family and patient, it was decided that patient will be placed on comfort cares.  Patient does not react well to the terms comfort cares/hospice being  used.    Plan is medically ready for discharge to [facility] on hospice cares when arrangements, including hospice arrangements, can be made.      Rest of hospital course as below     Hemorrhagic shock due to acute GI bleed, Improved  Lactic acidosis, Resolved  Colitis likely due to colon cancer  Alcoholic cirrhosis with esophageal varices, ascites, and hepatic encephalopathy  Mild scrotal edema-improving  - Presented with confusion, falls, and 1 week history of intermittent rectal bleeding which patient felt was related to hemorrhoids. Hypotensive to 70s on arrival with initial lactate >8; improved with IV fluids. Initial Hgb 5.7. Ammonia level normal.  - In the ED, he was initiated on an octreotide infusion, IV PPI BID, and IV ceftriaxone for SBP ppx. Also transfused total 3 units pRBCs upon admission  - [facility] GI was consulted on admission. Underwent EGD which showed variceal banding scar that appeared healthy, as well as grade I and II esophageal varices. Colonoscopy deferred due to evidence of colitis on imaging.   - CTA abd/pelvis upon admission showed marked wall thickening and edema of the entire bowel with concern for shock bowel/ischemic bowel and disease progression of colon cancer; however patient has been relatively asymptomatic with improved lactate and WBC  - Octreotide d/c'd on 5/4.   - 5/8 He had an episode of maroon-colored bloody stool this morning and was hemodynamically stable and repeat hemoglobin was stable at 8 and I did discuss with the GI team again today and source of his blood loss today is due to metastatic disease and further evaluation with colonoscopy is not possible with disease progression as tumors/neoplastic tissue is friable and treatment with APC would not be helpful and treatment is blood products and repeat CT abdomen done on 5/9/2023 did not show any evidence of bleeding  -continued with PPI, soft diet  - s/p paracentesis again on 5/11/2023 and 3.4 L of fluid was removed and the  fluid analysis does not show any evidence of SBP  Patient did complete 7 total days of IV antibiotics (Rocephin and zosyn) while in the hospital which should be adequate for SBP prophylaxis given his GI bleed and cirrhosis/ascites  -Did give him a small dose of Lasix 40 mg 5/14 and his edema seems to be improving  -5/18,significant abdominal distention distention without  pain.  Completed paracentesis.  2.3 L removed.  Ascitic fluid not consistent with SBP.  Negative Gram stain.  Culture no growth today.  Patient feels more comfortable  -Discussed management of ascites with [facility] gastroenterology.  They recommend Lasix 40 mg daily and Aldactone 100 mg daily.  However patient is now transitioning to comfort cares and will hold off on diuretic therapy at this time.  -5/19 patient had a episode of small amount of bright red blood per rectum this morning.  Perirectal area appeared normal except for a small small spot of blood on his underwear.  External hemorrhoids noted but no signs of inflammation.  Discussed with gastroenterology.  Plan is to just monitor clinically.  Patient was initiated on Lasix 20 mg daily and Aldactone 50 mg daily, reasonable at this time as this may reduce his ascites and scrotal edema allowing for improved comfort.  This can be continued depending on patient's clinical status going forward.     Sepsis due to suspected aspiration pneumonia, Resolved  Positive blood culture, suspect contaminant  - Spiked fever to 100.8 overnight 5/4-5/5 along with mild tachycardia. WBC 18.1k. Lactate 2.2. Reported increased shortness of breath. CXR showed known lung mets, bilateral infiltrates due to pulmonary edema vs pneumonia. He had been on ceftriaxone for SBP prophylaxis; antibiotics were broadened to IV pip-tazo with improvement. COVID-19 PCR negative. Unfortunately, blood cultures were not drawn.   - Underwent paracentesis (5/5) with removal of 2L fluid. 120 PMNs, no evidence of SBP  - 1 out of 2 blood  cultures on admission (5/3) grew Staph epi. Suspect contaminant  - wbc count has been down trending and he has been afebrile  -completed antibiotic course         Acute respiratory distress due to aspiration pneumonia vs pulmonary edema, Resolved  Leukocytosis-Improved  - Reported increased shortness of breath overnight 5/4-5/5.   -CXR showed pulmonary edema vs pneumonia. spO2 mid 90s on room air, but was placed on supplemental O2 for comfort.Treated with IV pip-tazo with improvement.   - Repeat CXR (5/6) improved  -He has been having persistent white count during this hospital stay but he has been afebrile  - wbc count is improving and repeat ascitic fluid analysis on 5/11 does not support any evidence of SBP        Thrombocytopenia, -Resolved  - Likely due to cirrhosis and ongoing alcohol use. Platelets wnl on admission though sample was likely hemoconcentrated.   -Platelet count improved.      Alcohol use disorder  - Longstanding history of alcohol dependence. Currently consuming 1 oz of gin five times daily. Last drink: 5/3. No s/sx of withdrawal noted this admission  - On thiamine/folate/MVI while hospitalized.  Discontinued with comfort cares.  -Patient's children state he has been drinking alcohol heavily in the last year and has not been interested in any treatment program .     Hypokalemia-resolved  -Potassium has been intermittently low, replaced as indicated.  Discontinued with comfort cares.    PTSD  Hx of schizoaffective disorder  Hx of bipolar disorder type I  - PTA regimen: clonazepam 0.5 mg daily, olanzapine 5 mg q6h prn, Invega 234 IM q28 days  - Last Invega dose: 5/6  - Clonazepam and olanzapine ordered prn this admission   -It is extremely difficult to assess patient's capacity to make medical decisions for himself.  His capacity to make decisions seems to rest primarily on his psychiatric disease.  No clear cognitive dysfunction.  There appears to be no metabolic encephalopathy.  Ammonia level is  "normal.  Patient has been alert.  Is also able to provide some detail regarding his cancer history.  -Agree with palliative care assessment: His decision making capacity is so complicated and convoluted by his mental health issues and intermittent lack of interest/willingness to engage. We cannot safely say he does not have the ability to make medical decisions, but given the complexity, it is safe to say that he cannot make medical decisions independently. Thus, we all agree that moving forward with hospice is most appropriate   -Clonazepam dosing increased per psychiatry.     Cachexia  Severe malnutrition in context of acute on chronic illness  - Daily magic cup ordered per Nutrition      Medical Decision Making     46 MINUTES SPENT BY ME on the date of service doing chart review, history, exam, documentation & further activities per the note.        Labs/Imaging Reviewed:  See Information above and Data section below    Diet: Room Service  Snacks/Supplements Adult: Magic Cup; Between Meals  Snacks/Supplements Adult: Ensure Max Protein (bariatric); Between Meals  Mechanical/Dental Soft Diet    DVT Prophylaxis: comfort care  Chew Catheter: Not present  Code Status: No CPR- Do NOT Intubate      Disposition Plan   Home (assisted living) with hospice     Entered: Shahnaz Saucedo DO 05/22/2023, 7:16 AM       The patient's care was discussed with the Bedside Nurse and Patient.      Interval History     Currently is calm and cooperative.  Does not want anything for pain.  Feels that \"yes\" his abdomen may be slightly more distended today and is slightly sore.  Has been getting up with 1 assist, per nursing.      RN noted he has had some mild, intermittent irritation, frustration with staff.    -Data reviewed today: I reviewed all new labs and imaging results over the last 24 hours. I personally reviewed no images or EKG's today.    Physical Exam                      Vitals:    05/03/23 1157 05/04/23 0615 " 05/11/23 1128   Weight: 88.9 kg (195 lb 14.1 oz) 90.4 kg (199 lb 4.7 oz) 89.8 kg (198 lb)     Vital Signs with Ranges     I/O last 3 completed shifts:  In: 120 [P.O.:120]  Out: 900 [Urine:900]    GEN:  Alert, awake, appears to be in no acute respiratory distress  HEENT:  Normocephalic/atraumatic, no scleral icterus, no nasal discharge, mouth and membranes appear fairly moist  CV:  Regular rate and rhythm, no clear murmur to ausc.  S1 + S2 noted, no S3 or S4.  LUNGS:  Clear to auscultation ant/lat bilaterally.  No rales/rhonchi/wheezing auscultated bilaterally.  No costal retractions bilaterally.  Symmetric chest rise on inhalation noted.  ABD:  Active bowel sounds, soft, no specific tenderness to the light palpation throughout, moderate distension to the abdomen.  No clear rebound/guarding/rigidity.  No masses palpated.  No obvious HSM to exam.  EXT:  trace pretibial edema or cyanosis bilaterally. No joint synovitis noted.  No calf-tenderness or asymmetry noted.  SKIN:  Dry to touch, no rashes or jaundice noted.  PSYCH:  Mood flattened; not tearful.  Maintains direct eye contact.      Data   Labs:  No labs today    Recent Imaging:   No results found for this or any previous visit (from the past 24 hour(s)).    Medications       artificial saliva  10 mL Swish & Spit 4x Daily     furosemide  20 mg Oral Daily     paliperidone  234 mg Intramuscular Q28 Days     pantoprazole  40 mg Oral BID AC     sodium chloride (PF)  3 mL Intracatheter Q8H     spironolactone  50 mg Oral Daily

## 2023-05-22 NOTE — PROGRESS NOTES
Pt ready for discharge.  Awaiting SW assistance for coordination of discharge back the the [facility] with [facility].

## 2023-05-22 NOTE — PLAN OF CARE
Physical Therapy Discharge Summary    Reason for therapy discharge:    No further expectations of functional progress.  Change in medical status.  Transition to comfort cares.    Progress towards therapy goal(s). See goals on Care Plan in [facility] electronic health record for goal details.  Goals not met.  Barriers to achieving goals:   limited tolerance for therapy and transition to comfort cares.    Therapy recommendation(s):    No further therapy is recommended.

## 2023-05-23 NOTE — PROGRESS NOTES
[facility]  Hospitalist Progress Note    Admit Date:  5/3/2023  Date of Service (when I saw the patient): 05/23/2023   Provider:  Shahnaz Saucedo, DO    Assessment & Plan      Los DAVIDA Huang is a 67 year old male with hx of alcoholic cirrhosis with known esophageal varices, metastatic colon cancer s/p partial colectomy and right hepatectomy previously on hospice, PTSD/schioaffective/bipolar disorder who was admitted on 5/3/2023 for hemorrhagic shock due to acute GI bleed. Hospital course complicated by sepsis of unclear source.       Pt has been transitioned to comfort cares and will be discharging back to assisted living facility with hospice once successfully coordinated with SW.     Problem List:    1. Goals of care  Metastatic Colon Cancer   Suspected renal cancer  - Diagnosed with colorectal cancer in 2014 with oligometastatic disease to liver at the time of diagnosis. He was treated with ascending colectomy 6/14 and R hepatectomy in 8/14. He declined chemotherapy and enrolled in [facility], but eventually unenrolled from hospice due to overall stability and hospice visits felt intrusive  - CTA abd/pelvis this admission showed disease progression of colon cancer with lung mets, new tumor thrombus in the left portal vein, new/enlarging liver mets, new ascites, and stable bilateral renal masses concerning for solid renal neoplasms  -Extensive goals of care conversations had between hospitalist, palliative care team, oncology, patient and family on multiple occasions.  Patient's behavioral health issues, personality issues, inconsistent participation/engagement in these types of conversations and care plan made it quite challenging to decide on next steps.  Ultimately after care conference on 5/19 with entire care team, family and patient, it was decided that patient will be placed on comfort cares.  Patient does not react well to the terms comfort cares/hospice being  used.    Plan is medically ready for discharge to [facility] on hospice cares when arrangements, including hospice arrangements, can be made.      Rest of hospital course as below     Hemorrhagic shock due to acute GI bleed, Improved  Lactic acidosis, Resolved  Colitis likely due to colon cancer  Alcoholic cirrhosis with esophageal varices, ascites, and hepatic encephalopathy  Mild scrotal edema-improving  - Presented with confusion, falls, and 1 week history of intermittent rectal bleeding which patient felt was related to hemorrhoids. Hypotensive to 70s on arrival with initial lactate >8; improved with IV fluids. Initial Hgb 5.7. Ammonia level normal.  - In the ED, he was initiated on an octreotide infusion, IV PPI BID, and IV ceftriaxone for SBP ppx. Also transfused total 3 units pRBCs upon admission  - [facility] GI was consulted on admission. Underwent EGD which showed variceal banding scar that appeared healthy, as well as grade I and II esophageal varices. Colonoscopy deferred due to evidence of colitis on imaging.   - CTA abd/pelvis upon admission showed marked wall thickening and edema of the entire bowel with concern for shock bowel/ischemic bowel and disease progression of colon cancer; however patient has been relatively asymptomatic with improved lactate and WBC  - Octreotide d/c'd on 5/4.   - 5/8 He had an episode of maroon-colored bloody stool this morning and was hemodynamically stable and repeat hemoglobin was stable at 8 and I did discuss with the GI team again today and source of his blood loss today is due to metastatic disease and further evaluation with colonoscopy is not possible with disease progression as tumors/neoplastic tissue is friable and treatment with APC would not be helpful and treatment is blood products and repeat CT abdomen done on 5/9/2023 did not show any evidence of bleeding  -continued with PPI, soft diet  - s/p paracentesis again on 5/11/2023 and 3.4 L of fluid was removed and the  fluid analysis does not show any evidence of SBP  Patient did complete 7 total days of IV antibiotics (Rocephin and zosyn) while in the hospital which should be adequate for SBP prophylaxis given his GI bleed and cirrhosis/ascites  -Did give him a small dose of Lasix 40 mg 5/14 and his edema seems to be improving  -5/18,significant abdominal distention distention without  pain.  Completed paracentesis.  2.3 L removed.  Ascitic fluid not consistent with SBP.  Negative Gram stain.  Culture no growth today.  Patient feels more comfortable  -Discussed management of ascites with [facility] gastroenterology.  They recommend Lasix 40 mg daily and Aldactone 100 mg daily.  However patient is now transitioning to comfort cares and will hold off on diuretic therapy at this time.  -5/19 patient had a episode of small amount of bright red blood per rectum this morning.  Perirectal area appeared normal except for a small small spot of blood on his underwear.  External hemorrhoids noted but no signs of inflammation.  Discussed with gastroenterology.  Plan is to just monitor clinically.  Patient was initiated on Lasix 20 mg daily and Aldactone 50 mg daily, reasonable at this time as this may reduce his ascites and scrotal edema allowing for improved comfort.  This can be continued depending on patient's clinical status going forward.     Sepsis due to suspected aspiration pneumonia, Resolved  Positive blood culture, suspect contaminant  - Spiked fever to 100.8 overnight 5/4-5/5 along with mild tachycardia. WBC 18.1k. Lactate 2.2. Reported increased shortness of breath. CXR showed known lung mets, bilateral infiltrates due to pulmonary edema vs pneumonia. He had been on ceftriaxone for SBP prophylaxis; antibiotics were broadened to IV pip-tazo with improvement. COVID-19 PCR negative. Unfortunately, blood cultures were not drawn.   - Underwent paracentesis (5/5) with removal of 2L fluid. 120 PMNs, no evidence of SBP  - 1 out of 2 blood  cultures on admission (5/3) grew Staph epi. Suspect contaminant  - wbc count has been down trending and he has been afebrile  -completed antibiotic course         Acute respiratory distress due to aspiration pneumonia vs pulmonary edema, Resolved  Leukocytosis-Improved  - Reported increased shortness of breath overnight 5/4-5/5.   -CXR showed pulmonary edema vs pneumonia. spO2 mid 90s on room air, but was placed on supplemental O2 for comfort.Treated with IV pip-tazo with improvement.   - Repeat CXR (5/6) improved  -He has been having persistent white count during this hospital stay but he has been afebrile  - wbc count is improving and repeat ascitic fluid analysis on 5/11 does not support any evidence of SBP        Thrombocytopenia, -Resolved  - Likely due to cirrhosis and ongoing alcohol use. Platelets wnl on admission though sample was likely hemoconcentrated.   -Platelet count improved.      Alcohol use disorder  - Longstanding history of alcohol dependence. Currently consuming 1 oz of gin five times daily. Last drink: 5/3. No s/sx of withdrawal noted this admission  - On thiamine/folate/MVI while hospitalized.  Discontinued with comfort cares.  -Patient's children state he has been drinking alcohol heavily in the last year and has not been interested in any treatment program .     Hypokalemia-resolved  -Potassium has been intermittently low, replaced as indicated.  Discontinued with comfort cares.    PTSD  Hx of schizoaffective disorder  Hx of bipolar disorder type I  - PTA regimen: clonazepam 0.5 mg daily, olanzapine 5 mg q6h prn, Invega 234 IM q28 days  - Last Invega dose: 5/6  - Clonazepam and olanzapine ordered prn this admission   -It is extremely difficult to assess patient's capacity to make medical decisions for himself.  His capacity to make decisions seems to rest primarily on his psychiatric disease.  No clear cognitive dysfunction.  There appears to be no metabolic encephalopathy.  Ammonia level is  normal.  Patient has been alert.  Is also able to provide some detail regarding his cancer history.  -Agree with palliative care assessment: His decision making capacity is so complicated and convoluted by his mental health issues and intermittent lack of interest/willingness to engage. We cannot safely say he does not have the ability to make medical decisions, but given the complexity, it is safe to say that he cannot make medical decisions independently. Thus, we all agree that moving forward with hospice is most appropriate   -Clonazepam dosing increased per psychiatry.     Cachexia  Severe malnutrition in context of acute on chronic illness  - Daily magic cup ordered per Nutrition      Medical Decision Making     46 MINUTES SPENT BY ME on the date of service doing chart review, history, exam, documentation & further activities per the note.        Labs/Imaging Reviewed:  See Information above and Data section below    Diet: Room Service  Snacks/Supplements Adult: Magic Cup; Between Meals  Snacks/Supplements Adult: Ensure Max Protein (bariatric); Between Meals  Mechanical/Dental Soft Diet    DVT Prophylaxis: comfort care  Chew Catheter: Not present  Code Status: No CPR- Do NOT Intubate      Disposition Plan   Home (assisted living) with hospice     Entered: Indra Rothman DO 05/23/2023, 9:27 AM       The patient's care was discussed with the Bedside Nurse and Patient.      Interval History     Currently is not interested in conversation and does not feel he is doing well. He declined physical examination    -Data reviewed today: I reviewed all new labs and imaging results over the last 24 hours. I personally reviewed no images or EKG's today.    Physical Exam                      Vitals:    05/03/23 1157 05/04/23 0615 05/11/23 1128   Weight: 88.9 kg (195 lb 14.1 oz) 90.4 kg (199 lb 4.7 oz) 89.8 kg (198 lb)     Vital Signs with Ranges     I/O last 3 completed shifts:  In: 400 [P.O.:400]  Out: 950  [Urine:950]    PE declined by patient      Data   Labs:  No labs today    Recent Imaging:   No results found for this or any previous visit (from the past 24 hour(s)).    Medications       artificial saliva  10 mL Swish & Spit 4x Daily     furosemide  20 mg Oral Daily     paliperidone  234 mg Intramuscular Q28 Days     pantoprazole  40 mg Oral BID AC     sodium chloride (PF)  3 mL Intracatheter Q8H     spironolactone  50 mg Oral Daily

## 2023-05-23 NOTE — PLAN OF CARE
"Date & Time: 5/22/23 [id number]  Orientation/Cognitive: A/Ox3-4, disoriented to situation at times. Flat affect  Mobility Level/Assist Equipment: A1 GBW. T/R in bed, declined to get OOB  Pain Management: Denies pain   Tele/VS/O2: Comfort Cares, no vitals  Lab/BG: No labs  Diet: Mechanical soft  Bowel/Bladder: incontinent of urine at times, urinal at bedside, Bmx1 this shift.    Skin Concerns: +2 edema BLE;  3+ scrotal edema.   Drains/Devices: R PIV SL   Other: daughter visited and updated, given PRN tessalon jose x1  Tests/Procedures for next shift: None  Anticipated DC date & active delays: Plan to discharge to [facility] possible tomorrow 5/23, at 0900 Zoila from [facility] will assess pt- to determine if returning back to apt or to \"En[facility] Care Area\"    "

## 2023-05-23 NOTE — PLAN OF CARE
"Goal Outcome Evaluation:       Summary: comfort cares (hx. ETOH, cirrhosis, colon cancer)  Date & Time: 5/22/23 -5/23/23 [id number]  Orientation/Cognitive: A/Ox3, disoriented to situation at times. Flat affect.  Mobility Level/Assist Equipment: A1 GBW. T/R in bed as needed, declined to get OOB.  Pain Management: Denies pain   Tele/VS/O2: Comfort Cares, no vitals  Lab/BG: No labs  Diet: Mechanical soft  Bowel/Bladder: incontinent of urine at times, urinal at bedside, Bmx1 this shift.    Skin Concerns: +2 edema BLE;  3+ scrotal edema.   Drains/Devices: R PIV SL   Other: daughter visited yesterday and updated.  Tests/Procedures for next shift: None  Anticipated DC date & active delays: Plan to discharge to [facility] possible tomorrow 5/23, at 0900 Zoila from [facility] will assess pt- to determine if returning back to apt or to \"Enhanced Care Area\".                 "

## 2023-05-23 NOTE — PLAN OF CARE
"Goal Outcome Evaluation:  Orientation/Cognitive: A&Ox2-3, disoriented to situation at times. Flat affect. Appears slightly paranoid, asked writer \"do you see what I hear?\"  Mobility Level/Assist Equipment: not OOB. Declined to turn with staff, encouraged pt to repo frequently in bed   Pain Management: Denies pain   Tele/VS/O2: Comfort Cares, no vitals  Lab/BG: No labs  Diet: Mechanical soft  Bowel/Bladder: incontinent of urine at times, urinal at bedside, one soft brown w/specks of blood BM  Skin Concerns: +1 to +2 edema BLE;  3+ scrotal edema.   Drains/Devices: R PIV SL   Other: none  Tests/Procedures for next shift: None  Anticipated DC date & active delays: refused morning meds.  Zoila from [facility] assessed pt- determining get into \"Enhanced Care Area\".                        "

## 2023-05-23 NOTE — PROGRESS NOTES
Care Management Follow Up    Length of Stay (days): 20    Expected Discharge Date: 05/24/2023     Concerns to be Addressed:       Patient plan of care discussed at interdisciplinary rounds: Yes    Anticipated Discharge Disposition:  (return to [facility] with [facility])     Anticipated Discharge Services:    Anticipated Discharge DME:      Patient/family educated on Medicare website which has current facility and service quality ratings: no  Education Provided on the Discharge Plan:    Patient/Family in Agreement with the Plan:  (adult children in agreement, unsure if patient understands)    Referrals Placed by CM/SW: Internal Clinic Care Coordination  Private pay costs discussed:     Additional Information:  PHILOMENA Guerrero from [facility] completed an on site assessment.  Patient told Yolanda he is not ready to go and told her to go.  Yolanda completed her assessment  with bedside RN and has concluded in order for [facility] to accept patient back he will need to be in their Ehanced Care Unit and doesn't know if they will have a vacancy.  She plans to return to speak with her care team and will let writer know if they can admit patient.  She will speak with his daughter Caro who is the primary HCA.  Yolanda is aware if she cannot accept patient back writer will need to find alternative placement.  Misha at [facility] and Dr Rothman updated.    Update: Early afternoon, Yolanda called back stating their Enhanced Care is at capacity.  She will call daughter and offer to have patient return to his apartment if they are willing or able to pay for 24/7 private duty hired thru an outside agency.  Misha from [facility] updated.  1600:  Called Yolanda and left a message asking if she has reached daughter Yuliet who is patient's primary HCA.        DANAE AlbertSW

## 2023-05-24 NOTE — PROGRESS NOTES
Care Management Follow Up    Length of Stay (days): 21    Expected Discharge Date: 05/25/2023     Concerns to be Addressed:       Patient plan of care discussed at interdisciplinary rounds: Yes    Anticipated Discharge Disposition:  (return to [facility] with [facility])     Anticipated Discharge Services:    Anticipated Discharge DME:      Patient/family educated on Medicare website which has current facility and service quality ratings: no  Education Provided on the Discharge Plan:    Patient/Family in Agreement with the Plan:  (adult children in agreement, unsure if patient understands)    Referrals Placed by CM/SW: Internal Clinic Care Coordination  Private pay costs discussed:     Additional Information:  Another call has been placed to the DON at [facility] ([phone]).  Received her voice mail and left a message requesting an update ASAP as to whether she has spoken with daughter Yuliet.  Writer will call Yuliet directly to determine the plan.      DANAE AlbertSW

## 2023-05-24 NOTE — PROGRESS NOTES
Entered chart for scheduled reassessment. Noted transition in care to comfort cares. RD will sign off. Please re consult should nutrition needs arise.     Radha Barlow RD, LD  [facility] [place], [address], Ortho  Pager: [phone]  Weekend Pager: [phone]

## 2023-05-24 NOTE — PLAN OF CARE
"Goal Outcome Evaluation:                      Summary: comfort cares (hx. ETOH, cirrhosis, colon cancer)  Date & Time: 5/23/23, pm shift  Orientation/Cognitive: A&Ox3, disoriented to situation at times. Flat affect.  Mobility Level/Assist Equipment: not OOB. Declined to turn with staff but allows weight shifting. Encouraged pt to repo frequently in bed   Pain Management: Denies pain   Tele/VS/O2: Comfort Cares.  Lab/BG: No labs  Diet: Mechanical soft, good appetite.  Bowel/Bladder: incontinent of urine at times, external catheter in place. No bm.  Skin Concerns: Pale, +1 to +2 edema BLE;  3+ scrotal edema.   Drains/Devices: R PIV SL   Other: none  Tests/Procedures for next shift: None  Anticipated DC date & active delays: Zoila lou [facility] assessed pt- determining get into \"Enhanced Care Area\". SW following.  "

## 2023-05-24 NOTE — PROGRESS NOTES
[facility]    Medicine Progress Note - Hospitalist Service    Date of Admission:  5/3/2023    Assessment & Plan   Los Huang is a 67 year old male with hx of alcoholic cirrhosis with known esophageal varices, metastatic colon cancer s/p partial colectomy and right hepatectomy previously on hospice, PTSD/schioaffective/bipolar disorder who was admitted on 5/3/2023 for hemorrhagic shock due to acute GI bleed. Hospital course complicated by sepsis of unclear source.       Pt has been transitioned to comfort cares and will be discharging back to assisted living facility with hospice once successfully coordinated with SW.     Problem List:    1. Goals of care  Metastatic Colon Cancer   Suspected renal cancer  - Diagnosed with colorectal cancer in 2014 with oligometastatic disease to liver at the time of diagnosis. He was treated with ascending colectomy 6/14 and R hepatectomy in 8/14. He declined chemotherapy and enrolled in [facility], but eventually unenrolled from hospice due to overall stability and hospice visits felt intrusive  - CTA abd/pelvis this admission showed disease progression of colon cancer with lung mets, new tumor thrombus in the left portal vein, new/enlarging liver mets, new ascites, and stable bilateral renal masses concerning for solid renal neoplasms  -Extensive goals of care conversations had between hospitalist, palliative care team, oncology, patient and family on multiple occasions.  Patient's behavioral health issues, personality issues, inconsistent participation/engagement in these types of conversations and care plan made it quite challenging to decide on next steps.  Ultimately after care conference on 5/19 with entire care team, family and patient, it was decided that patient will be placed on comfort cares.  Patient does not react well to the terms comfort cares/hospice being used.    Plan is medically ready for discharge to [facility] on hospice cares when  arrangements, including hospice arrangements, can be made.      Rest of hospital course as below     Hemorrhagic shock due to acute GI bleed, Improved  Lactic acidosis, Resolved  Colitis likely due to colon cancer  Alcoholic cirrhosis with esophageal varices, ascites, and hepatic encephalopathy  Mild scrotal edema-improving  - Presented with confusion, falls, and 1 week history of intermittent rectal bleeding which patient felt was related to hemorrhoids. Hypotensive to 70s on arrival with initial lactate >8; improved with IV fluids. Initial Hgb 5.7. Ammonia level normal.  - In the ED, he was initiated on an octreotide infusion, IV PPI BID, and IV ceftriaxone for SBP ppx. Also transfused total 3 units pRBCs upon admission  - [facility] GI was consulted on admission. Underwent EGD which showed variceal banding scar that appeared healthy, as well as grade I and II esophageal varices. Colonoscopy deferred due to evidence of colitis on imaging.   - CTA abd/pelvis upon admission showed marked wall thickening and edema of the entire bowel with concern for shock bowel/ischemic bowel and disease progression of colon cancer; however patient has been relatively asymptomatic with improved lactate and WBC  - Octreotide d/c'd on 5/4.   - 5/8 He had an episode of maroon-colored bloody stool this morning and was hemodynamically stable and repeat hemoglobin was stable at 8 and I did discuss with the GI team again today and source of his blood loss today is due to metastatic disease and further evaluation with colonoscopy is not possible with disease progression as tumors/neoplastic tissue is friable and treatment with APC would not be helpful and treatment is blood products and repeat CT abdomen done on 5/9/2023 did not show any evidence of bleeding  -continued with PPI, soft diet  - s/p paracentesis again on 5/11/2023 and 3.4 L of fluid was removed and the fluid analysis does not show any evidence of SBP  Patient did complete 7 total  days of IV antibiotics (Rocephin and zosyn) while in the hospital which should be adequate for SBP prophylaxis given his GI bleed and cirrhosis/ascites  -Did give him a small dose of Lasix 40 mg 5/14 and his edema seems to be improving  -5/18,significant abdominal distention distention without  pain.  Completed paracentesis.  2.3 L removed.  Ascitic fluid not consistent with SBP.  Negative Gram stain.  Culture no growth today.  Patient feels more comfortable  -Discussed management of ascites with [facility] gastroenterology.  They recommend Lasix 40 mg daily and Aldactone 100 mg daily.  However patient is now transitioning to comfort cares and will hold off on diuretic therapy at this time.  -5/19 patient had a episode of small amount of bright red blood per rectum this morning.  Perirectal area appeared normal except for a small small spot of blood on his underwear.  External hemorrhoids noted but no signs of inflammation.  Discussed with gastroenterology.  Plan is to just monitor clinically.  Patient was initiated on Lasix 20 mg daily and Aldactone 50 mg daily, reasonable at this time as this may reduce his ascites and scrotal edema allowing for improved comfort.  This can be continued depending on patient's clinical status going forward.     Sepsis due to suspected aspiration pneumonia, Resolved  Positive blood culture, suspect contaminant  - Spiked fever to 100.8 overnight 5/4-5/5 along with mild tachycardia. WBC 18.1k. Lactate 2.2. Reported increased shortness of breath. CXR showed known lung mets, bilateral infiltrates due to pulmonary edema vs pneumonia. He had been on ceftriaxone for SBP prophylaxis; antibiotics were broadened to IV pip-tazo with improvement. COVID-19 PCR negative. Unfortunately, blood cultures were not drawn.   - Underwent paracentesis (5/5) with removal of 2L fluid. 120 PMNs, no evidence of SBP  - 1 out of 2 blood cultures on admission (5/3) grew Staph epi. Suspect contaminant  - wbc count has  been down trending and he has been afebrile  -completed antibiotic course         Acute respiratory distress due to aspiration pneumonia vs pulmonary edema, Resolved  Leukocytosis-Improved  - Reported increased shortness of breath overnight 5/4-5/5.   -CXR showed pulmonary edema vs pneumonia. spO2 mid 90s on room air, but was placed on supplemental O2 for comfort.Treated with IV pip-tazo with improvement.   - Repeat CXR (5/6) improved  -He has been having persistent white count during this hospital stay but he has been afebrile  - wbc count is improving and repeat ascitic fluid analysis on 5/11 does not support any evidence of SBP        Thrombocytopenia, -Resolved  - Likely due to cirrhosis and ongoing alcohol use. Platelets wnl on admission though sample was likely hemoconcentrated.   -Platelet count improved.      Alcohol use disorder  - Longstanding history of alcohol dependence. Currently consuming 1 oz of gin five times daily. Last drink: 5/3. No s/sx of withdrawal noted this admission  - On thiamine/folate/MVI while hospitalized.  Discontinued with comfort cares.  -Patient's children state he has been drinking alcohol heavily in the last year and has not been interested in any treatment program .     Hypokalemia-resolved  -Potassium has been intermittently low, replaced as indicated.  Discontinued with comfort cares.    PTSD  Hx of schizoaffective disorder  Hx of bipolar disorder type I  - PTA regimen: clonazepam 0.5 mg daily, olanzapine 5 mg q6h prn, Invega 234 IM q28 days  - Last Invega dose: 5/6  - Clonazepam and olanzapine ordered prn this admission   -It is extremely difficult to assess patient's capacity to make medical decisions for himself.  His capacity to make decisions seems to rest primarily on his psychiatric disease.  No clear cognitive dysfunction.  There appears to be no metabolic encephalopathy.  Ammonia level is normal.  Patient has been alert.  Is also able to provide some detail regarding  his cancer history.  -Agree with palliative care assessment: His decision making capacity is so complicated and convoluted by his mental health issues and intermittent lack of interest/willingness to engage. We cannot safely say he does not have the ability to make medical decisions, but given the complexity, it is safe to say that he cannot make medical decisions independently. Thus, we all agree that moving forward with hospice is most appropriate   -Clonazepam dosing increased per psychiatry.     Cachexia  Severe malnutrition in context of acute on chronic illness  - Daily magic cup ordered per Nutrition           Diet: Room Service  Snacks/Supplements Adult: Magic Cup; Between Meals  Snacks/Supplements Adult: Ensure Max Protein (bariatric); Between Meals  Mechanical/Dental Soft Diet      Chew Catheter: Not present  Lines: None     Cardiac Monitoring: None  Code Status: No CPR- Do NOT Intubate      Clinically Significant Risk Factors              # Hypoalbuminemia: Lowest albumin = 2.3 g/dL at 5/17/2023  8:21 AM, will monitor as appropriate             # Severe Malnutrition: based on nutrition assessment         Disposition Plan     Expected Discharge Date: 05/24/2023, 12:00 PM  Discharge Delays: *Medically Ready for Discharge  Other (Add Comment)  Comfort Care/Hospice    Discharge Comments: Hospice. From [place], family to transport on Mondayn at 2 pm.          William Coto DO  Hospitalist Service  [facility]  Securely message with [employer] (more info)  Text page via [employer] P[employer]   ______________________________________________________________________    Interval History   Patient not interested in talking to this writer.     Physical Exam   Vital Signs: Temp: 97.9  F (36.6  C) Temp src: Oral BP: 105/66 Pulse: 90   Resp: 16 SpO2: 95 % O2 Device: None (Room air)    Weight: 198 lbs 0 oz    PE declined by patient    Medical Decision Making       5 MINUTES SPENT BY ME on  the date of service doing chart review, history, exam, documentation & further activities per the note.      Data         Imaging results reviewed over the past 24 hrs:   No results found for this or any previous visit (from the past 24 hour(s)).

## 2023-05-24 NOTE — PLAN OF CARE
"Goal Outcome Evaluation:  Orientation/Cognitive: A&Ox2, confused, paranoid, quiet, withdrawn, refused all meds. Flat affect. Hallucinating, staed \"someones going to eat you\"  Mobility Level/Assist Equipment: not OOB. Declines activity. Encourage pt to repo himself in bed.  Pain Management: Denies pain   Tele/VS/O2: Comfort Cares.  Lab/BG: No labs  Diet: Mechanical soft  Bowel/Bladder: incontinent of urine at times, external catheter in place. Large brown BM  Skin Concerns: Pale.  Drains/Devices: R PIV SL   Other: none  Tests/Procedures for next shift: None  Anticipated DC date & active delays: Zoila lou [facility] assessed pt 5/23- awaiting a call back from the facility to approve him. SW following.                        "

## 2023-05-24 NOTE — PROGRESS NOTES
Care Management Follow Up    Length of Stay (days): 21    Expected Discharge Date: 05/25/2023     Concerns to be Addressed:       Patient plan of care discussed at interdisciplinary rounds: Yes    Anticipated Discharge Disposition:  (return to [facility] with [facility])     Anticipated Discharge Services:    Anticipated Discharge DME:      Patient/family educated on Medicare website which has current facility and service quality ratings: no  Education Provided on the Discharge Plan:    Patient/Family in Agreement with the Plan:  (adult children in agreement, unsure if patient understands)    Referrals Placed by CM/SW: Internal Clinic Care Coordination  Private pay costs discussed:     Additional Information:  Writer is working with PHILOMENA Guerrero at [facility] and son Vince regarding discharge plan. Vince and his sister do not favor patient moving back into his [facility] apartment and also hiring 24/7 care due to the cost.   As of today a there is  now a vacancy in the Speciality Care Area of the [facility] which provides a higher level of care. Vince and his sister need to understand the cost structure and how long patient's private funds will last.   Vince needs to know if [facility] accepts EW for the time when patient's funds are depleted.  Vince prefers to avoid having his [facility] and then have to move again.  If the [facility] does not accept [facility]ly [facility] then he and his sister are considering patient moving into a SNF for LTC where MA funding is available.    Writer has left a message for Yolanda at [facility]. Son Vince will also call her.  Tomorrow writer will re-visit the discharge decision with Vince; either back to [facility] or SNF LTC needs to be located.       Meg Steve, Mid Coast HospitalSW

## 2023-05-24 NOTE — PLAN OF CARE
Goal Outcome Evaluation:       Summary: comfort cares/hospice (hx. ETOH, cirrhosis, colon cancer)    Date & Time: 5/23/23-5/24/23 [id number]    Orientation/Cognitive: A&Ox2-3, confused/disoriented to situation at times. Flat affect & can be inappropriate at times.  Mobility Level/Assist Equipment: not OOB. Declines to turn with staff at times, but allows weight shifting. Encourage pt to repo himself in bed.  Pain Management: Denies pain   Tele/VS/O2: Comfort Cares.  Lab/BG: No labs  Diet: Mechanical soft, good appetite.  Bowel/Bladder: incontinent of urine at times, external catheter in place. No bm.  Skin Concerns: Pale.  Drains/Devices: R PIV SL   Other: none  Tests/Procedures for next shift: None  Anticipated DC date & active delays: Zoila lou [facility] assessed pt 5/23- awaiting a call back from the facility to approve him. SW following.

## 2023-05-25 NOTE — PROGRESS NOTES
[facility]    Medicine Progress Note - Hospitalist Service    Date of Admission:  5/3/2023    Assessment & Plan   Los Huang is a 67 year old male with hx of alcoholic cirrhosis with known esophageal varices, metastatic colon cancer s/p partial colectomy and right hepatectomy previously on hospice, PTSD/schioaffective/bipolar disorder who was admitted on 5/3/2023 for hemorrhagic shock due to acute GI bleed. Hospital course complicated by sepsis of unclear source.       Pt has been transitioned to comfort cares and will be discharging back to assisted living facility with hospice once successfully coordinated with SW.     Problem List:    1. Goals of care  Metastatic Colon Cancer   Suspected renal cancer  - Diagnosed with colorectal cancer in 2014 with oligometastatic disease to liver at the time of diagnosis. He was treated with ascending colectomy 6/14 and R hepatectomy in 8/14. He declined chemotherapy and enrolled in [facility], but eventually unenrolled from hospice due to overall stability and hospice visits felt intrusive  - CTA abd/pelvis this admission showed disease progression of colon cancer with lung mets, new tumor thrombus in the left portal vein, new/enlarging liver mets, new ascites, and stable bilateral renal masses concerning for solid renal neoplasms  -Extensive goals of care conversations had between hospitalist, palliative care team, oncology, patient and family on multiple occasions.  Patient's behavioral health issues, personality issues, inconsistent participation/engagement in these types of conversations and care plan made it quite challenging to decide on next steps.  Ultimately after care conference on 5/19 with entire care team, family and patient, it was decided that patient will be placed on comfort cares.  Patient does not react well to the terms comfort cares/hospice being used.    Plan is medically ready for discharge to [facility] on hospice cares when  arrangements, including hospice arrangements, can be made.      Rest of hospital course as below     Hemorrhagic shock due to acute GI bleed, Improved  Lactic acidosis, Resolved  Colitis likely due to colon cancer  Alcoholic cirrhosis with esophageal varices, ascites, and hepatic encephalopathy  Mild scrotal edema-improving  - Presented with confusion, falls, and 1 week history of intermittent rectal bleeding which patient felt was related to hemorrhoids. Hypotensive to 70s on arrival with initial lactate >8; improved with IV fluids. Initial Hgb 5.7. Ammonia level normal.  - In the ED, he was initiated on an octreotide infusion, IV PPI BID, and IV ceftriaxone for SBP ppx. Also transfused total 3 units pRBCs upon admission  - [facility] GI was consulted on admission. Underwent EGD which showed variceal banding scar that appeared healthy, as well as grade I and II esophageal varices. Colonoscopy deferred due to evidence of colitis on imaging.   - CTA abd/pelvis upon admission showed marked wall thickening and edema of the entire bowel with concern for shock bowel/ischemic bowel and disease progression of colon cancer; however patient has been relatively asymptomatic with improved lactate and WBC  - Octreotide d/c'd on 5/4.   - 5/8 He had an episode of maroon-colored bloody stool this morning and was hemodynamically stable and repeat hemoglobin was stable at 8 and I did discuss with the GI team again today and source of his blood loss today is due to metastatic disease and further evaluation with colonoscopy is not possible with disease progression as tumors/neoplastic tissue is friable and treatment with APC would not be helpful and treatment is blood products and repeat CT abdomen done on 5/9/2023 did not show any evidence of bleeding  -continued with PPI, soft diet  - s/p paracentesis again on 5/11/2023 and 3.4 L of fluid was removed and the fluid analysis does not show any evidence of SBP  Patient did complete 7 total  days of IV antibiotics (Rocephin and zosyn) while in the hospital which should be adequate for SBP prophylaxis given his GI bleed and cirrhosis/ascites  -Did give him a small dose of Lasix 40 mg 5/14 and his edema seems to be improving  -5/18,significant abdominal distention distention without  pain.  Completed paracentesis.  2.3 L removed.  Ascitic fluid not consistent with SBP.  Negative Gram stain.  Culture no growth today.  Patient feels more comfortable  -Discussed management of ascites with [facility] gastroenterology.  They recommend Lasix 40 mg daily and Aldactone 100 mg daily.  However patient is now transitioning to comfort cares and will hold off on diuretic therapy at this time.  -5/19 patient had a episode of small amount of bright red blood per rectum this morning.  Perirectal area appeared normal except for a small small spot of blood on his underwear.  External hemorrhoids noted but no signs of inflammation.  Discussed with gastroenterology.  Plan is to just monitor clinically.  Patient was initiated on Lasix 20 mg daily and Aldactone 50 mg daily, reasonable at this time as this may reduce his ascites and scrotal edema allowing for improved comfort.  This can be continued depending on patient's clinical status going forward.     Sepsis due to suspected aspiration pneumonia, Resolved  Positive blood culture, suspect contaminant  - Spiked fever to 100.8 overnight 5/4-5/5 along with mild tachycardia. WBC 18.1k. Lactate 2.2. Reported increased shortness of breath. CXR showed known lung mets, bilateral infiltrates due to pulmonary edema vs pneumonia. He had been on ceftriaxone for SBP prophylaxis; antibiotics were broadened to IV pip-tazo with improvement. COVID-19 PCR negative. Unfortunately, blood cultures were not drawn.   - Underwent paracentesis (5/5) with removal of 2L fluid. 120 PMNs, no evidence of SBP  - 1 out of 2 blood cultures on admission (5/3) grew Staph epi. Suspect contaminant  - wbc count has  been down trending and he has been afebrile  -completed antibiotic course         Acute respiratory distress due to aspiration pneumonia vs pulmonary edema, Resolved  Leukocytosis-Improved  - Reported increased shortness of breath overnight 5/4-5/5.   -CXR showed pulmonary edema vs pneumonia. spO2 mid 90s on room air, but was placed on supplemental O2 for comfort.Treated with IV pip-tazo with improvement.   - Repeat CXR (5/6) improved  -He has been having persistent white count during this hospital stay but he has been afebrile  - wbc count is improving and repeat ascitic fluid analysis on 5/11 does not support any evidence of SBP        Thrombocytopenia, -Resolved  - Likely due to cirrhosis and ongoing alcohol use. Platelets wnl on admission though sample was likely hemoconcentrated.   -Platelet count improved.      Alcohol use disorder  - Longstanding history of alcohol dependence. Currently consuming 1 oz of gin five times daily. Last drink: 5/3. No s/sx of withdrawal noted this admission  - On thiamine/folate/MVI while hospitalized.  Discontinued with comfort cares.  -Patient's children state he has been drinking alcohol heavily in the last year and has not been interested in any treatment program .     Hypokalemia-resolved  -Potassium has been intermittently low, replaced as indicated.  Discontinued with comfort cares.    PTSD  Hx of schizoaffective disorder  Hx of bipolar disorder type I  - PTA regimen: clonazepam 0.5 mg daily, olanzapine 5 mg q6h prn, Invega 234 IM q28 days  - Last Invega dose: 5/6  - Clonazepam and olanzapine ordered prn this admission   -It is extremely difficult to assess patient's capacity to make medical decisions for himself.  His capacity to make decisions seems to rest primarily on his psychiatric disease.  No clear cognitive dysfunction.  There appears to be no metabolic encephalopathy.  Ammonia level is normal.  Patient has been alert.  Is also able to provide some detail regarding  his cancer history.  -Agree with palliative care assessment: His decision making capacity is so complicated and convoluted by his mental health issues and intermittent lack of interest/willingness to engage. We cannot safely say he does not have the ability to make medical decisions, but given the complexity, it is safe to say that he cannot make medical decisions independently. Thus, we all agree that moving forward with hospice is most appropriate   -Clonazepam dosing increased per psychiatry.     Cachexia  Severe malnutrition in context of acute on chronic illness  - Daily magic cup ordered per Nutrition             Diet: Room Service  Snacks/Supplements Adult: Magic Cup; Between Meals  Snacks/Supplements Adult: Ensure Max Protein (bariatric); Between Meals  Mechanical/Dental Soft Diet      Chew Catheter: Not present  Lines: None     Cardiac Monitoring: None  Code Status: No CPR- Do NOT Intubate      Clinically Significant Risk Factors              # Hypoalbuminemia: Lowest albumin = 2.3 g/dL at 5/17/2023  8:21 AM, will monitor as appropriate             # Severe Malnutrition: based on nutrition assessment         Disposition Plan     Expected Discharge Date: 05/25/2023, 12:00 PM  Discharge Delays: *Medically Ready for Discharge  Other (Add Comment)  Comfort Care/Hospice    Discharge Comments: Hospice. From [place], family to transport on Mondayn at 2 pm.          William Coto DO  Hospitalist Service  [facility]  Securely message with [employer] (more info)  Text page via [employer] Paging/Directory   ______________________________________________________________________    Interval History   Patient states he is ok, denies questions.     Physical Exam   Vital Signs: Temp: 97.9  F (36.6  C) Temp src: Oral BP: 102/59 Pulse: 86   Resp: 16 SpO2: 95 % O2 Device: None (Room air)    Weight: 198 lbs 0 oz      PE declined by patient    Medical Decision Making       26 MINUTES SPENT BY ME on the  date of service doing chart review, history, exam, documentation & further activities per the note.      Data         Imaging results reviewed over the past 24 hrs:   No results found for this or any previous visit (from the past 24 hour(s)).

## 2023-05-25 NOTE — PLAN OF CARE
Goal Outcome Evaluation:                      Summary: comfort cares/hospice (hx. ETOH, cirrhosis, colon cancer)    Date & Time: 5/24/23, pm shift    Orientation/Cognitive: A&Ox3.  Flat affect.   Mobility Level/Assist Equipment: not OOB. Refuses to turn on side. Weight shifting patient.   Pain Management: Denies pain   Tele/VS/O2: Comfort Cares.  Lab/BG: No labs  Diet: Mechanical soft  Bowel/Bladder: incontinent of urine at times, external catheter in place. No bm.  Skin Concerns: Pale. Redness blanchable on coccyx/sacrum  Drains/Devices: R PIV SL   Other: none  Tests/Procedures for next shift: None  Anticipated DC date & active delays: Zoila from [facility] assessed pt 5/23. Discharge to speciality Care Area of the [facility] that provides a higher level of care pending. SW following.

## 2023-05-25 NOTE — PROGRESS NOTES
Shift [id number]  Pt is A/Ox3 but can be forgetful and confused at times. He did have c/o pain and received PRN dilaudid x1.   He remains on comfort cares.

## 2023-05-25 NOTE — PLAN OF CARE
Goal Outcome Evaluation:  Orientation/Cognitive: A&Ox3.  Flat affect. Withdrawn and quiet  Mobility Level/Assist Equipment: not OOB. Occasionally changes positioned, declined to turn with staff  Pain Management: PO Dilaudid given x1 for 8/10 Generalized pain  Tele/VS/O2: Comfort Cares.  Lab/BG: No labs  Diet: Mechanical soft-no appetite  Bowel/Bladder: incontinent of urine at times, external catheter in place.   Skin Concerns: Pale. Redness blanchable on coccyx/sacrum - refusing repositioning.  Drains/Devices: R PIV SL   Other: none  Tests/Procedures for next shift: None  Anticipated DC date & active delays: Zoila from [facility] assessed pt 5/23. Discharge to speciality Care Area of the [facility] that provides a higher level of care pending. SW following. Refused to allow staff to do a full assessment. Sleeps between cares.

## 2023-05-25 NOTE — PLAN OF CARE
Goal Outcome Evaluation:       Summary: comfort cares/hospice (hx. ETOH, cirrhosis, colon cancer)    Date & Time: 5/24/23 - 5/25/23 [id number]    Orientation/Cognitive: A&Ox3.  Flat affect.   Mobility Level/Assist Equipment: not OOB. Refuses to turn on side. Patient does occasional self weight shifting.   Pain Management: PRN Dilaudid 2 mg x1 for 7/10 R. Sided rib,abdomen, chest, shoulder pain.  Tele/VS/O2: Comfort Cares.  Lab/BG: No labs  Diet: Mechanical soft  Bowel/Bladder: incontinent of urine at times, external catheter in place. No bm.  Skin Concerns: Pale. Redness blanchable on coccyx/sacrum - often refuses repositioning.  Drains/Devices: R PIV SL   Other: none  Tests/Procedures for next shift: None  Anticipated DC date & active delays: Zoila from [facility] assessed pt 5/23. Discharge to speciality Care Area of the [facility] that provides a higher level of care pending. SW following.

## 2023-05-25 NOTE — PROGRESS NOTES
Care Management Follow Up    Length of Stay (days): 22    Expected Discharge Date: 05/26/2023     Concerns to be Addressed:       Patient plan of care discussed at interdisciplinary rounds: Yes    Anticipated Discharge Disposition:  (return to [facility] with [facility])     Anticipated Discharge Services:    Anticipated Discharge DME:      Patient/family educated on Medicare website which has current facility and service quality ratings: no  Education Provided on the Discharge Plan:    Patient/Family in Agreement with the Plan:  (adult children in agreement, unsure if patient understands)    Referrals Placed by CM/SW: Internal Clinic Care Coordination  Private pay costs discussed:     Additional Information:  Called son Vince this afternoon to follow up with our conversation yesterday afternoon.  Vince has called [facility] and determined the facility doesn't  accept Elderly [place].   Vince explains patient doesn't have personal assets to pay for increased care at [facility] unless patient can access a Special Trust fund designated to be used for services related to patient's mental health disorder.  The son has placed a call to their  to ask if son, who is patient's Financial POA, can access the Special Trust Fund to pay for care at [facility]. It is the son's understanding that the Special Trust Fund cannot be used for End of Life Care.  If the  confirms this fact, the  patient will not  have the funds to pay privately at [facility] and the son will need to apply for MA for patient and discharge to a long term care skilled nursing home which accepts MA. The other option is a referral to [facility].    Son last placed a call to the  on Wednesday morning.  Writer has asked him to put out another call this afternoon.   Misha from [facility] updated.     Plan  Will follow up with son tomorrow morning.             Meg Steve Franklin Memorial HospitalSW

## 2023-05-26 NOTE — PROGRESS NOTES
Care Management Follow Up    Length of Stay (days): 23    Expected Discharge Date: 05/30/2023     Concerns to be Addressed:       Patient plan of care discussed at interdisciplinary rounds: Yes    Anticipated Discharge Disposition:  (return to [facility] with [facility])     Anticipated Discharge Services:    Anticipated Discharge DME:      Patient/family educated on Medicare website which has current facility and service quality ratings: no  Education Provided on the Discharge Plan:    Patient/Family in Agreement with the Plan:  (adult children in agreement, unsure if patient understands)    Referrals Placed by CM/SW: Internal Clinic Care Coordination  Private pay costs discussed:     Additional Information:  Have not received a call from son regarding their disposition decision.  Will try to reach him today and document later.    Spoke with son, Vince.  Explained the Yolanda QUACH, at [facility] updated writer today that they  can admit patient into the Speciality Care Unit on Tuesday if son signs the addendum to the current lease.   He has an appointment with his  on Tuesday to discuss use of a Special Trust to pay for patient's care at [facility] which will have a higher fee then the assisted living plan did.  Son is willing to proceed with this plan.  The w/e  will contact [facility] with an update and ask that they coordinate necessary DMD delivery for Tuesday.  The  on Tuesday will coordinate the final plan with Yolanda QUACH at [facility].      ARGELIA Albert

## 2023-05-26 NOTE — PROGRESS NOTES
[facility]    Medicine Progress Note - Hospitalist Service    Date of Admission:  5/3/2023    Assessment & Plan   Los Huang is a 67 year old male with hx of alcoholic cirrhosis with known esophageal varices, metastatic colon cancer s/p partial colectomy and right hepatectomy previously on hospice, PTSD/schioaffective/bipolar disorder who was admitted on 5/3/2023 for hemorrhagic shock due to acute GI bleed. Hospital course complicated by sepsis of unclear source.       Pt has been transitioned to comfort cares and will be discharging back to assisted living facility with hospice once successfully coordinated with SW.     Problem List:    1. Goals of care  Metastatic Colon Cancer   Suspected renal cancer  - Diagnosed with colorectal cancer in 2014 with oligometastatic disease to liver at the time of diagnosis. He was treated with ascending colectomy 6/14 and R hepatectomy in 8/14. He declined chemotherapy and enrolled in [facility], but eventually unenrolled from hospice due to overall stability and hospice visits felt intrusive  - CTA abd/pelvis this admission showed disease progression of colon cancer with lung mets, new tumor thrombus in the left portal vein, new/enlarging liver mets, new ascites, and stable bilateral renal masses concerning for solid renal neoplasms  -Extensive goals of care conversations had between hospitalist, palliative care team, oncology, patient and family on multiple occasions.  Patient's behavioral health issues, personality issues, inconsistent participation/engagement in these types of conversations and care plan made it quite challenging to decide on next steps.  Ultimately after care conference on 5/19 with entire care team, family and patient, it was decided that patient will be placed on comfort cares.  Patient does not react well to the terms comfort cares/hospice being used.    Plan is medically ready for discharge to [facility] on hospice cares when  arrangements, including hospice arrangements, can be made.      Rest of hospital course as below     Hemorrhagic shock due to acute GI bleed, Improved  Lactic acidosis, Resolved  Colitis likely due to colon cancer  Alcoholic cirrhosis with esophageal varices, ascites, and hepatic encephalopathy  Mild scrotal edema-improving  - Presented with confusion, falls, and 1 week history of intermittent rectal bleeding which patient felt was related to hemorrhoids. Hypotensive to 70s on arrival with initial lactate >8; improved with IV fluids. Initial Hgb 5.7. Ammonia level normal.  - In the ED, he was initiated on an octreotide infusion, IV PPI BID, and IV ceftriaxone for SBP ppx. Also transfused total 3 units pRBCs upon admission  - [facility] GI was consulted on admission. Underwent EGD which showed variceal banding scar that appeared healthy, as well as grade I and II esophageal varices. Colonoscopy deferred due to evidence of colitis on imaging.   - CTA abd/pelvis upon admission showed marked wall thickening and edema of the entire bowel with concern for shock bowel/ischemic bowel and disease progression of colon cancer; however patient has been relatively asymptomatic with improved lactate and WBC  - Octreotide d/c'd on 5/4.   - 5/8 He had an episode of maroon-colored bloody stool this morning and was hemodynamically stable and repeat hemoglobin was stable at 8 and I did discuss with the GI team again today and source of his blood loss today is due to metastatic disease and further evaluation with colonoscopy is not possible with disease progression as tumors/neoplastic tissue is friable and treatment with APC would not be helpful and treatment is blood products and repeat CT abdomen done on 5/9/2023 did not show any evidence of bleeding  -continued with PPI, soft diet  - s/p paracentesis again on 5/11/2023 and 3.4 L of fluid was removed and the fluid analysis does not show any evidence of SBP  Patient did complete 7 total  days of IV antibiotics (Rocephin and zosyn) while in the hospital which should be adequate for SBP prophylaxis given his GI bleed and cirrhosis/ascites  -Did give him a small dose of Lasix 40 mg 5/14 and his edema seems to be improving  -5/18,significant abdominal distention distention without  pain.  Completed paracentesis.  2.3 L removed.  Ascitic fluid not consistent with SBP.  Negative Gram stain.  Culture no growth today.  Patient feels more comfortable  -Discussed management of ascites with [facility] gastroenterology.  They recommend Lasix 40 mg daily and Aldactone 100 mg daily.  However patient is now transitioning to comfort cares and will hold off on diuretic therapy at this time.  -5/19 patient had a episode of small amount of bright red blood per rectum this morning.  Perirectal area appeared normal except for a small small spot of blood on his underwear.  External hemorrhoids noted but no signs of inflammation.  Discussed with gastroenterology.  Plan is to just monitor clinically.  Patient was initiated on Lasix 20 mg daily and Aldactone 50 mg daily, reasonable at this time as this may reduce his ascites and scrotal edema allowing for improved comfort.  This can be continued depending on patient's clinical status going forward.     Sepsis due to suspected aspiration pneumonia, Resolved  Positive blood culture, suspect contaminant  - Spiked fever to 100.8 overnight 5/4-5/5 along with mild tachycardia. WBC 18.1k. Lactate 2.2. Reported increased shortness of breath. CXR showed known lung mets, bilateral infiltrates due to pulmonary edema vs pneumonia. He had been on ceftriaxone for SBP prophylaxis; antibiotics were broadened to IV pip-tazo with improvement. COVID-19 PCR negative. Unfortunately, blood cultures were not drawn.   - Underwent paracentesis (5/5) with removal of 2L fluid. 120 PMNs, no evidence of SBP  - 1 out of 2 blood cultures on admission (5/3) grew Staph epi. Suspect contaminant  - wbc count has  been down trending and he has been afebrile  -completed antibiotic course         Acute respiratory distress due to aspiration pneumonia vs pulmonary edema, Resolved  Leukocytosis-Improved  - Reported increased shortness of breath overnight 5/4-5/5.   -CXR showed pulmonary edema vs pneumonia. spO2 mid 90s on room air, but was placed on supplemental O2 for comfort.Treated with IV pip-tazo with improvement.   - Repeat CXR (5/6) improved  -He has been having persistent white count during this hospital stay but he has been afebrile  - wbc count is improving and repeat ascitic fluid analysis on 5/11 does not support any evidence of SBP        Thrombocytopenia, -Resolved  - Likely due to cirrhosis and ongoing alcohol use. Platelets wnl on admission though sample was likely hemoconcentrated.   -Platelet count improved.      Alcohol use disorder  - Longstanding history of alcohol dependence. Currently consuming 1 oz of gin five times daily. Last drink: 5/3. No s/sx of withdrawal noted this admission  - On thiamine/folate/MVI while hospitalized.  Discontinued with comfort cares.  -Patient's children state he has been drinking alcohol heavily in the last year and has not been interested in any treatment program .     Hypokalemia-resolved  -Potassium has been intermittently low, replaced as indicated.  Discontinued with comfort cares.    PTSD  Hx of schizoaffective disorder  Hx of bipolar disorder type I  - PTA regimen: clonazepam 0.5 mg daily, olanzapine 5 mg q6h prn, Invega 234 IM q28 days  - Last Invega dose: 5/6  - Clonazepam and olanzapine ordered prn this admission   -It is extremely difficult to assess patient's capacity to make medical decisions for himself.  His capacity to make decisions seems to rest primarily on his psychiatric disease.  No clear cognitive dysfunction.  There appears to be no metabolic encephalopathy.  Ammonia level is normal.  Patient has been alert.  Is also able to provide some detail regarding  his cancer history.  -Agree with palliative care assessment: His decision making capacity is so complicated and convoluted by his mental health issues and intermittent lack of interest/willingness to engage. We cannot safely say he does not have the ability to make medical decisions, but given the complexity, it is safe to say that he cannot make medical decisions independently. Thus, we all agree that moving forward with hospice is most appropriate   -Clonazepam dosing increased per psychiatry.     Cachexia  Severe malnutrition in context of acute on chronic illness  - Daily magic cup ordered per Nutrition               Diet: Room Service  Snacks/Supplements Adult: Magic Cup; Between Meals  Snacks/Supplements Adult: Ensure Max Protein (bariatric); Between Meals  Mechanical/Dental Soft Diet      Chew Catheter: Not present  Lines: None     Cardiac Monitoring: None  Code Status: No CPR- Do NOT Intubate      Clinically Significant Risk Factors              # Hypoalbuminemia: Lowest albumin = 2.3 g/dL at 5/17/2023  8:21 AM, will monitor as appropriate             # Severe Malnutrition: based on nutrition assessment         Disposition Plan     Expected Discharge Date: 05/26/2023, 12:00 PM  Discharge Delays: *Medically Ready for Discharge  Other (Add Comment)  Comfort Care/Hospice    Discharge Comments: Hospice. From [place], family to transport on Mondayn at 2 pm.          William Coto,   Hospitalist Service  [facility]  Securely message with [employer] (more info)  Text page via [employer] Paging/Directory   ______________________________________________________________________    Interval History   Patient denies questions.    Physical Exam   Vital Signs:     BP: 100/58 Pulse: 90   Resp: 18        Weight: 198 lbs 0 oz        PE declined by patient    Medical Decision Making       9 MINUTES SPENT BY ME on the date of service doing chart review, history, exam, documentation & further  activities per the note.      Data         Imaging results reviewed over the past 24 hrs:   No results found for this or any previous visit (from the past 24 hour(s)).

## 2023-05-26 NOTE — PLAN OF CARE
Goal Outcome Evaluation:       Summary: Comfort cares/hospice (hx. ETOH, cirrhosis, colon cancer)    Date & Time: 5/25/23 - 5/26/23 7918-7414    Orientation/Cognitive: A&Ox3. Disoriented to time/situation at times. Flat affect. Withdrawn and quiet.  Mobility Level/Assist Equipment: not OOB. Occasionally changes position, declined to turn with staff.  Pain Management: PO Dilaudid 2mg given x 3 for 8/10 Generalized pain - effective.  Tele/VS/O2: Comfort Cares.  Lab/BG: No labs  Diet: Mechanical soft-poor appetite.  Bowel/Bladder: incontinent of urine at times, external catheter in place.   Skin Concerns: Pale. Redness blanchable on coccyx/sacrum - refusing repositioning.  Drains/Devices: R PIV SL   Other: none  Tests/Procedures for next shift: None  Anticipated DC date & active delays: Zoila from [facility] assessed pt 5/23. Discharge to speciality Care Area [facility] the [facility] that provides a higher level of care pending. SW following. Refused to allow staff to do a full assessment. Sleeps between cares.

## 2023-05-27 NOTE — PROGRESS NOTES
[facility]    Medicine Progress Note - Hospitalist Service    Date of Admission:  5/3/2023    Assessment & Plan   Los Huang is a 67 year old male with hx of alcoholic cirrhosis with known esophageal varices, metastatic colon cancer s/p partial colectomy and right hepatectomy previously on hospice, PTSD/schioaffective/bipolar disorder who was admitted on 5/3/2023 for hemorrhagic shock due to acute GI bleed. Hospital course complicated by sepsis of unclear source.        Pt has been transitioned to comfort cares and will be discharging back to assisted living facility with hospice once successfully coordinated with SW.      Problem List:     1. Goals of care  Metastatic Colon Cancer   Suspected renal cancer  - Diagnosed with colorectal cancer in 2014 with oligometastatic disease to liver at the time of diagnosis. He was treated with ascending colectomy 6/14 and R hepatectomy in 8/14. He declined chemotherapy and enrolled in [facility], but eventually unenrolled from hospice due to overall stability and hospice visits felt intrusive  - CTA abd/pelvis this admission showed disease progression of colon cancer with lung mets, new tumor thrombus in the left portal vein, new/enlarging liver mets, new ascites, and stable bilateral renal masses concerning for solid renal neoplasms  -Extensive goals of care conversations had between hospitalist, palliative care team, oncology, patient and family on multiple occasions.  Patient's behavioral health issues, personality issues, inconsistent participation/engagement in these types of conversations and care plan made it quite challenging to decide on next steps.  Ultimately after care conference on 5/19 with entire care team, family and patient, it was decided that patient will be placed on comfort cares.  Patient does not react well to the terms comfort cares/hospice being used.     Plan is medically ready for discharge to [facility] on hospice cares  when arrangements, including hospice arrangements, can be made.       Rest of hospital course as below     Hemorrhagic shock due to acute GI bleed, Improved  Lactic acidosis, Resolved  Colitis likely due to colon cancer  Alcoholic cirrhosis with esophageal varices, ascites, and hepatic encephalopathy  Mild scrotal edema-improving  - Presented with confusion, falls, and 1 week history of intermittent rectal bleeding which patient felt was related to hemorrhoids. Hypotensive to 70s on arrival with initial lactate >8; improved with IV fluids. Initial Hgb 5.7. Ammonia level normal.  - In the ED, he was initiated on an octreotide infusion, IV PPI BID, and IV ceftriaxone for SBP ppx. Also transfused total 3 units pRBCs upon admission  - [facility] GI was consulted on admission. Underwent EGD which showed variceal banding scar that appeared healthy, as well as grade I and II esophageal varices. Colonoscopy deferred due to evidence of colitis on imaging.   - CTA abd/pelvis upon admission showed marked wall thickening and edema of the entire bowel with concern for shock bowel/ischemic bowel and disease progression of colon cancer; however patient has been relatively asymptomatic with improved lactate and WBC  - Octreotide d/c'd on 5/4.   - 5/8 He had an episode of maroon-colored bloody stool this morning and was hemodynamically stable and repeat hemoglobin was stable at 8 and I did discuss with the GI team again today and source of his blood loss today is due to metastatic disease and further evaluation with colonoscopy is not possible with disease progression as tumors/neoplastic tissue is friable and treatment with APC would not be helpful and treatment is blood products and repeat CT abdomen done on 5/9/2023 did not show any evidence of bleeding  -continued with PPI, soft diet  - s/p paracentesis again on 5/11/2023 and 3.4 L of fluid was removed and the fluid analysis does not show any evidence of SBP  Patient did complete 7  total days of IV antibiotics (Rocephin and zosyn) while in the hospital which should be adequate for SBP prophylaxis given his GI bleed and cirrhosis/ascites  -Did give him a small dose of Lasix 40 mg 5/14 and his edema seems to be improving  -5/18,significant abdominal distention distention without  pain.  Completed paracentesis.  2.3 L removed.  Ascitic fluid not consistent with SBP.  Negative Gram stain.  Culture no growth today.  Patient feels more comfortable  -Discussed management of ascites with [facility] gastroenterology.  They recommend Lasix 40 mg daily and Aldactone 100 mg daily.  However patient is now transitioning to comfort cares and will hold off on diuretic therapy at this time.  -5/19 patient had a episode of small amount of bright red blood per rectum this morning.  Perirectal area appeared normal except for a small small spot of blood on his underwear.  External hemorrhoids noted but no signs of inflammation.  Discussed with gastroenterology.  Plan is to just monitor clinically.  Patient was initiated on Lasix 20 mg daily and Aldactone 50 mg daily, reasonable at this time as this may reduce his ascites and scrotal edema allowing for improved comfort.  This can be continued depending on patient's clinical status going forward.     Sepsis due to suspected aspiration pneumonia, Resolved  Positive blood culture, suspect contaminant  - Spiked fever to 100.8 overnight 5/4-5/5 along with mild tachycardia. WBC 18.1k. Lactate 2.2. Reported increased shortness of breath. CXR showed known lung mets, bilateral infiltrates due to pulmonary edema vs pneumonia. He had been on ceftriaxone for SBP prophylaxis; antibiotics were broadened to IV pip-tazo with improvement. COVID-19 PCR negative. Unfortunately, blood cultures were not drawn.   - Underwent paracentesis (5/5) with removal of 2L fluid. 120 PMNs, no evidence of SBP  - 1 out of 2 blood cultures on admission (5/3) grew Staph epi. Suspect contaminant  - wbc  count has been down trending and he has been afebrile  -completed antibiotic course       Acute respiratory distress due to aspiration pneumonia vs pulmonary edema, Resolved  Leukocytosis-Improved  - Reported increased shortness of breath overnight 5/4-5/5.   -CXR showed pulmonary edema vs pneumonia. spO2 mid 90s on room air, but was placed on supplemental O2 for comfort.Treated with IV pip-tazo with improvement.   - Repeat CXR (5/6) improved  -He has been having persistent white count during this hospital stay but he has been afebrile  - wbc count is improving and repeat ascitic fluid analysis on 5/11 does not support any evidence of SBP        Thrombocytopenia, -Resolved  - Likely due to cirrhosis and ongoing alcohol use. Platelets wnl on admission though sample was likely hemoconcentrated.   -Platelet count improved.      Alcohol use disorder  - Longstanding history of alcohol dependence. Currently consuming 1 oz of gin five times daily. Last drink: 5/3. No s/sx of withdrawal noted this admission  - On thiamine/folate/MVI while hospitalized.  Discontinued with comfort cares.  -Patient's children state he has been drinking alcohol heavily in the last year and has not been interested in any treatment program .     Hypokalemia-resolved  -Potassium has been intermittently low, replaced as indicated.  Discontinued with comfort cares.    PTSD  Hx of schizoaffective disorder  Hx of bipolar disorder type I  - PTA regimen: clonazepam 0.5 mg daily, olanzapine 5 mg q6h prn, Invega 234 IM q28 days  - Last Invega dose: 5/6  - Clonazepam and olanzapine ordered prn this admission   -It is extremely difficult to assess patient's capacity to make medical decisions for himself.  His capacity to make decisions seems to rest primarily on his psychiatric disease.  No clear cognitive dysfunction.  There appears to be no metabolic encephalopathy.  Ammonia level is normal.  Patient has been alert.  Is also able to provide some detail  regarding his cancer history.  -Agree with palliative care assessment: His decision making capacity is so complicated and convoluted by his mental health issues and intermittent lack of interest/willingness to engage. We cannot safely say he does not have the ability to make medical decisions, but given the complexity, it is safe to say that he cannot make medical decisions independently. Thus, we all agree that moving forward with hospice is most appropriate   -Clonazepam dosing increased per psychiatry.     Cachexia  Severe malnutrition in context of acute on chronic illness  - Daily magic cup ordered per Nutrition            Diet: Room Service  Snacks/Supplements Adult: Magic Cup; Between Meals  Snacks/Supplements Adult: Ensure Max Protein (bariatric); Between Meals  Mechanical/Dental Soft Diet    DVT Prophylaxis: comfort care  Chew Catheter: Not present  Lines: None     Cardiac Monitoring: None  Code Status: No CPR- Do NOT Intubate      Clinically Significant Risk Factors              # Hypoalbuminemia: Lowest albumin = 2.3 g/dL at 5/17/2023  8:21 AM, will monitor as appropriate             # Severe Malnutrition: based on nutrition assessment         Disposition Plan     Expected Discharge Date: 05/30/2023, 12:00 PM  Discharge Delays: *Medically Ready for Discharge  Other (Add Comment)  Comfort Care/Hospice    Discharge Comments: Hospice. From [place],  to transport on Mondayn at 2 pm.          Candido Love MD  Hospitalist Service  [facility]  Securely message with [employer] (more info)  Text page via [employer] Paging/Directory   ______________________________________________________________________    Interval History   No new complaints.    Physical Exam   Vital Signs:     BP: 101/68 Pulse: 89   Resp: 18        Weight: 198 lbs 0 oz  Comfortable  No respiratory distress    Medical Decision Making       55 MINUTES SPENT BY ME on the date of service doing chart review, history, exam,  documentation & further activities per the note.      Data   ------------------------- PAST 24 HR DATA REVIEWED -----------------------------------------------        Imaging results reviewed over the past 24 hrs:   No results found for this or any previous visit (from the past 24 hour(s)).

## 2023-05-27 NOTE — PLAN OF CARE
Goal Outcome Evaluation:       Cognitive Concerns/ Orientation: A&Ox3. Disoriented to situation at times.   BEHAVIOR & AGGRESSION TOOL COLOR: Green  CIWA SCORE: NA   ABNL VS/O2: Comfort cares  MOBILITY: Not OOB this shift, T/R [id number] at times, patient on comfort cares.   PAIN MANAGMENT:Generalized pain managed with PO Dilaudid x3  DIET: Mechanical soft  BOWEL/BLADDER: external catheter in place for incontinence of urine. No BM this shift  ABNL LAB/BG: Comfort cares  DRAIN/DEVICES: R PIV SL  TELEMETRY RHYTHM: N/A  SKIN: Pale, blanchable redness on coccyx/sacrum , scrotal edema, karissa/black LE  TESTS/PROCEDURES: none  D/C DATE: Possibly 5/30 return to [facility] with [facility] , pending son's signature at facility, per  note.   Discharge Barriers: awaiting sons signature at facility  OTHER IMPORTANT INFO:

## 2023-05-27 NOTE — PLAN OF CARE
Alert and oriented x2-3. Disoriented to time and situation at times. Comfort care. Generalized pain managed with prn oral Dilaudid. Abdomen distended. Tolerating mech soft diet. Takes pills whole with water. Incontinent of urine, using male external catheter. BLE karissa and scattered scabs. Turn and repo done.

## 2023-05-28 NOTE — PLAN OF CARE
DATE & TIME: 5/27/2023 7920-7476    Cognitive Concerns/ Orientation : Alert and oriented x4. Intermittent confusion.    BEHAVIOR & AGGRESSION TOOL COLOR: Green  CIWA SCORE: None   ABNL VS/O2: BP remains stable. Full VS and assessment deferred-Comfort care  MOBILITY: Not OOB this shift. Turn and repo done  PAIN MANAGMENT: Generalized pain managed with prn oral Dilaudid  DIET: Mech soft diet, good appetite  BOWEL/BLADDER: Incontinent of urine, using external catheter. No BM this shift. Scheduled Miralax  ABNL LAB/BG: None  DRAIN/DEVICES: External catheter   TELEMETRY RHYTHM: None  SKIN: scattered bruising and scabs  TESTS/PROCEDURES: None  D/C DATE: Possible on Tuesday to [facility] service.  Discharge Barriers: Placement with increased service and increase fee/insurance issues. SW following.   OTHER IMPORTANT INFO: Calm and cooperative. Scrotal edema improving. Started on miralax. No BM yet.

## 2023-05-28 NOTE — PLAN OF CARE
Goal Outcome Evaluation:         Cognitive Concerns/ Orientation: A&Ox3. Disoriented to situation at times.   BEHAVIOR & AGGRESSION TOOL COLOR: Green  CIWA SCORE: NA   ABNL VS/O2: Comfort cares  MOBILITY: Not OOB this shift, T/R [id number] at times, patient on comfort cares.   PAIN MANAGMENT:Generalized pain managed with PO Dilaudid x3  DIET: Mechanical soft, poor appetite  BOWEL/BLADDER: external catheter in place for incontinence of urine. No BM this shift  ABNL LAB/BG: Comfort cares  DRAIN/DEVICES: R PIV SL  TELEMETRY RHYTHM: N/A  SKIN: Pale, blanchable redness on coccyx/sacrum , scrotal edema, karissa/black LE  TESTS/PROCEDURES: none  D/C DATE: Possibly 5/30 return to [facility] with [facility] ,

## 2023-05-28 NOTE — PLAN OF CARE
Goal Outcome Evaluation:       Cognitive Concerns/ Orientation: A&Ox3. Disoriented to situation at times.   BEHAVIOR & AGGRESSION TOOL COLOR: Green  CIWA SCORE: NA   ABNL VS/O2: Comfort cares  MOBILITY: Not OOB this shift, T/R [id number] at times, patient on comfort cares.   PAIN MANAGMENT:Generalized pain managed with PO Dilaudid x3  DIET: Mechanical soft, poor appetite  BOWEL/BLADDER: external catheter in place for incontinence of urine. No BM this shift  ABNL LAB/BG: Comfort cares  DRAIN/DEVICES: R PIV SL  TELEMETRY RHYTHM: N/A  SKIN: Pale, blanchable redness on coccyx/sacrum , scrotal edema, karissa/black LE  TESTS/PROCEDURES: none  D/C DATE: Possibly 5/30 return to [facility] with [facility] , pending son's signature at facility, per  note.   Discharge Barriers: awaiting sons signature at facility  OTHER IMPORTANT INFO:

## 2023-05-28 NOTE — PROGRESS NOTES
[facility]    Medicine Progress Note - Hospitalist Service    Date of Admission:  5/3/2023    Assessment & Plan   Los Huang is a 67 year old male with hx of alcoholic cirrhosis with known esophageal varices, metastatic colon cancer s/p partial colectomy and right hepatectomy previously on hospice, PTSD/schioaffective/bipolar disorder who was admitted on 5/3/2023 for hemorrhagic shock due to acute GI bleed. Hospital course complicated by sepsis of unclear source.        Pt has been transitioned to comfort cares and will be discharging back to assisted living facility with hospice once successfully coordinated with SW.      Problem List:     1. Goals of care  Metastatic Colon Cancer   Suspected renal cancer  - Diagnosed with colorectal cancer in 2014 with oligometastatic disease to liver at the time of diagnosis. He was treated with ascending colectomy 6/14 and R hepatectomy in 8/14. He declined chemotherapy and enrolled in [facility], but eventually unenrolled from hospice due to overall stability and hospice visits felt intrusive  - CTA abd/pelvis this admission showed disease progression of colon cancer with lung mets, new tumor thrombus in the left portal vein, new/enlarging liver mets, new ascites, and stable bilateral renal masses concerning for solid renal neoplasms  -Extensive goals of care conversations had between hospitalist, palliative care team, oncology, patient and family on multiple occasions.  Patient's behavioral health issues, personality issues, inconsistent participation/engagement in these types of conversations and care plan made it quite challenging to decide on next steps.  Ultimately after care conference on 5/19 with entire care team, family and patient, it was decided that patient will be placed on comfort cares.  Patient does not react well to the terms comfort cares/hospice being used.     Plan is medically ready for discharge to [facility] on hospice cares  when arrangements, including hospice arrangements, can be made.       Rest of hospital course as below     Hemorrhagic shock due to acute GI bleed, Improved  Lactic acidosis, Resolved  Colitis likely due to colon cancer  Alcoholic cirrhosis with esophageal varices, ascites, and hepatic encephalopathy  Mild scrotal edema-improving  - Presented with confusion, falls, and 1 week history of intermittent rectal bleeding which patient felt was related to hemorrhoids. Hypotensive to 70s on arrival with initial lactate >8; improved with IV fluids. Initial Hgb 5.7. Ammonia level normal.  - In the ED, he was initiated on an octreotide infusion, IV PPI BID, and IV ceftriaxone for SBP ppx. Also transfused total 3 units pRBCs upon admission  - [facility] GI was consulted on admission. Underwent EGD which showed variceal banding scar that appeared healthy, as well as grade I and II esophageal varices. Colonoscopy deferred due to evidence of colitis on imaging.   - CTA abd/pelvis upon admission showed marked wall thickening and edema of the entire bowel with concern for shock bowel/ischemic bowel and disease progression of colon cancer; however patient has been relatively asymptomatic with improved lactate and WBC  - Octreotide d/c'd on 5/4.   - 5/8 He had an episode of maroon-colored bloody stool this morning and was hemodynamically stable and repeat hemoglobin was stable at 8 and I did discuss with the GI team again today and source of his blood loss today is due to metastatic disease and further evaluation with colonoscopy is not possible with disease progression as tumors/neoplastic tissue is friable and treatment with APC would not be helpful and treatment is blood products and repeat CT abdomen done on 5/9/2023 did not show any evidence of bleeding  -continued with PPI, soft diet  - s/p paracentesis again on 5/11/2023 and 3.4 L of fluid was removed and the fluid analysis does not show any evidence of SBP  Patient did complete 7  total days of IV antibiotics (Rocephin and zosyn) while in the hospital which should be adequate for SBP prophylaxis given his GI bleed and cirrhosis/ascites  -Did give him a small dose of Lasix 40 mg 5/14 and his edema seems to be improving  -5/18,significant abdominal distention distention without  pain.  Completed paracentesis.  2.3 L removed.  Ascitic fluid not consistent with SBP.  Negative Gram stain.  Culture no growth today.  Patient feels more comfortable  -Discussed management of ascites with [facility] gastroenterology.  They recommend Lasix 40 mg daily and Aldactone 100 mg daily.  However patient is now transitioning to comfort cares and will hold off on diuretic therapy at this time.  -5/19 patient had a episode of small amount of bright red blood per rectum this morning.  Perirectal area appeared normal except for a small small spot of blood on his underwear.  External hemorrhoids noted but no signs of inflammation.  Discussed with gastroenterology.  Plan is to just monitor clinically.  Patient was initiated on Lasix 20 mg daily and Aldactone 50 mg daily, reasonable at this time as this may reduce his ascites and scrotal edema allowing for improved comfort.  This can be continued depending on patient's clinical status going forward.     Sepsis due to suspected aspiration pneumonia, Resolved  Positive blood culture, suspect contaminant  - Spiked fever to 100.8 overnight 5/4-5/5 along with mild tachycardia. WBC 18.1k. Lactate 2.2. Reported increased shortness of breath. CXR showed known lung mets, bilateral infiltrates due to pulmonary edema vs pneumonia. He had been on ceftriaxone for SBP prophylaxis; antibiotics were broadened to IV pip-tazo with improvement. COVID-19 PCR negative. Unfortunately, blood cultures were not drawn.   - Underwent paracentesis (5/5) with removal of 2L fluid. 120 PMNs, no evidence of SBP  - 1 out of 2 blood cultures on admission (5/3) grew Staph epi. Suspect contaminant  - wbc  count has been down trending and he has been afebrile  -completed antibiotic course       Acute respiratory distress due to aspiration pneumonia vs pulmonary edema, Resolved  Leukocytosis-Improved  - Reported increased shortness of breath overnight 5/4-5/5.   -CXR showed pulmonary edema vs pneumonia. spO2 mid 90s on room air, but was placed on supplemental O2 for comfort.Treated with IV pip-tazo with improvement.   - Repeat CXR (5/6) improved  -He has been having persistent white count during this hospital stay but he has been afebrile  - wbc count is improving and repeat ascitic fluid analysis on 5/11 does not support any evidence of SBP        Thrombocytopenia, -Resolved  - Likely due to cirrhosis and ongoing alcohol use. Platelets wnl on admission though sample was likely hemoconcentrated.   -Platelet count improved.      Alcohol use disorder  - Longstanding history of alcohol dependence. Currently consuming 1 oz of gin five times daily. Last drink: 5/3. No s/sx of withdrawal noted this admission  - On thiamine/folate/MVI while hospitalized.  Discontinued with comfort cares.  -Patient's children state he has been drinking alcohol heavily in the last year and has not been interested in any treatment program .     Hypokalemia-resolved  -Potassium has been intermittently low, replaced as indicated.  Discontinued with comfort cares.    PTSD  Hx of schizoaffective disorder  Hx of bipolar disorder type I  - PTA regimen: clonazepam 0.5 mg daily, olanzapine 5 mg q6h prn, Invega 234 IM q28 days  - Last Invega dose: 5/6  - Clonazepam and olanzapine ordered prn this admission   -It is extremely difficult to assess patient's capacity to make medical decisions for himself.  His capacity to make decisions seems to rest primarily on his psychiatric disease.  No clear cognitive dysfunction.  There appears to be no metabolic encephalopathy.  Ammonia level is normal.  Patient has been alert.  Is also able to provide some detail  regarding his cancer history.  -Agree with palliative care assessment: His decision making capacity is so complicated and convoluted by his mental health issues and intermittent lack of interest/willingness to engage. We cannot safely say he does not have the ability to make medical decisions, but given the complexity, it is safe to say that he cannot make medical decisions independently. Thus, we all agree that moving forward with hospice is most appropriate   -Clonazepam dosing increased per psychiatry.     Cachexia  Severe malnutrition in context of acute on chronic illness  - Daily magic cup ordered per Nutrition            Diet: Room Service  Snacks/Supplements Adult: Magic Cup; Between Meals  Snacks/Supplements Adult: Ensure Max Protein (bariatric); Between Meals  Mechanical/Dental Soft Diet    DVT Prophylaxis: comfort care  Chew Catheter: Not present  Lines: None     Cardiac Monitoring: None  Code Status: No CPR- Do NOT Intubate      Clinically Significant Risk Factors              # Hypoalbuminemia: Lowest albumin = 2.3 g/dL at 5/17/2023  8:21 AM, will monitor as appropriate             # Severe Malnutrition: based on nutrition assessment         Disposition Plan      Expected Discharge Date: 05/30/2023, 12:00 PM  Discharge Delays: *Medically Ready for Discharge  Other (Add Comment)  Comfort Care/Hospice    Discharge Comments: Hospice. From [facility],  to transport on Mondayn at 2 pm.          Candido Love MD  Hospitalist Service  [facility]  Securely message with [employer] (more info)  Text page via [employer] Paging/Directory   ______________________________________________________________________    Interval History   No new complaints.    Physical Exam   Vital Signs:           Resp: 18        Weight: 198 lbs 0 oz  Comfortable  No respiratory distress    Medical Decision Making       40 MINUTES SPENT BY ME on the date of service doing chart review, history, exam, documentation &  further activities per the note.      Data   ------------------------- PAST 24 HR DATA REVIEWED -----------------------------------------------        Imaging results reviewed over the past 24 hrs:   No results found for this or any previous visit (from the past 24 hour(s)).

## 2023-05-28 NOTE — PROGRESS NOTES
Care Management Follow Up    Length of Stay (days): 25    Expected Discharge Date: 05/30/2023     Concerns to be Addressed:       Patient plan of care discussed at interdisciplinary rounds: Yes    Anticipated Discharge Disposition:  (return to [facility] with [facility])     Anticipated Discharge Services:    Anticipated Discharge DME:      Patient/family educated on Medicare website which has current facility and service quality ratings: no  Education Provided on the Discharge Plan:    Patient/Family in Agreement with the Plan:  (adult children in agreement, unsure if patient understands)    Referrals Placed by CM/SW: Internal Clinic Care Coordination  Private pay costs discussed: Not applicable    Additional Information:    Sw called sonVince, to discuss whether he has been able to coordinate a mtg/conversation with pt's trust's  regarding continued finance support towards pt's penitentiary/care.  Son, Vince, stated that he has not been successful in connecting with the  yet.  Vince did state, however, that he does not feel continued financial support towards pt's care/CESAR will be a barrier at discharge.  Vince aware that target discharge date is Tuesday, 6/30/23.  Sw attempted to contact [facility] to update them on target discharge date; no answer or ability to leave .  Sw to continue to follow.      Meg Jim, DANAESW

## 2023-05-29 NOTE — PLAN OF CARE
Summary: Comfort care  DATE & TIME: 5/29/23 7-3 pm   Cognitive Concerns/ Orientation: A & O x 3. Disoriented to situation. Slow speech, word finding difficulty.   BEHAVIOR & AGGRESSION TOOL COLOR: Green  ABNL VS/O2: Comfort cares  MOBILITY: T/R, refused all turns this shift except once. patient on comfort cares.   PAIN MANAGMENT: Generalized pain managed with PRN Dilaudid x1.   DIET: Mechanical soft, fair appetite. Ate all of breakfast and magic cup.   BOWEL/BLADDER: External catheter in place for incontinence of urine.   ABNL LAB/BG: Comfort cares  DRAIN/DEVICES: PIV SL  TELEMETRY RHYTHM: N/A  SKIN: Pale, blanchable redness on coccyx/sacrum , scrotal edema, karissa LE  TESTS/PROCEDURES: none  D/C DATE: Unknown, no set plans. Pending see  note.   Discharge Barriers: Return to [facility] with [facility], when everything is set up.   OTHER IMPORTANT INFO: No changes.

## 2023-05-29 NOTE — PLAN OF CARE
Goal Outcome Evaluation:       DATE & TIME: 5/28/23-5/29/23 [id number]  Cognitive Concerns/ Orientation: A&Ox3. Disoriented to situation at times.   BEHAVIOR & AGGRESSION TOOL COLOR: Green  CIWA SCORE: NA   ABNL VS/O2: Comfort cares  MOBILITY: Not OOB this shift, T/R [id number] at times, patient on comfort cares.   PAIN MANAGMENT:Generalized pain managed with PRN Dilaudid   DIET: Mechanical soft, poor appetite  BOWEL/BLADDER: external catheter in place for incontinence of urine. Had extra large soft BM this shift  ABNL LAB/BG: Comfort cares  DRAIN/DEVICES: R PIV SL  TELEMETRY RHYTHM: N/A  SKIN: Pale, blanchable redness on coccyx/sacrum , scrotal edema, karissa LE  TESTS/PROCEDURES: none  D/C DATE: Possibly 5/30 return to [facility] with [facility] , pending son's signature at facility, per  note.   Discharge Barriers: awaiting sons

## 2023-05-29 NOTE — PLAN OF CARE
Goal Outcome Evaluation:    Orientation: A&O x3, slow speech    Vitals/Tele: comfort cares    IV Access/drains: PIV on right forearm    Diet: Mechanical soft     Mobility: bedbound, T/R if pt allows    GI/: No BM, external cath, very minimal urine output    Wound/Skin: dry    Discharge Plan: Unknown     PO Dilaudid x1 given.     See Flow sheets for assessment        No

## 2023-05-29 NOTE — PROGRESS NOTES
[facility]    Medicine Progress Note - Hospitalist Service    Date of Admission:  5/3/2023    Assessment & Plan   Los Huang is a 67 year old male with hx of alcoholic cirrhosis with known esophageal varices, metastatic colon cancer s/p partial colectomy and right hepatectomy previously on hospice, PTSD/schioaffective/bipolar disorder who was admitted on 5/3/2023 for hemorrhagic shock due to acute GI bleed. Hospital course complicated by sepsis of unclear source.        Pt has been transitioned to comfort cares and will be discharging back to assisted living facility with hospice once successfully coordinated with SW.      Problem List:     1. Goals of care  Metastatic Colon Cancer   Suspected renal cancer  - Diagnosed with colorectal cancer in 2014 with oligometastatic disease to liver at the time of diagnosis. He was treated with ascending colectomy 6/14 and R hepatectomy in 8/14. He declined chemotherapy and enrolled in [facility], but eventually unenrolled from hospice due to overall stability and hospice visits felt intrusive  - CTA abd/pelvis this admission showed disease progression of colon cancer with lung mets, new tumor thrombus in the left portal vein, new/enlarging liver mets, new ascites, and stable bilateral renal masses concerning for solid renal neoplasms  -Extensive goals of care conversations had between hospitalist, palliative care team, oncology, patient and family on multiple occasions.  Patient's behavioral health issues, personality issues, inconsistent participation/engagement in these types of conversations and care plan made it quite challenging to decide on next steps.  Ultimately after care conference on 5/19 with entire care team, family and patient, it was decided that patient will be placed on comfort cares.  Patient does not react well to the terms comfort cares/hospice being used.     Plan is medically ready for discharge to [facility] on hospice cares  when arrangements, including hospice arrangements, can be made.       Rest of hospital course as below     Hemorrhagic shock due to acute GI bleed, Improved  Lactic acidosis, Resolved  Colitis likely due to colon cancer  Alcoholic cirrhosis with esophageal varices, ascites, and hepatic encephalopathy  Mild scrotal edema-improving  - Presented with confusion, falls, and 1 week history of intermittent rectal bleeding which patient felt was related to hemorrhoids. Hypotensive to 70s on arrival with initial lactate >8; improved with IV fluids. Initial Hgb 5.7. Ammonia level normal.  - In the ED, he was initiated on an octreotide infusion, IV PPI BID, and IV ceftriaxone for SBP ppx. Also transfused total 3 units pRBCs upon admission  - [facility] GI was consulted on admission. Underwent EGD which showed variceal banding scar that appeared healthy, as well as grade I and II esophageal varices. Colonoscopy deferred due to evidence of colitis on imaging.   - CTA abd/pelvis upon admission showed marked wall thickening and edema of the entire bowel with concern for shock bowel/ischemic bowel and disease progression of colon cancer; however patient has been relatively asymptomatic with improved lactate and WBC  - Octreotide d/c'd on 5/4.   - 5/8 He had an episode of maroon-colored bloody stool this morning and was hemodynamically stable and repeat hemoglobin was stable at 8 and I did discuss with the GI team again today and source of his blood loss today is due to metastatic disease and further evaluation with colonoscopy is not possible with disease progression as tumors/neoplastic tissue is friable and treatment with APC would not be helpful and treatment is blood products and repeat CT abdomen done on 5/9/2023 did not show any evidence of bleeding  -continued with PPI, soft diet  - s/p paracentesis again on 5/11/2023 and 3.4 L of fluid was removed and the fluid analysis does not show any evidence of SBP  Patient did complete 7  total days of IV antibiotics (Rocephin and zosyn) while in the hospital which should be adequate for SBP prophylaxis given his GI bleed and cirrhosis/ascites  -Did give him a small dose of Lasix 40 mg 5/14 and his edema seems to be improving  -5/18,significant abdominal distention distention without  pain.  Completed paracentesis.  2.3 L removed.  Ascitic fluid not consistent with SBP.  Negative Gram stain.  Culture no growth today.  Patient feels more comfortable  -Discussed management of ascites with [facility] gastroenterology.  They recommend Lasix 40 mg daily and Aldactone 100 mg daily.  However patient is now transitioning to comfort cares and will hold off on diuretic therapy at this time.  -5/19 patient had a episode of small amount of bright red blood per rectum this morning.  Perirectal area appeared normal except for a small small spot of blood on his underwear.  External hemorrhoids noted but no signs of inflammation.  Discussed with gastroenterology.  Plan is to just monitor clinically.  Patient was initiated on Lasix 20 mg daily and Aldactone 50 mg daily, reasonable at this time as this may reduce his ascites and scrotal edema allowing for improved comfort.  This can be continued depending on patient's clinical status going forward.     Sepsis due to suspected aspiration pneumonia, Resolved  Positive blood culture, suspect contaminant  - Spiked fever to 100.8 overnight 5/4-5/5 along with mild tachycardia. WBC 18.1k. Lactate 2.2. Reported increased shortness of breath. CXR showed known lung mets, bilateral infiltrates due to pulmonary edema vs pneumonia. He had been on ceftriaxone for SBP prophylaxis; antibiotics were broadened to IV pip-tazo with improvement. COVID-19 PCR negative. Unfortunately, blood cultures were not drawn.   - Underwent paracentesis (5/5) with removal of 2L fluid. 120 PMNs, no evidence of SBP  - 1 out of 2 blood cultures on admission (5/3) grew Staph epi. Suspect contaminant  - wbc  count has been down trending and he has been afebrile  -completed antibiotic course       Acute respiratory distress due to aspiration pneumonia vs pulmonary edema, Resolved  Leukocytosis-Improved  - Reported increased shortness of breath overnight 5/4-5/5.   -CXR showed pulmonary edema vs pneumonia. spO2 mid 90s on room air, but was placed on supplemental O2 for comfort.Treated with IV pip-tazo with improvement.   - Repeat CXR (5/6) improved  -He has been having persistent white count during this hospital stay but he has been afebrile  - wbc count is improving and repeat ascitic fluid analysis on 5/11 does not support any evidence of SBP        Thrombocytopenia, -Resolved  - Likely due to cirrhosis and ongoing alcohol use. Platelets wnl on admission though sample was likely hemoconcentrated.   -Platelet count improved.      Alcohol use disorder  - Longstanding history of alcohol dependence. Currently consuming 1 oz of gin five times daily. Last drink: 5/3. No s/sx of withdrawal noted this admission  - On thiamine/folate/MVI while hospitalized.  Discontinued with comfort cares.  -Patient's children state he has been drinking alcohol heavily in the last year and has not been interested in any treatment program .     Hypokalemia-resolved  -Potassium has been intermittently low, replaced as indicated.  Discontinued with comfort cares.    PTSD  Hx of schizoaffective disorder  Hx of bipolar disorder type I  - PTA regimen: clonazepam 0.5 mg daily, olanzapine 5 mg q6h prn, Invega 234 IM q28 days  - Last Invega dose: 5/6  - Clonazepam and olanzapine ordered prn this admission   -It is extremely difficult to assess patient's capacity to make medical decisions for himself.  His capacity to make decisions seems to rest primarily on his psychiatric disease.  No clear cognitive dysfunction.  There appears to be no metabolic encephalopathy.  Ammonia level is normal.  Patient has been alert.  Is also able to provide some detail  regarding his cancer history.  -Agree with palliative care assessment: His decision making capacity is so complicated and convoluted by his mental health issues and intermittent lack of interest/willingness to engage. We cannot safely say he does not have the ability to make medical decisions, but given the complexity, it is safe to say that he cannot make medical decisions independently. Thus, we all agree that moving forward with hospice is most appropriate   -Clonazepam dosing increased per psychiatry.     Cachexia  Severe malnutrition in context of acute on chronic illness  - Daily magic cup ordered per Nutrition            Diet: Room Service  Snacks/Supplements Adult: Magic Cup; Between Meals  Snacks/Supplements Adult: Ensure Max Protein (bariatric); Between Meals  Mechanical/Dental Soft Diet    DVT Prophylaxis: comfort care  Chew Catheter: Not present  Lines: None     Cardiac Monitoring: None  Code Status: No CPR- Do NOT Intubate      Clinically Significant Risk Factors              # Hypoalbuminemia: Lowest albumin = 2.3 g/dL at 5/17/2023  8:21 AM, will monitor as appropriate             # Severe Malnutrition: based on nutrition assessment         Disposition Plan     Expected Discharge Date: 05/30/2023, 12:00 PM  Discharge Delays: *Medically Ready for Discharge  Other (Add Comment)  Comfort Care/Hospice    Discharge Comments: Hospice. From [place],  to transport on Mondayn at 2 pm.          Candido Love MD  Hospitalist Service  [facility]  Securely message with [employer] (more info)  Text page via [employer] Paging/Directory   ______________________________________________________________________    Interval History   No new complaints.    Physical Exam   Vital Signs:                    Weight: 198 lbs 0 oz  Comfortable  No respiratory distress  abdo-distended  Medical Decision Making       40 MINUTES SPENT BY ME on the date of service doing chart review, history, exam,  documentation & further activities per the note.      Data   ------------------------- PAST 24 HR DATA REVIEWED -----------------------------------------------        Imaging results reviewed over the past 24 hrs:   No results found for this or any previous visit (from the past 24 hour(s)).

## 2023-05-30 NOTE — PROGRESS NOTES
[facility]    Medicine Progress Note - Hospitalist Service    Date of Admission:  5/3/2023    Assessment & Plan   Los Huang is a 67 year old male with hx of alcoholic cirrhosis with known esophageal varices, metastatic colon cancer s/p partial colectomy and right hepatectomy previously on hospice, PTSD/schioaffective/bipolar disorder who was admitted on 5/3/2023 for hemorrhagic shock due to acute GI bleed. Hospital course complicated by sepsis of unclear source.        Pt has been transitioned to comfort cares and will be discharging back to assisted living facility with hospice once successfully coordinated with SW.      Problem List:     Goals of care  Metastatic Colon Cancer   Suspected renal cancer  - Diagnosed with colorectal cancer in 2014 with oligometastatic disease to liver at the time of diagnosis. He was treated with ascending colectomy 6/14 and R hepatectomy in 8/14. He declined chemotherapy and enrolled in [facility], but eventually unenrolled from hospice due to overall stability and hospice visits felt intrusive  - CTA abd/pelvis this admission showed disease progression of colon cancer with lung mets, new tumor thrombus in the left portal vein, new/enlarging liver mets, new ascites, and stable bilateral renal masses concerning for solid renal neoplasms  -Extensive goals of care conversations had between hospitalist, palliative care team, oncology, patient and family on multiple occasions.  Patient's behavioral health issues, personality issues, inconsistent participation/engagement in these types of conversations and care plan made it quite challenging to decide on next steps.  Ultimately after care conference on 5/19 with entire care team, family and patient, it was decided that patient will be placed on comfort cares.  Patient does not react well to the terms comfort cares/hospice being used.     Plan is medically ready for discharge to [facility] on hospice cares when  arrangements, including hospice arrangements, can be made.  Discussed with social work, discharge tentatively planned for 5/31.     Rest of hospital course as below     Hemorrhagic shock due to acute GI bleed, Improved  Lactic acidosis, Resolved  Colitis likely due to colon cancer  Alcoholic cirrhosis with esophageal varices, ascites, and hepatic encephalopathy  Mild scrotal edema-improving  - Presented with confusion, falls, and 1 week history of intermittent rectal bleeding which patient felt was related to hemorrhoids. Hypotensive to 70s on arrival with initial lactate >8; improved with IV fluids. Initial Hgb 5.7. Ammonia level normal.  - In the ED, he was initiated on an octreotide infusion, IV PPI BID, and IV ceftriaxone for SBP ppx. Also transfused total 3 units pRBCs upon admission  - [facility] GI was consulted on admission. Underwent EGD which showed variceal banding scar that appeared healthy, as well as grade I and II esophageal varices. Colonoscopy deferred due to evidence of colitis on imaging.   - CTA abd/pelvis upon admission showed marked wall thickening and edema of the entire bowel with concern for shock bowel/ischemic bowel and disease progression of colon cancer; however patient has been relatively asymptomatic with improved lactate and WBC  - Octreotide d/c'd on 5/4.   - 5/8 He had an episode of maroon-colored bloody stool this morning and was hemodynamically stable and repeat hemoglobin was stable at 8 and I did discuss with the GI team again today and source of his blood loss today is due to metastatic disease and further evaluation with colonoscopy is not possible with disease progression as tumors/neoplastic tissue is friable and treatment with APC would not be helpful and treatment is blood products and repeat CT abdomen done on 5/9/2023 did not show any evidence of bleeding  -continued with PPI, soft diet  - s/p paracentesis again on 5/11/2023 and 3.4 L of fluid was removed and the fluid  analysis does not show any evidence of SBP  Patient did complete 7 total days of IV antibiotics (Rocephin and zosyn) while in the hospital which should be adequate for SBP prophylaxis given his GI bleed and cirrhosis/ascites  -Did give him a small dose of Lasix 40 mg 5/14 and his edema seems to be improving  -5/18,significant abdominal distention distention without  pain.  Completed paracentesis.  2.3 L removed.  Ascitic fluid not consistent with SBP.  Negative Gram stain.  Culture no growth today.  Patient feels more comfortable  -Discussed management of ascites with [facility] gastroenterology.  They recommend Lasix 40 mg daily and Aldactone 100 mg daily.  However patient is now transitioning to comfort cares and will hold off on diuretic therapy at this time.  -5/19 patient had a episode of small amount of bright red blood per rectum this morning.  Perirectal area appeared normal except for a small small spot of blood on his underwear.  External hemorrhoids noted but no signs of inflammation.  Discussed with gastroenterology.  Plan is to just monitor clinically.  Patient was initiated on Lasix 20 mg daily and Aldactone 50 mg daily, reasonable at this time as this may reduce his ascites and scrotal edema allowing for improved comfort.  This can be continued depending on patient's clinical status going forward.     Sepsis due to suspected aspiration pneumonia, Resolved  Positive blood culture, suspect contaminant  - Spiked fever to 100.8 overnight 5/4-5/5 along with mild tachycardia. WBC 18.1k. Lactate 2.2. Reported increased shortness of breath. CXR showed known lung mets, bilateral infiltrates due to pulmonary edema vs pneumonia. He had been on ceftriaxone for SBP prophylaxis; antibiotics were broadened to IV pip-tazo with improvement. COVID-19 PCR negative. Unfortunately, blood cultures were not drawn.   - Underwent paracentesis (5/5) with removal of 2L fluid. 120 PMNs, no evidence of SBP  - 1 out of 2 blood  cultures on admission (5/3) grew Staph epi. Suspect contaminant  - wbc count has been down trending and he has been afebrile  -completed antibiotic course       Acute respiratory distress due to aspiration pneumonia vs pulmonary edema, Resolved  Leukocytosis-Improved  - Reported increased shortness of breath overnight 5/4-5/5.   -CXR showed pulmonary edema vs pneumonia. spO2 mid 90s on room air, but was placed on supplemental O2 for comfort.Treated with IV pip-tazo with improvement.   - Repeat CXR (5/6) improved  -He has been having persistent white count during this hospital stay but he has been afebrile  - wbc count is improving and repeat ascitic fluid analysis on 5/11 does not support any evidence of SBP        Thrombocytopenia, -Resolved  - Likely due to cirrhosis and ongoing alcohol use. Platelets wnl on admission though sample was likely hemoconcentrated.   -Platelet count improved.      Alcohol use disorder  - Longstanding history of alcohol dependence. Currently consuming 1 oz of gin five times daily. Last drink: 5/3. No s/sx of withdrawal noted this admission  - On thiamine/folate/MVI while hospitalized.  Discontinued with comfort cares.  -Patient's children state he has been drinking alcohol heavily in the last year and has not been interested in any treatment program .     Hypokalemia-resolved  -Potassium has been intermittently low, replaced as indicated.  Discontinued with comfort cares.    PTSD  Hx of schizoaffective disorder  Hx of bipolar disorder type I  - PTA regimen: clonazepam 0.5 mg daily, olanzapine 5 mg q6h prn, Invega 234 IM q28 days  - Last Invega dose: 5/6  - Clonazepam and olanzapine ordered prn this admission   -It is extremely difficult to assess patient's capacity to make medical decisions for himself.  His capacity to make decisions seems to rest primarily on his psychiatric disease.  No clear cognitive dysfunction.  There appears to be no metabolic encephalopathy.  Ammonia level is  "normal.  Patient has been alert.  Is also able to provide some detail regarding his cancer history.  -Agree with palliative care assessment: His decision making capacity is so complicated and convoluted by his mental health issues and intermittent lack of interest/willingness to engage. We cannot safely say he does not have the ability to make medical decisions, but given the complexity, it is safe to say that he cannot make medical decisions independently. Thus, we all agree that moving forward with hospice is most appropriate   -Clonazepam dosing increased per psychiatry.     Cachexia  Severe malnutrition in context of acute on chronic illness  - Daily magic cup ordered per Nutrition       Diet: Room Service  Snacks/Supplements Adult: Magic Cup; Between Meals  Snacks/Supplements Adult: Ensure Max Protein (bariatric); Between Meals  Mechanical/Dental Soft Diet    DVT Prophylaxis: comfort care  Chew Catheter: Not present  Lines: None     Cardiac Monitoring: None  Code Status: No CPR- Do NOT Intubate      Clinically Significant Risk Factors              # Hypoalbuminemia: Lowest albumin = 2.3 g/dL at 5/17/2023  8:21 AM, will monitor as appropriate             # Severe Malnutrition: based on nutrition assessment         Disposition Plan      Expected Discharge Date: 05/30/2023, 12:00 PM  Discharge Delays: *Medically Ready for Discharge  Other (Add Comment)  Comfort Care/Hospice    Discharge Comments: Hospice. From [place], family to transport on Mondayn at 2 pm.          Luz Ramirez MD  Hospitalist Service  [facility]  Securely message with [employer] (more info)  Text page via [facility] Paging/Directory   _____________________________________________________________________    Interval History   \"I am confused about the disappearing ink.\"  Solo is focused on the white board in his room and repeats his comment about \"disappearing ink\" multiple times.  He says, \"I need Dilaudid,\" says he has pain " "\"all over my body\" between Dilaudid doses.  He has no specific respiratory or GI complaints.    Physical Exam   Vital Signs:           Resp: 16        Weight: 198 lbs 0 oz     Constitutional: Awake, alert  Respiratory: Clear to auscultation bilaterally, no crackles or wheezing  Cardiovascular: Regular rate and rhythm, normal S1 and S2, and no murmur noted  GI: Abdomen is distended, firm, not especially tender  Skin/Integumen: he is jaundiced  Other: Mood is vacant    Medical Decision Making       35 MINUTES SPENT BY ME on the date of service doing chart review, history, exam, documentation & further activities per the note.      Data   ------------------------- PAST 24 HR DATA REVIEWED -----------------------------------------------        Imaging results reviewed over the past 24 hrs:   No results found for this or any previous visit (from the past 24 hour(s)).  "

## 2023-05-30 NOTE — PLAN OF CARE
Summary: Comfort care  DATE & TIME: 5/29/23 1900- 5/30/23 0795  Cognitive Concerns/ Orientation: A & O x 3, disoriented to situation. Slow speech, word finding difficulty.   BEHAVIOR & AGGRESSION TOOL COLOR: Green  ABNL VS/O2: Comfort cares  MOBILITY: T/R, refused at time, patient on comfort cares.   PAIN MANAGMENT: PRN Dilaudid given x2 for generalized pain.   DIET: Mechanical soft  BOWEL/BLADDER: External catheter in place for incontinence of urine. Up to BSC for Large BM x1.  ABNL LAB/BG: None  DRAIN/DEVICES: PIV SL  TELEMETRY RHYTHM: N/A  SKIN: Pale, blanchable redness on coccyx/sacrum with mepilex in place; scrotal edema, karissa BLE  TESTS/PROCEDURES: none  D/C DATE: Discharge pending, see SW note.   Discharge Barriers: Return to [facility] with [facility], when everything is set up.   OTHER IMPORTANT INFO: Tessalon pearls given x1 for cough

## 2023-05-30 NOTE — PROGRESS NOTES
Care Management Follow Up    Length of Stay (days): 27    Expected Discharge Date: 05/31/2023     Concerns to be Addressed:       Patient plan of care discussed at interdisciplinary rounds: Yes    Anticipated Discharge Disposition:  (return to [facility] with [facility])     Anticipated Discharge Services:    Anticipated Discharge DME:      Patient/family educated on Medicare website which has current facility and service quality ratings: no  Education Provided on the Discharge Plan:    Patient/Family in Agreement with the Plan:  (adult children in agreement, unsure if patient understands)    Referrals Placed by CM/SW: Internal Clinic Care Coordination, coordination with [facility], [facility] and [facility] transport.   Private pay costs discussed:     Additional Information:  Spoke with Yolanda at [facility].  She is waiting for paperwork to be signed and patient's furniture to be moved from his current apartment before patient can admit to the [facility] room.  Around mid morning writer contacted son Vince to review what needs to happen before his father can discharge back to the [facility].   He reports he had left a message for staff over the w/e to arrange signing of the amendment to patient's current lease and had not received a phone call. Writer also shared understanding of patient's current room needing to be vacated before he moves into the [facility] area. Son acknowledged understanding this.  Writer asked son to place another call to [facility] staff to arrange completing necessary paperwork.  Son said he would and would call writer back once he reaches staff.     Update at 5263  Writer spoke with son Vnice who has spoken with May at [facility].  She is sending him the paperwork electronically which he will sign and send back to her by tomorrow morning.   Next writer spoke with May who confirms patient can return to [facility] tomorrow early afternoon.  May gave a list  Mailed a lab result letter to patient. "of DME they are requesting.    Writer called [facility] ay [phone] to update him we are planning a Tuesday discharge between 1210 and 1250.  Transport will be provided by [facility] (stretcher)  The [facility] nurse is requesting a hospital bed, over the bed table, shower chair and w/c be delivered prior to admission. This equipment will be delivered tomorrow morning. Pepe Clarke has been updated regarding discharge time tomorrow.    Discharge orders should be to HOME set, please fill 2-3 days worth of any comfort medications thru the hospital discharge pharmacy and include in the orders \"[facility] to evaluate and treat\"      Meg Steve, ARGELIA      "

## 2023-05-30 NOTE — PROGRESS NOTES
Palliative Social Work Note    Attempted visit with Arti this morning.  He declined offer.     Will be available for support as needed.     KIM Garland, [facility]   Palliative Care Clinical   Ph: [phone]

## 2023-05-30 NOTE — PLAN OF CARE
Summary: Comfort care  DATE & TIME: 5/30/23 0228-8435  Cognitive Concerns/ Orientation: A & O x 3, disoriented to situation. Slow speech, word finding difficulty.   BEHAVIOR & AGGRESSION TOOL COLOR: Green  ABNL VS/O2: Comfort cares  MOBILITY: refused t/r this shift, shifting slightly in bed.  PAIN MANAGMENT: PRN Dilaudid given x1 for generalized pain.   DIET: Mechanical soft diet  BOWEL/BLADDER: External catheter in place for incontinence of urine.  ABNL LAB/BG: N/A  DRAIN/DEVICES: PIV SL  TELEMETRY RHYTHM: N/A  SKIN: Pale, blanchable redness on coccyx/sacrum with mepilex in place; scrotal edema, karissa BLE  TESTS/PROCEDURES: none  D/C DATE: Discharge pending, see SW note.   Discharge Barriers: Return to [facility] with [facility], when everything is set up- most likely tomorrow.

## 2023-05-30 NOTE — PLAN OF CARE
Summary: Comfort care  DATE & TIME: 5/30/23 7-3 pm   Cognitive Concerns/ Orientation: A & O x 3, disoriented to situation. Slow speech, word finding difficulty. Flat affect.  BEHAVIOR & AGGRESSION TOOL COLOR: Green  ABNL VS/O2: Comfort cares  MOBILITY: T/R, refused except once, and patient on comfort cares.   PAIN MANAGMENT: PRN Dilaudid given x3 for generalized pain.   DIET: Mechanical soft  BOWEL/BLADDER: External catheter in place for incontinence of urine.  ABNL LAB/BG: N/A  DRAIN/DEVICES: PIV SL  TELEMETRY RHYTHM: N/A  SKIN: Pale, blanchable redness on coccyx/sacrum with mepilex in place; scrotal edema, karissa BLE  TESTS/PROCEDURES: none  D/C DATE: Discharge pending, see SW note.   Discharge Barriers: Return to [facility] with [facility], when everything is set up.   OTHER IMPORTANT INFO: Tessalon pearls given x1 for cough. Edema appears worse in lower half of body. No other changes.

## 2023-05-31 NOTE — PROGRESS NOTES
Patient discharging back to [facility] with [facility]. Patient took all his belongings with him and also his discharge meds. Patient transporting via stretcher with [facility] transport.

## 2023-05-31 NOTE — PROGRESS NOTES
Care Management Discharge Note    Discharge Date: 05/31/2023       Discharge Disposition:  (return to [facility] with [facility])    Discharge Services:      Discharge DME:      Discharge Transportation:  BLS via [facility]    Private pay costs discussed: transportation costs    Does the patient's insurance plan have a 3 day qualifying hospital stay waiver?  No    PAS Confirmation Code: not needed  Patient/family educated on Medicare website which has current facility and service quality ratings: no    Education Provided on the Discharge Plan: son  Persons Notified of Discharge Plans: son  Patient/Family in Agreement with the Plan:  (adult children in agreement, unsure if patient understands)    Handoff Referral Completed: No    Additional Information:  Patient discharging today as planned. Back to [facility] with enrollment into [facility], leaving here between 12:10 and 12:50 PCS and face sheet has been faxed to [employer] [facility]. Patient has not been out of bed for over a week and cannot tolerate sitting up.  Orders have been faxed to [facility] ([phone] and to [facility] at ([phone])    Update:  Discharge was delayed as the hospital bed did not arrive this morning to the [facility].  Misha from [facility] stated [facility] will have bed+ delivered by 1500. Transportation is scheduled to arrive between 1500 and 1600.  Faby the nurse at [facility], [phone] accepted patient arriving later then the original time         ARGELIA Albert

## 2023-05-31 NOTE — PLAN OF CARE
Summary: Comfort care  DATE & TIME: 5/31/23 7-3 pm  Cognitive Concerns/ Orientation: A & O x 3, disoriented to situation. Slow speech, word finding difficulty. Odd statements at times.   BEHAVIOR & AGGRESSION TOOL COLOR: Green  ABNL VS/O2: Comfort cares  MOBILITY: A2. Refused turning.   PAIN MANAGMENT: PO Dilaudid 2 mg twice this shift  DIET: Mechanical soft diet  BOWEL/BLADDER: External catheter in place draining rashi clear urine for incontinence. No BM and refused all laxatives today.   ABNL LAB/BG: N/A  DRAIN/DEVICES: PIV Removed.   TELEMETRY RHYTHM: N/A  SKIN: Pale, blanchable redness on coccyx/sacrum with mepilex, changed this morning.   D/C DATE: Discharge today between 4386-5691.   Discharge Barriers: Return to [facility] with [facility].

## 2023-05-31 NOTE — PLAN OF CARE
Summary: Comfort care  DATE & TIME: 5/30/23 [id number]  Cognitive Concerns/ Orientation: A & O x 3, disoriented to situation. Slow speech, word finding difficulty.   BEHAVIOR & AGGRESSION TOOL COLOR: Green  ABNL VS/O2: Comfort cares  MOBILITY: t/r, refused t/r at times, shifting slightly in bed.  PAIN MANAGMENT:Requested Dilaudid 2mg twice this shift  DIET: Mechanical soft diet  BOWEL/BLADDER: External catheter in place draining rashi clear urine for incontinence.  ABNL LAB/BG: N/A  DRAIN/DEVICES: PIV SL  TELEMETRY RHYTHM: N/A  SKIN: Pale, blanchable redness on coccyx/sacrum with mepilex in place; scrotal edema, karissa BLE  TESTS/PROCEDURES: none  D/C DATE: Discharge pending, see SW note.   Discharge Barriers: Return to [facility] with [facility], when everything is set up- most likely tomorrow.

## 2023-05-31 NOTE — PLAN OF CARE
Summary: Comfort care  DATE & TIME: 5/30/23 1900-2330  Cognitive Concerns/ Orientation: A & O x 3, disoriented to situation. Slow speech, word finding difficulty.   BEHAVIOR & AGGRESSION TOOL COLOR: Green  ABNL VS/O2: Comfort cares  MOBILITY: t/r, refused t/r at times, shifting slightly in bed.  PAIN MANAGMENT: denies  DIET: Mechanical soft diet  BOWEL/BLADDER: External catheter in place for incontinence of urine.  ABNL LAB/BG: N/A  DRAIN/DEVICES: PIV SL  TELEMETRY RHYTHM: N/A  SKIN: Pale, blanchable redness on coccyx/sacrum with mepilex in place; scrotal edema, karissa BLE  TESTS/PROCEDURES: none  D/C DATE: Discharge pending, see SW note.   Discharge Barriers: Return to [facility] with [facility], when everything is set up- most likely tomorrow.

## 2023-05-31 NOTE — DISCHARGE SUMMARY
[facility]  Hospitalist Discharge Summary      Date of Admission:  5/3/2023  Date of Discharge:  5/31/2023  Discharging Provider: Luz Ramirez MD  Discharge Service: Hospitalist Service    Discharge Diagnoses   Metastatic colon cancer  Suspected kidney cancer  Sepsis due to suspected aspiration pneumonia   Positive blood culture, probably a contaminant  Leukocytosis, improved  Thrombocytopenia, resolved  Alcohol use disorder  Hypokalemia, resolved  History of schizoaffective disorder  History of bipolar disorder, type I  Cachexia  Severe malnutrition in the context of acute on chronic illness    Clinically Significant Risk Factors     # Severe Malnutrition: based on nutrition assessment      Follow-ups Needed After Discharge   Follow-up Appointments     Follow-up and recommended labs and tests       Follow up with [facility].  They will contact you.           Unresulted Labs Ordered in the Past 30 Days of this Admission     Date and Time Order Name Status Description    5/3/2023 12:00 PM Prepare red blood cells (unit) Preliminary           Discharge Disposition   Discharged to assisted living with hospice care  Condition at discharge: Terminal    Hospital Course   Los Huang is a 67 year old male with hx of alcoholic cirrhosis with known esophageal varices, metastatic colon cancer s/p partial colectomy and right hepatectomy previously on hospice, PTSD/schioaffective/bipolar disorder who was admitted on 5/3/2023 for hemorrhagic shock due to acute GI bleed. Hospital course complicated by sepsis of unclear source.        Emphasis is now on comfort.  Discharging back to his assisted living facility and will enroll in hospice.     Problem List:     Goals of care  Metastatic Colon Cancer   Suspected renal cancer  - Diagnosed with colorectal cancer in 2014 with oligometastatic disease to liver at the time of diagnosis. He was treated with ascending colectomy 6/14 and R hepatectomy  in 8/14. He declined chemotherapy and enrolled in [facility], but eventually unenrolled from hospice due to overall stability and hospice visits felt intrusive  - CTA abd/pelvis this admission showed disease progression of colon cancer with lung mets, new tumor thrombus in the left portal vein, new/enlarging liver mets, new ascites, and stable bilateral renal masses concerning for solid renal neoplasms  -Extensive goals of care conversations had between hospitalist, palliative care team, oncology, patient and family on multiple occasions.  Patient's behavioral health issues, personality issues, inconsistent participation/engagement in these types of conversations and care plan made it quite challenging to decide on next steps.  Ultimately after care conference on 5/19 with entire care team, family and patient, it was decided that patient will be placed on comfort cares.       Rest of hospital course as below     Hemorrhagic shock due to acute GI bleed, Improved  Lactic acidosis, Resolved  Colitis likely due to colon cancer  Alcoholic cirrhosis with esophageal varices, ascites, and hepatic encephalopathy  Mild scrotal edema-improving  - Presented with confusion, falls, and 1 week history of intermittent rectal bleeding which patient felt was related to hemorrhoids. Hypotensive to 70s on arrival with initial lactate >8; improved with IV fluids. Initial Hgb 5.7. Ammonia level normal.  - In the ED, he was initiated on an octreotide infusion, IV PPI BID, and IV ceftriaxone for SBP ppx. Also transfused total 3 units pRBCs upon admission  - [facility] was consulted on admission. Underwent EGD which showed variceal banding scar that appeared healthy, as well as grade I and II esophageal varices. Colonoscopy deferred due to evidence of colitis on imaging.   - CTA abd/pelvis upon admission showed marked wall thickening and edema of the entire bowel with concern for shock bowel/ischemic bowel and disease progression of colon  cancer; however patient has been relatively asymptomatic with improved lactate and WBC  - Octreotide d/c'd on 5/4.   - 5/8 He had an episode of maroon-colored bloody stool this morning and was hemodynamically stable and repeat hemoglobin was stable at 8 and I did discuss with the GI team again today and source of his blood loss today is due to metastatic disease and further evaluation with colonoscopy is not possible with disease progression as tumors/neoplastic tissue is friable and treatment with APC would not be helpful and treatment is blood products and repeat CT abdomen done on 5/9/2023 did not show any evidence of bleeding  -continued with PPI, soft diet  - s/p paracentesis again on 5/11/2023 and 3.4 L of fluid was removed and the fluid analysis does not show any evidence of SBP  Patient did complete 7 total days of IV antibiotics (Rocephin and zosyn) while in the hospital which should be adequate for SBP prophylaxis given his GI bleed and cirrhosis/ascites  -Did give him a small dose of Lasix 40 mg 5/14 and his edema seems to be improving  -5/18,significant abdominal distention distention without  pain.  Completed paracentesis.  2.3 L removed.  Ascitic fluid not consistent with SBP.  Negative Gram stain.  Culture no growth today.  Patient feels more comfortable  -Discussed management of ascites with [facility] gastroenterology.  They recommend Lasix 40 mg daily and Aldactone 100 mg daily.  However patient is now transitioning to comfort cares and will hold off on diuretic therapy at this time.  -5/19 patient had a episode of small amount of bright red blood per rectum this morning.  Perirectal area appeared normal except for a small small spot of blood on his underwear.  External hemorrhoids noted but no signs of inflammation.  Discussed with gastroenterology.  Plan is to just monitor clinically.  Patient was initiated on Lasix 20 mg daily and Aldactone 50 mg daily, reasonable at this time as this may reduce his  ascites and scrotal edema allowing for improved comfort.  This can be continued depending on patient's clinical status going forward.     Sepsis due to suspected aspiration pneumonia, Resolved  Staph epidermidis positive blood culture, suspect contaminant  - Spiked fever to 100.8 overnight 5/4-5/5 along with mild tachycardia. WBC 18.1k. Lactate 2.2. Reported increased shortness of breath. CXR showed known lung mets, bilateral infiltrates due to pulmonary edema vs pneumonia. He had been on ceftriaxone for SBP prophylaxis; antibiotics were broadened to IV pip-tazo with improvement. COVID-19 PCR negative. Unfortunately, blood cultures were not drawn.   - Underwent paracentesis (5/5) with removal of 2L fluid. 120 PMNs, no evidence of SBP  - 1 out of 2 blood cultures on admission (5/3) grew Staph epi. Suspect contaminant  - wbc count has been down trending and he has been afebrile  -completed antibiotic course       Acute respiratory distress due to aspiration pneumonia vs pulmonary edema, Resolved  Leukocytosis-Improved  - Reported increased shortness of breath overnight 5/4-5/5.   -CXR showed pulmonary edema vs pneumonia. spO2 mid 90s on room air, but was placed on supplemental O2 for comfort.Treated with IV pip-tazo with improvement.   - Repeat CXR (5/6) improved  -He has been having persistent white count during this hospital stay but he has been afebrile  - wbc count is improving and repeat ascitic fluid analysis on 5/11 does not support any evidence of SBP        Thrombocytopenia, -Resolved  - Likely due to cirrhosis and ongoing alcohol use. Platelets wnl on admission though sample was likely hemoconcentrated.   -Platelet count improved.      Alcohol use disorder  - Longstanding history of alcohol dependence. Currently consuming 1 oz of gin five times daily. Last drink: 5/3. No s/sx of withdrawal noted this admission  - On thiamine/folate/MVI while hospitalized.  Discontinued with comfort cares.  -Patient's  children state he has been drinking alcohol heavily in the last year and has not been interested in any treatment program .     Hypokalemia-resolved  -Potassium has been intermittently low, replaced as indicated.  Discontinued with comfort cares.    PTSD  Hx of schizoaffective disorder  Hx of bipolar disorder type I  - PTA regimen: clonazepam 0.5 mg daily, olanzapine 5 mg q6h prn, Invega 234 IM q28 days  - Last Invega dose: 5/6  - Clonazepam and olanzapine ordered prn this admission   -It is extremely difficult to assess patient's capacity to make medical decisions for himself.  His capacity to make decisions seems to rest primarily on his psychiatric disease.  No clear cognitive dysfunction.  There appears to be no metabolic encephalopathy.  Ammonia level is normal.  Patient has been alert.  Is also able to provide some detail regarding his cancer history.  -Agree with palliative care assessment: His decision making capacity is so complicated and convoluted by his mental health issues and intermittent lack of interest/willingness to engage. We cannot safely say he does not have the ability to make medical decisions, but given the complexity, it is safe to say that he cannot make medical decisions independently. Thus, we all agree that moving forward with hospice is most appropriate   -Clonazepam dosing increased per psychiatry.     Cachexia  Severe malnutrition in context of acute on chronic illness  - Daily magic cup ordered per Nutrition      Consultations This Hospital Stay   INTENSIVIST IP CONSULT  MEDICATION HISTORY IP PHARMACY CONSULT  PHYSICAL THERAPY ADULT IP CONSULT  OCCUPATIONAL THERAPY ADULT IP CONSULT  PHYSICAL THERAPY ADULT IP CONSULT  OCCUPATIONAL THERAPY ADULT IP CONSULT  GASTROENTEROLOGY IP CONSULT  PALLIATIVE CARE ADULT IP CONSULT  HEMATOLOGY & ONCOLOGY IP CONSULT  CARE MANAGEMENT / SOCIAL WORK IP CONSULT  SPEECH LANGUAGE PATH ADULT IP CONSULT  PHARMACY IP CONSULT  NUTRITION SERVICES ADULT IP  CONSULT  PALLIATIVE CARE ADULT IP CONSULT  PSYCHIATRY IP CONSULT  HEMATOLOGY & ONCOLOGY IP CONSULT  GASTROENTEROLOGY IP CONSULT  CARE MANAGEMENT / SOCIAL WORK IP CONSULT    Code Status   No CPR- Do NOT Intubate    Time Spent on this Encounter   I, Luz Ramirez MD, personally saw the patient today and spent greater than 30 minutes discharging this patient.       Luz Ramirez MD  [place] [address] [facility]  [address] [place] [address]  Phone: [phone]  ______________________________________________________________________    Physical Exam   Vital Signs: Temp: 98.1  F (36.7  C) Temp src: Oral BP: 109/67 Pulse: 88   Resp: 16 SpO2: 94 % O2 Device: None (Room air)    Weight: 198 lbs 0 oz  I saw and examined the patient on the date of discharge.         Primary Care Physician   Darryl Chavez    Discharge Orders      Reason for your hospital stay    You had bleeding into the GI tract.  This has stabilized.     Follow-up and recommended labs and tests     Follow up with [facility].  They will contact you.     Activity    Your activity upon discharge: activity as tolerated     Diet    Follow this diet upon discharge: Orders Placed This Encounter      Room Service      Snacks/Supplements Adult: Magic Cup; Between Meals      Snacks/Supplements Adult: Ensure Max Protein (bariatric); Between Meals      Mechanical/Dental Soft Diet       Significant Results and Procedures   Most Recent 3 CBC's:Recent Labs   Lab Test 05/19/23  0732 05/17/23  0821 05/14/23  1027 05/13/23  0837 05/12/23  1056   WBC 6.6 9.5  --   --  11.9*   HGB 8.2* 7.6* 7.8*   < > 7.4*   MCV 89 91  --   --  91    221  --   --  221    < > = values in this interval not displayed.     Most Recent 3 BMP's:Recent Labs   Lab Test 05/19/23  0732 05/17/23  0821 05/16/23  1041   * 133* 133*   POTASSIUM 3.6 4.0 3.8   CHLORIDE 102 103 103   CO2 22 20* 19*   BUN 16.9 16.9 16.2   CR 0.64* 0.68 0.64*   ANIONGAP  10 10 11   GEOVANNA 7.9* 7.9* 8.1*   * 101* 155*     Most Recent 2 LFT's:Recent Labs   Lab Test 05/19/23  0732 05/17/23  0821   AST 45 41   ALT 50 57*   ALKPHOS 737* 687*   BILITOTAL 3.7* 4.2*     7-Day Micro Results     No results found for the last 168 hours.      ,   Results for orders placed or performed during the hospital encounter of 05/03/23   CTA Abdomen Pelvis with Contrast    Narrative    CTA ABDOMEN PELVIS WITH CONTRAST 5/3/2023 2:16 PM    CLINICAL HISTORY: GI bleed,    TECHNIQUE: CT angiogram of the abdomen and pelvis was performed  following injection of IV contrast. Multiplanar reformats were  obtained. Dose reduction techniques were used.  CONTRAST: 119mL Isovue-370    COMPARISON: 7/14/2022    FINDINGS:   CT ABDOMINAL AORTIC ANGIOGRAM: The celiac artery, superior mesenteric  artery, bilateral renal arteries, inferior mesenteric artery,  bilateral common, external and internal iliac arteries and common  femoral arteries are patent without significant stenosis. Stable  aneurysmal dilatation of the distal common iliac artery measuring 1.7  cm. No evidence for active extravasation into the bowel.    LOWER CHEST: Multiple pulmonary nodules and masses in the visualized  lung bases with enlargement of several nodules. For example, a 4.6 cm  mass in the posterior left lower lobe previously measured 3.4 cm  (series 6, image 9) and a 2.8 cm mass in the right lower lobe measured  1.1 cm (series 6, image 24). Partly visualized small right pleural  effusion.    HEPATOBILIARY: Right hepatectomy. New expansile hypodensity in the  left portal vein with enlargement of multiple nodules in the left  hepatic lobe, concerning for tumor thrombus and worsening metastatic  disease. Confluent masses in the central left lobe measure 7.0 x 4.9  cm, previously measuring 3.7 x 2.1 cm (series 8, image 32). Cirrhotic  morphology of the liver. Increased size of subcapsular implants along  the right hepatic lobe. For example, 11.5  x 7.0 cm implant along the  right hepatectomy margin previously measured about 7.0 x 4.0 cm  (series 8, image 17).    PANCREAS: Normal.    SPLEEN: Stable splenomegaly and splenic granuloma.    ADRENAL GLANDS: Normal.    KIDNEYS/BLADDER: Stable 2.6 cm solid enhancing mass of the lower pole  of the right kidney and indeterminate 2.1 cm mass of the upper pole of  the left kidney. No hydronephrosis or perinephric stranding  bilaterally.    BOWEL: Right hemicolectomy. No small bowel or colonic obstruction.  Marked circumferential wall thickening with enhancement and edema of  the entire small bowel and colon. The appearance is most suggestive of  shock bowel. No colonic diverticuli. No evidence of active GI bleed.    PELVIC ORGANS: No pelvic masses.    ADDITIONAL FINDINGS: New moderate ascites. No lymphadenopathy in the  abdomen and pelvis.    MUSCULOSKELETAL: No destructive lesions of the bones. Right upper  abdominal hernia mesh.      Impression    IMPRESSION:   1.  Findings concerning for shock bowel with marked edema involving  the entire small bowel and residual colon (prior right hemicolectomy).  No evidence for active GI bleed.  2.  Disease progression of colon cancer with enlargement of visualized  lung metastases. New small right pleural effusion.  3.  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 Enlargement of subcapsular  metastatic deposits along the right lobe of the liver.  4.  Right hepatectomy. Cirrhosis and splenomegaly.  5.  New moderate ascites either due to portal hypertension and/or  peritoneal carcinomatosis.  6.  Stable bilateral renal masses concerning for solid renal  neoplasms.  7.  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    HARI GARCIA MD         SYSTEM ID:  [id number]   XR Chest Port 1 View    Narrative    EXAM: XR CHEST PORT 1 VIEW  LOCATION: [facility]  DATE/TIME: 5/4/2023 10:55 PM  CDT    INDICATION: Coughing, leukocytosis  COMPARISON: 05/10/2021      Impression    IMPRESSION: Multiple bilateral lung nodules compatible with known metastatic disease. Pulmonary edema. Concomitant infiltrates cannot be excluded. Small right pleural effusion. Heart size normal. Left chest port catheter tip in SVC.   US Paracentesis without Albumin    Narrative    US PARACENTESIS WITHOUT ALBUMIN 5/5/2023 10:40 AM    CLINICAL HISTORY: sepsis    PROCEDURE: Informed consent obtained. Time out performed. The abdomen  was prepped and draped in a sterile fashion. 10 mL of 1% lidocaine was  infused into local soft tissues. A 5 [place] citizen catheter system was  introduced into the abdominal ascites under ultrasound guidance.    2.1 liters of clear fluid were removed and sent to lab if requested.    Patient tolerated procedure well.    Ultrasound imaging was obtained and placed in the patient's permanent  medical record.      Impression    IMPRESSION:  1.  Status post ultrasound-guided paracentesis.    JACK LANDRY MD         SYSTEM ID:  [id number]   XR Chest Port 1 View    Narrative    EXAM: XR CHEST PORT 1 VIEW  LOCATION: [facility]  DATE/TIME: 5/6/2023 8:49 AM CDT    INDICATION: f u pulmonary edema  COMPARISON: 05/04/2023      Impression    IMPRESSION: Heart is normal in size. Multiple pulmonary nodules are noted bilaterally compatible with metastatic disease. Trace bilateral effusions with bibasilar atelectasis. Large mass is noted within the medial right lung base. Left Mediport,   unchanged. No evidence of pulmonary edema.    CTA Abdomen Pelvis with Contrast    Narrative    EXAM: CTA ABDOMEN PELVIS WITH CONTRAST  LOCATION: [facility]  DATE/TIME: 5/9/2023 5:33 AM CDT    INDICATION: GI bleed  COMPARISON: 5/3/2023 and 7/14/2022  TECHNIQUE: CT angiogram abdomen pelvis during arterial phase of injection of IV contrast. 2D and 3D MIP reconstructions were performed by the CT  technologist. Dose reduction techniques were used.  CONTRAST: 123mL Isovue 370    FINDINGS:  ANGIOGRAM ABDOMEN/PELVIS: Normal caliber abdominal aorta. Celiac, superior mesenteric, renal and inferior mesenteric arteries are patent. 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 Site of GI bleed not identified.    LOWER CHEST: Pulmonary metastases are again seen. Ossified granulomas right lung base. Small right pleural effusion.    HEPATOBILIARY: Right hepatectomy. Lobular contour of the residual liver. Confluent masses in the left central lobe unchanged. Expansile hypodensity within the left portal vein similar.    PANCREAS: Normal.    SPLEEN: Calcified granulomas. Splenomegaly.    ADRENAL GLANDS: Thickening left adrenal.    KIDNEYS/BLADDER: 2.4 cm mass lower pole right kidney and 1.7 cm lesion upper pole left kidney similar. Subcentimeter renal hypodensities are small for characterization.    BOWEL: Right hemicolectomy. Underdistention the stomach. Improved wall thickening of small bowel. Persistent but improved colonic wall thickening. Moderate amount of ascites.    LYMPH NODES: Stable.    PELVIC ORGANS: Prominent prostate prostate calcification.    MUSCULOSKELETAL: Degenerative change osseous structures. Soft tissue edema.      Impression    IMPRESSION:  1.  Site of GI bleed not identified.  2.  Interval improvement in wall thickening of small bowel and colon.  3.  Pulmonary metastases. Small right pleural effusion.  4.  Tumor thrombus left portal vein and hepatic metastases again seen. Subcapsular implants along the liver again noted.  5.  Bilateral renal lesions again seen.  6.  Moderate amount of ascites.   US Paracentesis without Albumin    Narrative    ULTRASOUND PARACENTESIS WITHOUT ALBUMIN May 11, 2023 10:59 AM     HISTORY: Cirrhosis.    FINDINGS: Ultrasound was used to evaluate for the presence and best  approach for paracentesis. Written and oral informed consent was  obtained. A pause for the cause  procedure to verify the correct  patient and correct procedure. The skin overlying the right lower  quadrant was prepped and draped in the usual sterile fashion. The  subcutaneous tissues were anesthetized with 10 mL 1% lidocaine. A  catheter was advanced into the peritoneal space and 3.4 L of  straw  colored fluid was drained. There were no immediate complications.  Ultrasound images were permanently stored. Patient left the ultrasound  suite in satisfactory condition.      Impression    IMPRESSION: Technically successful paracentesis without immediate  complications.    LINDA MONTELONGO MD         SYSTEM ID:  [id number]   US Paracentesis with Albumin    Narrative    US PARACENTESIS WITH ALBUMIN 5/18/2023 2:10 PM    CLINICAL HISTORY: colon cancer, cirrhosis. recent GI bleed, will rule  out SBP,    PROCEDURE: Informed consent obtained. Time out performed. The abdomen  was prepped and draped in a sterile fashion. 10 mL of 1% lidocaine was  infused into local soft tissues. A 5 French catheter system was  introduced into the abdominal ascites under ultrasound guidance.    2.3 liters of clear fluid were removed and sent to lab if requested.    Patient tolerated procedure well.    Ultrasound imaging was obtained and placed in the patient's permanent  medical record.      Impression    IMPRESSION:  1.  Status post ultrasound-guided paracentesis.    JACK LANDRY MD         SYSTEM ID:  [id number]       Discharge Medications   Current Discharge Medication List      START taking these medications    Details   artificial saliva (BIOTENE DRY MOUTHWASH) LIQD liquid Swish and spit 10 mLs in mouth 4 times daily  Qty: 237 mL, Refills: 0    Associated Diagnoses: Metastatic colon cancer to liver (H)      bisacodyl (DULCOLAX) 10 MG suppository Place 1 suppository (10 mg) rectally daily as needed for constipation  Qty: 2 suppository, Refills: 0    Associated Diagnoses: Metastatic colon cancer to liver (H)      haloperidol (HALDOL) 0.5 MG  tablet Take 2 tablets (1 mg) by mouth or place under tongue every 6 hours as needed for agitation or other (nausea)  Qty: 30 tablet, Refills: 0    Associated Diagnoses: Metastatic colon cancer to liver (H)      HYDROmorphone (DILAUDID) 2 MG tablet Take 1 tablet (2 mg) by mouth every 2 hours as needed for pain or shortness of breath  Qty: 30 tablet, Refills: 0    Comments: Hospice patient. Dispense in quantities of 30 tablets.  Associated Diagnoses: Metastatic colon cancer to liver (H)         CONTINUE these medications which have CHANGED    Details   benztropine (COGENTIN) 1 MG tablet Medication instructions: Take 1 tablet by mouth daily at bedtime, hold until resident asks for it.  Qty: 10 tablet, Refills: 0    Associated Diagnoses: Bipolar 1 disorder (H)      clonazePAM (KLONOPIN) 0.5 MG tablet Take 1 tablet (0.5 mg) by mouth 3 times daily as needed for anxiety  Qty: 9 tablet, Refills: 0    Associated Diagnoses: Metastatic colon cancer to liver (H)      furosemide (LASIX) 40 MG tablet Take 0.5 tablets (20 mg) by mouth daily    Associated Diagnoses: Metastatic colon cancer to liver (H)         CONTINUE these medications which have NOT CHANGED    Details   Acetaminophen 325 MG CAPS Take 650 mg by mouth every 6 hours as needed (pain and or fever)      cyclobenzaprine (FLEXERIL) 10 MG tablet Take 10 mg by mouth 2 times daily as needed      docusate sodium (COLACE) 100 MG capsule Take 100 mg by mouth 2 times daily as needed for constipation      hydrocortisone (CORTAID) 1 % external cream Apply topically 4 times daily as needed for hemorrhoids      INVEGA SUSTENNA 234 MG/1.5ML JOE Inject 234 mg into the muscle every 28 days      melatonin 3 MG tablet Take 6 mg by mouth nightly as needed for sleep      OLANZapine (ZYPREXA) 5 MG tablet Take 5 mg by mouth every 6 hours as needed      ondansetron (ZOFRAN) 4 MG tablet Take 4 mg by mouth every 8 hours as needed for nausea      polyethylene glycol-propylene glycol (SYSTANE)  0.4-0.3 % SOLN ophthalmic solution Place 1-2 drops into both eyes every 2 hours as needed for dry eyes      Pramoxine HCl (SARNA SENSITIVE EX) Externally apply topically 3 times daily as needed (itching)      SENNA PLUS 8.6-50 MG tablet Take 2 tablets by mouth 2 times daily as needed for constipation      simethicone (MYLICON) 80 MG chewable tablet Take 1 tablet (80 mg) by mouth every 6 hours as needed for cramping  Qty: 15 tablet, Refills: 0    Associated Diagnoses: Metastatic colon cancer to liver (H)      traZODone (DESYREL) 50 MG tablet Take 1 tablet (50 mg) by mouth nightly as needed for sleep  Qty: 30 tablet, Refills: 1    Associated Diagnoses: Bipolar 1 disorder (H)         STOP taking these medications       betamethasone dipropionate (DIPROSONE) 0.05 % external cream Comments:   Reason for Stopping:         diclofenac (VOLTAREN) 1 % topical gel Comments:   Reason for Stopping:             Allergies   Allergies   Allergen Reactions     Animal Dander

## 2023-09-26 ENCOUNTER — PATIENT OUTREACH (OUTPATIENT)
Dept: ONCOLOGY | Facility: CLINIC | Age: 68
End: 2023-09-26
Payer: MEDICARE

## 2023-09-26 NOTE — TELEPHONE ENCOUNTER
"Date of death State file number  [id number]    Verified on [email or web address]    \"Notification of \" form completed and emailed to \"[email or web address]\" [email or web address]  on 2023    "

## 2023-11-22 NOTE — PLAN OF CARE
Goal Outcome Evaluation:  Summary: Sepsis unclear source. hemorrhagic shock due to acute GI bleed, Has metastatic colon cancer with suspected renal/lung cancer w mets. History of depression, PTSD, alcohol use disorder, schizoaffective disorder and bipolar.       DATE & TIME: 5/12/23 5144-3569  Cognitive Concerns/ Orientation : A&Ox4 Forgetful at times. Flat affect. Takes longer to answer questions/ slower speech.   BEHAVIOR & AGGRESSION TOOL COLOR: Green  ABNL VS/O2: VSS on RA  MOBILITY: AX1 GBW, ambulates to bathroom.   PAIN MANAGMENT: c/o abdominal pain- gave PRN oxy x2   DIET: Mechanical soft diet with supplements between meals  BOWEL/BLADDER: Continent B&B.   ABNL LAB/BG:   DRAIN/DEVICES: R PIV x2 SL & L PIV SL   TELEMETRY RHYTHM: NA  SKIN: Pale/flushed, jaundiced skin, +2 edema BLE. Mepilex on coccyx.   TESTS/PROCEDURES: 5/11 paracentesis- 3.4L removed  D/C DATE: possible Discharge to TCU to [place] pending hemoglobin stabilizing and no signs of sepsis.  OTHER IMPORTANT INFO: Started on PO antibiotics, diminished lung sounds. Nonproductive cough. GOLDEN.   retired

## 2025-01-19 NOTE — PATIENT INSTRUCTIONS
Thank you for coming to the [facility].    Lab Testing:  If you had lab testing today and your results are reassuring or normal they will be mailed to you or sent through [facility] within 7 days. If the lab tests need quick action we will call you with the results. The phone number we will call with results is # none (home) . If this is not the best number please call our clinic and change the number.    Medication Refills:  If you need any refills please call your pharmacy and they will contact us. Our fax number for refills is [phone]. Please allow three business for refill processing. If you need to  your refill at a new pharmacy, please contact the new pharmacy directly. The new pharmacy will help you get your medications transferred.     Scheduling:  If you have any concerns about today's visit or wish to schedule another appointment please call our office during normal business hours [phone] (8-5:00 M-F)    Contact Us:  Please call [phone] during business hours (8-5:00 M-F).  If after clinic hours, or on the weekend, please call  [phone].    Financial Assistance [phone]  [facility] Billing [phone]  Central Billing Office, [facility]: [phone]  [facility] Billing [phone]  Medical Records [phone]      MENTAL HEALTH CRISIS NUMBERS:  For a medical emergency please call  911 or go to the nearest ER.     [facility] [place]:   [facility] -[phone]   Crisis Residence [facility] -[phone]   Walk-In Counseling Center [phone]   COPE 24/7 [place] Mobile Team -[phone] (adults)/[phone] (child)  CHILD: [facility] needs assessment team - [phone]      [facility]:   [facility] - [phone]   Walk-in counseling [facility] - [phone]   Walk-in counseling [facility] - [phone]   Crisis Residence [facility] - [phone]  Urgent Care Adult  Mental Lkztpi-[phone] mobile unit/ 24/7 crisis line    National Crisis Numbers:   National Suicide Prevention Lifeline: 9-220-972-TALK (610-333-1617)  Poison Control Center - 5-505-761-7805  [facility]/resources for a list of additional resources (SOS)  Trans Lifeline a hotline for transgender people 4-723-842-3962  The [employer] a hotline for LGBT youth 1-774.236.5816  Crisis Text Line: For any crisis 24/7   To: 001052  see www.crisistextline.org  - IF MAKING A CALL FEELS TOO HARD, send a text!         Again thank you for choosing [facility] and please let us know how we can best partner with you to improve you and your family's health.    You may be receiving a survey regarding this appointment. We would love to have your feedback, both positive and negative. The survey is done by an external company, so your answers are anonymous.      23
